# Patient Record
Sex: MALE | Race: BLACK OR AFRICAN AMERICAN | NOT HISPANIC OR LATINO | Employment: FULL TIME | URBAN - METROPOLITAN AREA
[De-identification: names, ages, dates, MRNs, and addresses within clinical notes are randomized per-mention and may not be internally consistent; named-entity substitution may affect disease eponyms.]

---

## 2017-03-01 ENCOUNTER — OFFICE VISIT (OUTPATIENT)
Dept: INTERNAL MEDICINE | Facility: CLINIC | Age: 47
End: 2017-03-01
Payer: COMMERCIAL

## 2017-03-01 ENCOUNTER — HOSPITAL ENCOUNTER (OUTPATIENT)
Dept: RADIOLOGY | Facility: HOSPITAL | Age: 47
Discharge: HOME | End: 2017-03-01
Attending: HOSPITALIST
Payer: COMMERCIAL

## 2017-03-01 VITALS
WEIGHT: 315 LBS | HEART RATE: 75 BPM | HEIGHT: 72 IN | DIASTOLIC BLOOD PRESSURE: 92 MMHG | SYSTOLIC BLOOD PRESSURE: 172 MMHG | BODY MASS INDEX: 42.66 KG/M2

## 2017-03-01 DIAGNOSIS — K21.9 GASTROESOPHAGEAL REFLUX DISEASE WITHOUT ESOPHAGITIS: ICD-10-CM

## 2017-03-01 DIAGNOSIS — G89.29 CHRONIC PAIN OF BOTH KNEES: ICD-10-CM

## 2017-03-01 DIAGNOSIS — M25.562 CHRONIC PAIN OF BOTH KNEES: Primary | ICD-10-CM

## 2017-03-01 DIAGNOSIS — D17.0 LIPOMA OF NECK: ICD-10-CM

## 2017-03-01 DIAGNOSIS — M21.42 PES PLANUS OF BOTH FEET: ICD-10-CM

## 2017-03-01 DIAGNOSIS — M21.41 PES PLANUS OF BOTH FEET: ICD-10-CM

## 2017-03-01 DIAGNOSIS — M25.561 CHRONIC PAIN OF BOTH KNEES: ICD-10-CM

## 2017-03-01 DIAGNOSIS — E66.01 OBESITY, MORBID, BMI 40.0-49.9: ICD-10-CM

## 2017-03-01 DIAGNOSIS — M25.561 CHRONIC PAIN OF BOTH KNEES: Primary | ICD-10-CM

## 2017-03-01 DIAGNOSIS — M25.562 CHRONIC PAIN OF BOTH KNEES: ICD-10-CM

## 2017-03-01 DIAGNOSIS — G89.29 CHRONIC PAIN OF BOTH KNEES: Primary | ICD-10-CM

## 2017-03-01 DIAGNOSIS — I10 ESSENTIAL HYPERTENSION: Chronic | ICD-10-CM

## 2017-03-01 PROCEDURE — 73562 X-RAY EXAM OF KNEE 3: CPT | Mod: 26,50,, | Performed by: RADIOLOGY

## 2017-03-01 PROCEDURE — 99214 OFFICE O/P EST MOD 30 MIN: CPT | Mod: S$GLB,,, | Performed by: HOSPITALIST

## 2017-03-01 PROCEDURE — 3080F DIAST BP >= 90 MM HG: CPT | Mod: S$GLB,,, | Performed by: HOSPITALIST

## 2017-03-01 PROCEDURE — 99999 PR PBB SHADOW E&M-EST. PATIENT-LVL IV: CPT | Mod: PBBFAC,,, | Performed by: HOSPITALIST

## 2017-03-01 PROCEDURE — 3077F SYST BP >= 140 MM HG: CPT | Mod: S$GLB,,, | Performed by: HOSPITALIST

## 2017-03-01 PROCEDURE — 1160F RVW MEDS BY RX/DR IN RCRD: CPT | Mod: S$GLB,,, | Performed by: HOSPITALIST

## 2017-03-01 PROCEDURE — 73562 X-RAY EXAM OF KNEE 3: CPT | Mod: 50,TC

## 2017-03-01 RX ORDER — OMEPRAZOLE 40 MG/1
40 CAPSULE, DELAYED RELEASE ORAL DAILY
Qty: 30 CAPSULE | Refills: 3 | Status: SHIPPED | OUTPATIENT
Start: 2017-03-01 | End: 2017-06-07

## 2017-03-01 RX ORDER — LISINOPRIL 40 MG/1
40 TABLET ORAL DAILY
Qty: 90 TABLET | Refills: 3 | Status: SHIPPED | OUTPATIENT
Start: 2017-03-01 | End: 2018-10-17 | Stop reason: SDUPTHER

## 2017-03-01 RX ORDER — MELOXICAM 7.5 MG/1
7.5 TABLET ORAL DAILY
Qty: 30 TABLET | Refills: 3 | Status: SHIPPED | OUTPATIENT
Start: 2017-03-01 | End: 2017-06-07

## 2017-03-01 RX ORDER — DICLOFENAC SODIUM AND MISOPROSTOL 50; 200 MG/1; UG/1
1 TABLET, DELAYED RELEASE ORAL 2 TIMES DAILY
Qty: 60 TABLET | Refills: 11 | Status: CANCELLED | OUTPATIENT
Start: 2017-03-01 | End: 2018-03-01

## 2017-03-01 NOTE — MR AVS SNAPSHOT
[facility]  [address]  [facility] [address]  Phone: [phone]  Fax: [phone]                  Fabiano Jules Jr.   3/1/2017 2:00 PM   Office Visit    Description:  Male : 1970   Provider:  Sherie Romero MD   Department:  [facility]           Reason for Visit     Knee Pain     Ankle Pain           Diagnoses this Visit        Comments    Chronic pain of both knees    -  Primary     Essential hypertension         Obesity, morbid, BMI 40.0-49.9         Lipoma of neck         Pes planus of both feet         Gastroesophageal reflux disease without esophagitis                To Do List           Goals (5 Years of Data)     None      Follow-Up and Disposition     Return in about 6 weeks (around 2017).       These Medications        Disp Refills Start End    lisinopril (PRINIVIL,ZESTRIL) 40 MG tablet 90 tablet 3 3/1/2017     Take 1 tablet (40 mg total) by mouth once daily. - Oral    Pharmacy: [facility]  [place] - [facility] [address] AT [address] of [facility] Ph #: [phone]       meloxicam (MOBIC) 7.5 MG tablet 30 tablet 3 3/1/2017     Take 1 tablet (7.5 mg total) by mouth once daily. - Oral    Pharmacy: [facility]  [place] - [address] AT [address] of [facility] Ph #: [phone]       omeprazole (PRILOSEC) 40 MG capsule 30 capsule 3 3/1/2017 3/1/2018    Take 1 capsule (40 mg total) by mouth once daily. - Oral    Pharmacy: [facility]  [place] - [address] AT [address] of [facility] Ph #: [phone]         [facility]     [facility] On Call Nurse Care Line -  Assistance  Registered nurses in the [facility] Center provide clinical advisement, health education, appointment booking, and other advisory services.  Call for this free service at [phone].             Medications           Message regarding Medications     Verify the  changes and/or additions to your medication regime listed below are the same as discussed with your clinician today.  If any of these changes or additions are incorrect, please notify your healthcare provider.        START taking these NEW medications        Refills    meloxicam (MOBIC) 7.5 MG tablet 3    Sig: Take 1 tablet (7.5 mg total) by mouth once daily.    Class: Normal    Route: Oral    omeprazole (PRILOSEC) 40 MG capsule 3    Sig: Take 1 capsule (40 mg total) by mouth once daily.    Class: Normal    Route: Oral           Verify that the below list of medications is an accurate representation of the medications you are currently taking.  If none reported, the list may be blank. If incorrect, please contact your healthcare provider. Carry this list with you in case of emergency.           Current Medications     lisinopril (PRINIVIL,ZESTRIL) 40 MG tablet Take 1 tablet (40 mg total) by mouth once daily.    meloxicam (MOBIC) 7.5 MG tablet Take 1 tablet (7.5 mg total) by mouth once daily.    omeprazole (PRILOSEC) 40 MG capsule Take 1 capsule (40 mg total) by mouth once daily.           Clinical Reference Information           Your Vitals Were     BP                   172/92           Blood Pressure          Most Recent Value    BP  (!)  172/92      Allergies as of 3/1/2017     No Known Drug Allergies      Immunizations Administered on Date of Encounter - 3/1/2017     None      Orders Placed During Today's Visit      Normal Orders This Visit    Ambulatory Referral to General Surgery     Ambulatory Referral to Podiatry     Future Labs/Procedures Expected by Expires    Basic metabolic panel  3/1/2017 4/30/2018    Hemoglobin A1c  3/1/2017 4/30/2018    Lipid panel  3/1/2017 4/30/2018    X-Ray Knee 3 View Bilateral  3/1/2017 3/1/2018      [facility] Sign-Up     Activating your [facility] account is as easy as 1-2-3!     1) Visit [email or web address], select Sign Up Now, enter this activation code and your date of birth, then  select Next.  [email or web address]  Expires: 4/15/2017  3:06 PM      2) Create a username and password to use when you visit [facility] in the future and select a security question in case you lose your password and select Next.    3) Enter your e-mail address and click Sign Up!    Additional Information  If you have questions, please e-mail [email or web address] or call [phone] to talk to our [email or web address] staff. Remember, [facility] is NOT to be used for urgent needs. For medical emergencies, dial 911.         Language Assistance Services     ATTENTION: Language assistance services are available, free of charge. Please call [phone].      ATENCIÓN: Si lynettela janet, tiene a nickerson disposición servicios gratuitos de asistencia lingüística. Llame al [phone].     SHAINA Ý: N?u b?n nói Ti?ng Vi?t, có các d?ch v? h? tr? ngôn ng? mi?n phí dành cho b?n. G?i s? [phone].         [facility] complies with applicable Federal civil rights laws and does not discriminate on the basis of race, color, national origin, age, disability, or sex.

## 2017-03-01 NOTE — PROGRESS NOTES
Subjective:       Patient ID: Fabiano Jules Jr. is a 46 y.o. male.    Chief Complaint: Knee Pain (Bilat.) and Ankle Pain (Bilat.)    HPI Comments: 45 y/o M with hx of HTN, RAFAEL on CPAP, and obesity presents for evaluation of 4 week hx of bilateral knee pain. Patient states that the pain started abruptly, it is a constant 10/10 pain.  Pain is located anteriorly (behind his knee cap) as well as in his posterior knee. Pain is not improved with anything. He tried ibuprofen 400mg BID X3 days with no improvement. It is worse at the end of the day and with walking. His gait has not been affected. No prior trauma or injury. He works in the film industry and was recently in [place] doing a lot of walking (several miles per day) , after which the pain may have begun. Patient notes that he is also flat-footed and has tried multiple insoles with no improvement in his chronic foot pain. He is concerned that this may be affecting his knees now.     Obesity screen: BMI 47.57  HTN screen: 170/92- did not take lisinopril 40mg this AM, states that BP is usually WNL at home  Lipid Screen : no prior hx   Diabetes screen:  no prior hx      Review of Systems   Constitutional: Negative for activity change, appetite change and fever.   HENT: Negative for congestion.    Respiratory: Negative for apnea, cough, shortness of breath and wheezing.    Cardiovascular: Negative for chest pain, palpitations and leg swelling.   Gastrointestinal: Negative for abdominal distention and abdominal pain.   Genitourinary: Negative for dysuria.   Musculoskeletal: Positive for arthralgias. Negative for back pain, gait problem, joint swelling and myalgias.        As above   Neurological: Negative for dizziness, weakness and light-headedness.   Psychiatric/Behavioral: Negative for confusion.       Objective:     BP (!) 172/92  Pulse 75  Ht 6' (1.829 m)  Wt (!) 159.1 kg (350 lb 12 oz)  BMI 47.57 kg/m2    Physical Exam   Constitutional: He is oriented to person,  place, and time. He appears well-developed. No distress.   BMI 47.5   Eyes: EOM are normal. Pupils are equal, round, and reactive to light.   Neck: Normal range of motion. Neck supple.   + acanthosis nigricans on posterior neck, + lipoma on posterior neck   Cardiovascular: Normal rate, regular rhythm and normal heart sounds.  Exam reveals no gallop and no friction rub.    No murmur heard.  Pulmonary/Chest: Effort normal and breath sounds normal. No respiratory distress. He has no wheezes. He has no rales.   Abdominal: Soft. Bowel sounds are normal. There is no tenderness.   Musculoskeletal: Normal range of motion. He exhibits no edema or deformity.   Mild tenderness along lateral aspects of the knee bilat, no erythema or inflammation noted, + FROM bilat, - anterior and posterior drawer tests, no significant pain with valgus or varus stress   Lymphadenopathy:     He has no cervical adenopathy.   Neurological: He is alert and oriented to person, place, and time. No cranial nerve deficit.   Skin: Skin is warm and dry. He is not diaphoretic.   Psychiatric: He has a normal mood and affect.       Assessment:       1. Chronic pain of both knees    2. Essential hypertension    3. Obesity, morbid, BMI 40.0-49.9    4. Lipoma of neck    5. Pes planus of both feet    6. Gastroesophageal reflux disease without esophagitis        Plan:       1. Chronic pain of both knees  - ddx: likely arthritic changes  - X-Ray Knee 3 View Bilateral; Future  - meloxicam (MOBIC) 7.5 MG tablet daily  - discussed weight loss    2. Essential hypertension  - cont lisinopril 40mg at this time  - Basic metabolic panel; Future    3. Obesity, morbid, BMI 40.0-49.9  -discussed weight loss  - Basic metabolic panel; Future  - Hemoglobin A1c; Future  - Lipid panel; Future    4. Lipoma of neck  - Ambulatory Referral to General Surgery    5. Pes planus of both feet  - Ambulatory Referral to Podiatry    6. Gastroesophageal reflux disease without esophagitis  -  omeprazole (PRILOSEC) 40 MG capsule daily while on meloxicam therapy    F/u in 6 weeks- f/u for HTN and knee pain. May consider referral to Ortho in the future for steroid injections    Discussed with Dr Ziggy Romero MD  Internal Medicine PGY-2  Pager [phone]

## 2017-03-03 ENCOUNTER — OFFICE VISIT (OUTPATIENT)
Dept: SURGERY | Facility: CLINIC | Age: 47
End: 2017-03-03
Payer: COMMERCIAL

## 2017-03-03 ENCOUNTER — LAB VISIT (OUTPATIENT)
Dept: LAB | Facility: HOSPITAL | Age: 47
End: 2017-03-03
Payer: COMMERCIAL

## 2017-03-03 VITALS
SYSTOLIC BLOOD PRESSURE: 162 MMHG | DIASTOLIC BLOOD PRESSURE: 89 MMHG | WEIGHT: 157 LBS | HEIGHT: 72 IN | TEMPERATURE: 99 F | HEART RATE: 58 BPM | BODY MASS INDEX: 21.26 KG/M2

## 2017-03-03 DIAGNOSIS — I10 ESSENTIAL HYPERTENSION: Chronic | ICD-10-CM

## 2017-03-03 DIAGNOSIS — R22.9 SUBCUTANEOUS MASS: Primary | ICD-10-CM

## 2017-03-03 DIAGNOSIS — E66.01 OBESITY, MORBID, BMI 40.0-49.9: ICD-10-CM

## 2017-03-03 LAB
ANION GAP SERPL CALC-SCNC: 9 MMOL/L
BUN SERPL-MCNC: 12 MG/DL
CALCIUM SERPL-MCNC: 9.6 MG/DL
CHLORIDE SERPL-SCNC: 102 MMOL/L
CHOLEST/HDLC SERPL: 4.3 {RATIO}
CO2 SERPL-SCNC: 28 MMOL/L
CREAT SERPL-MCNC: 0.8 MG/DL
EST. GFR  (AFRICAN AMERICAN): >60 ML/MIN/1.73 M^2
EST. GFR  (NON AFRICAN AMERICAN): >60 ML/MIN/1.73 M^2
ESTIMATED AVG GLUCOSE: 126 MG/DL
GLUCOSE SERPL-MCNC: 87 MG/DL
HBA1C MFR BLD HPLC: 6 %
HDL/CHOLESTEROL RATIO: 23.5 %
HDLC SERPL-MCNC: 183 MG/DL
HDLC SERPL-MCNC: 43 MG/DL
LDLC SERPL CALC-MCNC: 120.4 MG/DL
NONHDLC SERPL-MCNC: 140 MG/DL
POTASSIUM SERPL-SCNC: 4 MMOL/L
SODIUM SERPL-SCNC: 139 MMOL/L
TRIGL SERPL-MCNC: 98 MG/DL

## 2017-03-03 PROCEDURE — 99243 OFF/OP CNSLTJ NEW/EST LOW 30: CPT | Mod: S$GLB,,, | Performed by: PHYSICIAN ASSISTANT

## 2017-03-03 PROCEDURE — 99999 PR PBB SHADOW E&M-EST. PATIENT-LVL III: CPT | Mod: PBBFAC,,, | Performed by: PHYSICIAN ASSISTANT

## 2017-03-03 PROCEDURE — 80048 BASIC METABOLIC PNL TOTAL CA: CPT

## 2017-03-03 PROCEDURE — 83036 HEMOGLOBIN GLYCOSYLATED A1C: CPT

## 2017-03-03 PROCEDURE — 80061 LIPID PANEL: CPT

## 2017-03-03 PROCEDURE — 36415 COLL VENOUS BLD VENIPUNCTURE: CPT

## 2017-03-03 RX ORDER — SODIUM CHLORIDE 9 MG/ML
INJECTION, SOLUTION INTRAVENOUS CONTINUOUS
Status: CANCELLED | OUTPATIENT
Start: 2017-03-03

## 2017-03-03 NOTE — LETTER
March 3, 2017      Sherie Romero MD  [address] [facility] [address]           [facility]  [address]  [facility] [address]  Phone: [phone]          Patient: Fabiano Jules Jr.   MR Number: [id number]   YOB: 1970   Date of Visit: 3/3/2017       Dear Dr. Sherie Romero:    Thank you for referring Fabiano Jules to me for evaluation. Attached you will find relevant portions of my assessment and plan of care.    If you have questions, please do not hesitate to call me. I look forward to following Fabiano Jules along with you.    Sincerely,    Patricia Templeton PA-C    Enclosure  CC:  No Recipients    If you would like to receive this communication electronically, please contact [email or web address] or [phone] to request more information on Screen[employer]er Link access.    For providers and/or their staff who would like to refer a patient to [facility], please contact us through our one-stop-shop provider referral line, [facility] , at [phone].    If you feel you have received this communication in error or would no longer like to receive these types of communications, please e-mail [email or web address]

## 2017-03-03 NOTE — PROGRESS NOTES
History & Physical    SUBJECTIVE:     History of Present Illness:  Patient is a 46 y.o. male presents with subcutaneous mass on posterior neck. He has had a previous lipoma in the same area, which was removed about 3 or 4 years ago. This mass has been growing back slowly and is smaller and slightly more painful than the previous one. Patient denies fevers, chills, nausea, vomiting. Symptoms are aggravated by palpation, moving his head. He is not a diabetic, non-smoker, no anti-coagulant.    Chief Complaint   Patient presents with    Consult    Lipoma       Review of patient's allergies indicates:   Allergen Reactions    No known drug allergies        Current Outpatient Prescriptions   Medication Sig Dispense Refill    lisinopril (PRINIVIL,ZESTRIL) 40 MG tablet Take 1 tablet (40 mg total) by mouth once daily. 90 tablet 3    meloxicam (MOBIC) 7.5 MG tablet Take 1 tablet (7.5 mg total) by mouth once daily. 30 tablet 3    omeprazole (PRILOSEC) 40 MG capsule Take 1 capsule (40 mg total) by mouth once daily. 30 capsule 3     No current facility-administered medications for this visit.        Past Medical History:   Diagnosis Date    CPAP (continuous positive airway pressure) dependence     Hypertension     PONV (postoperative nausea and vomiting)     Sleep apnea      Past Surgical History:   Procedure Laterality Date    CIRCUMCISION      LIPOMA RESECTION      Back of neck     Family History   Problem Relation Age of Onset    Hypertension Mother     Diabetes Father     Cancer Father      Social History   Substance Use Topics    Smoking status: Former Smoker     Quit date: 1/1/2009    Smokeless tobacco: Never Used      Comment: quit smoking in 2010    Alcohol use No        Review of Systems:  Review of Systems   Constitutional: Negative for chills and fever.   HENT: Negative for congestion and sneezing.    Eyes: Negative for photophobia and visual disturbance.   Respiratory: Negative for cough and  shortness of breath.    Cardiovascular: Negative for chest pain and palpitations.   Gastrointestinal: Negative for abdominal pain, constipation, diarrhea, nausea and vomiting.   Endocrine: Negative for cold intolerance and heat intolerance.   Musculoskeletal: Negative for arthralgias and myalgias.   Skin: Negative for rash and wound.   Psychiatric/Behavioral: Negative for agitation.       OBJECTIVE:     Vital Signs (Most Recent)  Temp: 98.5 °F (36.9 °C) (03/03/17 1042)  Pulse: (!) 58 (03/03/17 1042)  BP: (!) 162/89 (03/03/17 1042)  6' (1.829 m)  71.2 kg (157 lb)     Physical Exam:  Physical Exam   Constitutional: He is oriented to person, place, and time. He appears well-developed and well-nourished. No distress.   HENT:   Head: Normocephalic and atraumatic.   Eyes: Pupils are equal, round, and reactive to light.   Neck: Normal range of motion.   Cardiovascular: Normal rate and regular rhythm.    Pulmonary/Chest: Effort normal. No respiratory distress.   Abdominal: Soft. He exhibits no distension. There is no tenderness.   Musculoskeletal: Normal range of motion. He exhibits no edema or tenderness.   Neurological: He is alert and oriented to person, place, and time.   Skin: Skin is warm and dry.   Posterior neck mass - soft subcutaneous mass, non tender, no erythema, well healed scar noted   Psychiatric: He has a normal mood and affect.     ASSESSMENT/PLAN:   45 yo male with mass on posterior neck at site of previous lipoma removal  -plan for mass removal in OR

## 2017-03-03 NOTE — PATIENT INSTRUCTIONS
Understanding a Lipoma    A lipoma is a benign lump under the skin thats made of fat. Its not cancer. It feels soft like rubber when you press it, and in most cases it doesnt hurt. Some people have more than one. A lipoma grows slowly over time and doesnt cause many problems. Lipomas occur most often in adults from ages 40 to 60, and more often in men.  How to say it  Ly-POH-renetta   What causes a lipoma?  The cause is not yet known. Experts are still learning more. It may be partly caused by a problem in a gene. They can run in families. Familial multiple lipomatosis is when 2 or more family members have many lipomas.  Symptoms of a lipoma  The main symptom of a lipoma is a soft lump under the skin that doesnt hurt unless it is pressing on a nerve. It may be small, around 1/4 inch across. Or it may be larger, up to 4 inches across or more.  There are different kinds of lipomas. The most common kind occurs under the skin of the shoulders, chest, back, belly, or under the arms. In some cases, a lipoma can occur on the legs. In rare cases, one may occur deeper in the body or in a muscle.  Treatment for a lipoma  In most cases, a lipoma doesnt need treatment. Your healthcare provider may look at it during regular checkups to see if it changes.  But if the lipoma is painful or you want it removed for cosmetic reasons, it can be removed with surgery. The surgery is called excision. The lipoma will most likely not grow back after surgery. During surgery, the area around the lipoma is numbed. If you have a deep lipoma, you may need medicine called regional anesthesia to numb a larger area. Or you may need medicine called general anesthesia to put you to sleep during the procedure. Then the doctor makes a cut over the area of the lipoma. He or she removes the lump of fat. The cut is then closed with stitches.  Possible complications of a lipoma  A large lipoma inside the body can press on organs, nerves, or other  tissues and cause problems. For example, it can cause problems with breathing or digestion.  Living with a lipoma  Your healthcare provider may look at the lipoma during regular checkups to see if it changes or is causing problems.  When to call your healthcare provider  Call your healthcare provider right away if you have any of these:  · Lipoma that grows quickly, causes pain, or feels hard  · Growth of new lipomas   Date Last Reviewed: 5/1/2016  © 6617-6856 [employer] [address] [place] [address]. All rights reserved. This information is not intended as a substitute for professional medical care. Always follow your healthcare professional's instructions.

## 2017-03-03 NOTE — MR AVS SNAPSHOT
[facility] General Surgery  [address]  [facility] [address]  Phone: [phone]                  Fabiano Jules Jr.   3/3/2017 11:00 AM   Office Visit    Description:  Male : 1970   Provider:  Patricia Templeton PA-C   Department:  [facility]           Reason for Visit     Consult     Lipoma                To Do List           Future Appointments        Provider Department Dept Phone    3/3/2017 11:00 AM Patricia Tempelton PA-C [facility] Surgery [phone]    3/14/2017 3:30 PM Maxx Randall DPM [facility] Podiatry [phone]      Goals (5 Years of Data)     None      [facility]     [facility] On Call Nurse Care Line -  Assistance  Registered nurses in the [facility] On Call Center provide clinical advisement, health education, appointment booking, and other advisory services.  Call for this free service at [phone].             Medications           Message regarding Medications     Verify the changes and/or additions to your medication regime listed below are the same as discussed with your clinician today.  If any of these changes or additions are incorrect, please notify your healthcare provider.             Verify that the below list of medications is an accurate representation of the medications you are currently taking.  If none reported, the list may be blank. If incorrect, please contact your healthcare provider. Carry this list with you in case of emergency.           Current Medications     lisinopril (PRINIVIL,ZESTRIL) 40 MG tablet Take 1 tablet (40 mg total) by mouth once daily.    meloxicam (MOBIC) 7.5 MG tablet Take 1 tablet (7.5 mg total) by mouth once daily.    omeprazole (PRILOSEC) 40 MG capsule Take 1 capsule (40 mg total) by mouth once daily.           Clinical Reference Information           Your Vitals Were     BP Pulse Temp Height Weight BMI    162/89 58 98.5 °F (36.9 °C) (Oral) 6' (1.829 m) 71.2 kg (157 lb) 21.29 kg/m2      Blood  Pressure          Most Recent Value    BP  (!)  162/89      Allergies as of 3/3/2017     No Known Drug Allergies      Immunizations Administered on Date of Encounter - 3/3/2017     None      [facility] Sign-Up     Activating your [facility] account is as easy as 1-2-3!     1) Visit [email or web address], select Sign Up Now, enter this activation code and your date of birth, then select Next.  [email or web address]  Expires: 4/15/2017  3:06 PM      2) Create a username and password to use when you visit [facility] in the future and select a security question in case you lose your password and select Next.    3) Enter your e-mail address and click Sign Up!    Additional Information  If you have questions, please e-mail [email or web address] or call [phone] to talk to our [facility] staff. Remember, [facility] is NOT to be used for urgent needs. For medical emergencies, dial 911.         Instructions      Understanding a Lipoma    A lipoma is a benign lump under the skin thats made of fat. Its not cancer. It feels soft like rubber when you press it, and in most cases it doesnt hurt. Some people have more than one. A lipoma grows slowly over time and doesnt cause many problems. Lipomas occur most often in adults from ages 40 to 60, and more often in men.  How to say it  Ly-POH-renetta   What causes a lipoma?  The cause is not yet known. Experts are still learning more. It may be partly caused by a problem in a gene. They can run in families. Familial multiple lipomatosis is when 2 or more family members have many lipomas.  Symptoms of a lipoma  The main symptom of a lipoma is a soft lump under the skin that doesnt hurt unless it is pressing on a nerve. It may be small, around 1/4 inch across. Or it may be larger, up to 4 inches across or more.  There are different kinds of lipomas. The most common kind occurs under the skin of the shoulders, chest, back, belly, or under the arms. In some cases, a lipoma can occur on the legs. In  rare cases, one may occur deeper in the body or in a muscle.  Treatment for a lipoma  In most cases, a lipoma doesnt need treatment. Your healthcare provider may look at it during regular checkups to see if it changes.  But if the lipoma is painful or you want it removed for cosmetic reasons, it can be removed with surgery. The surgery is called excision. The lipoma will most likely not grow back after surgery. During surgery, the area around the lipoma is numbed. If you have a deep lipoma, you may need medicine called regional anesthesia to numb a larger area. Or you may need medicine called general anesthesia to put you to sleep during the procedure. Then the doctor makes a cut over the area of the lipoma. He or she removes the lump of fat. The cut is then closed with stitches.  Possible complications of a lipoma  A large lipoma inside the body can press on organs, nerves, or other tissues and cause problems. For example, it can cause problems with breathing or digestion.  Living with a lipoma  Your healthcare provider may look at the lipoma during regular checkups to see if it changes or is causing problems.  When to call your healthcare provider  Call your healthcare provider right away if you have any of these:  · Lipoma that grows quickly, causes pain, or feels hard  · Growth of new lipomas   Date Last Reviewed: 5/1/2016  © 9187-2248 [employer]. [address] [place] [address]. All rights reserved. This information is not intended as a substitute for professional medical care. Always follow your healthcare professional's instructions.             Language Assistance Services     ATTENTION: Language assistance services are available, free of charge. Please call [phone].      ATENCIÓN: Si habla español, tiene a nickerson disposición servicios gratuitos de asistencia lingüística. Llame al [phone].     SHAINA Ý: N?u b?n nói Ti?ng Vi?t, có các d?ch v? h? tr? ngôn ng? mi?n phí dành cho  b?n. G?i s? [phone].         [facility] complies with applicable Federal civil rights laws and does not discriminate on the basis of race, color, national origin, age, disability, or sex.

## 2017-03-09 ENCOUNTER — TELEPHONE (OUTPATIENT)
Dept: CRITICAL CARE MEDICINE | Facility: HOSPITAL | Age: 47
End: 2017-03-09

## 2017-03-10 NOTE — TELEPHONE ENCOUNTER
Called patient regarding lab results and Xrays. No evidence of arthritis. Labs are unremarkable, except for A1C of 6.0 showing evidence of pre-diabetes. Discussed mediterranean diet for weight loss and prevention of diabetes development. Knee pain is doing better with daily meloxicam , Cr was WNL.     Sherie Romero MD  Internal Medicine PGY-2  Pager [phone]

## 2017-03-14 ENCOUNTER — OFFICE VISIT (OUTPATIENT)
Dept: PODIATRY | Facility: CLINIC | Age: 47
End: 2017-03-14
Payer: COMMERCIAL

## 2017-03-14 VITALS
HEIGHT: 72 IN | WEIGHT: 315 LBS | SYSTOLIC BLOOD PRESSURE: 142 MMHG | BODY MASS INDEX: 42.66 KG/M2 | HEART RATE: 63 BPM | DIASTOLIC BLOOD PRESSURE: 90 MMHG

## 2017-03-14 DIAGNOSIS — M76.821 POSTERIOR TIBIAL TENDON DYSFUNCTION (PTTD) OF BOTH LOWER EXTREMITIES: Primary | ICD-10-CM

## 2017-03-14 DIAGNOSIS — M76.829: ICD-10-CM

## 2017-03-14 DIAGNOSIS — M62.469 GASTROCNEMIUS EQUINUS, UNSPECIFIED LATERALITY: ICD-10-CM

## 2017-03-14 DIAGNOSIS — M21.41 PES PLANUS OF BOTH FEET: ICD-10-CM

## 2017-03-14 DIAGNOSIS — M76.822 POSTERIOR TIBIAL TENDON DYSFUNCTION (PTTD) OF BOTH LOWER EXTREMITIES: Primary | ICD-10-CM

## 2017-03-14 DIAGNOSIS — M21.42 PES PLANUS OF BOTH FEET: ICD-10-CM

## 2017-03-14 PROCEDURE — 3080F DIAST BP >= 90 MM HG: CPT | Mod: S$GLB,,, | Performed by: PODIATRIST

## 2017-03-14 PROCEDURE — 99203 OFFICE O/P NEW LOW 30 MIN: CPT | Mod: S$GLB,,, | Performed by: PODIATRIST

## 2017-03-14 PROCEDURE — 99999 PR PBB SHADOW E&M-EST. PATIENT-LVL III: CPT | Mod: PBBFAC,,, | Performed by: PODIATRIST

## 2017-03-14 PROCEDURE — 1160F RVW MEDS BY RX/DR IN RCRD: CPT | Mod: S$GLB,,, | Performed by: PODIATRIST

## 2017-03-14 PROCEDURE — 3077F SYST BP >= 140 MM HG: CPT | Mod: S$GLB,,, | Performed by: PODIATRIST

## 2017-03-14 RX ORDER — METHYLPREDNISOLONE 4 MG/1
TABLET ORAL
Qty: 1 PACKAGE | Refills: 1 | Status: SHIPPED | OUTPATIENT
Start: 2017-03-14 | End: 2017-06-07

## 2017-03-14 NOTE — MR AVS SNAPSHOT
[facility] - Podiatry  [address]  [facility] [address]  Phone: [phone]                  Fabiano Jules Jr.   3/14/2017 3:30 PM   Office Visit    Description:  Male : 1970   Provider:  Maxx Randall DPM   Department:  [facility]           Reason for Visit     Foot Problem     Foot Pain           Diagnoses this Visit        Comments    Posterior tibial tendon dysfunction (PTTD) of both lower extremities    -  Primary     Pes planus of both feet         Gastrocnemius equinus, unspecified laterality         Tibial tendonitis, posterior, unspecified laterality                To Do List           Future Appointments        Provider Department Dept Phone    2017 3:30 PM [phone]      Goals (5 Years of Data)     None       These Medications        Disp Refills Start End    methylPREDNISolone (MEDROL DOSEPACK) 4 mg tablet 1 Package 1 3/14/2017     Use as instructed on dose pack    Pharmacy: [facility]/pharmacy #[id number] - [place], [place] - [address] [facility] Ph #: [phone]         [facility] On Call     [facility] On Call Nurse Care Line -  Assistance  Registered nurses in the [facility] provide clinical advisement, health education, appointment booking, and other advisory services.  Call for this free service at [phone].             Medications           Message regarding Medications     Verify the changes and/or additions to your medication regime listed below are the same as discussed with your clinician today.  If any of these changes or additions are incorrect, please notify your healthcare provider.        START taking these NEW medications        Refills    methylPREDNISolone (MEDROL DOSEPACK) 4 mg tablet 1    Sig: Use as instructed on dose pack    Class: Normal           Verify that the below list of medications is an accurate representation of the medications you are currently taking.  If none reported, the list may be  blank. If incorrect, please contact your healthcare provider. Carry this list with you in case of emergency.           Current Medications     lisinopril (PRINIVIL,ZESTRIL) 40 MG tablet Take 1 tablet (40 mg total) by mouth once daily.    meloxicam (MOBIC) 7.5 MG tablet Take 1 tablet (7.5 mg total) by mouth once daily.    omeprazole (PRILOSEC) 40 MG capsule Take 1 capsule (40 mg total) by mouth once daily.    methylPREDNISolone (MEDROL DOSEPACK) 4 mg tablet Use as instructed on dose pack           Clinical Reference Information           Your Vitals Were     BP Pulse Height Weight BMI    142/90 63 6' (1.829 m) 158 kg (348 lb 4.8 oz) 47.24 kg/m2      Blood Pressure          Most Recent Value    BP  (!)  142/90      Allergies as of 3/14/2017     No Known Drug Allergies      Immunizations Administered on Date of Encounter - 3/14/2017     None      Orders Placed During Today's Visit      Normal Orders This Visit    ANKLE FOOT ORTHOSIS FOR HOME USE     Future Labs/Procedures Expected by Expires    X-Ray Foot Complete Bilateral  3/14/2017 3/14/2018      [facility] Sign-Up     Activating your [facility] account is as easy as 1-2-3!     1) Visit [email or web address], select Sign Up Now, enter this activation code and your date of birth, then select Next.  [email or web address]  Expires: 4/15/2017  4:06 PM      2) Create a username and password to use when you visit [facility] in the future and select a security question in case you lose your password and select Next.    3) Enter your e-mail address and click Sign Up!    Additional Information  If you have questions, please e-mail [email or web address] or call [phone] to talk to our [facility] staff. Remember, [facility] is NOT to be used for urgent needs. For medical emergencies, dial 911.         Language Assistance Services     ATTENTION: Language assistance services are available, free of charge. Please call [phone].      ATENCIÓN: Si lynettela español, tiene a nickerson disposición  servicios gratuitos de asistencia lingüística. Chapito curtis [phone].     SHAINA Ý: N?u b?n nói Ti?ng Vi?t, có các d?ch v? h? tr? ngôn ng? mi?n phí dành cho b?n. G?i s? [phone].         [facility] complies with applicable Federal civil rights laws and does not discriminate on the basis of race, color, national origin, age, disability, or sex.

## 2017-03-14 NOTE — LETTER
March 19, 2017      Sherie Romero MD  [address]  [facility] [address]           [facility]  [address]  [facility] [address]  Phone: [phone]          Patient: Fabiano Jules Jr.   MR Number: [id number]   YOB: 1970   Date of Visit: 3/14/2017       Dear Dr. Sherie Romero:    Thank you for referring Fabiano Jules to me for evaluation. Attached you will find relevant portions of my assessment and plan of care.    If you have questions, please do not hesitate to call me. I look forward to following Fabiano Jules along with you.    Sincerely,    Maxx Randall, STEPHANIE    Enclosure  CC:  No Recipients    If you would like to receive this communication electronically, please contact [email or web address] or [phone] to request more information on [facility] access.    For providers and/or their staff who would like to refer a patient to [facility], please contact us through our one-stop-shop provider referral line, [facility], at [phone].    If you feel you have received this communication in error or would no longer like to receive these types of communications, please e-mail [email or web address]

## 2017-03-19 NOTE — PROGRESS NOTES
Subjective:      Patient ID: Fabiano Jules Jr. is a 47 y.o. male.    Chief Complaint: Foot Problem (bilateral/ PCP Dr. Romero) and Foot Pain    Fabiano is a 47 y.o. male who presents to the podiatry clinic  with complaint of  bilateral foot pain. Onset of the symptoms was several years ago. Precipitating event: increased activity and flat feet. Current symptoms include: ability to bear weight, but with some pain, pain at the medial aspect of the ankle, stiffness, swelling and worsening symptoms after a period of activity. Aggravating factors: standing and walking. Symptoms have gradually worsened. Patient has had no prior foot problems. Evaluation to date: none. Treatment to date: NSAID'S, rest, OTC orthotics, supportive shoes or boots, elastic supporter which is not very effective and rest. Patients rates pain 6/10 on pain scale.    Past Medical History:   Diagnosis Date    CPAP (continuous positive airway pressure) dependence     Hypertension     PONV (postoperative nausea and vomiting)     Sleep apnea        Past Surgical History:   Procedure Laterality Date    CIRCUMCISION      LIPOMA RESECTION      Back of neck       Family History   Problem Relation Age of Onset    Hypertension Mother     Diabetes Father     Cancer Father        Social History     Social History    Marital status:      Spouse name: N/A    Number of children: 4    Years of education: N/A     Occupational History     [employer]     Social History Main Topics    Smoking status: Former Smoker     Quit date: 1/1/2009    Smokeless tobacco: Never Used      Comment: quit smoking in 2010    Alcohol use No    Drug use: No    Sexual activity: Not Asked     Other Topics Concern    None     Social History Narrative       Current Outpatient Prescriptions   Medication Sig Dispense Refill    lisinopril (PRINIVIL,ZESTRIL) 40 MG tablet Take 1 tablet (40 mg total) by mouth once daily. 90 tablet 3    meloxicam (MOBIC) 7.5 MG tablet Take 1  tablet (7.5 mg total) by mouth once daily. 30 tablet 3    omeprazole (PRILOSEC) 40 MG capsule Take 1 capsule (40 mg total) by mouth once daily. 30 capsule 3    methylPREDNISolone (MEDROL DOSEPACK) 4 mg tablet Use as instructed on dose pack 1 Package 1     No current facility-administered medications for this visit.        Review of patient's allergies indicates:   Allergen Reactions    No known drug allergies          Review of Systems   Constitution: Negative for chills, fever, weakness, malaise/fatigue and night sweats.   Cardiovascular: Negative for chest pain, leg swelling, orthopnea and palpitations.   Respiratory: Negative for cough, shortness of breath and wheezing.    Skin: Negative for color change, itching, poor wound healing and rash.   Musculoskeletal: Positive for joint pain and joint swelling. Negative for arthritis, gout, muscle weakness and myalgias.   Gastrointestinal: Negative for abdominal pain, constipation and nausea.   Neurological: Negative for disturbances in coordination, dizziness, focal weakness, numbness and tremors.           Objective:      Physical Exam   Constitutional: He is oriented to person, place, and time. Vital signs are normal. He appears well-developed. He is cooperative. No distress.   Cardiovascular: Intact distal pulses.    Pulses:       Dorsalis pedis pulses are 2+ on the right side, and 2+ on the left side.        Posterior tibial pulses are 2+ on the right side, and 2+ on the left side.   Musculoskeletal:        Right ankle: Normal.        Left ankle: Normal.        Right foot: There is tenderness and deformity. There is normal range of motion, no bony tenderness, normal capillary refill and no crepitus.        Left foot: There is tenderness and deformity. There is normal range of motion, no bony tenderness, normal capillary refill and no crepitus.   Tenderness but no edema along course of PT tendon at ankle and navicular, bilat.     Ankle dorsiflexion is less than 5  degrees past neurtral with knee extended. 10 degrees of DF achieved with knee flexion.  Pes planus with hindfoot valgus, FF varus and abduction.  No pain with ankle or STJ ROM.  Negative anterior drawer at the ankle bilat.  Able to perform double but not single heel rise without pain.       Neurological: He is alert and oriented to person, place, and time. He has normal strength. No sensory deficit. He exhibits normal muscle tone. Gait normal.   Reflex Scores:       Achilles reflexes are 2+ on the right side and 2+ on the left side.  Negative Tinels sign and Pipo's click, bilat.   Skin: Skin is intact. No ecchymosis and no lesion noted. No erythema. Nails show no clubbing.   No foot and ankle edema.  No open wounds.               Assessment:       Encounter Diagnoses   Name Primary?    Posterior tibial tendon dysfunction (PTTD) of both lower extremities Yes    Pes planus of both feet     Gastrocnemius equinus, unspecified laterality     Tibial tendonitis, posterior, unspecified laterality          Plan:       Fabiano was seen today for foot problem and foot pain.    Diagnoses and all orders for this visit:    Posterior tibial tendon dysfunction (PTTD) of both lower extremities  -     X-Ray Foot Complete Bilateral; Future  -     methylPREDNISolone (MEDROL DOSEPACK) 4 mg tablet; Use as instructed on dose pack  -     ANKLE FOOT ORTHOSIS FOR HOME USE    Pes planus of both feet  -     X-Ray Foot Complete Bilateral; Future  -     methylPREDNISolone (MEDROL DOSEPACK) 4 mg tablet; Use as instructed on dose pack  -     ANKLE FOOT ORTHOSIS FOR HOME USE    Gastrocnemius equinus, unspecified laterality  -     X-Ray Foot Complete Bilateral; Future  -     methylPREDNISolone (MEDROL DOSEPACK) 4 mg tablet; Use as instructed on dose pack  -     ANKLE FOOT ORTHOSIS FOR HOME USE    Tibial tendonitis, posterior, unspecified laterality  -     X-Ray Foot Complete Bilateral; Future  -     methylPREDNISolone (MEDROL DOSEPACK) 4 mg  tablet; Use as instructed on dose pack  -     ANKLE FOOT ORTHOSIS FOR HOME USE      I counseled the patient on his conditions, their implications and medical management.    RICE  Dispensed, fitted and gait trained with ankle brace bilat. Instructed to wear with supportive shoe at all times when placing weight on the foot.    Continue with supportives shoes.  Custom AFO to stabilize ankle/hindfoot.  Consider walking boot/cast as needed. Declines today.    Side effects of medication(s) were discussed in detail and patient voiced understanding. Patient will call back for any issues or complications.     .

## 2017-03-19 NOTE — PATIENT INSTRUCTIONS
Posterior Tibial Tendon Dysfunction  Posterior tibial tendon dysfunction is one of the most common problems of the foot and ankle. It occurs when the posterior tibial tendon becomes inflamed or torn. As a result, the tendon may not be able to provide stability and support for the arch of the foot, resulting in flatfoot.  Most patients can be treated without surgery, using orthotics and braces. If orthotics and braces do not provide relief, surgery can be an effective way to help with the pain. Surgery might be as simple as removing the inflamed tissue or repairing a simple tear. However, more often than not, surgery is very involved, and many patients will notice some limitation in activity after surgery.  Anatomy  The posterior tibial tendon is one of the most important tendons of the leg. A tendon attaches muscles to bones, and the posterior tibial tendon attaches the calf muscle to the bones on the inside of the foot. The main function of the tendon is to hold up the arch and support the foot when walking.    The posterior tibial tendon attaches the calf muscle to the bones on the inside of the foot.  Top of page  Cause  An acute injury, such as from a fall, can tear the posterior tibial tendon or cause it to become inflamed. The tendon can also tear due to overuse. For example, people who do high-impact sports, such as basketball, tennis, or soccer, may have tears of the tendon from repetitive use. Once the tendon becomes inflamed or torn, the arch will slowly fall (collapse) over time.  Posterior tibial tendon dysfunction is more common in women and in people older than 40 years of age. Additional risk factors include obesity, diabetes, and hypertension.  Top of page  Symptoms   Pain along the inside of the foot and ankle, where the tendon lies. This may or may not be associated with swelling in the area.   Pain that is worse with activity. High-intensity or high-impact activities, such as running, can be  "very difficult. Some patients can have trouble walking or standing for a long time.   Pain on the outside of the ankle. When the foot collapses, the heel bone may shift to a new position outwards. This can put pressure on the outside ankle bone. The same type of pain is found in arthritis in the back of the foot.    The most common location of pain is along the course of the posterior tibial tendon (yellow line), which travels along the back and inside of the foot and ankle.  Top of page  Doctor Examination  Medical History and Physical Examination    This patient has posterior tibial tendon dysfunction with a flatfoot deformity.(Left) The front of her foot points outward. (Right) The "too many toes" sign. Even the big toe can be seen from the back of this patient's foot.  Your doctor will take a complete medical history and ask about your symptoms. During the foot and ankle examination, your doctor will check whether these signs are present.   Swelling along the posterior tibial tendon. This swelling is from the lower leg to the inside of the foot and ankle.   A change in the shape of the foot. The heel may be tilted outward and the arch will have collapsed.   "Too many toes" sign. When looking at the heel from the back of the patient, usually only the fifth toe and half of the fourth toe are seen. In a flatfoot deformity, more of the little toe can be seen.    This patient is able to perform a single limb heel rise on the right leg.   "Single limb heel rise" test. Being able to stand on one leg and come up on "tiptoes" requires a healthy posterior tibial tendon. When a patient cannot stand on one leg and raise the heel, it suggests a problem with the posterior tibial tendon.   Limited flexibility. The doctor may try to move the foot from side to side. The treatment plan for posterior tibial tendon tears varies depending on the flexibility of the foot. If there is no motion or if it is limited, there will " need to be a different treatment than with a flexible foot.   The range of motion of the ankle is affected. Upward motion of the ankle (dorsiflexion) can be limited in flatfoot. The limited motion is tied to tightness of the calf muscles.  Imaging Tests  Other tests which may help your doctor confirm your diagnosis include:  X-rays. These imaging tests provide detailed pictures of dense structures, like bone. They are useful to detect arthritis. If surgery is needed, they help the doctor make measurements to determine what surgery would most helpful.    (Top) An x-ray of a normal foot. Note that the lines are parallel, indicating a normal arch.(Bottom) In this x-ray the lines diverge, which is consistent with flatfoot deformity.  Magnetic resonance imaging (MRI). These studies can create images of soft tissues like the tendons and muscles. An MRI may be ordered if the diagnosis is in doubt.  Computerized tomography scan (CT Scan). These scans are more detailed than x-rays. They create cross-section images of the foot and ankle. Because arthritis of the back of the foot has similar symptoms to posterior tibial tendon dysfunction, a CT scan may be ordered to look for arthritis.  Ultrasound. An ultrasound uses high-frequency sound waves that echo off the body. This creates a picture of the bone and tissue. Sometimes more information is needed to make a diagnosis. An ultrasound can be ordered to show the posterior tibial tendon.  Top of page  Nonsurgical Treatment  Symptoms will be relieved in most patients with appropriate nonsurgical treatment. Pain may last longer than 3 months even with early treatment. For patients who have had pain for many months, it is not uncommon for the pain to last another 6 months after treatment starts.  Rest  Decreasing or even stopping activities that worsen the pain is the first step. Switching to low-impact exercise is helpful. Biking, elliptical machines, or swimming do not put a  large impact load on the foot, and are generally tolerated by most patients.  Ice  Apply cold packs on the most painful area of the posterior tibial tendon for 20 minutes at a time, 3 or 4 times a day to keep down swelling. Do not apply ice directly to the skin. Placing ice over the tendon immediately after completing an exercise helps to decrease the inflammation around the tendon.  Nonsteroidal Anti-inflammatory Medication  Drugs, such as ibuprofen or naproxen, reduce pain and inflammation. Taking such medications about a half of an hour before an exercise activity helps to limit inflammation around the tendon. The thickening of the tendon that is present is degenerated tendon. It will not go away with medication. Talk with your primary care doctor if the medication is used for more than 1 month.  Immobilization  A short leg cast or walking boot may be used for 6 to 8 weeks. This allows the tendon to rest and the swelling to go down. However, a cast causes the other muscles of the leg to atrophy (decrease in strength) and thus is only used if no other conservative treatment works.  Orthotics  Most people can be helped with orthotics and braces. An orthotic is a shoe insert. It is the most common nonsurgical treatment for a flatfoot. An over-the-counter orthotic may be enough for patients with a mild change in the shape of the foot. A custom orthotic is required in patients who have moderate to severe changes in the shape of the foot. The custom orthotic is more costly, but it allows the doctor to better control the position the foot.  Braces  A lace-up ankle brace may help mild to moderate flatfoot. The brace would support the joints of the back of the foot and take tension off of the tendon. A custom-molded leather brace is needed in severe flatfoot that is stiff or arthritic. The brace can help some patients avoid surgery.  Physical Therapy  Physical therapy that strengthens the tendon can help patients with  mild to moderate disease of the posterior tibial tendon.  Steroid Injection  Cortisone is a very powerful anti-inflammatory medicine that your doctor may consider injecting around the tendon. A cortisone injection into the posterior tibial tendon is not normally done. It carries a risk of tendon rupture. Discuss this risk with your doctor before getting an injection.  Top of page  Surgical Treatment  Surgery should only be done if the pain does not get better after 6 months of appropriate treatment. The type of surgery depends on where tendonitis is located and how much the tendon is damaged. Surgical reconstruction can be extremely complex. The following is a list of the more commonly used operations. Additional procedures may also be required.  Gastrocnemius Recession or Lengthening of the Achilles Tendon  This is a surgical lengthening of the calf muscles. It is useful in patients who have limited ability to move the ankle up. This surgery can help prevent flatfoot from returning, but does create some weakness with pushing off and climbing stairs. Complication rates are low but can include nerve damage and weakness. This surgery is typically performed together with other techniques for treating flatfoot.  Tenosynovectomy (Cleaning the Tendon)  This surgery is used when there is very mild disease, the shape of the foot has not changed, and there is pain and swelling over the tendon. The surgeon will clean away and remove the inflamed tissue (synovium) surrounding the tendon. This can be performed alone or in addition to other procedures. The main risk of this surgery is that the tendon may continue to degenerate and the pain may return.  Tendon Transfer  Tendon transfer can be done in flexible flatfoot to recreate the function of the damaged posterior tibial tendon. In this procedure, the diseased posterior tibial tendon is removed and replaced with another tendon from the foot, or, if the disease is not too  "significant in the posterior tibial tendon, the transferred tendon is attached to the preserved (not removed) posterior tibial tendon.  One of two possible tendons are commonly used to replace the posterior tibial tendon. One tendon helps the big toe point down and the other one helps the little toes move down. After the transfer, the toes will still be able to move and most patients will not notice a change in how they walk.  Although the transferred tendon can substitute for the posterior tibial tendon, the foot still is not normal. Some people may not be able to run or return to competitive sports after surgery. Patients who need tendon transfer surgery are typically not able to participate in many sports activities before surgery because of pain and tendon disease.  Osteotomy (Cutting and Shifting Bones)  An osteotomy can change the shape of a flexible flatfoot to recreate a more "normal" arch shape. One or two bone cuts may be required, typically of the heel bone (calcaneus).  If flatfoot is severe, a bone graft may be needed. The bone graft will lengthen the outside of the foot. Other bones in the middle of the foot also may be involved. They may be cut or fused to help support the arch and prevent the flatfoot from returning. Screws or plates hold the bones in places while they heal.    X-ray of a foot as viewed from the side in a patient with a more severe deformity. This patient required fusion of the middle of the foot in addition to a tendon transfer and cut in the heel bone.  Fusion  Sometimes flatfoot is stiff or there is also arthritis in the back of the foot. In these cases, the foot will not be flexible enough to be treated successfully with bone cuts and tendon transfers. Fusion (arthrodesis) of a joint or joints in the back of the foot is used to realign the foot and make it more "normal" shaped and remove any arthritis. Fusion involves removing any remaining cartilage in the joint. Over time, this " "lets the body "glue" the joints together so that they become one large bone without a joint, which eliminates joint pain. Screws or plates hold the bones in places while they heal.    This x-ray shows a very stiff flatfoot deformity. A fusion of the three joints in the back of the foot is required and can successfully recreate the arch and allow restoration of function.  Side-to-side motion is lost after this operation. Patients who typically need this surgery do not have a lot of motion and will see an improvement in the way they walk. The pain they may experience on the outside of the ankle joint will be gone due to permanent realignment of the foot. The up and down motion of the ankle is not greatly affected. With any fusion, the body may fail to "glue" the bones together. This may require another operation.  Complications  The most common complication is that pain is not completely relieved. Nonunion (failure of the body to "glue" the bones together) can be a complication with both osteotomies and fusions. Wound infection is a possible complication, as well.  Surgical Outcome  Most patients have good results from surgery. The main factors that determine surgical outcome are the amount of motion possible before surgery and the severity of the flatfoot. The more severe the problem, the longer the recovery time and the less likely a patient will be able to return to sports. In many patients, it may be 12 months before there is any great improvement in pain.      "

## 2017-05-24 ENCOUNTER — PROCEDURE VISIT (OUTPATIENT)
Dept: SURGERY | Facility: CLINIC | Age: 47
End: 2017-05-24
Payer: COMMERCIAL

## 2017-05-24 VITALS
TEMPERATURE: 98 F | DIASTOLIC BLOOD PRESSURE: 102 MMHG | BODY MASS INDEX: 42.66 KG/M2 | WEIGHT: 315 LBS | HEART RATE: 51 BPM | HEIGHT: 72 IN | SYSTOLIC BLOOD PRESSURE: 162 MMHG

## 2017-05-24 DIAGNOSIS — D17.0 LIPOMA OF NECK: Primary | ICD-10-CM

## 2017-05-24 PROCEDURE — 21552 EXC NECK LES SC 3 CM/>: CPT | Mod: S$GLB,,, | Performed by: SURGERY

## 2017-05-24 PROCEDURE — 88304 TISSUE EXAM BY PATHOLOGIST: CPT | Performed by: PATHOLOGY

## 2017-05-24 PROCEDURE — 88304 TISSUE EXAM BY PATHOLOGIST: CPT | Mod: 26,,, | Performed by: PATHOLOGY

## 2017-05-24 NOTE — PROCEDURES
"Exc, Tumor Soft Tissue  Date/Time: 5/24/2017 12:04 PM  Performed by: DIXIE HARDING  Authorized by: DIXIE HARDING     Consent Done?:  Yes (Written)  Time out: Immediately prior to procedure a "time out" was called to verify the correct patient, procedure, equipment, support staff and site/side marked as required.    INDICATIONS:: tumor.  LOCATION:  Body area:  Neck / anterior thorax    PREP:Position:  Prone    ANESTHESIA:  Anesthesia:  Local infiltration  Local anesthetic:  Lidocaine 1% without epinephrine    PROCEDURE DETAILS:  Excision type:  Tumor  Excision depth:  Subcutaneous  Excision size (cm):  3  Scalpel size:  10  Incision type:  Single straight  Specimens?: Yes    Specimens submitted to pathology.  Hemostasis was obtained.  Estimated blood loss (cc):  20  Wound closure:  Simple  Wound repair size (cm):  3  Sutures:  3-0 Vicryl and 4-0 Monocryl  Sterile dressings:  Mastisol, Telfa gauze, Steri-strips and Tegaderm  Patient tolerated the procedure well with no immediate complications.  Post-operative instructions were provided for the patient.  Patient was discharged and will follow up for wound check and pathology results. and Patient was discharged and will follow up for wound check.      "

## 2017-06-07 ENCOUNTER — OFFICE VISIT (OUTPATIENT)
Dept: SURGERY | Facility: CLINIC | Age: 47
End: 2017-06-07
Payer: COMMERCIAL

## 2017-06-07 VITALS
HEART RATE: 65 BPM | TEMPERATURE: 98 F | BODY MASS INDEX: 42.66 KG/M2 | HEIGHT: 72 IN | WEIGHT: 315 LBS | DIASTOLIC BLOOD PRESSURE: 82 MMHG | SYSTOLIC BLOOD PRESSURE: 136 MMHG

## 2017-06-07 DIAGNOSIS — D17.0 LIPOMA OF NECK: Primary | ICD-10-CM

## 2017-06-07 PROCEDURE — 99999 PR PBB SHADOW E&M-EST. PATIENT-LVL III: CPT | Mod: PBBFAC,,, | Performed by: SURGERY

## 2017-06-07 PROCEDURE — 99024 POSTOP FOLLOW-UP VISIT: CPT | Mod: S$GLB,,, | Performed by: SURGERY

## 2017-06-07 NOTE — PROGRESS NOTES
S/p excision of posterior neck lipoma.    Doing well    Incision healed    Path benign    Plan D/C   F/u prn

## 2018-10-17 ENCOUNTER — OFFICE VISIT (OUTPATIENT)
Dept: URGENT CARE | Facility: CLINIC | Age: 48
End: 2018-10-17
Payer: COMMERCIAL

## 2018-10-17 VITALS
OXYGEN SATURATION: 97 % | RESPIRATION RATE: 16 BRPM | TEMPERATURE: 98 F | SYSTOLIC BLOOD PRESSURE: 167 MMHG | HEART RATE: 97 BPM | DIASTOLIC BLOOD PRESSURE: 98 MMHG | HEIGHT: 72 IN | BODY MASS INDEX: 40.63 KG/M2 | WEIGHT: 300 LBS

## 2018-10-17 DIAGNOSIS — I10 ESSENTIAL HYPERTENSION: Primary | ICD-10-CM

## 2018-10-17 PROCEDURE — 3008F BODY MASS INDEX DOCD: CPT | Mod: CPTII,S$GLB,, | Performed by: INTERNAL MEDICINE

## 2018-10-17 PROCEDURE — 3080F DIAST BP >= 90 MM HG: CPT | Mod: CPTII,S$GLB,, | Performed by: INTERNAL MEDICINE

## 2018-10-17 PROCEDURE — 99212 OFFICE O/P EST SF 10 MIN: CPT | Mod: S$GLB,,, | Performed by: INTERNAL MEDICINE

## 2018-10-17 PROCEDURE — 3077F SYST BP >= 140 MM HG: CPT | Mod: CPTII,S$GLB,, | Performed by: INTERNAL MEDICINE

## 2018-10-17 RX ORDER — LISINOPRIL 40 MG/1
40 TABLET ORAL DAILY
Qty: 90 TABLET | Refills: 3 | Status: SHIPPED | OUTPATIENT
Start: 2018-10-17 | End: 2020-01-09

## 2018-10-18 NOTE — PATIENT INSTRUCTIONS
"  Discharge Instructions for High Blood Pressure (Hypertension)  You have been diagnosed with high blood pressure (also called hypertension). This means the force of blood against your artery walls is too strong. It also means your heart is working hard to move blood. High blood pressure usually has no symptoms, but over time, it can damage your heart, blood vessels, eyes, kidneys, and other organs. With help from your doctor, you can manage your blood pressure and protect your health.  Taking medicine  · Learn to take your own blood pressure. Keep a record of your results. Ask your doctor which readings mean that you need medical attention.  · Take your blood pressure medicine exactly as directed. Dont skip doses. Missing doses can cause your blood pressure to get out of control.  · If you do miss a dose (or doses) check with your healthcare provider about what to do.  · Avoid medicine that contain heart stimulants, including over-the-counter drugs. Check for warnings about high blood pressure on the label. Ask the pharmacist before purchasing something you haven't used before  · Check with your doctor or pharmacist before taking a decongestant. Some decongestants can worsen high blood pressure.  Lifestyle changes  · Maintain a healthy weight. Get help to lose any extra pounds.  · Cut back on salt.  ¨ Limit canned, dried, packaged, and fast foods.  ¨ Dont add salt to your food at the table.  ¨ Season foods with herbs instead of salt when you cook.  ¨ Request no added salt when you go to a restaurant.  ¨ The American Heart Associations (AHA) "ideal" sodium intake recommendation is 1,500 milligrams per day.  However, since American's eat so much salt, the AHA says a positive change can occur by cutting back to even 2,400 milligrams of sodium a day.   · Follow the DASH (Dietary Approaches to Stop Hypertension) eating plan. This plan recommends vegetables, fruits, whole gains, and other heart healthy foods.  · Begin " an exercise program. Ask your doctor how to get started. The American Heart Association recommends aerobic exercise 3 to 4 times a week for an average of 40 minutes at a time, with your doctor's approval. Simple activities like walking or gardening can help.  · Break the smoking habit. Enroll in a stop-smoking program to improve your chances of success. Ask your healthcare provider about programs and medicines to help you stop smoking.  · Limit drinks that contain caffeine (coffee, black or green tea, cola) to 2 per day.  · Never take stimulants such as amphetamines or cocaine; these drugs can be deadly for someone with high blood pressure.  · Control your stress. Learn stress-management techniques.  · Limit alcohol to no more than 1 drink a day for women and 2 drinks a day for men.  Follow-up care  Make a follow-up appointment as directed by our staff.     When to seek medical care  Call your doctor immediately or seek emergency care if you have any of the following:  · Chest pain or shortness of breath (call 911)  · Moderate to severe headache  · Weakness in the muscles of your face, arms, or legs  · Trouble speaking  · Extreme drowsiness  · Confusion  · Fainting or dizziness  · Pulsating or rushing sound in your ears  · Unexplained nosebleed  · Weakness, tingling, or numbness of your face, arms, or legs  · Change in vision  · Blood pressure measured at home that is greater than 180/110   Date Last Reviewed: 4/27/2016  © 6520-6853 [employer]. [address] [place] [address], [place] [address]. All rights reserved. This information is not intended as a substitute for professional medical care. Always follow your healthcare professional's instructions.

## 2018-10-18 NOTE — PROGRESS NOTES
Subjective:       Patient ID: Fabiano Jules Jr. is a 48 y.o. male.    Vitals:  height is 6' (1.829 m) and weight is 136.1 kg (300 lb). His oral temperature is 98.3 °F (36.8 °C). His blood pressure is 167/98 (abnormal) and his pulse is 97. His respiration is 16 and oxygen saturation is 97%.     Chief Complaint: Hypertension and Medication Refill (Lisinipril 40mg)    Hypertension   This is a recurrent problem. The current episode started in the past 7 days. The problem has been gradually worsening since onset. Pertinent negatives include no blurred vision, chest pain, headaches or shortness of breath. There are no associated agents to hypertension. Risk factors for coronary artery disease include family history, obesity and male gender. Past treatments include beta blockers.   Medication Refill   This is a chronic problem. The current episode started more than 1 year ago (pt needs refill lisinipril 40 mg. x1 wk). The problem occurs constantly. Pertinent negatives include no abdominal pain, chest pain, chills, fever, headaches, nausea, rash, sore throat or vomiting. Nothing aggravates the symptoms. He has tried nothing for the symptoms.     Review of Systems   Constitution: Negative for chills and fever.   HENT: Negative for sore throat.    Eyes: Negative for blurred vision.   Cardiovascular: Negative for chest pain.   Respiratory: Negative for shortness of breath.    Skin: Negative for rash.   Musculoskeletal: Negative for back pain and joint pain.   Gastrointestinal: Negative for abdominal pain, diarrhea, nausea and vomiting.   Neurological: Negative for headaches.   Psychiatric/Behavioral: The patient is not nervous/anxious.    All other systems reviewed and are negative.      Objective:      Physical Exam   Constitutional: He is oriented to person, place, and time. He appears well-developed and well-nourished. He is cooperative.  Non-toxic appearance. He does not appear ill. No distress.   HENT:   Head: Normocephalic  and atraumatic.   Right Ear: Hearing, tympanic membrane, external ear and ear canal normal.   Left Ear: Hearing, tympanic membrane, external ear and ear canal normal.   Nose: Nose normal. No mucosal edema, rhinorrhea or nasal deformity. No epistaxis. Right sinus exhibits no maxillary sinus tenderness and no frontal sinus tenderness. Left sinus exhibits no maxillary sinus tenderness and no frontal sinus tenderness.   Mouth/Throat: Uvula is midline, oropharynx is clear and moist and mucous membranes are normal. No trismus in the jaw. Normal dentition. No uvula swelling. No posterior oropharyngeal erythema.   Eyes: Conjunctivae and lids are normal. Right eye exhibits no discharge. Left eye exhibits no discharge. No scleral icterus.   Sclera clear bilat   Neck: Trachea normal, normal range of motion, full passive range of motion without pain and phonation normal. Neck supple.   Cardiovascular: Normal rate, regular rhythm, normal heart sounds, intact distal pulses and normal pulses.   Pulmonary/Chest: Effort normal and breath sounds normal. No respiratory distress.   Abdominal: Soft. Normal appearance and bowel sounds are normal. He exhibits no distension, no pulsatile midline mass and no mass. There is no tenderness.   Musculoskeletal: Normal range of motion. He exhibits no edema or deformity.   Neurological: He is alert and oriented to person, place, and time. He exhibits normal muscle tone. Coordination normal.   Skin: Skin is warm, dry and intact. Capillary refill takes less than 2 seconds. He is not diaphoretic. No pallor.   Psychiatric: He has a normal mood and affect. His speech is normal and behavior is normal. Judgment and thought content normal. Cognition and memory are normal.   Nursing note and vitals reviewed.      Assessment:       1. Essential hypertension        Plan:         Essential hypertension  -     lisinopril (PRINIVIL,ZESTRIL) 40 MG tablet; Take 1 tablet (40 mg total) by mouth once daily.   Dispense: 90 tablet; Refill: 3

## 2018-10-19 ENCOUNTER — OFFICE VISIT (OUTPATIENT)
Dept: INTERNAL MEDICINE | Facility: CLINIC | Age: 48
End: 2018-10-19
Payer: COMMERCIAL

## 2018-10-19 VITALS
SYSTOLIC BLOOD PRESSURE: 138 MMHG | DIASTOLIC BLOOD PRESSURE: 86 MMHG | HEART RATE: 55 BPM | BODY MASS INDEX: 42.66 KG/M2 | WEIGHT: 315 LBS | HEIGHT: 72 IN | OXYGEN SATURATION: 99 %

## 2018-10-19 DIAGNOSIS — E66.01 CLASS 3 SEVERE OBESITY WITH BODY MASS INDEX (BMI) OF 40.0 TO 44.9 IN ADULT, UNSPECIFIED OBESITY TYPE, UNSPECIFIED WHETHER SERIOUS COMORBIDITY PRESENT: ICD-10-CM

## 2018-10-19 DIAGNOSIS — I10 ESSENTIAL HYPERTENSION: Chronic | ICD-10-CM

## 2018-10-19 DIAGNOSIS — G47.33 OSA (OBSTRUCTIVE SLEEP APNEA): ICD-10-CM

## 2018-10-19 DIAGNOSIS — Z12.11 COLON CANCER SCREENING: ICD-10-CM

## 2018-10-19 DIAGNOSIS — E66.01 OBESITY, MORBID, BMI 40.0-49.9: ICD-10-CM

## 2018-10-19 DIAGNOSIS — Z00.00 ANNUAL PHYSICAL EXAM: Primary | ICD-10-CM

## 2018-10-19 PROBLEM — E66.813 CLASS 3 SEVERE OBESITY WITH BODY MASS INDEX (BMI) OF 40.0 TO 44.9 IN ADULT: Status: ACTIVE | Noted: 2018-10-19

## 2018-10-19 PROCEDURE — 99999 PR PBB SHADOW E&M-EST. PATIENT-LVL IV: CPT | Mod: PBBFAC,,, | Performed by: NURSE PRACTITIONER

## 2018-10-19 PROCEDURE — 3075F SYST BP GE 130 - 139MM HG: CPT | Mod: CPTII,S$GLB,, | Performed by: NURSE PRACTITIONER

## 2018-10-19 PROCEDURE — 3079F DIAST BP 80-89 MM HG: CPT | Mod: CPTII,S$GLB,, | Performed by: NURSE PRACTITIONER

## 2018-10-19 PROCEDURE — 99396 PREV VISIT EST AGE 40-64: CPT | Mod: S$GLB,,, | Performed by: NURSE PRACTITIONER

## 2018-10-19 NOTE — PROGRESS NOTES
Internal Medicine Annual Exam       CHIEF COMPLAINT     The patient, Fabiano Jules Jr., who is a 48 y.o. male with htn, phimosis, and obesity presents for an annual exam.    HPI   Here for annual exam. Needs PCP.     HTN- compliant with lisinopril 40mg daily     RAFAEL- uses CPAP; last Study 8-10 years. Would like new machine.     HM-  Lipids- ordered today   c-scope- ordered today   Flu- refuses   Tdap-UTD           Past Medical History:  Past Medical History:   Diagnosis Date    CPAP (continuous positive airway pressure) dependence     Hypertension     PONV (postoperative nausea and vomiting)     Sleep apnea        Past Surgical History:   Procedure Laterality Date    CIRCUMCISION      CIRCUMCISION N/A 2/3/2015    Performed by [facility]ran Ruiz IV, [facility] at [facility] OR    LIPOMA RESECTION      Back of neck        Family History   Problem Relation Age of Onset    Hypertension Mother     Diabetes Father     Cancer Father         Social History     Socioeconomic History    Marital status:      Spouse name: Not on file    Number of children: 4    Years of education: Not on file    Highest education level: Not on file   Social Needs    Financial resource strain: Not on file    Food insecurity - worry: Not on file    Food insecurity - inability: Not on file    Transportation needs - medical: Not on file    Transportation needs - non-medical: Not on file   Occupational History     Employer: [employer]   Tobacco Use    Smoking status: Former Smoker     Last attempt to quit: 2009     Years since quittin.8    Smokeless tobacco: Never Used    Tobacco comment: quit smoking in    Substance and Sexual Activity    Alcohol use: No    Drug use: No    Sexual activity: Not on file   Other Topics Concern    Not on file   Social History Narrative    Not on file        Social History     Tobacco Use   Smoking Status Former Smoker    Last attempt to quit: 2009    Years since quittin.8    Smokeless Tobacco Never Used   Tobacco Comment    quit smoking in 2010        Allergies as of 10/19/2018 - Reviewed 10/19/2018   Allergen Reaction Noted    No known drug allergies  06/10/2013          Home Medications:  Prior to Admission medications    Medication Sig Start Date End Date Taking? Authorizing Provider   lisinopril (PRINIVIL,ZESTRIL) 40 MG tablet Take 1 tablet (40 mg total) by mouth once daily. 10/17/18   Destiny Adan NP       Review of Systems:  Review of Systems   Constitutional: Negative for chills, fatigue, fever and unexpected weight change.   HENT: Negative for congestion, ear pain, hearing loss, postnasal drip and sinus pressure.    Eyes: Negative for pain, redness and visual disturbance.   Respiratory: Negative for cough, shortness of breath and wheezing.    Cardiovascular: Negative for chest pain and palpitations.   Gastrointestinal: Negative for abdominal distention, abdominal pain, constipation, diarrhea, nausea and vomiting.   Endocrine: Negative for polydipsia, polyphagia and polyuria.   Genitourinary: Negative for dysuria, frequency, hematuria and urgency.   Musculoskeletal: Positive for arthralgias (right knee - recent MCL surgery, seeing PT). Negative for gait problem and myalgias.   Skin: Negative for pallor and rash.   Allergic/Immunologic: Negative for environmental allergies and immunocompromised state.   Neurological: Negative for dizziness, weakness, light-headedness and headaches.   Hematological: Negative for adenopathy. Does not bruise/bleed easily.   Psychiatric/Behavioral: Positive for sleep disturbance. Negative for behavioral problems, confusion and dysphoric mood. The patient is not nervous/anxious.        Health Maintainence:   Immunizations:  Health Maintenance       Date Due Completion Date    TETANUS VACCINE 03/15/1986 ---    Lipid Panel 03/03/2018 3/3/2017    Override on 3/1/2017: Done    Colonoscopy 03/15/2018 ---    Influenza Vaccine 08/01/2018 ---            PHYSICAL EXAM     /86 (BP Location: Left arm, Patient Position: Sitting, BP Method: Large (Manual))   Pulse (!) 55   Ht 6' (1.829 m)   Wt (!) 156.4 kg (344 lb 12.8 oz)   SpO2 99%   BMI 46.76 kg/m²  Body mass index is 46.76 kg/m².    Physical Exam   Constitutional: He is oriented to person, place, and time. He appears well-developed and well-nourished.   HENT:   Head: Normocephalic.   Right Ear: External ear normal.   Left Ear: External ear normal.   Nose: Nose normal.   Mouth/Throat: Oropharynx is clear and moist. No oropharyngeal exudate.   Eyes: Pupils are equal, round, and reactive to light.   Neck: No JVD present. No tracheal deviation present. No thyromegaly present.   Cardiovascular: Normal rate, regular rhythm and intact distal pulses. Exam reveals no gallop and no friction rub.   No murmur heard.  Pulmonary/Chest: Breath sounds normal. No respiratory distress. He has no wheezes. He has no rales. He exhibits no tenderness.   Abdominal: Soft. Bowel sounds are normal. He exhibits no distension. There is no tenderness.   Musculoskeletal: Normal range of motion. He exhibits no edema or tenderness.   Lymphadenopathy:     He has no cervical adenopathy.   Neurological: He is alert and oriented to person, place, and time.   Skin: Skin is warm and dry. No rash noted.   Psychiatric: He has a normal mood and affect. His behavior is normal.   Vitals reviewed.      LABS     Lab Results   Component Value Date    HGBA1C 6.0 03/03/2017     CMP  Sodium   Date Value Ref Range Status   03/03/2017 139 136 - 145 mmol/L Final     Potassium   Date Value Ref Range Status   03/03/2017 4.0 3.5 - 5.1 mmol/L Final     Chloride   Date Value Ref Range Status   03/03/2017 102 95 - 110 mmol/L Final     CO2   Date Value Ref Range Status   03/03/2017 28 23 - 29 mmol/L Final     Glucose   Date Value Ref Range Status   03/03/2017 87 70 - 110 mg/dL Final     BUN, Bld   Date Value Ref Range Status   03/03/2017 12 6 - 20 mg/dL Final      Creatinine   Date Value Ref Range Status   03/03/2017 0.8 0.5 - 1.4 mg/dL Final     Calcium   Date Value Ref Range Status   03/03/2017 9.6 8.7 - 10.5 mg/dL Final     Total Protein   Date Value Ref Range Status   12/18/2014 8.1 6.0 - 8.4 g/dL Final     Albumin   Date Value Ref Range Status   12/18/2014 4.2 3.5 - 5.2 g/dL Final     Total Bilirubin   Date Value Ref Range Status   12/18/2014 0.4 0.1 - 1.0 mg/dL Final     Comment:     For infants and newborns, interpretation of results should be based  on gestational age, weight and in agreement with clinical  observations.  Premature Infant recommended reference ranges:  Up to 24 hours.............<8.0 mg/dL  Up to 48 hours............<12.0 mg/dL  3-5 days..................<15.0 mg/dL  6-29 days.................<15.0 mg/dL       Alkaline Phosphatase   Date Value Ref Range Status   12/18/2014 53 (L) 55 - 135 U/L Final     AST   Date Value Ref Range Status   12/18/2014 18 10 - 40 U/L Final     ALT   Date Value Ref Range Status   12/18/2014 11 10 - 44 U/L Final     Anion Gap   Date Value Ref Range Status   03/03/2017 9 8 - 16 mmol/L Final     eGFR if    Date Value Ref Range Status   03/03/2017 >60.0 >60 mL/min/1.73 m^2 Final     eGFR if non    Date Value Ref Range Status   03/03/2017 >60.0 >60 mL/min/1.73 m^2 Final     Comment:     Calculation used to obtain the estimated glomerular filtration  rate (eGFR) is the CKD-EPI equation. Since race is unknown   in our information system, the eGFR values for   -American and Non--American patients are given   for each creatinine result.       Lab Results   Component Value Date    WBC 4.40 02/02/2015    HGB 12.2 (L) 02/02/2015    HCT 37.5 (L) 02/02/2015    MCV 82 02/02/2015     02/02/2015     Lab Results   Component Value Date    CHOL 183 03/03/2017    CHOL 166 12/18/2014    CHOL 168 08/20/2013     Lab Results   Component Value Date    HDL 43 03/03/2017    HDL 40 12/18/2014     HDL 32 (L) 08/20/2013     Lab Results   Component Value Date    LDLCALC 120.4 03/03/2017    LDLCALC 108.6 12/18/2014    LDLCALC 114.0 08/20/2013     Lab Results   Component Value Date    TRIG 98 03/03/2017    TRIG 87 12/18/2014    TRIG 111 08/20/2013     Lab Results   Component Value Date    CHOLHDL 23.5 03/03/2017    CHOLHDL 24.1 12/18/2014    CHOLHDL 19.0 (L) 08/20/2013     Lab Results   Component Value Date    TSH 3.365 08/20/2013       ASSESSMENT/PLAN     Fabiano Jules Jr. is a 48 y.o. male with  Past Medical History:   Diagnosis Date    CPAP (continuous positive airway pressure) dependence     Hypertension     PONV (postoperative nausea and vomiting)     Sleep apnea      Annual physical exam- all age and gender related screenings discussed   -     CBC auto differential; Future; Expected date: 10/19/2018  -     Comprehensive metabolic panel; Future; Expected date: 10/19/2018  -     TSH; Future; Expected date: 10/19/2018  -     T4, free; Future; Expected date: 10/19/2018  -     Cancel: Urinalysis  -     Vitamin D; Future; Expected date: 10/19/2018  -     Lipid panel; Future; Expected date: 10/19/2018  -     Urinalysis; Future; Expected date: 10/19/2018    Essential hypertension- at goal. Cont current meds. Low na diet     Obesity, morbid, BMI 40.0-49.9  Class 3 severe obesity with body mass index (BMI) of 40.0 to 44.9 in adult, unspecified obesity type, unspecified whether serious comorbidity present- discussed keto diet.     Colon cancer screening  -     Case request GI: COLONOSCOPY    RAFAEL (obstructive sleep apnea)- refer to sleep med   -     Ambulatory consult to Sleep Disorders      Follow up was needed and with PCP in 4-6 months to [facility] care     Bebe Whitmore MN, APRN, FNP-c   Department of Internal Medicine - [facility]  7:35 AM

## 2018-10-24 ENCOUNTER — LAB VISIT (OUTPATIENT)
Dept: LAB | Facility: HOSPITAL | Age: 48
End: 2018-10-24
Payer: COMMERCIAL

## 2018-10-24 ENCOUNTER — TELEPHONE (OUTPATIENT)
Dept: INTERNAL MEDICINE | Facility: CLINIC | Age: 48
End: 2018-10-24

## 2018-10-24 DIAGNOSIS — Z00.00 ANNUAL PHYSICAL EXAM: ICD-10-CM

## 2018-10-24 DIAGNOSIS — D64.9 ANEMIA, UNSPECIFIED TYPE: Primary | ICD-10-CM

## 2018-10-24 DIAGNOSIS — E55.9 VITAMIN D DEFICIENCY: ICD-10-CM

## 2018-10-24 LAB
25(OH)D3+25(OH)D2 SERPL-MCNC: 17 NG/ML
ALBUMIN SERPL BCP-MCNC: 4.1 G/DL
ALP SERPL-CCNC: 49 U/L
ALT SERPL W/O P-5'-P-CCNC: 13 U/L
ANION GAP SERPL CALC-SCNC: 11 MMOL/L
AST SERPL-CCNC: 20 U/L
BASOPHILS # BLD AUTO: 0.02 K/UL
BASOPHILS NFR BLD: 0.5 %
BILIRUB SERPL-MCNC: 0.6 MG/DL
BUN SERPL-MCNC: 10 MG/DL
CALCIUM SERPL-MCNC: 9.6 MG/DL
CHLORIDE SERPL-SCNC: 105 MMOL/L
CHOLEST SERPL-MCNC: 158 MG/DL
CHOLEST/HDLC SERPL: 4.4 {RATIO}
CO2 SERPL-SCNC: 25 MMOL/L
CREAT SERPL-MCNC: 0.7 MG/DL
DIFFERENTIAL METHOD: ABNORMAL
EOSINOPHIL # BLD AUTO: 0.1 K/UL
EOSINOPHIL NFR BLD: 2.3 %
ERYTHROCYTE [DISTWIDTH] IN BLOOD BY AUTOMATED COUNT: 14.9 %
EST. GFR  (AFRICAN AMERICAN): >60 ML/MIN/1.73 M^2
EST. GFR  (NON AFRICAN AMERICAN): >60 ML/MIN/1.73 M^2
GLUCOSE SERPL-MCNC: 96 MG/DL
HCT VFR BLD AUTO: 39.2 %
HDLC SERPL-MCNC: 36 MG/DL
HDLC SERPL: 22.8 %
HGB BLD-MCNC: 11.8 G/DL
LDLC SERPL CALC-MCNC: 109.4 MG/DL
LYMPHOCYTES # BLD AUTO: 2.2 K/UL
LYMPHOCYTES NFR BLD: 55.8 %
MCH RBC QN AUTO: 25.4 PG
MCHC RBC AUTO-ENTMCNC: 30.1 G/DL
MCV RBC AUTO: 84 FL
MONOCYTES # BLD AUTO: 0.4 K/UL
MONOCYTES NFR BLD: 9.7 %
NEUTROPHILS # BLD AUTO: 1.2 K/UL
NEUTROPHILS NFR BLD: 31.7 %
NONHDLC SERPL-MCNC: 122 MG/DL
PLATELET # BLD AUTO: 312 K/UL
PMV BLD AUTO: 10.8 FL
POTASSIUM SERPL-SCNC: 4.1 MMOL/L
PROT SERPL-MCNC: 7.5 G/DL
RBC # BLD AUTO: 4.65 M/UL
SODIUM SERPL-SCNC: 141 MMOL/L
T4 FREE SERPL-MCNC: 1.02 NG/DL
TRIGL SERPL-MCNC: 63 MG/DL
TSH SERPL DL<=0.005 MIU/L-ACNC: 2.19 UIU/ML
WBC # BLD AUTO: 3.91 K/UL

## 2018-10-24 PROCEDURE — 80053 COMPREHEN METABOLIC PANEL: CPT

## 2018-10-24 PROCEDURE — 84443 ASSAY THYROID STIM HORMONE: CPT

## 2018-10-24 PROCEDURE — 36415 COLL VENOUS BLD VENIPUNCTURE: CPT

## 2018-10-24 PROCEDURE — 85025 COMPLETE CBC W/AUTO DIFF WBC: CPT

## 2018-10-24 PROCEDURE — 80061 LIPID PANEL: CPT

## 2018-10-24 PROCEDURE — 82306 VITAMIN D 25 HYDROXY: CPT

## 2018-10-24 PROCEDURE — 84439 ASSAY OF FREE THYROXINE: CPT

## 2018-10-24 RX ORDER — ERGOCALCIFEROL 1.25 MG/1
50000 CAPSULE ORAL
Qty: 12 CAPSULE | Refills: 0 | Status: SHIPPED | OUTPATIENT
Start: 2018-10-24 | End: 2019-01-15 | Stop reason: SDUPTHER

## 2018-10-24 NOTE — TELEPHONE ENCOUNTER
Discussed lab results. Will increase HDL through diet and exercise. Start vit D supplement 50,000 units weekly.   Anemia worsening, will set up C-scope. Pt states understanding

## 2018-10-26 DIAGNOSIS — Z12.11 SPECIAL SCREENING FOR MALIGNANT NEOPLASMS, COLON: Primary | ICD-10-CM

## 2018-10-26 RX ORDER — POLYETHYLENE GLYCOL 3350, SODIUM SULFATE ANHYDROUS, SODIUM BICARBONATE, SODIUM CHLORIDE, POTASSIUM CHLORIDE 236; 22.74; 6.74; 5.86; 2.97 G/4L; G/4L; G/4L; G/4L; G/4L
4 POWDER, FOR SOLUTION ORAL ONCE
Qty: 4000 ML | Refills: 0 | Status: SHIPPED | OUTPATIENT
Start: 2018-10-26 | End: 2018-10-26

## 2018-11-14 ENCOUNTER — TELEPHONE (OUTPATIENT)
Dept: SLEEP MEDICINE | Facility: CLINIC | Age: 48
End: 2018-11-14

## 2018-11-15 ENCOUNTER — OFFICE VISIT (OUTPATIENT)
Dept: SLEEP MEDICINE | Facility: CLINIC | Age: 48
End: 2018-11-15
Payer: COMMERCIAL

## 2018-11-15 VITALS
HEIGHT: 72 IN | BODY MASS INDEX: 42.66 KG/M2 | WEIGHT: 315 LBS | DIASTOLIC BLOOD PRESSURE: 98 MMHG | SYSTOLIC BLOOD PRESSURE: 140 MMHG | HEART RATE: 78 BPM

## 2018-11-15 DIAGNOSIS — G47.33 OBSTRUCTIVE SLEEP APNEA: Primary | ICD-10-CM

## 2018-11-15 DIAGNOSIS — Z71.89 CPAP USE COUNSELING: ICD-10-CM

## 2018-11-15 PROCEDURE — 3008F BODY MASS INDEX DOCD: CPT | Mod: CPTII,S$GLB,, | Performed by: NURSE PRACTITIONER

## 2018-11-15 PROCEDURE — 99999 PR PBB SHADOW E&M-EST. PATIENT-LVL III: CPT | Mod: PBBFAC,,, | Performed by: NURSE PRACTITIONER

## 2018-11-15 PROCEDURE — 3080F DIAST BP >= 90 MM HG: CPT | Mod: CPTII,S$GLB,, | Performed by: NURSE PRACTITIONER

## 2018-11-15 PROCEDURE — 99203 OFFICE O/P NEW LOW 30 MIN: CPT | Mod: S$GLB,,, | Performed by: NURSE PRACTITIONER

## 2018-11-15 PROCEDURE — 3077F SYST BP >= 140 MM HG: CPT | Mod: CPTII,S$GLB,, | Performed by: NURSE PRACTITIONER

## 2018-11-15 NOTE — LETTER
November 15, 2018      Bebe Whitmore, NP  [address]  [facility] [address]           [facility]  [address]  [facility] [address]  Phone: [phone]          Patient: Fabiano Jules Jr.   MR Number: [id number]   YOB: 1970   Date of Visit: 11/15/2018       Dear Bebe Whitmore:    Thank you for referring Fabiano Jules to me for evaluation. Attached you will find relevant portions of my assessment and plan of care.    If you have questions, please do not hesitate to call me. I look forward to following Fabiano Jules along with you.    Sincerely,    Niecy Chang NP    Enclosure  CC:  No Recipients    If you would like to receive this communication electronically, please contact [email or web address] or [phone] to request more information on [employer] Link access.    For providers and/or their staff who would like to refer a patient to [facility], please contact us through our one-stop-shop provider referral line, [facility] , at [phone].    If you feel you have received this communication in error or would no longer like to receive these types of communications, please e-mail [email or web address]

## 2018-11-15 NOTE — PROGRESS NOTES
"Fabiano Jules  was seen as a new patient at the request of  Bebe Whitmore,* for the evaluation of obstructive sleep apnea.    CHIEF COMPLAINT:    Chief Complaint   Patient presents with    Sleep Apnea       11/15/2018 BIGG Chang NP: Initial HISTORY OF PRESENT ILLNESS: Fabiano Jules Jr. is a 48 y.o. male is here for sleep evaluation.       Per PCP:  "RAFAEL- uses CPAP; last Study 8-10 years. Would like new machine".    Patient diagnosed with RAFAEL via PSG in 2006 prompted by snoring, air gasping, and witnessed apneas.   Has had 4 different machines since and requires new one now   Reports nightly use with resolution in sleep complaints  Prefers to use nasal mask  Also needs local DME, previous one closed.   Supplies prn out of pocket from online vendor     Subjectively using CPAP nightly, upon interrogation of EveryScapeiPAP CPAP 14 cm, Days > 4 hour use is 0/90 days and 90-days usage average is 0.6 hours.   Per patient machine must not be recording properly, as he uses machine regularly.     Denies symptoms of restless legs or kicking during sleep.    Occupation: days only     Previous Sleep Studies:    01/09/2006 Split  lb. AHI 88 with oxygen rosendo 55%. Split to CPAP 14 cm     PAST MEDICAL HISTORY:    Active Ambulatory Problems     Diagnosis Date Noted    HTN (hypertension) 08/20/2013    Motion sickness 08/13/2014    Male circumcision 12/18/2014    Phimosis 02/03/2015    Obesity, morbid, BMI 40.0-49.9 03/01/2017    Class 3 severe obesity with body mass index (BMI) of 40.0 to 44.9 in adult 10/19/2018    Vitamin D deficiency 10/24/2018    Anemia 10/24/2018     Resolved Ambulatory Problems     Diagnosis Date Noted    Annual physical exam 12/18/2014     Past Medical History:   Diagnosis Date    CPAP (continuous positive airway pressure) dependence     Hypertension     PONV (postoperative nausea and vomiting)     Sleep apnea                 PAST SURGICAL HISTORY:    Past Surgical History: "   Procedure Laterality Date    CIRCUMCISION      CIRCUMCISION N/A 2/3/2015    Performed by [facility]ran Ruiz [facility] at [facility] OR    facet [facility]       [facility] [place]     LIPOMA RESECTION      Back of neck    MEDIAL COLLATERAL LIGAMENT AND LATERAL COLLATERAL LIGAMENT REPAIR, KNEE Right 2018    Dr. Christophe Gore in [place]          FAMILY HISTORY:                Family History   Problem Relation Age of Onset    Hypertension Mother     Diabetes Father     Cancer Father         mesotheliosma        SOCIAL HISTORY:          Tobacco:   Social History     Tobacco Use   Smoking Status Former Smoker    Last attempt to quit: 2009    Years since quittin.8   Smokeless Tobacco Never Used   Tobacco Comment    quit smoking in        Alcohol use:    Social History     Substance and Sexual Activity   Alcohol Use No                 ALLERGIES:    Review of patient's allergies indicates:   Allergen Reactions    No known drug allergies        CURRENT MEDICATIONS:    Current Outpatient Medications   Medication Sig Dispense Refill    ergocalciferol (ERGOCALCIFEROL) 50,000 unit Cap Take 1 capsule (50,000 Units total) by mouth every 7 days. 12 capsule 0    lisinopril (PRINIVIL,ZESTRIL) 40 MG tablet Take 1 tablet (40 mg total) by mouth once daily. 90 tablet 3     No current facility-administered medications for this visit.                   REVIEW OF SYSTEMS:     Sleep related symptoms as per HPI.  CONST:Denies weight gain    HEENT: Denies sinus congestion  PULM: Denies dyspnea  CARD:  Denies palpitations   GI:  Denies acid reflux  : Denies polyuria  NEURO: Denies headaches  PSYCH: Denies mood disturbance  HEME: Denies anemia    Otherwise, a balance of 10 systems reviewed is negative.          PHYSICAL EXAM:  BP (!) 140/98 (BP Location: Left arm, Patient Position: Sitting)   Pulse 78   Ht 6' (1.829 m)   Wt (!) 152.2 kg (335 lb 8.6 oz)   BMI 45.51 kg/m²        GENERAL:  Obese development, well groomed  NECK: Supple.  No thyromegaly. No palpable nodes.    SKIN: On face and neck: No abrasions, no rashes, no lesions.  No subcutaneous nodules are palpable.  RESPIRATORY: Normal chest expansion and non-labored breathing at rest.  CARDIOVASCULAR: Normal rate  EXTREMITIES: No edema. No clubbing. No cyanosis. Station normal. Gait normal.        NEURO/PSYCH: Oriented to time, place and person. Normal attention span and concentration. Affect is full. Mood is normal.                                              ASSESSMENT:    Obstructive sleep apnea, severe by AHI.  The patient symptomatically has snoring, air gasping, and witnessed apneas resolved with CPAP. The patient is subjectively adherent on CPAP and experiencing symptomatic benefit. Medical co-morbidities: HTN and obesity.  This warrants treatment for obstructive sleep apnea.  CPAP machine has reached reasonable useful lifetime and needs to be replaced    PLAN:    Treatment:  -new CPAP 14 cm @ [facility]. RTC 31 - 90 days after PAP   -If patient has difficulty with CPAP adherence or ongoing RAFAEL symptoms despite CPAP adherence, then consider an in-lab titration sleep study in order to determine optimal fixed CPAP setting.  -Reviewed insurance guidelines for adherence to therapy defined as use of PAP = 4 hours per night on 70% of nights during a consecutive 30-day period anytime during the first 3 months of initial usage.   -Pt understands that if [employer] requires repeat sleep testing will re-evaluate via HST then proceed with new ; if so, in interim pt will contact [facility] for supplies to be used with current machine    Education: During our discussion today, we talked about the etiology of obstructive sleep apnea as well as the potential ramifications of untreated sleep apnea, which could include daytime sleepiness, hypertension, heart disease and/or stroke. We discussed potential treatment options, which could include  weight loss, body positioning, continuous positive airway pressure (CPAP), OA, EPAP, or referral for surgical consideration.     Precautions: The patient was advised to abstain from driving should they feel sleepy  or drowsy.     Thank you for allowing me the opportunity to participate in the care of your patient.

## 2018-12-02 ENCOUNTER — PATIENT MESSAGE (OUTPATIENT)
Dept: SURGERY | Facility: CLINIC | Age: 48
End: 2018-12-02

## 2018-12-03 ENCOUNTER — ANESTHESIA (OUTPATIENT)
Dept: ENDOSCOPY | Facility: HOSPITAL | Age: 48
End: 2018-12-03
Payer: COMMERCIAL

## 2018-12-03 ENCOUNTER — ANESTHESIA EVENT (OUTPATIENT)
Dept: ENDOSCOPY | Facility: HOSPITAL | Age: 48
End: 2018-12-03
Payer: COMMERCIAL

## 2018-12-03 ENCOUNTER — HOSPITAL ENCOUNTER (OUTPATIENT)
Facility: HOSPITAL | Age: 48
Discharge: HOME | End: 2018-12-03
Attending: COLON & RECTAL SURGERY | Admitting: COLON & RECTAL SURGERY
Payer: COMMERCIAL

## 2018-12-03 VITALS
OXYGEN SATURATION: 100 % | HEIGHT: 72 IN | HEART RATE: 78 BPM | WEIGHT: 315 LBS | BODY MASS INDEX: 42.66 KG/M2 | RESPIRATION RATE: 18 BRPM | TEMPERATURE: 99 F | DIASTOLIC BLOOD PRESSURE: 76 MMHG | SYSTOLIC BLOOD PRESSURE: 153 MMHG

## 2018-12-03 DIAGNOSIS — Z12.11 SCREENING FOR COLON CANCER: ICD-10-CM

## 2018-12-03 PROCEDURE — 37000009 HC ANESTHESIA EA ADD 15 MINS: Performed by: COLON & RECTAL SURGERY

## 2018-12-03 PROCEDURE — E9220 PRA ENDO ANESTHESIA: HCPCS | Mod: 33,,, | Performed by: NURSE ANESTHETIST, CERTIFIED REGISTERED

## 2018-12-03 PROCEDURE — 63600175 PHARM REV CODE 636 W HCPCS: Performed by: NURSE ANESTHETIST, CERTIFIED REGISTERED

## 2018-12-03 PROCEDURE — 27201089 HC SNARE, DISP (ANY): Performed by: COLON & RECTAL SURGERY

## 2018-12-03 PROCEDURE — 45385 COLONOSCOPY W/LESION REMOVAL: CPT | Performed by: COLON & RECTAL SURGERY

## 2018-12-03 PROCEDURE — 45385 COLONOSCOPY W/LESION REMOVAL: CPT | Mod: 33,,, | Performed by: COLON & RECTAL SURGERY

## 2018-12-03 PROCEDURE — 88305 TISSUE EXAM BY PATHOLOGIST: CPT | Performed by: PATHOLOGY

## 2018-12-03 PROCEDURE — 25000003 PHARM REV CODE 250: Performed by: NURSE PRACTITIONER

## 2018-12-03 PROCEDURE — 88305 TISSUE EXAM BY PATHOLOGIST: CPT | Mod: 26,,, | Performed by: PATHOLOGY

## 2018-12-03 PROCEDURE — 37000008 HC ANESTHESIA 1ST 15 MINUTES: Performed by: COLON & RECTAL SURGERY

## 2018-12-03 RX ORDER — PROPOFOL 10 MG/ML
VIAL (ML) INTRAVENOUS CONTINUOUS PRN
Status: DISCONTINUED | OUTPATIENT
Start: 2018-12-03 | End: 2018-12-03

## 2018-12-03 RX ORDER — PROPOFOL 10 MG/ML
VIAL (ML) INTRAVENOUS
Status: DISCONTINUED | OUTPATIENT
Start: 2018-12-03 | End: 2018-12-03

## 2018-12-03 RX ORDER — SODIUM CHLORIDE 9 MG/ML
INJECTION, SOLUTION INTRAVENOUS CONTINUOUS
Status: DISCONTINUED | OUTPATIENT
Start: 2018-12-03 | End: 2018-12-03 | Stop reason: HOSPADM

## 2018-12-03 RX ORDER — LIDOCAINE HCL/PF 100 MG/5ML
SYRINGE (ML) INTRAVENOUS
Status: DISCONTINUED | OUTPATIENT
Start: 2018-12-03 | End: 2018-12-03

## 2018-12-03 RX ORDER — HYDROCODONE BITARTRATE AND ACETAMINOPHEN 7.5; 325 MG/1; MG/1
1 TABLET ORAL EVERY 6 HOURS PRN
Status: ON HOLD | COMMUNITY
End: 2022-04-07 | Stop reason: HOSPADM

## 2018-12-03 RX ORDER — SODIUM CHLORIDE 0.9 % (FLUSH) 0.9 %
3 SYRINGE (ML) INJECTION
Status: DISCONTINUED | OUTPATIENT
Start: 2018-12-03 | End: 2018-12-03 | Stop reason: HOSPADM

## 2018-12-03 RX ADMIN — PROPOFOL 30 MG: 10 INJECTION, EMULSION INTRAVENOUS at 11:12

## 2018-12-03 RX ADMIN — PROPOFOL 70 MG: 10 INJECTION, EMULSION INTRAVENOUS at 11:12

## 2018-12-03 RX ADMIN — SODIUM CHLORIDE: 0.9 INJECTION, SOLUTION INTRAVENOUS at 10:12

## 2018-12-03 RX ADMIN — PROPOFOL 150 MCG/KG/MIN: 10 INJECTION, EMULSION INTRAVENOUS at 11:12

## 2018-12-03 RX ADMIN — LIDOCAINE HYDROCHLORIDE 75 MG: 20 INJECTION, SOLUTION INTRAVENOUS at 11:12

## 2018-12-03 NOTE — [employer] PATIENT INSTRUCTIONS
Discharge Summary/Instructions after an Endoscopic Procedure  Patient Name: Fabiano Jules  Patient MRN: [id number]  Patient YOB: 1970  Monday, December 03, 2018  Kev Reyna MD  RESTRICTIONS:  During your procedure today, you received medications for sedation.  These   medications may affect your judgment, balance and coordination.  Therefore,   for 24 hours, you have the following restrictions:   - DO NOT drive a car, operate machinery, make legal/financial decisions,   sign important papers or drink alcohol.    ACTIVITY:  Today: no heavy lifting, straining or running due to procedural   sedation/anesthesia.  The following day: return to full activity including work.  DIET:  Eat and drink normally unless instructed otherwise.     TREATMENT FOR COMMON SIDE EFFECTS:  - Mild abdominal pain, nausea, belching, bloating or excessive gas:  rest,   eat lightly and use a heating pad.  - Sore Throat: treat with throat lozenges and/or gargle with warm salt   water.  - Because air was used during the procedure, expelling large amounts of air   from your rectum or belching is normal.  - If a bowel prep was taken, you may not have a bowel movement for 1-3 days.    This is normal.  SYMPTOMS TO WATCH FOR AND REPORT TO YOUR PHYSICIAN:  1. Abdominal pain or bloating, other than gas cramps.  2. Chest pain.  3. Back pain.  4. Signs of infection such as: chills or fever occurring within 24 hours   after the procedure.  5. Rectal bleeding, which would show as bright red, maroon, or black stools.   (A tablespoon of blood from the rectum is not serious, especially if   hemorrhoids are present.)  6. Vomiting.  7. Weakness or dizziness.  GO DIRECTLY TO THE NEAREST EMERGENCY ROOM IF YOU HAVE ANY OF THE FOLLOWING:      Difficulty breathing              Chills and/or fever over 101 F   Persistent vomiting and/or vomiting blood   Severe abdominal pain   Severe chest pain   Black, tarry stools   Bleeding- more than one tablespoon   Any  other symptom or condition that you feel may need urgent attention  Your doctor recommends these additional instructions:  If any biopsies were taken, your doctors clinic will contact you in 1 to 2   weeks with any results.  - Discharge patient to home (ambulatory).   - Patient has a contact number available for emergencies.  The signs and   symptoms of potential delayed complications were discussed with the   patient.  Return to normal activities tomorrow.  Written discharge   instructions were provided to the patient.   - Resume previous diet.   - Continue present medications.   - Await pathology results.   - Repeat colonoscopy in 5 years for surveillance based on pathology   results.  For questions, problems or results please call your physician - Kev Reyna MD at Work:  [phone].  [facility], EMERGENCY ROOM PHONE NUMBER: [phone]  IF A COMPLICATION OR EMERGENCY SITUATION ARISES AND YOU ARE UNABLE TO REACH   YOUR PHYSICIAN - GO DIRECTLY TO THE EMERGENCY ROOM.  Kev Reyna MD  12/3/2018 12:00:03 PM  This report has been verified and signed electronically.  [employer]

## 2018-12-03 NOTE — TRANSFER OF CARE
Anesthesia Transfer of Care Note    Patient: Fabiano Jules Jr.    Procedure(s) Performed: Procedure(s) (LRB):  COLONOSCOPY (N/A)    Patient location: GI    Anesthesia Type: general    Transport from OR: Transported from OR on room air with adequate spontaneous ventilation    Post pain: adequate analgesia    Post assessment: no apparent anesthetic complications and tolerated procedure well    Post vital signs: stable    Level of consciousness: awake    Nausea/Vomiting: no nausea/vomiting    Complications: none    Transfer of care protocol was followed      Last vitals:   Visit Vitals  BP (!) 170/97 (BP Location: Left arm, Patient Position: Sitting)   Pulse (!) 58   Temp 37.3 °C (99.2 °F) (Temporal)   Resp 16   Ht 6' (1.829 m)   Wt (!) 147.4 kg (325 lb)   SpO2 98%   BMI 44.08 kg/m²

## 2018-12-03 NOTE — ANESTHESIA PREPROCEDURE EVALUATION
12/03/2018  Pre-operative evaluation for Procedure(s) (LRB):  COLONOSCOPY (N/A)    Fabiano Jules Jr. is a 48 y.o. male HTN, RAFAEL on cpap.     Patient Active Problem List   Diagnosis    HTN (hypertension)    Motion sickness    Male circumcision    Phimosis    Obesity, morbid, BMI 40.0-49.9    Class 3 severe obesity with body mass index (BMI) of 40.0 to 44.9 in adult    Vitamin D deficiency    Anemia    Screening for colon cancer       Review of patient's allergies indicates:   Allergen Reactions    No known drug allergies        No current facility-administered medications on file prior to encounter.      Current Outpatient Medications on File Prior to Encounter   Medication Sig Dispense Refill    HYDROcodone-acetaminophen (NORCO) 7.5-325 mg per tablet Take 1 tablet by mouth every 6 (six) hours as needed for Pain.      lisinopril (PRINIVIL,ZESTRIL) 40 MG tablet Take 1 tablet (40 mg total) by mouth once daily. 90 tablet 3       Past Surgical History:   Procedure Laterality Date    CIRCUMCISION      CIRCUMCISION N/A 2/3/2015    Performed by Eran Ruiz IV, [facility] at [facility] OR    facet injection       [facility] [place]     LIPOMA RESECTION      Back of neck    MEDIAL COLLATERAL LIGAMENT AND LATERAL COLLATERAL LIGAMENT REPAIR, KNEE Right 09/01/2018    Dr. Christophe Gore in [place]        Social History     Socioeconomic History    Marital status:      Spouse name: Not on file    Number of children: 4    Years of education: Not on file    Highest education level: Not on file   Social Needs    Financial resource strain: Not on file    Food insecurity - worry: Not on file    Food insecurity - inability: Not on file    Transportation needs - medical: Not on file    Transportation needs - non-medical: Not on file   Occupational History     Employer: [employer]   Tobacco Use    Smoking status: Former Smoker     Last attempt to quit: 2009     Years since quittin.9    Smokeless tobacco: Never Used    Tobacco comment: quit smoking in    Substance and Sexual Activity    Alcohol use: No    Drug use: No    Sexual activity: Not on file   Other Topics Concern    Not on file   Social History Narrative    Not on file         CBC: No results for input(s): WBC, RBC, HGB, HCT, PLT, MCV, MCH, MCHC in the last 72 hours.    CMP: No results for input(s): NA, K, CL, CO2, BUN, CREATININE, GLU, MG, PHOS, CALCIUM, ALBUMIN, PROT, ALKPHOS, ALT, AST, BILITOT in the last 72 hours.    INR  No results for input(s): PT, INR, PROTIME, APTT in the last 72 hours.        Diagnostic Studies:      EKD Echo:  No results found for this or any previous visit.      Anesthesia Evaluation    I have reviewed the Patient Summary Reports.     I have reviewed the Medications.     Review of Systems  Anesthesia Hx:  History of prior surgery of interest to airway management or planning: Denies Family Hx of Anesthesia complications.   Denies Personal Hx of Anesthesia complications.       Physical Exam  General:  Morbid Obesity    Airway/Jaw/Neck:  Airway Findings: Mouth Opening: Normal Tongue: Normal  General Airway Assessment: Adult  Mallampati: III  TM Distance: Normal, at least 6 cm  Jaw/Neck Findings:  Neck ROM: Normal ROM      Dental:  Dental Findings: In tact   Chest/Lungs:  Chest/Lungs Findings: Clear to auscultation, Normal Respiratory Rate         Mental Status:  Mental Status Findings:  Cooperative, Alert and Oriented         Anesthesia Plan  Type of Anesthesia, risks & benefits discussed:  Anesthesia Type:  general  Patient's Preference:   Intra-op Monitoring Plan: standard ASA monitors  Intra-op Monitoring Plan Comments:   Post Op Pain Control Plan: multimodal analgesia  Post Op Pain Control Plan Comments:   Induction:   IV  Beta Blocker:  Patient is not currently on a  Beta-Blocker (No further documentation required).       Informed Consent: Patient understands risks and agrees with Anesthesia plan.  Questions answered. Anesthesia consent signed with patient.  ASA Score: 3     Day of Surgery Review of History & Physical:    H&P update referred to the surgeon.         Ready For Surgery From Anesthesia Perspective.

## 2018-12-03 NOTE — DISCHARGE INSTRUCTIONS
Colonoscopy     A camera attached to a flexible tube with a viewing lens is used to take video pictures.     Colonoscopy is a test to view the inside of your lower digestive tract (colon and rectum). Sometimes it can show the last part of the small intestine (ileum). During the test, small pieces of tissue may be removed for testing. This is called a biopsy. Small growths, such as polyps, may also be removed.   Why is colonoscopy done?  The test is done to help look for colon cancer. And it can help find the source of abdominal pain, bleeding, and changes in bowel habits. It may be needed once a year, depending on factors such as your:  · Age  · Health history  · Family health history  · Symptoms  · Results from any prior colonoscopy  Risks and possible complications  These include:  · Bleeding               · A puncture or tear in the colon   · Risks of anesthesia  · A cancer lesion not being seen  Getting ready   To prepare for the test:  · Talk with your healthcare provider about the risks of the test (see below). Also ask your healthcare provider about alternatives to the test.  · Tell your healthcare provider about any medicines you take. Also tell him or her about any health conditions you may have.  · Make sure your rectum and colon are empty for the test. Follow the diet and bowel prep instructions exactly. If you dont, the test may need to be rescheduled.  · Plan for a friend or family member to drive you home after the test.     Colonoscopy provides an inside view of the entire colon.     You may discuss the results with your doctor right away or at a future visit.  During the test   The test is usually done in the hospital on an outpatient basis. This means you go home the same day. The procedure takes about 30 minutes. During that time:  · You are given relaxing (sedating) medicine through an IV line. You may be drowsy, or fully asleep.  · The healthcare provider will first give you a physical exam to  check for anal and rectal problems.  · Then the anus is lubricated and the scope inserted.  · If you are awake, you may have a feeling similar to needing to have a bowel movement. You may also feel pressure as air is pumped into the colon. Its OK to pass gas during the procedure.  · Biopsy, polyp removal, or other treatments may be done during the test.  After the test   You may have gas right after the test. It can help to try to pass it to help prevent later bloating. Your healthcare provider may discuss the results with you right away. Or you may need to schedule a follow-up visit to talk about the results. After the test, you can go back to your normal eating and other activities. You may be tired from the sedation and need to rest for a few hours.  Date Last Reviewed: 11/1/2016 © 2000-2017 [employer]. [address] [place] [address], [place] [address]. All rights reserved. This information is not intended as a substitute for professional medical care. Always follow your healthcare professional's instructions.

## 2018-12-03 NOTE — H&P
Endoscopy H&P    Procedure : Colonoscopy      asymptomatic screening exam, 1st colonoscopy. Denies family history of CRS.      Past Medical History:   Diagnosis Date    CPAP (continuous positive airway pressure) dependence     Hypertension     PONV (postoperative nausea and vomiting)     Sleep apnea              Review of Systems -ROS:  GENERAL: No fever, chills, fatigability or weight loss.  CHEST: Denies DEWITT, cyanosis, wheezing, cough and sputum production.  CARDIOVASCULAR: Denies chest pain, PND, orthopnea or reduced exercise tolerance.   Musculoskeletal ROS: negative for - gait disturbance or joint pain  Neurological ROS: negative for - confusion or memory loss        Physical Exam:  General: well developed, well nourished, no distress  Head: normocephalic  Neck: supple, symmetrical, trachea midline  Lungs:  clear to auscultation bilaterally and normal respiratory effort  Heart: regular rate and rhythm, S1, S2 normal, no murmur, rub or gallop and regular rate and rhythm  Abdomen: soft, non-tender non-distented; bowel sounds normal; no masses,  no organomegaly  Extremities: no cyanosis or edema, or clubbing       Moderate Sedation (choice): Mallampati Score 1    ASA : III    IMP: asymptomatic screening exam    Plan: Colonoscopy with Moderate sedation.  I have explained the procedure including indications, alternatives, expected outcomes and potential complications. The patient appears to understand and gives informed consent. The patient is medically ready for surgery.

## 2018-12-03 NOTE — ANESTHESIA POSTPROCEDURE EVALUATION
Anesthesia Post Evaluation    Patient: Fabiano Jules Jr.    Procedure(s) Performed: Procedure(s) (LRB):  COLONOSCOPY (N/A)    Final Anesthesia Type: general  Patient location during evaluation: PACU  Patient participation: Yes- Able to Participate  Level of consciousness: awake and alert and oriented  Post-procedure vital signs: reviewed and stable  Pain management: adequate  Airway patency: patent  PONV status at discharge: No PONV  Anesthetic complications: no      Cardiovascular status: hemodynamically stable  Respiratory status: unassisted  Hydration status: euvolemic  Follow-up not needed.        Visit Vitals  BP (!) 153/76   Pulse 78   Temp 37.1 °C (98.7 °F) (Temporal)   Resp 18   Ht 6' (1.829 m)   Wt (!) 147.4 kg (325 lb)   SpO2 100%   BMI 44.08 kg/m²       Pain/Arian Score: Pain Assessment Performed: Yes (12/3/2018 10:49 AM)  Presence of Pain: denies (12/3/2018 10:49 AM)  Arian Score: 10 (12/3/2018 12:10 PM)

## 2018-12-07 ENCOUNTER — PATIENT MESSAGE (OUTPATIENT)
Dept: INTERNAL MEDICINE | Facility: CLINIC | Age: 48
End: 2018-12-07

## 2018-12-10 ENCOUNTER — TELEPHONE (OUTPATIENT)
Dept: ENDOSCOPY | Facility: HOSPITAL | Age: 48
End: 2018-12-10

## 2018-12-10 NOTE — PROGRESS NOTES
SPECIMEN  1) Transverse colon, 3 mm polyp, jumbo cold forceps polypectomy.  FINAL PATHOLOGIC DIAGNOSIS  FRAGMENT OF NONNEOPLASTIC COLONIC MUCOSA  Diagnosed by: Osman Jenkins M.D.  (Electronically Signed: 2018-12-07 08:08:00)    Biopsy showed non-neoplastic tissue which is NOT pathologic and does NOT require surveillance or further testing.      I would recommend repeat colonoscopy in 10 years    If you have any questions or if I can clarify any of the above please contact me:      Mobile [phone]   Pager [phone]  email [email or web address]  EPIC message  Nurse Bruna Infante [phone]   Fatoumata Cabral:  [phone]    Sincerely  HKev MD, FACS, FASCRS  Staff Surgeon  Dept of Colon and Rectal Surgery

## 2019-01-15 DIAGNOSIS — E55.9 VITAMIN D DEFICIENCY: ICD-10-CM

## 2019-01-16 RX ORDER — ERGOCALCIFEROL 1.25 MG/1
CAPSULE ORAL
Qty: 12 CAPSULE | Refills: 0 | Status: SHIPPED | OUTPATIENT
Start: 2019-01-16 | End: 2020-01-10 | Stop reason: SDUPTHER

## 2019-03-08 ENCOUNTER — TELEPHONE (OUTPATIENT)
Dept: SLEEP MEDICINE | Facility: CLINIC | Age: 49
End: 2019-03-08

## 2019-08-08 ENCOUNTER — OFFICE VISIT (OUTPATIENT)
Dept: URGENT CARE | Facility: CLINIC | Age: 49
End: 2019-08-08
Payer: COMMERCIAL

## 2019-08-08 VITALS
RESPIRATION RATE: 18 BRPM | DIASTOLIC BLOOD PRESSURE: 100 MMHG | TEMPERATURE: 98 F | SYSTOLIC BLOOD PRESSURE: 180 MMHG | OXYGEN SATURATION: 98 % | BODY MASS INDEX: 42.66 KG/M2 | WEIGHT: 315 LBS | HEIGHT: 72 IN | HEART RATE: 60 BPM

## 2019-08-08 DIAGNOSIS — I10 ESSENTIAL HYPERTENSION: ICD-10-CM

## 2019-08-08 DIAGNOSIS — I10 WHITE COAT SYNDROME WITH DIAGNOSIS OF HYPERTENSION: ICD-10-CM

## 2019-08-08 DIAGNOSIS — H10.9 CONJUNCTIVITIS OF RIGHT EYE, UNSPECIFIED CONJUNCTIVITIS TYPE: Primary | ICD-10-CM

## 2019-08-08 PROCEDURE — 3008F PR BODY MASS INDEX (BMI) DOCUMENTED: ICD-10-PCS | Mod: CPTII,S$GLB,, | Performed by: NURSE PRACTITIONER

## 2019-08-08 PROCEDURE — 3080F PR MOST RECENT DIASTOLIC BLOOD PRESSURE >= 90 MM HG: ICD-10-PCS | Mod: CPTII,S$GLB,, | Performed by: NURSE PRACTITIONER

## 2019-08-08 PROCEDURE — 3077F SYST BP >= 140 MM HG: CPT | Mod: CPTII,S$GLB,, | Performed by: NURSE PRACTITIONER

## 2019-08-08 PROCEDURE — 3080F DIAST BP >= 90 MM HG: CPT | Mod: CPTII,S$GLB,, | Performed by: NURSE PRACTITIONER

## 2019-08-08 PROCEDURE — 3077F PR MOST RECENT SYSTOLIC BLOOD PRESSURE >= 140 MM HG: ICD-10-PCS | Mod: CPTII,S$GLB,, | Performed by: NURSE PRACTITIONER

## 2019-08-08 PROCEDURE — 3008F BODY MASS INDEX DOCD: CPT | Mod: CPTII,S$GLB,, | Performed by: NURSE PRACTITIONER

## 2019-08-08 PROCEDURE — 99214 PR OFFICE/OUTPT VISIT, EST, LEVL IV, 30-39 MIN: ICD-10-PCS | Mod: S$GLB,,, | Performed by: NURSE PRACTITIONER

## 2019-08-08 PROCEDURE — 99214 OFFICE O/P EST MOD 30 MIN: CPT | Mod: S$GLB,,, | Performed by: NURSE PRACTITIONER

## 2019-08-08 RX ORDER — TRAMADOL HYDROCHLORIDE 200 MG/1
TABLET, EXTENDED RELEASE ORAL
COMMUNITY
Start: 2019-07-05 | End: 2021-05-27

## 2019-08-08 RX ORDER — MELOXICAM 15 MG/1
TABLET ORAL
COMMUNITY
Start: 2019-06-25 | End: 2021-01-04 | Stop reason: ALTCHOICE

## 2019-08-08 RX ORDER — ERYTHROMYCIN 5 MG/G
OINTMENT OPHTHALMIC 3 TIMES DAILY
Qty: 1 G | Refills: 0 | Status: SHIPPED | OUTPATIENT
Start: 2019-08-08 | End: 2019-08-15

## 2019-08-08 RX ORDER — LISINOPRIL 40 MG/1
40 TABLET ORAL
COMMUNITY
End: 2020-01-09

## 2019-08-08 NOTE — PATIENT INSTRUCTIONS
If your condition worsens or fails to improve we recommend that you receive another evaluation at the emergency room immediately or contact your primary medical clinic to discuss your concerns.   You must understand that you have received an Urgent Care treatment only and that you may be released before all of your medical problems are known or treated. You, the patient, will arrange for follow up care as instructed.   Please return here or go to the Emergency Department for any concerns or worsening of condition.  If you were prescribed antibiotics, please take them to completion.  If you were prescribed a narcotic medication, do not drive or operate heavy equipment or machinery while taking these medications.  Please follow up with your primary care doctor or specialist as needed.    If you  smoke, please stop smoking.    Conjunctivitis, Nonspecific    The membrane that covers the white part of your eye (the conjunctiva) is inflamed. Inflammation happens when your body responds to an injury, allergic reaction, infection, or illness. Symptoms of inflammation in the eye may include redness, irritation, itching, swelling, or burning. These symptoms should go away within the next 24 hours. Conjunctivitis may be related to a particle that was in your eye. If so, it may wash out with your tears or irrigation treatment. Being exposed to liquid chemicals or fumes may also cause this reaction.   Home care  · Apply a cold pack (ice in a plastic bag, wrapped in a towel) over the eye for 20 minutes at a time. This will reduce pain.  · Artificial tears may be prescribed to reduce irritation or redness.  These should be used 3 to 4 times a day.  · You may use acetaminophen or ibuprofen to control pain, unless another medicine was prescribed.(Note: If you have chronic liver or kidney disease, or if you have ever had a stomach ulcer or gastrointestinal bleeding, talk with your healthcare provider before using these  medicines.)  Follow-up care  Follow up with your healthcare provider, or as advised.  When to seek medical advice  Call your healthcare provider right away if any of these occur:  · Increased eyelid swelling  · Increased eye pain  · Increased redness or drainage from the eye  · Increased blurry vision or increased sensitivity to light  · Failure of normal vision to return within 24 to 48 hours  Date Last Reviewed: 6/14/2015  © 0620-2065 [employer]. [address] [place] [address], [place] [address]. All rights reserved. This information is not intended as a substitute for professional medical care. Always follow your healthcare professional's instructions.

## 2019-08-08 NOTE — PROGRESS NOTES
Subjective:       Patient ID: Fabiano Jules Jr. is a 49 y.o. male.    Vitals:  height is 6' (1.829 m) and weight is 147.4 kg (325 lb) (abnormal). His temperature is 98.1 °F (36.7 °C). His blood pressure is 180/100 (abnormal) and his pulse is 60. His respiration is 18 and oxygen saturation is 98%.     Chief Complaint: Eye Problem    Patient states he woke up this morning with eye redness and itching.  Unsure of exposure to pinkeye but has been at a few functions with a lot of people lately.    Eye Problem    The right eye is affected. This is a new problem. The current episode started today. The problem occurs constantly. The problem has been gradually worsening. There was no injury mechanism. The pain is at a severity of 5/10. The pain is mild. There is no known exposure to pink eye. He does not wear contacts. Associated symptoms include eye redness and itching. Pertinent negatives include no blurred vision, eye discharge, double vision, fever, nausea, photophobia or vomiting. He has tried eye drops for the symptoms. The treatment provided no relief.       Constitution: Negative for chills and fever.   HENT: Negative for congestion and sinus pain.    Eyes: Positive for eye itching and eye redness. Negative for eye trauma, foreign body in eye, eye discharge, eye pain, photophobia, vision loss, double vision, blurred vision and eyelid swelling.   Gastrointestinal: Negative for nausea and vomiting.   Skin: Negative for rash.   Allergic/Immunologic: Negative for seasonal allergies and itching.   Neurological: Negative for headaches.       Objective:      Physical Exam   Constitutional: He is oriented to person, place, and time. He appears well-developed and well-nourished.  Non-toxic appearance. He does not appear ill. No distress.   Hypertensive in clinic   HENT:   Head: Normocephalic and atraumatic.   Right Ear: External ear normal.   Left Ear: External ear normal.   Nose: Nose normal.   Mouth/Throat: Uvula is midline,  oropharynx is clear and moist and mucous membranes are normal.   Eyes: Pupils are equal, round, and reactive to light. EOM and lids are normal. Right conjunctiva is injected. Left conjunctiva is not injected.       Neck: Trachea normal, normal range of motion, full passive range of motion without pain and phonation normal. Neck supple. No spinous process tenderness and no muscular tenderness present. No neck rigidity. Normal range of motion present.   Cardiovascular: Normal rate.   Pulmonary/Chest: Effort normal.   Musculoskeletal: Normal range of motion.   Lymphadenopathy:        Head (right side): No preauricular, no posterior auricular and no occipital adenopathy present.        Head (left side): No occipital adenopathy present.     He has no cervical adenopathy.   Neurological: He is alert and oriented to person, place, and time.   Skin: Skin is warm, dry and intact.   Psychiatric: He has a normal mood and affect. His speech is normal and behavior is normal. Judgment and thought content normal. Cognition and memory are normal.   Nursing note and vitals reviewed.      Assessment:       1. Conjunctivitis of right eye, unspecified conjunctivitis type    2. Essential hypertension    3. White coat syndrome with diagnosis of hypertension        Plan:         Conjunctivitis of right eye, unspecified conjunctivitis type  -     erythromycin (ROMYCIN) ophthalmic ointment; Place into the right eye 3 (three) times daily. for 7 days  Dispense: 1 g; Refill: 0    Essential hypertension    White coat syndrome with diagnosis of hypertension      Patient Instructions     If your condition worsens or fails to improve we recommend that you receive another evaluation at the emergency room immediately or contact your primary medical clinic to discuss your concerns.   You must understand that you have received an Urgent Care treatment only and that you may be released before all of your medical problems are known or treated. You, the  patient, will arrange for follow up care as instructed.   Please return here or go to the Emergency Department for any concerns or worsening of condition.  If you were prescribed antibiotics, please take them to completion.  If you were prescribed a narcotic medication, do not drive or operate heavy equipment or machinery while taking these medications.  Please follow up with your primary care doctor or specialist as needed.    If you  smoke, please stop smoking.    Conjunctivitis, Nonspecific    The membrane that covers the white part of your eye (the conjunctiva) is inflamed. Inflammation happens when your body responds to an injury, allergic reaction, infection, or illness. Symptoms of inflammation in the eye may include redness, irritation, itching, swelling, or burning. These symptoms should go away within the next 24 hours. Conjunctivitis may be related to a particle that was in your eye. If so, it may wash out with your tears or irrigation treatment. Being exposed to liquid chemicals or fumes may also cause this reaction.   Home care  · Apply a cold pack (ice in a plastic bag, wrapped in a towel) over the eye for 20 minutes at a time. This will reduce pain.  · Artificial tears may be prescribed to reduce irritation or redness.  These should be used 3 to 4 times a day.  · You may use acetaminophen or ibuprofen to control pain, unless another medicine was prescribed.(Note: If you have chronic liver or kidney disease, or if you have ever had a stomach ulcer or gastrointestinal bleeding, talk with your healthcare provider before using these medicines.)  Follow-up care  Follow up with your healthcare provider, or as advised.  When to seek medical advice  Call your healthcare provider right away if any of these occur:  · Increased eyelid swelling  · Increased eye pain  · Increased redness or drainage from the eye  · Increased blurry vision or increased sensitivity to light  · Failure of normal vision to return  within 24 to 48 hours  Date Last Reviewed: 6/14/2015  © 0956-7598 [employer]. [address] [place] [address], [place] [address]. All rights reserved. This information is not intended as a substitute for professional medical care. Always follow your healthcare professional's instructions.

## 2019-10-31 DIAGNOSIS — I10 ESSENTIAL HYPERTENSION: ICD-10-CM

## 2019-11-02 RX ORDER — LISINOPRIL 40 MG/1
TABLET ORAL
Qty: 90 TABLET | Refills: 3 | OUTPATIENT
Start: 2019-11-02

## 2019-11-20 DIAGNOSIS — I10 ESSENTIAL HYPERTENSION: ICD-10-CM

## 2019-11-20 RX ORDER — LISINOPRIL 40 MG/1
TABLET ORAL
Qty: 90 TABLET | Refills: 3 | OUTPATIENT
Start: 2019-11-20

## 2019-11-29 ENCOUNTER — OFFICE VISIT (OUTPATIENT)
Dept: URGENT CARE | Facility: CLINIC | Age: 49
End: 2019-11-29
Payer: COMMERCIAL

## 2019-11-29 VITALS
TEMPERATURE: 99 F | SYSTOLIC BLOOD PRESSURE: 132 MMHG | BODY MASS INDEX: 40.63 KG/M2 | WEIGHT: 300 LBS | DIASTOLIC BLOOD PRESSURE: 84 MMHG | HEART RATE: 96 BPM | OXYGEN SATURATION: 99 % | RESPIRATION RATE: 18 BRPM | HEIGHT: 72 IN

## 2019-11-29 DIAGNOSIS — I10 ESSENTIAL HYPERTENSION: Primary | ICD-10-CM

## 2019-11-29 PROCEDURE — 99214 PR OFFICE/OUTPT VISIT, EST, LEVL IV, 30-39 MIN: ICD-10-PCS | Mod: S$GLB,,, | Performed by: NURSE PRACTITIONER

## 2019-11-29 PROCEDURE — 99214 OFFICE O/P EST MOD 30 MIN: CPT | Mod: S$GLB,,, | Performed by: NURSE PRACTITIONER

## 2019-11-29 RX ORDER — LISINOPRIL 40 MG/1
40 TABLET ORAL DAILY
Qty: 30 TABLET | Refills: 0 | Status: SHIPPED | OUTPATIENT
Start: 2019-11-29 | End: 2020-01-09

## 2019-11-29 NOTE — PROGRESS NOTES
Subjective:       Patient ID: Fabiano Jules Jr. is a 49 y.o. male.    Vitals:  height is 6' (1.829 m) and weight is 136.1 kg (300 lb). His temperature is 98.8 °F (37.1 °C). His blood pressure is 132/84 and his pulse is 96. His respiration is 18 and oxygen saturation is 99%.     Chief Complaint: Headache    Pt states out of bp medication lisinopril x3 days. Yesterday headache with 160/100. Today headache resolved and blood pressure better. Pt states here for bp med refill. Missed family provider appt last week for refill pt states due to work.     Headache    This is a recurrent problem. The current episode started yesterday. The problem occurs intermittently. The problem has been resolved. The pain is located in the bilateral region. The pain does not radiate. The pain quality is similar to prior headaches. The quality of the pain is described as pulsating and aching. The pain is at a severity of 0/10. The patient is experiencing no pain. Pertinent negatives include no abdominal pain, abnormal behavior, blurred vision, coughing, dizziness, eye pain, eye redness, fever, loss of balance, muscle aches, nausea, neck pain, numbness, phonophobia, photophobia, scalp tenderness, seizures, sinus pressure, tingling, tinnitus, visual change, vomiting or weakness. Associated symptoms comments: No chest pain, no dizziness, no one sided weakness.. Nothing aggravates the symptoms. He has tried nothing for the symptoms. The treatment provided no relief. His past medical history is significant for hypertension. There is no history of migraine headaches or recent head traumas.       Constitution: Negative for fever and generalized weakness.   HENT: Negative for tinnitus and sinus pressure.    Neck: Negative for neck pain, neck stiffness and painful lymph nodes.   Cardiovascular: Negative for chest pain and sob on exertion.   Eyes: Negative for eye pain, eye redness, photophobia and blurred vision.   Respiratory: Negative for chest  tightness, cough and wheezing.    Gastrointestinal: Negative for abdominal pain, nausea, vomiting and diarrhea.   Genitourinary: Negative for dysuria and frequency.   Musculoskeletal: Negative for joint pain, joint swelling and muscle ache.   Skin: Negative for pale, rash and erythema.   Neurological: Positive for headaches. Negative for dizziness, loss of balance, history of migraines, numbness and seizures.   Hematologic/Lymphatic: Negative for swollen lymph nodes and easy bruising/bleeding. Does not bruise/bleed easily.       Objective:      Physical Exam   Constitutional: He is oriented to person, place, and time. He appears well-developed and well-nourished. He is cooperative.  Non-toxic appearance. He does not have a sickly appearance. He does not appear ill. No distress.   HENT:   Head: Normocephalic and atraumatic.   Right Ear: Hearing, tympanic membrane, external ear and ear canal normal.   Left Ear: Hearing, tympanic membrane, external ear and ear canal normal.   Nose: Nose normal. No mucosal edema, rhinorrhea or nasal deformity. No epistaxis. Right sinus exhibits no maxillary sinus tenderness and no frontal sinus tenderness. Left sinus exhibits no maxillary sinus tenderness and no frontal sinus tenderness.   Mouth/Throat: Uvula is midline, oropharynx is clear and moist and mucous membranes are normal. Mucous membranes are not pale. No trismus in the jaw. Normal dentition. No uvula swelling. No oropharyngeal exudate, posterior oropharyngeal edema or posterior oropharyngeal erythema. Tonsils are 0 on the right. Tonsils are 0 on the left.   Eyes: Conjunctivae and lids are normal. Right eye exhibits no discharge. Left eye exhibits no discharge. No scleral icterus.   Neck: Trachea normal, normal range of motion, full passive range of motion without pain and phonation normal. Neck supple. No neck rigidity. No tracheal deviation, no edema and no erythema present.   Cardiovascular: Normal rate, regular rhythm,  normal heart sounds, intact distal pulses and normal pulses. PMI is not displaced.   No murmur heard.  Pulmonary/Chest: Effort normal and breath sounds normal. No respiratory distress. He has no decreased breath sounds. He has no wheezes. He has no rhonchi.   Abdominal: Soft. Normal appearance and bowel sounds are normal. He exhibits no distension, no pulsatile midline mass and no mass. There is no tenderness.   Musculoskeletal: Normal range of motion. He exhibits no edema or deformity.   Lymphadenopathy:     He has no cervical adenopathy.   Neurological: He is alert and oriented to person, place, and time. No cranial nerve deficit. He exhibits normal muscle tone. He displays no seizure activity. Coordination and gait normal. GCS eye subscore is 4. GCS verbal subscore is 5. GCS motor subscore is 6.   Skin: Skin is warm, dry, intact, not diaphoretic, not pale and no rash. Capillary refill takes less than 2 seconds. erythema  Psychiatric: He has a normal mood and affect. His speech is normal and behavior is normal. Judgment and thought content normal. Cognition and memory are normal.   Nursing note and vitals reviewed.        Assessment:       1. Essential hypertension        Plan:       Alert, nontoxic and in NAD. Afebrile. No evidence end organ damage.  Patient is asymptomatic today.  Will refill 1 month lisinopril, to follow up with family provider.    Essential hypertension  -     lisinopril (PRINIVIL,ZESTRIL) 40 MG tablet; Take 1 tablet (40 mg total) by mouth once daily.  Dispense: 30 tablet; Refill: 0          Patient Instructions     Established High Blood Pressure    High blood pressure (hypertension) is a chronic disease. Often, healthcare providers dont know what causes it. But it can be caused by certain health conditions and medicines.  If you have high blood pressure, you may not have any symptoms. If you do have symptoms, they may include headache, dizziness, changes in your vision, chest pain, and  shortness of breath. But even without symptoms, high blood pressure thats not treated raises your risk for heart attack and stroke. High blood pressure is a serious health risk and shouldnt be ignored.  A blood pressure reading is made up of two numbers: a higher number over a lower number. The top number is the systolic pressure. The bottom number is the diastolic pressure. A normal blood pressure is a systolic pressure of  less than 120 over a diastolic pressure of less than 80. You will see your blood pressure readings written together. For example, a person with a systolic pressure of 188 and a diastolic pressure of 78 will have 118/78 written in the medical record.  High blood pressure is when either the top number is 140 or higher, or the bottom number is 90 or higher. This must be the result when taking your blood pressure a number of times. The blood pressures between normal and high are called prehypertension.  Home care  If you have high blood pressure, you should do what is listed below to lower your blood pressure. If you are taking medicines for high blood pressure, these methods may reduce or end your need for medicines in the future.  Begin a weight-loss program if you are overweight.  Cut back on how much salt you get in your diet. Heres how to do this:  Dont eat foods that have a lot of salt. These include olives, pickles, smoked meats, and salted potato chips.  Dont add salt to your food at the table.  Use only small amounts of salt when cooking.  Start an exercise program. Talk with your healthcare provider about the type of exercise program that would be best for you. It doesn't have to be hard. Even brisk walking for 20 minutes 3 times a week is a good form of exercise.  Dont take medicines that stimulate the heart. This includes many over-the-counter cold and sinus decongestant pills and sprays, as well as diet pills. Check the warnings about hypertension on the label. Before buying any  over-the-counter medicines or supplements, always ask the pharmacist about the product's potential interaction with your high blood pressure and your high blood pressure medicines.  Stimulants such as amphetamine or cocaine could be deadly for someone with high blood pressure. Never take these.  Limit how much caffeine you get in your diet. Switch to caffeine-free products.  Stop smoking. If you are a long-time smoker, this can be hard. Talk to your healthcare provider about medicines and nicotine replacement options to help you. Also, enroll in a stop-smoking program to make it more likely that you will quit for good.  Learn how to handle stress. This is an important part of any program to lower blood pressure. Learn about relaxation methods like meditation, yoga, or biofeedback.  If your provider prescribed medicines, take them exactly as directed. Missing doses may cause your blood pressure get out of control.  If you miss a dose or doses, check with your healthcare provider or pharmacist about what to do.  Consider buying an automatic blood pressure machine. Ask your provider for a recommendation. You can get one of these at most pharmacies.     The American Heart Association recommends the following guidelines for home blood pressure monitoring:  Don't smoke or drink coffee for 30 minutes before taking your blood pressure.  Go to the bathroom before the test.  Relax for 5 minutes before taking the measurement.  Sit with your back supported (don't sit on a couch or soft chair); keep your feet on the floor uncrossed. Place your arm on a solid flat surface (like a table) with the upper part of the arm at heart level. Place the middle of the cuff directly above the eye of the elbow. Check the monitor's instruction manual for an illustration.  Take multiple readings. When you measure, take 2 to 3 readings one minute apart and record all of the results.  Take your blood pressure at the same time every day, or as your  healthcare provider recommends.  Record the date, time, and blood pressure reading.  Take the record with you to your next medical appointment. If your blood pressure monitor has a built-in memory, simply take the monitor with you to your next appointment.  Call your provider if you have several high readings. Don't be frightened by a single high blood pressure reading, but if you get several high readings, check in with your healthcare provider.  Note: When blood pressure reaches a systolic (top number) of 180 or higher OR diastolic (bottom number) of 110 or higher, seek emergency medical treatment.  Follow-up care  You will need to see your healthcare provider regularly. This is to check your blood pressure and to make changes to your medicines. Make a follow-up appointment as directed. Bring the record of your home blood pressure readings to the appointment.  When to seek medical advice  Call your healthcare provider right away if any of these occur:  Blood pressure reaches a systolic (upper number) of 180 or higher OR a diastolic (bottom number) of 110 or higher  Chest pain or shortness of breath  Severe headache  Throbbing or rushing sound in the ears  Nosebleed  Sudden severe pain in your belly (abdomen)  Extreme drowsiness, confusion, or fainting  Dizziness or spinning sensation (vertigo)  Weakness of an arm or leg or one side of the face  You have problems speaking or seeing   Date Last Reviewed: 12/1/2016  © 5915-0189 [employer]. [address] [place] [address]. All rights reserved. This information is not intended as a substitute for professional medical care. Always follow your healthcare professional's instructions.      ·   ·   · Follow up with your primary care in 2-5 days if symptoms have not improved, or you may return here.  · If you were referred to a specialist, please follow up with that specialty.  · If you were prescribed antibiotics, please take them to  completion.  · If you were prescribed a narcotic or any medication with sedative effects, do not drive or operate heavy equipment or machinery while taking these medications.  · You must understand that you have received treatment at an Urgent Care facility only, and that you may be released before all of your medical problems are known or treated. Urgent Care facilities are not equipped to handle life threatening emergencies. It is recommended that you go to an Emergency Department for further evaluation of worsening or concerning symptoms, or possibly life threatening conditions as discussed.                                        If you  smoke, please stop smoking

## 2019-11-29 NOTE — PATIENT INSTRUCTIONS
Established High Blood Pressure    High blood pressure (hypertension) is a chronic disease. Often, healthcare providers dont know what causes it. But it can be caused by certain health conditions and medicines.  If you have high blood pressure, you may not have any symptoms. If you do have symptoms, they may include headache, dizziness, changes in your vision, chest pain, and shortness of breath. But even without symptoms, high blood pressure thats not treated raises your risk for heart attack and stroke. High blood pressure is a serious health risk and shouldnt be ignored.  A blood pressure reading is made up of two numbers: a higher number over a lower number. The top number is the systolic pressure. The bottom number is the diastolic pressure. A normal blood pressure is a systolic pressure of  less than 120 over a diastolic pressure of less than 80. You will see your blood pressure readings written together. For example, a person with a systolic pressure of 188 and a diastolic pressure of 78 will have 118/78 written in the medical record.  High blood pressure is when either the top number is 140 or higher, or the bottom number is 90 or higher. This must be the result when taking your blood pressure a number of times. The blood pressures between normal and high are called prehypertension.  Home care  If you have high blood pressure, you should do what is listed below to lower your blood pressure. If you are taking medicines for high blood pressure, these methods may reduce or end your need for medicines in the future.  Begin a weight-loss program if you are overweight.  Cut back on how much salt you get in your diet. Heres how to do this:  Dont eat foods that have a lot of salt. These include olives, pickles, smoked meats, and salted potato chips.  Dont add salt to your food at the table.  Use only small amounts of salt when cooking.  Start an exercise program. Talk with your healthcare provider about the  type of exercise program that would be best for you. It doesn't have to be hard. Even brisk walking for 20 minutes 3 times a week is a good form of exercise.  Dont take medicines that stimulate the heart. This includes many over-the-counter cold and sinus decongestant pills and sprays, as well as diet pills. Check the warnings about hypertension on the label. Before buying any over-the-counter medicines or supplements, always ask the pharmacist about the product's potential interaction with your high blood pressure and your high blood pressure medicines.  Stimulants such as amphetamine or cocaine could be deadly for someone with high blood pressure. Never take these.  Limit how much caffeine you get in your diet. Switch to caffeine-free products.  Stop smoking. If you are a long-time smoker, this can be hard. Talk to your healthcare provider about medicines and nicotine replacement options to help you. Also, enroll in a stop-smoking program to make it more likely that you will quit for good.  Learn how to handle stress. This is an important part of any program to lower blood pressure. Learn about relaxation methods like meditation, yoga, or biofeedback.  If your provider prescribed medicines, take them exactly as directed. Missing doses may cause your blood pressure get out of control.  If you miss a dose or doses, check with your healthcare provider or pharmacist about what to do.  Consider buying an automatic blood pressure machine. Ask your provider for a recommendation. You can get one of these at most pharmacies.     The American Heart Association recommends the following guidelines for home blood pressure monitoring:  Don't smoke or drink coffee for 30 minutes before taking your blood pressure.  Go to the bathroom before the test.  Relax for 5 minutes before taking the measurement.  Sit with your back supported (don't sit on a couch or soft chair); keep your feet on the floor uncrossed. Place your arm on a  solid flat surface (like a table) with the upper part of the arm at heart level. Place the middle of the cuff directly above the eye of the elbow. Check the monitor's instruction manual for an illustration.  Take multiple readings. When you measure, take 2 to 3 readings one minute apart and record all of the results.  Take your blood pressure at the same time every day, or as your healthcare provider recommends.  Record the date, time, and blood pressure reading.  Take the record with you to your next medical appointment. If your blood pressure monitor has a built-in memory, simply take the monitor with you to your next appointment.  Call your provider if you have several high readings. Don't be frightened by a single high blood pressure reading, but if you get several high readings, check in with your healthcare provider.  Note: When blood pressure reaches a systolic (top number) of 180 or higher OR diastolic (bottom number) of 110 or higher, seek emergency medical treatment.  Follow-up care  You will need to see your healthcare provider regularly. This is to check your blood pressure and to make changes to your medicines. Make a follow-up appointment as directed. Bring the record of your home blood pressure readings to the appointment.  When to seek medical advice  Call your healthcare provider right away if any of these occur:  Blood pressure reaches a systolic (upper number) of 180 or higher OR a diastolic (bottom number) of 110 or higher  Chest pain or shortness of breath  Severe headache  Throbbing or rushing sound in the ears  Nosebleed  Sudden severe pain in your belly (abdomen)  Extreme drowsiness, confusion, or fainting  Dizziness or spinning sensation (vertigo)  Weakness of an arm or leg or one side of the face  You have problems speaking or seeing   Date Last Reviewed: 12/1/2016  © 7715-9445 [employer]. [address] [place] [address], [place] [address]. All rights reserved. This information is not  intended as a substitute for professional medical care. Always follow your healthcare professional's instructions.      ·   ·   · Follow up with your primary care in 2-5 days if symptoms have not improved, or you may return here.  · If you were referred to a specialist, please follow up with that specialty.  · If you were prescribed antibiotics, please take them to completion.  · If you were prescribed a narcotic or any medication with sedative effects, do not drive or operate heavy equipment or machinery while taking these medications.  · You must understand that you have received treatment at an Urgent Care facility only, and that you may be released before all of your medical problems are known or treated. Urgent Care facilities are not equipped to handle life threatening emergencies. It is recommended that you go to an Emergency Department for further evaluation of worsening or concerning symptoms, or possibly life threatening conditions as discussed.                                        If you  smoke, please stop smoking

## 2020-01-07 ENCOUNTER — PATIENT MESSAGE (OUTPATIENT)
Dept: SLEEP MEDICINE | Facility: CLINIC | Age: 50
End: 2020-01-07

## 2020-01-09 ENCOUNTER — LAB VISIT (OUTPATIENT)
Dept: LAB | Facility: HOSPITAL | Age: 50
End: 2020-01-09
Payer: COMMERCIAL

## 2020-01-09 ENCOUNTER — OFFICE VISIT (OUTPATIENT)
Dept: INTERNAL MEDICINE | Facility: CLINIC | Age: 50
End: 2020-01-09
Payer: COMMERCIAL

## 2020-01-09 VITALS
HEART RATE: 68 BPM | SYSTOLIC BLOOD PRESSURE: 138 MMHG | BODY MASS INDEX: 42.66 KG/M2 | OXYGEN SATURATION: 98 % | DIASTOLIC BLOOD PRESSURE: 90 MMHG | WEIGHT: 315 LBS | HEIGHT: 72 IN

## 2020-01-09 DIAGNOSIS — G89.29 CHRONIC PAIN OF RIGHT KNEE: ICD-10-CM

## 2020-01-09 DIAGNOSIS — M54.50 CHRONIC BILATERAL LOW BACK PAIN, UNSPECIFIED WHETHER SCIATICA PRESENT: ICD-10-CM

## 2020-01-09 DIAGNOSIS — E66.01 OBESITY, MORBID, BMI 40.0-49.9: ICD-10-CM

## 2020-01-09 DIAGNOSIS — I10 ESSENTIAL HYPERTENSION: Chronic | ICD-10-CM

## 2020-01-09 DIAGNOSIS — G89.29 CHRONIC BILATERAL LOW BACK PAIN, UNSPECIFIED WHETHER SCIATICA PRESENT: ICD-10-CM

## 2020-01-09 DIAGNOSIS — M25.561 CHRONIC PAIN OF RIGHT KNEE: ICD-10-CM

## 2020-01-09 DIAGNOSIS — E55.9 VITAMIN D DEFICIENCY: ICD-10-CM

## 2020-01-09 DIAGNOSIS — D64.9 ANEMIA, UNSPECIFIED TYPE: ICD-10-CM

## 2020-01-09 DIAGNOSIS — E66.01 CLASS 3 SEVERE OBESITY WITH BODY MASS INDEX (BMI) OF 40.0 TO 44.9 IN ADULT, UNSPECIFIED OBESITY TYPE, UNSPECIFIED WHETHER SERIOUS COMORBIDITY PRESENT: ICD-10-CM

## 2020-01-09 DIAGNOSIS — Z00.00 ANNUAL PHYSICAL EXAM: ICD-10-CM

## 2020-01-09 DIAGNOSIS — Z00.00 ANNUAL PHYSICAL EXAM: Primary | ICD-10-CM

## 2020-01-09 LAB
25(OH)D3+25(OH)D2 SERPL-MCNC: 19 NG/ML (ref 30–96)
ALBUMIN SERPL BCP-MCNC: 4.4 G/DL (ref 3.5–5.2)
ALP SERPL-CCNC: 57 U/L (ref 55–135)
ALT SERPL W/O P-5'-P-CCNC: 12 U/L (ref 10–44)
ANION GAP SERPL CALC-SCNC: 9 MMOL/L (ref 8–16)
AST SERPL-CCNC: 18 U/L (ref 10–40)
BASOPHILS # BLD AUTO: 0.02 K/UL (ref 0–0.2)
BASOPHILS NFR BLD: 0.4 % (ref 0–1.9)
BILIRUB SERPL-MCNC: 0.7 MG/DL (ref 0.1–1)
BILIRUB UR QL STRIP: NEGATIVE
BUN SERPL-MCNC: 8 MG/DL (ref 6–20)
CALCIUM SERPL-MCNC: 9.5 MG/DL (ref 8.7–10.5)
CHLORIDE SERPL-SCNC: 101 MMOL/L (ref 95–110)
CHOLEST SERPL-MCNC: 183 MG/DL (ref 120–199)
CHOLEST/HDLC SERPL: 4 {RATIO} (ref 2–5)
CLARITY UR REFRACT.AUTO: CLEAR
CO2 SERPL-SCNC: 28 MMOL/L (ref 23–29)
COLOR UR AUTO: YELLOW
CREAT SERPL-MCNC: 0.8 MG/DL (ref 0.5–1.4)
DIFFERENTIAL METHOD: ABNORMAL
EOSINOPHIL # BLD AUTO: 0.1 K/UL (ref 0–0.5)
EOSINOPHIL NFR BLD: 1.9 % (ref 0–8)
ERYTHROCYTE [DISTWIDTH] IN BLOOD BY AUTOMATED COUNT: 15 % (ref 11.5–14.5)
EST. GFR  (AFRICAN AMERICAN): >60 ML/MIN/1.73 M^2
EST. GFR  (NON AFRICAN AMERICAN): >60 ML/MIN/1.73 M^2
FERRITIN SERPL-MCNC: 71 NG/ML (ref 20–300)
GLUCOSE SERPL-MCNC: 93 MG/DL (ref 70–110)
GLUCOSE UR QL STRIP: NEGATIVE
HCT VFR BLD AUTO: 41.2 % (ref 40–54)
HDLC SERPL-MCNC: 46 MG/DL (ref 40–75)
HDLC SERPL: 25.1 % (ref 20–50)
HGB BLD-MCNC: 13.3 G/DL (ref 14–18)
HGB UR QL STRIP: NEGATIVE
IRON SERPL-MCNC: 108 UG/DL (ref 45–160)
KETONES UR QL STRIP: NEGATIVE
LDLC SERPL CALC-MCNC: 120.4 MG/DL (ref 63–159)
LEUKOCYTE ESTERASE UR QL STRIP: NEGATIVE
LYMPHOCYTES # BLD AUTO: 2.5 K/UL (ref 1–4.8)
LYMPHOCYTES NFR BLD: 47.4 % (ref 18–48)
MCH RBC QN AUTO: 26.1 PG (ref 27–31)
MCHC RBC AUTO-ENTMCNC: 32.3 G/DL (ref 32–36)
MCV RBC AUTO: 81 FL (ref 82–98)
MONOCYTES # BLD AUTO: 0.5 K/UL (ref 0.3–1)
MONOCYTES NFR BLD: 8.8 % (ref 4–15)
NEUTROPHILS # BLD AUTO: 2.2 K/UL (ref 1.8–7.7)
NEUTROPHILS NFR BLD: 41.5 % (ref 38–73)
NITRITE UR QL STRIP: NEGATIVE
NONHDLC SERPL-MCNC: 137 MG/DL
PH UR STRIP: 8 [PH] (ref 5–8)
PLATELET # BLD AUTO: 372 K/UL (ref 150–350)
PMV BLD AUTO: 10.6 FL (ref 9.2–12.9)
POTASSIUM SERPL-SCNC: 3.8 MMOL/L (ref 3.5–5.1)
PROT SERPL-MCNC: 8.1 G/DL (ref 6–8.4)
PROT UR QL STRIP: NEGATIVE
RBC # BLD AUTO: 5.1 M/UL (ref 4.6–6.2)
SATURATED IRON: 23 % (ref 20–50)
SODIUM SERPL-SCNC: 138 MMOL/L (ref 136–145)
SP GR UR STRIP: 1.01 (ref 1–1.03)
TOTAL IRON BINDING CAPACITY: 477 UG/DL (ref 250–450)
TRANSFERRIN SERPL-MCNC: 322 MG/DL (ref 200–375)
TRIGL SERPL-MCNC: 83 MG/DL (ref 30–150)
TSH SERPL DL<=0.005 MIU/L-ACNC: 2.15 UIU/ML (ref 0.4–4)
URN SPEC COLLECT METH UR: NORMAL
WBC # BLD AUTO: 5.21 K/UL (ref 3.9–12.7)

## 2020-01-09 PROCEDURE — 83540 ASSAY OF IRON: CPT

## 2020-01-09 PROCEDURE — 99999 PR PBB SHADOW E&M-EST. PATIENT-LVL IV: ICD-10-PCS | Mod: PBBFAC,,, | Performed by: NURSE PRACTITIONER

## 2020-01-09 PROCEDURE — 80061 LIPID PANEL: CPT

## 2020-01-09 PROCEDURE — 99396 PR PREVENTIVE VISIT,EST,40-64: ICD-10-PCS | Mod: S$GLB,,, | Performed by: NURSE PRACTITIONER

## 2020-01-09 PROCEDURE — 99999 PR PBB SHADOW E&M-EST. PATIENT-LVL IV: CPT | Mod: PBBFAC,,, | Performed by: NURSE PRACTITIONER

## 2020-01-09 PROCEDURE — 36415 COLL VENOUS BLD VENIPUNCTURE: CPT

## 2020-01-09 PROCEDURE — 84443 ASSAY THYROID STIM HORMONE: CPT

## 2020-01-09 PROCEDURE — 3080F PR MOST RECENT DIASTOLIC BLOOD PRESSURE >= 90 MM HG: ICD-10-PCS | Mod: CPTII,S$GLB,, | Performed by: NURSE PRACTITIONER

## 2020-01-09 PROCEDURE — 3075F SYST BP GE 130 - 139MM HG: CPT | Mod: CPTII,S$GLB,, | Performed by: NURSE PRACTITIONER

## 2020-01-09 PROCEDURE — 3075F PR MOST RECENT SYSTOLIC BLOOD PRESS GE 130-139MM HG: ICD-10-PCS | Mod: CPTII,S$GLB,, | Performed by: NURSE PRACTITIONER

## 2020-01-09 PROCEDURE — 82728 ASSAY OF FERRITIN: CPT

## 2020-01-09 PROCEDURE — 99396 PREV VISIT EST AGE 40-64: CPT | Mod: S$GLB,,, | Performed by: NURSE PRACTITIONER

## 2020-01-09 PROCEDURE — 82306 VITAMIN D 25 HYDROXY: CPT

## 2020-01-09 PROCEDURE — 81003 URINALYSIS AUTO W/O SCOPE: CPT

## 2020-01-09 PROCEDURE — 85025 COMPLETE CBC W/AUTO DIFF WBC: CPT

## 2020-01-09 PROCEDURE — 3080F DIAST BP >= 90 MM HG: CPT | Mod: CPTII,S$GLB,, | Performed by: NURSE PRACTITIONER

## 2020-01-09 PROCEDURE — 80053 COMPREHEN METABOLIC PANEL: CPT

## 2020-01-09 RX ORDER — LISINOPRIL AND HYDROCHLOROTHIAZIDE 12.5; 2 MG/1; MG/1
2 TABLET ORAL DAILY
Qty: 180 TABLET | Refills: 2 | Status: SHIPPED | OUTPATIENT
Start: 2020-01-09 | End: 2020-03-19 | Stop reason: SDUPTHER

## 2020-01-09 NOTE — PROGRESS NOTES
Internal Medicine Annual Exam       CHIEF COMPLAINT     The patient, Fabiano Jules Jr., who is a 49 y.o. male with htn, phimosis, and obesity presents for an annual exam.    HPI     HTN- compliant with lisinopril 40mg daily   BP running high at home   Occasional headaches. No vision changes, chest pain or SOB.      RAFAEL-  last Study 8-10 years. Using new CPAP machine. No issues. .     Chronic knee and back pain - knee surgery 7/18 Dr. ROSLYN Gore and 11,12/18 and 1/19 JESSICA and RFA lumbar spine with Dr LAUREL Gore   Still having knee and back pain     Anemia- mild on last labs - c-scope 3mm polyp, no bleeds. No family h/o thalassemia or sickle cell.      HM-   Lipids- ordered today   c-scope- 12/3/2018 3mm polyp - repeat in 5 years    Flu- refuses  Tdap-UTD        Past Medical History:  Past Medical History:   Diagnosis Date    CPAP (continuous positive airway pressure) dependence     Hypertension     PONV (postoperative nausea and vomiting)     Sleep apnea        Past Surgical History:   Procedure Laterality Date    CIRCUMCISION      COLONOSCOPY N/A 12/3/2018    Procedure: COLONOSCOPY;  Surgeon: CHRISSY Reyna MD;  Location: [facility] ([address] [place] [address]);  Service: Endoscopy;  Laterality: N/A;    facet injection       [facility] [place]     LIPOMA RESECTION      Back of neck    MEDIAL COLLATERAL LIGAMENT AND LATERAL COLLATERAL LIGAMENT REPAIR, KNEE Right 09/01/2018    Dr. Christophe Gore in [place]         Family History   Problem Relation Age of Onset    Hypertension Mother     Diabetes Father     Cancer Father         mesotheliosma         Social History     Socioeconomic History    Marital status:      Spouse name: Not on file    Number of children: 4    Years of education: Not on file    Highest education level: Not on file   Occupational History     Employer: [employer]   Social Needs    Financial resource strain: Not on file    Food insecurity:     Worry: Not on file      Inability: Not on file    Transportation needs:     Medical: Not on file     Non-medical: Not on file   Tobacco Use    Smoking status: Former Smoker     Last attempt to quit: 2009     Years since quittin.0    Smokeless tobacco: Never Used    Tobacco comment: quit smoking in    Substance and Sexual Activity    Alcohol use: No    Drug use: No    Sexual activity: Not on file   Lifestyle    Physical activity:     Days per week: Not on file     Minutes per session: Not on file    Stress: Not on file   Relationships    Social connections:     Talks on phone: Not on file     Gets together: Not on file     Attends Ca[employer] service: Not on file     Active member of club or organization: Not on file     Attends meetings of clubs or organizations: Not on file     Relationship status: Not on file   Other Topics Concern    Not on file   Social History Narrative    Not on file        Social History     Tobacco Use   Smoking Status Former Smoker    Last attempt to quit: 2009    Years since quittin.0   Smokeless Tobacco Never Used   Tobacco Comment    quit smoking in         Allergies as of 2020 - Reviewed 2020   Allergen Reaction Noted    No known drug allergies  06/10/2013          Home Medications:  Prior to Admission medications    Medication Sig Start Date End Date Taking? Authorizing Provider   ergocalciferol (ERGOCALCIFEROL) 50,000 unit Cap TAKE 1 CAPSULE BY MOUTH EVERY 7 DAYS 19  Yes Bhaskar Mi MD   lisinopril (PRINIVIL,ZESTRIL) 40 MG tablet Take 1 tablet (40 mg total) by mouth once daily. 10/17/18  Yes Destiny Adan NP   meloxicam (MOBIC) 15 MG tablet  19  Yes Historical Provider, MD   HYDROcodone-acetaminophen (NORCO) 7.5-325 mg per tablet Take 1 tablet by mouth every 6 (six) hours as needed for Pain.    Historical Provider, MD   lisinopril (PRINIVIL,ZESTRIL) 40 MG tablet Take 40 mg by mouth.    Historical Provider, MD   lisinopril (PRINIVIL,ZESTRIL)  40 MG tablet Take 1 tablet (40 mg total) by mouth once daily.  Patient not taking: Reported on 1/9/2020 11/29/19 11/28/20  Yeimi Zuniga NP   traMADol (ULTRAM-ER) 200 MG Tb24  7/5/19   Historical Provider, MD       Review of Systems:  Review of Systems   Constitutional: Negative for activity change, appetite change, chills, fatigue and fever.   HENT: Positive for postnasal drip.    Eyes: Negative for visual disturbance.   Respiratory: Negative for cough, shortness of breath and wheezing.    Cardiovascular: Negative for chest pain, palpitations and leg swelling.   Gastrointestinal: Negative for abdominal pain, constipation, diarrhea, nausea and vomiting.   Genitourinary: Negative.    Skin: Negative for rash.   Neurological: Positive for light-headedness (1 episode in the past 6 months with change positions ) and headaches. Negative for dizziness and weakness.       Health Maintainence:   Immunizations:  Health Maintenance       Date Due Completion Date    Shingles Vaccine (1 of 2) 03/15/2018 ---    Influenza Vaccine (1) 09/01/2019 ---    Lipid Panel 10/24/2019 10/24/2018    Override on 3/1/2017: Done    TETANUS VACCINE 10/01/2024 10/1/2014 (Done)    Override on 10/1/2014: Done    Colonoscopy 12/03/2028 12/3/2018           PHYSICAL EXAM     BP (!) 148/96 (BP Location: Right arm, Patient Position: Sitting, BP Method: Large (Manual))   Pulse 68   Ht 6' (1.829 m)   Wt (!) 163.7 kg (360 lb 14.3 oz)   SpO2 98%   BMI 48.95 kg/m²  Body mass index is 48.95 kg/m².    Physical Exam   Constitutional: He is oriented to person, place, and time. He appears well-developed and well-nourished.   HENT:   Head: Normocephalic.   Right Ear: External ear normal.   Left Ear: External ear normal.   Nose: Nose normal.   Mouth/Throat: Oropharynx is clear and moist. No oropharyngeal exudate.   Eyes: Pupils are equal, round, and reactive to light.   Neck: No JVD present. No tracheal deviation present. No thyromegaly present.    Cardiovascular: Normal rate, regular rhythm and intact distal pulses. Exam reveals no gallop and no friction rub.   No murmur heard.  Pulmonary/Chest: Breath sounds normal. No respiratory distress. He has no wheezes. He has no rales. He exhibits no tenderness.   Abdominal: Soft. Bowel sounds are normal. He exhibits no distension. There is no tenderness.   Musculoskeletal: Normal range of motion. He exhibits no edema or tenderness.   Lymphadenopathy:     He has no cervical adenopathy.   Neurological: He is alert and oriented to person, place, and time.   Skin: Skin is warm and dry. No rash noted.   Psychiatric: He has a normal mood and affect. His behavior is normal.   Vitals reviewed.      LABS     Lab Results   Component Value Date    HGBA1C 6.0 03/03/2017     CMP  Sodium   Date Value Ref Range Status   10/24/2018 141 136 - 145 mmol/L Final     Potassium   Date Value Ref Range Status   10/24/2018 4.1 3.5 - 5.1 mmol/L Final     Chloride   Date Value Ref Range Status   10/24/2018 105 95 - 110 mmol/L Final     CO2   Date Value Ref Range Status   10/24/2018 25 23 - 29 mmol/L Final     Glucose   Date Value Ref Range Status   10/24/2018 96 70 - 110 mg/dL Final     BUN, Bld   Date Value Ref Range Status   10/24/2018 10 6 - 20 mg/dL Final     Creatinine   Date Value Ref Range Status   10/24/2018 0.7 0.5 - 1.4 mg/dL Final     Calcium   Date Value Ref Range Status   10/24/2018 9.6 8.7 - 10.5 mg/dL Final     Total Protein   Date Value Ref Range Status   10/24/2018 7.5 6.0 - 8.4 g/dL Final     Albumin   Date Value Ref Range Status   10/24/2018 4.1 3.5 - 5.2 g/dL Final     Total Bilirubin   Date Value Ref Range Status   10/24/2018 0.6 0.1 - 1.0 mg/dL Final     Comment:     For infants and newborns, interpretation of results should be based  on gestational age, weight and in agreement with clinical  observations.  Premature Infant recommended reference ranges:  Up to 24 hours.............<8.0 mg/dL  Up to 48  hours............<12.0 mg/dL  3-5 days..................<15.0 mg/dL  6-29 days.................<15.0 mg/dL       Alkaline Phosphatase   Date Value Ref Range Status   10/24/2018 49 (L) 55 - 135 U/L Final     AST   Date Value Ref Range Status   10/24/2018 20 10 - 40 U/L Final     ALT   Date Value Ref Range Status   10/24/2018 13 10 - 44 U/L Final     Anion Gap   Date Value Ref Range Status   10/24/2018 11 8 - 16 mmol/L Final     eGFR if    Date Value Ref Range Status   10/24/2018 >60 >60 mL/min/1.73 m^2 Final     eGFR if non    Date Value Ref Range Status   10/24/2018 >60 >60 mL/min/1.73 m^2 Final     Comment:     Calculation used to obtain the estimated glomerular filtration  rate (eGFR) is the CKD-EPI equation.        Lab Results   Component Value Date    WBC 3.91 10/24/2018    HGB 11.8 (L) 10/24/2018    HCT 39.2 (L) 10/24/2018    MCV 84 10/24/2018     10/24/2018     Lab Results   Component Value Date    CHOL 158 10/24/2018    CHOL 183 03/03/2017    CHOL 166 12/18/2014     Lab Results   Component Value Date    HDL 36 (L) 10/24/2018    HDL 43 03/03/2017    HDL 40 12/18/2014     Lab Results   Component Value Date    LDLCALC 109.4 10/24/2018    LDLCALC 120.4 03/03/2017    LDLCALC 108.6 12/18/2014     Lab Results   Component Value Date    TRIG 63 10/24/2018    TRIG 98 03/03/2017    TRIG 87 12/18/2014     Lab Results   Component Value Date    CHOLHDL 22.8 10/24/2018    CHOLHDL 23.5 03/03/2017    CHOLHDL 24.1 12/18/2014     Lab Results   Component Value Date    TSH 2.189 10/24/2018       ASSESSMENT/PLAN     Fabiano Jules Jr. is a 49 y.o. male with  Past Medical History:   Diagnosis Date    CPAP (continuous positive airway pressure) dependence     Hypertension     PONV (postoperative nausea and vomiting)     Sleep apnea        Annual physical exam- all age and gender related screenings discussed   -     CBC auto differential; Future; Expected date: 01/09/2020  -     Comprehensive  metabolic panel; Future; Expected date: 01/09/2020  -     TSH; Future; Expected date: 01/09/2020  -     Urinalysis  -     Vitamin D; Future; Expected date: 01/09/2020  -     Lipid panel; Future; Expected date: 01/09/2020    Essential hypertension- above goal. Will add HCTZ. Cont BP monitoring. Low na diet. Discussed weight loss   -     CBC auto differential; Future; Expected date: 01/09/2020  -     lisinopril-hydrochlorothiazide (PRINZIDE,ZESTORETIC) 20-12.5 mg per tablet; Take 2 tablets by mouth once daily.  Dispense: 180 tablet; Refill: 2    Anemia, unspecified type- will repeat CBC and Iron studies   -     Iron and TIBC; Future; Expected date: 01/09/2020  -     Ferritin; Future; Expected date: 01/09/2020    Vitamin D deficiency- recheck vitamin D - completed high dose course   -     Vitamin D; Future; Expected date: 01/09/2020    Obesity, morbid, BMI 40.0-49.9  Class 3 severe obesity with body mass index (BMI) of 40.0 to 44.9 in adult, unspecified obesity type, unspecified whether serious comorbidity present- discussed diet for weight loss.     Chronic bilateral low back pain, unspecified whether sciatica present- stable. F/u with pain mgmt.     Chronic pain of right knee- stable. F/u with pain mgmt     Follow up with PCP    Bebe POWELL, APRN, FNP-c   Department of Internal Medicine - [facility]  9:35 AM

## 2020-01-10 ENCOUNTER — PATIENT MESSAGE (OUTPATIENT)
Dept: INTERNAL MEDICINE | Facility: CLINIC | Age: 50
End: 2020-01-10

## 2020-01-10 DIAGNOSIS — E55.9 VITAMIN D DEFICIENCY: Primary | ICD-10-CM

## 2020-01-10 DIAGNOSIS — E55.9 VITAMIN D DEFICIENCY: ICD-10-CM

## 2020-01-10 RX ORDER — ERGOCALCIFEROL 1.25 MG/1
50000 CAPSULE ORAL
Qty: 12 CAPSULE | Refills: 0 | Status: SHIPPED | OUTPATIENT
Start: 2020-01-10 | End: 2020-01-13

## 2020-01-13 RX ORDER — ERGOCALCIFEROL 1.25 MG/1
CAPSULE ORAL
Qty: 12 CAPSULE | Refills: 0 | Status: SHIPPED | OUTPATIENT
Start: 2020-01-13 | End: 2020-04-14 | Stop reason: SDUPTHER

## 2020-02-27 ENCOUNTER — OFFICE VISIT (OUTPATIENT)
Dept: URGENT CARE | Facility: CLINIC | Age: 50
End: 2020-02-27
Payer: COMMERCIAL

## 2020-02-27 VITALS
WEIGHT: 315 LBS | SYSTOLIC BLOOD PRESSURE: 136 MMHG | HEIGHT: 72 IN | TEMPERATURE: 98 F | HEART RATE: 84 BPM | RESPIRATION RATE: 18 BRPM | OXYGEN SATURATION: 98 % | BODY MASS INDEX: 42.66 KG/M2 | DIASTOLIC BLOOD PRESSURE: 86 MMHG

## 2020-02-27 DIAGNOSIS — R05.9 COUGH: ICD-10-CM

## 2020-02-27 DIAGNOSIS — H65.02 ACUTE SEROUS OTITIS MEDIA OF LEFT EAR, RECURRENCE NOT SPECIFIED: ICD-10-CM

## 2020-02-27 DIAGNOSIS — B96.89 ACUTE BACTERIAL SINUSITIS: Primary | ICD-10-CM

## 2020-02-27 DIAGNOSIS — J01.90 ACUTE BACTERIAL SINUSITIS: Primary | ICD-10-CM

## 2020-02-27 PROCEDURE — 99214 OFFICE O/P EST MOD 30 MIN: CPT | Mod: S$GLB,,, | Performed by: INTERNAL MEDICINE

## 2020-02-27 PROCEDURE — 99214 PR OFFICE/OUTPT VISIT, EST, LEVL IV, 30-39 MIN: ICD-10-PCS | Mod: S$GLB,,, | Performed by: INTERNAL MEDICINE

## 2020-02-27 RX ORDER — AMOXICILLIN AND CLAVULANATE POTASSIUM 875; 125 MG/1; MG/1
1 TABLET, FILM COATED ORAL 2 TIMES DAILY
Qty: 20 TABLET | Refills: 0 | Status: SHIPPED | OUTPATIENT
Start: 2020-02-27 | End: 2020-03-08

## 2020-02-27 RX ORDER — PREDNISONE 20 MG/1
20 TABLET ORAL DAILY
Qty: 5 TABLET | Refills: 0 | Status: SHIPPED | OUTPATIENT
Start: 2020-02-27 | End: 2020-03-03

## 2020-02-27 RX ORDER — BENZONATATE 100 MG/1
200 CAPSULE ORAL 3 TIMES DAILY PRN
Qty: 30 CAPSULE | Refills: 1 | Status: SHIPPED | OUTPATIENT
Start: 2020-02-27 | End: 2020-05-28 | Stop reason: CLARIF

## 2020-02-28 NOTE — PATIENT INSTRUCTIONS
Acute Bacterial Rhinosinusitis (ABRS)    Acute bacterial rhinosinusitis (ABRS) is an infection of your nasal cavity and sinuses. Its caused by bacteria. Acute means that youve had symptoms for less than 12 weeks.  Understanding your sinuses  The nasal cavity is the large air-filled space behind your nose. The sinuses are a group of spaces formed by the bones of your face. They connect with your nasal cavity. ABRS causes the tissue lining these spaces to become inflamed. Mucus may not drain normally. This leads to facial pain and other symptoms.  What causes ABRS?  ABRS most often follows an upper respiratory infection caused by a virus. Bacteria then infect the lining of your nasal cavity and sinuses. But you can also get ABRS if you have:  · Nasal allergies  · Long-term nasal swelling and congestion not caused by allergies  · Blockage in the nose  Symptoms of ABRS  The symptoms of ABRS may be different for each person, and can include:  · Nasal congestion  · Runny nose  · Fluid draining from the nose down the throat (postnasal drip)  · Headache  · Cough  · Pain in the sinuses  · Thick, colored fluid from the nose (mucus)  · Fever  Diagnosing ABRS  ABRS may be diagnosed if youve had an upper respiratory infection like a cold and cough for longer than 10 to 14 days. Your health care provider will ask about your symptoms and your medical history. The provider will check your vital signs, including your temperature. Youll have a physical exam. The health care provider will check your ears, nose, and throat. You likely wont need any tests. If ABRS comes back, you may have a culture or other tests.  Treatment for ABRS  Treatment may include:  · Antibiotic medicine. This is for symptoms that last for at least 10 to 14 days.  · Nasal corticosteroid medicine. Drops or spray used in the nose can lessen swelling and congestion.  · Over-the-counter pain medicine. This is to lessen sinus pain and pressure.  · Nasal  decongestant medicine. Spray or drops may help to lessen congestion. Do not use them for more than a few days.  · Salt wash (saline irrigation). This can help to loosen mucus.  Possible complications of ABRS  ABRS may come back or become long-term (chronic).  In rare cases, ABRS may cause complications such as:   · Inflamed tissue around the brain and spinal cord (meningitis)  · Inflamed tissue around the eyes (orbital cellulitis)  · Inflamed bones around the sinuses (osteitis)  These problems may need to be treated in a hospital with intravenous (IV) antibiotic medicine or surgery.  When to call the health care provider  Call your health care provider if you have any of the following:  · Symptoms that dont get better, or get worse  · Symptoms that dont get better after 3 to 5 days on antibiotics  · Trouble seeing  · Swelling around your eyes  · Confusion or trouble staying awake   Date Last Reviewed: 3/3/2015  © 8224-2376 [employer]. [address] [place] [address]. All rights reserved. This information is not intended as a substitute for professional medical care. Always follow your healthcare professional's instructions.    Please return here or go to the Emergency Department for any concerns or worsening of condition.  If you were prescribed antibiotics, please take them to completion.  If you were prescribed a narcotic medication, do not drive or operate heavy equipment or machinery while taking these medications.  Please follow up with your primary care doctor or specialist as needed.    If you  smoke, please stop smoking.    1) Motrin/advil/ibuprofen- Take Two to Three Tablets(200 mg) three Times a Day for 5 to 7 Days.  2) Mucinex D 1/2 to 1 Tablet twice a day for 5 to 7 Days.  3) Drink Hot Liquids(coffee,WATER,Tea,Hot Chocolate,or Soup) that you put in a Mug place in Microwave for 2.5 to 3 minutes CHANGE THE CUP THAT WAS USED IN THE MICROWAVE SO AS NOT TO BURN YOUR MOUTH,then sniff  the steam from the cup and sip the heated liquid TEN TO TWELVE TIMES A DAY for 5 to 7 Days.  4) These 3 things will help the antibiotics and other medications work faster and will speed your recovery!

## 2020-03-19 DIAGNOSIS — I10 ESSENTIAL HYPERTENSION: Chronic | ICD-10-CM

## 2020-03-19 RX ORDER — LISINOPRIL AND HYDROCHLOROTHIAZIDE 12.5; 2 MG/1; MG/1
2 TABLET ORAL DAILY
Qty: 180 TABLET | Refills: 2 | Status: SHIPPED | OUTPATIENT
Start: 2020-03-19 | End: 2020-03-20

## 2020-03-20 ENCOUNTER — PATIENT MESSAGE (OUTPATIENT)
Dept: INTERNAL MEDICINE | Facility: CLINIC | Age: 50
End: 2020-03-20

## 2020-03-20 DIAGNOSIS — I10 ESSENTIAL HYPERTENSION: Primary | ICD-10-CM

## 2020-03-20 RX ORDER — AMLODIPINE BESYLATE 5 MG/1
5 TABLET ORAL DAILY
Qty: 90 TABLET | Refills: 3 | Status: SHIPPED | OUTPATIENT
Start: 2020-03-20 | End: 2020-05-28 | Stop reason: CLARIF

## 2020-03-21 ENCOUNTER — PATIENT MESSAGE (OUTPATIENT)
Dept: INTERNAL MEDICINE | Facility: CLINIC | Age: 50
End: 2020-03-21

## 2020-04-14 ENCOUNTER — PATIENT MESSAGE (OUTPATIENT)
Dept: INTERNAL MEDICINE | Facility: CLINIC | Age: 50
End: 2020-04-14

## 2020-04-14 DIAGNOSIS — E55.9 VITAMIN D DEFICIENCY: ICD-10-CM

## 2020-04-14 RX ORDER — ERGOCALCIFEROL 1.25 MG/1
50000 CAPSULE ORAL
Qty: 12 CAPSULE | Refills: 0 | Status: SHIPPED | OUTPATIENT
Start: 2020-04-14 | End: 2020-05-28 | Stop reason: CLARIF

## 2020-06-23 PROBLEM — M54.50 LOW BACK PAIN: Status: ACTIVE | Noted: 2020-06-23

## 2020-09-02 ENCOUNTER — OFFICE VISIT (OUTPATIENT)
Dept: URGENT CARE | Facility: CLINIC | Age: 50
End: 2020-09-02
Payer: COMMERCIAL

## 2020-09-02 VITALS
SYSTOLIC BLOOD PRESSURE: 183 MMHG | WEIGHT: 300 LBS | TEMPERATURE: 98 F | OXYGEN SATURATION: 98 % | HEART RATE: 66 BPM | BODY MASS INDEX: 40.63 KG/M2 | RESPIRATION RATE: 16 BRPM | DIASTOLIC BLOOD PRESSURE: 109 MMHG | HEIGHT: 72 IN

## 2020-09-02 DIAGNOSIS — I10 ESSENTIAL HYPERTENSION: ICD-10-CM

## 2020-09-02 DIAGNOSIS — R51.9 ACUTE INTRACTABLE HEADACHE, UNSPECIFIED HEADACHE TYPE: Primary | ICD-10-CM

## 2020-09-02 DIAGNOSIS — E86.0 MILD DEHYDRATION: ICD-10-CM

## 2020-09-02 PROCEDURE — 99214 PR OFFICE/OUTPT VISIT, EST, LEVL IV, 30-39 MIN: ICD-10-PCS | Mod: S$GLB,,, | Performed by: NURSE PRACTITIONER

## 2020-09-02 PROCEDURE — 99214 OFFICE O/P EST MOD 30 MIN: CPT | Mod: S$GLB,,, | Performed by: NURSE PRACTITIONER

## 2020-09-02 RX ORDER — BUTALBITAL, ACETAMINOPHEN AND CAFFEINE 50; 325; 40 MG/1; MG/1; MG/1
1 TABLET ORAL EVERY 6 HOURS PRN
Qty: 20 TABLET | Refills: 0 | Status: SHIPPED | OUTPATIENT
Start: 2020-09-02 | End: 2020-10-02

## 2020-09-02 NOTE — PATIENT INSTRUCTIONS
Dehydration (Adult)  Dehydration occurs when your body loses too much fluid. This may be the result of prolonged vomiting or diarrhea, excessive sweating, or a high fever. It may also happen if you dont drink enough fluid when youre sick or out in the heat. Misuse of diuretics (water pills) can also be a cause.  Symptoms include thirst and decreased urine output. You may also feel dizzy, weak, fatigued, or very drowsy. The diet described below is usually enough to treat dehydration. In some cases, you may need medicine.  Home care  · Drink at least 12 8-ounce glasses of fluid every day to resolve the dehydration. Fluid may include water; orange juice; lemonade; apple, grape, or cranberry juice; clear fruit drinks; electrolyte replacement and sports drinks; and teas and coffee without caffeine. If you have been diagnosed with a kidney disease, ask your doctor how much and what types of fluids you should drink to prevent dehydration. If you have kidney disease, fluid can build up in the body. This can be dangerous to your health.  · If you have a fever, muscle aches, or a headache as a result of a cold or flu, you may take acetaminophen or ibuprofen, unless another medicine was prescribed. If you have chronic liver or kidney disease, or have ever had a stomach ulcer or gastrointestinal bleeding, talk with your health care provider before using these medicines. Don't take aspirin if you are younger than 18 and have a fever. Aspirin raises the chance for severe liver injury.  Follow-up care  Follow up with your health care provider, or as advised.  When to seek medical advice  Call your health care provider right away if any of these occur:  · Continued vomiting  · Frequent diarrhea (more than 5 times a day); blood (red or black color) or mucus in diarrhea  · Blood in vomit or stool  · Swollen abdomen or increasing abdominal pain  · Weakness, dizziness, or fainting  · Unusual drowsiness or confusion  · Reduced urine  output or extreme thirst  · Fever of 100.4°F (34°C) or higher  Date Last Reviewed: 5/31/2015  © 7524-8444 [employer]. [address] [place] [address], [place] [address]. All rights reserved. This information is not intended as a substitute for professional medical care. Always follow your healthcare professional's instructions.        Discharge Instructions: Taking Your Blood Pressure  Blood pressure is the force of blood as it moves from the heart through the blood vessels. You can take your own blood pressure reading using a digital monitor. Take readings as often as your healthcare provider instructs. Take your readings each time in the same way, using the same arm. Here are guidelines for taking your blood pressure.  The American Heart Association (AHA) recommends purchasing a blood pressure monitor that is validated and approved by the Association for the Advancement of Medical Instrumentation, the Citizen of [place] Hypertension Society, and the International Protocol for the Validation of Automated BP Measuring Devices. If the blood pressure monitor is for a senior adult, a pregnant woman, or a child, make certain it is validated for use with such a population. For the most reliable readings, the AHA recommends an automatic, cuff-style, upper arm (bicep) monitor. The readings from finger and wrist monitors are not as reliable as the upper arm monitor.        Step 1. Relax    · Wait at least a half hour after smoking, eating, or exercising. Do not drink coffee, tea, soda, or other caffeinated beverages before checking your blood pressure.   · Sit comfortably at a table. Place the monitor near you.  · Rest for a few minutes before you begin.        Step 2. Wrap the cuff    · Place your arm on the table, palm up. Put your arm in a position that is level with your heart. Wrap the cuff around your upper arm, about an inch above your elbow. Its best to wrap the cuff on bare skin, not over clothing.  · Make sure your  cuff fits. If it doesnt wrap around your upper arm, order a larger cuff. A cuff that is too large or too small can result in an inaccurate blood pressure reading.           Step 3. Inflate the cuff    · Pump the cuff until the scale reads 200. If you have a self-inflating cuff, push the button that starts the pump.  · The cuff will tighten, then loosen.  · The numbers will change. When they stop changing, your blood pressure reading will appear.  · If you get a reading that is too high or too low for you, relax for a few minutes. Then do the test again.    Step 4. Write down the results  · Write down your blood pressure numbers. Chin the date and time. Keep your results in one place, such as a notebook.  · Remove the cuff from your arm. Turn off the machine.  · Take the readings with you to your medical appointments.  · If you start a new blood pressure medicine, or change a blood pressure medicine dose, note the day you started the new drug or dosage on your blood pressure recording sheet. This will help your healthcare provider monitor the effect of medication changes.     Date Last Reviewed: 4/27/2016  © 2914-3344 [employer]. [address] [place] [address], [place] [address]. All rights reserved. This information is not intended as a substitute for professional medical care. Always follow your healthcare professional's instructions.

## 2020-09-02 NOTE — LETTER
September 2, 2020      [facility]  [address]  [place] [address]  Phone: [phone]  Fax: [phone]       Patient: Fabiano Jules   YOB: 1970  Date of Visit: 09/02/2020    To Whom It May Concern:    Riddhi Jules  was at [facility] on 09/02/2020. He may return to work/school on 9/7/2020 with no restrictions. If you have any questions or concerns, or if I can be of further assistance, please do not hesitate to contact me.    Sincerely,            Nandini Buckley, NP

## 2020-09-02 NOTE — PROGRESS NOTES
Subjective:       Patient ID: Fabiano Jules Jr. is a 50 y.o. male.    Vitals:  height is 6' (1.829 m) and weight is 136.1 kg (300 lb). His tympanic temperature is 97.5 °F (36.4 °C). His blood pressure is 183/109 (abnormal) and his pulse is 66. His respiration is 16 and oxygen saturation is 98%.     Chief Complaint: Migraine    Patient states he has had two episodes of dehydration at work this week.  Patient states medics at work helped cool him down and rehydrated him with pedialite, water and gatorade.  Patient states after each episode he has experienced headaches and weakness.  Patient has a history of migraine headaches and did not take his lisinopril this morning.      Migraine   This is a new problem. The current episode started in the past 7 days. The problem occurs intermittently. The problem has been gradually worsening. The pain is located in the bilateral region. The pain does not radiate. The pain quality is similar to prior headaches. The quality of the pain is described as aching. The pain is at a severity of 5/10. The pain is mild. Associated symptoms include dizziness, muscle aches, photophobia, scalp tenderness, tingling and weakness. Pertinent negatives include no abdominal pain, abnormal behavior, anorexia, back pain, blurred vision, coughing, drainage, ear pain, eye watering, facial sweating, fever, hearing loss, insomnia, loss of balance, nausea, neck pain, numbness, phonophobia, rhinorrhea, seizures, sinus pressure, sore throat, swollen glands, tinnitus, visual change, vomiting or weight loss. Exacerbated by: Dehydration. Treatments tried: Pedia lite, water and gatorade. The treatment provided mild relief.       Constitution: Positive for fatigue. Negative for chills, sweating and fever.        Denies any chance of covid as he gets tested 3 x weekly on set of film   HENT: Negative for ear pain, tinnitus, hearing loss, congestion, postnasal drip, sinus pain, sinus pressure and sore throat.    Neck:  Negative for neck pain and painful lymph nodes.   Cardiovascular: Negative for chest pain and leg swelling.   Eyes: Positive for photophobia. Negative for vision loss, double vision, blurred vision and eyelid swelling.   Respiratory: Negative for cough, sputum production and shortness of breath.    Gastrointestinal: Negative for abdominal pain, nausea, vomiting, constipation and diarrhea.   Musculoskeletal: Negative for pain, joint pain, joint swelling, back pain, muscle cramps and muscle ache.   Skin: Negative for color change, pale and rash.   Allergic/Immunologic: Negative for seasonal allergies.   Neurological: Positive for dizziness and headaches. Negative for history of vertigo, light-headedness, passing out, loss of balance, numbness and seizures.   Hematologic/Lymphatic: Negative for swollen lymph nodes, easy bruising/bleeding and history of blood clots. Does not bruise/bleed easily.   Psychiatric/Behavioral: Negative for nervous/anxious, sleep disturbance and depression. The patient is not nervous/anxious and does not have insomnia.        Objective:      Physical Exam   Constitutional: He is oriented to person, place, and time. He appears well-developed. He is cooperative.  Non-toxic appearance. He does not appear ill. No distress.   HENT:   Head: Normocephalic and atraumatic.   Ears:   Right Ear: Hearing, tympanic membrane and ear canal normal.   Left Ear: Hearing, tympanic membrane and ear canal normal.   Nose: Nose normal. No mucosal edema, rhinorrhea or nasal deformity. No epistaxis. Right sinus exhibits no maxillary sinus tenderness and no frontal sinus tenderness. Left sinus exhibits no maxillary sinus tenderness and no frontal sinus tenderness.   Mouth/Throat: Uvula is midline. Mucous membranes are dry. No trismus in the jaw. Normal dentition. No uvula swelling. No posterior oropharyngeal edema or posterior oropharyngeal erythema.      Comments: Tongue mod dryness noted.   Eyes: Pupils are equal,  round, and reactive to light. Conjunctivae and lids are normal. No scleral icterus.   Neck: Trachea normal, full passive range of motion without pain and phonation normal. Neck supple. No neck rigidity. No edema and no erythema present.   Cardiovascular: Normal rate, regular rhythm and normal pulses.   Pulmonary/Chest: Effort normal and breath sounds normal. No stridor. No respiratory distress. He has no decreased breath sounds. He has no wheezes. He has no rhonchi. He has no rales. He exhibits no tenderness.   Abdominal: Soft. Normal appearance and bowel sounds are normal.   Musculoskeletal: Normal range of motion.   Neurological: He is alert and oriented to person, place, and time. He displays no weakness and normal reflexes. No cranial nerve deficit or sensory deficit. He exhibits normal muscle tone. Coordination and gait normal.   Skin: Skin is warm, dry, intact, not diaphoretic and not pale. Psychiatric: His speech is normal and behavior is normal. Judgment and thought content normal.   Nursing note and vitals reviewed.        Assessment:       1. Acute intractable headache, unspecified headache type    2. Essential hypertension    3. Mild dehydration        Plan:       1. Acute intractable headache, unspecified headache type  Start with tylenol, take fioricet only if needed.   - butalbital-acetaminophen-caffeine -40 mg (FIORICET, ESGIC) -40 mg per tablet; Take 1 tablet by mouth every 6 (six) hours as needed for Pain or Headaches.  Dispense: 20 tablet; Refill: 0    2. Essential hypertension  Go home and take bp meds. Thinks he skipped yesterday and today. Advised how dangerous this is and how imp meds are. Must continue to monitor at home as I would think he may be non-controlled. V/u.     3. Mild dehydration  Seems to still be dehydrated. Orthostatics ok. Go home and push fluids as advised. Rest. Will give off of work the next couple days. If ANY worse, to ER.

## 2020-11-11 ENCOUNTER — PATIENT MESSAGE (OUTPATIENT)
Dept: INTERNAL MEDICINE | Facility: CLINIC | Age: 50
End: 2020-11-11

## 2020-11-12 ENCOUNTER — OFFICE VISIT (OUTPATIENT)
Dept: FAMILY MEDICINE | Facility: CLINIC | Age: 50
End: 2020-11-12
Payer: COMMERCIAL

## 2020-11-12 ENCOUNTER — LAB VISIT (OUTPATIENT)
Dept: LAB | Facility: HOSPITAL | Age: 50
End: 2020-11-12
Attending: STUDENT IN AN ORGANIZED HEALTH CARE EDUCATION/TRAINING PROGRAM
Payer: COMMERCIAL

## 2020-11-12 VITALS
DIASTOLIC BLOOD PRESSURE: 96 MMHG | SYSTOLIC BLOOD PRESSURE: 152 MMHG | WEIGHT: 315 LBS | HEART RATE: 79 BPM | HEIGHT: 72 IN | TEMPERATURE: 99 F | BODY MASS INDEX: 42.66 KG/M2 | OXYGEN SATURATION: 97 % | RESPIRATION RATE: 18 BRPM

## 2020-11-12 DIAGNOSIS — Z00.00 ROUTINE GENERAL MEDICAL EXAMINATION AT A HEALTH CARE FACILITY: Primary | ICD-10-CM

## 2020-11-12 DIAGNOSIS — E66.01 CLASS 3 SEVERE OBESITY WITH SERIOUS COMORBIDITY AND BODY MASS INDEX (BMI) OF 45.0 TO 49.9 IN ADULT, UNSPECIFIED OBESITY TYPE: ICD-10-CM

## 2020-11-12 DIAGNOSIS — E66.01 OBESITY, MORBID, BMI 40.0-49.9: ICD-10-CM

## 2020-11-12 DIAGNOSIS — I10 BENIGN ESSENTIAL HTN: ICD-10-CM

## 2020-11-12 LAB
ALBUMIN SERPL BCP-MCNC: 4.2 G/DL (ref 3.5–5.2)
ALP SERPL-CCNC: 50 U/L (ref 55–135)
ALT SERPL W/O P-5'-P-CCNC: 14 U/L (ref 10–44)
ANION GAP SERPL CALC-SCNC: 11 MMOL/L (ref 8–16)
AST SERPL-CCNC: 24 U/L (ref 10–40)
BASOPHILS # BLD AUTO: 0.02 K/UL (ref 0–0.2)
BASOPHILS NFR BLD: 0.5 % (ref 0–1.9)
BILIRUB SERPL-MCNC: 0.4 MG/DL (ref 0.1–1)
BUN SERPL-MCNC: 12 MG/DL (ref 6–20)
CALCIUM SERPL-MCNC: 9.1 MG/DL (ref 8.7–10.5)
CHLORIDE SERPL-SCNC: 102 MMOL/L (ref 95–110)
CHOLEST SERPL-MCNC: 146 MG/DL (ref 120–199)
CHOLEST/HDLC SERPL: 3.3 {RATIO} (ref 2–5)
CO2 SERPL-SCNC: 27 MMOL/L (ref 23–29)
CREAT SERPL-MCNC: 0.9 MG/DL (ref 0.5–1.4)
DIFFERENTIAL METHOD: ABNORMAL
EOSINOPHIL # BLD AUTO: 0.1 K/UL (ref 0–0.5)
EOSINOPHIL NFR BLD: 2.1 % (ref 0–8)
ERYTHROCYTE [DISTWIDTH] IN BLOOD BY AUTOMATED COUNT: 14.9 % (ref 11.5–14.5)
EST. GFR  (AFRICAN AMERICAN): >60 ML/MIN/1.73 M^2
EST. GFR  (NON AFRICAN AMERICAN): >60 ML/MIN/1.73 M^2
GLUCOSE SERPL-MCNC: 80 MG/DL (ref 70–110)
HCT VFR BLD AUTO: 39.7 % (ref 40–54)
HDLC SERPL-MCNC: 44 MG/DL (ref 40–75)
HDLC SERPL: 30.1 % (ref 20–50)
HGB BLD-MCNC: 11.9 G/DL (ref 14–18)
IMM GRANULOCYTES # BLD AUTO: 0 K/UL (ref 0–0.04)
IMM GRANULOCYTES NFR BLD AUTO: 0 % (ref 0–0.5)
LDLC SERPL CALC-MCNC: 91 MG/DL (ref 63–159)
LYMPHOCYTES # BLD AUTO: 2.5 K/UL (ref 1–4.8)
LYMPHOCYTES NFR BLD: 57.9 % (ref 18–48)
MCH RBC QN AUTO: 25.7 PG (ref 27–31)
MCHC RBC AUTO-ENTMCNC: 30 G/DL (ref 32–36)
MCV RBC AUTO: 86 FL (ref 82–98)
MONOCYTES # BLD AUTO: 0.3 K/UL (ref 0.3–1)
MONOCYTES NFR BLD: 7.6 % (ref 4–15)
NEUTROPHILS # BLD AUTO: 1.4 K/UL (ref 1.8–7.7)
NEUTROPHILS NFR BLD: 31.9 % (ref 38–73)
NONHDLC SERPL-MCNC: 102 MG/DL
NRBC BLD-RTO: 0 /100 WBC
PLATELET # BLD AUTO: 357 K/UL (ref 150–350)
PMV BLD AUTO: 10.3 FL (ref 9.2–12.9)
POTASSIUM SERPL-SCNC: 4.1 MMOL/L (ref 3.5–5.1)
PROT SERPL-MCNC: 7.7 G/DL (ref 6–8.4)
RBC # BLD AUTO: 4.63 M/UL (ref 4.6–6.2)
SODIUM SERPL-SCNC: 140 MMOL/L (ref 136–145)
TRIGL SERPL-MCNC: 55 MG/DL (ref 30–150)
TSH SERPL DL<=0.005 MIU/L-ACNC: 1.36 UIU/ML (ref 0.4–4)
WBC # BLD AUTO: 4.23 K/UL (ref 3.9–12.7)

## 2020-11-12 PROCEDURE — 84153 ASSAY OF PSA TOTAL: CPT

## 2020-11-12 PROCEDURE — 99214 PR OFFICE/OUTPT VISIT, EST, LEVL IV, 30-39 MIN: ICD-10-PCS | Mod: S$GLB,,, | Performed by: STUDENT IN AN ORGANIZED HEALTH CARE EDUCATION/TRAINING PROGRAM

## 2020-11-12 PROCEDURE — 1125F PR PAIN SEVERITY QUANTIFIED, PAIN PRESENT: ICD-10-PCS | Mod: S$GLB,,, | Performed by: STUDENT IN AN ORGANIZED HEALTH CARE EDUCATION/TRAINING PROGRAM

## 2020-11-12 PROCEDURE — 3008F PR BODY MASS INDEX (BMI) DOCUMENTED: ICD-10-PCS | Mod: CPTII,S$GLB,, | Performed by: STUDENT IN AN ORGANIZED HEALTH CARE EDUCATION/TRAINING PROGRAM

## 2020-11-12 PROCEDURE — 3078F PR MOST RECENT DIASTOLIC BLOOD PRESSURE < 80 MM HG: ICD-10-PCS | Mod: CPTII,S$GLB,, | Performed by: STUDENT IN AN ORGANIZED HEALTH CARE EDUCATION/TRAINING PROGRAM

## 2020-11-12 PROCEDURE — 99214 OFFICE O/P EST MOD 30 MIN: CPT | Mod: S$GLB,,, | Performed by: STUDENT IN AN ORGANIZED HEALTH CARE EDUCATION/TRAINING PROGRAM

## 2020-11-12 PROCEDURE — 80053 COMPREHEN METABOLIC PANEL: CPT

## 2020-11-12 PROCEDURE — 3008F BODY MASS INDEX DOCD: CPT | Mod: CPTII,S$GLB,, | Performed by: STUDENT IN AN ORGANIZED HEALTH CARE EDUCATION/TRAINING PROGRAM

## 2020-11-12 PROCEDURE — 99999 PR PBB SHADOW E&M-EST. PATIENT-LVL V: ICD-10-PCS | Mod: PBBFAC,,, | Performed by: STUDENT IN AN ORGANIZED HEALTH CARE EDUCATION/TRAINING PROGRAM

## 2020-11-12 PROCEDURE — 99999 PR PBB SHADOW E&M-EST. PATIENT-LVL V: CPT | Mod: PBBFAC,,, | Performed by: STUDENT IN AN ORGANIZED HEALTH CARE EDUCATION/TRAINING PROGRAM

## 2020-11-12 PROCEDURE — 84443 ASSAY THYROID STIM HORMONE: CPT

## 2020-11-12 PROCEDURE — 83036 HEMOGLOBIN GLYCOSYLATED A1C: CPT

## 2020-11-12 PROCEDURE — 85025 COMPLETE CBC W/AUTO DIFF WBC: CPT

## 2020-11-12 PROCEDURE — 36415 COLL VENOUS BLD VENIPUNCTURE: CPT | Mod: PO

## 2020-11-12 PROCEDURE — 80061 LIPID PANEL: CPT

## 2020-11-12 PROCEDURE — 3078F DIAST BP <80 MM HG: CPT | Mod: CPTII,S$GLB,, | Performed by: STUDENT IN AN ORGANIZED HEALTH CARE EDUCATION/TRAINING PROGRAM

## 2020-11-12 PROCEDURE — 1125F AMNT PAIN NOTED PAIN PRSNT: CPT | Mod: S$GLB,,, | Performed by: STUDENT IN AN ORGANIZED HEALTH CARE EDUCATION/TRAINING PROGRAM

## 2020-11-12 PROCEDURE — 3077F PR MOST RECENT SYSTOLIC BLOOD PRESSURE >= 140 MM HG: ICD-10-PCS | Mod: CPTII,S$GLB,, | Performed by: STUDENT IN AN ORGANIZED HEALTH CARE EDUCATION/TRAINING PROGRAM

## 2020-11-12 PROCEDURE — 3077F SYST BP >= 140 MM HG: CPT | Mod: CPTII,S$GLB,, | Performed by: STUDENT IN AN ORGANIZED HEALTH CARE EDUCATION/TRAINING PROGRAM

## 2020-11-12 RX ORDER — CHLORTHALIDONE 25 MG/1
25 TABLET ORAL DAILY
Qty: 30 TABLET | Refills: 11 | Status: SHIPPED | OUTPATIENT
Start: 2020-11-12 | End: 2021-02-12 | Stop reason: DRUGHIGH

## 2020-11-12 NOTE — PROGRESS NOTES
[facility] CLINIC NOTE    Patient Name: Fabiano Jules Jr.  YOB: 1970    PRESENTING HISTORY   Chief Complaint:   Chief Complaint   Patient presents with    [facility]      New patient to me  History of Present Illness:  Mr. Fabiano Jules Jr. is a 50 y.o. male      Moved from [place] 2 months ago.     Was seeing NP on [facility].     HTN- on lisinopril for 5 years.    RAFAEL- uses CPAP. Getting good sleep   No NSAIDs. Takes hydrocodone for chronic back pain.   EtOH- few times a year.    Fhx: Mother, brother, grandmother       Obesity- lightest 210.    350 right now, heaviest I've been   Motivation 6/10.    Did HIIT today.    2 16 oz bottle water, 1 cup coffee. 1 oz tuna.    Diets in past: [place] Diet. 17 pound weight loss in 7 days.    Lost a lot of weight living in [place]   Never on any medications or considered surgery for weight loss.         Review of Systems   Constitutional: Negative for chills, fever and weight loss.   HENT: Negative for hearing loss and tinnitus.    Eyes: Negative for blurred vision and double vision.   Respiratory: Positive for cough. Negative for shortness of breath.    Cardiovascular: Negative for chest pain and palpitations.   Gastrointestinal: Negative for abdominal pain, constipation and diarrhea.   Musculoskeletal: Positive for back pain. Negative for myalgias.   Skin: Negative for itching and rash.   Neurological: Negative for dizziness and headaches.   Psychiatric/Behavioral: Negative for depression. The patient does not have insomnia.          PAST HISTORY:     Past Medical History:   Diagnosis Date    Back injury     CPAP (continuous positive airway pressure) dependence     Hypertension     PONV (postoperative nausea and vomiting)     Sleep apnea        Past Surgical History:   Procedure Laterality Date    CIRCUMCISION      COLONOSCOPY N/A 12/3/2018    Procedure: COLONOSCOPY;  Surgeon: CHRISSY Reyna MD;  Location: [address]  [facility]);  Service: Endoscopy;  Laterality: N/A;    epidural steroid injection X 2      [facility]       [facility] [place]     LIPOMA RESECTION      Back of neck    MAGNETIC RESONANCE IMAGING N/A 2020    Procedure: MRI (MAGNETIC RESONANCE IMAGING) LUMBAR SPINE;  Surgeon: [facility] Diagnostic Provider;  Location: [facility];  Service: General;  Laterality: N/A;  COVID NEG    MEDIAL COLLATERAL LIGAMENT AND LATERAL COLLATERAL LIGAMENT REPAIR, KNEE Right 2018    Dr. Christophe Gore in [place]     MYELOGRAPHY N/A 2020    Procedure: MYELOGRAM;  Surgeon: [facility] Diagnostic Provider;  [facility];  Service: General;  Laterality: N/A;       Family History   Problem Relation Age of Onset    Hypertension Mother     Diabetes Father     Cancer Father         mesotheliosma        Social History     Socioeconomic History    Marital status:      Spouse name: Not on file    Number of children: 4    Years of education: Not on file    Highest education level: Not on file   Occupational History     Employer: [employer]   Social Needs    Financial resource strain: Not on file    Food insecurity     Worry: Not on file     Inability: Not on file    Transportation needs     Medical: Not on file     Non-medical: Not on file   Tobacco Use    Smoking status: Former Smoker     Quit date: 2009     Years since quittin.8    Smokeless tobacco: Never Used    Tobacco comment: quit smoking in    Substance and Sexual Activity    Alcohol use: No    Drug use: No    Sexual activity: Not Currently   Lifestyle    Physical activity     Days per week: Not on file     Minutes per session: Not on file    Stress: Not on file   Relationships    Social connections     Talks on phone: Not on file     Gets together: Not on file     Attends Muslim service: Not on file     Active member of club or organization: Not on file     Attends meetings of clubs or organizations: Not  on file     Relationship status: Not on file   Other Topics Concern    Not on file   Social History Narrative    Not on file       MEDICATIONS & ALLERGIES:     Current Outpatient Medications on File Prior to Visit   Medication Sig    HYDROcodone-acetaminophen (NORCO) 7.5-325 mg per tablet Take 1 tablet by mouth every 6 (six) hours as needed for Pain.    lisinopriL (PRINIVIL,ZESTRIL) 40 MG tablet Take 40 mg by mouth once daily.    meloxicam (MOBIC) 15 MG tablet     multivitamin capsule Take 1 capsule by mouth once daily.    traMADol (ULTRAM-ER) 200 MG Tb24      Current Facility-Administered Medications on File Prior to Visit   Medication    lactated ringers infusion       Review of patient's allergies indicates:   Allergen Reactions    No known drug allergies        OBJECTIVE:   Vital Signs:  Vitals:    11/12/20 1306   BP: (!) 158/88   Pulse: 79   Resp: 18   Temp: 98.7 °F (37.1 °C)   TempSrc: Temporal   SpO2: 97%   Weight: (!) 159 kg (350 lb 8.5 oz)   Height: 6' (1.829 m)       No results found for this or any previous visit (from the past 24 hour(s)).      Physical Exam   Constitutional: He is oriented to person, place, and time. No distress.   Class 3 obesity   HENT:   Head: Normocephalic and atraumatic.   Right Ear: External ear normal.   Left Ear: External ear normal.   Eyes: Pupils are equal, round, and reactive to light. Conjunctivae and EOM are normal. No scleral icterus.   Neck: Normal range of motion. Neck supple. No thyromegaly present.   Short, thick neck   Cardiovascular: Normal rate, regular rhythm and normal heart sounds.   No murmur heard.  Pulmonary/Chest: Effort normal and breath sounds normal. No respiratory distress. He has no wheezes. He has no rales.   Musculoskeletal: Normal range of motion.         General: No tenderness, deformity or edema.   Lymphadenopathy:     He has no cervical adenopathy.   Neurological: He is alert and oriented to person, place, and time. Gait normal. GCS score  is 15.   Skin: Skin is warm and dry. No rash noted. He is not diaphoretic. No erythema.   Psychiatric: Mood, memory, affect and judgment normal.   Nursing note and vitals reviewed.      ASSESSMENT & PLAN:     50 M here to establish care.     Major issues are uncontrolled HTN on Lisinopril and Class 3 obesity.     Routine general medical examination at a health care facility  As above    Benign essential HTN  -     chlorthalidone (HYGROTEN) 25 MG Tab; Take 1 tablet (25 mg total) by mouth once daily.  Dispense: 30 tablet; Refill: 11  -     PSA, Screening; Future; Expected date: 11/12/2020  -     Hemoglobin A1C; Future; Expected date: 11/12/2020  -     Comprehensive Metabolic Panel; Future; Expected date: 11/12/2020  -     CBC Auto Differential; Future; Expected date: 11/12/2020  -     TSH; Future; Expected date: 11/12/2020  -     Lipid Panel; Future; Expected date: 11/12/2020  -     Basic Metabolic Panel; Future; Expected date: 12/12/2020  Add chlorthalidone  BP check 2 weeks  Recheck electrolytes 4 weeks  Low salt diet    Class 3 severe obesity with serious comorbidity and body mass index (BMI) of 45.0 to 49.9 in adult, unspecified obesity type  -     PSA, Screening; Future; Expected date: 11/12/2020  -     Hemoglobin A1C; Future; Expected date: 11/12/2020  -     Comprehensive Metabolic Panel; Future; Expected date: 11/12/2020  -     CBC Auto Differential; Future; Expected date: 11/12/2020  -     TSH; Future; Expected date: 11/12/2020  -     liraglutide, weight loss, (SAXENDA) 3 mg/0.5 mL (18 mg/3 mL) PnIj; Inject 0.6 mg into the skin every 7 days.  Dispense: 15 mL; Refill: 12  -     Ambulatory referral/consult to Weight Management Program; Future; Expected date: 11/19/2020  -     Lipid Panel; Future; Expected date: 11/12/2020  -     Basic Metabolic Panel; Future; Expected date: 12/12/2020  Needs aggressive management and currently well motivated.   Will try to capitalize on this with diet, exercise, pharmacotherapy.    Referral to weight management.        Howie Mathias MD

## 2020-11-12 NOTE — PATIENT INSTRUCTIONS
My Fitness Pal or LoseIT! Are weight tracking apps. Consider Bringme    Record everything you eat.   Weight yourself daily.     Goal calories less than 2800.   2300/day will give you 1 pound per week of weight loss    Goal weight 220.   May 2020    Feb 12, goal weight 330.     4 Steps for Eating Healthier  Changing the way you eat can improve your health. It can lower your cholesterol and blood pressure, and help you stay at a healthy weight. Your diet doesnt have to be bland and boring to be healthy. Just watch your calories and follow these steps:    1. Eat fewer unhealthy fats  · Choose more fish and lean meats instead of fatty cuts of meat.  · Skip butter and lard, and use less margarine.  · Pass on foods that have palm, coconut, or hydrogenated oils.  · Eat fewer high-fat dairy foods like cheese, ice cream, and whole milk.  · Get a heart-healthy cookbook and try some low-fat recipes.  2. Go light on salt  · Keep the saltshaker off the table.  · Limit high-salt ingredients, such as soy sauce, bouillon, and garlic salt.  · Instead of adding salt when cooking, season your food with herbs and flavorings. Try lemon, garlic, and onion.  · Limit convenience foods, such as boxed or canned foods and restaurant food.  · Read food labels and choose lower-sodium options.  3. Limit sugar  · Pause before you add sugars to pancakes, cereal, coffee, or tea. This includes white and brown table sugar, syrup, honey, and molasses. Cut your usual amount by half.  · Use non-sugar sweeteners. Stevia, aspartame, and sucralose can satisfy a sweet tooth without adding calories.  · Swap out sugar-filled soda and other drinks. Buy sugar-free or low-calorie beverages. Remember water is always the best choice.  · Read labels and choose foods with less added sugar. Keep in mind that dairy foods and foods with fruit will have some natural sugar.  · Cut the sugar in recipes by 1/3 to 1/2. Boost the flavor with extracts like almond,  vanilla, or orange. Or add spices such as cinnamon or nutmeg.  4. Eat more fiber  · Eat fresh fruits and vegetables every day.  · Boost your diet with whole grains. Go for oats, whole-grain rice, and bran.  · Add beans and lentils to your meals.  · Drink more water to match your fiber increase. This is to help prevent constipation.  Date Last Reviewed: 5/11/2015  © [employer]. [address] [place] [address], [place] [address]. All rights reserved. This information is not intended as a substitute for professional medical care. Always follow your healthcare professional's instructions.      Get flu shot at Pharmacy on a Friday

## 2020-11-13 LAB
COMPLEXED PSA SERPL-MCNC: 2.1 NG/ML (ref 0–4)
ESTIMATED AVG GLUCOSE: 117 MG/DL (ref 68–131)
HBA1C MFR BLD HPLC: 5.7 % (ref 4–5.6)

## 2020-11-16 ENCOUNTER — TELEPHONE (OUTPATIENT)
Dept: PHARMACY | Facility: CLINIC | Age: 50
End: 2020-11-16

## 2020-11-16 DIAGNOSIS — E66.01 OBESITY, MORBID, BMI 40.0-49.9: Primary | ICD-10-CM

## 2020-11-16 DIAGNOSIS — R73.03 PRE-DIABETES: ICD-10-CM

## 2020-11-16 NOTE — TELEPHONE ENCOUNTER
Good morning Dr. Mathias,     I am reaching out from [facility] in regards to Saxenda RX received for Mr. Fabiano Jules. A prior authorization was submitted to his insurance, but they have denied the request, stating Saxenda is a plan exclusion. I researched his plan, and they will cover Ozempic for 24.99/month and Victoza for 39.99/month if you would like to send us new RX.    Mr. Jules has been notified of this information. Please let me know if you have any questions or concerns! Thank you for your time!    Arielle Andrews, PharmD  [facility]

## 2020-12-07 ENCOUNTER — LAB VISIT (OUTPATIENT)
Dept: LAB | Facility: HOSPITAL | Age: 50
End: 2020-12-07
Attending: STUDENT IN AN ORGANIZED HEALTH CARE EDUCATION/TRAINING PROGRAM
Payer: COMMERCIAL

## 2020-12-07 DIAGNOSIS — I10 BENIGN ESSENTIAL HTN: ICD-10-CM

## 2020-12-07 DIAGNOSIS — E66.01 CLASS 3 SEVERE OBESITY WITH SERIOUS COMORBIDITY AND BODY MASS INDEX (BMI) OF 45.0 TO 49.9 IN ADULT, UNSPECIFIED OBESITY TYPE: ICD-10-CM

## 2020-12-07 PROCEDURE — 80048 BASIC METABOLIC PNL TOTAL CA: CPT

## 2020-12-07 PROCEDURE — 36415 COLL VENOUS BLD VENIPUNCTURE: CPT | Mod: PO

## 2020-12-15 ENCOUNTER — HOSPITAL ENCOUNTER (EMERGENCY)
Facility: HOSPITAL | Age: 50
Discharge: HOME | End: 2020-12-15
Attending: EMERGENCY MEDICINE
Payer: COMMERCIAL

## 2020-12-15 VITALS
HEIGHT: 72 IN | OXYGEN SATURATION: 96 % | RESPIRATION RATE: 18 BRPM | TEMPERATURE: 99 F | BODY MASS INDEX: 42.66 KG/M2 | SYSTOLIC BLOOD PRESSURE: 192 MMHG | WEIGHT: 315 LBS | HEART RATE: 61 BPM | DIASTOLIC BLOOD PRESSURE: 91 MMHG

## 2020-12-15 DIAGNOSIS — M25.571 ANKLE PAIN, RIGHT: ICD-10-CM

## 2020-12-15 DIAGNOSIS — S86.001A INJURY OF RIGHT ACHILLES TENDON, INITIAL ENCOUNTER: Primary | ICD-10-CM

## 2020-12-15 DIAGNOSIS — M79.671 RIGHT FOOT PAIN: ICD-10-CM

## 2020-12-15 PROCEDURE — 25000003 PHARM REV CODE 250: Performed by: PHYSICIAN ASSISTANT

## 2020-12-15 PROCEDURE — 99283 EMERGENCY DEPT VISIT LOW MDM: CPT | Mod: 25

## 2020-12-15 PROCEDURE — 99284 EMERGENCY DEPT VISIT MOD MDM: CPT | Mod: ,,, | Performed by: PHYSICIAN ASSISTANT

## 2020-12-15 PROCEDURE — 99284 PR EMERGENCY DEPT VISIT,LEVEL IV: ICD-10-PCS | Mod: ,,, | Performed by: PHYSICIAN ASSISTANT

## 2020-12-15 RX ORDER — NAPROXEN 500 MG/1
500 TABLET ORAL 2 TIMES DAILY WITH MEALS
Qty: 10 TABLET | Refills: 0 | Status: SHIPPED | OUTPATIENT
Start: 2020-12-15 | End: 2021-01-04 | Stop reason: ALTCHOICE

## 2020-12-15 RX ORDER — IBUPROFEN 600 MG/1
600 TABLET ORAL
Status: COMPLETED | OUTPATIENT
Start: 2020-12-15 | End: 2020-12-15

## 2020-12-15 RX ADMIN — IBUPROFEN 600 MG: 600 TABLET, FILM COATED ORAL at 07:12

## 2020-12-15 NOTE — DISCHARGE INSTRUCTIONS
Your xrays do not show any fractures. Take naproxen as needed for pain. Do not take with other anti-inflammatories such as mobic, ibuprofen, aleve. Follow up with orthopedic surgery or return to the ER for any new or worsening symptoms.

## 2020-12-15 NOTE — ED NOTES
"The patient c/o right "achilles tendon" pain that occurred on dec 8th when coming backwards down a step. States right foot was numb due to a pinched nerve, and his right heel "jammed" and pain "shot up the back of the achilles" reports taking meloxican advil and cbd cream and pills with no relief. No loc. Did not hit his head.  "

## 2020-12-15 NOTE — Clinical Note
"Fabiano Merrill" Jorge A was seen and treated in our emergency department on 12/15/2020.  He may return to work on 12/16/2020.       If you have any questions or concerns, please don't hesitate to call.      DEWEY Cabrales/Fredo SHARP    "

## 2020-12-15 NOTE — ED PROVIDER NOTES
Encounter Date: 12/15/2020       History     Chief Complaint   Patient presents with    Foot Problem     r achilles pain     50-year-old male with past medical history of hypertension, lumbar radiculopathy presents to the emergency department with complaints of right foot and ankle pain that began about 6 days ago during a work related injury.  Patient reports that he was stepping off of a piece of equipment when he fell backwards onto the right foot. He did fall to the ground however denies head trauma or LOC.  He reports taking hydrocodone at home with some relief.  He has been able to ambulate however it is painful to do so.  Reports chronic back pain that is unchanged since the incident.  Denies chest pain, shortness of breath, nausea, vomiting, diarrhea.  Denies other worsening or alleviating factors.        Review of patient's allergies indicates:   Allergen Reactions    No known drug allergies      Past Medical History:   Diagnosis Date    Back injury     CPAP (continuous positive airway pressure) dependence     Hypertension     PONV (postoperative nausea and vomiting)     Sleep apnea      Past Surgical History:   Procedure Laterality Date    CIRCUMCISION      COLONOSCOPY N/A 12/3/2018    Procedure: COLONOSCOPY;  Surgeon: CHRISSY Reyna MD;  Location: [address]);  Service: Endoscopy;  Laterality: N/A;    epidural steroid injection X 2      facet injection       [facility] [place]     LIPOMA RESECTION      Back of neck    MAGNETIC RESONANCE IMAGING N/A 5/29/2020    Procedure: MRI (MAGNETIC RESONANCE IMAGING) LUMBAR SPINE;  Surgeon: [facility] Diagnostic Provider;  Location: [facility];  Service: General;  Laterality: N/A;  COVID NEG    MEDIAL COLLATERAL LIGAMENT AND LATERAL COLLATERAL LIGAMENT REPAIR, KNEE Right 09/01/2018    Dr. Christophe Gore in [place]     MYELOGRAPHY N/A 6/23/2020    Procedure: MYELOGRAM;  Surgeon: [facility] Diagnostic Provider;  Location: [facility]  OR;  Service: General;  Laterality: N/A;     Family History   Problem Relation Age of Onset    Hypertension Mother     Diabetes Father     Cancer Father         mesotheliosma      Social History     Tobacco Use    Smoking status: Former Smoker     Quit date: 2009     Years since quittin.9    Smokeless tobacco: Never Used    Tobacco comment: quit smoking in    Substance Use Topics    Alcohol use: No    Drug use: No     Review of Systems   Constitutional: Negative for fever.   HENT: Negative for sore throat.    Respiratory: Negative for shortness of breath.    Cardiovascular: Negative for chest pain.   Gastrointestinal: Negative for nausea.   Genitourinary: Negative for dysuria.   Musculoskeletal: Positive for arthralgias and gait problem. Negative for back pain.   Skin: Negative for rash.   Neurological: Negative for weakness.   Hematological: Does not bruise/bleed easily.       Physical Exam     Initial Vitals [12/15/20 0716]   BP Pulse Resp Temp SpO2   (!) 192/91 61 18 98.7 °F (37.1 °C) 96 %      MAP       --         Physical Exam    Nursing note and vitals reviewed.  Constitutional: He appears well-developed and well-nourished. He is not diaphoretic. No distress.   HENT:   Head: Normocephalic and atraumatic.   Mouth/Throat: Oropharynx is clear and moist.   Eyes: EOM are normal. Pupils are equal, round, and reactive to light.   Neck: Normal range of motion. Neck supple.   Cardiovascular: Normal rate, regular rhythm, normal heart sounds and intact distal pulses. Exam reveals no gallop and no friction rub.    No murmur heard.  Pulmonary/Chest: Breath sounds normal. He has no wheezes. He has no rhonchi. He has no rales.   Abdominal: Soft. Bowel sounds are normal. There is no abdominal tenderness.   Musculoskeletal: Normal range of motion.      Comments: Mild swelling to right ankle. TTP achilles, lateral and medial malleolus. Full ROM right ankle. Negative cazares sign.  Strength 5/5.  Decreased  sensation to medial aspect right foot compared to left.   Neurological: He is alert and oriented to person, place, and time. He has normal strength. GCS score is 15. GCS eye subscore is 4. GCS verbal subscore is 5. GCS motor subscore is 6.   Skin: Skin is warm and dry. Capillary refill takes less than 2 seconds.   Psychiatric: He has a normal mood and affect. His behavior is normal. Judgment and thought content normal.         ED Course   Procedures  Labs Reviewed - No data to display       Imaging Results          X-Ray Foot Complete Right (Final result)  Result time 12/15/20 08:15:51    Final result by Anish Gay DO (12/15/20 08:15:51)                 Impression:      Moderate ankle joint effusion.  No acute osseous abnormality of the right ankle or right foot.      Electronically signed by: Anish Gay  Date:    12/15/2020  Time:    08:15             Narrative:    EXAMINATION:  XR ANKLE COMPLETE 3 VIEW RIGHT; XR FOOT COMPLETE 3 VIEW RIGHT    CLINICAL HISTORY:  Pain in right ankle and joints of right foot; Pain in right foot    TECHNIQUE:  AP, oblique, lateral radiographs of the right ankle.    AP, oblique, lateral radiographs of the right foot.    COMPARISON:  None    FINDINGS:  Bone mineralization is normal.    Right ankle: No acute fracture or dislocation.  The ankle mortise is congruent.  The talar dome is intact.  There is an osteophyte along the medial malleolus.  There is an os trigonum.  There is a moderate effusion.    Right foot: No acute fracture or dislocation.  There is pes planus.  Alignment is otherwise normal.  Possible remote fracture of the 5th proximal phalanx.  Tiny Achilles calcaneal enthesophyte.                               X-Ray Ankle Complete Right (Final result)  Result time 12/15/20 08:15:51    Final result by Anish Gay DO (12/15/20 08:15:51)                 Impression:      Moderate ankle joint effusion.  No acute osseous abnormality of the right ankle or right  foot.      Electronically signed by: Anish Gay  Date:    12/15/2020  Time:    08:15             Narrative:    EXAMINATION:  XR ANKLE COMPLETE 3 VIEW RIGHT; XR FOOT COMPLETE 3 VIEW RIGHT    CLINICAL HISTORY:  Pain in right ankle and joints of right foot; Pain in right foot    TECHNIQUE:  AP, oblique, lateral radiographs of the right ankle.    AP, oblique, lateral radiographs of the right foot.    COMPARISON:  None    FINDINGS:  Bone mineralization is normal.    Right ankle: No acute fracture or dislocation.  The ankle mortise is congruent.  The talar dome is intact.  There is an osteophyte along the medial malleolus.  There is an os trigonum.  There is a moderate effusion.    Right foot: No acute fracture or dislocation.  There is pes planus.  Alignment is otherwise normal.  Possible remote fracture of the 5th proximal phalanx.  Tiny Achilles calcaneal enthesophyte.                                 Medical Decision Making:   History:   Old Medical Records: I decided to obtain old medical records.  Initial Assessment:   Emergent evaluation of a 50-year-old male who presents to the emergency department with complaints of right foot and ankle pain that began about 6 days ago after an injury sustained at work.  He reports stepping off of a piece of equipment when he fell onto the right heel.  No head trauma or loss of consciousness.  Patient is afebrile, hypertensive, nontoxic appearing.  Will order x-ray, pain control, ice and reassess.  Differential Diagnosis:   Differential diagnosis includes but is not limited to fracture, dislocation, contusion, Achilles tendon rupture.  Clinical Tests:   Radiological Study: Ordered and Reviewed  ED Management:  X-ray without evidence of fracture.  Concern for Achilles tendon injury, will provide walking boot.  Ambulatory referral for orthopedic surgery sent.  Advised patient to use walking boot until he is able to follow-up in clinic.  Will also discharge with prescription for  naproxen.  Strict return precautions given.  He voices understanding.  All questions answered.  The patient was instructed to follow up with Orthopedic surgery or to return to the emergency department for worsening symptoms. The treatment plan was discussed with the patient who demonstrated understanding and comfort with plan. The patient's history, physical exam, and plan of care was discussed with and agreed upon with my supervising physician.                                Clinical Impression:       ICD-10-CM ICD-9-CM   1. Injury of right Achilles tendon, initial encounter  S86.001A 959.7   2. Right foot pain  M79.671 729.5   3. Ankle pain, right  M25.571 719.47                          ED Disposition Condition    Discharge Stable        ED Prescriptions     Medication Sig Dispense Start Date End Date Auth. Provider    naproxen (NAPROSYN) 500 MG tablet Take 1 tablet (500 mg total) by mouth 2 (two) times daily with meals. 10 tablet 12/15/2020  Afshan Holcomb PA-C        Follow-up Information     Follow up With Specialties Details Why Contact Info Additional Information    [facility] Emergency Medicine Go to  If symptoms worsen [address] [facility] [id number] [phone]     [facility] - Orthopedics [facility] Orthopedics Schedule an appointment as soon as possible for a visit in 3 days  [address] [facility] [facility] [id number] [phone] [facility] - Main Building, 5th Floor Please park in [facility] and take Atrium elevator                                       Afshan Holcomb PA-C  12/15/20 0855

## 2020-12-22 ENCOUNTER — PATIENT OUTREACH (OUTPATIENT)
Dept: ADMINISTRATIVE | Facility: OTHER | Age: 50
End: 2020-12-22

## 2020-12-22 NOTE — PROGRESS NOTES
Chart was reviewed for overdue [facility] Encounters ([facility])  topics  Updates were requested from care everywhere  Health Maintenance has been updated  LINKS immunization registry triggered

## 2020-12-24 LAB
ANION GAP SERPL CALC-SCNC: 10 MMOL/L (ref 8–16)
BUN SERPL-MCNC: 13 MG/DL (ref 6–20)
CALCIUM SERPL-MCNC: 9.4 MG/DL (ref 8.7–10.5)
CHLORIDE SERPL-SCNC: 99 MMOL/L (ref 95–110)
CO2 SERPL-SCNC: 29 MMOL/L (ref 23–29)
CREAT SERPL-MCNC: 0.9 MG/DL (ref 0.5–1.4)
EST. GFR  (AFRICAN AMERICAN): >60 ML/MIN/1.73 M^2
EST. GFR  (NON AFRICAN AMERICAN): >60 ML/MIN/1.73 M^2
GLUCOSE SERPL-MCNC: 113 MG/DL (ref 70–110)
POTASSIUM SERPL-SCNC: 3.6 MMOL/L (ref 3.5–5.1)
SODIUM SERPL-SCNC: 138 MMOL/L (ref 136–145)

## 2021-01-04 ENCOUNTER — OFFICE VISIT (OUTPATIENT)
Dept: ORTHOPEDICS | Facility: CLINIC | Age: 51
End: 2021-01-04
Payer: OTHER MISCELLANEOUS

## 2021-01-04 VITALS — WEIGHT: 315 LBS | BODY MASS INDEX: 42.66 KG/M2 | HEIGHT: 72 IN | RESPIRATION RATE: 16 BRPM

## 2021-01-04 DIAGNOSIS — S86.011A STRAIN OF RIGHT ACHILLES TENDON, INITIAL ENCOUNTER: Primary | ICD-10-CM

## 2021-01-04 DIAGNOSIS — S86.001A INJURY OF RIGHT ACHILLES TENDON, INITIAL ENCOUNTER: ICD-10-CM

## 2021-01-04 PROCEDURE — 99999 PR PBB SHADOW E&M-EST. PATIENT-LVL III: CPT | Mod: PBBFAC,,, | Performed by: ORTHOPAEDIC SURGERY

## 2021-01-04 PROCEDURE — 99999 PR PBB SHADOW E&M-EST. PATIENT-LVL III: ICD-10-PCS | Mod: PBBFAC,,, | Performed by: ORTHOPAEDIC SURGERY

## 2021-01-04 PROCEDURE — 99203 OFFICE O/P NEW LOW 30 MIN: CPT | Mod: S$GLB,,, | Performed by: ORTHOPAEDIC SURGERY

## 2021-01-04 PROCEDURE — 99203 PR OFFICE/OUTPT VISIT, NEW, LEVL III, 30-44 MIN: ICD-10-PCS | Mod: S$GLB,,, | Performed by: ORTHOPAEDIC SURGERY

## 2021-01-04 RX ORDER — IBUPROFEN AND FAMOTIDINE 800; 26.6 MG/1; MG/1
800 TABLET, COATED ORAL 3 TIMES DAILY
Qty: 60 TABLET | Refills: 0 | Status: SHIPPED | OUTPATIENT
Start: 2021-01-04 | End: 2021-01-28

## 2021-01-07 ENCOUNTER — TELEPHONE (OUTPATIENT)
Dept: REHABILITATION | Facility: OTHER | Age: 51
End: 2021-01-07

## 2021-01-12 ENCOUNTER — CLINICAL SUPPORT (OUTPATIENT)
Dept: REHABILITATION | Facility: OTHER | Age: 51
End: 2021-01-12
Payer: OTHER MISCELLANEOUS

## 2021-01-12 DIAGNOSIS — R26.2 DIFFICULTY WALKING: ICD-10-CM

## 2021-01-12 DIAGNOSIS — G89.29 CHRONIC PAIN OF RIGHT ANKLE: ICD-10-CM

## 2021-01-12 DIAGNOSIS — S86.001S INJURY OF RIGHT ACHILLES TENDON, SEQUELA: ICD-10-CM

## 2021-01-12 DIAGNOSIS — M25.571 CHRONIC PAIN OF RIGHT ANKLE: ICD-10-CM

## 2021-01-12 DIAGNOSIS — S86.011A STRAIN OF RIGHT ACHILLES TENDON, INITIAL ENCOUNTER: ICD-10-CM

## 2021-01-12 PROBLEM — S86.001A INJURY OF RIGHT ACHILLES TENDON: Status: ACTIVE | Noted: 2021-01-12

## 2021-01-12 PROCEDURE — 97161 PT EVAL LOW COMPLEX 20 MIN: CPT | Mod: PN

## 2021-01-12 PROCEDURE — 97110 THERAPEUTIC EXERCISES: CPT | Mod: PN

## 2021-01-14 ENCOUNTER — CLINICAL SUPPORT (OUTPATIENT)
Dept: REHABILITATION | Facility: OTHER | Age: 51
End: 2021-01-14
Payer: OTHER MISCELLANEOUS

## 2021-01-14 DIAGNOSIS — R26.2 DIFFICULTY WALKING: ICD-10-CM

## 2021-01-14 DIAGNOSIS — G89.29 CHRONIC PAIN OF RIGHT ANKLE: ICD-10-CM

## 2021-01-14 DIAGNOSIS — E11.9 TYPE 2 DIABETES MELLITUS WITHOUT COMPLICATION, UNSPECIFIED WHETHER LONG TERM INSULIN USE: ICD-10-CM

## 2021-01-14 DIAGNOSIS — M25.571 CHRONIC PAIN OF RIGHT ANKLE: ICD-10-CM

## 2021-01-14 PROCEDURE — 97110 THERAPEUTIC EXERCISES: CPT | Mod: PN

## 2021-01-14 PROCEDURE — 97140 MANUAL THERAPY 1/> REGIONS: CPT | Mod: PN

## 2021-01-19 ENCOUNTER — PATIENT OUTREACH (OUTPATIENT)
Dept: ADMINISTRATIVE | Facility: OTHER | Age: 51
End: 2021-01-19

## 2021-01-19 ENCOUNTER — CLINICAL SUPPORT (OUTPATIENT)
Dept: REHABILITATION | Facility: OTHER | Age: 51
End: 2021-01-19
Payer: COMMERCIAL

## 2021-01-19 VITALS — DIASTOLIC BLOOD PRESSURE: 92 MMHG | SYSTOLIC BLOOD PRESSURE: 152 MMHG

## 2021-01-19 DIAGNOSIS — R26.2 DIFFICULTY WALKING: Primary | ICD-10-CM

## 2021-01-19 PROCEDURE — 97110 THERAPEUTIC EXERCISES: CPT | Mod: PN

## 2021-01-19 PROCEDURE — 97140 MANUAL THERAPY 1/> REGIONS: CPT | Mod: PN

## 2021-01-22 ENCOUNTER — PATIENT MESSAGE (OUTPATIENT)
Dept: FAMILY MEDICINE | Facility: CLINIC | Age: 51
End: 2021-01-22

## 2021-01-22 DIAGNOSIS — U07.1 COVID-19: Primary | ICD-10-CM

## 2021-01-22 RX ORDER — ERGOCALCIFEROL 1.25 MG/1
50000 CAPSULE ORAL
Qty: 12 CAPSULE | Refills: 0 | Status: SHIPPED | OUTPATIENT
Start: 2021-01-22 | End: 2021-06-24 | Stop reason: SDUPTHER

## 2021-01-23 ENCOUNTER — TELEPHONE (OUTPATIENT)
Dept: ADMINISTRATIVE | Facility: OTHER | Age: 51
End: 2021-01-23

## 2021-01-23 ENCOUNTER — PATIENT MESSAGE (OUTPATIENT)
Dept: FAMILY MEDICINE | Facility: CLINIC | Age: 51
End: 2021-01-23

## 2021-01-25 ENCOUNTER — PATIENT MESSAGE (OUTPATIENT)
Dept: ADMINISTRATIVE | Facility: OTHER | Age: 51
End: 2021-01-25

## 2021-01-25 ENCOUNTER — NURSE TRIAGE (OUTPATIENT)
Dept: ADMINISTRATIVE | Facility: CLINIC | Age: 51
End: 2021-01-25

## 2021-01-25 ENCOUNTER — TELEPHONE (OUTPATIENT)
Dept: ADMINISTRATIVE | Facility: CLINIC | Age: 51
End: 2021-01-25

## 2021-01-26 ENCOUNTER — PATIENT MESSAGE (OUTPATIENT)
Dept: ADMINISTRATIVE | Facility: OTHER | Age: 51
End: 2021-01-26

## 2021-01-27 ENCOUNTER — PATIENT MESSAGE (OUTPATIENT)
Dept: ADMINISTRATIVE | Facility: OTHER | Age: 51
End: 2021-01-27

## 2021-01-27 ENCOUNTER — PATIENT MESSAGE (OUTPATIENT)
Dept: FAMILY MEDICINE | Facility: CLINIC | Age: 51
End: 2021-01-27

## 2021-01-28 ENCOUNTER — PATIENT MESSAGE (OUTPATIENT)
Dept: ADMINISTRATIVE | Facility: OTHER | Age: 51
End: 2021-01-28

## 2021-01-28 ENCOUNTER — LAB VISIT (OUTPATIENT)
Dept: LAB | Facility: HOSPITAL | Age: 51
End: 2021-01-28
Attending: STUDENT IN AN ORGANIZED HEALTH CARE EDUCATION/TRAINING PROGRAM
Payer: COMMERCIAL

## 2021-01-28 ENCOUNTER — PATIENT MESSAGE (OUTPATIENT)
Dept: FAMILY MEDICINE | Facility: CLINIC | Age: 51
End: 2021-01-28

## 2021-01-28 ENCOUNTER — OFFICE VISIT (OUTPATIENT)
Dept: FAMILY MEDICINE | Facility: CLINIC | Age: 51
End: 2021-01-28
Payer: COMMERCIAL

## 2021-01-28 ENCOUNTER — NURSE TRIAGE (OUTPATIENT)
Dept: ADMINISTRATIVE | Facility: CLINIC | Age: 51
End: 2021-01-28

## 2021-01-28 VITALS — DIASTOLIC BLOOD PRESSURE: 84 MMHG | SYSTOLIC BLOOD PRESSURE: 142 MMHG | HEART RATE: 68 BPM | OXYGEN SATURATION: 98 %

## 2021-01-28 DIAGNOSIS — U07.1 COVID-19: ICD-10-CM

## 2021-01-28 DIAGNOSIS — U07.1 COVID-19: Primary | ICD-10-CM

## 2021-01-28 DIAGNOSIS — K52.9 COLITIS: ICD-10-CM

## 2021-01-28 DIAGNOSIS — K92.1 MELENA: ICD-10-CM

## 2021-01-28 LAB
ALBUMIN SERPL BCP-MCNC: 4.3 G/DL (ref 3.5–5.2)
ALP SERPL-CCNC: 72 U/L (ref 55–135)
ALT SERPL W/O P-5'-P-CCNC: 21 U/L (ref 10–44)
ANION GAP SERPL CALC-SCNC: 11 MMOL/L (ref 8–16)
AST SERPL-CCNC: 35 U/L (ref 10–40)
BASOPHILS # BLD AUTO: 0.03 K/UL (ref 0–0.2)
BASOPHILS NFR BLD: 0.6 % (ref 0–1.9)
BILIRUB SERPL-MCNC: 0.5 MG/DL (ref 0.1–1)
BUN SERPL-MCNC: 9 MG/DL (ref 6–20)
CALCIUM SERPL-MCNC: 9.3 MG/DL (ref 8.7–10.5)
CHLORIDE SERPL-SCNC: 96 MMOL/L (ref 95–110)
CO2 SERPL-SCNC: 28 MMOL/L (ref 23–29)
CREAT SERPL-MCNC: 0.9 MG/DL (ref 0.5–1.4)
DIFFERENTIAL METHOD: ABNORMAL
EOSINOPHIL # BLD AUTO: 0 K/UL (ref 0–0.5)
EOSINOPHIL NFR BLD: 0.6 % (ref 0–8)
ERYTHROCYTE [DISTWIDTH] IN BLOOD BY AUTOMATED COUNT: 14.3 % (ref 11.5–14.5)
EST. GFR  (AFRICAN AMERICAN): >60 ML/MIN/1.73 M^2
EST. GFR  (NON AFRICAN AMERICAN): >60 ML/MIN/1.73 M^2
GIANT PLATELETS BLD QL SMEAR: PRESENT
GLUCOSE SERPL-MCNC: 95 MG/DL (ref 70–110)
HCT VFR BLD AUTO: 42.9 % (ref 40–54)
HGB BLD-MCNC: 13.7 G/DL (ref 14–18)
IMM GRANULOCYTES # BLD AUTO: 0.01 K/UL (ref 0–0.04)
IMM GRANULOCYTES NFR BLD AUTO: 0.2 % (ref 0–0.5)
LYMPHOCYTES # BLD AUTO: 2.4 K/UL (ref 1–4.8)
LYMPHOCYTES NFR BLD: 48.8 % (ref 18–48)
MCH RBC QN AUTO: 25.8 PG (ref 27–31)
MCHC RBC AUTO-ENTMCNC: 31.9 G/DL (ref 32–36)
MCV RBC AUTO: 81 FL (ref 82–98)
MONOCYTES # BLD AUTO: 0.5 K/UL (ref 0.3–1)
MONOCYTES NFR BLD: 9.5 % (ref 4–15)
NEUTROPHILS # BLD AUTO: 2 K/UL (ref 1.8–7.7)
NEUTROPHILS NFR BLD: 40.3 % (ref 38–73)
NRBC BLD-RTO: 0 /100 WBC
PLATELET # BLD AUTO: 253 K/UL (ref 150–350)
PLATELET BLD QL SMEAR: ABNORMAL
PMV BLD AUTO: 11.1 FL (ref 9.2–12.9)
POTASSIUM SERPL-SCNC: 3.7 MMOL/L (ref 3.5–5.1)
PROT SERPL-MCNC: 8.5 G/DL (ref 6–8.4)
RBC # BLD AUTO: 5.3 M/UL (ref 4.6–6.2)
SODIUM SERPL-SCNC: 135 MMOL/L (ref 136–145)
WBC # BLD AUTO: 4.86 K/UL (ref 3.9–12.7)

## 2021-01-28 PROCEDURE — 85025 COMPLETE CBC W/AUTO DIFF WBC: CPT | Mod: PO

## 2021-01-28 PROCEDURE — 80053 COMPREHEN METABOLIC PANEL: CPT

## 2021-01-28 PROCEDURE — 99214 PR OFFICE/OUTPT VISIT, EST, LEVL IV, 30-39 MIN: ICD-10-PCS | Mod: S$GLB,,, | Performed by: STUDENT IN AN ORGANIZED HEALTH CARE EDUCATION/TRAINING PROGRAM

## 2021-01-28 PROCEDURE — 36415 COLL VENOUS BLD VENIPUNCTURE: CPT | Mod: PO

## 2021-01-28 PROCEDURE — 3077F PR MOST RECENT SYSTOLIC BLOOD PRESSURE >= 140 MM HG: ICD-10-PCS | Mod: CPTII,S$GLB,, | Performed by: STUDENT IN AN ORGANIZED HEALTH CARE EDUCATION/TRAINING PROGRAM

## 2021-01-28 PROCEDURE — 99214 OFFICE O/P EST MOD 30 MIN: CPT | Mod: S$GLB,,, | Performed by: STUDENT IN AN ORGANIZED HEALTH CARE EDUCATION/TRAINING PROGRAM

## 2021-01-28 PROCEDURE — 3079F DIAST BP 80-89 MM HG: CPT | Mod: CPTII,S$GLB,, | Performed by: STUDENT IN AN ORGANIZED HEALTH CARE EDUCATION/TRAINING PROGRAM

## 2021-01-28 PROCEDURE — 99999 PR PBB SHADOW E&M-EST. PATIENT-LVL III: CPT | Mod: PBBFAC,,, | Performed by: STUDENT IN AN ORGANIZED HEALTH CARE EDUCATION/TRAINING PROGRAM

## 2021-01-28 PROCEDURE — 3077F SYST BP >= 140 MM HG: CPT | Mod: CPTII,S$GLB,, | Performed by: STUDENT IN AN ORGANIZED HEALTH CARE EDUCATION/TRAINING PROGRAM

## 2021-01-28 PROCEDURE — 99999 PR PBB SHADOW E&M-EST. PATIENT-LVL III: ICD-10-PCS | Mod: PBBFAC,,, | Performed by: STUDENT IN AN ORGANIZED HEALTH CARE EDUCATION/TRAINING PROGRAM

## 2021-01-28 PROCEDURE — 3079F PR MOST RECENT DIASTOLIC BLOOD PRESSURE 80-89 MM HG: ICD-10-PCS | Mod: CPTII,S$GLB,, | Performed by: STUDENT IN AN ORGANIZED HEALTH CARE EDUCATION/TRAINING PROGRAM

## 2021-01-28 RX ORDER — CIPROFLOXACIN 500 MG/1
500 TABLET ORAL EVERY 12 HOURS
Qty: 14 TABLET | Refills: 0 | Status: SHIPPED | OUTPATIENT
Start: 2021-01-28 | End: 2021-02-12

## 2021-01-28 RX ORDER — METRONIDAZOLE 500 MG/1
500 TABLET ORAL EVERY 8 HOURS
Qty: 21 TABLET | Refills: 0 | Status: SHIPPED | OUTPATIENT
Start: 2021-01-28 | End: 2021-02-12

## 2021-01-28 RX ORDER — PANTOPRAZOLE SODIUM 40 MG/1
40 TABLET, DELAYED RELEASE ORAL DAILY
Qty: 30 TABLET | Refills: 11 | Status: SHIPPED | OUTPATIENT
Start: 2021-01-28 | End: 2021-05-27

## 2021-01-29 ENCOUNTER — NURSE TRIAGE (OUTPATIENT)
Dept: ADMINISTRATIVE | Facility: CLINIC | Age: 51
End: 2021-01-29

## 2021-01-29 ENCOUNTER — PATIENT MESSAGE (OUTPATIENT)
Dept: ADMINISTRATIVE | Facility: OTHER | Age: 51
End: 2021-01-29

## 2021-01-30 ENCOUNTER — PATIENT MESSAGE (OUTPATIENT)
Dept: ADMINISTRATIVE | Facility: OTHER | Age: 51
End: 2021-01-30

## 2021-01-31 ENCOUNTER — PATIENT MESSAGE (OUTPATIENT)
Dept: ADMINISTRATIVE | Facility: OTHER | Age: 51
End: 2021-01-31

## 2021-02-01 ENCOUNTER — PATIENT MESSAGE (OUTPATIENT)
Dept: ADMINISTRATIVE | Facility: OTHER | Age: 51
End: 2021-02-01

## 2021-02-01 ENCOUNTER — DOCUMENTATION ONLY (OUTPATIENT)
Dept: REHABILITATION | Facility: OTHER | Age: 51
End: 2021-02-01

## 2021-02-02 ENCOUNTER — PATIENT MESSAGE (OUTPATIENT)
Dept: ADMINISTRATIVE | Facility: OTHER | Age: 51
End: 2021-02-02

## 2021-02-02 ENCOUNTER — PATIENT MESSAGE (OUTPATIENT)
Dept: ADMINISTRATIVE | Facility: CLINIC | Age: 51
End: 2021-02-02

## 2021-02-02 ENCOUNTER — NURSE TRIAGE (OUTPATIENT)
Dept: ADMINISTRATIVE | Facility: CLINIC | Age: 51
End: 2021-02-02

## 2021-02-03 ENCOUNTER — PATIENT MESSAGE (OUTPATIENT)
Dept: ADMINISTRATIVE | Facility: OTHER | Age: 51
End: 2021-02-03

## 2021-02-04 ENCOUNTER — PATIENT MESSAGE (OUTPATIENT)
Dept: ADMINISTRATIVE | Facility: CLINIC | Age: 51
End: 2021-02-04

## 2021-02-04 ENCOUNTER — PATIENT MESSAGE (OUTPATIENT)
Dept: ADMINISTRATIVE | Facility: OTHER | Age: 51
End: 2021-02-04

## 2021-02-05 ENCOUNTER — PATIENT MESSAGE (OUTPATIENT)
Dept: ADMINISTRATIVE | Facility: CLINIC | Age: 51
End: 2021-02-05

## 2021-02-08 ENCOUNTER — PATIENT MESSAGE (OUTPATIENT)
Dept: FAMILY MEDICINE | Facility: CLINIC | Age: 51
End: 2021-02-08

## 2021-02-11 ENCOUNTER — CLINICAL SUPPORT (OUTPATIENT)
Dept: REHABILITATION | Facility: HOSPITAL | Age: 51
End: 2021-02-11
Payer: OTHER MISCELLANEOUS

## 2021-02-11 DIAGNOSIS — G89.29 CHRONIC PAIN OF RIGHT ANKLE: ICD-10-CM

## 2021-02-11 DIAGNOSIS — M25.571 CHRONIC PAIN OF RIGHT ANKLE: ICD-10-CM

## 2021-02-11 DIAGNOSIS — R26.2 DIFFICULTY WALKING: ICD-10-CM

## 2021-02-11 PROCEDURE — 97112 NEUROMUSCULAR REEDUCATION: CPT | Mod: PN | Performed by: PHYSICAL THERAPIST

## 2021-02-11 PROCEDURE — 97110 THERAPEUTIC EXERCISES: CPT | Mod: PN | Performed by: PHYSICAL THERAPIST

## 2021-02-12 ENCOUNTER — HOSPITAL ENCOUNTER (OUTPATIENT)
Dept: RADIOLOGY | Facility: CLINIC | Age: 51
Discharge: HOME | End: 2021-02-12
Attending: STUDENT IN AN ORGANIZED HEALTH CARE EDUCATION/TRAINING PROGRAM
Payer: COMMERCIAL

## 2021-02-12 ENCOUNTER — OFFICE VISIT (OUTPATIENT)
Dept: FAMILY MEDICINE | Facility: CLINIC | Age: 51
End: 2021-02-12
Payer: COMMERCIAL

## 2021-02-12 VITALS
HEART RATE: 70 BPM | DIASTOLIC BLOOD PRESSURE: 88 MMHG | OXYGEN SATURATION: 97 % | BODY MASS INDEX: 42.66 KG/M2 | HEIGHT: 72 IN | TEMPERATURE: 98 F | RESPIRATION RATE: 18 BRPM | WEIGHT: 315 LBS | SYSTOLIC BLOOD PRESSURE: 136 MMHG

## 2021-02-12 DIAGNOSIS — E55.9 VITAMIN D DEFICIENCY: ICD-10-CM

## 2021-02-12 DIAGNOSIS — I10 ESSENTIAL HYPERTENSION: ICD-10-CM

## 2021-02-12 DIAGNOSIS — Z00.00 ROUTINE GENERAL MEDICAL EXAMINATION AT A HEALTH CARE FACILITY: Primary | ICD-10-CM

## 2021-02-12 DIAGNOSIS — R43.0 ANOSMIA: ICD-10-CM

## 2021-02-12 DIAGNOSIS — E66.01 OBESITY, MORBID, BMI 40.0-49.9: ICD-10-CM

## 2021-02-12 DIAGNOSIS — R10.13 EPIGASTRIC PAIN: ICD-10-CM

## 2021-02-12 DIAGNOSIS — R43.2 AGEUSIA: ICD-10-CM

## 2021-02-12 DIAGNOSIS — U07.1 COVID-19: ICD-10-CM

## 2021-02-12 DIAGNOSIS — R73.03 PRE-DIABETES: ICD-10-CM

## 2021-02-12 PROCEDURE — 71046 X-RAY EXAM CHEST 2 VIEWS: CPT | Mod: 26,,, | Performed by: RADIOLOGY

## 2021-02-12 PROCEDURE — 71046 XR CHEST PA AND LATERAL: ICD-10-PCS | Mod: 26,,, | Performed by: RADIOLOGY

## 2021-02-12 PROCEDURE — 3008F BODY MASS INDEX DOCD: CPT | Mod: CPTII,S$GLB,, | Performed by: STUDENT IN AN ORGANIZED HEALTH CARE EDUCATION/TRAINING PROGRAM

## 2021-02-12 PROCEDURE — 99999 PR PBB SHADOW E&M-EST. PATIENT-LVL IV: ICD-10-PCS | Mod: PBBFAC,,, | Performed by: STUDENT IN AN ORGANIZED HEALTH CARE EDUCATION/TRAINING PROGRAM

## 2021-02-12 PROCEDURE — 71046 X-RAY EXAM CHEST 2 VIEWS: CPT | Mod: TC,FY,PO

## 2021-02-12 PROCEDURE — 99999 PR PBB SHADOW E&M-EST. PATIENT-LVL IV: CPT | Mod: PBBFAC,,, | Performed by: STUDENT IN AN ORGANIZED HEALTH CARE EDUCATION/TRAINING PROGRAM

## 2021-02-12 PROCEDURE — 3079F PR MOST RECENT DIASTOLIC BLOOD PRESSURE 80-89 MM HG: ICD-10-PCS | Mod: CPTII,S$GLB,, | Performed by: STUDENT IN AN ORGANIZED HEALTH CARE EDUCATION/TRAINING PROGRAM

## 2021-02-12 PROCEDURE — 3079F DIAST BP 80-89 MM HG: CPT | Mod: CPTII,S$GLB,, | Performed by: STUDENT IN AN ORGANIZED HEALTH CARE EDUCATION/TRAINING PROGRAM

## 2021-02-12 PROCEDURE — 99396 PR PREVENTIVE VISIT,EST,40-64: ICD-10-PCS | Mod: S$GLB,,, | Performed by: STUDENT IN AN ORGANIZED HEALTH CARE EDUCATION/TRAINING PROGRAM

## 2021-02-12 PROCEDURE — 3008F PR BODY MASS INDEX (BMI) DOCUMENTED: ICD-10-PCS | Mod: CPTII,S$GLB,, | Performed by: STUDENT IN AN ORGANIZED HEALTH CARE EDUCATION/TRAINING PROGRAM

## 2021-02-12 PROCEDURE — 3075F SYST BP GE 130 - 139MM HG: CPT | Mod: CPTII,S$GLB,, | Performed by: STUDENT IN AN ORGANIZED HEALTH CARE EDUCATION/TRAINING PROGRAM

## 2021-02-12 PROCEDURE — 1126F PR PAIN SEVERITY QUANTIFIED, NO PAIN PRESENT: ICD-10-PCS | Mod: S$GLB,,, | Performed by: STUDENT IN AN ORGANIZED HEALTH CARE EDUCATION/TRAINING PROGRAM

## 2021-02-12 PROCEDURE — 1126F AMNT PAIN NOTED NONE PRSNT: CPT | Mod: S$GLB,,, | Performed by: STUDENT IN AN ORGANIZED HEALTH CARE EDUCATION/TRAINING PROGRAM

## 2021-02-12 PROCEDURE — 99396 PREV VISIT EST AGE 40-64: CPT | Mod: S$GLB,,, | Performed by: STUDENT IN AN ORGANIZED HEALTH CARE EDUCATION/TRAINING PROGRAM

## 2021-02-12 PROCEDURE — 3075F PR MOST RECENT SYSTOLIC BLOOD PRESS GE 130-139MM HG: ICD-10-PCS | Mod: CPTII,S$GLB,, | Performed by: STUDENT IN AN ORGANIZED HEALTH CARE EDUCATION/TRAINING PROGRAM

## 2021-02-12 RX ORDER — MELOXICAM 15 MG/1
TABLET ORAL
COMMUNITY
End: 2021-04-29 | Stop reason: ALTCHOICE

## 2021-02-18 ENCOUNTER — DOCUMENTATION ONLY (OUTPATIENT)
Dept: REHABILITATION | Facility: HOSPITAL | Age: 51
End: 2021-02-18

## 2021-02-18 ENCOUNTER — CLINICAL SUPPORT (OUTPATIENT)
Dept: REHABILITATION | Facility: HOSPITAL | Age: 51
End: 2021-02-18
Payer: OTHER MISCELLANEOUS

## 2021-02-18 DIAGNOSIS — M25.571 CHRONIC PAIN OF RIGHT ANKLE: ICD-10-CM

## 2021-02-18 DIAGNOSIS — G89.29 CHRONIC PAIN OF RIGHT ANKLE: ICD-10-CM

## 2021-02-18 DIAGNOSIS — R26.2 DIFFICULTY WALKING: ICD-10-CM

## 2021-02-18 PROCEDURE — 97140 MANUAL THERAPY 1/> REGIONS: CPT | Mod: PN,CQ

## 2021-02-18 PROCEDURE — 97112 NEUROMUSCULAR REEDUCATION: CPT | Mod: PN,CQ

## 2021-02-18 PROCEDURE — 97110 THERAPEUTIC EXERCISES: CPT | Mod: PN,CQ

## 2021-02-19 ENCOUNTER — PATIENT MESSAGE (OUTPATIENT)
Dept: FAMILY MEDICINE | Facility: CLINIC | Age: 51
End: 2021-02-19

## 2021-02-19 DIAGNOSIS — R31.0 GROSS HEMATURIA: Primary | ICD-10-CM

## 2021-02-22 ENCOUNTER — LAB VISIT (OUTPATIENT)
Dept: LAB | Facility: HOSPITAL | Age: 51
End: 2021-02-22
Attending: STUDENT IN AN ORGANIZED HEALTH CARE EDUCATION/TRAINING PROGRAM
Payer: COMMERCIAL

## 2021-02-22 DIAGNOSIS — R31.0 GROSS HEMATURIA: ICD-10-CM

## 2021-02-22 PROCEDURE — 81001 URINALYSIS AUTO W/SCOPE: CPT

## 2021-02-23 LAB
BILIRUB UR QL STRIP: NEGATIVE
CLARITY UR REFRACT.AUTO: CLEAR
COLOR UR AUTO: YELLOW
GLUCOSE UR QL STRIP: NEGATIVE
HGB UR QL STRIP: NEGATIVE
KETONES UR QL STRIP: NEGATIVE
LEUKOCYTE ESTERASE UR QL STRIP: NEGATIVE
MICROSCOPIC COMMENT: NORMAL
NITRITE UR QL STRIP: NEGATIVE
PH UR STRIP: 6 [PH] (ref 5–8)
PROT UR QL STRIP: NEGATIVE
SP GR UR STRIP: 1.02 (ref 1–1.03)
URN SPEC COLLECT METH UR: NORMAL
WBC #/AREA URNS AUTO: 1 /HPF (ref 0–5)

## 2021-02-24 ENCOUNTER — CLINICAL SUPPORT (OUTPATIENT)
Dept: REHABILITATION | Facility: HOSPITAL | Age: 51
End: 2021-02-24
Payer: OTHER MISCELLANEOUS

## 2021-02-24 DIAGNOSIS — R26.2 DIFFICULTY WALKING: ICD-10-CM

## 2021-02-24 DIAGNOSIS — G89.29 CHRONIC PAIN OF RIGHT ANKLE: ICD-10-CM

## 2021-02-24 DIAGNOSIS — M25.571 CHRONIC PAIN OF RIGHT ANKLE: ICD-10-CM

## 2021-02-24 PROCEDURE — 97140 MANUAL THERAPY 1/> REGIONS: CPT | Mod: PN | Performed by: PHYSICAL THERAPIST

## 2021-02-24 PROCEDURE — 97110 THERAPEUTIC EXERCISES: CPT | Mod: PN | Performed by: PHYSICAL THERAPIST

## 2021-02-24 PROCEDURE — 97112 NEUROMUSCULAR REEDUCATION: CPT | Mod: PN | Performed by: PHYSICAL THERAPIST

## 2021-03-10 ENCOUNTER — TELEPHONE (OUTPATIENT)
Dept: REHABILITATION | Facility: HOSPITAL | Age: 51
End: 2021-03-10

## 2021-03-10 ENCOUNTER — CLINICAL SUPPORT (OUTPATIENT)
Dept: REHABILITATION | Facility: HOSPITAL | Age: 51
End: 2021-03-10
Payer: COMMERCIAL

## 2021-03-10 DIAGNOSIS — G89.29 CHRONIC PAIN OF RIGHT ANKLE: ICD-10-CM

## 2021-03-10 DIAGNOSIS — R26.2 DIFFICULTY WALKING: ICD-10-CM

## 2021-03-10 DIAGNOSIS — M25.571 CHRONIC PAIN OF RIGHT ANKLE: ICD-10-CM

## 2021-03-10 PROCEDURE — 97110 THERAPEUTIC EXERCISES: CPT | Mod: PN,CQ

## 2021-03-12 ENCOUNTER — CLINICAL SUPPORT (OUTPATIENT)
Dept: REHABILITATION | Facility: HOSPITAL | Age: 51
End: 2021-03-12
Payer: COMMERCIAL

## 2021-03-12 DIAGNOSIS — R26.2 DIFFICULTY WALKING: ICD-10-CM

## 2021-03-12 DIAGNOSIS — M25.571 CHRONIC PAIN OF RIGHT ANKLE: ICD-10-CM

## 2021-03-12 DIAGNOSIS — G89.29 CHRONIC PAIN OF RIGHT ANKLE: ICD-10-CM

## 2021-03-12 PROCEDURE — 97112 NEUROMUSCULAR REEDUCATION: CPT | Mod: PN,CQ

## 2021-03-12 PROCEDURE — 97140 MANUAL THERAPY 1/> REGIONS: CPT | Mod: PN,CQ

## 2021-03-12 PROCEDURE — 97110 THERAPEUTIC EXERCISES: CPT | Mod: PN,CQ

## 2021-03-16 ENCOUNTER — IMMUNIZATION (OUTPATIENT)
Dept: FAMILY MEDICINE | Facility: CLINIC | Age: 51
End: 2021-03-16
Payer: COMMERCIAL

## 2021-03-16 ENCOUNTER — CLINICAL SUPPORT (OUTPATIENT)
Dept: REHABILITATION | Facility: HOSPITAL | Age: 51
End: 2021-03-16
Payer: COMMERCIAL

## 2021-03-16 DIAGNOSIS — G89.29 CHRONIC PAIN OF RIGHT ANKLE: ICD-10-CM

## 2021-03-16 DIAGNOSIS — M25.571 CHRONIC PAIN OF RIGHT ANKLE: ICD-10-CM

## 2021-03-16 DIAGNOSIS — R26.2 DIFFICULTY WALKING: ICD-10-CM

## 2021-03-16 DIAGNOSIS — Z23 NEED FOR VACCINATION: Primary | ICD-10-CM

## 2021-03-16 PROCEDURE — 97140 MANUAL THERAPY 1/> REGIONS: CPT | Mod: PN | Performed by: PHYSICAL THERAPIST

## 2021-03-16 PROCEDURE — 97110 THERAPEUTIC EXERCISES: CPT | Mod: PN | Performed by: PHYSICAL THERAPIST

## 2021-03-16 PROCEDURE — 91300 COVID-19, MRNA, LNP-S, PF, 30 MCG/0.3 ML DOSE VACCINE: CPT | Mod: PBBFAC | Performed by: FAMILY MEDICINE

## 2021-03-16 PROCEDURE — 97112 NEUROMUSCULAR REEDUCATION: CPT | Mod: PN | Performed by: PHYSICAL THERAPIST

## 2021-03-18 ENCOUNTER — CLINICAL SUPPORT (OUTPATIENT)
Dept: REHABILITATION | Facility: HOSPITAL | Age: 51
End: 2021-03-18
Payer: COMMERCIAL

## 2021-03-18 DIAGNOSIS — M25.571 CHRONIC PAIN OF RIGHT ANKLE: ICD-10-CM

## 2021-03-18 DIAGNOSIS — R26.2 DIFFICULTY WALKING: ICD-10-CM

## 2021-03-18 DIAGNOSIS — G89.29 CHRONIC PAIN OF RIGHT ANKLE: ICD-10-CM

## 2021-03-18 PROCEDURE — 97110 THERAPEUTIC EXERCISES: CPT | Mod: PN,CQ

## 2021-03-18 PROCEDURE — 97112 NEUROMUSCULAR REEDUCATION: CPT | Mod: PN,CQ

## 2021-03-25 ENCOUNTER — CLINICAL SUPPORT (OUTPATIENT)
Dept: REHABILITATION | Facility: HOSPITAL | Age: 51
End: 2021-03-25
Payer: OTHER MISCELLANEOUS

## 2021-03-25 ENCOUNTER — DOCUMENTATION ONLY (OUTPATIENT)
Dept: REHABILITATION | Facility: HOSPITAL | Age: 51
End: 2021-03-25

## 2021-03-25 DIAGNOSIS — M25.571 CHRONIC PAIN OF RIGHT ANKLE: ICD-10-CM

## 2021-03-25 DIAGNOSIS — G89.29 CHRONIC PAIN OF RIGHT ANKLE: ICD-10-CM

## 2021-03-25 DIAGNOSIS — R26.2 DIFFICULTY WALKING: ICD-10-CM

## 2021-03-25 PROCEDURE — 97112 NEUROMUSCULAR REEDUCATION: CPT | Mod: PN,CQ

## 2021-03-25 PROCEDURE — 97110 THERAPEUTIC EXERCISES: CPT | Mod: PN,CQ

## 2021-03-25 PROCEDURE — 97140 MANUAL THERAPY 1/> REGIONS: CPT | Mod: PN,CQ

## 2021-04-01 ENCOUNTER — CLINICAL SUPPORT (OUTPATIENT)
Dept: REHABILITATION | Facility: HOSPITAL | Age: 51
End: 2021-04-01
Payer: OTHER MISCELLANEOUS

## 2021-04-01 DIAGNOSIS — R26.2 DIFFICULTY WALKING: ICD-10-CM

## 2021-04-01 DIAGNOSIS — M25.571 CHRONIC PAIN OF RIGHT ANKLE: ICD-10-CM

## 2021-04-01 DIAGNOSIS — G89.29 CHRONIC PAIN OF RIGHT ANKLE: ICD-10-CM

## 2021-04-01 PROCEDURE — 97110 THERAPEUTIC EXERCISES: CPT | Mod: PN | Performed by: PHYSICAL THERAPIST

## 2021-04-01 PROCEDURE — 97112 NEUROMUSCULAR REEDUCATION: CPT | Mod: PN | Performed by: PHYSICAL THERAPIST

## 2021-04-06 ENCOUNTER — IMMUNIZATION (OUTPATIENT)
Dept: FAMILY MEDICINE | Facility: CLINIC | Age: 51
End: 2021-04-06
Payer: COMMERCIAL

## 2021-04-06 DIAGNOSIS — Z23 NEED FOR VACCINATION: Primary | ICD-10-CM

## 2021-04-06 PROCEDURE — 91300 COVID-19, MRNA, LNP-S, PF, 30 MCG/0.3 ML DOSE VACCINE: CPT | Mod: PBBFAC | Performed by: FAMILY MEDICINE

## 2021-04-06 PROCEDURE — 0002A COVID-19, MRNA, LNP-S, PF, 30 MCG/0.3 ML DOSE VACCINE: CPT | Mod: PBBFAC | Performed by: FAMILY MEDICINE

## 2021-04-21 ENCOUNTER — PATIENT OUTREACH (OUTPATIENT)
Dept: ADMINISTRATIVE | Facility: OTHER | Age: 51
End: 2021-04-21

## 2021-04-29 ENCOUNTER — OFFICE VISIT (OUTPATIENT)
Dept: ORTHOPEDICS | Facility: CLINIC | Age: 51
End: 2021-04-29
Payer: OTHER MISCELLANEOUS

## 2021-04-29 VITALS — WEIGHT: 315 LBS | RESPIRATION RATE: 18 BRPM | HEIGHT: 72 IN | BODY MASS INDEX: 42.66 KG/M2

## 2021-04-29 DIAGNOSIS — S86.011A STRAIN OF RIGHT ACHILLES TENDON, INITIAL ENCOUNTER: Primary | ICD-10-CM

## 2021-04-29 DIAGNOSIS — S86.001D INJURY OF RIGHT ACHILLES TENDON, SUBSEQUENT ENCOUNTER: Primary | ICD-10-CM

## 2021-04-29 PROCEDURE — 99999 PR PBB SHADOW E&M-EST. PATIENT-LVL III: ICD-10-PCS | Mod: PBBFAC,,, | Performed by: ORTHOPAEDIC SURGERY

## 2021-04-29 PROCEDURE — 99999 PR PBB SHADOW E&M-EST. PATIENT-LVL III: CPT | Mod: PBBFAC,,, | Performed by: ORTHOPAEDIC SURGERY

## 2021-04-29 PROCEDURE — 99213 PR OFFICE/OUTPT VISIT, EST, LEVL III, 20-29 MIN: ICD-10-PCS | Mod: S$GLB,,, | Performed by: ORTHOPAEDIC SURGERY

## 2021-04-29 PROCEDURE — 99213 OFFICE O/P EST LOW 20 MIN: CPT | Mod: S$GLB,,, | Performed by: ORTHOPAEDIC SURGERY

## 2021-04-29 RX ORDER — CELECOXIB 200 MG/1
200 CAPSULE ORAL DAILY
Qty: 30 CAPSULE | Refills: 0 | Status: SHIPPED | OUTPATIENT
Start: 2021-04-29 | End: 2021-11-29 | Stop reason: SDUPTHER

## 2021-05-10 ENCOUNTER — TELEPHONE (OUTPATIENT)
Dept: ORTHOPEDICS | Facility: CLINIC | Age: 51
End: 2021-05-10

## 2021-05-24 ENCOUNTER — HOSPITAL ENCOUNTER (OUTPATIENT)
Dept: RADIOLOGY | Facility: HOSPITAL | Age: 51
Discharge: HOME | End: 2021-05-24
Attending: ORTHOPAEDIC SURGERY
Payer: OTHER MISCELLANEOUS

## 2021-05-24 ENCOUNTER — PATIENT MESSAGE (OUTPATIENT)
Dept: ORTHOPEDICS | Facility: CLINIC | Age: 51
End: 2021-05-24

## 2021-05-24 DIAGNOSIS — S86.011A STRAIN OF RIGHT ACHILLES TENDON, INITIAL ENCOUNTER: ICD-10-CM

## 2021-05-24 PROCEDURE — 73721 MRI ANKLE WITHOUT CONTRAST RIGHT: ICD-10-PCS | Mod: 26,RT,, | Performed by: RADIOLOGY

## 2021-05-24 PROCEDURE — 73721 MRI JNT OF LWR EXTRE W/O DYE: CPT | Mod: TC,RT

## 2021-05-24 PROCEDURE — 73721 MRI JNT OF LWR EXTRE W/O DYE: CPT | Mod: 26,RT,, | Performed by: RADIOLOGY

## 2021-05-26 DIAGNOSIS — S86.001D INJURY OF RIGHT ACHILLES TENDON, SUBSEQUENT ENCOUNTER: Primary | ICD-10-CM

## 2021-05-27 ENCOUNTER — OFFICE VISIT (OUTPATIENT)
Dept: OPTOMETRY | Facility: CLINIC | Age: 51
End: 2021-05-27
Payer: COMMERCIAL

## 2021-05-27 DIAGNOSIS — H52.7 REFRACTIVE ERROR: ICD-10-CM

## 2021-05-27 DIAGNOSIS — H04.123 BILATERAL DRY EYES: Primary | ICD-10-CM

## 2021-05-27 PROCEDURE — 1126F AMNT PAIN NOTED NONE PRSNT: CPT | Mod: S$GLB,,, | Performed by: OPTOMETRIST

## 2021-05-27 PROCEDURE — 92015 PR REFRACTION: ICD-10-PCS | Mod: S$GLB,,, | Performed by: OPTOMETRIST

## 2021-05-27 PROCEDURE — 92015 DETERMINE REFRACTIVE STATE: CPT | Mod: S$GLB,,, | Performed by: OPTOMETRIST

## 2021-05-27 PROCEDURE — 1126F PR PAIN SEVERITY QUANTIFIED, NO PAIN PRESENT: ICD-10-PCS | Mod: S$GLB,,, | Performed by: OPTOMETRIST

## 2021-05-27 PROCEDURE — 92004 COMPRE OPH EXAM NEW PT 1/>: CPT | Mod: S$GLB,,, | Performed by: OPTOMETRIST

## 2021-05-27 PROCEDURE — 99999 PR PBB SHADOW E&M-EST. PATIENT-LVL II: ICD-10-PCS | Mod: PBBFAC,,, | Performed by: OPTOMETRIST

## 2021-05-27 PROCEDURE — 92004 PR EYE EXAM, NEW PATIENT,COMPREHESV: ICD-10-PCS | Mod: S$GLB,,, | Performed by: OPTOMETRIST

## 2021-05-27 PROCEDURE — 99999 PR PBB SHADOW E&M-EST. PATIENT-LVL II: CPT | Mod: PBBFAC,,, | Performed by: OPTOMETRIST

## 2021-05-31 DIAGNOSIS — S86.001D INJURY OF RIGHT ACHILLES TENDON, SUBSEQUENT ENCOUNTER: Primary | ICD-10-CM

## 2021-06-04 ENCOUNTER — OFFICE VISIT (OUTPATIENT)
Dept: ORTHOPEDICS | Facility: CLINIC | Age: 51
End: 2021-06-04
Payer: OTHER MISCELLANEOUS

## 2021-06-04 ENCOUNTER — HOSPITAL ENCOUNTER (OUTPATIENT)
Dept: RADIOLOGY | Facility: HOSPITAL | Age: 51
Discharge: HOME | End: 2021-06-04
Attending: ORTHOPAEDIC SURGERY
Payer: OTHER MISCELLANEOUS

## 2021-06-04 VITALS — WEIGHT: 315 LBS | HEIGHT: 72 IN | BODY MASS INDEX: 42.66 KG/M2

## 2021-06-04 DIAGNOSIS — M25.571 CHRONIC PAIN OF RIGHT ANKLE: Primary | ICD-10-CM

## 2021-06-04 DIAGNOSIS — G89.29 CHRONIC PAIN OF RIGHT ANKLE: Primary | ICD-10-CM

## 2021-06-04 DIAGNOSIS — Q66.6 CONGENITAL PES PLANOVALGUS: ICD-10-CM

## 2021-06-04 DIAGNOSIS — S86.001D INJURY OF RIGHT ACHILLES TENDON, SUBSEQUENT ENCOUNTER: ICD-10-CM

## 2021-06-04 DIAGNOSIS — M19.071 DEGENERATIVE JOINT DISEASE OF RIGHT HINDFOOT: ICD-10-CM

## 2021-06-04 PROCEDURE — 99999 PR PBB SHADOW E&M-EST. PATIENT-LVL III: CPT | Mod: PBBFAC,,, | Performed by: ORTHOPAEDIC SURGERY

## 2021-06-04 PROCEDURE — 99244 OFF/OP CNSLTJ NEW/EST MOD 40: CPT | Mod: S$GLB,,, | Performed by: ORTHOPAEDIC SURGERY

## 2021-06-04 PROCEDURE — 73610 X-RAY EXAM OF ANKLE: CPT | Mod: 26,RT,, | Performed by: RADIOLOGY

## 2021-06-04 PROCEDURE — 99999 PR PBB SHADOW E&M-EST. PATIENT-LVL III: ICD-10-PCS | Mod: PBBFAC,,, | Performed by: ORTHOPAEDIC SURGERY

## 2021-06-04 PROCEDURE — 99244 PR OFFICE CONSULTATION,LEVEL IV: ICD-10-PCS | Mod: S$GLB,,, | Performed by: ORTHOPAEDIC SURGERY

## 2021-06-04 PROCEDURE — 73610 X-RAY EXAM OF ANKLE: CPT | Mod: TC,PN,RT

## 2021-06-04 PROCEDURE — 73610 XR ANKLE COMPLETE 3 VIEW RIGHT: ICD-10-PCS | Mod: 26,RT,, | Performed by: RADIOLOGY

## 2021-06-08 ENCOUNTER — PATIENT MESSAGE (OUTPATIENT)
Dept: ORTHOPEDICS | Facility: CLINIC | Age: 51
End: 2021-06-08

## 2021-06-08 PROBLEM — Q66.6 CONGENITAL PES PLANOVALGUS: Status: ACTIVE | Noted: 2021-06-08

## 2021-06-08 PROBLEM — M19.071 DEGENERATIVE JOINT DISEASE OF RIGHT HINDFOOT: Status: ACTIVE | Noted: 2021-06-08

## 2021-06-08 PROBLEM — G89.29 CHRONIC PAIN OF RIGHT ANKLE: Status: ACTIVE | Noted: 2021-01-12

## 2021-06-09 ENCOUNTER — TELEPHONE (OUTPATIENT)
Dept: ORTHOPEDICS | Facility: CLINIC | Age: 51
End: 2021-06-09

## 2021-06-09 ENCOUNTER — HOSPITAL ENCOUNTER (OUTPATIENT)
Dept: RADIOLOGY | Facility: HOSPITAL | Age: 51
Discharge: HOME | End: 2021-06-09
Attending: ORTHOPAEDIC SURGERY
Payer: COMMERCIAL

## 2021-06-09 DIAGNOSIS — M25.572 LEFT ANKLE PAIN, UNSPECIFIED CHRONICITY: ICD-10-CM

## 2021-06-09 DIAGNOSIS — M25.572 LEFT ANKLE PAIN, UNSPECIFIED CHRONICITY: Primary | ICD-10-CM

## 2021-06-09 PROCEDURE — 73610 XR ANKLE COMPLETE 3 VIEW LEFT: ICD-10-PCS | Mod: 26,LT,, | Performed by: RADIOLOGY

## 2021-06-09 PROCEDURE — 73610 X-RAY EXAM OF ANKLE: CPT | Mod: TC,FY,LT

## 2021-06-09 PROCEDURE — 73610 X-RAY EXAM OF ANKLE: CPT | Mod: 26,LT,, | Performed by: RADIOLOGY

## 2021-06-10 ENCOUNTER — OFFICE VISIT (OUTPATIENT)
Dept: PODIATRY | Facility: CLINIC | Age: 51
End: 2021-06-10
Payer: COMMERCIAL

## 2021-06-10 VITALS
HEIGHT: 72 IN | WEIGHT: 315 LBS | HEART RATE: 98 BPM | DIASTOLIC BLOOD PRESSURE: 90 MMHG | SYSTOLIC BLOOD PRESSURE: 157 MMHG | BODY MASS INDEX: 42.66 KG/M2

## 2021-06-10 DIAGNOSIS — B35.1 ONYCHOMYCOSIS DUE TO DERMATOPHYTE: Primary | ICD-10-CM

## 2021-06-10 DIAGNOSIS — B35.3 TINEA PEDIS OF BOTH FEET: ICD-10-CM

## 2021-06-10 PROCEDURE — 99204 PR OFFICE/OUTPT VISIT, NEW, LEVL IV, 45-59 MIN: ICD-10-PCS | Mod: S$GLB,,, | Performed by: PODIATRIST

## 2021-06-10 PROCEDURE — 1125F PR PAIN SEVERITY QUANTIFIED, PAIN PRESENT: ICD-10-PCS | Mod: S$GLB,,, | Performed by: PODIATRIST

## 2021-06-10 PROCEDURE — 3008F BODY MASS INDEX DOCD: CPT | Mod: CPTII,S$GLB,, | Performed by: PODIATRIST

## 2021-06-10 PROCEDURE — 99999 PR PBB SHADOW E&M-EST. PATIENT-LVL III: ICD-10-PCS | Mod: PBBFAC,,, | Performed by: PODIATRIST

## 2021-06-10 PROCEDURE — 1125F AMNT PAIN NOTED PAIN PRSNT: CPT | Mod: S$GLB,,, | Performed by: PODIATRIST

## 2021-06-10 PROCEDURE — 99204 OFFICE O/P NEW MOD 45 MIN: CPT | Mod: S$GLB,,, | Performed by: PODIATRIST

## 2021-06-10 PROCEDURE — 99999 PR PBB SHADOW E&M-EST. PATIENT-LVL III: CPT | Mod: PBBFAC,,, | Performed by: PODIATRIST

## 2021-06-10 PROCEDURE — 3008F PR BODY MASS INDEX (BMI) DOCUMENTED: ICD-10-PCS | Mod: CPTII,S$GLB,, | Performed by: PODIATRIST

## 2021-06-10 RX ORDER — CICLOPIROX 7.7 MG/G
GEL TOPICAL 2 TIMES DAILY
Qty: 100 G | Refills: 3 | Status: SHIPPED | OUTPATIENT
Start: 2021-06-10 | End: 2021-07-13

## 2021-06-10 RX ORDER — CICLOPIROX 80 MG/ML
SOLUTION TOPICAL NIGHTLY
Qty: 6.6 ML | Refills: 11 | Status: SHIPPED | OUTPATIENT
Start: 2021-06-10 | End: 2021-10-01

## 2021-06-11 ENCOUNTER — OFFICE VISIT (OUTPATIENT)
Dept: ORTHOPEDICS | Facility: CLINIC | Age: 51
End: 2021-06-11
Payer: COMMERCIAL

## 2021-06-11 ENCOUNTER — HOSPITAL ENCOUNTER (OUTPATIENT)
Dept: RADIOLOGY | Facility: HOSPITAL | Age: 51
Discharge: HOME | End: 2021-06-11
Attending: ORTHOPAEDIC SURGERY
Payer: COMMERCIAL

## 2021-06-11 VITALS
HEART RATE: 76 BPM | WEIGHT: 315 LBS | DIASTOLIC BLOOD PRESSURE: 87 MMHG | HEIGHT: 72 IN | SYSTOLIC BLOOD PRESSURE: 144 MMHG | BODY MASS INDEX: 42.66 KG/M2

## 2021-06-11 DIAGNOSIS — M19.071 ARTHRITIS OF RIGHT ANKLE: ICD-10-CM

## 2021-06-11 DIAGNOSIS — M19.072 DEGENERATIVE JOINT DISEASE OF LEFT HINDFOOT: ICD-10-CM

## 2021-06-11 DIAGNOSIS — Q66.6 CONGENITAL PES PLANOVALGUS: Primary | ICD-10-CM

## 2021-06-11 DIAGNOSIS — M19.072 ARTHRITIS OF ANKLE, LEFT: Primary | ICD-10-CM

## 2021-06-11 DIAGNOSIS — M19.071 DEGENERATIVE JOINT DISEASE OF RIGHT HINDFOOT: ICD-10-CM

## 2021-06-11 DIAGNOSIS — Q66.6 CONGENITAL PES PLANOVALGUS: ICD-10-CM

## 2021-06-11 PROCEDURE — 1125F AMNT PAIN NOTED PAIN PRSNT: CPT | Mod: S$GLB,,, | Performed by: ORTHOPAEDIC SURGERY

## 2021-06-11 PROCEDURE — 73650 XR CALCANEUS 2 VIEW LEFT: ICD-10-PCS | Mod: 26,59,LT, | Performed by: RADIOLOGY

## 2021-06-11 PROCEDURE — 99214 PR OFFICE/OUTPT VISIT, EST, LEVL IV, 30-39 MIN: ICD-10-PCS | Mod: 25,S$GLB,, | Performed by: ORTHOPAEDIC SURGERY

## 2021-06-11 PROCEDURE — 73630 XR FOOT COMPLETE 3 VIEW LEFT: ICD-10-PCS | Mod: 26,LT,, | Performed by: RADIOLOGY

## 2021-06-11 PROCEDURE — 99214 OFFICE O/P EST MOD 30 MIN: CPT | Mod: 25,S$GLB,, | Performed by: ORTHOPAEDIC SURGERY

## 2021-06-11 PROCEDURE — 20605 INTERMEDIATE JOINT ASPIRATION/INJECTION: L ANKLE: ICD-10-PCS | Mod: LT,S$GLB,, | Performed by: ORTHOPAEDIC SURGERY

## 2021-06-11 PROCEDURE — 3008F PR BODY MASS INDEX (BMI) DOCUMENTED: ICD-10-PCS | Mod: CPTII,S$GLB,, | Performed by: ORTHOPAEDIC SURGERY

## 2021-06-11 PROCEDURE — 73630 X-RAY EXAM OF FOOT: CPT | Mod: TC,PN,LT

## 2021-06-11 PROCEDURE — 1125F PR PAIN SEVERITY QUANTIFIED, PAIN PRESENT: ICD-10-PCS | Mod: S$GLB,,, | Performed by: ORTHOPAEDIC SURGERY

## 2021-06-11 PROCEDURE — 20605 DRAIN/INJ JOINT/BURSA W/O US: CPT | Mod: LT,S$GLB,, | Performed by: ORTHOPAEDIC SURGERY

## 2021-06-11 PROCEDURE — 99999 PR PBB SHADOW E&M-EST. PATIENT-LVL III: ICD-10-PCS | Mod: PBBFAC,,, | Performed by: ORTHOPAEDIC SURGERY

## 2021-06-11 PROCEDURE — 73650 X-RAY EXAM OF HEEL: CPT | Mod: TC,PN,59,LT

## 2021-06-11 PROCEDURE — 73650 X-RAY EXAM OF HEEL: CPT | Mod: 26,59,LT, | Performed by: RADIOLOGY

## 2021-06-11 PROCEDURE — 73630 X-RAY EXAM OF FOOT: CPT | Mod: 26,LT,, | Performed by: RADIOLOGY

## 2021-06-11 PROCEDURE — 3008F BODY MASS INDEX DOCD: CPT | Mod: CPTII,S$GLB,, | Performed by: ORTHOPAEDIC SURGERY

## 2021-06-11 PROCEDURE — 99999 PR PBB SHADOW E&M-EST. PATIENT-LVL III: CPT | Mod: PBBFAC,,, | Performed by: ORTHOPAEDIC SURGERY

## 2021-06-11 RX ADMIN — TRIAMCINOLONE ACETONIDE 20 MG: 40 INJECTION, SUSPENSION INTRA-ARTICULAR; INTRAMUSCULAR at 09:06

## 2021-06-18 PROBLEM — M19.072 DEGENERATIVE JOINT DISEASE OF LEFT HINDFOOT: Status: ACTIVE | Noted: 2021-06-18

## 2021-06-18 PROBLEM — M19.071 ARTHRITIS OF RIGHT ANKLE: Status: ACTIVE | Noted: 2021-06-18

## 2021-06-18 PROBLEM — M19.072 ARTHRITIS OF ANKLE, LEFT: Status: ACTIVE | Noted: 2021-06-18

## 2021-06-18 RX ORDER — TRIAMCINOLONE ACETONIDE 40 MG/ML
20 INJECTION, SUSPENSION INTRA-ARTICULAR; INTRAMUSCULAR
Status: DISCONTINUED | OUTPATIENT
Start: 2021-06-11 | End: 2021-06-18 | Stop reason: HOSPADM

## 2021-06-22 ENCOUNTER — TELEPHONE (OUTPATIENT)
Dept: FAMILY MEDICINE | Facility: CLINIC | Age: 51
End: 2021-06-22

## 2021-06-23 ENCOUNTER — LAB VISIT (OUTPATIENT)
Dept: LAB | Facility: HOSPITAL | Age: 51
End: 2021-06-23
Attending: STUDENT IN AN ORGANIZED HEALTH CARE EDUCATION/TRAINING PROGRAM
Payer: COMMERCIAL

## 2021-06-23 ENCOUNTER — OFFICE VISIT (OUTPATIENT)
Dept: FAMILY MEDICINE | Facility: CLINIC | Age: 51
End: 2021-06-23
Payer: COMMERCIAL

## 2021-06-23 DIAGNOSIS — R41.89 BRAIN FOG: Primary | ICD-10-CM

## 2021-06-23 DIAGNOSIS — E66.01 OBESITY, MORBID, BMI 40.0-49.9: ICD-10-CM

## 2021-06-23 DIAGNOSIS — D64.9 ANEMIA, UNSPECIFIED TYPE: ICD-10-CM

## 2021-06-23 DIAGNOSIS — R73.03 PRE-DIABETES: ICD-10-CM

## 2021-06-23 DIAGNOSIS — R41.89 BRAIN FOG: ICD-10-CM

## 2021-06-23 LAB
BASOPHILS # BLD AUTO: 0.03 K/UL (ref 0–0.2)
BASOPHILS NFR BLD: 0.5 % (ref 0–1.9)
DIFFERENTIAL METHOD: ABNORMAL
EOSINOPHIL # BLD AUTO: 0 K/UL (ref 0–0.5)
EOSINOPHIL NFR BLD: 0.5 % (ref 0–8)
ERYTHROCYTE [DISTWIDTH] IN BLOOD BY AUTOMATED COUNT: 15.4 % (ref 11.5–14.5)
HCT VFR BLD AUTO: 41.1 % (ref 40–54)
HGB BLD-MCNC: 12.7 G/DL (ref 14–18)
HGB S BLD QL SOLY: NEGATIVE
IMM GRANULOCYTES # BLD AUTO: 0.02 K/UL (ref 0–0.04)
IMM GRANULOCYTES NFR BLD AUTO: 0.3 % (ref 0–0.5)
LYMPHOCYTES # BLD AUTO: 2.8 K/UL (ref 1–4.8)
LYMPHOCYTES NFR BLD: 47.7 % (ref 18–48)
MCH RBC QN AUTO: 25.5 PG (ref 27–31)
MCHC RBC AUTO-ENTMCNC: 30.9 G/DL (ref 32–36)
MCV RBC AUTO: 83 FL (ref 82–98)
MONOCYTES # BLD AUTO: 0.4 K/UL (ref 0.3–1)
MONOCYTES NFR BLD: 7.4 % (ref 4–15)
NEUTROPHILS # BLD AUTO: 2.6 K/UL (ref 1.8–7.7)
NEUTROPHILS NFR BLD: 43.6 % (ref 38–73)
NRBC BLD-RTO: 0 /100 WBC
PLATELET # BLD AUTO: 411 K/UL (ref 150–450)
PMV BLD AUTO: 10.2 FL (ref 9.2–12.9)
RBC # BLD AUTO: 4.98 M/UL (ref 4.6–6.2)
WBC # BLD AUTO: 5.93 K/UL (ref 3.9–12.7)

## 2021-06-23 PROCEDURE — 99213 PR OFFICE/OUTPT VISIT, EST, LEVL III, 20-29 MIN: ICD-10-PCS | Mod: 95,,, | Performed by: STUDENT IN AN ORGANIZED HEALTH CARE EDUCATION/TRAINING PROGRAM

## 2021-06-23 PROCEDURE — 84443 ASSAY THYROID STIM HORMONE: CPT | Performed by: STUDENT IN AN ORGANIZED HEALTH CARE EDUCATION/TRAINING PROGRAM

## 2021-06-23 PROCEDURE — 83036 HEMOGLOBIN GLYCOSYLATED A1C: CPT | Performed by: STUDENT IN AN ORGANIZED HEALTH CARE EDUCATION/TRAINING PROGRAM

## 2021-06-23 PROCEDURE — 99213 OFFICE O/P EST LOW 20 MIN: CPT | Mod: 95,,, | Performed by: STUDENT IN AN ORGANIZED HEALTH CARE EDUCATION/TRAINING PROGRAM

## 2021-06-23 PROCEDURE — 85025 COMPLETE CBC W/AUTO DIFF WBC: CPT | Performed by: STUDENT IN AN ORGANIZED HEALTH CARE EDUCATION/TRAINING PROGRAM

## 2021-06-23 PROCEDURE — 85660 RBC SICKLE CELL TEST: CPT | Performed by: STUDENT IN AN ORGANIZED HEALTH CARE EDUCATION/TRAINING PROGRAM

## 2021-06-23 PROCEDURE — 82728 ASSAY OF FERRITIN: CPT | Performed by: STUDENT IN AN ORGANIZED HEALTH CARE EDUCATION/TRAINING PROGRAM

## 2021-06-23 PROCEDURE — 82607 VITAMIN B-12: CPT | Performed by: STUDENT IN AN ORGANIZED HEALTH CARE EDUCATION/TRAINING PROGRAM

## 2021-06-23 PROCEDURE — 36415 COLL VENOUS BLD VENIPUNCTURE: CPT | Mod: PO | Performed by: STUDENT IN AN ORGANIZED HEALTH CARE EDUCATION/TRAINING PROGRAM

## 2021-06-23 PROCEDURE — 83921 ORGANIC ACID SINGLE QUANT: CPT | Performed by: STUDENT IN AN ORGANIZED HEALTH CARE EDUCATION/TRAINING PROGRAM

## 2021-06-23 PROCEDURE — 83540 ASSAY OF IRON: CPT | Performed by: STUDENT IN AN ORGANIZED HEALTH CARE EDUCATION/TRAINING PROGRAM

## 2021-06-23 PROCEDURE — 82746 ASSAY OF FOLIC ACID SERUM: CPT | Performed by: STUDENT IN AN ORGANIZED HEALTH CARE EDUCATION/TRAINING PROGRAM

## 2021-06-23 PROCEDURE — 82306 VITAMIN D 25 HYDROXY: CPT | Performed by: STUDENT IN AN ORGANIZED HEALTH CARE EDUCATION/TRAINING PROGRAM

## 2021-06-24 ENCOUNTER — PATIENT MESSAGE (OUTPATIENT)
Dept: FAMILY MEDICINE | Facility: CLINIC | Age: 51
End: 2021-06-24

## 2021-06-24 DIAGNOSIS — D50.9 IRON DEFICIENCY ANEMIA, UNSPECIFIED IRON DEFICIENCY ANEMIA TYPE: ICD-10-CM

## 2021-06-24 DIAGNOSIS — E55.9 VITAMIN D DEFICIENCY: Primary | ICD-10-CM

## 2021-06-24 DIAGNOSIS — E53.8 B12 DEFICIENCY: ICD-10-CM

## 2021-06-24 LAB
25(OH)D3+25(OH)D2 SERPL-MCNC: 18 NG/ML (ref 30–96)
ESTIMATED AVG GLUCOSE: 114 MG/DL (ref 68–131)
FERRITIN SERPL-MCNC: 73 NG/ML (ref 20–300)
FOLATE SERPL-MCNC: 14.1 NG/ML (ref 4–24)
HBA1C MFR BLD: 5.6 % (ref 4–5.6)
IRON SERPL-MCNC: 43 UG/DL (ref 45–160)
SATURATED IRON: 9 % (ref 20–50)
TOTAL IRON BINDING CAPACITY: 480 UG/DL (ref 250–450)
TRANSFERRIN SERPL-MCNC: 324 MG/DL (ref 200–375)
TSH SERPL DL<=0.005 MIU/L-ACNC: 1.96 UIU/ML (ref 0.4–4)
VIT B12 SERPL-MCNC: 221 PG/ML (ref 210–950)

## 2021-06-24 RX ORDER — FERROUS SULFATE 325(65) MG
325 TABLET ORAL EVERY OTHER DAY
Qty: 90 TABLET | Refills: 0 | Status: SHIPPED | OUTPATIENT
Start: 2021-06-24 | End: 2022-08-07 | Stop reason: SDUPTHER

## 2021-06-24 RX ORDER — ERGOCALCIFEROL 1.25 MG/1
50000 CAPSULE ORAL
Qty: 24 CAPSULE | Refills: 0 | Status: SHIPPED | OUTPATIENT
Start: 2021-06-24 | End: 2022-01-03 | Stop reason: SDUPTHER

## 2021-06-24 RX ORDER — MULTIVIT WITH MINERALS/HERBS
1 TABLET ORAL DAILY
Qty: 90 TABLET | Refills: 1 | Status: SHIPPED | OUTPATIENT
Start: 2021-06-24 | End: 2022-08-07 | Stop reason: SDUPTHER

## 2021-06-28 ENCOUNTER — TELEPHONE (OUTPATIENT)
Dept: FAMILY MEDICINE | Facility: CLINIC | Age: 51
End: 2021-06-28

## 2021-06-29 LAB — METHYLMALONATE SERPL-SCNC: 0.16 UMOL/L

## 2021-07-08 ENCOUNTER — OFFICE VISIT (OUTPATIENT)
Dept: URGENT CARE | Facility: CLINIC | Age: 51
End: 2021-07-08
Payer: COMMERCIAL

## 2021-07-08 ENCOUNTER — TELEPHONE (OUTPATIENT)
Dept: FAMILY MEDICINE | Facility: CLINIC | Age: 51
End: 2021-07-08

## 2021-07-08 VITALS
TEMPERATURE: 98 F | OXYGEN SATURATION: 98 % | HEART RATE: 70 BPM | HEIGHT: 72 IN | DIASTOLIC BLOOD PRESSURE: 87 MMHG | RESPIRATION RATE: 16 BRPM | SYSTOLIC BLOOD PRESSURE: 128 MMHG | BODY MASS INDEX: 38.6 KG/M2 | WEIGHT: 285 LBS

## 2021-07-08 DIAGNOSIS — T14.90XA TRAUMA: ICD-10-CM

## 2021-07-08 DIAGNOSIS — S80.12XA CONTUSION OF KNEE AND LOWER LEG, LEFT, INITIAL ENCOUNTER: Primary | ICD-10-CM

## 2021-07-08 DIAGNOSIS — S80.02XA CONTUSION OF KNEE AND LOWER LEG, LEFT, INITIAL ENCOUNTER: Primary | ICD-10-CM

## 2021-07-08 DIAGNOSIS — R93.89 ABNORMAL X-RAY: ICD-10-CM

## 2021-07-08 PROCEDURE — 99212 OFFICE O/P EST SF 10 MIN: CPT | Mod: S$GLB,,, | Performed by: FAMILY MEDICINE

## 2021-07-08 PROCEDURE — 73590 XR TIBIA FIBULA 2 VIEW LEFT: ICD-10-PCS | Mod: LT,S$GLB,, | Performed by: RADIOLOGY

## 2021-07-08 PROCEDURE — 99212 PR OFFICE/OUTPT VISIT, EST, LEVL II, 10-19 MIN: ICD-10-PCS | Mod: S$GLB,,, | Performed by: FAMILY MEDICINE

## 2021-07-08 PROCEDURE — 73590 X-RAY EXAM OF LOWER LEG: CPT | Mod: LT,S$GLB,, | Performed by: RADIOLOGY

## 2021-07-13 ENCOUNTER — HOSPITAL ENCOUNTER (OUTPATIENT)
Facility: HOSPITAL | Age: 51
Discharge: HOME | End: 2021-07-15
Attending: EMERGENCY MEDICINE | Admitting: FAMILY MEDICINE
Payer: COMMERCIAL

## 2021-07-13 DIAGNOSIS — I21.4 NSTEMI (NON-ST ELEVATED MYOCARDIAL INFARCTION): ICD-10-CM

## 2021-07-13 DIAGNOSIS — M79.89 PAIN AND SWELLING OF LOWER LEG, LEFT: ICD-10-CM

## 2021-07-13 DIAGNOSIS — R22.42 LOCALIZED SWELLING OF LEFT LOWER LEG: ICD-10-CM

## 2021-07-13 DIAGNOSIS — M79.662 PAIN AND SWELLING OF LOWER LEG, LEFT: ICD-10-CM

## 2021-07-13 DIAGNOSIS — R55 SYNCOPE, UNSPECIFIED SYNCOPE TYPE: ICD-10-CM

## 2021-07-13 DIAGNOSIS — R79.89 ELEVATED TROPONIN: ICD-10-CM

## 2021-07-13 DIAGNOSIS — R53.1 WEAKNESS: ICD-10-CM

## 2021-07-13 DIAGNOSIS — R55 SYNCOPE: Primary | ICD-10-CM

## 2021-07-13 DIAGNOSIS — R07.9 CHEST PAIN: ICD-10-CM

## 2021-07-13 LAB
ALBUMIN SERPL BCP-MCNC: 4.4 G/DL (ref 3.5–5.2)
ALP SERPL-CCNC: 50 U/L (ref 55–135)
ALT SERPL W/O P-5'-P-CCNC: 16 U/L (ref 10–44)
ANION GAP SERPL CALC-SCNC: 12 MMOL/L (ref 8–16)
AST SERPL-CCNC: 25 U/L (ref 10–40)
BASOPHILS # BLD AUTO: 0.04 K/UL (ref 0–0.2)
BASOPHILS NFR BLD: 0.5 % (ref 0–1.9)
BILIRUB SERPL-MCNC: 0.9 MG/DL (ref 0.1–1)
BNP SERPL-MCNC: 27 PG/ML (ref 0–99)
BUN SERPL-MCNC: 9 MG/DL (ref 6–20)
CALCIUM SERPL-MCNC: 9.5 MG/DL (ref 8.7–10.5)
CHLORIDE SERPL-SCNC: 98 MMOL/L (ref 95–110)
CO2 SERPL-SCNC: 26 MMOL/L (ref 23–29)
CREAT SERPL-MCNC: 0.8 MG/DL (ref 0.5–1.4)
D DIMER PPP IA.FEU-MCNC: 0.39 UG/ML FEU
DIFFERENTIAL METHOD: ABNORMAL
EOSINOPHIL # BLD AUTO: 0.1 K/UL (ref 0–0.5)
EOSINOPHIL NFR BLD: 1.3 % (ref 0–8)
ERYTHROCYTE [DISTWIDTH] IN BLOOD BY AUTOMATED COUNT: 15.6 % (ref 11.5–14.5)
EST. GFR  (AFRICAN AMERICAN): >60 ML/MIN/1.73 M^2
EST. GFR  (NON AFRICAN AMERICAN): >60 ML/MIN/1.73 M^2
FERRITIN SERPL-MCNC: 57 NG/ML (ref 20–300)
GLUCOSE SERPL-MCNC: 102 MG/DL (ref 70–110)
HCT VFR BLD AUTO: 40.1 % (ref 40–54)
HGB BLD-MCNC: 12.7 G/DL (ref 14–18)
IMM GRANULOCYTES # BLD AUTO: 0.02 K/UL (ref 0–0.04)
IMM GRANULOCYTES NFR BLD AUTO: 0.2 % (ref 0–0.5)
IRON SERPL-MCNC: 42 UG/DL (ref 45–160)
LYMPHOCYTES # BLD AUTO: 5.2 K/UL (ref 1–4.8)
LYMPHOCYTES NFR BLD: 60.8 % (ref 18–48)
MAGNESIUM SERPL-MCNC: 2.3 MG/DL (ref 1.6–2.6)
MCH RBC QN AUTO: 26.6 PG (ref 27–31)
MCHC RBC AUTO-ENTMCNC: 31.7 G/DL (ref 32–36)
MCV RBC AUTO: 84 FL (ref 82–98)
MONOCYTES # BLD AUTO: 0.7 K/UL (ref 0.3–1)
MONOCYTES NFR BLD: 7.9 % (ref 4–15)
NEUTROPHILS # BLD AUTO: 2.5 K/UL (ref 1.8–7.7)
NEUTROPHILS NFR BLD: 29.3 % (ref 38–73)
NRBC BLD-RTO: 0 /100 WBC
PLATELET # BLD AUTO: 340 K/UL (ref 150–450)
PMV BLD AUTO: 9.6 FL (ref 9.2–12.9)
POTASSIUM SERPL-SCNC: 4 MMOL/L (ref 3.5–5.1)
PROT SERPL-MCNC: 8.1 G/DL (ref 6–8.4)
RBC # BLD AUTO: 4.77 M/UL (ref 4.6–6.2)
SARS-COV-2 RDRP RESP QL NAA+PROBE: NEGATIVE
SATURATED IRON: 10 % (ref 20–50)
SODIUM SERPL-SCNC: 136 MMOL/L (ref 136–145)
TOTAL IRON BINDING CAPACITY: 419 UG/DL (ref 250–450)
TRANSFERRIN SERPL-MCNC: 299 MG/DL (ref 200–375)
TROPONIN I SERPL DL<=0.01 NG/ML-MCNC: 0.09 NG/ML
WBC # BLD AUTO: 8.48 K/UL (ref 3.9–12.7)

## 2021-07-13 PROCEDURE — 80053 COMPREHEN METABOLIC PANEL: CPT | Performed by: EMERGENCY MEDICINE

## 2021-07-13 PROCEDURE — 83540 ASSAY OF IRON: CPT | Performed by: EMERGENCY MEDICINE

## 2021-07-13 PROCEDURE — 36415 COLL VENOUS BLD VENIPUNCTURE: CPT | Performed by: NURSE PRACTITIONER

## 2021-07-13 PROCEDURE — 85379 FIBRIN DEGRADATION QUANT: CPT | Performed by: NURSE PRACTITIONER

## 2021-07-13 PROCEDURE — 93010 ELECTROCARDIOGRAM REPORT: CPT | Mod: ,,, | Performed by: INTERNAL MEDICINE

## 2021-07-13 PROCEDURE — G0378 HOSPITAL OBSERVATION PER HR: HCPCS

## 2021-07-13 PROCEDURE — U0002 COVID-19 LAB TEST NON-CDC: HCPCS | Performed by: EMERGENCY MEDICINE

## 2021-07-13 PROCEDURE — 63600175 PHARM REV CODE 636 W HCPCS: Performed by: EMERGENCY MEDICINE

## 2021-07-13 PROCEDURE — 82728 ASSAY OF FERRITIN: CPT | Performed by: EMERGENCY MEDICINE

## 2021-07-13 PROCEDURE — 25000003 PHARM REV CODE 250: Performed by: NURSE PRACTITIONER

## 2021-07-13 PROCEDURE — 99285 EMERGENCY DEPT VISIT HI MDM: CPT

## 2021-07-13 PROCEDURE — 25000242 PHARM REV CODE 250 ALT 637 W/ HCPCS: Performed by: EMERGENCY MEDICINE

## 2021-07-13 PROCEDURE — 63600175 PHARM REV CODE 636 W HCPCS: Performed by: FAMILY MEDICINE

## 2021-07-13 PROCEDURE — 93010 EKG 12-LEAD: ICD-10-PCS | Mod: 76,,, | Performed by: INTERNAL MEDICINE

## 2021-07-13 PROCEDURE — 25000003 PHARM REV CODE 250: Performed by: EMERGENCY MEDICINE

## 2021-07-13 PROCEDURE — 93005 ELECTROCARDIOGRAM TRACING: CPT | Performed by: INTERNAL MEDICINE

## 2021-07-13 PROCEDURE — 83735 ASSAY OF MAGNESIUM: CPT | Performed by: EMERGENCY MEDICINE

## 2021-07-13 PROCEDURE — 63600175 PHARM REV CODE 636 W HCPCS: Performed by: NURSE PRACTITIONER

## 2021-07-13 PROCEDURE — 85025 COMPLETE CBC W/AUTO DIFF WBC: CPT | Performed by: EMERGENCY MEDICINE

## 2021-07-13 PROCEDURE — 93010 ELECTROCARDIOGRAM REPORT: CPT | Mod: 76,,, | Performed by: INTERNAL MEDICINE

## 2021-07-13 PROCEDURE — 84484 ASSAY OF TROPONIN QUANT: CPT | Performed by: EMERGENCY MEDICINE

## 2021-07-13 PROCEDURE — 96372 THER/PROPH/DIAG INJ SC/IM: CPT

## 2021-07-13 PROCEDURE — 83880 ASSAY OF NATRIURETIC PEPTIDE: CPT | Performed by: EMERGENCY MEDICINE

## 2021-07-13 RX ORDER — NITROGLYCERIN 0.4 MG/1
0.4 TABLET SUBLINGUAL
Status: COMPLETED | OUTPATIENT
Start: 2021-07-13 | End: 2021-07-13

## 2021-07-13 RX ORDER — POTASSIUM CHLORIDE 7.45 MG/ML
40 INJECTION INTRAVENOUS
Status: DISCONTINUED | OUTPATIENT
Start: 2021-07-13 | End: 2021-07-15 | Stop reason: HOSPADM

## 2021-07-13 RX ORDER — POTASSIUM CHLORIDE 20 MEQ/1
40 TABLET, EXTENDED RELEASE ORAL
Status: DISCONTINUED | OUTPATIENT
Start: 2021-07-13 | End: 2021-07-15 | Stop reason: HOSPADM

## 2021-07-13 RX ORDER — MAGNESIUM SULFATE HEPTAHYDRATE 40 MG/ML
4 INJECTION, SOLUTION INTRAVENOUS
Status: DISCONTINUED | OUTPATIENT
Start: 2021-07-13 | End: 2021-07-15 | Stop reason: HOSPADM

## 2021-07-13 RX ORDER — ONDANSETRON 2 MG/ML
4 INJECTION INTRAMUSCULAR; INTRAVENOUS EVERY 8 HOURS PRN
Status: DISCONTINUED | OUTPATIENT
Start: 2021-07-13 | End: 2021-07-15 | Stop reason: HOSPADM

## 2021-07-13 RX ORDER — ENOXAPARIN SODIUM 100 MG/ML
40 INJECTION SUBCUTANEOUS EVERY 12 HOURS
Status: DISCONTINUED | OUTPATIENT
Start: 2021-07-13 | End: 2021-07-15 | Stop reason: HOSPADM

## 2021-07-13 RX ORDER — MAGNESIUM SULFATE HEPTAHYDRATE 40 MG/ML
2 INJECTION, SOLUTION INTRAVENOUS
Status: DISCONTINUED | OUTPATIENT
Start: 2021-07-13 | End: 2021-07-15 | Stop reason: HOSPADM

## 2021-07-13 RX ORDER — AMOXICILLIN 250 MG
1 CAPSULE ORAL 2 TIMES DAILY
Status: DISCONTINUED | OUTPATIENT
Start: 2021-07-13 | End: 2021-07-15 | Stop reason: HOSPADM

## 2021-07-13 RX ORDER — ASPIRIN 325 MG
325 TABLET, DELAYED RELEASE (ENTERIC COATED) ORAL DAILY
Status: DISCONTINUED | OUTPATIENT
Start: 2021-07-14 | End: 2021-07-15 | Stop reason: HOSPADM

## 2021-07-13 RX ORDER — POTASSIUM CHLORIDE 20 MEQ/1
20 TABLET, EXTENDED RELEASE ORAL
Status: DISCONTINUED | OUTPATIENT
Start: 2021-07-13 | End: 2021-07-15 | Stop reason: HOSPADM

## 2021-07-13 RX ORDER — ASPIRIN 325 MG
325 TABLET ORAL
Status: COMPLETED | OUTPATIENT
Start: 2021-07-13 | End: 2021-07-13

## 2021-07-13 RX ORDER — ENOXAPARIN SODIUM 100 MG/ML
40 INJECTION SUBCUTANEOUS EVERY 12 HOURS
Status: DISCONTINUED | OUTPATIENT
Start: 2021-07-14 | End: 2021-07-13

## 2021-07-13 RX ORDER — SODIUM CHLORIDE 0.9 % (FLUSH) 0.9 %
10 SYRINGE (ML) INJECTION
Status: DISCONTINUED | OUTPATIENT
Start: 2021-07-13 | End: 2021-07-15 | Stop reason: HOSPADM

## 2021-07-13 RX ORDER — ENOXAPARIN SODIUM 100 MG/ML
40 INJECTION SUBCUTANEOUS EVERY 24 HOURS
Status: DISCONTINUED | OUTPATIENT
Start: 2021-07-13 | End: 2021-07-13

## 2021-07-13 RX ORDER — POLYETHYLENE GLYCOL 3350 17 G/17G
17 POWDER, FOR SOLUTION ORAL 2 TIMES DAILY PRN
Status: DISCONTINUED | OUTPATIENT
Start: 2021-07-13 | End: 2021-07-15 | Stop reason: HOSPADM

## 2021-07-13 RX ORDER — MAGNESIUM SULFATE 1 G/100ML
1 INJECTION INTRAVENOUS
Status: DISCONTINUED | OUTPATIENT
Start: 2021-07-13 | End: 2021-07-15 | Stop reason: HOSPADM

## 2021-07-13 RX ORDER — LANOLIN ALCOHOL/MO/W.PET/CERES
800 CREAM (GRAM) TOPICAL
Status: DISCONTINUED | OUTPATIENT
Start: 2021-07-13 | End: 2021-07-15 | Stop reason: HOSPADM

## 2021-07-13 RX ORDER — ACETAMINOPHEN 325 MG/1
650 TABLET ORAL EVERY 4 HOURS PRN
Status: DISCONTINUED | OUTPATIENT
Start: 2021-07-13 | End: 2021-07-15 | Stop reason: HOSPADM

## 2021-07-13 RX ORDER — HYDROCODONE BITARTRATE AND ACETAMINOPHEN 5; 325 MG/1; MG/1
1 TABLET ORAL EVERY 6 HOURS PRN
Status: DISCONTINUED | OUTPATIENT
Start: 2021-07-13 | End: 2021-07-15 | Stop reason: HOSPADM

## 2021-07-13 RX ORDER — POTASSIUM CHLORIDE 7.45 MG/ML
20 INJECTION INTRAVENOUS
Status: DISCONTINUED | OUTPATIENT
Start: 2021-07-13 | End: 2021-07-15 | Stop reason: HOSPADM

## 2021-07-13 RX ORDER — FERROUS SULFATE 325(65) MG
325 TABLET ORAL EVERY OTHER DAY
Status: DISCONTINUED | OUTPATIENT
Start: 2021-07-14 | End: 2021-07-15 | Stop reason: HOSPADM

## 2021-07-13 RX ORDER — MORPHINE SULFATE 4 MG/ML
4 INJECTION, SOLUTION INTRAMUSCULAR; INTRAVENOUS
Status: DISCONTINUED | OUTPATIENT
Start: 2021-07-13 | End: 2021-07-13

## 2021-07-13 RX ORDER — TALC
6 POWDER (GRAM) TOPICAL NIGHTLY PRN
Status: DISCONTINUED | OUTPATIENT
Start: 2021-07-13 | End: 2021-07-15 | Stop reason: HOSPADM

## 2021-07-13 RX ADMIN — SENNOSIDES AND DOCUSATE SODIUM 1 TABLET: 8.6; 5 TABLET ORAL at 11:07

## 2021-07-13 RX ADMIN — SODIUM CHLORIDE, SODIUM LACTATE, POTASSIUM CHLORIDE, AND CALCIUM CHLORIDE 500 ML: .6; .31; .03; .02 INJECTION, SOLUTION INTRAVENOUS at 11:07

## 2021-07-13 RX ADMIN — NITROGLYCERIN 0.4 MG: 0.4 TABLET, ORALLY DISINTEGRATING SUBLINGUAL at 08:07

## 2021-07-13 RX ADMIN — ASPIRIN 325 MG ORAL TABLET 325 MG: 325 PILL ORAL at 08:07

## 2021-07-13 RX ADMIN — HYDROCODONE BITARTRATE AND ACETAMINOPHEN 1 TABLET: 5; 325 TABLET ORAL at 11:07

## 2021-07-13 RX ADMIN — ENOXAPARIN SODIUM 40 MG: 40 INJECTION SUBCUTANEOUS at 11:07

## 2021-07-14 ENCOUNTER — HOSPITAL ENCOUNTER (OUTPATIENT)
Dept: RADIOLOGY | Facility: HOSPITAL | Age: 51
Discharge: HOME | End: 2021-07-14
Payer: COMMERCIAL

## 2021-07-14 ENCOUNTER — CLINICAL SUPPORT (OUTPATIENT)
Dept: CARDIOLOGY | Facility: HOSPITAL | Age: 51
End: 2021-07-14
Payer: COMMERCIAL

## 2021-07-14 PROBLEM — R79.89 ELEVATED TROPONIN: Status: ACTIVE | Noted: 2021-07-14

## 2021-07-14 PROBLEM — D50.9 MICROCYTIC ANEMIA: Status: ACTIVE | Noted: 2018-10-24

## 2021-07-14 PROBLEM — G47.33 OSA (OBSTRUCTIVE SLEEP APNEA): Status: ACTIVE | Noted: 2021-07-14

## 2021-07-14 PROBLEM — E66.01 MORBID OBESITY: Status: ACTIVE | Noted: 2021-07-14

## 2021-07-14 LAB
ANION GAP SERPL CALC-SCNC: 10 MMOL/L (ref 8–16)
AORTIC ROOT ANNULUS: 3.56 CM
AORTIC VALVE CUSP SEPERATION: 2 CM
AV INDEX (PROSTH): 0.7
AV MEAN GRADIENT: 6 MMHG
AV PEAK GRADIENT: 11 MMHG
AV VALVE AREA: 2.19 CM2
AV VELOCITY RATIO: 64.2
BASOPHILS # BLD AUTO: 0.03 K/UL (ref 0–0.2)
BASOPHILS NFR BLD: 0.6 % (ref 0–1.9)
BSA FOR ECHO PROCEDURE: 2.8 M2
BUN SERPL-MCNC: 9 MG/DL (ref 6–20)
CALCIUM SERPL-MCNC: 8.9 MG/DL (ref 8.7–10.5)
CHLORIDE SERPL-SCNC: 100 MMOL/L (ref 95–110)
CO2 SERPL-SCNC: 28 MMOL/L (ref 23–29)
CREAT SERPL-MCNC: 0.7 MG/DL (ref 0.5–1.4)
CV ECHO LV RWT: 0.57 CM
CV PHARM DOSE: 0.4 MG
CV STRESS BASE HR: 52 BPM
DIASTOLIC BLOOD PRESSURE: 94 MMHG
DIFFERENTIAL METHOD: ABNORMAL
DOP CALC AO PEAK VEL: 1.66 M/S
DOP CALC AO VTI: 35.33 CM
DOP CALC LVOT AREA: 3.1 CM2
DOP CALC LVOT DIAMETER: 1.99 CM
DOP CALC LVOT PEAK VEL: 106.58 M/S
DOP CALC LVOT STROKE VOLUME: 77.31 CM3
DOP CALCLVOT PEAK VEL VTI: 24.87 CM
E WAVE DECELERATION TIME: 240.81 MSEC
ECHO LV POSTERIOR WALL: 1.6 CM (ref 0.6–1.1)
EJECTION FRACTION: 60 %
EOSINOPHIL # BLD AUTO: 0.1 K/UL (ref 0–0.5)
EOSINOPHIL NFR BLD: 1 % (ref 0–8)
ERYTHROCYTE [DISTWIDTH] IN BLOOD BY AUTOMATED COUNT: 15.7 % (ref 11.5–14.5)
EST. GFR  (AFRICAN AMERICAN): >60 ML/MIN/1.73 M^2
EST. GFR  (NON AFRICAN AMERICAN): >60 ML/MIN/1.73 M^2
FRACTIONAL SHORTENING: 31 % (ref 28–44)
GLUCOSE SERPL-MCNC: 102 MG/DL (ref 70–110)
HCT VFR BLD AUTO: 36.6 % (ref 40–54)
HGB BLD-MCNC: 11.5 G/DL (ref 14–18)
IMM GRANULOCYTES # BLD AUTO: 0.01 K/UL (ref 0–0.04)
IMM GRANULOCYTES NFR BLD AUTO: 0.2 % (ref 0–0.5)
INTERVENTRICULAR SEPTUM: 1.6 CM (ref 0.6–1.1)
LEFT ATRIUM SIZE: 3.32 CM
LEFT INTERNAL DIMENSION IN SYSTOLE: 3.84 CM (ref 2.1–4)
LEFT VENTRICLE MASS INDEX: 159 G/M2
LEFT VENTRICULAR INTERNAL DIMENSION IN DIASTOLE: 5.6 CM (ref 3.5–6)
LEFT VENTRICULAR MASS: 421.47 G
LIPASE SERPL-CCNC: 29 U/L (ref 4–60)
LYMPHOCYTES # BLD AUTO: 2.4 K/UL (ref 1–4.8)
LYMPHOCYTES NFR BLD: 48 % (ref 18–48)
MAGNESIUM SERPL-MCNC: 2.1 MG/DL (ref 1.6–2.6)
MCH RBC QN AUTO: 26.3 PG (ref 27–31)
MCHC RBC AUTO-ENTMCNC: 31.4 G/DL (ref 32–36)
MCV RBC AUTO: 84 FL (ref 82–98)
MONOCYTES # BLD AUTO: 0.3 K/UL (ref 0.3–1)
MONOCYTES NFR BLD: 6.8 % (ref 4–15)
NEUTROPHILS # BLD AUTO: 2.2 K/UL (ref 1.8–7.7)
NEUTROPHILS NFR BLD: 43.4 % (ref 38–73)
NRBC BLD-RTO: 0 /100 WBC
OHS CV CPX 85 PERCENT MAX PREDICTED HEART RATE MALE: 142
OHS CV CPX MAX PREDICTED HEART RATE: 167
OHS CV CPX PATIENT IS FEMALE: 0
OHS CV CPX PATIENT IS MALE: 1
OHS CV CPX PEAK DIASTOLIC BLOOD PRESSURE: 92 MMHG
OHS CV CPX PEAK HEAR RATE: 94 BPM
OHS CV CPX PEAK RATE PRESSURE PRODUCT: NORMAL
OHS CV CPX PEAK SYSTOLIC BLOOD PRESSURE: 172 MMHG
OHS CV CPX PERCENT MAX PREDICTED HEART RATE ACHIEVED: 56
OHS CV CPX RATE PRESSURE PRODUCT PRESENTING: 7540
PISA TR MAX VEL: 2.77 M/S
PLATELET # BLD AUTO: 321 K/UL (ref 150–450)
PMV BLD AUTO: 9.8 FL (ref 9.2–12.9)
POTASSIUM SERPL-SCNC: 4.1 MMOL/L (ref 3.5–5.1)
PROCALCITONIN SERPL IA-MCNC: <0.05 NG/ML (ref 0–0.5)
PV PEAK VELOCITY: 99.73 CM/S
RA PRESSURE: 3 MMHG
RBC # BLD AUTO: 4.38 M/UL (ref 4.6–6.2)
SODIUM SERPL-SCNC: 138 MMOL/L (ref 136–145)
SYSTOLIC BLOOD PRESSURE: 145 MMHG
TDI LATERAL: 0.08 M/S
TDI SEPTAL: 0.1 M/S
TDI: 0.09 M/S
TR MAX PG: 31 MMHG
TROPONIN I SERPL DL<=0.01 NG/ML-MCNC: 0.07 NG/ML
TROPONIN I SERPL DL<=0.01 NG/ML-MCNC: 0.07 NG/ML
TSH SERPL DL<=0.005 MIU/L-ACNC: 3.53 UIU/ML (ref 0.34–5.6)
TV REST PULMONARY ARTERY PRESSURE: 34 MMHG
WBC # BLD AUTO: 4.98 K/UL (ref 3.9–12.7)

## 2021-07-14 PROCEDURE — 63600175 PHARM REV CODE 636 W HCPCS: Performed by: NURSE PRACTITIONER

## 2021-07-14 PROCEDURE — 83690 ASSAY OF LIPASE: CPT | Performed by: NURSE PRACTITIONER

## 2021-07-14 PROCEDURE — 93018 NUCLEAR STRESS TEST (CUPID ONLY): ICD-10-PCS | Mod: ,,, | Performed by: INTERNAL MEDICINE

## 2021-07-14 PROCEDURE — 99900035 HC TECH TIME PER 15 MIN (STAT)

## 2021-07-14 PROCEDURE — 99900031 HC PATIENT EDUCATION (STAT)

## 2021-07-14 PROCEDURE — 93018 CV STRESS TEST I&R ONLY: CPT | Mod: ,,, | Performed by: INTERNAL MEDICINE

## 2021-07-14 PROCEDURE — 93306 TTE W/DOPPLER COMPLETE: CPT | Mod: 26,,, | Performed by: INTERNAL MEDICINE

## 2021-07-14 PROCEDURE — 80048 BASIC METABOLIC PNL TOTAL CA: CPT | Performed by: NURSE PRACTITIONER

## 2021-07-14 PROCEDURE — 63600175 PHARM REV CODE 636 W HCPCS: Performed by: FAMILY MEDICINE

## 2021-07-14 PROCEDURE — A9502 TC99M TETROFOSMIN: HCPCS

## 2021-07-14 PROCEDURE — 25000003 PHARM REV CODE 250: Performed by: STUDENT IN AN ORGANIZED HEALTH CARE EDUCATION/TRAINING PROGRAM

## 2021-07-14 PROCEDURE — 93306 TTE W/DOPPLER COMPLETE: CPT

## 2021-07-14 PROCEDURE — 85025 COMPLETE CBC W/AUTO DIFF WBC: CPT | Performed by: NURSE PRACTITIONER

## 2021-07-14 PROCEDURE — G0378 HOSPITAL OBSERVATION PER HR: HCPCS

## 2021-07-14 PROCEDURE — 93017 CV STRESS TEST TRACING ONLY: CPT

## 2021-07-14 PROCEDURE — 84484 ASSAY OF TROPONIN QUANT: CPT | Performed by: NURSE PRACTITIONER

## 2021-07-14 PROCEDURE — 96372 THER/PROPH/DIAG INJ SC/IM: CPT | Mod: 59

## 2021-07-14 PROCEDURE — 84443 ASSAY THYROID STIM HORMONE: CPT | Performed by: NURSE PRACTITIONER

## 2021-07-14 PROCEDURE — 94761 N-INVAS EAR/PLS OXIMETRY MLT: CPT

## 2021-07-14 PROCEDURE — 99220 PR INITIAL OBSERVATION CARE,LEVL III: CPT | Mod: ,,, | Performed by: INTERNAL MEDICINE

## 2021-07-14 PROCEDURE — 84484 ASSAY OF TROPONIN QUANT: CPT | Mod: 91 | Performed by: NURSE PRACTITIONER

## 2021-07-14 PROCEDURE — 84145 PROCALCITONIN (PCT): CPT | Performed by: NURSE PRACTITIONER

## 2021-07-14 PROCEDURE — 93306 ECHO (CUPID ONLY): ICD-10-PCS | Mod: 26,,, | Performed by: INTERNAL MEDICINE

## 2021-07-14 PROCEDURE — 83735 ASSAY OF MAGNESIUM: CPT | Performed by: NURSE PRACTITIONER

## 2021-07-14 PROCEDURE — 93016 NUCLEAR STRESS TEST (CUPID ONLY): ICD-10-PCS | Mod: ,,, | Performed by: INTERNAL MEDICINE

## 2021-07-14 PROCEDURE — 36415 COLL VENOUS BLD VENIPUNCTURE: CPT | Performed by: NURSE PRACTITIONER

## 2021-07-14 PROCEDURE — 78452 HT MUSCLE IMAGE SPECT MULT: CPT | Mod: TC

## 2021-07-14 PROCEDURE — 93016 CV STRESS TEST SUPVJ ONLY: CPT | Mod: ,,, | Performed by: INTERNAL MEDICINE

## 2021-07-14 PROCEDURE — 25000003 PHARM REV CODE 250: Performed by: NURSE PRACTITIONER

## 2021-07-14 PROCEDURE — 99220 PR INITIAL OBSERVATION CARE,LEVL III: ICD-10-PCS | Mod: ,,, | Performed by: INTERNAL MEDICINE

## 2021-07-14 RX ORDER — AMLODIPINE BESYLATE 5 MG/1
10 TABLET ORAL DAILY
Status: DISCONTINUED | OUTPATIENT
Start: 2021-07-14 | End: 2021-07-15 | Stop reason: HOSPADM

## 2021-07-14 RX ORDER — LISINOPRIL 20 MG/1
20 TABLET ORAL DAILY
Status: DISCONTINUED | OUTPATIENT
Start: 2021-07-14 | End: 2021-07-14

## 2021-07-14 RX ORDER — REGADENOSON 0.08 MG/ML
0.4 INJECTION, SOLUTION INTRAVENOUS ONCE
Status: COMPLETED | OUTPATIENT
Start: 2021-07-14 | End: 2021-07-14

## 2021-07-14 RX ORDER — NITROGLYCERIN 0.4 MG/1
0.4 TABLET SUBLINGUAL EVERY 5 MIN PRN
Status: DISCONTINUED | OUTPATIENT
Start: 2021-07-14 | End: 2021-07-15 | Stop reason: HOSPADM

## 2021-07-14 RX ORDER — LISINOPRIL 20 MG/1
40 TABLET ORAL DAILY
Status: DISCONTINUED | OUTPATIENT
Start: 2021-07-14 | End: 2021-07-15 | Stop reason: HOSPADM

## 2021-07-14 RX ORDER — HYDROCHLOROTHIAZIDE 25 MG/1
25 TABLET ORAL DAILY
Status: DISCONTINUED | OUTPATIENT
Start: 2021-07-15 | End: 2021-07-15 | Stop reason: HOSPADM

## 2021-07-14 RX ORDER — ATORVASTATIN CALCIUM 40 MG/1
40 TABLET, FILM COATED ORAL NIGHTLY
Status: DISCONTINUED | OUTPATIENT
Start: 2021-07-14 | End: 2021-07-15 | Stop reason: HOSPADM

## 2021-07-14 RX ORDER — HYDRALAZINE HYDROCHLORIDE 20 MG/ML
10 INJECTION INTRAMUSCULAR; INTRAVENOUS EVERY 8 HOURS PRN
Status: DISCONTINUED | OUTPATIENT
Start: 2021-07-14 | End: 2021-07-15 | Stop reason: HOSPADM

## 2021-07-14 RX ORDER — HYDROCHLOROTHIAZIDE 12.5 MG/1
12.5 TABLET ORAL DAILY
Status: DISCONTINUED | OUTPATIENT
Start: 2021-07-15 | End: 2021-07-14

## 2021-07-14 RX ADMIN — ENOXAPARIN SODIUM 40 MG: 40 INJECTION SUBCUTANEOUS at 12:07

## 2021-07-14 RX ADMIN — REGADENOSON 0.4 MG: 0.08 INJECTION, SOLUTION INTRAVENOUS at 11:07

## 2021-07-14 RX ADMIN — ENOXAPARIN SODIUM 40 MG: 40 INJECTION SUBCUTANEOUS at 09:07

## 2021-07-14 RX ADMIN — ASPIRIN 325 MG: 325 TABLET, COATED ORAL at 09:07

## 2021-07-14 RX ADMIN — SENNOSIDES AND DOCUSATE SODIUM 1 TABLET: 8.6; 5 TABLET ORAL at 09:07

## 2021-07-14 RX ADMIN — ATORVASTATIN CALCIUM 40 MG: 40 TABLET, FILM COATED ORAL at 09:07

## 2021-07-14 RX ADMIN — FERROUS SULFATE TAB 325 MG (65 MG ELEMENTAL FE) 325 MG: 325 (65 FE) TAB at 09:07

## 2021-07-14 RX ADMIN — AMLODIPINE BESYLATE 10 MG: 5 TABLET ORAL at 05:07

## 2021-07-14 RX ADMIN — HYDROCODONE BITARTRATE AND ACETAMINOPHEN 1 TABLET: 5; 325 TABLET ORAL at 09:07

## 2021-07-15 ENCOUNTER — HOSPITAL ENCOUNTER (OUTPATIENT)
Dept: RADIOLOGY | Facility: HOSPITAL | Age: 51
Discharge: HOME | End: 2021-07-15
Attending: EMERGENCY MEDICINE | Admitting: FAMILY MEDICINE
Payer: COMMERCIAL

## 2021-07-15 VITALS
BODY MASS INDEX: 45.1 KG/M2 | SYSTOLIC BLOOD PRESSURE: 128 MMHG | HEART RATE: 64 BPM | OXYGEN SATURATION: 98 % | DIASTOLIC BLOOD PRESSURE: 76 MMHG | WEIGHT: 315 LBS | HEIGHT: 70 IN | TEMPERATURE: 98 F | RESPIRATION RATE: 18 BRPM

## 2021-07-15 LAB
ANION GAP SERPL CALC-SCNC: 9 MMOL/L (ref 8–16)
BASOPHILS # BLD AUTO: 0.02 K/UL (ref 0–0.2)
BASOPHILS NFR BLD: 0.4 % (ref 0–1.9)
BUN SERPL-MCNC: 10 MG/DL (ref 6–20)
CALCIUM SERPL-MCNC: 9.5 MG/DL (ref 8.7–10.5)
CHLORIDE SERPL-SCNC: 101 MMOL/L (ref 95–110)
CO2 SERPL-SCNC: 29 MMOL/L (ref 23–29)
CREAT SERPL-MCNC: 0.8 MG/DL (ref 0.5–1.4)
DIFFERENTIAL METHOD: ABNORMAL
EOSINOPHIL # BLD AUTO: 0.1 K/UL (ref 0–0.5)
EOSINOPHIL NFR BLD: 1.6 % (ref 0–8)
ERYTHROCYTE [DISTWIDTH] IN BLOOD BY AUTOMATED COUNT: 15.6 % (ref 11.5–14.5)
EST. GFR  (AFRICAN AMERICAN): >60 ML/MIN/1.73 M^2
EST. GFR  (NON AFRICAN AMERICAN): >60 ML/MIN/1.73 M^2
GLUCOSE SERPL-MCNC: 95 MG/DL (ref 70–110)
HCT VFR BLD AUTO: 40.5 % (ref 40–54)
HGB BLD-MCNC: 12.6 G/DL (ref 14–18)
IMM GRANULOCYTES # BLD AUTO: 0.01 K/UL (ref 0–0.04)
IMM GRANULOCYTES NFR BLD AUTO: 0.2 % (ref 0–0.5)
LYMPHOCYTES # BLD AUTO: 2.4 K/UL (ref 1–4.8)
LYMPHOCYTES NFR BLD: 48.5 % (ref 18–48)
MAGNESIUM SERPL-MCNC: 2.1 MG/DL (ref 1.6–2.6)
MCH RBC QN AUTO: 26 PG (ref 27–31)
MCHC RBC AUTO-ENTMCNC: 31.1 G/DL (ref 32–36)
MCV RBC AUTO: 84 FL (ref 82–98)
MONOCYTES # BLD AUTO: 0.4 K/UL (ref 0.3–1)
MONOCYTES NFR BLD: 8.9 % (ref 4–15)
NEUTROPHILS # BLD AUTO: 2 K/UL (ref 1.8–7.7)
NEUTROPHILS NFR BLD: 40.4 % (ref 38–73)
NRBC BLD-RTO: 0 /100 WBC
PLATELET # BLD AUTO: 348 K/UL (ref 150–450)
PMV BLD AUTO: 9.7 FL (ref 9.2–12.9)
POTASSIUM SERPL-SCNC: 4 MMOL/L (ref 3.5–5.1)
RBC # BLD AUTO: 4.85 M/UL (ref 4.6–6.2)
SODIUM SERPL-SCNC: 139 MMOL/L (ref 136–145)
WBC # BLD AUTO: 4.85 K/UL (ref 3.9–12.7)

## 2021-07-15 PROCEDURE — 25000003 PHARM REV CODE 250: Performed by: STUDENT IN AN ORGANIZED HEALTH CARE EDUCATION/TRAINING PROGRAM

## 2021-07-15 PROCEDURE — 99214 OFFICE O/P EST MOD 30 MIN: CPT | Mod: ,,, | Performed by: INTERNAL MEDICINE

## 2021-07-15 PROCEDURE — 25000003 PHARM REV CODE 250: Performed by: NURSE PRACTITIONER

## 2021-07-15 PROCEDURE — 99900035 HC TECH TIME PER 15 MIN (STAT)

## 2021-07-15 PROCEDURE — 63600175 PHARM REV CODE 636 W HCPCS: Performed by: FAMILY MEDICINE

## 2021-07-15 PROCEDURE — G0378 HOSPITAL OBSERVATION PER HR: HCPCS

## 2021-07-15 PROCEDURE — 99900031 HC PATIENT EDUCATION (STAT)

## 2021-07-15 PROCEDURE — 99214 PR OFFICE/OUTPT VISIT, EST, LEVL IV, 30-39 MIN: ICD-10-PCS | Mod: ,,, | Performed by: INTERNAL MEDICINE

## 2021-07-15 PROCEDURE — 94761 N-INVAS EAR/PLS OXIMETRY MLT: CPT

## 2021-07-15 PROCEDURE — 83735 ASSAY OF MAGNESIUM: CPT | Performed by: NURSE PRACTITIONER

## 2021-07-15 PROCEDURE — 36415 COLL VENOUS BLD VENIPUNCTURE: CPT | Performed by: NURSE PRACTITIONER

## 2021-07-15 PROCEDURE — 85025 COMPLETE CBC W/AUTO DIFF WBC: CPT | Performed by: NURSE PRACTITIONER

## 2021-07-15 PROCEDURE — 96372 THER/PROPH/DIAG INJ SC/IM: CPT | Mod: 59

## 2021-07-15 PROCEDURE — 80048 BASIC METABOLIC PNL TOTAL CA: CPT | Performed by: NURSE PRACTITIONER

## 2021-07-15 RX ORDER — ATORVASTATIN CALCIUM 40 MG/1
40 TABLET, FILM COATED ORAL NIGHTLY
Qty: 90 TABLET | Refills: 3 | Status: SHIPPED | OUTPATIENT
Start: 2021-07-15 | End: 2022-11-15

## 2021-07-15 RX ORDER — AMLODIPINE BESYLATE 5 MG/1
5 TABLET ORAL DAILY
Qty: 30 TABLET | Refills: 11 | Status: SHIPPED | OUTPATIENT
Start: 2021-07-16 | End: 2022-08-24 | Stop reason: SDUPTHER

## 2021-07-15 RX ORDER — LISINOPRIL AND HYDROCHLOROTHIAZIDE 12.5; 2 MG/1; MG/1
2 TABLET ORAL DAILY
Qty: 60 TABLET | Refills: 0 | Status: SHIPPED | OUTPATIENT
Start: 2021-07-15 | End: 2021-09-21

## 2021-07-15 RX ADMIN — ENOXAPARIN SODIUM 40 MG: 40 INJECTION SUBCUTANEOUS at 09:07

## 2021-07-15 RX ADMIN — HYDROCHLOROTHIAZIDE 25 MG: 25 TABLET ORAL at 09:07

## 2021-07-15 RX ADMIN — ASPIRIN 325 MG: 325 TABLET, COATED ORAL at 09:07

## 2021-07-15 RX ADMIN — AMLODIPINE BESYLATE 10 MG: 5 TABLET ORAL at 09:07

## 2021-07-15 RX ADMIN — LISINOPRIL 40 MG: 20 TABLET ORAL at 09:07

## 2021-07-15 RX ADMIN — SENNOSIDES AND DOCUSATE SODIUM 1 TABLET: 8.6; 5 TABLET ORAL at 09:07

## 2021-07-16 ENCOUNTER — HOSPITAL ENCOUNTER (EMERGENCY)
Facility: HOSPITAL | Age: 51
Discharge: HOME | End: 2021-07-16
Attending: EMERGENCY MEDICINE
Payer: COMMERCIAL

## 2021-07-16 VITALS
RESPIRATION RATE: 18 BRPM | BODY MASS INDEX: 49.5 KG/M2 | OXYGEN SATURATION: 99 % | DIASTOLIC BLOOD PRESSURE: 67 MMHG | SYSTOLIC BLOOD PRESSURE: 119 MMHG | HEART RATE: 63 BPM | TEMPERATURE: 98 F | WEIGHT: 315 LBS

## 2021-07-16 DIAGNOSIS — T78.40XA ALLERGIC REACTION: ICD-10-CM

## 2021-07-16 PROCEDURE — 99283 EMERGENCY DEPT VISIT LOW MDM: CPT

## 2021-07-16 PROCEDURE — 93010 ELECTROCARDIOGRAM REPORT: CPT | Mod: ,,, | Performed by: INTERNAL MEDICINE

## 2021-07-16 PROCEDURE — 93010 EKG 12-LEAD: ICD-10-PCS | Mod: ,,, | Performed by: INTERNAL MEDICINE

## 2021-07-16 PROCEDURE — 93005 ELECTROCARDIOGRAM TRACING: CPT | Performed by: INTERNAL MEDICINE

## 2021-07-16 PROCEDURE — 63600175 PHARM REV CODE 636 W HCPCS: Performed by: EMERGENCY MEDICINE

## 2021-07-16 RX ORDER — PREDNISONE 20 MG/1
60 TABLET ORAL
Status: COMPLETED | OUTPATIENT
Start: 2021-07-16 | End: 2021-07-16

## 2021-07-16 RX ORDER — PREDNISONE 20 MG/1
20 TABLET ORAL DAILY
Qty: 5 TABLET | Refills: 0 | Status: SHIPPED | OUTPATIENT
Start: 2021-07-16 | End: 2021-07-21

## 2021-07-16 RX ADMIN — PREDNISONE 60 MG: 20 TABLET ORAL at 11:07

## 2021-07-18 ENCOUNTER — PATIENT MESSAGE (OUTPATIENT)
Dept: FAMILY MEDICINE | Facility: CLINIC | Age: 51
End: 2021-07-18

## 2021-07-19 ENCOUNTER — OFFICE VISIT (OUTPATIENT)
Dept: FAMILY MEDICINE | Facility: CLINIC | Age: 51
End: 2021-07-19
Payer: COMMERCIAL

## 2021-07-19 VITALS
BODY MASS INDEX: 45.1 KG/M2 | WEIGHT: 315 LBS | OXYGEN SATURATION: 97 % | SYSTOLIC BLOOD PRESSURE: 138 MMHG | HEIGHT: 70 IN | DIASTOLIC BLOOD PRESSURE: 74 MMHG | HEART RATE: 88 BPM | TEMPERATURE: 99 F

## 2021-07-19 DIAGNOSIS — T78.40XD ALLERGIC REACTION, SUBSEQUENT ENCOUNTER: Primary | ICD-10-CM

## 2021-07-19 DIAGNOSIS — Z91.010 PEANUT ALLERGY: ICD-10-CM

## 2021-07-19 PROCEDURE — 99214 PR OFFICE/OUTPT VISIT, EST, LEVL IV, 30-39 MIN: ICD-10-PCS | Mod: S$GLB,,, | Performed by: NURSE PRACTITIONER

## 2021-07-19 PROCEDURE — 3008F PR BODY MASS INDEX (BMI) DOCUMENTED: ICD-10-PCS | Mod: CPTII,S$GLB,, | Performed by: NURSE PRACTITIONER

## 2021-07-19 PROCEDURE — 1126F AMNT PAIN NOTED NONE PRSNT: CPT | Mod: CPTII,S$GLB,, | Performed by: NURSE PRACTITIONER

## 2021-07-19 PROCEDURE — 99999 PR PBB SHADOW E&M-EST. PATIENT-LVL V: CPT | Mod: PBBFAC,,, | Performed by: NURSE PRACTITIONER

## 2021-07-19 PROCEDURE — 3008F BODY MASS INDEX DOCD: CPT | Mod: CPTII,S$GLB,, | Performed by: NURSE PRACTITIONER

## 2021-07-19 PROCEDURE — 99214 OFFICE O/P EST MOD 30 MIN: CPT | Mod: S$GLB,,, | Performed by: NURSE PRACTITIONER

## 2021-07-19 PROCEDURE — 1126F PR PAIN SEVERITY QUANTIFIED, NO PAIN PRESENT: ICD-10-PCS | Mod: CPTII,S$GLB,, | Performed by: NURSE PRACTITIONER

## 2021-07-19 PROCEDURE — 99999 PR PBB SHADOW E&M-EST. PATIENT-LVL V: ICD-10-PCS | Mod: PBBFAC,,, | Performed by: NURSE PRACTITIONER

## 2021-07-19 RX ORDER — CIMETIDINE 400 MG/1
400 TABLET, FILM COATED ORAL 2 TIMES DAILY PRN
Qty: 60 TABLET | Refills: 0 | Status: SHIPPED | OUTPATIENT
Start: 2021-07-19 | End: 2021-08-10

## 2021-07-19 RX ORDER — EPINEPHRINE 0.3 MG/.3ML
1 INJECTION SUBCUTANEOUS ONCE
Qty: 2 EACH | Refills: 1 | Status: SHIPPED | OUTPATIENT
Start: 2021-07-19 | End: 2021-12-19 | Stop reason: SDUPTHER

## 2021-08-02 ENCOUNTER — HOSPITAL ENCOUNTER (OUTPATIENT)
Dept: RADIOLOGY | Facility: HOSPITAL | Age: 51
Discharge: HOME | End: 2021-08-02
Attending: STUDENT IN AN ORGANIZED HEALTH CARE EDUCATION/TRAINING PROGRAM
Payer: OTHER MISCELLANEOUS

## 2021-08-02 DIAGNOSIS — R93.89 ABNORMAL X-RAY: ICD-10-CM

## 2021-08-02 PROCEDURE — 73718 MRI TIBIA FIBULA WITHOUT CONTRAST LEFT: ICD-10-PCS | Mod: 26,LT,, | Performed by: RADIOLOGY

## 2021-08-02 PROCEDURE — 73610 X-RAY EXAM OF ANKLE: CPT | Mod: 26,LT,, | Performed by: RADIOLOGY

## 2021-08-02 PROCEDURE — 73718 MRI LOWER EXTREMITY W/O DYE: CPT | Mod: 26,LT,, | Performed by: RADIOLOGY

## 2021-08-02 PROCEDURE — 73610 X-RAY EXAM OF ANKLE: CPT | Mod: TC,FY,LT

## 2021-08-02 PROCEDURE — 73718 MRI LOWER EXTREMITY W/O DYE: CPT | Mod: TC,LT

## 2021-08-02 PROCEDURE — 73610 XR ANKLE COMPLETE 3 VIEW LEFT: ICD-10-PCS | Mod: 26,LT,, | Performed by: RADIOLOGY

## 2021-08-03 ENCOUNTER — PATIENT MESSAGE (OUTPATIENT)
Dept: FAMILY MEDICINE | Facility: CLINIC | Age: 51
End: 2021-08-03

## 2021-08-04 ENCOUNTER — PATIENT MESSAGE (OUTPATIENT)
Dept: FAMILY MEDICINE | Facility: CLINIC | Age: 51
End: 2021-08-04

## 2021-08-04 DIAGNOSIS — D16.9 OSTEOCHONDROMA: Primary | ICD-10-CM

## 2021-08-17 ENCOUNTER — PATIENT MESSAGE (OUTPATIENT)
Dept: ORTHOPEDICS | Facility: CLINIC | Age: 51
End: 2021-08-17

## 2021-08-20 ENCOUNTER — PATIENT MESSAGE (OUTPATIENT)
Dept: ORTHOPEDICS | Facility: CLINIC | Age: 51
End: 2021-08-20

## 2021-08-23 ENCOUNTER — PATIENT MESSAGE (OUTPATIENT)
Dept: ORTHOPEDICS | Facility: CLINIC | Age: 51
End: 2021-08-23

## 2021-09-08 ENCOUNTER — TELEPHONE (OUTPATIENT)
Dept: ORTHOPEDICS | Facility: CLINIC | Age: 51
End: 2021-09-08

## 2021-09-10 DIAGNOSIS — M79.662 PAIN IN LEFT LOWER LEG: Primary | ICD-10-CM

## 2021-09-21 ENCOUNTER — OFFICE VISIT (OUTPATIENT)
Dept: ORTHOPEDICS | Facility: CLINIC | Age: 51
End: 2021-09-21
Payer: OTHER MISCELLANEOUS

## 2021-09-21 ENCOUNTER — HOSPITAL ENCOUNTER (OUTPATIENT)
Dept: RADIOLOGY | Facility: HOSPITAL | Age: 51
Discharge: HOME | End: 2021-09-21
Attending: ORTHOPAEDIC SURGERY
Payer: OTHER MISCELLANEOUS

## 2021-09-21 VITALS
SYSTOLIC BLOOD PRESSURE: 141 MMHG | HEART RATE: 75 BPM | WEIGHT: 315 LBS | HEIGHT: 70 IN | BODY MASS INDEX: 45.1 KG/M2 | DIASTOLIC BLOOD PRESSURE: 89 MMHG

## 2021-09-21 DIAGNOSIS — M79.662 PAIN IN LEFT LOWER LEG: ICD-10-CM

## 2021-09-21 DIAGNOSIS — S80.12XA CONTUSION OF LEFT LEG, INITIAL ENCOUNTER: Primary | ICD-10-CM

## 2021-09-21 PROCEDURE — 99999 PR PBB SHADOW E&M-EST. PATIENT-LVL IV: ICD-10-PCS | Mod: PBBFAC,,, | Performed by: ORTHOPAEDIC SURGERY

## 2021-09-21 PROCEDURE — 73610 X-RAY EXAM OF ANKLE: CPT | Mod: 26,LT,, | Performed by: RADIOLOGY

## 2021-09-21 PROCEDURE — 99214 PR OFFICE/OUTPT VISIT, EST, LEVL IV, 30-39 MIN: ICD-10-PCS | Mod: S$GLB,,, | Performed by: ORTHOPAEDIC SURGERY

## 2021-09-21 PROCEDURE — 73590 XR TIBIA FIBULA 2 VIEW LEFT: ICD-10-PCS | Mod: 26,LT,, | Performed by: RADIOLOGY

## 2021-09-21 PROCEDURE — 73610 X-RAY EXAM OF ANKLE: CPT | Mod: TC,PO,LT

## 2021-09-21 PROCEDURE — 99999 PR PBB SHADOW E&M-EST. PATIENT-LVL IV: CPT | Mod: PBBFAC,,, | Performed by: ORTHOPAEDIC SURGERY

## 2021-09-21 PROCEDURE — 99214 OFFICE O/P EST MOD 30 MIN: CPT | Mod: S$GLB,,, | Performed by: ORTHOPAEDIC SURGERY

## 2021-09-21 PROCEDURE — 73590 X-RAY EXAM OF LOWER LEG: CPT | Mod: TC,PO,LT

## 2021-09-21 PROCEDURE — 73590 X-RAY EXAM OF LOWER LEG: CPT | Mod: 26,LT,, | Performed by: RADIOLOGY

## 2021-09-21 PROCEDURE — 73610 XR ANKLE COMPLETE 3 VIEW LEFT: ICD-10-PCS | Mod: 26,LT,, | Performed by: RADIOLOGY

## 2021-11-01 ENCOUNTER — PATIENT MESSAGE (OUTPATIENT)
Dept: ORTHOPEDICS | Facility: CLINIC | Age: 51
End: 2021-11-01
Payer: COMMERCIAL

## 2021-11-24 DIAGNOSIS — E66.01 OBESITY, MORBID, BMI 40.0-49.9: ICD-10-CM

## 2021-11-24 RX ORDER — SEMAGLUTIDE 1.34 MG/ML
1 INJECTION, SOLUTION SUBCUTANEOUS
Qty: 4 PEN | Refills: 3 | Status: SHIPPED | OUTPATIENT
Start: 2021-11-24 | End: 2022-01-31

## 2021-11-29 ENCOUNTER — OFFICE VISIT (OUTPATIENT)
Dept: FAMILY MEDICINE | Facility: CLINIC | Age: 51
End: 2021-11-29
Payer: COMMERCIAL

## 2021-11-29 VITALS
HEART RATE: 68 BPM | BODY MASS INDEX: 42.66 KG/M2 | RESPIRATION RATE: 18 BRPM | DIASTOLIC BLOOD PRESSURE: 86 MMHG | OXYGEN SATURATION: 97 % | SYSTOLIC BLOOD PRESSURE: 136 MMHG | HEIGHT: 72 IN | WEIGHT: 315 LBS

## 2021-11-29 DIAGNOSIS — Z86.39 HISTORY OF VITAMIN D DEFICIENCY: ICD-10-CM

## 2021-11-29 DIAGNOSIS — Z13.6 ENCOUNTER FOR LIPID SCREENING FOR CARDIOVASCULAR DISEASE: ICD-10-CM

## 2021-11-29 DIAGNOSIS — M25.561 CHRONIC PAIN OF BOTH KNEES: ICD-10-CM

## 2021-11-29 DIAGNOSIS — M25.562 CHRONIC PAIN OF BOTH KNEES: ICD-10-CM

## 2021-11-29 DIAGNOSIS — U09.9 LONG COVID: ICD-10-CM

## 2021-11-29 DIAGNOSIS — Z13.220 ENCOUNTER FOR LIPID SCREENING FOR CARDIOVASCULAR DISEASE: ICD-10-CM

## 2021-11-29 DIAGNOSIS — G89.29 CHRONIC PAIN OF BOTH KNEES: ICD-10-CM

## 2021-11-29 DIAGNOSIS — E66.01 CLASS 3 SEVERE OBESITY WITH BODY MASS INDEX (BMI) OF 45.0 TO 49.9 IN ADULT, UNSPECIFIED OBESITY TYPE, UNSPECIFIED WHETHER SERIOUS COMORBIDITY PRESENT: ICD-10-CM

## 2021-11-29 DIAGNOSIS — Z00.00 ROUTINE GENERAL MEDICAL EXAMINATION AT A HEALTH CARE FACILITY: Primary | ICD-10-CM

## 2021-11-29 DIAGNOSIS — Z12.5 ENCOUNTER FOR PROSTATE CANCER SCREENING: ICD-10-CM

## 2021-11-29 PROCEDURE — 99999 PR PBB SHADOW E&M-EST. PATIENT-LVL V: CPT | Mod: PBBFAC,,, | Performed by: STUDENT IN AN ORGANIZED HEALTH CARE EDUCATION/TRAINING PROGRAM

## 2021-11-29 PROCEDURE — 99999 PR PBB SHADOW E&M-EST. PATIENT-LVL V: ICD-10-PCS | Mod: PBBFAC,,, | Performed by: STUDENT IN AN ORGANIZED HEALTH CARE EDUCATION/TRAINING PROGRAM

## 2021-11-29 PROCEDURE — 99214 OFFICE O/P EST MOD 30 MIN: CPT | Mod: S$GLB,,, | Performed by: STUDENT IN AN ORGANIZED HEALTH CARE EDUCATION/TRAINING PROGRAM

## 2021-11-29 PROCEDURE — 99214 PR OFFICE/OUTPT VISIT, EST, LEVL IV, 30-39 MIN: ICD-10-PCS | Mod: S$GLB,,, | Performed by: STUDENT IN AN ORGANIZED HEALTH CARE EDUCATION/TRAINING PROGRAM

## 2021-11-29 PROCEDURE — 4010F ACE/ARB THERAPY RXD/TAKEN: CPT | Mod: CPTII,S$GLB,, | Performed by: STUDENT IN AN ORGANIZED HEALTH CARE EDUCATION/TRAINING PROGRAM

## 2021-11-29 PROCEDURE — 4010F PR ACE/ARB THEARPY RXD/TAKEN: ICD-10-PCS | Mod: CPTII,S$GLB,, | Performed by: STUDENT IN AN ORGANIZED HEALTH CARE EDUCATION/TRAINING PROGRAM

## 2021-11-29 RX ORDER — CELECOXIB 200 MG/1
200 CAPSULE ORAL DAILY PRN
Qty: 30 CAPSULE | Refills: 2 | Status: ON HOLD | OUTPATIENT
Start: 2021-11-29 | End: 2022-04-07 | Stop reason: HOSPADM

## 2021-11-30 ENCOUNTER — TELEPHONE (OUTPATIENT)
Dept: BARIATRICS | Facility: CLINIC | Age: 51
End: 2021-11-30
Payer: COMMERCIAL

## 2021-12-03 ENCOUNTER — PATIENT MESSAGE (OUTPATIENT)
Dept: FAMILY MEDICINE | Facility: CLINIC | Age: 51
End: 2021-12-03
Payer: COMMERCIAL

## 2021-12-03 DIAGNOSIS — M25.562 CHRONIC PAIN OF BOTH KNEES: Primary | ICD-10-CM

## 2021-12-03 DIAGNOSIS — M25.561 CHRONIC PAIN OF BOTH KNEES: Primary | ICD-10-CM

## 2021-12-03 DIAGNOSIS — G89.29 CHRONIC PAIN OF BOTH KNEES: Primary | ICD-10-CM

## 2021-12-04 ENCOUNTER — HOSPITAL ENCOUNTER (OUTPATIENT)
Dept: RADIOLOGY | Facility: HOSPITAL | Age: 51
Discharge: HOME | End: 2021-12-04
Attending: STUDENT IN AN ORGANIZED HEALTH CARE EDUCATION/TRAINING PROGRAM
Payer: COMMERCIAL

## 2021-12-04 DIAGNOSIS — G89.29 CHRONIC PAIN OF BOTH KNEES: ICD-10-CM

## 2021-12-04 DIAGNOSIS — M25.562 CHRONIC PAIN OF BOTH KNEES: ICD-10-CM

## 2021-12-04 DIAGNOSIS — M25.561 CHRONIC PAIN OF BOTH KNEES: ICD-10-CM

## 2021-12-04 PROCEDURE — 73562 X-RAY EXAM OF KNEE 3: CPT | Mod: 26,,, | Performed by: RADIOLOGY

## 2021-12-04 PROCEDURE — 73562 XR KNEE 3 VIEW BILATERAL: ICD-10-PCS | Mod: 26,,, | Performed by: RADIOLOGY

## 2021-12-04 PROCEDURE — 73562 X-RAY EXAM OF KNEE 3: CPT | Mod: TC,50,FY

## 2021-12-06 ENCOUNTER — TELEPHONE (OUTPATIENT)
Dept: REHABILITATION | Facility: HOSPITAL | Age: 51
End: 2021-12-06
Payer: COMMERCIAL

## 2021-12-08 DIAGNOSIS — Z86.39 HISTORY OF VITAMIN D DEFICIENCY: Primary | ICD-10-CM

## 2021-12-13 ENCOUNTER — PATIENT MESSAGE (OUTPATIENT)
Dept: BARIATRICS | Facility: CLINIC | Age: 51
End: 2021-12-13
Payer: COMMERCIAL

## 2021-12-19 ENCOUNTER — HOSPITAL ENCOUNTER (EMERGENCY)
Facility: HOSPITAL | Age: 51
Discharge: HOME | End: 2021-12-19
Attending: EMERGENCY MEDICINE
Payer: COMMERCIAL

## 2021-12-19 VITALS
RESPIRATION RATE: 18 BRPM | DIASTOLIC BLOOD PRESSURE: 78 MMHG | HEART RATE: 79 BPM | SYSTOLIC BLOOD PRESSURE: 142 MMHG | OXYGEN SATURATION: 97 % | HEIGHT: 72 IN | BODY MASS INDEX: 42.66 KG/M2 | TEMPERATURE: 98 F | WEIGHT: 315 LBS

## 2021-12-19 DIAGNOSIS — T78.40XA ALLERGIC REACTION, INITIAL ENCOUNTER: Primary | ICD-10-CM

## 2021-12-19 PROCEDURE — 63600175 PHARM REV CODE 636 W HCPCS: Performed by: EMERGENCY MEDICINE

## 2021-12-19 PROCEDURE — 99284 EMERGENCY DEPT VISIT MOD MDM: CPT | Mod: 25

## 2021-12-19 PROCEDURE — 96374 THER/PROPH/DIAG INJ IV PUSH: CPT

## 2021-12-19 PROCEDURE — 96375 TX/PRO/DX INJ NEW DRUG ADDON: CPT

## 2021-12-19 PROCEDURE — 25000003 PHARM REV CODE 250: Performed by: EMERGENCY MEDICINE

## 2021-12-19 RX ORDER — EPINEPHRINE 0.3 MG/.3ML
1 INJECTION SUBCUTANEOUS ONCE
Qty: 2 EACH | Refills: 1 | Status: SHIPPED | OUTPATIENT
Start: 2021-12-19 | End: 2021-12-19

## 2021-12-19 RX ORDER — PREDNISONE 20 MG/1
40 TABLET ORAL DAILY
Qty: 10 TABLET | Refills: 0 | Status: SHIPPED | OUTPATIENT
Start: 2021-12-19 | End: 2021-12-25

## 2021-12-19 RX ORDER — METHYLPREDNISOLONE SOD SUCC 125 MG
125 VIAL (EA) INJECTION
Status: COMPLETED | OUTPATIENT
Start: 2021-12-19 | End: 2021-12-19

## 2021-12-19 RX ORDER — FAMOTIDINE 10 MG/ML
20 INJECTION INTRAVENOUS
Status: COMPLETED | OUTPATIENT
Start: 2021-12-19 | End: 2021-12-19

## 2021-12-19 RX ADMIN — FAMOTIDINE 20 MG: 10 INJECTION, SOLUTION INTRAVENOUS at 04:12

## 2021-12-19 RX ADMIN — METHYLPREDNISOLONE SODIUM SUCCINATE 125 MG: 125 INJECTION, POWDER, FOR SOLUTION INTRAMUSCULAR; INTRAVENOUS at 04:12

## 2021-12-23 ENCOUNTER — CLINICAL SUPPORT (OUTPATIENT)
Dept: REHABILITATION | Facility: HOSPITAL | Age: 51
End: 2021-12-23
Attending: STUDENT IN AN ORGANIZED HEALTH CARE EDUCATION/TRAINING PROGRAM
Payer: COMMERCIAL

## 2021-12-23 DIAGNOSIS — U09.9 LONG COVID: ICD-10-CM

## 2021-12-23 DIAGNOSIS — R41.841 COGNITIVE COMMUNICATION DEFICIT: ICD-10-CM

## 2021-12-23 PROCEDURE — 96125 COGNITIVE TEST BY HC PRO: CPT | Mod: PN

## 2021-12-24 ENCOUNTER — PATIENT MESSAGE (OUTPATIENT)
Dept: FAMILY MEDICINE | Facility: CLINIC | Age: 51
End: 2021-12-24
Payer: COMMERCIAL

## 2021-12-27 ENCOUNTER — CLINICAL SUPPORT (OUTPATIENT)
Dept: REHABILITATION | Facility: HOSPITAL | Age: 51
End: 2021-12-27
Payer: COMMERCIAL

## 2021-12-27 DIAGNOSIS — R41.841 COGNITIVE COMMUNICATION DEFICIT: ICD-10-CM

## 2021-12-27 PROCEDURE — 97130 THER IVNTJ EA ADDL 15 MIN: CPT | Mod: PN

## 2021-12-27 PROCEDURE — 97129 THER IVNTJ 1ST 15 MIN: CPT | Mod: PN

## 2021-12-30 ENCOUNTER — PATIENT MESSAGE (OUTPATIENT)
Dept: BARIATRICS | Facility: CLINIC | Age: 51
End: 2021-12-30
Payer: COMMERCIAL

## 2022-01-02 ENCOUNTER — PATIENT MESSAGE (OUTPATIENT)
Dept: FAMILY MEDICINE | Facility: CLINIC | Age: 52
End: 2022-01-02
Payer: COMMERCIAL

## 2022-01-03 ENCOUNTER — CLINICAL SUPPORT (OUTPATIENT)
Dept: REHABILITATION | Facility: HOSPITAL | Age: 52
End: 2022-01-03
Payer: COMMERCIAL

## 2022-01-03 ENCOUNTER — PATIENT MESSAGE (OUTPATIENT)
Dept: FAMILY MEDICINE | Facility: CLINIC | Age: 52
End: 2022-01-03
Payer: COMMERCIAL

## 2022-01-03 DIAGNOSIS — E55.9 VITAMIN D DEFICIENCY: ICD-10-CM

## 2022-01-03 DIAGNOSIS — R41.841 COGNITIVE COMMUNICATION DEFICIT: ICD-10-CM

## 2022-01-03 PROCEDURE — 97129 THER IVNTJ 1ST 15 MIN: CPT | Mod: PN

## 2022-01-03 PROCEDURE — 97130 THER IVNTJ EA ADDL 15 MIN: CPT | Mod: PN

## 2022-01-03 RX ORDER — ERGOCALCIFEROL 1.25 MG/1
50000 CAPSULE ORAL
Qty: 24 CAPSULE | Refills: 0 | Status: SHIPPED | OUTPATIENT
Start: 2022-01-03 | End: 2022-04-18 | Stop reason: SDUPTHER

## 2022-01-03 NOTE — PROGRESS NOTES
[facility]  Speech Therapy Treatment Note- Neurological Rehabilitation  Name: Fabiano Jules Jr.   MRN: [id number]   Therapy Diagnosis:   Encounter Diagnosis   Name Primary?    Cognitive communication deficit    Physician: Howie Mathias MD  Physician Orders: Ambulatory Referral to Speech Therapy  Medical Diagnosis: Long COVID    Visit #/ Visits Authorized: 1/ 10  Date of Evaluation:  12/23/2021   Insurance Authorization Period: 1/1/2022 - 3/29/2022  Plan of Care Expiration Date: 2/4/2022      Extended Plan of Care:  n/a  Progress Note: 1/23/2022   Visits Cancelled: 0  Visits No Show: 0    Time In: 07:34AM  Time Out: 08:15AM  Total Billable Time: 41 minutes     Precautions: Standard  Subjective:   Pt reports: January 1st was a tough day with his memory. In the morning, he would forget what he was doing multiple times.     He was not compliant to home exercise program.   Response to previous treatment: tolerated well   Pain Scale:  10/10 on a Visual Analog Scale currently.   Pain Location: right knee, right and left feet, lower right back   Objective:   TIMED  Procedure Min.   Cognitive Therapeutic Interventions, first 15 minutes CPT 82952  15   Cognitive Therapeutic Interventions, each additional 15 minutes CPT 11565  26         Total Timed Units: 3  Total Untimed Units: 0  Charges Billed/Number of units: 3/3    Short Term Goals: (6 weeks) Current Progress:   1. Pt will participate in assessment of high level executive function skills. (I.e. FAVRES Task 2)      Progressing/ Not Met 1/3/2022   Completed 12/27/21.   2. Pt will recall 4/4 memory strategies independently 3 times overall.    Progressing/ Not Met 1/3/2022   Pt recalled 0/4 memory strategies. See education below.        3. Pt will complete high level auditory memory tasks with 90% accuracy independently.      Progressing/ Not Met 1/3/2022   Not addressed in today's session.     4. Pt will complete alternating attention tasks in quiet  environment with 80% accuracy independently.    Progressing/ Not Met 1/3/2022   Pt completed alternating attention tasks within 3 minute intervals (cancellation of 2 symbols and deductive reasoning task) with less than 80% accuracy independently in a quiet environment.     Pt rated tasks as a 4 on the Scale of Cognitive Load.    5. Patient will independently generate strategies to improve ability to complete tasks with enhanced accuracy and time, based on review of objective previous performance on trials of functional tasks with 80% accuracy.     Progressing/ Not Met 1/3/2022   Pt completed moderate level deductive reasoning puzzle with 100% accuracy independently in 2 minutes and 26 seconds.      6.  Pt will complete divided attention tasks in quiet environment with 80% accuracy independently.     Progressing/ Not Met 1/3/2022   Not addressed in today's session.         Patient Education/Response:   Reviewed the following memory strategies again: Write, Repeat, Associate, and Picture it.   Reviewed attention strategies. Handout provided.   Reviewed results of FAVRES. Pt reports understanding.       Home program established: Patient instructed to continue prior program and review/incorporate attention strategies  Exercises were reviewed and Fabiano was able to demonstrate them prior to the end of the session.  Fabiano demonstrated good  understanding of the education provided.     See Electronic Medical Record under Patient Instructions for exercises provided throughout therapy.  Assessment:   Fabiano is progressing well towards his goals. Pt continues to benefit from review of compensatory memory strategies. Pt is improving with use of attention strategies and completion of alternating attention tasks. Current goals remain appropriate. Goals to be updated as necessary.     Pt prognosis is Good. Pt will continue to benefit from skilled outpatient speech and language therapy to address the deficits listed in the problem list  on initial evaluation, provide patient/family education and to maximize patient's level of independence in the home and community environment.   Medical necessity is demonstrated by the following IMPAIRMENTS:  Deficits in executive functioning and memory prevent the pt from relaying medically and safety relevant information in a timely manner in a state of emergency.     Barriers to Therapy: potentially patient's busy work schedule  Pt's spiritual, cultural and educational needs considered and patient agreeable to plan of care and goals.  Plan:   Continue Plan of Care with focus on attention, memory, and executive function skills    Charley Varner CCC-SLP   1/3/2022

## 2022-01-03 NOTE — TELEPHONE ENCOUNTER
I spoke with pt via phone, referral request sent to Dr. Mathias. Pt prefers [facility] as his pharmacy.

## 2022-01-05 ENCOUNTER — OFFICE VISIT (OUTPATIENT)
Dept: PODIATRY | Facility: CLINIC | Age: 52
End: 2022-01-05
Payer: COMMERCIAL

## 2022-01-05 VITALS — BODY MASS INDEX: 42.66 KG/M2 | HEIGHT: 72 IN | RESPIRATION RATE: 16 BRPM | WEIGHT: 315 LBS

## 2022-01-05 DIAGNOSIS — L60.0 INGROWING NAIL WITH INFECTION: ICD-10-CM

## 2022-01-05 DIAGNOSIS — B35.3 TINEA PEDIS OF BOTH FEET: ICD-10-CM

## 2022-01-05 DIAGNOSIS — E66.01 CLASS 3 SEVERE OBESITY WITH BODY MASS INDEX (BMI) OF 40.0 TO 44.9 IN ADULT, UNSPECIFIED OBESITY TYPE, UNSPECIFIED WHETHER SERIOUS COMORBIDITY PRESENT: ICD-10-CM

## 2022-01-05 DIAGNOSIS — L60.0 INGROWING NAIL: ICD-10-CM

## 2022-01-05 DIAGNOSIS — B35.1 ONYCHOMYCOSIS DUE TO DERMATOPHYTE: Primary | ICD-10-CM

## 2022-01-05 DIAGNOSIS — R26.2 DIFFICULTY WALKING: ICD-10-CM

## 2022-01-05 DIAGNOSIS — M79.675 PAIN OF TOE OF LEFT FOOT: ICD-10-CM

## 2022-01-05 PROCEDURE — 1160F PR REVIEW ALL MEDS BY PRESCRIBER/CLIN PHARMACIST DOCUMENTED: ICD-10-PCS | Mod: CPTII,S$GLB,, | Performed by: PODIATRIST

## 2022-01-05 PROCEDURE — 11730 NAIL REMOVAL: ICD-10-PCS | Mod: TA,S$GLB,, | Performed by: PODIATRIST

## 2022-01-05 PROCEDURE — 99214 PR OFFICE/OUTPT VISIT, EST, LEVL IV, 30-39 MIN: ICD-10-PCS | Mod: 25,S$GLB,, | Performed by: PODIATRIST

## 2022-01-05 PROCEDURE — 1159F PR MEDICATION LIST DOCUMENTED IN MEDICAL RECORD: ICD-10-PCS | Mod: CPTII,S$GLB,, | Performed by: PODIATRIST

## 2022-01-05 PROCEDURE — 11730 AVULSION NAIL PLATE SIMPLE 1: CPT | Mod: TA,S$GLB,, | Performed by: PODIATRIST

## 2022-01-05 PROCEDURE — 1160F RVW MEDS BY RX/DR IN RCRD: CPT | Mod: CPTII,S$GLB,, | Performed by: PODIATRIST

## 2022-01-05 PROCEDURE — 99214 OFFICE O/P EST MOD 30 MIN: CPT | Mod: 25,S$GLB,, | Performed by: PODIATRIST

## 2022-01-05 PROCEDURE — 1159F MED LIST DOCD IN RCRD: CPT | Mod: CPTII,S$GLB,, | Performed by: PODIATRIST

## 2022-01-05 PROCEDURE — 3008F PR BODY MASS INDEX (BMI) DOCUMENTED: ICD-10-PCS | Mod: CPTII,S$GLB,, | Performed by: PODIATRIST

## 2022-01-05 PROCEDURE — 3008F BODY MASS INDEX DOCD: CPT | Mod: CPTII,S$GLB,, | Performed by: PODIATRIST

## 2022-01-05 RX ORDER — DOXYCYCLINE 100 MG/1
100 CAPSULE ORAL 2 TIMES DAILY
Qty: 20 CAPSULE | Refills: 0 | Status: SHIPPED | OUTPATIENT
Start: 2022-01-05 | End: 2022-01-27

## 2022-01-05 RX ORDER — CIMETIDINE 400 MG/1
400 TABLET, FILM COATED ORAL 2 TIMES DAILY
COMMUNITY
Start: 2021-12-17 | End: 2022-01-27

## 2022-01-05 RX ORDER — CLOTRIMAZOLE AND BETAMETHASONE DIPROPIONATE 10; .64 MG/G; MG/G
CREAM TOPICAL 2 TIMES DAILY
Qty: 1 EACH | Refills: 11 | Status: SHIPPED | OUTPATIENT
Start: 2022-01-05 | End: 2022-01-27

## 2022-01-05 NOTE — PATIENT INSTRUCTIONS
Nail Fungal Infection  A nail fungal infection changes the way fingernails and toenails look. They may thicken, discolor, change shape, or split. This condition is hard to treat because nails grow slowly and have limited blood supply. The infection often comes back after treatment.  There are 2 types of medicines used to treat this condition:  · Topical anti-fungal medicines. These are applied to the surface of the skin and nail area. These medicines are not very effective because they cant get deep into the nail.  · Oral antifungal medicines. These medicines work better because they go into the nail from the inside out. But the infection may still come back. It may take 9 to 12 months for your nail to look normal again. This means you are cured. You can repeat treatment if needed. Most people take these medicines without any problems. It is rare to stop therapy because of side effects. But your healthcare provider may give you some monitoring tests. Talk about possible side effects with your provider before starting treatment.  If medicines fail, the nail can be removed surgically or chemically. These methods physically remove the fungus from the body. This helps medical treatment be more effective.  Home care  · Use medicines exactly as directed for as long as directed. Treating a fungal infection can take longer than other kinds of infections.  · Smoking is a risk factor for fungal infection. This is one more reason to quit.  · Wear absorbent socks, and shoes that let your feet breathe. Sweaty feet increase your risk of fungal infection. They also make an existing infection harder to treat.  · Use footwear when in damp public places like swimming pools, gyms, and shower rooms. This will help you avoid the fungus that grows there.  · Don't share nail clippers or scissors with others.  Follow-up care  Follow up with your healthcare provider, or as advised.  When to seek medical advice  Call your healthcare  provider right away if any of these occur:  · Skin by the nail becomes red, swollen, painful, or drains pus (a creamy yellow or white liquid)  · Side effects from oral anti-fungal medicines  Date Last Reviewed: 8/1/2016  © 9821-7068 [employer]. [address] [place] [address]. All rights reserved. This information is not intended as a substitute for professional medical care. Always follow your healthcare professional's instructions.

## 2022-01-05 NOTE — PROGRESS NOTES
"  [address] [facility] [address] [place] [address]  Phone: [phone]   [phone]    Patient's PCP:Howie Mathias MD  Referring Provider: Aaareferral Self    Subjective:      Chief Complaint:: Nail Problem (Left 1st toenail)    MATTHEW Jules Jr. is a 51 y.o. male who presents with a complaint of left 1st toenail problem lasting for a few years. Onset of symptoms patient was playing basketball about 10 years ago, a  who was about 6' 11" and jumped up and landed on his toe. A little afterwards, toenail fell off.  Since then, the nail has not grown back correctly and has been causing problems.   About 2 weeks ago, the toenail was infected.  Patient trimmed the nail back and was able to get the infection down.  Toenail is still painful.  It is growing back thick and discolored.  Treatment to date have included seeing two other podiatrists for this same issue.  First podiatrist trimmed the nail which lasted for a few years, was given oral medications which did not work.  Second podiatrist, Dr. De La O, prescribed Penlac Gel and Topical which also did not work.   He has tried to trim the nail to keep it at bay.    Vitals:    01/05/22 1435   Resp: 16   Weight: (!) 161.9 kg (357 lb)   Height: 6' (1.829 m)   PainSc:   5      Shoe Size:     Past Surgical History:   Procedure Laterality Date    CIRCUMCISION      COLONOSCOPY N/A 12/3/2018    Procedure: COLONOSCOPY;  Surgeon: CHRISSY Reyna MD;  Location: [address]);  Service: Endoscopy;  Laterality: N/A;    epidural steroid injection X 2      facet injection       [facility] [place]     LIPOMA RESECTION      Back of neck    MAGNETIC RESONANCE IMAGING N/A 5/29/2020    Procedure: MRI (MAGNETIC RESONANCE IMAGING) LUMBAR SPINE;  [facility];  Location: [facility];  Service: General;  Laterality: N/A;  COVID NEG    MAGNETIC RESONANCE IMAGING N/A 12/3/2021    Procedure: MRI (MAGNETIC " RESONANCE IMAGING), LUMBAR SPINE;  Surgeon: [facility] Diagnostic Provider;  Location: [facility];  Service: General;  Laterality: N/A;  In MRI @ 7:50 per Ariela    MEDIAL COLLATERAL LIGAMENT AND LATERAL COLLATERAL LIGAMENT REPAIR, KNEE Right 09/01/2018    Dr. Christophe Gore in [place]     MYELOGRAPHY N/A 6/23/2020    Procedure: MYELOGRAM;  Surgeon: [facility] Diagnostic Provider;  Location: [facility] OR;  Service: General;  Laterality: N/A;     Past Medical History:   Diagnosis Date    Arthritis     Back injury     CPAP (continuous positive airway pressure) dependence     Hypertension     PONV (postoperative nausea and vomiting)     Sleep apnea     c pap machine used     Family History   Problem Relation Age of Onset    Hypertension Mother     Diabetes Father     Cancer Father         mesotheliosma     Cataracts Neg Hx     Glaucoma Neg Hx     Macular degeneration Neg Hx     Retinal detachment Neg Hx         Social History:   Marital Status:   Alcohol History:  reports current alcohol use.  Tobacco History:  reports that he quit smoking about 13 years ago. He has a 5.00 pack-year smoking history. He has never used smokeless tobacco.  Drug History:  reports previous drug use. Drug: Hydrocodone.    Review of patient's allergies indicates:   Allergen Reactions    Peanut Anaphylaxis       Current Outpatient Medications   Medication Sig Dispense Refill    amLODIPine (NORVASC) 5 MG tablet Take 1 tablet (5 mg total) by mouth once daily. 30 tablet 11    atorvastatin (LIPITOR) 40 MG tablet Take 1 tablet (40 mg total) by mouth every evening. 90 tablet 3    b complex vitamins (B COMPLEX-VITAMIN B12) tablet Take 1 tablet by mouth once daily. 90 tablet 1    celecoxib (CELEBREX) 200 MG capsule Take 1 capsule (200 mg total) by mouth daily as needed for Pain. 30 capsule 2    cimetidine (TAGAMET) 400 MG tablet Take 400 mg by mouth 2 (two) times daily.      EPINEPHrine (EPIPEN) 0.3 mg/0.3 mL AtIn Inject into the muscle once for  one dose as needed. 2 each 1    ergocalciferol (ERGOCALCIFEROL) 50,000 unit Cap Take 1 capsule (50,000 Units total) by mouth twice a week. 24 capsule 0    ferrous sulfate (FEOSOL) 325 mg (65 mg iron) Tab tablet Take 1 tablet (325 mg total) by mouth every other day. 90 tablet 0    HYDROcodone-acetaminophen (NORCO) 7.5-325 mg per tablet Take 1 tablet by mouth every 6 (six) hours as needed for Pain.      lisinopriL-hydrochlorothiazide (PRINZIDE,ZESTORETIC) 20-12.5 mg per tablet TAKE 2 TABLETS BY MOUTH EVERY  tablet 1    semaglutide (OZEMPIC) 1 mg/dose (4 mg/3 mL) Inject 1 mg into the skin every 7 days. 4 pen 3    clotrimazole-betamethasone 1-0.05% (LOTRISONE) cream Apply topically 2 (two) times daily. 1 each 11    doxycycline (MONODOX) 100 MG capsule Take 1 capsule (100 mg total) by mouth 2 (two) times daily. 20 capsule 0     No current facility-administered medications for this visit.       Review of Systems   Constitutional: Negative for chills, fatigue, fever and unexpected weight change.   HENT: Negative for hearing loss and trouble swallowing.    Eyes: Negative for photophobia and visual disturbance.   Respiratory: Negative for cough, shortness of breath and wheezing.    Cardiovascular: Negative for chest pain, palpitations and leg swelling.   Gastrointestinal: Negative for abdominal pain and nausea.   Genitourinary: Negative for dysuria and frequency.   Musculoskeletal: Negative for arthralgias, back pain, gait problem, joint swelling and myalgias.   Skin: Negative for rash and wound.   Neurological: Negative for tremors, seizures, weakness, numbness and headaches.   Hematological: Does not bruise/bleed easily.         Objective:        Physical Exam:   Foot Exam  Physical Exam     Dermatological:  The toenail is incurvated, Medial, Lateral border of the Left hallux.    mild erythema noted to the affected area.     Absent paronychia.     Absent abscess.  Dry scale with superficial flakes without  ulceration, drainage, pus, tracking, fluctuance, malodor, or cardinal signs infection.    Toenails 1st, 2nd, 3rd, 4th, 5th  bilateral are hypertrophic, dystrophic, discolored tanish brown with tan, gray crumbly subungual debris.  Tender to distal nail plate pressure, without periungual skin abnormality of each.    Otherwise, Skin is normal age and health appropriate color, turgor, texture, and temperature bilateral lower extremities without ulceration, hyperpigmentation, discoloration, masses nodules or cords palpated.  No ecchymosis, erythema, edema, or cardinal signs of infection bilateral lower extremities.    Adequate joint range of motion without pain, limitation, nor crepitation Bilateral feet and ankle joints. Muscle strength is 5/5 in all groups bilaterally.     Protective sensation intact by 10 g monofilament all ten toes/both feet.  Pain sensation intact bilateral feet and legs.    DP and PT pulses 2/4 bilateral, capillary refill 3 seconds all toes/distal feet, all toes/both feet warm to touch.   Negative lower extremity edema bilateral.    Imaging:            Assessment:       1. Onychomycosis due to dermatophyte    2. Pain of toe of left foot    3. Tinea pedis of both feet    4. Ingrowing nail with infection    5. Ingrowing nail    6. Difficulty walking    7. Class 3 severe obesity with body mass index (BMI) of 40.0 to 44.9 in adult, unspecified obesity type, unspecified whether serious comorbidity present      Plan:   Onychomycosis due to dermatophyte  -     doxycycline (MONODOX) 100 MG capsule; Take 1 capsule (100 mg total) by mouth 2 (two) times daily.  Dispense: 20 capsule; Refill: 0  -     clotrimazole-betamethasone 1-0.05% (LOTRISONE) cream; Apply topically 2 (two) times daily.  Dispense: 1 each; Refill: 11    Pain of toe of left foot  -     doxycycline (MONODOX) 100 MG capsule; Take 1 capsule (100 mg total) by mouth 2 (two) times daily.  Dispense: 20 capsule; Refill: 0  -      clotrimazole-betamethasone 1-0.05% (LOTRISONE) cream; Apply topically 2 (two) times daily.  Dispense: 1 each; Refill: 11    Tinea pedis of both feet  -     doxycycline (MONODOX) 100 MG capsule; Take 1 capsule (100 mg total) by mouth 2 (two) times daily.  Dispense: 20 capsule; Refill: 0  -     clotrimazole-betamethasone 1-0.05% (LOTRISONE) cream; Apply topically 2 (two) times daily.  Dispense: 1 each; Refill: 11    Ingrowing nail with infection  -     Nail Removal    Ingrowing nail  -     Nail Removal    Difficulty walking    Class 3 severe obesity with body mass index (BMI) of 40.0 to 44.9 in adult, unspecified obesity type, unspecified whether serious comorbidity present      No follow-ups on file.    Nail Removal    Date/Time: 1/5/2022 2:20 PM  Performed by: Amarilis Jackson DPM  Authorized by: Amarilis Jackson DPM     Consent Done?:  Yes (Written)    Location:  Left foot  Location detail:  Left big toe  Anesthesia:  Local infiltration  Local anesthetic: lidocaine 1% without epinephrine  Preparation:  Skin prepped with alcohol    Amount removed:  Complete  Wedge excision of skin of nail fold: No    Nail bed sutured?: No    Nail matrix removed:  None  Removed nail replaced and anchored: No    Dressing applied:  4x4, antibiotic ointment and gauze roll  Patient tolerance:  Patient tolerated the procedure well with no immediate complications            Counseling:     I provided patient education verbally regarding:   Patient diagnosis, treatment options, as well as alternatives, risks, and benefits.     This note was created using Dragon voice recognition software that occasionally misinterpreted phrases or words.     Lotrisone topical cream prescribed for bilateral tinea pedis    Fungal infection of toenails explained. Treatment options including no treatment, periodic debridement, topical medications, oral medications, and removal of the nail were discussed, as well as success rates and risks of recurrence. We agreed  on temporary removal of the nail      Ingrown toenail treatment options of no treatment, avulsion of nail border under local with regrowth of nail, chemical matrixectomy for attempted permanent correction of the problem. Patient was educated about daily dressing changes, soaks, and medications following removal of the nail.        I counseled the patient on their conditions, their implications and medical management.      >50% of this > 45 minute visit was spent face to face educating/counseling the patient

## 2022-01-07 ENCOUNTER — CLINICAL SUPPORT (OUTPATIENT)
Dept: REHABILITATION | Facility: HOSPITAL | Age: 52
End: 2022-01-07
Payer: COMMERCIAL

## 2022-01-07 DIAGNOSIS — R41.841 COGNITIVE COMMUNICATION DEFICIT: ICD-10-CM

## 2022-01-07 PROCEDURE — 97129 THER IVNTJ 1ST 15 MIN: CPT | Mod: PN

## 2022-01-07 PROCEDURE — 97130 THER IVNTJ EA ADDL 15 MIN: CPT | Mod: PN

## 2022-01-07 NOTE — PROGRESS NOTES
[facility]  Speech Therapy Treatment Note- Neurological Rehabilitation  Name: Fabiano Jules Jr.   MRN: [id number]   Therapy Diagnosis:   Encounter Diagnosis   Name Primary?    Cognitive communication deficit    Physician: Howie Mathias MD  Physician Orders: Ambulatory Referral to Speech Therapy  Medical Diagnosis: Long COVID    Visit #/ Visits Authorized: 2/ 10  Date of Evaluation:  12/23/2021   Insurance Authorization Period: 1/1/2022 - 3/29/2022  Plan of Care Expiration Date: 2/4/2022      Extended Plan of Care:  n/a  Progress Note: 1/23/2022   Visits Cancelled: 0  Visits No Show: 0    Time In: 13:15PM  Time Out: 14:00PM  Total Billable Time: 45 minutes     Precautions: Standard  Subjective:   Pt reports: Got a toenail removed. Went to work today. 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 He thinks he did alright. It was a lot of concentration. He wrote a lot of stuff down.     He was not compliant to home exercise program.   Response to previous treatment: tolerated well   Pain Scale:  10/10 on a Visual Analog Scale currently.   Pain Location: right knee, right and left feet, lower right back   Objective:   TIMED  Procedure Min.   Cognitive Therapeutic Interventions, first 15 minutes CPT 44278  15   Cognitive Therapeutic Interventions, each additional 15 minutes CPT 24159  30         Total Timed Units: 3  Total Untimed Units: 0  Charges Billed/Number of units: 3/3    Short Term Goals: (6 weeks) Current Progress:   1. Pt will participate in assessment of high level executive function skills. (I.e. FAVRES Task 2)      Progressing/ Not Met 1/7/2022   Completed 12/27/21.   2. Pt will recall 4/4 memory strategies independently 3 times overall.    Progressing/ Not Met 1/7/2022   Not addressed in today's session.         3. Pt will complete high level auditory memory tasks with 90% accuracy independently.      Progressing/ Not Met 1/7/2022   Not addressed in today's  session.     4. Pt will complete alternating attention tasks in quiet environment with 80% accuracy independently.    Progressing/ Not Met 1/7/2022   Pt completed alternating attention tasks within 1 minute intervals (word search and deductive reasoning task) with 100% accuracy independently in a quiet environment.     Met x1   5. Patient will independently generate strategies to improve ability to complete tasks with enhanced accuracy and time, based on review of objective previous performance on trials of functional tasks with 80% accuracy.     Progressing/ Not Met 1/7/2022   See education below.  Completing Goal-Plan-Do-Review for auditing. Strategy generation given moderate cueing.      6.  Pt will complete divided attention tasks in quiet environment with 80% accuracy independently.     Progressing/ Not Met 1/7/2022   Not addressed in today's session.         Patient Education/Response:   Reviewed Goal-Plan-Do-Review for strategy generation for functional tasks/work.       Home program established: Patient instructed to continue prior program and review/incorporate attention strategies  Exercises were reviewed and Fabiano was able to demonstrate them prior to the end of the session.  Fabiano demonstrated good  understanding of the education provided.     See Electronic Medical Record under Patient Instructions for exercises provided throughout therapy.  Assessment:   Fabiano is progressing well towards his goals. Pt is improving with accurate completion of alternating tasks. He is improving with knowledge of goal-plan-do-review for strategy generation. Current goals remain appropriate. Goals to be updated as necessary.     Pt prognosis is Good. Pt will continue to benefit from skilled outpatient speech and language therapy to address the deficits listed in the problem list on initial evaluation, provide patient/family education and to maximize patient's level of independence in the home and community environment.    Medical necessity is demonstrated by the following IMPAIRMENTS:  Deficits in executive functioning and memory prevent the pt from relaying medically and safety relevant information in a timely manner in a state of emergency.     Barriers to Therapy: potentially patient's busy work schedule  Pt's spiritual, cultural and educational needs considered and patient agreeable to plan of care and goals.  Plan:   Continue Plan of Care with focus on attention, memory, and executive function skills    Charley Varner CCC-SLP   1/7/2022

## 2022-01-11 ENCOUNTER — OFFICE VISIT (OUTPATIENT)
Dept: ORTHOPEDICS | Facility: CLINIC | Age: 52
End: 2022-01-11
Payer: COMMERCIAL

## 2022-01-11 VITALS
BODY MASS INDEX: 42.66 KG/M2 | HEIGHT: 72 IN | HEART RATE: 81 BPM | WEIGHT: 315 LBS | SYSTOLIC BLOOD PRESSURE: 153 MMHG | DIASTOLIC BLOOD PRESSURE: 95 MMHG

## 2022-01-11 DIAGNOSIS — M19.079 ARTHRITIS OF ANKLE: Primary | ICD-10-CM

## 2022-01-11 PROCEDURE — 3080F PR MOST RECENT DIASTOLIC BLOOD PRESSURE >= 90 MM HG: ICD-10-PCS | Mod: CPTII,S$GLB,, | Performed by: ORTHOPAEDIC SURGERY

## 2022-01-11 PROCEDURE — 20605 INTERMEDIATE JOINT ASPIRATION/INJECTION: R ANKLE, L ANKLE: ICD-10-PCS | Mod: 50,S$GLB,, | Performed by: ORTHOPAEDIC SURGERY

## 2022-01-11 PROCEDURE — 3077F PR MOST RECENT SYSTOLIC BLOOD PRESSURE >= 140 MM HG: ICD-10-PCS | Mod: CPTII,S$GLB,, | Performed by: ORTHOPAEDIC SURGERY

## 2022-01-11 PROCEDURE — 99213 PR OFFICE/OUTPT VISIT, EST, LEVL III, 20-29 MIN: ICD-10-PCS | Mod: 25,S$GLB,, | Performed by: ORTHOPAEDIC SURGERY

## 2022-01-11 PROCEDURE — 3077F SYST BP >= 140 MM HG: CPT | Mod: CPTII,S$GLB,, | Performed by: ORTHOPAEDIC SURGERY

## 2022-01-11 PROCEDURE — 99999 PR PBB SHADOW E&M-EST. PATIENT-LVL III: ICD-10-PCS | Mod: PBBFAC,,, | Performed by: ORTHOPAEDIC SURGERY

## 2022-01-11 PROCEDURE — 3008F BODY MASS INDEX DOCD: CPT | Mod: CPTII,S$GLB,, | Performed by: ORTHOPAEDIC SURGERY

## 2022-01-11 PROCEDURE — 20605 DRAIN/INJ JOINT/BURSA W/O US: CPT | Mod: 50,S$GLB,, | Performed by: ORTHOPAEDIC SURGERY

## 2022-01-11 PROCEDURE — 1159F MED LIST DOCD IN RCRD: CPT | Mod: CPTII,S$GLB,, | Performed by: ORTHOPAEDIC SURGERY

## 2022-01-11 PROCEDURE — 99999 PR PBB SHADOW E&M-EST. PATIENT-LVL III: CPT | Mod: PBBFAC,,, | Performed by: ORTHOPAEDIC SURGERY

## 2022-01-11 PROCEDURE — 1160F PR REVIEW ALL MEDS BY PRESCRIBER/CLIN PHARMACIST DOCUMENTED: ICD-10-PCS | Mod: CPTII,S$GLB,, | Performed by: ORTHOPAEDIC SURGERY

## 2022-01-11 PROCEDURE — 3080F DIAST BP >= 90 MM HG: CPT | Mod: CPTII,S$GLB,, | Performed by: ORTHOPAEDIC SURGERY

## 2022-01-11 PROCEDURE — 1159F PR MEDICATION LIST DOCUMENTED IN MEDICAL RECORD: ICD-10-PCS | Mod: CPTII,S$GLB,, | Performed by: ORTHOPAEDIC SURGERY

## 2022-01-11 PROCEDURE — 99213 OFFICE O/P EST LOW 20 MIN: CPT | Mod: 25,S$GLB,, | Performed by: ORTHOPAEDIC SURGERY

## 2022-01-11 PROCEDURE — 1160F RVW MEDS BY RX/DR IN RCRD: CPT | Mod: CPTII,S$GLB,, | Performed by: ORTHOPAEDIC SURGERY

## 2022-01-11 PROCEDURE — 3008F PR BODY MASS INDEX (BMI) DOCUMENTED: ICD-10-PCS | Mod: CPTII,S$GLB,, | Performed by: ORTHOPAEDIC SURGERY

## 2022-01-11 RX ADMIN — TRIAMCINOLONE ACETONIDE 40 MG: 40 INJECTION, SUSPENSION INTRA-ARTICULAR; INTRAMUSCULAR at 09:01

## 2022-01-11 NOTE — PROGRESS NOTES
Subjective:      Patient ID: Fabiano Jules Jr. is a 51 y.o. male.    Chief Complaint: Pain of the Left Ankle and Pain of the Right Ankle    Fabiano Jules Jr. is a 51 y.o. male  was seen for f/u  today for bilateral ankle pain.  He rates his pain as 5/10 today.   The pain is worse with walking and standing.    He has had pain in both feet for many years which is worsening over time. The left ankle/foot is more painful than the right.       Social History     Occupational History     Employer: [employer]   Tobacco Use    Smoking status: Former Smoker     Packs/day: 0.50     Years: 10.00     Pack years: 5.00     Quit date: 2009     Years since quittin.0    Smokeless tobacco: Never Used    Tobacco comment: quit smoking in    Substance and Sexual Activity    Alcohol use: Yes     Comment: rare    Drug use: Not Currently     Types: Hydrocodone    Sexual activity: Not Currently            Objective:    Ortho Exam       GENERAL: Well developed, well nourished, no acute distress. Very,very pleasant. BMI 48.6.  SKIN: Skin is intact. No atrophy, abrasions or lesions are noted.   Neurological: Normal mental status. Appropriate and conversant. Alert and oriented x 3.  GAIT: Walks with an antalgic gait.    Bilateral lower extremities:  2+ dorsalis pedis pulse.  Capillary refill < 3 seconds.  Decresaed range of motion tibiotalar and subtalar joints. Severe pes planovalgus L>R.  5/5 strength EHL, FHL, tibialis anterior, gastrocsoleus, tibialis posterior and peroneals. Sensation to light touch intact sural, saphenous, superficial peroneal and deep peroneal nerves. severe tenderness to palpation at the the ankle joints  bilaterally. Rocker bottom deformity on the left.       Assessment:       1. Arthritis of ankle          Plan:       I injected the Bilateral ankle today. I can repeat injections every 3-4 months or prn as needed.

## 2022-01-11 NOTE — PROCEDURES
Intermediate Joint Aspiration/Injection: R ankle, L ankle    Date/Time: 1/11/2022 9:00 AM  Performed by: Matt Orozco MD  Authorized by: Matt Orozco MD     Consent Done?: Yes (Verbal)  Indications:  Pain, joint swelling and arthritis  Site marked: The procedure site was marked      Location:  Ankle  Site:  R ankle and L ankle  Prep: Patient was prepped and draped in usual sterile fashion    Needle size:  22 G  Approach:  Anteromedial  Medications:  40 mg triamcinolone acetonide 40 mg/mL; 40 mg triamcinolone acetonide 40 mg/mL  Patient tolerance:  Patient tolerated the procedure well with no immediate complications

## 2022-01-12 NOTE — PATIENT INSTRUCTIONS
Understanding Ingrown Toenails    An ingrown nail is the result of a nail growing into the skin that surrounds it. This often occurs at either edge of the big toe. Ingrown nails may be caused by improper trimming, inherited nail deformities, injuries, fungal infections, or pressure.    Symptoms  Ingrown nails may cause pain at the tip of the toe or all the way to the base of the toe. The pain is often worse while walking. An ingrown nail may also lead to infection, inflammation, or a more serious condition. If its infected, you might see pus or redness.    Evaluation  To determine the extent of your problem, your healthcare provider examines and possibly presses the painful area. If other problems are suspected, blood tests, cultures, and X-rays may be done as well.    Treatment  If the nail isnt infected, your healthcare provider may trim the corner of it to help relieve your symptoms. He or she may need to remove one side of your nail back to the cuticle. The base of the nail may then be treated with a chemical to keep the ingrown part from growing back. Severe infections or ingrown nails may require antibiotics and temporary or permanent removal of a portion of the nail. To prevent pain, a local anesthetic may be used in these procedures. This treatment is usually done at your healthcare provider's office.    Prevention  Many nail problems can be prevented by wearing the right shoes and trimming your nails properly. To help avoid infection, keep your feet clean and dry. If you have diabetes, talk with your healthcare provider before doing any foot self-care.  · The right shoes: Get your feet measured (your size may change as you age). Wear shoes that are supportive and roomy enough for your toes to wiggle. Look for shoes made of natural materials such as leather, which allow your feet to breathe.  · Proper trimming: To avoid problems, trim your toenails straight across without cutting down into the corners. If  you cant trim your own nails, ask your healthcare provider to do so for you.    Date Last Reviewed: 10/1/2016  © 9699-0246 [employer]. [address] [place] [address], [place] [address]. All rights reserved. This information is not intended as a substitute for professional medical care. Always follow your healthcare professional's instructions.        Nail Fungal Infection  A nail fungal infection changes the way fingernails and toenails look. They may thicken, discolor, change shape, or split. This condition is hard to treat because nails grow slowly and have limited blood supply. The infection often comes back after treatment.  There are 2 types of medicines used to treat this condition:  · Topical anti-fungal medicines. These are applied to the surface of the skin and nail area. These medicines are not very effective because they cant get deep into the nail.  · Oral antifungal medicines. These medicines work better because they go into the nail from the inside out. But the infection may still come back. It may take 9 to 12 months for your nail to look normal again. This means you are cured. You can repeat treatment if needed. Most people take these medicines without any problems. It is rare to stop therapy because of side effects. But your healthcare provider may give you some monitoring tests. Talk about possible side effects with your provider before starting treatment.  If medicines fail, the nail can be removed surgically or chemically. These methods physically remove the fungus from the body. This helps medical treatment be more effective.  Home care  · Use medicines exactly as directed for as long as directed. Treating a fungal infection can take longer than other kinds of infections.  · Smoking is a risk factor for fungal infection. This is one more reason to quit.  · Wear absorbent socks, and shoes that let your feet breathe. Sweaty feet increase your risk of fungal infection. They also make an  existing infection harder to treat.  · Use footwear when in damp public places like swimming pools, gyms, and shower rooms. This will help you avoid the fungus that grows there.  · Don't share nail clippers or scissors with others.  Follow-up care  Follow up with your healthcare provider, or as advised.  When to seek medical advice  Call your healthcare provider right away if any of these occur:  · Skin by the nail becomes red, swollen, painful, or drains pus (a creamy yellow or white liquid)  · Side effects from oral anti-fungal medicines  Date Last Reviewed: 8/1/2016  © 5357-6377 [employer]. [address] [place] [address], [place] [address]. All rights reserved. This information is not intended as a substitute for professional medical care. Always follow your healthcare professional's instructions.

## 2022-01-13 ENCOUNTER — OFFICE VISIT (OUTPATIENT)
Dept: PODIATRY | Facility: CLINIC | Age: 52
End: 2022-01-13
Payer: COMMERCIAL

## 2022-01-13 VITALS — RESPIRATION RATE: 16 BRPM | WEIGHT: 315 LBS | HEIGHT: 72 IN | BODY MASS INDEX: 42.66 KG/M2

## 2022-01-13 DIAGNOSIS — L60.0 INGROWN NAIL: Primary | ICD-10-CM

## 2022-01-13 DIAGNOSIS — B35.3 TINEA PEDIS OF BOTH FEET: ICD-10-CM

## 2022-01-13 DIAGNOSIS — M79.675 PAIN OF TOE OF LEFT FOOT: ICD-10-CM

## 2022-01-13 DIAGNOSIS — R26.2 DIFFICULTY IN WALKING, NOT ELSEWHERE CLASSIFIED: ICD-10-CM

## 2022-01-13 DIAGNOSIS — B35.1 ONYCHOMYCOSIS DUE TO DERMATOPHYTE: ICD-10-CM

## 2022-01-13 PROCEDURE — 1160F RVW MEDS BY RX/DR IN RCRD: CPT | Mod: CPTII,S$GLB,, | Performed by: PODIATRIST

## 2022-01-13 PROCEDURE — 3008F BODY MASS INDEX DOCD: CPT | Mod: CPTII,S$GLB,, | Performed by: PODIATRIST

## 2022-01-13 PROCEDURE — 3008F PR BODY MASS INDEX (BMI) DOCUMENTED: ICD-10-PCS | Mod: CPTII,S$GLB,, | Performed by: PODIATRIST

## 2022-01-13 PROCEDURE — 99213 OFFICE O/P EST LOW 20 MIN: CPT | Mod: S$GLB,,, | Performed by: PODIATRIST

## 2022-01-13 PROCEDURE — 1160F PR REVIEW ALL MEDS BY PRESCRIBER/CLIN PHARMACIST DOCUMENTED: ICD-10-PCS | Mod: CPTII,S$GLB,, | Performed by: PODIATRIST

## 2022-01-13 PROCEDURE — 1159F MED LIST DOCD IN RCRD: CPT | Mod: CPTII,S$GLB,, | Performed by: PODIATRIST

## 2022-01-13 PROCEDURE — 99213 PR OFFICE/OUTPT VISIT, EST, LEVL III, 20-29 MIN: ICD-10-PCS | Mod: S$GLB,,, | Performed by: PODIATRIST

## 2022-01-13 PROCEDURE — 1159F PR MEDICATION LIST DOCUMENTED IN MEDICAL RECORD: ICD-10-PCS | Mod: CPTII,S$GLB,, | Performed by: PODIATRIST

## 2022-01-13 RX ORDER — TRIAMCINOLONE ACETONIDE 40 MG/ML
40 INJECTION, SUSPENSION INTRA-ARTICULAR; INTRAMUSCULAR
Status: DISCONTINUED | OUTPATIENT
Start: 2022-01-11 | End: 2022-01-13 | Stop reason: HOSPADM

## 2022-01-13 NOTE — PROGRESS NOTES
[address] [facility] [address]  [place] [address]  Phone: [phone]   [phone]    Patient's PCP:Howie Mathias MD  Referring Provider: No ref. provider found    Subjective:      Chief Complaint:: Follow-up    HPI  Fabiano Jules Jr. is a 51 y.o. male who presents for follow up total avulsion left great toe.  States he was experiencing pain initially, but is doing much better now.  Vitals:    01/13/22 1151   Resp: 16   Weight: (!) 162.4 kg (358 lb)   Height: 6' (1.829 m)   PainSc: 10-Worst pain ever      Shoe Size:     Past Surgical History:   Procedure Laterality Date    CIRCUMCISION      COLONOSCOPY N/A 12/3/2018    Procedure: COLONOSCOPY;  Surgeon: CHRISSY Reyna MD;  Location: [address];  Service: Endoscopy;  Laterality: N/A;    epidural steroid injection X 2      facet injection       [facility] [place]     LIPOMA RESECTION      Back of neck    MAGNETIC RESONANCE IMAGING N/A 5/29/2020    Procedure: MRI (MAGNETIC RESONANCE IMAGING) LUMBAR SPINE;  Surgeon: [facility] Diagnostic Provider;  Location: [facility];  Service: General;  Laterality: N/A;  COVID NEG    MAGNETIC RESONANCE IMAGING N/A 12/3/2021    Procedure: MRI (MAGNETIC RESONANCE IMAGING), LUMBAR SPINE;  Surgeon: [facility] Diagnostic Provider;  Location: [facility];  Service: General;  Laterality: N/A;  In MRI @ 7:50 per Ariela    MEDIAL COLLATERAL LIGAMENT AND LATERAL COLLATERAL LIGAMENT REPAIR, KNEE Right 09/01/2018    Dr. Christophe Gore in [place]     MYELOGRAPHY N/A 6/23/2020    Procedure: MYELOGRAM;  Surgeon: [facility] Diagnostic Provider;  Location: [facility];  Service: General;  Laterality: N/A;     Past Medical History:   Diagnosis Date    Arthritis     Back injury     CPAP (continuous positive airway pressure) dependence     Hypertension     PONV (postoperative nausea and vomiting)     Sleep apnea     c pap machine used     Family History   Problem Relation Age of Onset    Hypertension Mother      Diabetes Father     Cancer Father         mesotheliosma     Cataracts Neg Hx     Glaucoma Neg Hx     Macular degeneration Neg Hx     Retinal detachment Neg Hx         Social History:   Marital Status:   Alcohol History:  reports current alcohol use.  Tobacco History:  reports that he quit smoking about 13 years ago. He has a 5.00 pack-year smoking history. He has never used smokeless tobacco.  Drug History:  reports previous drug use. Drug: Hydrocodone.    Review of patient's allergies indicates:   Allergen Reactions    Peanut Anaphylaxis       Current Outpatient Medications   Medication Sig Dispense Refill    amLODIPine (NORVASC) 5 MG tablet Take 1 tablet (5 mg total) by mouth once daily. 30 tablet 11    atorvastatin (LIPITOR) 40 MG tablet Take 1 tablet (40 mg total) by mouth every evening. 90 tablet 3    b complex vitamins (B COMPLEX-VITAMIN B12) tablet Take 1 tablet by mouth once daily. 90 tablet 1    celecoxib (CELEBREX) 200 MG capsule Take 1 capsule (200 mg total) by mouth daily as needed for Pain. 30 capsule 2    cimetidine (TAGAMET) 400 MG tablet Take 400 mg by mouth 2 (two) times daily.      clotrimazole-betamethasone 1-0.05% (LOTRISONE) cream Apply topically 2 (two) times daily. 1 each 11    doxycycline (MONODOX) 100 MG capsule Take 1 capsule (100 mg total) by mouth 2 (two) times daily. 20 capsule 0    EPINEPHrine (EPIPEN) 0.3 mg/0.3 mL AtIn Inject into the muscle once for one dose as needed. 2 each 1    ergocalciferol (ERGOCALCIFEROL) 50,000 unit Cap Take 1 capsule (50,000 Units total) by mouth twice a week. 24 capsule 0    ferrous sulfate (FEOSOL) 325 mg (65 mg iron) Tab tablet Take 1 tablet (325 mg total) by mouth every other day. 90 tablet 0    HYDROcodone-acetaminophen (NORCO) 7.5-325 mg per tablet Take 1 tablet by mouth every 6 (six) hours as needed for Pain.      lisinopriL-hydrochlorothiazide (PRINZIDE,ZESTORETIC) 20-12.5 mg per tablet TAKE 2 TABLETS BY MOUTH EVERY   tablet 1    semaglutide (OZEMPIC) 1 mg/dose (4 mg/3 mL) Inject 1 mg into the skin every 7 days. 4 pen 3     No current facility-administered medications for this visit.       Review of Systems   Constitutional: Negative for chills, fatigue, fever and unexpected weight change.   HENT: Negative for hearing loss and trouble swallowing.    Eyes: Negative for photophobia and visual disturbance.   Respiratory: Negative for cough, shortness of breath and wheezing.    Cardiovascular: Negative for chest pain, palpitations and leg swelling.   Gastrointestinal: Negative for abdominal pain and nausea.   Genitourinary: Negative for dysuria and frequency.   Musculoskeletal: Positive for arthralgias, back pain, joint swelling and myalgias. Negative for gait problem.   Skin: Negative for rash and wound.   Neurological: Negative for tremors, seizures, weakness, numbness and headaches.   Hematological: Does not bruise/bleed easily.         Objective:        Physical Exam:   Foot Exam  Physical Exam     Removal of nail, left great toenail avulsion,  healing normally with no signs of infection.   Absent paronychia.     Absent abscess.    Dry scale with superficial flakes without ulceration, drainage, pus, tracking, fluctuance, malodor, or cardinal signs infection.    Toenails 1st, 2nd, 3rd, 4th, 5th  bilateral are hypertrophic, dystrophic, discolored tanish brown with tan, gray crumbly subungual debris.  Tender to distal nail plate pressure, without periungual skin abnormality of each.     Otherwise, Skin is normal age and health appropriate color, turgor, texture, and temperature bilateral lower extremities without ulceration, hyperpigmentation, discoloration, masses nodules or cords palpated.  No ecchymosis, erythema, edema, or cardinal signs of infection bilateral lower extremities.     Adequate joint range of motion without pain, limitation, nor crepitation Bilateral feet and ankle joints. Muscle strength is 5/5 in all groups  bilaterally.     Protective sensation intact by 10 g monofilament all ten toes/both feet.  Pain sensation intact bilateral feet and legs.     DP and PT pulses 2/4 bilateral, capillary refill 3 seconds all toes/distal feet, all toes/both feet warm to touch.   Negative lower extremity edema bilateral.     Imaging:            Assessment:       1. Ingrown nail    2. Onychomycosis due to dermatophyte    3. Pain of toe of left foot    4. Tinea pedis of both feet    5. Difficulty in walking, not elsewhere classified      Plan:   Ingrown nail    Onychomycosis due to dermatophyte    Pain of toe of left foot    Tinea pedis of both feet    Difficulty in walking, not elsewhere classified      No follow-ups on file.    Procedures          Counseling:     I provided patient education verbally regarding:   Patient diagnosis, treatment options, as well as alternatives, risks, and benefits.     This note was created using Dragon voice recognition software that occasionally misinterpreted phrases or words.     Recommend continue Lotrisone cream for bilateral foot tinea pedis       Fungal infection of toenails explained. Treatment options including no treatment, periodic debridement, topical medications, oral medications, and removal of the nail were discussed, as well as success rates and risks of recurrence. We agreed on temporary removal of the nail    Patient was instructed to call the clinic or go to the emergency department if their symptoms do not improve, worsens, or if new symptoms develop.  Patient was advised that if any increased swelling, pain, or numbness arise to go immediately to the ED. Patient knows to call any time if an emergency arises. Shared decision making occurred and patient verbalized understanding in agreement with this plan.        Recommend continuing to soak in Epson salts and continue to dress for the next 1-2 weeks.  If pain  returns patient to return to clinic     I counseled the patient on  their conditions, their implications and medical management.      >50% of this > 45 minute visit was spent face to face educating/counseling the patient

## 2022-01-18 ENCOUNTER — CLINICAL SUPPORT (OUTPATIENT)
Dept: REHABILITATION | Facility: HOSPITAL | Age: 52
End: 2022-01-18
Payer: COMMERCIAL

## 2022-01-18 DIAGNOSIS — R41.841 COGNITIVE COMMUNICATION DEFICIT: ICD-10-CM

## 2022-01-18 PROCEDURE — 97130 THER IVNTJ EA ADDL 15 MIN: CPT | Mod: PN

## 2022-01-18 PROCEDURE — 97129 THER IVNTJ 1ST 15 MIN: CPT | Mod: PN

## 2022-01-18 NOTE — PROGRESS NOTES
[facility]  Speech Therapy Treatment Note- Neurological Rehabilitation  Name: Fabiano Jules Jr.   MRN: [id number]   Therapy Diagnosis:   Encounter Diagnosis   Name Primary?    Cognitive communication deficit    Physician: Howie Mathias MD  Physician Orders: Ambulatory Referral to Speech Therapy  Medical Diagnosis: Long COVID    Visit #/ Visits Authorized: 3/ 10  Date of Evaluation:  12/23/2021   Insurance Authorization Period: 1/1/2022 - 3/29/2022  Plan of Care Expiration Date: 2/4/2022      Extended Plan of Care:  n/a  Progress Note: 1/23/2022   Visits Cancelled: 0  Visits No Show: 0    Time In: 1050  Time Out: 1130  Total Billable Time: 40 minutes     Precautions: Standard  Subjective:   Pt reports: he is noting improvement with forgetting gaps in time. He states he recognized this 10 times last week, but was able to retreive the memory he was attempting 3-4 of those time.   He was compliant to home exercise program.   Response to previous treatment: tolerated well   Pain Scale:  7/10 on a Visual Analog Scale currently.   Pain Location: right knee, right and left feet, lower right back   Objective:   TIMED  Procedure Min.   Cognitive Therapeutic Interventions, first 15 minutes CPT 47297  15   Cognitive Therapeutic Interventions, each additional 15 minutes CPT 21091  25         Total Timed Units: 3  Total Untimed Units: 0  Charges Billed/Number of units: 3/3    Short Term Goals: (6 weeks) Current Progress:   1. Pt will participate in assessment of high level executive function skills. (I.e. FAVRES Task 2) Completed 12/27/21.   2. Pt will recall 4/4 memory strategies independently 3 times overall.    Progressing/ Not Met 1/18/2022   Not addressed in today's session.         3. Pt will complete high level auditory memory tasks with 90% accuracy independently.      Progressing/ Not Met 1/18/2022   Not addressed in today's session.     4. Pt will complete alternating attention tasks in quiet  environment with 80% accuracy independently.    Progressing/ Not Met 2022   Not addressed in today's session.      Met x1   5. Patient will independently generate strategies to improve ability to complete tasks with enhanced accuracy and time, based on review of objective previous performance on trials of functional tasks with 80% accuracy.     Progressing/ Not Met 2022   Completed with a deduction reasoning task completed with 80% accuracy independently      6.  Pt will complete divided attention tasks in quiet environment with 80% accuracy independently.     Progressing/ Not Met 2022   Not addressed in today's session.         Patient Education/Response:   Patient educated regarding the followin. Discussed use of memory strategies and how to optimize these. Discussed using strategies he was successful with prior to COVID such as color coding and use of color supports.   2. Role covid played in current complaints of cognitive dysfunction  3. Brain Fog and strategies for improving task tolerance and pacing    Patient verbalized understanding to all above education provided.    Home program established: Patient instructed to continue prior program and review/incorporate attention strategies  Exercises were reviewed and Fabiano was able to demonstrate them prior to the end of the session.  Fabiano demonstrated good  understanding of the education provided.     See Electronic Medical Record under Patient Instructions for exercises provided throughout therapy.  Assessment:   Fabiano is progressing well towards his goals. Patient with organized attempts to tasks. Patient is able to use strategies well, but has difficulty with strategy generation. Improvement was noted in understanding of current complaints. Current goals remain appropriate. Goals to be updated as necessary.     Pt prognosis is Good. Pt will continue to benefit from skilled outpatient speech and language therapy to address the deficits listed  in the problem list on initial evaluation, provide patient/family education and to maximize patient's level of independence in the home and community environment.   Medical necessity is demonstrated by the following IMPAIRMENTS:  Deficits in executive functioning and memory prevent the pt from relaying medically and safety relevant information in a timely manner in a state of emergency.     Barriers to Therapy: potentially patient's busy work schedule  Pt's spiritual, cultural and educational needs considered and patient agreeable to plan of care and goals.  Plan:   Continue Plan of Care with focus on attention, memory, and executive function skills    Deidra Russell CCC-SLP   1/18/2022

## 2022-01-21 ENCOUNTER — CLINICAL SUPPORT (OUTPATIENT)
Dept: REHABILITATION | Facility: HOSPITAL | Age: 52
End: 2022-01-21
Payer: COMMERCIAL

## 2022-01-21 DIAGNOSIS — R41.841 COGNITIVE COMMUNICATION DEFICIT: ICD-10-CM

## 2022-01-21 PROCEDURE — 97129 THER IVNTJ 1ST 15 MIN: CPT | Mod: PN

## 2022-01-21 PROCEDURE — 97130 THER IVNTJ EA ADDL 15 MIN: CPT | Mod: PN

## 2022-01-21 NOTE — PROGRESS NOTES
[facility]  Speech Therapy Treatment Note- Neurological Rehabilitation  Name: Fabiano Jules Jr.   MRN: [id number]   Therapy Diagnosis:   Encounter Diagnosis   Name Primary?    Cognitive communication deficit    Physician: Howie Mathias MD  Physician Orders: Ambulatory Referral to Speech Therapy  Medical Diagnosis: Long COVID    Visit #/ Visits Authorized: 4/ 10  Date of Evaluation:  12/23/2021   Insurance Authorization Period: 1/1/2022 - 3/29/2022  Plan of Care Expiration Date: 2/4/2022      Extended Plan of Care:  n/a  Progress Note: 1/23/2022   Visits Cancelled: 0  Visits No Show: 0    Time In: 10:17AM  Time Out: 11:02AM  Total Billable Time: 45 minutes     Precautions: Standard  Subjective:   Pt reports:   I had a few episodes that I worked it out and it came to me. That's a big improvement.     He was compliant to home exercise program.   Response to previous treatment: tolerated well   Pain Scale:  7/10 on a Visual Analog Scale currently.   Pain Location: right knee, right and left feet, lower right back   Objective:   TIMED  Procedure Min.   Cognitive Therapeutic Interventions, first 15 minutes CPT 25634  15   Cognitive Therapeutic Interventions, each additional 15 minutes CPT 96727  30         Total Timed Units: 3  Total Untimed Units: 0  Charges Billed/Number of units: 3/3    Short Term Goals: (6 weeks) Current Progress:   1. Pt will participate in assessment of high level executive function skills. (I.e. FAVRES Task 2) Completed 12/27/21.   2. Pt will recall 4/4 memory strategies independently 3 times overall.    Progressing/ Not Met 1/21/2022   Pt stated the following:   Association   Setting reminders   Pictures   Extended time   Write it down     Met x1    3. Pt will complete high level auditory memory tasks with 90% accuracy independently.      Progressing/ Not Met 1/21/2022   Not addressed in today's session.     4. Pt will complete alternating attention tasks in quiet environment with  80% accuracy independently.    Progressing/ Not Met 2022   Pt completed high level alternating attention task with 100% accuracy independently given moderate cueing.     Met x1   5. Patient will independently generate strategies to improve ability to complete tasks with enhanced accuracy and time, based on review of objective previous performance on trials of functional tasks with 80% accuracy.     Progressing/ Not Met 2022   Pt completed moderate level deductive reasoning task with 100% accuracy given minimum cueing for strategy generation.          6.  Pt will complete divided attention tasks in quiet environment with 80% accuracy independently.     Progressing/ Not Met 2022   Not addressed in today's session.         Patient Education/Response:   Patient educated regarding the followin. Benefits of using color coding strategy in other areas of life 2/2 hx of success   2. Metacognition and planning strategies    Home program established: Patient instructed to continue prior program and review/incorporate attention strategies  Exercises were reviewed and Fabiano was able to demonstrate them prior to the end of the session.  Fabiano demonstrated good  understanding of the education provided.     See Electronic Medical Record under Patient Instructions for exercises provided throughout therapy.  Assessment:   Fabiano is progressing well towards his goals. Patient demonstrates improvements with knowledge of compensatory memory strategies. Pt continues to benefit from assistance with generation of strategies to improve ability to complete tasks. Pt reports improvements of memory and use of strategies at home. Current goals remain appropriate. Goals to be updated as necessary.     Pt prognosis is Good. Pt will continue to benefit from skilled outpatient speech and language therapy to address the deficits listed in the problem list on initial evaluation, provide patient/family education and to maximize  patient's level of independence in the home and community environment.   Medical necessity is demonstrated by the following IMPAIRMENTS:  Deficits in executive functioning and memory prevent the pt from relaying medically and safety relevant information in a timely manner in a state of emergency.     Barriers to Therapy: potentially patient's busy work schedule  Pt's spiritual, cultural and educational needs considered and patient agreeable to plan of care and goals.  Plan:   Continue Plan of Care with focus on attention, memory, and executive function skills    Charley Varner CCC-SLP   1/21/2022

## 2022-01-24 ENCOUNTER — PATIENT MESSAGE (OUTPATIENT)
Dept: FAMILY MEDICINE | Facility: CLINIC | Age: 52
End: 2022-01-24
Payer: COMMERCIAL

## 2022-01-25 ENCOUNTER — CLINICAL SUPPORT (OUTPATIENT)
Dept: REHABILITATION | Facility: HOSPITAL | Age: 52
End: 2022-01-25
Payer: COMMERCIAL

## 2022-01-25 ENCOUNTER — TELEPHONE (OUTPATIENT)
Dept: FAMILY MEDICINE | Facility: CLINIC | Age: 52
End: 2022-01-25
Payer: COMMERCIAL

## 2022-01-25 DIAGNOSIS — R41.841 COGNITIVE COMMUNICATION DEFICIT: ICD-10-CM

## 2022-01-25 PROCEDURE — 97129 THER IVNTJ 1ST 15 MIN: CPT | Mod: PN

## 2022-01-25 PROCEDURE — 97130 THER IVNTJ EA ADDL 15 MIN: CPT | Mod: PN

## 2022-01-25 NOTE — TELEPHONE ENCOUNTER
Left voicemail for patient to call the office back.        ----- Message from Tim Gonzales sent at 1/25/2022  3:27 PM CST -----  Contact: Self  Type:  Patient Returning Call    Who Called:  Patient  Who Left Message for Patient:  Nidhi  Does the patient know what this is regarding?:  Medication  Best Call Back Number:  [phone]   Additional Information:

## 2022-01-25 NOTE — PROGRESS NOTES
"[facility]  Speech Therapy Treatment Note- Neurological Rehabilitation  Name: Fabiano Jules Jr.   MRN: [id number]   Therapy Diagnosis:   Encounter Diagnosis   Name Primary?    Cognitive communication deficit    Physician: Howie Mathias MD  Physician Orders: Ambulatory Referral to Speech Therapy  Medical Diagnosis: Long COVID    Visit #/ Visits Authorized: 5/10  Date of Evaluation:  12/23/2021   Insurance Authorization Period: 1/1/2022 - 3/29/2022  Plan of Care Expiration Date: 2/4/2022      Extended Plan of Care:  n/a  Progress Note: 1/23/2022   Visits Cancelled: 0  Visits No Show: 0    Time In: 2:38AM  Time Out: 3:20PM  Total Billable Time: 42 minutes     Precautions: Standard  Subjective:   Pt reports:  " I had a couple of episodes meaning I am in the middle of doing something and then I forget."     He was compliant to home exercise program.   Response to previous treatment: tolerated well   Pain Scale:  7/10 on a Visual Analog Scale currently.   Pain Location: right knee, right and left feet, lower right back   Objective:   TIMED  Procedure Min.   Cognitive Therapeutic Interventions, first 15 minutes CPT 30008  15   Cognitive Therapeutic Interventions, each additional 15 minutes CPT 91399  27         Total Timed Units: 3  Total Untimed Units: 0  Charges Billed/Number of units: 3/3    Short Term Goals: (6 weeks) Current Progress:   1. Pt will participate in assessment of high level executive function skills. (I.e. FAVRES Task 2) Completed 12/27/21.   2. Pt will recall 4/4 memory strategies independently 3 times overall.    Progressing/ Not Met 1/25/2022   Not addressed in today's session.       Met x1    3. Pt will complete high level auditory memory tasks with 90% accuracy independently.      Progressing/ Not Met 1/25/2022   Not addressed in today's session.     4. Pt will complete alternating attention tasks in quiet environment with 80% accuracy independently.    Progressing/ Not Met 1/25/2022 "   Pt completed high level alternating attention tasks (word search and word-finding grid) with 100% accuracy independently.    Met x2   5. Patient will independently generate strategies to improve ability to complete tasks with enhanced accuracy and time, based on review of objective previous performance on trials of functional tasks with 80% accuracy.     Progressing/ Not Met 1/25/2022   Patient generated strategy x1 to complete word-finding grid.          6.  Pt will complete divided attention tasks in quiet environment with 80% accuracy independently.     Progressing/ Not Met 1/25/2022   Not addressed in today's session.       Mindfulness Mediation for 5 minutes.     Patient Education/Response:   Discussed the impact of mindfulness on cognition including improved working memory and decreased stress. Pt reports understanding.     Home program established: Patient instructed to continue prior program and complete alternating attention tasks at home.   Exercises were reviewed and Fabiano was able to demonstrate them prior to the end of the session.  Fabiano demonstrated good  understanding of the education provided.     See Electronic Medical Record under Patient Instructions for exercises provided throughout therapy.  Assessment:   Fabiano is progressing well towards his goals. Patient demonstrates improvements with knowledge of mindfulness technique. Pt exhibits improved ability to complete mindfulness breathing following a guide and completing alternating attention tasks with accuracy. Pt continues to benefit from discussion of memory strategies and strategy generation. Current goals remain appropriate. Goals to be updated as necessary.     Pt prognosis is Good. Pt will continue to benefit from skilled outpatient speech and language therapy to address the deficits listed in the problem list on initial evaluation, provide patient/family education and to maximize patient's level of independence in the home and community  environment.   Medical necessity is demonstrated by the following IMPAIRMENTS:  Deficits in executive functioning and memory prevent the pt from relaying medically and safety relevant information in a timely manner in a state of emergency.     Barriers to Therapy: potentially patient's busy work schedule  Pt's spiritual, cultural and educational needs considered and patient agreeable to plan of care and goals.  Plan:   Continue Plan of Care with focus on attention, memory, and executive function skills    Charley Varner CCC-SLP   1/25/2022

## 2022-01-27 ENCOUNTER — LAB VISIT (OUTPATIENT)
Dept: LAB | Facility: HOSPITAL | Age: 52
End: 2022-01-27
Attending: ALLERGY & IMMUNOLOGY
Payer: COMMERCIAL

## 2022-01-27 ENCOUNTER — OFFICE VISIT (OUTPATIENT)
Dept: ALLERGY | Facility: CLINIC | Age: 52
End: 2022-01-27
Payer: COMMERCIAL

## 2022-01-27 VITALS — BODY MASS INDEX: 42.66 KG/M2 | WEIGHT: 315 LBS | HEIGHT: 72 IN

## 2022-01-27 DIAGNOSIS — Z91.018 HX OF FOOD ALLERGY: ICD-10-CM

## 2022-01-27 DIAGNOSIS — R06.02 SHORTNESS OF BREATH: Primary | ICD-10-CM

## 2022-01-27 DIAGNOSIS — R06.02 SHORTNESS OF BREATH: ICD-10-CM

## 2022-01-27 PROCEDURE — 36415 COLL VENOUS BLD VENIPUNCTURE: CPT | Performed by: ALLERGY & IMMUNOLOGY

## 2022-01-27 PROCEDURE — 3008F BODY MASS INDEX DOCD: CPT | Mod: CPTII,S$GLB,, | Performed by: ALLERGY & IMMUNOLOGY

## 2022-01-27 PROCEDURE — 86003 ALLG SPEC IGE CRUDE XTRC EA: CPT | Performed by: ALLERGY & IMMUNOLOGY

## 2022-01-27 PROCEDURE — 1159F PR MEDICATION LIST DOCUMENTED IN MEDICAL RECORD: ICD-10-PCS | Mod: CPTII,S$GLB,, | Performed by: ALLERGY & IMMUNOLOGY

## 2022-01-27 PROCEDURE — 3008F PR BODY MASS INDEX (BMI) DOCUMENTED: ICD-10-PCS | Mod: CPTII,S$GLB,, | Performed by: ALLERGY & IMMUNOLOGY

## 2022-01-27 PROCEDURE — 99999 PR PBB SHADOW E&M-EST. PATIENT-LVL III: CPT | Mod: PBBFAC,,, | Performed by: ALLERGY & IMMUNOLOGY

## 2022-01-27 PROCEDURE — 99204 OFFICE O/P NEW MOD 45 MIN: CPT | Mod: S$GLB,,, | Performed by: ALLERGY & IMMUNOLOGY

## 2022-01-27 PROCEDURE — 1159F MED LIST DOCD IN RCRD: CPT | Mod: CPTII,S$GLB,, | Performed by: ALLERGY & IMMUNOLOGY

## 2022-01-27 PROCEDURE — 99999 PR PBB SHADOW E&M-EST. PATIENT-LVL III: ICD-10-PCS | Mod: PBBFAC,,, | Performed by: ALLERGY & IMMUNOLOGY

## 2022-01-27 PROCEDURE — 86003 ALLG SPEC IGE CRUDE XTRC EA: CPT | Mod: 59 | Performed by: ALLERGY & IMMUNOLOGY

## 2022-01-27 PROCEDURE — 99204 PR OFFICE/OUTPT VISIT, NEW, LEVL IV, 45-59 MIN: ICD-10-PCS | Mod: S$GLB,,, | Performed by: ALLERGY & IMMUNOLOGY

## 2022-01-27 PROCEDURE — 86008 ALLG SPEC IGE RECOMB EA: CPT | Mod: 59 | Performed by: ALLERGY & IMMUNOLOGY

## 2022-01-27 NOTE — PROGRESS NOTES
"Subjective:       Patient ID: Fabiano Jules Jr. is a 51 y.o. male.    Chief Complaint:  Allergic Reaction (Peanut)      HPI    Pt presents for evaluation of suspected peanut allergy.  On 7/13/21 while leaving the house, he ate a handful of peanut (an off-brand, he reaclls). He started driving. W/in 10 min he felt like he was "blacking out", then started having trouble breathing, so started driving to [facility]. In ED triage was  brought straight back to ER. Reportedly BP was initially high. He was admitted to hosp x 2 night. Initially though to have had a cardiac event, but eval was reportedly normal. Denies assoc chest pain, rash, pruritus, or GI sx's.    He experience the same sx's again on 7/16. Again within 10 minutes of eating peanuts. He called 9/11. EMS gave epipen. He believes this gave him some relief of SOB.    On 12/19 was eating turkey leg fried in PN oil. Ate 1 bite and w/in 2 min started w shortness of breath, feeling faint. He self administered epipen. Relief noted. Has tolerated turkey multiple times since.    He has been afraid to eat tree nuts since this episode.    No asthma or AR        Past Medical History:   Diagnosis Date    Arthritis     Back injury     CPAP (continuous positive airway pressure) dependence     Hypertension     PONV (postoperative nausea and vomiting)     Sleep apnea     c pap machine used   long covid      Family History   Problem Relation Age of Onset    Hypertension Mother     Diabetes Father     Cancer Father         mesotheliosma     Cataracts Neg Hx     Glaucoma Neg Hx     Macular degeneration Neg Hx     Retinal detachment Neg Hx          Review of Systems   Constitutional: Positive for fatigue. Negative for activity change and fever.   HENT: Negative for congestion, postnasal drip, rhinorrhea, sinus pressure and sneezing.    Eyes: Negative for discharge, redness and itching.   Respiratory: Negative for cough, shortness of breath and wheezing.  "   Cardiovascular: Negative for chest pain.   Gastrointestinal: Negative for constipation, diarrhea, nausea and vomiting.   Genitourinary: Negative for difficulty urinating.   Musculoskeletal: Negative for joint swelling and myalgias.   Skin: Negative for rash.   Neurological: Negative for headaches.   Hematological: Does not bruise/bleed easily.   Psychiatric/Behavioral: Positive for decreased concentration. Negative for behavioral problems and sleep disturbance.        Objective:   Physical Exam  Constitutional:       General: He is not in acute distress.     Appearance: He is well-developed and well-nourished. He is not diaphoretic.   HENT:      Head: Normocephalic and atraumatic.      Right Ear: Tympanic membrane and external ear normal.      Left Ear: Tympanic membrane and external ear normal.      Nose: Nose normal.      Mouth/Throat:      Mouth: Oropharynx is clear and moist.      Pharynx: No oropharyngeal exudate.   Eyes:      General:         Right eye: No discharge.         Left eye: No discharge.      Conjunctiva/sclera: Conjunctivae normal.   Neck:      Thyroid: No thyromegaly.   Cardiovascular:      Rate and Rhythm: Normal rate and regular rhythm.   Pulmonary:      Effort: Pulmonary effort is normal. No respiratory distress.      Breath sounds: Normal breath sounds. No wheezing.   Abdominal:      General: Bowel sounds are normal. There is no distension.      Palpations: Abdomen is soft.      Tenderness: There is no abdominal tenderness.   Musculoskeletal:         General: No edema. Normal range of motion.      Cervical back: Normal range of motion and neck supple.   Lymphadenopathy:      Cervical: No cervical adenopathy.   Skin:     General: Skin is warm.      Findings: No erythema or rash.   Neurological:      Mental Status: He is alert and oriented to person, place, and time.      Motor: No abnormal muscle tone.   Psychiatric:         Mood and Affect: Mood and affect normal.         Behavior: Behavior  normal.         Thought Content: Thought content normal.         Judgment: Judgment normal.           Assessment:       1. Shortness of breath    2. Hx of food allergy         Plan:       Fabiano was seen today for allergic reaction.    Diagnoses and all orders for this visit:    Shortness of breath  -     Allergen, Peanut Components IGE; Future  -     Allergen - Pistachio; Future  -     Cashew IgE; Future  -     [place]reeze IgE; Future  -     Almonds IgE; Future    Hx of food allergy  -     Allergen, Peanut Components IGE; Future  -     Allergen - Pistachio; Future  -     Cashew IgE; Future  -     [place]reeze IgE; Future  -     Almonds IgE; Future    has epipen  Gave, reviewed food allergy action plan    If immunoCAPs neg, consider SPT. Consider observed oral challenge if SPT also neg

## 2022-01-31 ENCOUNTER — OFFICE VISIT (OUTPATIENT)
Dept: BARIATRICS | Facility: CLINIC | Age: 52
End: 2022-01-31
Payer: COMMERCIAL

## 2022-01-31 ENCOUNTER — CLINICAL SUPPORT (OUTPATIENT)
Dept: REHABILITATION | Facility: HOSPITAL | Age: 52
End: 2022-01-31
Payer: COMMERCIAL

## 2022-01-31 ENCOUNTER — TELEPHONE (OUTPATIENT)
Dept: FAMILY MEDICINE | Facility: CLINIC | Age: 52
End: 2022-01-31
Payer: COMMERCIAL

## 2022-01-31 VITALS
DIASTOLIC BLOOD PRESSURE: 83 MMHG | HEIGHT: 69 IN | TEMPERATURE: 98 F | HEART RATE: 78 BPM | WEIGHT: 315 LBS | BODY MASS INDEX: 46.65 KG/M2 | SYSTOLIC BLOOD PRESSURE: 163 MMHG | RESPIRATION RATE: 16 BRPM

## 2022-01-31 DIAGNOSIS — E55.9 VITAMIN D DEFICIENCY: ICD-10-CM

## 2022-01-31 DIAGNOSIS — R41.841 COGNITIVE COMMUNICATION DEFICIT: ICD-10-CM

## 2022-01-31 DIAGNOSIS — M19.071 DEGENERATIVE JOINT DISEASE OF RIGHT HINDFOOT: ICD-10-CM

## 2022-01-31 DIAGNOSIS — E66.01 MORBID OBESITY: Primary | ICD-10-CM

## 2022-01-31 DIAGNOSIS — Z71.3 PRE-BARIATRIC SURGERY NUTRITION EVALUATION: ICD-10-CM

## 2022-01-31 DIAGNOSIS — Z71.89 ENCOUNTER FOR PRE-BARIATRIC SURGERY COUNSELING AND EDUCATION: ICD-10-CM

## 2022-01-31 DIAGNOSIS — R55 SYNCOPE, UNSPECIFIED SYNCOPE TYPE: ICD-10-CM

## 2022-01-31 DIAGNOSIS — G62.9 NEUROPATHY: ICD-10-CM

## 2022-01-31 DIAGNOSIS — D50.9 MICROCYTIC ANEMIA: ICD-10-CM

## 2022-01-31 DIAGNOSIS — Q66.6 CONGENITAL PES PLANOVALGUS: ICD-10-CM

## 2022-01-31 DIAGNOSIS — G47.33 OSA (OBSTRUCTIVE SLEEP APNEA): ICD-10-CM

## 2022-01-31 DIAGNOSIS — I10 PRIMARY HYPERTENSION: ICD-10-CM

## 2022-01-31 LAB
ALLERGY INTERPRETATION: NORMAL
ALMOND IGE QN: <0.1 KU/L
CASHEW NUT IGE QN: <0.1 KU/L
DEPRECATED ALMOND IGE RAST QL: NORMAL
DEPRECATED CASHEW NUT IGE RAST QL: NORMAL
DEPRECATED PEANUT (RARA H) 2 IGE RAST QL: NORMAL
DEPRECATED PEANUT (RARA H) 2 IGE RAST QL: NORMAL
DEPRECATED PEANUT (RARA H) 3 IGE RAST QL: NORMAL
DEPRECATED PEANUT (RARA H) 6 IGE RAST QL: NORMAL
DEPRECATED PEANUT (RARA H) 8 IGE RAST QL: NORMAL
DEPRECATED PISTACHIO IGE RAST QL: NORMAL
DEPRECATED WALNUT IGE RAST QL: NORMAL
PEANUT (RARA H) 1 IGE QN: <0.1 KU/L
PEANUT (RARA H) 2 IGE QN: <0.1 KU/L
PEANUT (RARA H) 3 IGE QN: <0.1 KU/L
PEANUT (RARA H) 6 IGE QN: <0.1 KU/L
PEANUT (RARA H) 8 IGE QN: <0.1 KU/L
PEANUT (RARA H) 9 IGE QN: <0.1 KU/L
PEANUT (RARA H) 9 IGE QN: NORMAL
PISTACHIO IGE QN: <0.1 KU/L
WALNUT IGE QN: <0.1 KU/L

## 2022-01-31 PROCEDURE — 3079F PR MOST RECENT DIASTOLIC BLOOD PRESSURE 80-89 MM HG: ICD-10-PCS | Mod: CPTII,S$GLB,, | Performed by: NURSE PRACTITIONER

## 2022-01-31 PROCEDURE — 97129 THER IVNTJ 1ST 15 MIN: CPT | Mod: PN

## 2022-01-31 PROCEDURE — 1125F PR PAIN SEVERITY QUANTIFIED, PAIN PRESENT: ICD-10-PCS | Mod: CPTII,S$GLB,, | Performed by: NURSE PRACTITIONER

## 2022-01-31 PROCEDURE — 1159F MED LIST DOCD IN RCRD: CPT | Mod: CPTII,S$GLB,, | Performed by: NURSE PRACTITIONER

## 2022-01-31 PROCEDURE — 99999 PR PBB SHADOW E&M-EST. PATIENT-LVL V: CPT | Mod: PBBFAC,,, | Performed by: NURSE PRACTITIONER

## 2022-01-31 PROCEDURE — 3008F BODY MASS INDEX DOCD: CPT | Mod: CPTII,S$GLB,, | Performed by: NURSE PRACTITIONER

## 2022-01-31 PROCEDURE — 97130 THER IVNTJ EA ADDL 15 MIN: CPT | Mod: PN

## 2022-01-31 PROCEDURE — 3079F DIAST BP 80-89 MM HG: CPT | Mod: CPTII,S$GLB,, | Performed by: NURSE PRACTITIONER

## 2022-01-31 PROCEDURE — 3008F PR BODY MASS INDEX (BMI) DOCUMENTED: ICD-10-PCS | Mod: CPTII,S$GLB,, | Performed by: NURSE PRACTITIONER

## 2022-01-31 PROCEDURE — 3077F PR MOST RECENT SYSTOLIC BLOOD PRESSURE >= 140 MM HG: ICD-10-PCS | Mod: CPTII,S$GLB,, | Performed by: NURSE PRACTITIONER

## 2022-01-31 PROCEDURE — 99205 PR OFFICE/OUTPT VISIT, NEW, LEVL V, 60-74 MIN: ICD-10-PCS | Mod: S$GLB,,, | Performed by: NURSE PRACTITIONER

## 2022-01-31 PROCEDURE — 1160F RVW MEDS BY RX/DR IN RCRD: CPT | Mod: CPTII,S$GLB,, | Performed by: NURSE PRACTITIONER

## 2022-01-31 PROCEDURE — 3077F SYST BP >= 140 MM HG: CPT | Mod: CPTII,S$GLB,, | Performed by: NURSE PRACTITIONER

## 2022-01-31 PROCEDURE — 99205 OFFICE O/P NEW HI 60 MIN: CPT | Mod: S$GLB,,, | Performed by: NURSE PRACTITIONER

## 2022-01-31 PROCEDURE — 1125F AMNT PAIN NOTED PAIN PRSNT: CPT | Mod: CPTII,S$GLB,, | Performed by: NURSE PRACTITIONER

## 2022-01-31 PROCEDURE — 1160F PR REVIEW ALL MEDS BY PRESCRIBER/CLIN PHARMACIST DOCUMENTED: ICD-10-PCS | Mod: CPTII,S$GLB,, | Performed by: NURSE PRACTITIONER

## 2022-01-31 PROCEDURE — 1159F PR MEDICATION LIST DOCUMENTED IN MEDICAL RECORD: ICD-10-PCS | Mod: CPTII,S$GLB,, | Performed by: NURSE PRACTITIONER

## 2022-01-31 PROCEDURE — 99999 PR PBB SHADOW E&M-EST. PATIENT-LVL V: ICD-10-PCS | Mod: PBBFAC,,, | Performed by: NURSE PRACTITIONER

## 2022-01-31 NOTE — PROGRESS NOTES
[facility]  Speech Therapy Treatment Note- Neurological Rehabilitation   PROGRESS NOTE  Name: Fabiano Jules Jr.   MRN: [id number]   Therapy Diagnosis:   Encounter Diagnosis   Name Primary?    Cognitive communication deficit    Physician: Howie Mathisa MD  Physician Orders: Ambulatory Referral to Speech Therapy  Medical Diagnosis: Long COVID    Visit #/ Visits Authorized: 6/10  Date of Evaluation:  12/23/2021   Insurance Authorization Period: 1/1/2022 - 3/29/2022  Plan of Care Expiration Date: 2/4/2022      Extended Plan of Care:  n/a  Progress Note: 1/23/2022 - today's note  Visits Cancelled: 0  Visits No Show: 0    Time In: 3:35AM  Time Out: 4:20PM  Total Billable Time: 45 minutes     Precautions: Standard  Subjective:   Pt reports:  Had an appointment today. It was long. Started training a jw at work today and they worked on making the schedule. Pt felt good about work today.     He was compliant to home exercise program. Pt reports use of mindfulness.   Response to previous treatment: tolerated well   Pain Scale:  7/10 on a Visual Analog Scale currently.   Pain Location: right knee, right and left feet, lower right back   Objective:   TIMED  Procedure Min.   Cognitive Therapeutic Interventions, first 15 minutes CPT 20423  15   Cognitive Therapeutic Interventions, each additional 15 minutes CPT 23060  30         Total Timed Units: 3  Total Untimed Units: 0  Charges Billed/Number of units: 3/3    Short Term Goals: (6 weeks) Current Progress:   1. Pt will participate in assessment of high level executive function skills. (I.e. FAVRES Task 2) Completed 12/27/21.   2. Pt will recall 4/4 memory strategies independently 3 times overall.    Progressing/ Not Met 1/31/2022   Reviewed additional attention and memory strategies. See education below.        Met x1    3. Pt will complete high level auditory memory tasks with 90% accuracy independently.      Progressing/ Not Met 1/31/2022   Not addressed in  today's session.     4. Pt will complete alternating attention tasks in quiet environment with 80% accuracy independently.    Met 1/31/2022   Pt completed high level alternating attention tasks (level 10/10 on Constant Therapy Dilshad) with 82% accuracy independently in a quiet environment.    Completed 5/7 with Scale of Cognitive Load    Met x3   5. Patient will independently generate strategies to improve ability to complete tasks with enhanced accuracy and time, based on review of objective previous performance on trials of functional tasks with 80% accuracy.     Progressing/ Not Met 1/31/2022     Not addressed in today's session.             6.  Pt will complete divided attention tasks in quiet environment with 80% accuracy independently.     Progressing/ Not Met 1/31/2022   Pt completed low level language and focused attention tasks while completing high level auditory comprehension task. Pt answered comprehension questions following with 0% accuracy.       Patient Education/Response:   Pt reports increased anger. SLP advised patient to discuss this again with his MD.   Reviewed additional attention and memory strategies beyond WRAP. Handout provided.     Home program established: Patient instructed to continue prior program and complete alternating attention tasks at home.   Exercises were reviewed and Fabiano was able to demonstrate them prior to the end of the session.  Fabiano demonstrated good  understanding of the education provided.     See Electronic Medical Record under Patient Instructions for exercises provided throughout therapy.  Assessment:    Fabiano is progressing well towards his goals. Pt continues to benefit from discussion of attention and memory strategies. Pt with significant difficulty with divided attention tasks today. He is improving with alternating attention tasks. Current goals remain appropriate. Goals to be updated as necessary.     Pt prognosis is Good. Pt will continue to benefit from  skilled outpatient speech and language therapy to address the deficits listed in the problem list on initial evaluation, provide patient/family education and to maximize patient's level of independence in the home and community environment.   Medical necessity is demonstrated by the following IMPAIRMENTS:  Deficits in executive functioning and memory prevent the pt from relaying medically and safety relevant information in a timely manner in a state of emergency.     Barriers to Therapy: potentially patient's busy work schedule  Pt's spiritual, cultural and educational needs considered and patient agreeable to plan of care and goals.  Plan:   Continue Plan of Care with focus on attention, memory, and executive function skills    Charley Varner CCC-SLP   1/31/2022

## 2022-01-31 NOTE — PROGRESS NOTES
Initial Consult    Chief Complaint   Patient presents with    Obesity    Consult    Nutrition Counseling       History of Present Illness:  Patient is a 51 y.o. male who is referred for evaluation of surgical treatment of morbid obesity. His Body mass index is 51.76 kg/m². He has known comorbidities of hypertension, obstructive sleep apnea and arthritis. He has not attended the bariatric seminar and is most interested in hearing options.      Past attempts at weight loss include:   Unsupervised: atkins, gym memberships, herbal life, high protein, low carb, slim fast, pritkins   Supervised:  , diet pills by MD  Diet pills: laxatives, diuretics, Ozempic   Exercise attempts: walking, running, treadmilll, weight training, stationary cycle, group classes    Weight history:   At current weight:  7  Obese for 30 years.  More than 35 pounds overweight for 30 years .  More than 100 pounds overweight for 15 years.  Started dieting at 18 years old.  Maximum weight reached: 360 lbs  Most weight lost was 70 lbs  through eating less for 1 year.  He describes His eating habits as snacker/grazer, binger eater, sweet eater.    RAFAEL screening: wears CPAP    Reflux screening: none    Review of patient's allergies indicates:   Allergen Reactions    Peanut Anaphylaxis       Current Outpatient Medications   Medication Sig Dispense Refill    amLODIPine (NORVASC) 5 MG tablet Take 1 tablet (5 mg total) by mouth once daily. 30 tablet 11    atorvastatin (LIPITOR) 40 MG tablet Take 1 tablet (40 mg total) by mouth every evening. 90 tablet 3    b complex vitamins (B COMPLEX-VITAMIN B12) tablet Take 1 tablet by mouth once daily. 90 tablet 1    celecoxib (CELEBREX) 200 MG capsule Take 1 capsule (200 mg total) by mouth daily as needed for Pain. 30 capsule 2    ergocalciferol (ERGOCALCIFEROL) 50,000 unit Cap Take 1 capsule (50,000 Units total) by mouth twice a week. 24 capsule 0    ferrous sulfate (FEOSOL) 325 mg (65 mg  iron) Tab tablet Take 1 tablet (325 mg total) by mouth every other day. 90 tablet 0    HYDROcodone-acetaminophen (NORCO) 7.5-325 mg per tablet Take 1 tablet by mouth every 6 (six) hours as needed for Pain.      lisinopriL-hydrochlorothiazide (PRINZIDE,ZESTORETIC) 20-12.5 mg per tablet TAKE 2 TABLETS BY MOUTH EVERY  tablet 1    EPINEPHrine (EPIPEN) 0.3 mg/0.3 mL AtIn Inject into the muscle once for one dose as needed. (Patient not taking: No sig reported) 2 each 1     No current facility-administered medications for this visit.       Past Medical History:   Diagnosis Date    Arthritis     Back injury     CPAP (continuous positive airway pressure) dependence     Hypertension     PONV (postoperative nausea and vomiting)     Sleep apnea     c pap machine used     Past Surgical History:   Procedure Laterality Date    CIRCUMCISION      COLONOSCOPY N/A 12/3/2018    Procedure: COLONOSCOPY;  Surgeon: CHRISSY Reyna MD;  Location: [address]);  Service: Endoscopy;  Laterality: N/A;    epidural steroid injection X 2      facet injection       [facility] [place]     LIPOMA RESECTION      Back of neck    MAGNETIC RESONANCE IMAGING N/A 5/29/2020    Procedure: MRI (MAGNETIC RESONANCE IMAGING) LUMBAR SPINE;  Surgeon: [facility] Diagnostic Provider;  Location: [facility];  Service: General;  Laterality: N/A;  COVID NEG    MAGNETIC RESONANCE IMAGING N/A 12/3/2021    Procedure: MRI (MAGNETIC RESONANCE IMAGING), LUMBAR SPINE;  Surgeon: [facility] Diagnostic Provider;  Location: [facility];  Service: General;  Laterality: N/A;  In MRI @ 7:50 per Ariela    MEDIAL COLLATERAL LIGAMENT AND LATERAL COLLATERAL LIGAMENT REPAIR, KNEE Right 09/01/2018    Dr. Christophe Gore in [place]     MYELOGRAPHY N/A 6/23/2020    Procedure: MYELOGRAM;  Surgeon: [facility] Diagnostic Provider;  Location: [facility] OR;  Service: General;  Laterality: N/A;     Family History   Problem Relation Age of Onset    Hypertension  Mother     Diabetes Father     Cancer Father         mesotheliosma     Cataracts Neg Hx     Glaucoma Neg Hx     Macular degeneration Neg Hx     Retinal detachment Neg Hx      Social History     Tobacco Use    Smoking status: Former Smoker     Packs/day: 0.50     Years: 10.00     Pack years: 5.00     Quit date: 2009     Years since quittin.0    Smokeless tobacco: Never Used    Tobacco comment: quit smoking in    Substance Use Topics    Alcohol use: Yes     Comment: rare    Drug use: Not Currently     Types: Hydrocodone          Chart review:  Primary Care Physician: Mey      Lab review:  Most Recent Data:  CBC:   Lab Results   Component Value Date    WBC 4.85 07/15/2021    HGB 12.6 (L) 07/15/2021    HCT 40.5 07/15/2021     07/15/2021    MCV 84 07/15/2021    RDW 15.6 (H) 07/15/2021       BMP:   Lab Results   Component Value Date     10/01/2021    K 3.6 10/01/2021     10/01/2021    CO2 29 10/01/2021    BUN 12 10/01/2021    CREATININE 0.79 (L) 10/01/2021    GLU 95 10/01/2021    CALCIUM 9.4 10/01/2021    MG 2.1 07/15/2021     LFTs:   Lab Results   Component Value Date    PROT 8.1 2021    ALBUMIN 4.4 2021    BILITOT 0.9 2021    AST 25 2021    ALKPHOS 50 (L) 2021    ALT 16 2021     Coags: No results found for: INR, PROTIME, PTT  FLP:   Lab Results   Component Value Date    CHOL 177 2021    HDL 37 (L) 2021    LDLCALC 117.0 2021    TRIG 115 2021    CHOLHDL 20.9 2021     DM:   Lab Results   Component Value Date    HGBA1C 5.6 2021    HGBA1C 5.7 (H) 2020    HGBA1C 6.0 2017    LDLCALC 117.0 2021    CREATININE 0.79 (L) 10/01/2021     Thyroid:   Lab Results   Component Value Date    TSH 3.530 2021    FREET4 1.02 10/24/2018     Anemia:   Lab Results   Component Value Date    IRON 42 (L) 2021    TIBC 419 2021    FERRITIN 57 2021    ZHMLPCPO07 221 2021    FOLATE 14.1  06/23/2021     Cardiac:   Lab Results   Component Value Date    TROPONINI 0.073 (H) 07/14/2021    TROPONINI 0.073 (H) 07/14/2021    BNP 27 07/13/2021     Urinalysis:   Lab Results   Component Value Date    COLORU Yellow 02/22/2021    SPECGRAV 1.020 02/22/2021    NITRITE Negative 02/22/2021    PROTEINUR neg 06/24/2015    KETONESU Negative 02/22/2021    UROBILINOGEN neg 06/24/2015    BILIRUBINUR neg 06/24/2015    WBCUA 1 02/22/2021         Radiology review:  7/2021- EKG, Stress test and ECHO      Review of Systems:  Review of Systems   Constitutional: Positive for activity change and fatigue. Negative for appetite change, chills, diaphoresis and fever.   HENT: Negative for congestion, dental problem, drooling, ear discharge, ear pain, facial swelling, hearing loss, mouth sores, nosebleeds, postnasal drip, rhinorrhea, sinus pressure, sinus pain, sneezing, sore throat, tinnitus, trouble swallowing and voice change.    Eyes: Negative for photophobia, pain, discharge, itching and visual disturbance.   Respiratory: Positive for shortness of breath. Negative for apnea, cough, chest tightness and wheezing.    Cardiovascular: Positive for leg swelling. Negative for chest pain and palpitations.   Gastrointestinal: Negative for abdominal distention, abdominal pain, anal bleeding, blood in stool, constipation, diarrhea, nausea, rectal pain and vomiting.   Endocrine: Negative for cold intolerance, heat intolerance, polydipsia, polyphagia and polyuria.   Genitourinary: Negative for difficulty urinating, dysuria, enuresis, flank pain, frequency, genital sores and hematuria.   Musculoskeletal: Positive for arthralgias, back pain, gait problem and joint swelling. Negative for myalgias, neck pain and neck stiffness.   Skin: Negative for color change, pallor, rash and wound.   Allergic/Immunologic: Negative.  Negative for environmental allergies, food allergies and immunocompromised state.   Neurological: Negative for dizziness,  "tremors, seizures, syncope, facial asymmetry, speech difficulty, weakness, light-headedness, numbness and headaches.   Hematological: Negative for adenopathy. Does not bruise/bleed easily.   Psychiatric/Behavioral: Negative for agitation, behavioral problems, confusion, decreased concentration, dysphoric mood, hallucinations, self-injury, sleep disturbance and suicidal ideas. The patient is not nervous/anxious and is not hyperactive.    Medication compliant.      Current Diet Log:  Breakfast: 2 eggs, biscuits/toast with grits, 4-6 pieces of askew  Snack: granola and fuit. 3 cups of coffee with sugar/creamer  Lunch: "healthiest per day' per patient since provided at work- chicken, meat, salad- dont typically eat breads or desserts while at work  Snack: Beauty, chips, cookies, granola bar, and Energy Drink  Dinner: baked chicken, rice and breans    NO MVI    Exercise: walks mostly at work  3x/wk= 3500 steps  4x/wk= 0533-6406 K steps per day        Physical:     Vital Signs (Most Recent)  Temp: 98.3 °F (36.8 °C) (01/31/22 1419)  Pulse: 78 (01/31/22 1419)  Resp: 16 (01/31/22 1419)  BP: (!) 163/83 (01/31/22 1419)  5' 9" (1.753 m)  (!) 159 kg (350 lb 8.5 oz)     Body comp:  Fat Percent:  44.4 %  Fat Mass:  154 lb  FFM:  193 lb  TBW: 152.2 lb  TBW %:  43.9 %  BMR: 2770 kcal          Physical Exam:  Physical Exam  Constitutional:       General: He is not in acute distress.     Appearance: Normal appearance. He is obese. He is not ill-appearing or diaphoretic.   HENT:      Head: Normocephalic and atraumatic.      Right Ear: External ear normal.      Left Ear: External ear normal.      Nose: Nose normal. No congestion or rhinorrhea.      Mouth/Throat:      Mouth: Mucous membranes are moist.      Pharynx: Oropharynx is clear.   Eyes:      General: No scleral icterus.        Right eye: No discharge.         Left eye: No discharge.      Conjunctiva/sclera: Conjunctivae normal.   Cardiovascular:      Rate and Rhythm: Normal " rate and regular rhythm.      Pulses: Normal pulses.      Heart sounds: Normal heart sounds. No murmur heard.      Pulmonary:      Effort: Pulmonary effort is normal. No respiratory distress.      Breath sounds: No stridor. No wheezing.   Chest:      Chest wall: No tenderness.   Abdominal:      General: Bowel sounds are normal. There is no distension.      Palpations: Abdomen is soft. There is no mass.      Tenderness: There is no abdominal tenderness.      Hernia: No hernia is present.   Musculoskeletal:         General: No swelling, tenderness, deformity or signs of injury. Normal range of motion.      Cervical back: Normal range of motion and neck supple. No rigidity or tenderness.      Right lower leg: Edema present.      Left lower leg: Edema present.   Skin:     General: Skin is warm and dry.      Capillary Refill: Capillary refill takes less than 2 seconds.      Coloration: Skin is not jaundiced or pale.      Findings: No bruising, erythema, lesion or rash.   Neurological:      General: No focal deficit present.      Mental Status: He is alert and oriented to person, place, and time. Mental status is at baseline.      Motor: No weakness.      Coordination: Coordination abnormal.      Gait: Gait abnormal.   Psychiatric:         Mood and Affect: Mood normal.         Thought Content: Thought content normal.         Judgment: Judgment normal.         ASSESSMENT/PLAN:        1. Morbid obesity  CBC Auto Differential    H. pylori Antibody, IgG    Comprehensive Metabolic Panel    Hemoglobin A1C    Iron and TIBC    Magnesium    Vitamin B12    Vitamin B1    TSH    T4, Free    T3    Phosphorus    Ambulatory referral/consult to Nutrition Services    Ambulatory referral/consult to Psychiatry    Ambulatory referral/consult to Cardiology    FL Upper GI   2. Pre-bariatric surgery nutrition evaluation     3. Encounter for pre-bariatric surgery counseling and education     4. Primary hypertension  Ambulatory referral/consult  to Nutrition Services    Ambulatory referral/consult to Cardiology   5. Vitamin D deficiency     6. RAFAEL (obstructive sleep apnea)     7. Microcytic anemia  Ambulatory referral/consult to Nutrition Services   8. Syncope, unspecified syncope type  Ambulatory referral/consult to Cardiology   9. Neuropathy     10. Degenerative joint disease of right hindfoot     11. Congenital pes planovalgus         Plan:  Fabiano Jules Jr. has morbid obesity as their Body mass index is 51.76 kg/m². He would benefit from weight loss surgery and has chosen gastric sleeve surgery as the preferred procedure. He understands that this is a tool and lifestyle change will be necessary to keep weight off. I went over possible complications of all surgeries with the patient and he is agreeable to surgery.    He will need: 6 MSD    Labs  EKG  UGI   dietary consult  psych consult   Seminar      I will obtain the following clearances prior to surgery: Cardiology   Previous ECHO, Nuclear Stress Test (7/14/2021)    Patient has multiple medical problems that have been worsening over time.  We are planning to treat these medical problems which include obesity, and HTN, with diet, weight loss, exercise, possibly surgery.     Obesity, hypertension, RAFAEL, syncope, and PMH of elevated troponins are risk factors for elective major surgery and I will plan for risk stratification by sending He to cardiologist.        Diet plan: high protein low carb- mainly meats and vegetables  Exercise plan: Cardiovascular exercise, get HR over 100 for 20 minutes 3 times per week.  Start multivitamin, continue Rx of Vitamin D         I spent a total of 65 minutes on the day of the visit.This includes face to face time and non-face to face time preparing to see the patient (eg, review of tests), obtaining and/or reviewing separately obtained history, documenting clinical information in the electronic or other health record, independently interpreting results and communicating  results to the patient/family/caregiver, or care coordinator.

## 2022-02-01 ENCOUNTER — PATIENT MESSAGE (OUTPATIENT)
Dept: FAMILY MEDICINE | Facility: CLINIC | Age: 52
End: 2022-02-01
Payer: COMMERCIAL

## 2022-02-02 ENCOUNTER — PATIENT MESSAGE (OUTPATIENT)
Dept: FAMILY MEDICINE | Facility: CLINIC | Age: 52
End: 2022-02-02
Payer: COMMERCIAL

## 2022-02-07 ENCOUNTER — CLINICAL SUPPORT (OUTPATIENT)
Dept: REHABILITATION | Facility: HOSPITAL | Age: 52
End: 2022-02-07
Payer: COMMERCIAL

## 2022-02-07 DIAGNOSIS — R41.841 COGNITIVE COMMUNICATION DEFICIT: ICD-10-CM

## 2022-02-07 PROCEDURE — 97129 THER IVNTJ 1ST 15 MIN: CPT | Mod: PN

## 2022-02-07 PROCEDURE — 97130 THER IVNTJ EA ADDL 15 MIN: CPT | Mod: PN

## 2022-02-07 NOTE — PROGRESS NOTES
[facility]  Speech Therapy Treatment Note- Post-COVID Recovery   Name: Fabiano Jules Jr.   MRN: [id number]   Therapy Diagnosis:   Encounter Diagnosis   Name Primary?    Cognitive communication deficit    Physician: Howie Mathias MD  Physician Orders: Ambulatory Referral to Speech Therapy  Medical Diagnosis: Long COVID    Visit #/ Visits Authorized: 7/10  Date of Evaluation:  12/23/2021   Insurance Authorization Period: 1/1/2022 - 3/29/2022  Plan of Care Expiration Date: 2/4/2022      Extended Plan of Care:  n/a  Progress Note: 1/23/2022   Visits Cancelled: 0  Visits No Show: 0    Time In: 7:35AM  Time Out: 7: 58AM  Total Billable Time: 23 minutes     Precautions: Standard  Subjective:   Pt reports: I left my work papers two different times at work. Requested to leave early today 2/2 to not be late.     He was compliant to home exercise program.   Response to previous treatment: tolerated well   Pain Scale:  7/10 on a Visual Analog Scale currently.   Pain Location: right knee, right and left feet, lower right back   Objective:   TIMED  Procedure Min.   Cognitive Therapeutic Interventions, first 15 minutes CPT 35142  15   Cognitive Therapeutic Interventions, each additional 15 minutes CPT 83931  8         Total Timed Units: 2  Total Untimed Units: 0  Charges Billed/Number of units: 2/2    Short Term Goals: (6 weeks) Current Progress:   1. Pt will participate in assessment of high level executive function skills. (I.e. FAVRES Task 2) Completed 12/27/21.   2. Pt will recall 4/4 memory strategies independently 3 times overall.    Progressing/ Not Met 2/7/2022   2/4 independently recalled        Met x1    3. Pt will complete high level auditory memory tasks with 90% accuracy independently.      Progressing/ Not Met 2/7/2022   Remember N-back - 69% accuracy      4. Pt will complete alternating attention tasks in quiet environment with 80% accuracy independently.    Met 2/7/2022   Pt completed high level  alternating attention tasks (level 10/10 on Constant Therapy Dilshad) with 82% accuracy independently in a quiet environment.    Completed 5/7 with Scale of Cognitive Load    Met x3   5. Patient will independently generate strategies to improve ability to complete tasks with enhanced accuracy and time, based on review of objective previous performance on trials of functional tasks with 80% accuracy.     Progressing/ Not Met 2/7/2022    Discussed memory strategies to prevent losing track of cash and preventing leaving items at work given moderate assistance.              6.  Pt will complete divided attention tasks in quiet environment with 80% accuracy independently.     Progressing/ Not Met 2/7/2022   Not addressed in today's session.         Patient Education/Response:   Reviewed memory strategies beyond WRAP. Handout provided.     Home program established: Patient instructed to continue prior program and complete alternating attention tasks at home.   Exercises were reviewed and Fabiano was able to demonstrate them prior to the end of the session.  Fabiano demonstrated good  understanding of the education provided.     See Electronic Medical Record under Patient Instructions for exercises provided throughout therapy.  Assessment:    Fabiano is progressing well towards his goals. Pt continues to report memory challenges at home which affect his work and family life. Pt continues to demonstrate auditory memory challenges during the session. Pt will benefit from continued opportunities to work on auditory memory and use of memory strategies. Current goals remain appropriate. Goals to be updated as necessary.     Pt prognosis is Good. Pt will continue to benefit from skilled outpatient speech and language therapy to address the deficits listed in the problem list on initial evaluation, provide patient/family education and to maximize patient's level of independence in the home and community environment.   Medical necessity is  demonstrated by the following IMPAIRMENTS:  Deficits in executive functioning and memory prevent the pt from relaying medically and safety relevant information in a timely manner in a state of emergency.     Barriers to Therapy: potentially patient's busy work schedule  Pt's spiritual, cultural and educational needs considered and patient agreeable to plan of care and goals.  Plan:   Continue Plan of Care with focus on attention, memory, and executive function skills    Charley Varner CCC-SLP   2/7/2022

## 2022-02-07 NOTE — PLAN OF CARE
[facility]  Speech Therapy Updated Plan of Care-Post-COVID Recovery         Date: 2/7/2022   Name: Fabiano Jules Jr.  Clinic Number: [id number]    Therapy Diagnosis:   Encounter Diagnosis   Name Primary?    Cognitive communication deficit      Physician: Howie Mathias MD    Physician Orders: Ambulatory Referral to Speech Therapy  Medical Diagnosis: Long COVID    Visit #/ Visits Authorized:  7 / 10  Evaluation Date: 12/23/2021   Insurance Authorization Period: 1/1/2022 - 3/29/2022  Plan of Care Expiration: 2/4/2022   New POC Certification Period:  2/7/2022- 3/4/2022    Total Visits Received: 9    Precautions:Standard     Subjective     Update: Pt continues to report memory challenges at home which affect his work and family life. Pt continues to demonstrate auditory memory challenges during the session. Pt will benefit from continued opportunities to work on his auditory memory and use of memory strategies.    Objective     Update: see follow up note dated 2/7/2022    Assessment     Update: Fabiano Jules Jr. presents to [facility] status post medical diagnosis of Long COVID. Demonstrates impairments including limitations as described in the problem list. Positive prognostic factors include patient motivation. Negative prognostic factors include time since onset. He presents with cognitive communication deficits characterized by deficits in memory.  No barriers to therapy identified.. Patient will benefit from skilled, outpatient rehabilitation speech therapy.    Rehab Potential: fair   Pt's spiritual, cultural, and educational needs considered and patient agreeable to plan of care and goals.    Education: Plan of Care and role of SLP in care     Previous Short Term Goals Status:   Short Term Goals: (6 weeks) Current Progress:   1. Pt will participate in assessment of high level executive function skills. (I.e. FAVRES Task 2) Completed 12/27/21.   2. Pt will recall 4/4 memory strategies  independently 3 times overall.     Progressing/ Not Met 2/7/2022   Met 1/3, Progressing/ Not Met 2/7/2022      3. Pt will complete high level auditory memory tasks with 90% accuracy independently.        Progressing/ Not Met 2/7/2022        4. Pt will complete alternating attention tasks in quiet environment with 80% accuracy independently.      Met 2/7/2022       Met x3   5. Patient will independently generate strategies to improve ability to complete tasks with enhanced accuracy and time, based on review of objective previous performance on trials of functional tasks with 80% accuracy.      Progressing/ Not Met 2/7/2022                   6.  Pt will complete divided attention tasks in quiet environment with 80% accuracy independently.    Progressing/ Not Met 2/7/2022         New Short Term Goals:   Short Term Goals: (2 weeks) Current Progress:   1. Pt will recall 4/4 memory strategies independently 3 times overall.     Progressing/ Not Met 2/7/2022   Met 1/3   2. Pt will complete high level auditory memory tasks with 90% accuracy independently.       Progressing/ Not Met 2/7/2022     Ongoing      3. Patient will independently generate strategies to improve ability to complete tasks with enhanced accuracy and time, based on review of objective previous performance on trials of functional tasks with 80% accuracy.       Progressing/ Not Met 2/7/2022  Ongoing               4.  Pt will complete divided attention tasks in quiet environment with 80% accuracy independently.   Progressing/ Not Met 2/7/2022  Ongoing        Long Term Goal Status: 4 weeks  1. Pt will maintain functional cognitive-linguistic based skills and utilize compensatory strategies to communicate wants and needs effectively to different conversational partners, maintain safety, and participate socially in functional living environment.  Progressing/ Not Met 2/7/2022      Goals Previously Met:  See above.      Reasons for Recertification of Therapy:  Patient's has demonstrated progress thus far and continues to demonstrate deficits     Plan     Updated Certification Period: 2/7/2022 to 3/4/2022       Recommended Treatment Plan: Patient will participate in the [facility] rehabilitation program for speech therapy 1 times per week to address his Cognition deficits, to educate patient and their family, and to participate in a home exercise program.     Other recommendations: n/a     Therapist's Name:  Charley Varner CCC-SLP   2/7/2022      I CERTIFY THE NEED FOR THESE SERVICES FURNISHED UNDER THIS PLAN OF TREATMENT AND WHILE UNDER MY CARE      Physician Name: _______________________________    Physician Signature: ____________________________

## 2022-02-18 ENCOUNTER — CLINICAL SUPPORT (OUTPATIENT)
Dept: REHABILITATION | Facility: HOSPITAL | Age: 52
End: 2022-02-18
Payer: COMMERCIAL

## 2022-02-18 DIAGNOSIS — R41.841 COGNITIVE COMMUNICATION DEFICIT: ICD-10-CM

## 2022-02-18 PROCEDURE — 97129 THER IVNTJ 1ST 15 MIN: CPT | Mod: PN

## 2022-02-18 PROCEDURE — 97130 THER IVNTJ EA ADDL 15 MIN: CPT | Mod: PN

## 2022-02-18 NOTE — PROGRESS NOTES
"[facility]  Speech Therapy Treatment Note- Post-COVID Recovery   Name: Fabiano Jules Jr.   MRN: [id number]   Therapy Diagnosis:   Encounter Diagnosis   Name Primary?    Cognitive communication deficit    Physician: Howie Mathias MD  Physician Orders: Ambulatory Referral to Speech Therapy  Medical Diagnosis: Long COVID    Visit #/ Visits Authorized: 8/10  Date of Evaluation:  12/23/2021   Insurance Authorization Period: 1/1/2022 - 3/29/2022  Plan of Care Expiration Date: 3/4/2022  Extended Plan of Care:  n/a  Progress Note: 2/23/2022   Visits Cancelled: 0  Visits No Show: 0    Time In: 8:03AM  Time Out: 8:48AM  Total Billable Time: 45 minutes     Precautions: Standard  Subjective:   Pt reports: Death in the family. Busy at work. "I probably went three days in a row with no problems (regarding memory)." He had a chance to do some of the mindfulness with my son. He also played [employer].     He was compliant to home exercise program.   Response to previous treatment: tolerated well   Pain Scale:  7/10 on a Visual Analog Scale currently.   Pain Location: right knee, right and left feet, lower right back   Objective:   TIMED  Procedure Min.   Cognitive Therapeutic Interventions, first 15 minutes CPT 56859  15   Cognitive Therapeutic Interventions, each additional 15 minutes CPT 02588   30         Total Timed Units: 2  Total Untimed Units: 0  Charges Billed/Number of units: 2/2      Short Term Goals: (2 weeks) Current Progress:   1. Pt will recall 4/4 memory strategies independently 3 times overall.     Progressing/ Not Met 2/7/2022   Met 1/3   2. Pt will complete high level auditory memory tasks with 90% accuracy independently.       Progressing/ Not Met 2/7/2022      Say the words in alphabetical order (4 words Constant Therapy Level 2/5):  40% accuracy       Match the sounds you hear (Constant Therapy Level 4/5):  85% accuracy       3. Patient will independently generate strategies to improve ability to " complete tasks with enhanced accuracy and time, based on review of objective previous performance on trials of functional tasks with 80% accuracy.       Progressing/ Not Met 2/7/2022  Reviewed strategies for memory: take notes and recap/summary               4.  Pt will complete divided attention tasks in quiet environment with 80% accuracy independently.   Progressing/ Not Met 2/7/2022  See education below.        Patient Education/Response:   Reviewed rationale for mindfulness. Pt reports understanding.   Discussed immediate recall vs working memory. Pt reports understanding.  Reviewed examples of divided attention (I.e. taking notes while listening and talking on the phone while cooking). Pt reports understanding.  Reviewed attention strategy of scheduling breaks during work day.  Pt reports understanding.    Home program established: Patient instructed to continue prior program and complete divided attention tasks at home.   Exercises were reviewed and Fabiano was able to demonstrate them prior to the end of the session.  Fabiano demonstrated good  understanding of the education provided.     See Electronic Medical Record under Patient Instructions for exercises provided throughout therapy.  Assessment:    Fabiano is progressing slowly towards his goals.  Patient continues to benefit from memory strategy generation assistance. Memory deficits persists during high level tasks. Patient is improving with incorporating mindfulness at home, per pt report. Current goals remain appropriate. Goals to be updated as necessary.     Pt prognosis is Good. Pt will continue to benefit from skilled outpatient speech and language therapy to address the deficits listed in the problem list on initial evaluation, provide patient/family education and to maximize patient's level of independence in the home and community environment.   Medical necessity is demonstrated by the following IMPAIRMENTS:  Deficits in executive functioning and memory  prevent the pt from relaying medically and safety relevant information in a timely manner in a state of emergency.     Barriers to Therapy: potentially patient's busy work schedule  Pt's spiritual, cultural and educational needs considered and patient agreeable to plan of care and goals.  Plan:   Continue Plan of Care 1x/week with focus on attention, memory, and executive function skills    Charley Varner CCC-SLP   2/18/2022

## 2022-02-21 ENCOUNTER — PATIENT MESSAGE (OUTPATIENT)
Dept: ALLERGY | Facility: CLINIC | Age: 52
End: 2022-02-21
Payer: COMMERCIAL

## 2022-02-21 ENCOUNTER — TELEPHONE (OUTPATIENT)
Dept: ALLERGY | Facility: CLINIC | Age: 52
End: 2022-02-21
Payer: COMMERCIAL

## 2022-02-21 NOTE — TELEPHONE ENCOUNTER
----- Message from Giuseppe Britton MD sent at 1/31/2022  1:29 PM CST -----  Please let Mr. Jules know that his allergy test to peanut and tree nuts are negative.   OK to reintroduce tree nuts at home.   For peanut allergy, given his history, we can schedule a skin test, off antihistamines 1 week, to peanut before considering an observed peanut challenge in challenge clinic. Chasity murray appt.  KJ Tim

## 2022-02-28 ENCOUNTER — CLINICAL SUPPORT (OUTPATIENT)
Dept: REHABILITATION | Facility: HOSPITAL | Age: 52
End: 2022-02-28
Payer: COMMERCIAL

## 2022-02-28 DIAGNOSIS — R41.841 COGNITIVE COMMUNICATION DEFICIT: Primary | ICD-10-CM

## 2022-02-28 PROCEDURE — 97129 THER IVNTJ 1ST 15 MIN: CPT | Mod: PN

## 2022-02-28 PROCEDURE — 97130 THER IVNTJ EA ADDL 15 MIN: CPT | Mod: PN

## 2022-02-28 NOTE — PROGRESS NOTES
"[facility]  Speech Therapy Treatment Note- Post-COVID Recovery     Name: Fabiano Jules Jr.   MRN: [id number]   Therapy Diagnosis:   Encounter Diagnosis   Name Primary?    Cognitive communication deficit Yes   Physician: Howie Mathias MD  Physician Orders: Ambulatory Referral to Speech Therapy  Medical Diagnosis: Long COVID    Visit #/ Visits Authorized: 9/10  Date of Evaluation:  12/23/2021   Insurance Authorization Period: 1/1/2022 - 3/29/2022  Plan of Care Expiration Date: 3/4/2022  Extended Plan of Care:  n/a  Progress Note: 2/23/2022 - today's note  Visits Cancelled: 0  Visits No Show: 0    Time In: 10:30AM  Time Out: 11:15AM  Total Billable Time: 45 minutes     Precautions: Standard  Subjective:   Pt reports: "I have not been sleeping. I have been doing Constant Therapy everyday. I forgot stuff everyday this week.  I worked 72 hours in 5 days."       He was compliant to home exercise program.   Response to previous treatment: tolerated well   Pain Scale:  7/10 on a Visual Analog Scale currently.   Pain Location: right knee, right and left feet, lower right back   Objective:   TIMED  Procedure Min.   Cognitive Therapeutic Interventions, first 15 minutes CPT 46217  15   Cognitive Therapeutic Interventions, each additional 15 minutes CPT 50744   30         Total Timed Units: 3  Total Untimed Units: 0  Charges Billed/Number of units: 3/3      Short Term Goals: (2 weeks) Current Progress:   1. Pt will recall 4/4 memory strategies independently 3 times overall.     Progressing/ Not Met 2/7/2022   Reviewed. See education below.         Met 1/3   2. Pt will complete high level auditory memory tasks with 90% accuracy independently.       Progressing/ Not Met 2/7/2022      Not addressed in today's session.        3. Patient will independently generate strategies to improve ability to complete tasks with enhanced accuracy and time, based on review of objective previous performance on trials of functional " tasks with 80% accuracy.       Progressing/ Not Met 2/7/2022  Reviewed organization strategies for work-related tasks. Given minimal assistance, pt and clinician created a daily to do list including important items at the top of each list.     Given minimal assistance, pt determined activities that drain energy and assigned amount of spoons to each. Pt determined activities that restore energy and assigned amount of spoons to each.             4.  Pt will complete divided attention tasks in quiet environment with 80% accuracy independently.   Progressing/ Not Met 2/7/2022  See education below.        Patient Education/Response:   Reviewed spoon theory extensively. Pt reports understanding. See above.     Home program established: Patient instructed to continue prior program   Exercises were reviewed and Fabiano was able to demonstrate them prior to the end of the session.  Fabiano demonstrated good  understanding of the education provided.     See Electronic Medical Record under Patient Instructions for exercises provided throughout therapy.  Assessment:    Fabiano is progressing slowly towards his goals.  Patient benefited from review of Spoon Theory on this date. Patient is able to determine activities that drain and restore energy. He is improving with ability to assign appropriate spoons to each activity and adjust his lifestyle accordingly. Pt reports significant memory challenges last week. He continues to benefit from executive function strategy generation for work-related tasks. SLP suspects that memory deficits were worsened with lack of sleep this week.  Current goals remain appropriate. Goals to be updated as necessary.     Pt prognosis is Good. Pt will continue to benefit from skilled outpatient speech and language therapy to address the deficits listed in the problem list on initial evaluation, provide patient/family education and to maximize patient's level of independence in the home and community environment.    Medical necessity is demonstrated by the following IMPAIRMENTS:  Deficits in executive functioning and memory prevent the pt from relaying medically and safety relevant information in a timely manner in a state of emergency.     Barriers to Therapy: potentially patient's busy work schedule  Pt's spiritual, cultural and educational needs considered and patient agreeable to plan of care and goals.  Plan:   Continue Plan of Care 1x/week with focus on attention, memory, and executive function skills    Charley Varner CCC-SLP   2/28/2022

## 2022-03-11 ENCOUNTER — DOCUMENTATION ONLY (OUTPATIENT)
Dept: REHABILITATION | Facility: HOSPITAL | Age: 52
End: 2022-03-11

## 2022-03-11 ENCOUNTER — CLINICAL SUPPORT (OUTPATIENT)
Dept: REHABILITATION | Facility: HOSPITAL | Age: 52
End: 2022-03-11
Payer: COMMERCIAL

## 2022-03-11 DIAGNOSIS — R41.841 COGNITIVE COMMUNICATION DEFICIT: Primary | ICD-10-CM

## 2022-03-11 PROCEDURE — 97129 THER IVNTJ 1ST 15 MIN: CPT | Mod: PN

## 2022-03-11 PROCEDURE — 97130 THER IVNTJ EA ADDL 15 MIN: CPT | Mod: PN

## 2022-03-11 NOTE — PROGRESS NOTES
"[facility]  Speech Therapy Treatment Note- Post-COVID Recovery     Name: Fabiano Jules Jr.   MRN: [id number]   Therapy Diagnosis:   Encounter Diagnosis   Name Primary?    Cognitive communication deficit Yes   Physician: Howie Mathias MD  Physician Orders: Ambulatory Referral to Speech Therapy  Medical Diagnosis: Long COVID    Visit #/ Visits Authorized: 10/10  Date of Evaluation:  12/23/2021   Insurance Authorization Period: 1/1/2022 - 3/29/2022  Plan of Care Expiration Date: 4/1/2022  Extended Plan of Care:  Yes, 3/4/2022   Progress Note: 3/23/2022   Visits Cancelled: 0  Visits No Show: 0    Time In: 8:00AM  Time Out: 8:45AM  Total Billable Time: 45 minutes     Precautions: Standard  Subjective:   Pt reports: "I am having surgery April 12th. I am not going back to do the movie. It is too hard. I might be off the rest of the year." He also states, "Twice I was driving and I was like where am I going. It took me like 30 seconds. I forgot two things yesterday. I forgot to call the doctor's office."     He was compliant to home exercise program.   Response to previous treatment: tolerated well   Pain Scale:  7/10 on a Visual Analog Scale currently.   Pain Location: right knee, right and left feet, lower right back   Objective:   TIMED  Procedure Min.   Cognitive Therapeutic Interventions, first 15 minutes CPT 62394  15   Cognitive Therapeutic Interventions, each additional 15 minutes CPT 85425   30         Total Timed Units: 3  Total Untimed Units: 0  Charges Billed/Number of units: 3/3    Short Term Goals: (2 weeks) Current Progress:   1. Pt will recall 4/4 memory strategies independently 3 times overall.     Progressing/ Not Met 2/7/2022   Reviewed. See education below.     Independently  recalled 3/4: Write it, picture it, and association        Met 1/3   2. Pt will complete high level auditory memory tasks with 90% accuracy independently.       Progressing/ Not Met 2/7/2022      Mental " manipulation  Ranking in consistent way - 3-4 words: 100% accuracy independently    Ranking in various ways- 3-4 words: 30% accuracy independently, provided repetitions as needed to attempt to increase accuracy   3. Patient will independently generate strategies to improve ability to complete tasks with enhanced accuracy and time, based on review of objective previous performance on trials of functional tasks with 80% accuracy.       Progressing/ Not Met 2/7/2022  Name- Picture - Association : named 5/5 immediate recall given minimum cueing for strategy use                4.  Pt will complete divided attention tasks in quiet environment with 80% accuracy independently.   Progressing/ Not Met 2/7/2022  Pt completed number cancellation of three numbers while counting with 88% accuracy independently in 1 minute.     Met x1        Patient Education/Response:   Reviewed compensatory memory strategies. Pt reports understanding.     Home program established: yes-pt provided number cancellation and mental manipulation handouts. Pt will complete at home with family member.    Exercises were reviewed and Fabiano was able to demonstrate them prior to the end of the session.  Fabaino demonstrated good  understanding of the education provided.     See Electronic Medical Record under Patient Instructions for exercises provided throughout therapy.  Assessment:    Fabiano is progressing slowly towards his goals.  Patient with improvements in divided attention tasks in a quiet environment today. He is recalling 3/4 compensatory memory strategies and continues to benefit from addressing working memory deficits and strategy generation for memory and attention.  Current goals remain appropriate. Goals to be updated as necessary.     Pt prognosis is Good. Pt will continue to benefit from skilled outpatient speech and language therapy to address the deficits listed in the problem list on initial evaluation, provide patient/family education and to  maximize patient's level of independence in the home and community environment.   Medical necessity is demonstrated by the following IMPAIRMENTS:  Deficits in executive functioning and memory prevent the pt from relaying medically and safety relevant information in a timely manner in a state of emergency.     Barriers to Therapy: potentially patient's busy work schedule  Pt's spiritual, cultural and educational needs considered and patient agreeable to plan of care and goals.  Plan:   Continue Plan of Care 1x/week with focus on attention, memory, and executive function skills    Charley Varner CCC-SLP   3/11/2022

## 2022-03-11 NOTE — PLAN OF CARE
[facility]  Speech Therapy Updated Plan of Care-Neurological Rehabilitation         Date: 3/11/2022   Name: Fabiano Jules Jr.  Clinic Number: [id number]    Therapy Diagnosis:   Encounter Diagnosis   Name Primary?    Cognitive communication deficit Yes     Physician: Howie Mathias MD    Physician Orders: Ambulatory Referral to Speech Therapy  Medical Diagnosis: Long COVID    Visit #/ Visits Authorized:  10 / 10   Evaluation Date: 12/23/2021   Insurance Authorization Period: 1/1/2022 - 3/29/2022  Plan of Care Expiration:    4/1/2022  New POC Certification Period:  3/4/2022 - 4/1/2022    Total Visits Received: 12    Precautions:Standard     Subjective     Update: Fabiano is progressing slowly towards his goals. Over the past sessions, patient has determined activities that drain and restore energy and has demonstrated improvements with knowledge of the Spoon Theory. He continues to report high level memory deficits but has also made progress in recalling some of the compensatory memory strategies. He continues to benefit from support for recall of greater than three. He also continues to demonstrate deficits in divided attention.     Objective     Update: see follow up note dated 3/11/2022    Assessment     Update: Fabiano Jules Jr. presents to [facility] status post medical diagnosis of Long COVID. Demonstrates impairments including limitations as described in the problem list. Positive prognostic factors include patient motivation. Negative prognostic factors include time since onset. He presents with cognitive communication deficits characterized by deficits in memory and attention skills. No barriers to therapy identified.. Patient will benefit from skilled, outpatient rehabilitation speech therapy.    Rehab Potential: fair   Pt's spiritual, cultural, and educational needs considered and patient agreeable to plan of care and goals.    Education: Plan of Care and role of SLP in care      Previous Short Term Goals Status: 2 weeks  New Short Term Goals:   Short Term Goals: (2 weeks) Current Progress:   1. Pt will recall 4/4 memory strategies independently 3 times overall.     Progressing/ Not Met 2/28/2022   Met 1/3   2. Pt will complete high level auditory memory tasks with 90% accuracy independently.       Progressing/ Not Met 2/28/2022      Ongoing      3. Patient will independently generate strategies to improve ability to complete tasks with enhanced accuracy and time, based on review of objective previous performance on trials of functional tasks with 80% accuracy.       Progressing/ Not Met 2/28/2022  Ongoing               4.  Pt will complete divided attention tasks in quiet environment with 80% accuracy independently.   Progressing/ Not Met 2/28/2022  Ongoing        New Short Term Goals: 2 weeks  Short Term Goals:  Current Progress:   1. Pt will recall 4/4 memory strategies independently 3 times overall.     Progressing/ Not Met 2/28/2022   Met 1/3   2. Pt will complete high level auditory memory tasks with 90% accuracy independently.       Progressing/ Not Met 2/28/2022      Ongoing      3. Patient will independently generate strategies to improve ability to complete tasks with enhanced accuracy and time, based on review of objective previous performance on trials of functional tasks with 80% accuracy.       Progressing/ Not Met 2/28/2022  Ongoing               4.  Pt will complete divided attention tasks in quiet environment with 80% accuracy independently.   Progressing/ Not Met 2/28/2022  Ongoing         Long Term Goal Status:  4 weeks  1. Pt will maintain functional cognitive-linguistic based skills and utilize compensatory strategies to communicate wants and needs effectively to different conversational partners, maintain safety, and participate socially in functional living environment.  Progressing/ Not Met 3/4/2022      Goals Previously Met:  See Plan of care update 2/7/2022      Reasons for Recertification of Therapy: Patient's has demonstrated progress thus far and continues to demonstrate deficits     Plan     Updated Certification Period: 3/4/2022 to 4/1/2022    Recommended Treatment Plan: Patient will participate in the [facility] program for speech therapy 1 times per week to address his Communication deficits, to educate patient and their family, and to participate in a home exercise program.     Other recommendations: n/a     Therapist's Name:  Charley Varner CCC-SLP   3/11/2022      I CERTIFY THE NEED FOR THESE SERVICES FURNISHED UNDER THIS PLAN OF TREATMENT AND WHILE UNDER MY CARE      Physician Name: _______________________________    Physician Signature: ____________________________

## 2022-03-23 ENCOUNTER — PATIENT MESSAGE (OUTPATIENT)
Dept: FAMILY MEDICINE | Facility: CLINIC | Age: 52
End: 2022-03-23
Payer: COMMERCIAL

## 2022-03-24 NOTE — TELEPHONE ENCOUNTER
I spoke with pt via phone, clearance scheduled for 03/29/2022 with Mrs. Hernandez. Requested clearance form from [facility].

## 2022-03-28 ENCOUNTER — CLINICAL SUPPORT (OUTPATIENT)
Dept: REHABILITATION | Facility: HOSPITAL | Age: 52
End: 2022-03-28
Payer: COMMERCIAL

## 2022-03-28 DIAGNOSIS — R41.841 COGNITIVE COMMUNICATION DEFICIT: Primary | ICD-10-CM

## 2022-03-28 PROCEDURE — 97130 THER IVNTJ EA ADDL 15 MIN: CPT | Mod: PN

## 2022-03-28 PROCEDURE — 97129 THER IVNTJ 1ST 15 MIN: CPT | Mod: PN

## 2022-03-28 NOTE — PROGRESS NOTES
"[facility]  Speech Therapy Treatment Note- Post-COVID Recovery   PROGRESS NOTE      Name: Fabiano Jules Jr.   MRN: [id number]   Therapy Diagnosis:   Encounter Diagnosis   Name Primary?    Cognitive communication deficit Yes   Physician: Howie Mathias MD  Physician Orders: Ambulatory Referral to Speech Therapy  Medical Diagnosis: Long COVID    Visit #/ Visits Authorized: 11/10  Date of Evaluation:  12/23/2021   Insurance Authorization Period: 1/1/2022 - 4/16/2022  Plan of Care Expiration Date: 4/1/2022  Extended Plan of Care:  Yes, 3/4/2022   Progress Note: 3/23/2022 - today's note  Visits Cancelled: 0  Visits No Show: 0    Time In: 8:00AM  Time Out: 8:45AM  Total Billable Time: 45 minutes     Precautions: Standard  Subjective:   Pt reports: Surgery on April 5th. "I had to quit that job."     He was compliant to home exercise program.   Response to previous treatment: tolerated well   Pain Scale:  7/10 on a Visual Analog Scale currently.   Pain Location: right knee, right and left feet, lower right back   Objective:   TIMED  Procedure Min.   Cognitive Therapeutic Interventions, first 15 minutes CPT 92483  15   Cognitive Therapeutic Interventions, each additional 15 minutes CPT 20809  30         Total Timed Units: 3  Total Untimed Units: 0  Charges Billed/Number of units: 3/3    Short Term Goals: (2 weeks) Current Progress:   1. Pt will recall 4/4 memory strategies independently 3 times overall.     Progressing/ Not Met 2/7/2022   Recalled 4/4 independently         Met 2/3   2. Pt will complete high level auditory memory tasks with 90% accuracy independently.       Progressing/ Not Met 2/7/2022      Constant Therapy Level 2/5 Matching the Sounds: 86% accuracy independently    Understanding Voicemails Constant Therapy Level  1/1: 80% accuracy independently    Understanding Stories You Hear  Constant Therapy Level  4/6:  90% accuracy     Remember Spoken Word Order (Constant Therapy Level 2/3): 50% " accuracy     Met x1   3. Patient will independently generate strategies to improve ability to complete tasks with enhanced accuracy and time, based on review of objective previous performance on trials of functional tasks with 80% accuracy.       Progressing/ Not Met 2/7/2022  Clinician provided moderate cueing for strategies for completion of high level memory tasks.         4.  Pt will complete divided attention tasks in quiet environment with 80% accuracy independently.   Progressing/ Not Met 2/7/2022  Not addressed in today's session.      Met x1        Patient Education/Response:   Discussed recommendation to continue to complete auditory memory tasks following discharge. Pt reports understanding.     Home program established: Patient instructed to continue prior program   Exercises were reviewed and Fabiano was able to demonstrate them prior to the end of the session.  Fabiano demonstrated good  understanding of the education provided.     See Electronic Medical Record under Patient Instructions for exercises provided throughout therapy.  Assessment:    Fabiano is progressing slowly towards his goals. Patient recalled 4/4 memory strategies today independently. He demonstrated improved performance with high level auditory memory strategies. Patient continues to report brain fog and memory deficits; however, reports use of compensatory strategies when possible. Current goals remain appropriate. Goals to be updated as necessary.     Pt prognosis is Good. Pt will continue to benefit from skilled outpatient speech and language therapy to address the deficits listed in the problem list on initial evaluation, provide patient/family education and to maximize patient's level of independence in the home and community environment.   Medical necessity is demonstrated by the following IMPAIRMENTS:  Deficits in executive functioning and memory prevent the pt from relaying medically and safety relevant information in a timely manner  in a state of emergency.     Barriers to Therapy: potentially patient's busy work schedule  Pt's spiritual, cultural and educational needs considered and patient agreeable to plan of care and goals.  Plan:   Continue Plan of Care 1x/week with focus on attention, memory, and executive function skills    Charley Varner CCC-SLP   3/28/2022

## 2022-03-29 ENCOUNTER — OFFICE VISIT (OUTPATIENT)
Dept: FAMILY MEDICINE | Facility: CLINIC | Age: 52
End: 2022-03-29
Payer: COMMERCIAL

## 2022-03-29 ENCOUNTER — TELEPHONE (OUTPATIENT)
Dept: FAMILY MEDICINE | Facility: CLINIC | Age: 52
End: 2022-03-29

## 2022-03-29 ENCOUNTER — HOSPITAL ENCOUNTER (OUTPATIENT)
Dept: RADIOLOGY | Facility: CLINIC | Age: 52
Discharge: HOME | End: 2022-03-29
Attending: NURSE PRACTITIONER
Payer: COMMERCIAL

## 2022-03-29 VITALS
WEIGHT: 315 LBS | SYSTOLIC BLOOD PRESSURE: 138 MMHG | DIASTOLIC BLOOD PRESSURE: 88 MMHG | HEART RATE: 67 BPM | OXYGEN SATURATION: 97 % | HEIGHT: 69 IN | BODY MASS INDEX: 46.65 KG/M2

## 2022-03-29 DIAGNOSIS — G47.33 OSA (OBSTRUCTIVE SLEEP APNEA): ICD-10-CM

## 2022-03-29 DIAGNOSIS — M54.41 CHRONIC RIGHT-SIDED LOW BACK PAIN WITH RIGHT-SIDED SCIATICA: ICD-10-CM

## 2022-03-29 DIAGNOSIS — I10 PRIMARY HYPERTENSION: Chronic | ICD-10-CM

## 2022-03-29 DIAGNOSIS — Z01.818 PRE-OP EXAM: ICD-10-CM

## 2022-03-29 DIAGNOSIS — R41.841 COGNITIVE COMMUNICATION DEFICIT: ICD-10-CM

## 2022-03-29 DIAGNOSIS — Z01.818 PRE-OP EXAM: Primary | ICD-10-CM

## 2022-03-29 DIAGNOSIS — G89.29 CHRONIC RIGHT-SIDED LOW BACK PAIN WITH RIGHT-SIDED SCIATICA: ICD-10-CM

## 2022-03-29 DIAGNOSIS — E66.01 CLASS 3 SEVERE OBESITY WITH BODY MASS INDEX (BMI) OF 40.0 TO 44.9 IN ADULT, UNSPECIFIED OBESITY TYPE, UNSPECIFIED WHETHER SERIOUS COMORBIDITY PRESENT: ICD-10-CM

## 2022-03-29 PROCEDURE — 3008F PR BODY MASS INDEX (BMI) DOCUMENTED: ICD-10-PCS | Mod: CPTII,S$GLB,, | Performed by: NURSE PRACTITIONER

## 2022-03-29 PROCEDURE — 93010 ELECTROCARDIOGRAM REPORT: CPT | Mod: S$GLB,,, | Performed by: INTERNAL MEDICINE

## 2022-03-29 PROCEDURE — 1159F MED LIST DOCD IN RCRD: CPT | Mod: CPTII,S$GLB,, | Performed by: NURSE PRACTITIONER

## 2022-03-29 PROCEDURE — 93010 EKG 12-LEAD: ICD-10-PCS | Mod: S$GLB,,, | Performed by: INTERNAL MEDICINE

## 2022-03-29 PROCEDURE — 99214 PR OFFICE/OUTPT VISIT, EST, LEVL IV, 30-39 MIN: ICD-10-PCS | Mod: S$GLB,,, | Performed by: NURSE PRACTITIONER

## 2022-03-29 PROCEDURE — 71046 XR CHEST PA AND LATERAL: ICD-10-PCS | Mod: 26,,, | Performed by: RADIOLOGY

## 2022-03-29 PROCEDURE — 3008F BODY MASS INDEX DOCD: CPT | Mod: CPTII,S$GLB,, | Performed by: NURSE PRACTITIONER

## 2022-03-29 PROCEDURE — 4010F ACE/ARB THERAPY RXD/TAKEN: CPT | Mod: CPTII,S$GLB,, | Performed by: NURSE PRACTITIONER

## 2022-03-29 PROCEDURE — 99214 OFFICE O/P EST MOD 30 MIN: CPT | Mod: S$GLB,,, | Performed by: NURSE PRACTITIONER

## 2022-03-29 PROCEDURE — 93005 EKG 12-LEAD: ICD-10-PCS | Mod: S$GLB,,, | Performed by: NURSE PRACTITIONER

## 2022-03-29 PROCEDURE — 3075F PR MOST RECENT SYSTOLIC BLOOD PRESS GE 130-139MM HG: ICD-10-PCS | Mod: CPTII,S$GLB,, | Performed by: NURSE PRACTITIONER

## 2022-03-29 PROCEDURE — 3075F SYST BP GE 130 - 139MM HG: CPT | Mod: CPTII,S$GLB,, | Performed by: NURSE PRACTITIONER

## 2022-03-29 PROCEDURE — 3079F DIAST BP 80-89 MM HG: CPT | Mod: CPTII,S$GLB,, | Performed by: NURSE PRACTITIONER

## 2022-03-29 PROCEDURE — 99999 PR PBB SHADOW E&M-EST. PATIENT-LVL III: CPT | Mod: PBBFAC,,, | Performed by: NURSE PRACTITIONER

## 2022-03-29 PROCEDURE — 93005 ELECTROCARDIOGRAM TRACING: CPT | Mod: S$GLB,,, | Performed by: NURSE PRACTITIONER

## 2022-03-29 PROCEDURE — 3079F PR MOST RECENT DIASTOLIC BLOOD PRESSURE 80-89 MM HG: ICD-10-PCS | Mod: CPTII,S$GLB,, | Performed by: NURSE PRACTITIONER

## 2022-03-29 PROCEDURE — 99999 PR PBB SHADOW E&M-EST. PATIENT-LVL III: ICD-10-PCS | Mod: PBBFAC,,, | Performed by: NURSE PRACTITIONER

## 2022-03-29 PROCEDURE — 71046 X-RAY EXAM CHEST 2 VIEWS: CPT | Mod: TC,FY,PO

## 2022-03-29 PROCEDURE — 4010F PR ACE/ARB THEARPY RXD/TAKEN: ICD-10-PCS | Mod: CPTII,S$GLB,, | Performed by: NURSE PRACTITIONER

## 2022-03-29 PROCEDURE — 71046 X-RAY EXAM CHEST 2 VIEWS: CPT | Mod: 26,,, | Performed by: RADIOLOGY

## 2022-03-29 PROCEDURE — 1159F PR MEDICATION LIST DOCUMENTED IN MEDICAL RECORD: ICD-10-PCS | Mod: CPTII,S$GLB,, | Performed by: NURSE PRACTITIONER

## 2022-03-29 NOTE — PROGRESS NOTES
Subjective:       Patient ID: Fabiano Jules Jr. is a 52 y.o. male.    Chief Complaint: Pre-op Exam    52-year-old male presents to the clinic today for preop exam.  He is scheduled to have a fusion of L4-L5 on 04/05/2022 by Dr. Adler.  He states the battery fell on his back on 04/09/2018 while at [employer].  He is still working as a film director.  He denies any chest pain, heart palpitations, shortness of breath, or swelling to lower extremities.  He denies any headaches, dizziness, or blurred vision.  He denies any difficulty with urination or dysuria.  He denies any abdominal pain, nausea, vomiting, or diarrhea.  He denies any upper respiratory tract infection symptoms.  Denies any specific complaints today.  He has been taking anti-inflammatories which I have instructed him to stop today prior to surgery.  He has tried physical therapy and multiple back injections prior to considering surgery.  He has been taking anti-inflammatories which he will stop at this time. He has not been on prolonged courses of steroids. He denies any history of problems or history of  family problems with anesthesia. He reports no history of blood clots or excessive bleeding. He reports fair exercise tolerance.       Past Medical History:   Diagnosis Date    Arthritis     Back injury     CPAP (continuous positive airway pressure) dependence     Hypertension     PONV (postoperative nausea and vomiting)     Sleep apnea     c pap machine used     Past Surgical History:   Procedure Laterality Date    CIRCUMCISION      COLONOSCOPY N/A 12/3/2018    Procedure: COLONOSCOPY;  Surgeon: CHRISSY Reyna MD;  Location: [address]);  Service: Endoscopy;  Laterality: N/A;    epidural steroid injection X 2      [facility]       [facility] [place]     LIPOMA RESECTION      Back of neck    MAGNETIC RESONANCE IMAGING N/A 5/29/2020    Procedure: MRI (MAGNETIC RESONANCE IMAGING) LUMBAR SPINE;   Surgeon: [facility] Diagnostic Provider;  Location: [facility];  Service: General;  Laterality: N/A;  COVID NEG    MAGNETIC RESONANCE IMAGING N/A 12/3/2021    Procedure: MRI (MAGNETIC RESONANCE IMAGING), LUMBAR SPINE;  Surgeon: [facility] Diagnostic Provider;  Location: [facility];  Service: General;  Laterality: N/A;  In MRI @ 7:50 per Ariela    MEDIAL COLLATERAL LIGAMENT AND LATERAL COLLATERAL LIGAMENT REPAIR, KNEE Right 09/01/2018    Dr. Christophe Gore in [place]     MYELOGRAPHY N/A 6/23/2020    Procedure: MYELOGRAM;  Surgeon: [facility] Diagnostic Provider;  Location: [facility];  Service: General;  Laterality: N/A;      reports that he quit smoking about 13 years ago. He has a 5.00 pack-year smoking history. He has never used smokeless tobacco. He reports current alcohol use. He reports previous drug use. Drug: Hydrocodone.  Review of Systems   Constitutional: Negative for chills and fatigue.   HENT: Negative for congestion, ear pain, sinus pressure and sore throat.    Respiratory: Negative for cough, shortness of breath and wheezing.    Cardiovascular: Negative for chest pain, palpitations and leg swelling.   Gastrointestinal: Negative for abdominal pain, constipation, nausea and vomiting.   Genitourinary: Negative for difficulty urinating.   Musculoskeletal: Positive for back pain. Negative for gait problem.   Neurological: Negative for dizziness, weakness, light-headedness and headaches.        Numbness and tingling in right leg        Objective:      Physical Exam  Constitutional:       Appearance: He is well-developed.   HENT:      Head: Normocephalic and atraumatic.      Right Ear: External ear normal.      Left Ear: External ear normal.      Nose: Nose normal.      Mouth/Throat:      Pharynx: No oropharyngeal exudate.   Eyes:      General: No scleral icterus.        Right eye: No discharge.         Left eye: No discharge.      Conjunctiva/sclera: Conjunctivae normal.      Pupils: Pupils are equal, round, and reactive to light.    Neck:      Thyroid: No thyromegaly.      Vascular: No JVD.      Trachea: No tracheal deviation.   Cardiovascular:      Rate and Rhythm: Normal rate and regular rhythm.      Heart sounds: Normal heart sounds. No murmur heard.    No friction rub. No gallop.   Pulmonary:      Effort: Pulmonary effort is normal. No respiratory distress.      Breath sounds: Normal breath sounds. No stridor. No wheezing or rales.   Chest:      Chest wall: No tenderness.   Abdominal:      General: Bowel sounds are normal. There is no distension.      Palpations: Abdomen is soft. There is no mass.      Tenderness: There is no abdominal tenderness. There is no guarding or rebound.   Musculoskeletal:         General: Tenderness present. Normal range of motion.      Cervical back: Normal range of motion and neck supple.      Comments: His spine is nontender to palpation.  He has tenderness to the right paraspinal muscles of L4-L5.  He has a positive right leg raise.  His sensation is intact.  His reflexes are normal.   Lymphadenopathy:      Cervical: No cervical adenopathy.   Skin:     General: Skin is warm and dry.      Coloration: Skin is not pale.      Findings: No erythema or rash.   Neurological:      Mental Status: He is alert and oriented to person, place, and time.   Psychiatric:         Mood and Affect: Mood normal.         Behavior: Behavior normal.         Thought Content: Thought content normal.         Judgment: Judgment normal.         Assessment:       1. Pre-op exam    2. Chronic right-sided low back pain with right-sided sciatica    3. Primary hypertension    4. Class 3 severe obesity with body mass index (BMI) of 40.0 to 44.9 in adult, unspecified obesity type, unspecified whether serious comorbidity present    5. RAFAEL (obstructive sleep apnea)    6. Cognitive communication deficit        Plan:         Pre-o the p exam  -     CBC Auto Differential; Future; Expected date: 03/29/2022  -     X-Ray Chest PA And Lateral; Future;  Expected date: 03/29/2022  -     IN OFFICE EKG 12-LEAD (to Muse)  -     Comprehensive Metabolic Panel; Future; Expected date: 03/29/2022    Chronic right-sided low back pain with right-sided sciatica  - surgery scheduled on 04/05/2022 on L4-L5    Primary hypertension  - The current medical regimen is effective;  continue present plan and medications.    Class 3 severe obesity with body mass index (BMI) of 40.0 to 44.9 in adult, unspecified obesity type, unspecified whether serious comorbidity present  - The patient is asked to make an attempt to improve diet and exercise patterns to aid in medical management of this problem.    RAFAEL (obstructive sleep apnea)  - continues CPAP machine as directed    Cognitive communication deficit  - developed after having COVID    EKG normal sinus rhythm no ST wave changes  CMP WNL   CBC stable  Chest x-ray  no acute cardiopulmonary abnormality appreciated radiographically.  No interval detrimental change from compared to the prior exam.    This is a low cardiac risk patient that is medically maximized for a low risk cardiac surgery. She is cleared for surgery.           No follow-ups on file.

## 2022-03-29 NOTE — TELEPHONE ENCOUNTER
[facility] requesting clearance letter for patient. Please advise. Thank you.

## 2022-03-29 NOTE — TELEPHONE ENCOUNTER
----- Message from Kaitlin Garcia sent at 3/29/2022  3:38 PM CDT -----  Regarding: Surg Babak Mckeon w/[facility] [phone]  F# [phone].. Linda is calling in ref to patients surg clearance letter. Patient having back surgery T-Lift under general anes. Scheduled for 4/5. Please fax clearance to the above number.     Thank You

## 2022-03-29 NOTE — PATIENT INSTRUCTIONS
If you are due for any health screening(s) below please notify me so we can arrange them to be ordered and scheduled to maintain your health.     Health Maintenance   Topic Date Due    Hepatitis C Screening  Never done    Hemoglobin A1c  12/23/2021    Lipid Panel  12/04/2022    TETANUS VACCINE  10/01/2024                Check CBC and CMP   Check Chest x-ray    Check EKG]   Surgery on 4/6 L4- L5 fusion    Stop anti-inflammatories today

## 2022-03-30 ENCOUNTER — TELEPHONE (OUTPATIENT)
Dept: FAMILY MEDICINE | Facility: CLINIC | Age: 52
End: 2022-03-30
Payer: COMMERCIAL

## 2022-03-30 PROBLEM — M48.062 SPINAL STENOSIS OF LUMBAR REGION WITH NEUROGENIC CLAUDICATION: Status: ACTIVE | Noted: 2022-03-30

## 2022-03-30 NOTE — TELEPHONE ENCOUNTER
I left a message on the patient's voicemail that he was cleared for surgery.  I explained to him that I was faxing over his surgery clearance to Linda at [facility].  If he had any questions, please feel free to call me at the office.

## 2022-03-31 ENCOUNTER — PATIENT MESSAGE (OUTPATIENT)
Dept: FAMILY MEDICINE | Facility: CLINIC | Age: 52
End: 2022-03-31
Payer: COMMERCIAL

## 2022-04-01 ENCOUNTER — PATIENT MESSAGE (OUTPATIENT)
Dept: FAMILY MEDICINE | Facility: CLINIC | Age: 52
End: 2022-04-01
Payer: COMMERCIAL

## 2022-04-05 ENCOUNTER — PATIENT MESSAGE (OUTPATIENT)
Dept: FAMILY MEDICINE | Facility: CLINIC | Age: 52
End: 2022-04-05
Payer: COMMERCIAL

## 2022-04-05 PROBLEM — Z79.899 POLYPHARMACY: Status: ACTIVE | Noted: 2022-04-05

## 2022-04-06 ENCOUNTER — PATIENT MESSAGE (OUTPATIENT)
Dept: FAMILY MEDICINE | Facility: CLINIC | Age: 52
End: 2022-04-06
Payer: COMMERCIAL

## 2022-04-06 NOTE — TELEPHONE ENCOUNTER
Please see attached portal message from patient. Patient has question related lab performed by Orthopedic physician. Please advise. Thank you.

## 2022-04-11 ENCOUNTER — PATIENT MESSAGE (OUTPATIENT)
Dept: FAMILY MEDICINE | Facility: CLINIC | Age: 52
End: 2022-04-11
Payer: COMMERCIAL

## 2022-04-13 ENCOUNTER — PATIENT MESSAGE (OUTPATIENT)
Dept: FAMILY MEDICINE | Facility: CLINIC | Age: 52
End: 2022-04-13
Payer: COMMERCIAL

## 2022-04-13 NOTE — TELEPHONE ENCOUNTER
I spoke with pt via phone, he states that he was seeing speech therapy before his surgery.   He would like to know if there is anything else he can do to get him to feeling more like him self after covid. Please advise, thank you !

## 2022-04-15 ENCOUNTER — PATIENT MESSAGE (OUTPATIENT)
Dept: FAMILY MEDICINE | Facility: CLINIC | Age: 52
End: 2022-04-15
Payer: COMMERCIAL

## 2022-04-18 ENCOUNTER — DOCUMENTATION ONLY (OUTPATIENT)
Dept: REHABILITATION | Facility: HOSPITAL | Age: 52
End: 2022-04-18
Payer: COMMERCIAL

## 2022-04-18 DIAGNOSIS — E55.9 VITAMIN D DEFICIENCY: ICD-10-CM

## 2022-04-18 RX ORDER — ERGOCALCIFEROL 1.25 MG/1
50000 CAPSULE ORAL
Qty: 24 CAPSULE | Refills: 0 | Status: SHIPPED | OUTPATIENT
Start: 2022-04-18 | End: 2023-03-31 | Stop reason: SDUPTHER

## 2022-04-18 NOTE — PROGRESS NOTES
Outpatient Therapy Discharge Summary   Discharge Date: 4/18/2022   Name: Fabiano Jules Jr.  Clinic Number: [id number]  Therapy Diagnosis: No diagnosis found.  Physician: No ref. provider found  Physician Orders: Ambulatory Referral to Speech Therapy  Medical Diagnosis: Long COVID  Evaluation Date: 12/23/2021     Date of Last visit: 3/28/2022  Total Visits Received: 11  Cancelled Visits: 0  No Show Visits: 0    Assessment    Assessment of Current Status: Fabiano was progressing slowly towards his goals. Patient improved with recall of memory strategies. Patient continued to report brain fog and memory deficits; however, reports use of compensatory strategies when possible.     Goals:   Short Term Goals:  Current Progress:   1. Pt will recall 4/4 memory strategies independently 3 times overall.      Met 1/3, Progressing/ Not Met 3/28/2022    2. Pt will complete high level auditory memory tasks with 90% accuracy independently.           Progressing/ Not Met 3/28/2022       3. Patient will independently generate strategies to improve ability to complete tasks with enhanced accuracy and time, based on review of objective previous performance on trials of functional tasks with 80% accuracy.    Progressing/ Not Met 3/28/2022                4.  Pt will complete divided attention tasks in quiet environment with 80% accuracy independently.    Progressing/ Not Met 3/28/2022       Long Term Goals:  1. Pt will maintain functional cognitive-linguistic based skills and utilize compensatory strategies to communicate wants and needs effectively to different conversational partners, maintain safety, and participate socially in functional living environment.  Progressing/ Not Met 3/28/2022     Discharge reason: Patient has reached the maximum rehab potential for the present time    Plan   This patient is discharged from Speech Therapy    Charley Varner CCC-SLP   4/18/2022

## 2022-04-19 ENCOUNTER — EXTERNAL HOME HEALTH (OUTPATIENT)
Dept: HOME HEALTH SERVICES | Facility: HOSPITAL | Age: 52
End: 2022-04-19
Payer: COMMERCIAL

## 2022-05-27 ENCOUNTER — PATIENT MESSAGE (OUTPATIENT)
Dept: FAMILY MEDICINE | Facility: CLINIC | Age: 52
End: 2022-05-27
Payer: COMMERCIAL

## 2022-05-27 DIAGNOSIS — G47.33 OSA (OBSTRUCTIVE SLEEP APNEA): Primary | ICD-10-CM

## 2022-05-30 ENCOUNTER — PATIENT MESSAGE (OUTPATIENT)
Dept: FAMILY MEDICINE | Facility: CLINIC | Age: 52
End: 2022-05-30
Payer: COMMERCIAL

## 2022-05-30 NOTE — TELEPHONE ENCOUNTER
CPAP titration study    Message text     Portal message forwarded to patient for notification cpap titration study has been placed.

## 2022-06-01 ENCOUNTER — OFFICE VISIT (OUTPATIENT)
Dept: BARIATRICS | Facility: CLINIC | Age: 52
End: 2022-06-01
Payer: COMMERCIAL

## 2022-06-01 VITALS
RESPIRATION RATE: 16 BRPM | DIASTOLIC BLOOD PRESSURE: 96 MMHG | WEIGHT: 315 LBS | HEART RATE: 96 BPM | HEIGHT: 69 IN | SYSTOLIC BLOOD PRESSURE: 140 MMHG | TEMPERATURE: 98 F | BODY MASS INDEX: 46.65 KG/M2

## 2022-06-01 DIAGNOSIS — G47.33 OSA (OBSTRUCTIVE SLEEP APNEA): ICD-10-CM

## 2022-06-01 DIAGNOSIS — E55.9 VITAMIN D DEFICIENCY: ICD-10-CM

## 2022-06-01 DIAGNOSIS — E66.01 MORBID OBESITY: Primary | ICD-10-CM

## 2022-06-01 PROCEDURE — 1159F MED LIST DOCD IN RCRD: CPT | Mod: CPTII,S$GLB,, | Performed by: SURGERY

## 2022-06-01 PROCEDURE — 4010F PR ACE/ARB THEARPY RXD/TAKEN: ICD-10-PCS | Mod: CPTII,S$GLB,, | Performed by: SURGERY

## 2022-06-01 PROCEDURE — 99213 OFFICE O/P EST LOW 20 MIN: CPT | Mod: S$GLB,,, | Performed by: SURGERY

## 2022-06-01 PROCEDURE — 4010F ACE/ARB THERAPY RXD/TAKEN: CPT | Mod: CPTII,S$GLB,, | Performed by: SURGERY

## 2022-06-01 PROCEDURE — 99999 PR PBB SHADOW E&M-EST. PATIENT-LVL III: ICD-10-PCS | Mod: PBBFAC,,, | Performed by: SURGERY

## 2022-06-01 PROCEDURE — 1159F PR MEDICATION LIST DOCUMENTED IN MEDICAL RECORD: ICD-10-PCS | Mod: CPTII,S$GLB,, | Performed by: SURGERY

## 2022-06-01 PROCEDURE — 3008F PR BODY MASS INDEX (BMI) DOCUMENTED: ICD-10-PCS | Mod: CPTII,S$GLB,, | Performed by: SURGERY

## 2022-06-01 PROCEDURE — 3077F SYST BP >= 140 MM HG: CPT | Mod: CPTII,S$GLB,, | Performed by: SURGERY

## 2022-06-01 PROCEDURE — 3077F PR MOST RECENT SYSTOLIC BLOOD PRESSURE >= 140 MM HG: ICD-10-PCS | Mod: CPTII,S$GLB,, | Performed by: SURGERY

## 2022-06-01 PROCEDURE — 3008F BODY MASS INDEX DOCD: CPT | Mod: CPTII,S$GLB,, | Performed by: SURGERY

## 2022-06-01 PROCEDURE — 99213 PR OFFICE/OUTPT VISIT, EST, LEVL III, 20-29 MIN: ICD-10-PCS | Mod: S$GLB,,, | Performed by: SURGERY

## 2022-06-01 PROCEDURE — 3080F PR MOST RECENT DIASTOLIC BLOOD PRESSURE >= 90 MM HG: ICD-10-PCS | Mod: CPTII,S$GLB,, | Performed by: SURGERY

## 2022-06-01 PROCEDURE — 99999 PR PBB SHADOW E&M-EST. PATIENT-LVL III: CPT | Mod: PBBFAC,,, | Performed by: SURGERY

## 2022-06-01 PROCEDURE — 3080F DIAST BP >= 90 MM HG: CPT | Mod: CPTII,S$GLB,, | Performed by: SURGERY

## 2022-06-01 RX ORDER — OXYCODONE AND ACETAMINOPHEN 7.5; 325 MG/1; MG/1
1 TABLET ORAL 3 TIMES DAILY PRN
Status: ON HOLD | COMMUNITY
Start: 2022-05-18 | End: 2022-12-20 | Stop reason: HOSPADM

## 2022-06-01 RX ORDER — CYCLOBENZAPRINE HCL 10 MG
10 TABLET ORAL 3 TIMES DAILY
COMMUNITY
Start: 2022-04-29 | End: 2022-11-15

## 2022-06-01 NOTE — PROGRESS NOTES
Medically Supervised Weight Loss Documentation    Date of Visit: 06/02/2022    Patient: Fabiano Jules Jr.    Current Weight: 355  Current BMI: Body mass index is 52.48 kg/m².  Weight Change: + 5    Last Weight: 350    Beginning Weight: 350      Vitals:   Vitals:    06/01/22 1050   BP: (!) 140/96   Pulse: 96   Resp: 16   Temp: 98 °F (36.7 °C)       Comorbidities:   Past Medical History:   Diagnosis Date    Arthritis     Back injury     COVID     CPAP (continuous positive airway pressure) dependence     Hypertension     PONV (postoperative nausea and vomiting)     Sleep apnea     c pap machine used       Medications:  Current Outpatient Medications on File Prior to Visit   Medication Sig Dispense Refill    amLODIPine (NORVASC) 5 MG tablet Take 1 tablet (5 mg total) by mouth once daily. 30 tablet 11    atorvastatin (LIPITOR) 40 MG tablet Take 1 tablet (40 mg total) by mouth every evening. 90 tablet 3    b complex vitamins (B COMPLEX-VITAMIN B12) tablet Take 1 tablet by mouth once daily. 90 tablet 1    cimetidine (TAGAMET) 400 MG tablet TAKE 1 TABLET (400 MG TOTAL) BY MOUTH 2 (TWO) TIMES DAILY AS NEEDED (ALLERGIC REACTION). 180 tablet 1    cyclobenzaprine (FLEXERIL) 10 MG tablet Take 10 mg by mouth 3 (three) times daily.      EPINEPHrine (EPIPEN) 0.3 mg/0.3 mL AtIn Inject into the muscle once for one dose as needed. 2 each 1    ergocalciferol (ERGOCALCIFEROL) 50,000 unit Cap Take 1 capsule (50,000 Units total) by mouth twice a week. 24 capsule 0    ferrous sulfate (FEOSOL) 325 mg (65 mg iron) Tab tablet Take 1 tablet (325 mg total) by mouth every other day. 90 tablet 0    LIDOcaine-methyl sal-menthol 4-4-5 % PtMd Apply 1 patch topically once daily at 6am. 10 patch 0    lisinopriL-hydrochlorothiazide (PRINZIDE,ZESTORETIC) 20-12.5 mg per tablet TAKE 2 TABLETS BY MOUTH EVERY  tablet 1    oxyCODONE-acetaminophen (PERCOCET) 7.5-325 mg per tablet Take 1 tablet by mouth 3 (three) times daily as needed.       gabapentin (NEURONTIN) 300 MG capsule Take 1 capsule (300 mg total) by mouth 3 (three) times daily. 90 capsule 0     Current Facility-Administered Medications on File Prior to Visit   Medication Dose Route Frequency Provider Last Rate Last Admin    acetaminophen tablet 650 mg  650 mg Oral Q4H PRN Katie Shane NP        ceFAZolin in dextrose (iso-os) 2 gram/100 mL PgBk 2,000 mg  2 g Intravenous Q8H Katie Shane NP   Stopped at 04/09/22 1036    HYDROmorphone injection 1 mg  1 mg Intravenous Q6H PRN Katie Shane NP   1 mg at 04/09/22 0539    ondansetron injection 4 mg  4 mg Intravenous Q6H PRN Katie Shane NP        oxyCODONE-acetaminophen  mg per tablet 1 tablet  1 tablet Oral Q4H PRN Katie Shane NP   1 tablet at 04/09/22 0906    oxyCODONE-acetaminophen 5-325 mg per tablet 1 tablet  1 tablet Oral Q4H PRN Katie Shane NP   1 tablet at 04/08/22 0947    polyethylene glycol packet 17 g  17 g Oral Daily Katie Shane NP   17 g at 04/08/22 0822         Body comp:  uto      Diet Education Discussed:    Breakfast:  Oatmeal and fruits  Lunch:  Regular plate of food with veggies and meat  Dinner:  Protein shake    Still snacks on some sweets     Exercise/Activity Discussed:    Doing physical therapy for his recent back surgery    Behavior or Diet Goals for this patient:    Stop the sweets, get on track with low carb dieting.  Get back to exercising when feasible.  Has back brace and other equipment from recent surgery which accounts for some of the weight gain    He will need: 6 MSD     Labs  EKG  UGI   dietary consult  psych consult   Seminar        I will obtain the following clearances prior to surgery: Cardiology- done              Previous ECHO, Nuclear Stress Test (7/14/2021)    : total time spent for visit: 20 minutes

## 2022-06-20 ENCOUNTER — PROCEDURE VISIT (OUTPATIENT)
Dept: SLEEP MEDICINE | Facility: HOSPITAL | Age: 52
End: 2022-06-20
Attending: STUDENT IN AN ORGANIZED HEALTH CARE EDUCATION/TRAINING PROGRAM
Payer: COMMERCIAL

## 2022-06-20 DIAGNOSIS — G47.33 OSA (OBSTRUCTIVE SLEEP APNEA): ICD-10-CM

## 2022-06-20 PROCEDURE — 95811 POLYSOM 6/>YRS CPAP 4/> PARM: CPT

## 2022-06-21 NOTE — PATIENT INSTRUCTIONS
CPAP was performed and pt was informed to call his referring physician in 3 to 5 days to make a f/u appt.

## 2022-06-24 ENCOUNTER — TELEPHONE (OUTPATIENT)
Dept: BARIATRICS | Facility: CLINIC | Age: 52
End: 2022-06-24
Payer: COMMERCIAL

## 2022-06-30 ENCOUNTER — PATIENT MESSAGE (OUTPATIENT)
Dept: REHABILITATION | Facility: HOSPITAL | Age: 52
End: 2022-06-30
Payer: COMMERCIAL

## 2022-07-01 DIAGNOSIS — M43.26 ANKYLOSIS OF LUMBAR SPINE: Primary | ICD-10-CM

## 2022-07-05 ENCOUNTER — CLINICAL SUPPORT (OUTPATIENT)
Dept: BARIATRICS | Facility: CLINIC | Age: 52
End: 2022-07-05
Payer: COMMERCIAL

## 2022-07-05 VITALS — BODY MASS INDEX: 46.65 KG/M2 | HEIGHT: 69 IN | WEIGHT: 315 LBS

## 2022-07-05 DIAGNOSIS — E66.01 CLASS 3 SEVERE OBESITY DUE TO EXCESS CALORIES WITH SERIOUS COMORBIDITY AND BODY MASS INDEX (BMI) OF 50.0 TO 59.9 IN ADULT: ICD-10-CM

## 2022-07-05 DIAGNOSIS — I10 PRIMARY HYPERTENSION: Chronic | ICD-10-CM

## 2022-07-05 DIAGNOSIS — Z71.3 DIETARY COUNSELING AND SURVEILLANCE: ICD-10-CM

## 2022-07-05 PROCEDURE — 97802 MEDICAL NUTRITION INDIV IN: CPT | Mod: S$GLB,,, | Performed by: DIETITIAN, REGISTERED

## 2022-07-05 PROCEDURE — 99999 PR PBB SHADOW E&M-EST. PATIENT-LVL III: CPT | Mod: PBBFAC,,, | Performed by: DIETITIAN, REGISTERED

## 2022-07-05 PROCEDURE — 97802 PR MED NUTR THER, 1ST, INDIV, EA 15 MIN: ICD-10-PCS | Mod: S$GLB,,, | Performed by: DIETITIAN, REGISTERED

## 2022-07-05 PROCEDURE — 99999 PR PBB SHADOW E&M-EST. PATIENT-LVL III: ICD-10-PCS | Mod: PBBFAC,,, | Performed by: DIETITIAN, REGISTERED

## 2022-07-05 NOTE — PROGRESS NOTES
NUTRITIONAL CONSULT    Referring Physician: Dr. Pinedo  Reason for MNT Referral: Initial assessment for  Bariatric surgery work-up    PAST MEDICAL HISTORY:   52 y.o. male presents with a BMI of Body mass index is 53.16 kg/m²..  Past attempts at weight loss include:   Unsupervised: atkins, gym memberships, herbal life, high protein, low carb, slim fast, pritkins   Supervised:  , diet pills by MD  Diet pills: laxatives, diuretics, Ozempic   Exercise attempts: walking, running, treadmilll, weight training, stationary cycle, group classes     Weight history:   At current weight:  7  Obese for 30 years.  More than 35 pounds overweight for 30 years .  More than 100 pounds overweight for 15 years.  Started dieting at 18 years old.  Maximum weight reached: 360 lbs  Most weight lost was 70 lbs  through eating less for 1 year.  He describes His eating habits as snacker/grazer, binger eater, sweet eater.    Past Medical History:   Diagnosis Date    Arthritis     Back injury     COVID     CPAP (continuous positive airway pressure) dependence     Hypertension     PONV (postoperative nausea and vomiting)     Sleep apnea     c pap machine used       CLINICAL DATA:  52 y.o.-year-old Black or  male.  Height: 5'9  Weight: 360 lbs  IBW: 160 lbs  BMI: 53.16  The patient's goal weight (50 % EBW): 260 lbs    Goal for Bariatric Surgery: to improve health    NUTRITION & HEALTH HISTORY:  Greatest challenge: sweets and portion control. He also has lack of appetite then overeats when he becomes really hungry.    Current diet recall: Breakfast: grits, eggs, bread product (bagel, biscuit, or toast), hashbrowns, askew, and sometimes a waffle  No lunch  Dinner: protein + vegetable + side dish + fruit or skips  Snack: fruit, sweets  Patient reports he can not eat.    Current Diet:  Meal pattern: irregular.  Protein supplements: 0.  Tried Muscle Milk in the past.  Snackin-excessive / day  Vegetables:  Likes a variety. Eats daily.  Fruits: Likes a variety. Eats daily.  Beverages: water, 1 cup coffee, multiple types of teas some caffeinated and some not.  Dining out: Weekly. Mostly restaurants.  Cooking at home: Daily. Mostly baked and grilled fish, starchy CHO, vegetables and poultry.    Exercise:  Past exercise: None    Current exercise: None  Restrictions to exercise: back pain and recent back surgery.    Vitamins / Minerals / Herbs:   65 mg elemental iron prescribed by physician, B12 every other day, 50,000 IU vitamin D/week.    Food Allergies:   Peaches, peanuts    Social:  Out of work currently, but normally works days.  Lives with wife and kids.  Grocery shopping and food prep shared with wife..  Patient believes the household will be supportive after surgery.  Alcohol: Socially.  Smoking: None.    ASSESSMENT:  · Patient reports attempts at weight loss, only to regain lost weight.  · Patient demonstrated knowledge of healthy eating behaviors and exercise patterns; admits to not eating healthy and not exercising at this point.  · Patient states willingness to change lifestyle and make behavior modifications.  · Expect fair  compliance after surgery at this time.    Insurance requires medically supervised diet prior to consideration for bariatric surgery.    BARIATRIC DIET DISCUSSION:  Discussed diet after surgery and related to patients food record.  Reviewed diet progression before and after surgery.  Stressed importance of exercise and its role in achieving weight loss goals.  Answered all questions.    RECOMMENDATIONS:  Patient is a potential candidate for bariatric surgery if he is able to consistently maintain lifestyle changes.    Needs additional visit(s) with RD.    PLAN:  Resume work-up for surgery.  Continue to review Bariatric Nutrition Guidebook at home and call with any questions.  Work on Bariatric Nutrition Checklist.  Work on gradually cutting back on starchy CHO in the diet.   Eat three meals  daily each with a source of protein.  Goal of 70 g protein daily from plant and animal based complete proteins.  64 ounces of caffeine free fluids.  Take MVI daily. No iron needed since he is prescribed iron.    SESSION TIME:  60 minutes

## 2022-07-07 ENCOUNTER — CLINICAL SUPPORT (OUTPATIENT)
Dept: REHABILITATION | Facility: HOSPITAL | Age: 52
End: 2022-07-07
Payer: COMMERCIAL

## 2022-07-07 DIAGNOSIS — Z55.9 SPECIAL EDUCATIONAL NEEDS: ICD-10-CM

## 2022-07-07 DIAGNOSIS — M62.89 ABNORMAL INCREASED MUSCLE TONE: ICD-10-CM

## 2022-07-07 DIAGNOSIS — M54.50 LUMBAR PAIN: Primary | ICD-10-CM

## 2022-07-07 DIAGNOSIS — Z74.09 DECREASED FUNCTIONAL MOBILITY AND ENDURANCE: ICD-10-CM

## 2022-07-07 DIAGNOSIS — R53.1 WEAKNESS: ICD-10-CM

## 2022-07-07 PROCEDURE — 97162 PT EVAL MOD COMPLEX 30 MIN: CPT | Mod: PN

## 2022-07-07 PROCEDURE — 97110 THERAPEUTIC EXERCISES: CPT | Mod: PN

## 2022-07-07 SDOH — SOCIAL DETERMINANTS OF HEALTH (SDOH): PROBLEMS RELATED TO EDUCATION AND LITERACY, UNSPECIFIED: Z55.9

## 2022-07-08 PROBLEM — Z74.09 DECREASED FUNCTIONAL MOBILITY AND ENDURANCE: Status: ACTIVE | Noted: 2022-07-08

## 2022-07-08 PROBLEM — R53.1 WEAKNESS: Status: ACTIVE | Noted: 2022-07-08

## 2022-07-08 PROBLEM — Z55.9 SPECIAL EDUCATIONAL NEEDS: Status: ACTIVE | Noted: 2022-07-08

## 2022-07-08 PROBLEM — M54.50 LUMBAR PAIN: Status: ACTIVE | Noted: 2022-07-08

## 2022-07-08 PROBLEM — M62.89 ABNORMAL INCREASED MUSCLE TONE: Status: ACTIVE | Noted: 2022-07-08

## 2022-07-08 NOTE — PLAN OF CARE
[facility]   Physical Therapy Initial Evaluation     Date: 7/7/2022   Name: Fabiano Jules Jr.  Clinic Number: [id number]    Therapy Diagnosis:   Encounter Diagnoses   Name Primary?    Lumbar pain Yes    Weakness     Abnormal increased muscle tone     Decreased functional mobility and endurance     Special educational needs      Physician: Duran Adler Jr., MD    Physician Orders: PT Eval and Treat   Medical Diagnosis from Referral: M43.26 (ICD-10-CM) - Ankylosis of lumbar spine   Evaluation Date: 7/7/2022  Authorization Period Expiration: 07/21/2022  Plan of Care Expiration: 09/09/2022 at 2x/wk x 8 weeks  Progress Note Due: 08/08/2022  Visit # / Visits authorized: 1/ 1  FOTO: 1/ 3     Time In: 1:00 pm  Time Out: 2:05 pm  Total Appointment Time (timed & untimed codes): 65 minutes    Precautions: Standard and and surgical.  SUBJECTIVE   Date of onset: The patient's surgery was 04/05/2022. He had a lumbar fusion.    History of current condition - Fabiano reports: that he had years of back pain. He reports the pain led to the need of this recent surgery. He reports having had an L4-L5 spinal fusion. He did home health therapy at first, and is now ready for out patient therapy. He enters today wearing a back brace as he reports he feels safer with it on right now as he remains weak. PT acknowledges. He reports also having had Covid within the past year and he believes is still having effects from it including short term memory issues. PT acknowledges.    Falls: No falls reported since this recent surgery.    Imaging, XR LUMBAR SPINE: FINDINGS: Status post L4-5 fusion with pedicle screws.  Hardware appears intact.  No acute fracture.  Mild vascular calcifications.Impression: S/p L4-5 fusion. CT LUMBAR SPINE COMPARISON: CT lumbar spine 06/23/2020FINDINGS:  There are bilateral transpedicular screws at L4 and L5 with interconnecting bars as well as an interbody spacer and right L4 laminectomy  "change.  Hardware appears intact.  Foci of air amongst the tissues at the laminectomy site are not unexpected given the history of recent surgery.  There is a slight grade 1 anterolisthesis of L4 over L5, new from the previous exam.  Remaining lumbar levels are unremarkable.  No acute osseous finding.Impression:Interval placement of posterior fusion hardware across L4-L5 along with an interbody spacer and right L4 laminectomy change.Slight grade 1 anterolisthesis of L4 over L5, new from the previous exam.No acute osseous finding.     Prior Therapy: The patient had home health PT after this recent surgery.  Social History: Lives with family with no stairs. He reports one step into his house.  Occupation:   Prior Level of Function: He reports being limited from the pain in his lumbar spine.  Current Level of Function: He reports being limited from post surgical pain and protocol but that he feels better then he did before surgery.    Pain:  Current 4/10, worst 10/10, best 2/10   Location: bilateral lumbar muscles  Description: Aching, Throbbing, Superficial, Sharp and Shooting  Aggravating Factors: muscle spasms, prolonged standing or walking, over bending and when he sneezes.  Easing Factors: sitting in a recliner, no over movement or over bending, and a hot and cold pack, menthol rub.He reports most relief from the cold pack at this time.    Patients goals: "I want to return to work."     Medical History:   Past Medical History:   Diagnosis Date    Arthritis     Back injury     COVID     CPAP (continuous positive airway pressure) dependence     Hypertension     PONV (postoperative nausea and vomiting)     Sleep apnea     c pap machine used     Surgical History:   Fabiano Jules Jr.  has a past surgical history that includes Lipoma resection; Circumcision; Medial collateral ligament and lateral collateral ligament repair, knee (Right, 09/01/2018); facet injection ; Colonoscopy (N/A, 12/3/2018); " Magnetic resonance imaging (N/A, 5/29/2020); epidural steroid injection X 2; Myelography (N/A, 6/23/2020); Magnetic resonance imaging (N/A, 12/3/2021); Minimally invasive transforaminal lumbar interbody fusion (TLIF) (N/A, 4/5/2022); and Bone graft (N/A, 4/5/2022).    Medications:   Fabiano has a current medication list which includes the following prescription(s): amlodipine, atorvastatin, b complex vitamins, cimetidine, cyclobenzaprine, epinephrine, ergocalciferol, ferrous sulfate, gabapentin, lidocaine-methyl sal-menthol, lisinopril-hydrochlorothiazide, and oxycodone-acetaminophen, and the following Facility-Administered Medications: acetaminophen, cefazolin in dextrose (iso-os), hydromorphone, ondansetron, oxycodone-acetaminophen, oxycodone-acetaminophen, and polyethylene glycol.    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 Bone landmarks are difficult to find as the patient is                                      obese. However, there does not appear to be any structural abnormalities.    Flexibility      Muscle Left Right Comment         Hamstrings Moderate Increase Moderate Increase    Quadriceps Minimal Increase Minimal Increase    Illipsoas Moderate Increase Moderate Increase    Pirifromis Moderate Increase Moderate Increase              ROM     L-Spine AROM AROM Comment    Left Right          Flexion 30*     Extension 5*     Sidebending 10* 10*              MMT      L-spine   Comment    Left Right          Flexion 3+/5 -    Extension 4-/5 -    Side Bending 4-/5 4-/5          Abdominals 3+/5 -    Lower Abdominals 3+/5 -    Hip ABduction 4-/5 4-/5 Decreased bilateral hip strength is   Hip ADduction 4-/5 4-/5 Influenced by back pain when tested   Hip Internal  Rotation 4-/5 4-/5    Hip External Rotation 4-/5 4-/5         SPECIAL TESTS         Test   Comment    Left Right    SLR Negative. Negative.    Slump Test Negative. Negative.          Repetitive Flexion (standing) Positive. Positive.    Repetitive Extension (standing) Negative. Negative.              FUNCTIONAL MOBILITY          Balance: No gross abnormalities.                                Gait: Patient ambulates into the clinic with a slow gait. He has a good stride length. He reports his gait is most likely habit as                           he walked slow before surgery because he was in so much pain, but he has less pain now and it is a habit he wants to break.                    Transfer: Patient performed sit to supine and supine to sit using log roll technique independently.        Limitation/Restriction for FOTO Lumbar Survey    Therapist reviewed FOTO scores for Fabiano Jules JrTish on 7/7/2022.   FOTO documents entered into [employer] - see Media section.    Limitation Score: 81% Limitation at the evaluation.         TREATMENT     Total Treatment time (time-based codes) separate from Evaluation: 25 minutes      Fabiano received the treatments listed below:    -standing doorway bilateral pectoralis stretch with 30 second hold x 3  -standing wand shoulder extension with 3 second hold x 15  -seated bilateral hamstring stretch with 30 second hold x 3 each leg  -seated pillow squeeze with 2 second hold x 15  -supine bilateral lower trunk rotation with 5 second hold x 5 each direction  -supine single knee to chest with 30 second hold x 3 each leg using a sheet  -supine posterior pelvic tilt with 3 second hold x 10  -supine gluteal squeeze with 2 second hold x 10    PATIENT EDUCATION AND HOME EXERCISES     Education provided:   -The patient received his initial written home exercise program. He returned demo to PT and was without questions.    Written Home Exercises Provided: yes. Exercises were reviewed and Fabiano was able to  demonstrate them prior to the end of the session.  Fabiano demonstrated good  understanding of the education provided. See EMR under Patient Instructions for exercises provided during therapy sessions.    ASSESSMENT     Fabiano is a 52 y.o. male referred to outpatient Physical Therapy with a medical diagnosis of M43.26 (ICD-10-CM) - Ankylosis of lumbar spine. He is status post lumbar fusion. Patient presents with lumbar pain and weakness and increased muscle tone and decreased active motion. He also had decreased functional ability at this time and needs patient education and a progressed written home exercise program.    Patient prognosis is Good.   Patientt will benefit from skilled outpatient Physical Therapy to address the deficits stated above and in the chart below, provide patient /family education, and to maximize patientt's level of independence.     Plan of care discussed with patient: Yes  Patient's spiritual, cultural and educational needs considered and patient is agreeable to the plan of care and goals as stated below:     Anticipated Barriers for therapy: co-morbidities.    Medical Necessity is demonstrated by the following  History  Co-morbidities and personal factors that may impact the plan of care Co-morbidities:   difficulty sleeping, HTN, level of undertstanding of current condition and arthritis, back injury, COVID, CPAP, post operative nausea and vomiting, and sleep apnea.    Personal Factors:   coping style  lifestyle  character     moderate   Examination  Body Structures and Functions, activity limitations and participation restrictions that may impact the plan of care Body Regions:   back  lower extremities  trunk    Body Systems:    ROM  strength  gross coordinated movement  gait  motor control  motor learning  increased muscle tone and pain control.    Participation Restrictions:   No restrictions noted.    Activity limitations:   Learning and applying knowledge  no deficits    General Tasks  and Commands  undertaking multiple tasks    Communication  no deficits    Mobility  lifting and carrying objects  walking  driving (bike, car, motorcycle)    Self care  dressing  looking after one's health    Domestic Life  shopping  cooking  doing house work (cleaning house, washing dishes, laundry)  assisting others    Interactions/Relationships  no deficits    Life Areas  employment    Community and Social Life  community life  recreation and leisure         moderate   Clinical Presentation evolving clinical presentation with changing clinical characteristics moderate   Decision Making/ Complexity Score: moderate       Goals:    STG  Weeks/Visits Date Established  Date Met   1.Decrease max lumbar pain to 6/10 to improve the patient's quality of life. 4 weeks. 7/7/2022      2. Decrease bilateral hamstring, iliopsoas, and piriformis muscle tones to minimal to moderate increase to improve ease of bed mobility. 4 weeks. 7/7/2022      3.Increase general core strength 1/2 grade to improve standing activity endurance. 4 weeks.           7/7/2022      4.Decrease FOTO limitation to <=70% to improve job task ability. 4 weeks. 7/7/2022      5.Fair initial written home exercise program knowledge and be without questions to have carryover after discharge from PT. 4 weeks. 7/7/2022        LTG Weeks/Visits Date Established  Date Met   1.Decrease max lumbar pain to 2/10 to improve the patient's quality of life. 8 weeks. 7/7/2022      2. Decrease bilateral hamstring, iliopsoas, and piriformis muscle tones to minimal increase to improve ease of bed mobility. 8 weeks 7/7/2022        3.Increase general core strength one grade from evaluation date to improve standing activity endurance. 8 weeks. 7/7/2022      4.Decrease FOTO limitation to <=60% to improve job task ability. 8 weeks. 7/7/2022      5.Good progressed written home exercise program knowledge and be without questions to have carryover after discharge from PT. 8 weeks.  7/7/2022          PLAN   Plan of care Certification: 7/7/2022 to 09/09/2022.    Outpatient Physical Therapy 2 times weekly for 8 weeks to include the following interventions: Electrical Stimulation unattended, Manual Therapy, Moist Heat/ Ice, Neuromuscular Re-ed, Patient Education, Self Care, Therapeutic Activities, Therapeutic Exercise, Ultrasound and care by a PTA..     Espinoza Jeffrey, ADILSON      I CERTIFY THE NEED FOR THESE SERVICES FURNISHED UNDER THIS PLAN OF TREATMENT AND WHILE UNDER MY CARE   Physician's comments:     Physician's Signature: ___________________________________________________

## 2022-07-12 ENCOUNTER — CLINICAL SUPPORT (OUTPATIENT)
Dept: REHABILITATION | Facility: HOSPITAL | Age: 52
End: 2022-07-12
Payer: COMMERCIAL

## 2022-07-12 ENCOUNTER — DOCUMENTATION ONLY (OUTPATIENT)
Dept: REHABILITATION | Facility: HOSPITAL | Age: 52
End: 2022-07-12
Payer: COMMERCIAL

## 2022-07-12 DIAGNOSIS — Z74.09 DECREASED FUNCTIONAL MOBILITY AND ENDURANCE: ICD-10-CM

## 2022-07-12 DIAGNOSIS — R53.1 WEAKNESS: ICD-10-CM

## 2022-07-12 DIAGNOSIS — Z55.9 SPECIAL EDUCATIONAL NEEDS: ICD-10-CM

## 2022-07-12 DIAGNOSIS — M62.89 ABNORMAL INCREASED MUSCLE TONE: ICD-10-CM

## 2022-07-12 DIAGNOSIS — M54.50 LUMBAR PAIN: Primary | ICD-10-CM

## 2022-07-12 PROCEDURE — 97112 NEUROMUSCULAR REEDUCATION: CPT | Mod: PN,CQ

## 2022-07-12 PROCEDURE — 97014 ELECTRIC STIMULATION THERAPY: CPT | Mod: PN,CQ

## 2022-07-12 PROCEDURE — 97110 THERAPEUTIC EXERCISES: CPT | Mod: PN,CQ

## 2022-07-12 SDOH — SOCIAL DETERMINANTS OF HEALTH (SDOH): PROBLEMS RELATED TO EDUCATION AND LITERACY, UNSPECIFIED: Z55.9

## 2022-07-12 NOTE — PROGRESS NOTES
[facility]  Outpatient Physical Therapy Daily Treatment Note      Name: Fabiano Jules Jr.  Clinic Number: [id number]  Visit Date: 7/12/2022    Therapy Diagnosis:   Encounter Diagnoses   Name Primary?    Lumbar pain Yes    Weakness     Abnormal increased muscle tone     Decreased functional mobility and endurance     Special educational needs      Physician: Duran Adler Jr., MD     Physician Orders: PT Eval and Treat   Medical Diagnosis from Referral: M43.26 (ICD-10-CM) - Ankylosis of lumbar spine   Evaluation Date: 7/7/2022  Authorization Period Expiration: 07/21/2022  Plan of Care Expiration: 09/09/2022 at 2x/wk x 8 weeks  Progress Note Due: 08/08/2022  Visit # / Visits authorized: 1/ 10   Total visit #: 2  PTA visit #: 1/5  FOTO: 1/ 3      Time In: 9:00 am  Time Out: 10:00 am  Total Appointment Time (timed & untimed codes): 55 minutes     Precautions: Standard and and surgical    Subjective     The patient presents to therapy reporting increased pain today. He stated that he used the pool and felt great but was sore for the last 2 days. Current pain level 8/10.     Response to previous treatment: first treatment following eval   Functional change: first treatment following eval     Pain: 8/10  Location: bilateral back      Objective     Fabiano received therapeutic exercises to develop strength, endurance, ROM, flexibility, posture and core stabilization for 20 minutes including:    Prone hip extension with knee flexion ( attempted)   Bridges ( attempted)   Seated glute sets 5 sec hold 2 x 10 with cues to self assess his body shifting upwards in order to ensure proper glute activation   lumbar trunk rotation 2 x 10 within tolerance  Pelvic tilt 10 x     Fbaiano participated in neuromuscular re-education activities to improve: Coordination, Proprioception and Posture for 25 minutes. The following activities were included:    Prone 3 minutes  Prone on elbows  3 minutes   Sidelying open books 5 sec hold 10 x   Side glide to left 2 sec hold 10 x     Fabiano participated in dynamic functional therapeutic activities to improve functional performance for 0  minutes, including:    Fabiano received the following supervised modalities after being cleared for contradictions: IFC Electrical Stimulation:  Fabiano received IFC Electrical Stimulation for pain control applied to the lumbar region with cold pack in seated position for 15 minutes. Fabiano tolderated treatment well without any adverse effects.      Fabiano received cold pack for 15 minutes to lumbar region with E-stim.      Patient Education and HEP     He was compliant with home exercise program.    Education provided:   - home exercise program additional exercises added today    Written Home Exercises Provided: yes.  Exercises were reviewed and Fabiano was able to demonstrate them prior to the end of the session.  Fabiano demonstrated fair  understanding of the education provided.     See EMR under Patient Instructions for exercises provided prior visit.    Assessment     The patient participated with PT today. He reported severe soreness after attempting home exercise program and exercises in the pool. Pt was advised to continue performing hip abduction in pool but to modify the motion to be smaller and more manageable. He was also instructed that he can continue to walk in the pool in this gravity minimized environment. Hip extension and bridges were attempted in clinic today but significant facial grimacing noted and when asked pt reported severe pain. The importance of keeping all exercises within manageable levels of pain was emphasized with pt and he was educated on the fundamentals of strength building and neuromuscular recruitment being an important factor in his rehabilitation. He verbalized understanding. With this in mind, pt was instructed to perform glute sets in a seated position ensuring proper glute engagement with self  assessment of bodily motion. It was also noted that pt presented with right lateral shift of pelvis which is likely contributing to his continued pain and dysfunction. Side glides to the left for correction were performed in clinic within pt's tolerance. Both of these exercises along with prone on elbows to encourage lumbar extension were issued for home exercise program. E-stim and cold pack were applied to pt's lumbar region after performance of exercises for pain control. Pt reported reduction of pain following therapy.     Pt will continue to benefit from skilled outpatient physical therapy to address the deficits listed in the problem list box on initial evaluation, provide pt/family education and to maximize pt's level of independence in the home and community environment.     Fabiano Is progressing well towards his goals.   Pt prognosis is Good.     Pt's spiritual, cultural and educational needs considered and pt agreeable to plan of care and goals.    Anticipated barriers to physical therapy: co-morbidities    Goals:Goals:     STG  Weeks/Visits Date Established  Date Met   1.Decrease max lumbar pain to 6/10 to improve the patient's quality of life. 4 weeks. 7/7/2022    In progress  7/12/2022      2. Decrease bilateral hamstring, iliopsoas, and piriformis muscle tones to minimal to moderate increase to improve ease of bed mobility. 4 weeks. 7/7/2022    In progress  7/12/2022      3.Increase general core strength 1/2 grade to improve standing activity endurance. 4 weeks.                7/7/2022    In progress  7/12/2022      4.Decrease FOTO limitation to <=70% to improve job task ability. 4 weeks. 7/7/2022     In progress  7/12/2022     5.Fair initial written home exercise program knowledge and be without questions to have carryover after discharge from PT. 4 weeks. 7/7/2022    In progress  7/12/2022         LTG Weeks/Visits Date Established  Date Met   1.Decrease max lumbar pain to 2/10 to improve the patient's  quality of life. 8 weeks. 7/7/2022    In progress  7/12/2022      2. Decrease bilateral hamstring, iliopsoas, and piriformis muscle tones to minimal increase to improve ease of bed mobility. 8 weeks 7/7/2022        In progress  7/12/2022     3.Increase general core strength one grade from evaluation date to improve standing activity endurance. 8 weeks. 7/7/2022    In progress  7/12/2022      4.Decrease FOTO limitation to <=60% to improve job task ability. 8 weeks. 7/7/2022     In progress  7/12/2022     5.Good progressed written home exercise program knowledge and be without questions to have carryover after discharge from PT. 8 weeks. 7/7/2022 In progress  7/12/2022            Plan     Continue with the plan of care established per initial evaluation    Donya Coello, PTA

## 2022-07-15 ENCOUNTER — CLINICAL SUPPORT (OUTPATIENT)
Dept: REHABILITATION | Facility: HOSPITAL | Age: 52
End: 2022-07-15
Payer: COMMERCIAL

## 2022-07-15 DIAGNOSIS — Z55.9 SPECIAL EDUCATIONAL NEEDS: ICD-10-CM

## 2022-07-15 DIAGNOSIS — R53.1 WEAKNESS: ICD-10-CM

## 2022-07-15 DIAGNOSIS — Z74.09 DECREASED FUNCTIONAL MOBILITY AND ENDURANCE: ICD-10-CM

## 2022-07-15 DIAGNOSIS — M62.89 ABNORMAL INCREASED MUSCLE TONE: ICD-10-CM

## 2022-07-15 DIAGNOSIS — M54.50 LUMBAR PAIN: Primary | ICD-10-CM

## 2022-07-15 PROCEDURE — 97112 NEUROMUSCULAR REEDUCATION: CPT | Mod: PN

## 2022-07-15 PROCEDURE — 97014 ELECTRIC STIMULATION THERAPY: CPT | Mod: PN

## 2022-07-15 PROCEDURE — 97110 THERAPEUTIC EXERCISES: CPT | Mod: PN

## 2022-07-15 SDOH — SOCIAL DETERMINANTS OF HEALTH (SDOH): PROBLEMS RELATED TO EDUCATION AND LITERACY, UNSPECIFIED: Z55.9

## 2022-07-15 NOTE — PROGRESS NOTES
"  [facility]  Outpatient Physical Therapy Daily Treatment Note      Name: Fabiano Jules Jr.  Clinic Number: [id number]  Visit Date: 7/15/2022    Therapy Diagnosis:   Encounter Diagnoses   Name Primary?    Lumbar pain Yes    Weakness     Abnormal increased muscle tone     Decreased functional mobility and endurance     Special educational needs      Physician: Duran Adler Jr., MD     Physician Orders: PT Eval and Treat   Medical Diagnosis from Referral: M43.26 (ICD-10-CM) - Ankylosis of lumbar spine   Evaluation Date: 7/7/2022  Authorization Period Expiration: 10/08/2022  Plan of Care Expiration: 09/09/2022 at 2x/wk x 8 weeks  Progress Note Due: 08/08/2022  Visit # / Visits authorized: 2/ 10   Total visit #: 3  PTA visit #: 0/5  FOTO: 1/ 3      Time In: 10:00 am  Time Out: 11:07 am  Total Appointment Time (timed & untimed codes): 55 minutes     Precautions: Standard and and surgical    Subjective     The patient reports: "I went to the pool again yesterday, and I did much better. I didn't feel a thing. I even felt better afterwards. I feel a little better today then last time. I'd say I am down to a 5/10. I listened to what Donya told me and made my pool activity less intensive."    Response to previous treatment: The patient enters reporting a decrease in lumbar pain since the last PT session.   Functional change: The patient reports better ease of activity in the aquatic realm.     Pain: 5/10 upon entering the clinic today.  Location: bilateral back    Objective     Fabiano received therapeutic exercises to develop strength, endurance, ROM, flexibility, posture and core stabilization for 30 minutes including:  -+Nu-step on Level 1 x 5 minutes.  -+Precor knee flexion with 35 pounds x 20  -+Precor knee extension with 20 pounds x 20  -+Scapular retraction with 25 pounds x 20  -+Precor DIP bar with 30 pounds x 20  -+Precor chest press with 10 pounds x " 20    Below not performed this day:  Prone hip extension with knee flexion ( attempted)   Bridges ( attempted)   Seated glute sets 5 sec hold 2 x 10 with cues to self assess his body shifting upwards in order to ensure proper glute activation   lumbar trunk rotation 2 x 10 within tolerance  Pelvic tilt 10 x     Fabiano participated in neuromuscular re-education activities to improve: Coordination, Proprioception and Posture for 10 minutes. The following activities were included:  -+heel raises x 20  -+standing bilateral abduction x 15 each leg  -+standing bilateral extension x 15 each leg  -+standing and bilateral marching x 15 each leg    Below not performed today:  Prone 3 minutes  Prone on elbows 3 minutes   Sidelying open books 5 sec hold 10 x   Side glide to left 2 sec hold 10 x     Fabiano received the following supervised modalities after being cleared for contradictions: IFC Electrical Stimulation:  Fabiano received IFC Electrical Stimulation for pain control applied to the lumbar region with cold pack in seated position for 15 minutes. Fabiano tolderated treatment well without any adverse effects. He received it at 100% at 1-11 Hz.    Fabiano received cold pack for 15 minutes to lumbar region with E-stim.    Patient Education and HEP     He was compliant with home exercise program.    Education provided:   -home exercise program additional exercises added today    Written Home Exercises Provided: Patient instructed to cont prior HEP.  Exercises were reviewed and Fabiano was able to demonstrate them prior to the end of the session.  Fabiano demonstrated fair  understanding of the education provided.     See EMR under Patient Instructions for exercises provided prior visit.  Assessment     Today, the patient entered reporting less lumbar pain then at the last PT session. He reported better compliance with his instructions for the  aquatic realm. Per subjective, his exercises were progressed this day to assist with strengthening  of lumbar assessory muscles. PT is hopeful that will make specific lumbar muscle and core strengthening more tolerable. The patient tolerated all well today. He reported no increase in pain. He requires rest and verbal cues at times, but he works hard. He is compliant with PT. He did not wear his brace this day, but good posture was maintained at all times. E-stim and cold pack were applied to patient lumbar region after performance of exercises for pain control. Pt reported reduction of pain following therapy again this day..     Pt will continue to benefit from skilled outpatient physical therapy to address the deficits listed in the problem list box on initial evaluation, provide pt/family education and to maximize pt's level of independence in the home and community environment.     Fabiano Is progressing well towards his goals.   Pt prognosis is Good.     Pt's spiritual, cultural and educational needs considered and pt agreeable to plan of care and goals.    Anticipated barriers to physical therapy: co-morbidities    Goals:Goals:     STG  Weeks/Visits Date Established  Date Met   1.Decrease max lumbar pain to 6/10 to improve the patient's quality of life. 4 weeks. 7/7/2022    In progress  7/15/2022      2. Decrease bilateral hamstring, iliopsoas, and piriformis muscle tones to minimal to moderate increase to improve ease of bed mobility. 4 weeks. 7/7/2022    In progress  7/15/2022      3.Increase general core strength 1/2 grade to improve standing activity endurance. 4 weeks.                7/7/2022    In progress  7/15/2022      4.Decrease FOTO limitation to <=70% to improve job task ability. 4 weeks. 7/7/2022     In progress  7/15/2022     5.Fair initial written home exercise program knowledge and be without questions to have carryover after discharge from PT. 4 weeks. 7/7/2022    In progress  7/15/2022         LTG Weeks/Visits Date Established  Date Met   1.Decrease max lumbar pain to 2/10 to improve the  patient's quality of life. 8 weeks. 7/7/2022    In progress  7/15/2022      2. Decrease bilateral hamstring, iliopsoas, and piriformis muscle tones to minimal increase to improve ease of bed mobility. 8 weeks 7/7/2022        In progress  7/15/2022     3.Increase general core strength one grade from evaluation date to improve standing activity endurance. 8 weeks. 7/7/2022    In progress  7/15/2022      4.Decrease FOTO limitation to <=60% to improve job task ability. 8 weeks. 7/7/2022     In progress  7/15/2022     5.Good progressed written home exercise program knowledge and be without questions to have carryover after discharge from PT. 8 weeks. 7/7/2022 In progress  7/15/2022         Plan     Plan of care Certification: 7/7/2022 to 09/09/2022. Outpatient Physical Therapy 2 times weekly for 8 weeks. Plan to maximize lumbar strength and flexibility to help provide long term pain relief.        Espinoza Jeffrey, PT

## 2022-07-18 ENCOUNTER — CLINICAL SUPPORT (OUTPATIENT)
Dept: REHABILITATION | Facility: HOSPITAL | Age: 52
End: 2022-07-18
Payer: COMMERCIAL

## 2022-07-18 DIAGNOSIS — M54.50 LUMBAR PAIN: Primary | ICD-10-CM

## 2022-07-18 DIAGNOSIS — M62.89 ABNORMAL INCREASED MUSCLE TONE: ICD-10-CM

## 2022-07-18 DIAGNOSIS — R53.1 WEAKNESS: ICD-10-CM

## 2022-07-18 DIAGNOSIS — Z55.9 SPECIAL EDUCATIONAL NEEDS: ICD-10-CM

## 2022-07-18 DIAGNOSIS — Z74.09 DECREASED FUNCTIONAL MOBILITY AND ENDURANCE: ICD-10-CM

## 2022-07-18 PROCEDURE — 97014 ELECTRIC STIMULATION THERAPY: CPT | Mod: PN,CQ

## 2022-07-18 PROCEDURE — 97110 THERAPEUTIC EXERCISES: CPT | Mod: PN,CQ

## 2022-07-18 PROCEDURE — 97112 NEUROMUSCULAR REEDUCATION: CPT | Mod: PN,CQ

## 2022-07-18 SDOH — SOCIAL DETERMINANTS OF HEALTH (SDOH): PROBLEMS RELATED TO EDUCATION AND LITERACY, UNSPECIFIED: Z55.9
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
## 2022-07-20 ENCOUNTER — CLINICAL SUPPORT (OUTPATIENT)
Dept: REHABILITATION | Facility: HOSPITAL | Age: 52
End: 2022-07-20
Payer: COMMERCIAL

## 2022-07-20 DIAGNOSIS — Z55.9 SPECIAL EDUCATIONAL NEEDS: ICD-10-CM

## 2022-07-20 DIAGNOSIS — M62.89 ABNORMAL INCREASED MUSCLE TONE: ICD-10-CM

## 2022-07-20 DIAGNOSIS — Z74.09 DECREASED FUNCTIONAL MOBILITY AND ENDURANCE: ICD-10-CM

## 2022-07-20 DIAGNOSIS — R53.1 WEAKNESS: ICD-10-CM

## 2022-07-20 DIAGNOSIS — M54.50 LUMBAR PAIN: Primary | ICD-10-CM

## 2022-07-20 PROCEDURE — 97014 ELECTRIC STIMULATION THERAPY: CPT | Mod: PN,CQ

## 2022-07-20 PROCEDURE — 97112 NEUROMUSCULAR REEDUCATION: CPT | Mod: PN,CQ

## 2022-07-20 PROCEDURE — 97110 THERAPEUTIC EXERCISES: CPT | Mod: PN,CQ

## 2022-07-20 SDOH — SOCIAL DETERMINANTS OF HEALTH (SDOH): PROBLEMS RELATED TO EDUCATION AND LITERACY, UNSPECIFIED: Z55.9

## 2022-07-20 NOTE — PROGRESS NOTES
[facility]  Outpatient Physical Therapy Daily Treatment Note      Name: Fabiano Jules Jr.  Clinic Number: [id number]  Visit Date: 7/20/2022    Therapy Diagnosis:   Encounter Diagnoses   Name Primary?    Lumbar pain Yes    Weakness     Abnormal increased muscle tone     Decreased functional mobility and endurance     Special educational needs      Physician: Duran Adler Jr., MD     Physician Orders: PT Eval and Treat   Medical Diagnosis from Referral: M43.26 (ICD-10-CM) - Ankylosis of lumbar spine   Evaluation Date: 7/7/2022  Authorization Period Expiration: 10/08/2022  Plan of Care Expiration: 09/09/2022 at 2x/wk x 8 weeks  Progress Note Due: 08/08/2022  Visit # / Visits authorized: 4/ 10   Total visit #: 5  PTA visit #: 2/5  FOTO: 1/ 3      Time In: 11:40 am  Time Out: 12:40 am  Total Appointment Time (timed & untimed codes): 60  minutes     Precautions: Standard and and surgical    Subjective     The patient reports: pain at level 5/10 worse on right than left side. He reports continued compliance with home exercise program.     Response to previous treatment: moderate soreness  Functional change: The patient reports decreased use of back brace    Pain: 5/10 upon entering the clinic today.  Location: bilateral back    Objective     Fabiano received therapeutic exercises to develop strength, endurance, ROM, flexibility, posture and core stabilization for 15 minutes including:      Clams with GTB 2 x 10   SIdelying hip abduction 10 x   Donkey kicks 1 red band on shuttle with cues to keep core engaged and facilitate glutes for movement 2 x 10 each side  quadratus lumborum stretch over bolster 3 x 30   Modified Bangl[place] Dead lifts 10 x minimal motion with cues to hinge at hip, keep lumbar spine neutral and engage glutes for initiation of lift.     Not performed today   -+Nu-step on Level 1 x 5 minutes.  -+Precor knee flexion with 35 pounds x 20  -+Precor  knee extension with 20 pounds x 20  -+Scapular retraction with 25 pounds x 20  -+Precor DIP bar with 30 pounds x 20  -+Precor chest press with 10 pounds x 20  Prone hip extension with knee flexion ( attempted)   Bridges ( attempted)   Prone glute sets 3 sec hold 3 x 10     Fabiano participated in neuromuscular re-education activities to improve: Coordination, Proprioception and Posture for 30 minutes. The following activities were included:    Standing lumbar extension with hips braced on high desk 20 x   Prone press ups 20 x with hip shifted to right   Sidelying open books 5 sec hold 15 x with GTB   Side glide to left 2 sec hold 10 x   Paloff press BTB 20  x each direction   Standing diagonal chops for abdominal/ oblique recruitment 20 x each side with Black theraband   Resisted retro walking holding black sports cord 2 minutes slow and controled with cues to place heel behind hip in order to achieve best recruitment of glutes     Muscle energy techniques taught for self correction of anterior innominate of right rotation. Performed with right foot on floor activating extensors and left leg on elevated plinth activating flexors. Improvement noted objectively as well as subjectively following muscle energy techniques. Not performed today     Below not performed today:  -+heel raises x 20  -+standing bilateral abduction x 15 each leg  -+standing bilateral extension x 15 each leg  -+standing and bilateral marching x 15 each leg  Prone 3 minutes  Prone on elbows 3 minutes   Sidelying open books 5 sec hold 10 x   Side glide to left 2 sec hold 10 x     Fabiano received the following supervised modalities after being cleared for contradictions: IFC Electrical Stimulation:  Fabiano received IFC Electrical Stimulation for pain control applied to the lumbar region with cold pack in seated position for 15 minutes. Fabiano tolderated treatment well without any adverse effects. He received it at 100% at 1-11 Hz.    Fabiano received cold pack  for 15 minutes to lumbar region with E-stim.    Patient Education and HEP     He was compliant with home exercise program.    Education provided:   -home exercise program additional exercises added today    Written Home Exercises Provided: Patient instructed to cont prior HEP.  Exercises were reviewed and Fabiano was able to demonstrate them prior to the end of the session.  Fabiano demonstrated fair  understanding of the education provided.     See EMR under Patient Instructions for exercises provided prior visit.  Assessment     Today, the patient was able to demonstrate improved glute activation in prone position but remains unable to lift the weight of pelvis in supine for bridges or the weight of the leg for prone hip extensions. Today's treatment focused on improved recruitment of glutes and hip stabilizing musculature. All exercises were modified as needed in order for pt to achieve best facilitation of targeted muscles. He was able to perform all exercises without increase of back pain. He reported fatigue in glutes and lower extremities which is to be expected. E-stim and cold pack were again applied to patient lumbar region after performance of exercises for pain control. Pt reported good response to therapy today reporting an initial reduction of pain even before application of modalities.       Pt will continue to benefit from skilled outpatient physical therapy to address the deficits listed in the problem list box on initial evaluation, provide pt/family education and to maximize pt's level of independence in the home and community environment.     Fabiano Is progressing well towards his goals.   Pt prognosis is Good.     Pt's spiritual, cultural and educational needs considered and pt agreeable to plan of care and goals.    Anticipated barriers to physical therapy: co-morbidities    Goals:Goals:     STG  Weeks/Visits Date Established  Date Met   1.Decrease max lumbar pain to 6/10 to improve the patient's  quality of life. 4 weeks. 7/7/2022    In progress  7/20/2022      2. Decrease bilateral hamstring, iliopsoas, and piriformis muscle tones to minimal to moderate increase to improve ease of bed mobility. 4 weeks. 7/7/2022    In progress  7/20/2022      3.Increase general core strength 1/2 grade to improve standing activity endurance. 4 weeks.                7/7/2022    In progress  7/20/2022      4.Decrease FOTO limitation to <=70% to improve job task ability. 4 weeks. 7/7/2022     In progress  7/20/2022     5.Fair initial written home exercise program knowledge and be without questions to have carryover after discharge from PT. 4 weeks. 7/7/2022    In progress  7/20/2022         LTG Weeks/Visits Date Established  Date Met   1.Decrease max lumbar pain to 2/10 to improve the patient's quality of life. 8 weeks. 7/7/2022    In progress  7/20/2022      2. Decrease bilateral hamstring, iliopsoas, and piriformis muscle tones to minimal increase to improve ease of bed mobility. 8 weeks 7/7/2022        In progress  7/20/2022     3.Increase general core strength one grade from evaluation date to improve standing activity endurance. 8 weeks. 7/7/2022    In progress  7/20/2022      4.Decrease FOTO limitation to <=60% to improve job task ability. 8 weeks. 7/7/2022     In progress  7/20/2022     5.Good progressed written home exercise program knowledge and be without questions to have carryover after discharge from PT. 8 weeks. 7/7/2022 In progress  7/20/2022         Plan     Plan of care Certification: 7/7/2022 to 09/09/2022. Outpatient Physical Therapy 2 times weekly for 8 weeks. Plan to maximize lumbar strength and flexibility to help provide long term pain relief.        Donya Coello, PTA

## 2022-07-21 ENCOUNTER — PATIENT MESSAGE (OUTPATIENT)
Dept: FAMILY MEDICINE | Facility: CLINIC | Age: 52
End: 2022-07-21
Payer: COMMERCIAL

## 2022-07-22 ENCOUNTER — PATIENT MESSAGE (OUTPATIENT)
Dept: FAMILY MEDICINE | Facility: CLINIC | Age: 52
End: 2022-07-22
Payer: COMMERCIAL

## 2022-07-25 ENCOUNTER — DOCUMENTATION ONLY (OUTPATIENT)
Dept: BARIATRICS | Facility: CLINIC | Age: 52
End: 2022-07-25
Payer: COMMERCIAL

## 2022-07-26 ENCOUNTER — CLINICAL SUPPORT (OUTPATIENT)
Dept: REHABILITATION | Facility: HOSPITAL | Age: 52
End: 2022-07-26
Payer: COMMERCIAL

## 2022-07-26 DIAGNOSIS — M62.89 ABNORMAL INCREASED MUSCLE TONE: ICD-10-CM

## 2022-07-26 DIAGNOSIS — Z55.9 SPECIAL EDUCATIONAL NEEDS: ICD-10-CM

## 2022-07-26 DIAGNOSIS — R53.1 WEAKNESS: ICD-10-CM

## 2022-07-26 DIAGNOSIS — M54.50 LUMBAR PAIN: Primary | ICD-10-CM

## 2022-07-26 DIAGNOSIS — Z74.09 DECREASED FUNCTIONAL MOBILITY AND ENDURANCE: ICD-10-CM

## 2022-07-26 PROCEDURE — 97014 ELECTRIC STIMULATION THERAPY: CPT | Mod: PN,CQ

## 2022-07-26 PROCEDURE — 97112 NEUROMUSCULAR REEDUCATION: CPT | Mod: PN,CQ

## 2022-07-26 PROCEDURE — 97110 THERAPEUTIC EXERCISES: CPT | Mod: PN,CQ

## 2022-07-26 SDOH — SOCIAL DETERMINANTS OF HEALTH (SDOH): PROBLEMS RELATED TO EDUCATION AND LITERACY, UNSPECIFIED: Z55.9

## 2022-07-26 NOTE — PROGRESS NOTES
"  [facility]  Outpatient Physical Therapy Daily Treatment Note      Name: Fabiano Jules Jr.  Clinic Number: [id number]  Visit Date: 7/26/2022    Therapy Diagnosis:   Encounter Diagnoses   Name Primary?    Lumbar pain Yes    Weakness     Abnormal increased muscle tone     Decreased functional mobility and endurance     Special educational needs      Physician: Duran Adler Jr., MD     Physician Orders: PT Eval and Treat   Medical Diagnosis from Referral: M43.26 (ICD-10-CM) - Ankylosis of lumbar spine   Evaluation Date: 7/7/2022  Authorization Period Expiration: 10/08/2022  Plan of Care Expiration: 09/09/2022 at 2x/wk x 8 weeks  Progress Note Due: 08/08/2022  Visit # / Visits authorized: 5/ 10   Total visit #: 6  PTA visit #: 3/5  FOTO: 1/ 3      Time In: 2:15 pm  Time Out: 13:15 pm  Total Appointment Time (timed & untimed codes): 60  minutes     Precautions: Standard and and surgical    Subjective     The patient reports: pain at level 7/10 today after having a "terrible night sleeping. I was tossing and turning and that just made it worse". He did report using a friends TENS unit with good pain relief.     Response to previous treatment: moderate soreness  Functional change: The patient reports decreased use of back brace    Pain: 7/10 upon entering the clinic today.  Location: bilateral back    Objective     Fabiano received therapeutic exercises to develop strength, endurance, ROM, flexibility, posture and core stabilization for 15 minutes including:      Clams with GTB 2 x 10   SIdelying hip abduction 10 x 2   Prone hip extension with knee flexed 10 x each side  Supine bridges with bolster under ankles 2 x 10    Donkey kicks 1 red band on shuttle with cues to keep core engaged and facilitate glutes for movement 2 x 10 each side  quadratus lumborum stretch over bolster 3 x 30 Not performed today   Modified Hebrew Dead lifts 10 x minimal motion with cues " to hinge at hip, keep lumbar spine neutral and engage glutes for initiation of lift. Not performed today     Not performed today   -+Nu-step on Level 1 x 5 minutes.  -+Precor knee flexion with 35 pounds x 20  -+Precor knee extension with 20 pounds x 20  -+Scapular retraction with 25 pounds x 20  -+Precor DIP bar with 30 pounds x 20  -+Precor chest press with 10 pounds x 20  Prone hip extension with knee flexion ( attempted)   Bridges ( attempted)   Prone glute sets 3 sec hold 3 x 10     Fabiano participated in neuromuscular re-education activities to improve: Coordination, Proprioception and Posture for 30 minutes. The following activities were included:    Standing lumbar extension with hips braced on high desk 20 x   Prone press ups 20 x with hip shifted to right Not performed today   Sidelying open books 5 sec hold 15 x with GTB   Side glide to left 2 sec hold 10 x   Paloff press BTB 20  x each direction   Standing diagonal chops for abdominal/ oblique recruitment 20 x each side with Black theraband   Resisted retro walking holding black sports cord 2 minutes slow and controled with cues to place heel behind hip in order to achieve best recruitment of glutes     Muscle energy techniques taught for self correction of anterior innominate of right rotation. Performed with right foot on floor activating extensors and left leg on elevated plinth activating flexors. Improvement noted objectively as well as subjectively following muscle energy techniques. Not performed today     Below not performed today:  -+heel raises x 20  -+standing bilateral abduction x 15 each leg  -+standing bilateral extension x 15 each leg  -+standing and bilateral marching x 15 each leg  Prone 3 minutes  Prone on elbows 3 minutes   Sidelying open books 5 sec hold 10 x   Side glide to left 2 sec hold 10 x     Fabiano received the following supervised modalities after being cleared for contradictions: IFC Electrical Stimulation:  Fabiano received  Electrical Stimulation with cold thermal probe for pain control applied to the lumbar region following therapeutic exercises today. 10 minutes total with 6 minutes on right and 4 minutes on left side. Fabiano tolderated treatment well without any adverse effects.       Patient Education and HEP     He was compliant with home exercise program.    Education provided:   -home exercise program additional exercises added today    Written Home Exercises Provided: Patient instructed to cont prior HEP.  Exercises were reviewed and Fabiano was able to demonstrate them prior to the end of the session.  Fabiano demonstrated fair  understanding of the education provided.     See EMR under Patient Instructions for exercises provided prior visit.  Assessment     Today, treatment was modified as needed in deference to pt's pain level. Despite his pain level he was able to perform exercises with improved form and significant improvement of glute facilitation. In that vein additional hip strengthening exercises were added to treatment without incident. E-stim with thermal probe was applied to pt's lumbar region today for more intensive pain control. He responded well with report of decreased pain however pt remained guarded when leaving the clinic. He was encouraged to continue using the TENS unit he has at home for additional pain control.   Pt will continue to benefit from skilled outpatient physical therapy to address the deficits listed in the problem list box on initial evaluation, provide pt/family education and to maximize pt's level of independence in the home and community environment.     Fabiano Is progressing well towards his goals.   Pt prognosis is Good.     Pt's spiritual, cultural and educational needs considered and pt agreeable to plan of care and goals.    Anticipated barriers to physical therapy: co-morbidities    Goals:Goals:     STG  Weeks/Visits Date Established  Date Met   1.Decrease max lumbar pain to 6/10 to improve  the patient's quality of life. 4 weeks. 7/7/2022    In progress  7/26/2022      2. Decrease bilateral hamstring, iliopsoas, and piriformis muscle tones to minimal to moderate increase to improve ease of bed mobility. 4 weeks. 7/7/2022    In progress  7/26/2022      3.Increase general core strength 1/2 grade to improve standing activity endurance. 4 weeks.                7/7/2022    In progress  7/26/2022      4.Decrease FOTO limitation to <=70% to improve job task ability. 4 weeks. 7/7/2022     In progress  7/26/2022     5.Fair initial written home exercise program knowledge and be without questions to have carryover after discharge from PT. 4 weeks. 7/7/2022    In progress  7/26/2022         LTG Weeks/Visits Date Established  Date Met   1.Decrease max lumbar pain to 2/10 to improve the patient's quality of life. 8 weeks. 7/7/2022    In progress  7/26/2022      2. Decrease bilateral hamstring, iliopsoas, and piriformis muscle tones to minimal increase to improve ease of bed mobility. 8 weeks 7/7/2022        In progress  7/26/2022     3.Increase general core strength one grade from evaluation date to improve standing activity endurance. 8 weeks. 7/7/2022    In progress  7/26/2022      4.Decrease FOTO limitation to <=60% to improve job task ability. 8 weeks. 7/7/2022     In progress  7/26/2022     5.Good progressed written home exercise program knowledge and be without questions to have carryover after discharge from PT. 8 weeks. 7/7/2022 In progress  7/26/2022         Plan     Plan of care Certification: 7/7/2022 to 09/09/2022. Outpatient Physical Therapy 2 times weekly for 8 weeks. Plan to maximize lumbar strength and flexibility to help provide long term pain relief.        Donya Coello, PTA

## 2022-08-01 ENCOUNTER — CLINICAL SUPPORT (OUTPATIENT)
Dept: REHABILITATION | Facility: HOSPITAL | Age: 52
End: 2022-08-01
Payer: COMMERCIAL

## 2022-08-01 DIAGNOSIS — Z74.09 DECREASED FUNCTIONAL MOBILITY AND ENDURANCE: ICD-10-CM

## 2022-08-01 DIAGNOSIS — M54.50 LUMBAR PAIN: ICD-10-CM

## 2022-08-01 DIAGNOSIS — Z55.9 SPECIAL EDUCATIONAL NEEDS: ICD-10-CM

## 2022-08-01 DIAGNOSIS — M62.89 ABNORMAL INCREASED MUSCLE TONE: ICD-10-CM

## 2022-08-01 DIAGNOSIS — R53.1 WEAKNESS: Primary | ICD-10-CM

## 2022-08-01 PROCEDURE — 97112 NEUROMUSCULAR REEDUCATION: CPT | Mod: PN

## 2022-08-01 PROCEDURE — 97110 THERAPEUTIC EXERCISES: CPT | Mod: PN

## 2022-08-01 SDOH — SOCIAL DETERMINANTS OF HEALTH (SDOH): PROBLEMS RELATED TO EDUCATION AND LITERACY, UNSPECIFIED: Z55.9

## 2022-08-01 NOTE — PROGRESS NOTES
"  [facility]  Outpatient Physical Therapy Daily Treatment Note      Name: Fabiano Jules Jr.  Clinic Number: [id number]  Visit Date: 8/1/2022    Therapy Diagnosis:   Encounter Diagnoses   Name Primary?    Lumbar pain     Weakness Yes    Abnormal increased muscle tone     Decreased functional mobility and endurance     Special educational needs      Physician: Duran Adler Jr., MD     Physician Orders: PT Eval and Treat   Medical Diagnosis from Referral: M43.26 (ICD-10-CM) - Ankylosis of lumbar spine   Evaluation Date: 7/7/2022  Authorization Period Expiration: 10/08/2022  Plan of Care Expiration: 09/09/2022 at 2x/wk x 8 weeks  Progress Note Due: 08/08/2022  Visit # / Visits authorized: 6/ 10 (08/10/2022 JEANCARLOS put on excel sheet for more visits)  Total visit #: 7  PTA visit #: 0/5  FOTO: 1/ 3      Time In: 10:05 am  Time Out: 11:14 am  Total Appointment Time (timed & untimed codes): 50 minutes + 6 minutes on HP pre routine     Precautions: Standard and and surgical    Subjective     The patient reports: "I just need a good night's sleep. I feel those back muscles when I am in bed at night. I just can't sleep. I went out of town and slept good. The bed was higher. We came back and raised my bed. I slept better, but the past couple of nights I haven't. I'd say I am that same as last time. It is like a 7/10. It's not where the hardware is, it's more like in the upper hips." PT acknowledges.     Response to previous treatment: The patient enters today reporting the same level of pain.  Functional change: The patient reports that he was able to raise his bed height which he reports did help him get in and out of bed better.    Pain: 7/10 upon entering the clinic today again today.  Location: bilateral back    Objective     +++Jeancarlos..did he do better after adjusting his night stand location and reducing rotation?+++    Fabiano received therapeutic exercises to " develop strength, endurance, ROM, flexibility, posture and core stabilization for 25 minutes including:  -+checked for leg length  -muscle energy technique by PT to attempt to rotate right pelvis anteriorly  -+bilateral straight leg raises x 20 each  -supine adduction squeeze with 2 second hold 20  -supine marching x 10 each leg with two second hold  -bridges x 4 to tolerance  -heel raises x 20 with red ball adduction.     Not performed today   -Clams with GTB 2 x 10   -SIdelying hip abduction 10 x 2   -Prone hip extension with knee flexed 10 x each side  -Supine bridges with bolster under ankles 2 x 10    -donkey kicks 1 red band on shuttle with cues to keep core engaged and facilitate glutes for movement 2 x 10 each side  -quadratus lumborum stretch over bolster 3 x 30 Not performed today   -Modified Norwegian Dead lifts 10 x minimal motion with cues to hinge at hip, keep lumbar spine neutral and engage glutes for initiation of lift  -Nu-step on Level 1 x 5 minutes.  -Precor knee flexion with 35 pounds x 20  -Precor knee extension with 20 pounds x 20  -Scapular retraction with 25 pounds x 20  -Precor DIP bar with 30 pounds x 20  -Precor chest press with 10 pounds x 20  -Prone hip extension with knee flexion ( attempted)     Fabiano participated in neuromuscular re-education activities to improve: Coordination, Proprioception and Posture for 25 minutes. The following activities were included:  -+isometric curl up with red ball and 3-5 second hold: forward, to the left, and to the right x 10 each direction  -+gluteal squeezes with 2 second hold with bolster under knees x 4 minutes  -+bolster bridges x 10 to tolerance.  -Paloff press Blue theraband 20  x each direction   -+serratus roll ups with black roller x 15  -+sit to stand from 4th black box not using arms x 10    Below not performed today:  Standing lumbar extension with hips braced on high desk 20 x   Prone press ups 20 x with hip shifted to right Not performed  today   Side glide to left 2 sec hold 10 x   Standing diagonal chops for abdominal/ oblique recruitment 20 x each side with Black theraband   Resisted retro walking holding black sports cord 2 minutes slow and controled with cues to place heel behind hip in order to achieve best recruitment of glutes     Muscle energy techniques taught for self correction of anterior innominate of right rotation. Performed with right foot on floor activating extensors and left leg on elevated plinth activating flexors. Improvement noted objectively as well as subjectively following muscle energy techniques. This technique not performed today     Below not performed today:  -heel raises x 20  -standing bilateral abduction x 15 each leg  -standing bilateral extension x 15 each leg  -standing and bilateral marching x 15 each leg  -Prone 3 minutes  -Prone on elbows 3 minutes   -Sidelying open books 5 sec hold 10 x     Fabiano received the following supervised modalities after being cleared for contradictions: IFC Electrical Stimulation:  Fabiano received Electrical Stimulation with cold thermal probe for pain control applied to the lumbar region following therapeutic exercises today. 0 minutes total with 6 minutes on right and 0 minutes on left side. Fabiano tolderated treatment well without any adverse effects. NP    Hot pack to lumbar area while in chair PRE workout x 6 minutes  Patient Education and HEP     He was compliant with home exercise program.    Education provided:   -home exercise program additional exercises added today  -+08/01/2022 Patient was educated on moving his night stand to prevent too much rotation. He verbally acknowledged.    Written Home Exercises Provided: Patient instructed to cont prior HEP.  Exercises were reviewed and Fabiano was able to demonstrate them prior to the end of the session.  Fabiano demonstrated fair  understanding of the education provided.     See EMR under Patient Instructions for exercises provided  prior visit.  Assessment     Today, the patient entered with continued pain. After talking with the patient, it may be how he rotates in bed at night to reach for his night table. He is going to consider switching sides of the bed and also limit any reaching for the night stand he can for a day or two to see if it helps decrease his night time pain. The patient entered with 7/10 pain, and he left today with 1-2/10 pain. Exercises and neuro were adjusted today to work on anterior core muscles and to limit rotation as it may be rotation at night time adding to his pain and unrestfulness. He did well. He reported fatigue, but his pain decreased. He had good gluteal contractions today. He requires increased length of time to perform his routine. He requires verbal cues at times, but uses good form. PT used muscle energy technique to rotate right pelvis forward. PT will continue to follow to be more specific with possible leg length discrepancy. Today, it appeared the right leg was slightly longer after the pelvis was adjusted. However, this too could have attributed to his decrease in pain during the session. PT will continue to assess. No modality was needed after the session today. He was encouraged to continue with his aquatics but after having a few days off, PT suggested he just walk and not doing and kicking or leg lifts. He verbally acknowledged. He continues to work hard and is motivated to get well.     Pt will continue to benefit from skilled outpatient physical therapy to address the deficits listed in the problem list box on initial evaluation, provide pt/family education and to maximize pt's level of independence in the home and community environment.     Fabiano Is progressing well towards his goals.   Pt prognosis is Good.     Pt's spiritual, cultural and educational needs considered and pt agreeable to plan of care and goals.    Anticipated barriers to physical therapy:  co-morbidities    Goals:Goals:     STG  Weeks/Visits Date Established  Date Met   1.Decrease max lumbar pain to 6/10 to improve the patient's quality of life. 4 weeks. 7/7/2022    In progress  8/1/2022      2. Decrease bilateral hamstring, iliopsoas, and piriformis muscle tones to minimal to moderate increase to improve ease of bed mobility. 4 weeks. 7/7/2022    In progress  8/1/2022      3.Increase general core strength 1/2 grade to improve standing activity endurance. 4 weeks.                7/7/2022    In progress  8/1/2022      4.Decrease FOTO limitation to <=70% to improve job task ability. 4 weeks. 7/7/2022     In progress  8/1/2022     5.Fair initial written home exercise program knowledge and be without questions to have carryover after discharge from PT. 4 weeks. 7/7/2022    In progress  8/1/2022         LTG Weeks/Visits Date Established  Date Met   1.Decrease max lumbar pain to 2/10 to improve the patient's quality of life. 8 weeks. 7/7/2022    In progress  8/1/2022      2. Decrease bilateral hamstring, iliopsoas, and piriformis muscle tones to minimal increase to improve ease of bed mobility. 8 weeks 7/7/2022        In progress  8/1/2022     3.Increase general core strength one grade from evaluation date to improve standing activity endurance. 8 weeks. 7/7/2022    In progress  8/1/2022      4.Decrease FOTO limitation to <=60% to improve job task ability. 8 weeks. 7/7/2022     In progress  8/1/2022     5.Good progressed written home exercise program knowledge and be without questions to have carryover after discharge from PT. 8 weeks. 7/7/2022 In progress  8/1/2022         Plan     Plan of care Certification: 7/7/2022 to 09/09/2022. Outpatient Physical Therapy 2 times weekly for 8 weeks. Plan to maximize lumbar strength and flexibility to help provide long term pain relief. Plan to continue to work on pain decrease and education.        Espinoza Jeffrey, PT

## 2022-08-03 ENCOUNTER — PATIENT MESSAGE (OUTPATIENT)
Dept: FAMILY MEDICINE | Facility: CLINIC | Age: 52
End: 2022-08-03
Payer: COMMERCIAL

## 2022-08-03 ENCOUNTER — PATIENT MESSAGE (OUTPATIENT)
Dept: FAMILY MEDICINE | Facility: CLINIC | Age: 52
End: 2022-08-03

## 2022-08-03 ENCOUNTER — OFFICE VISIT (OUTPATIENT)
Dept: FAMILY MEDICINE | Facility: CLINIC | Age: 52
End: 2022-08-03
Payer: COMMERCIAL

## 2022-08-03 ENCOUNTER — LAB VISIT (OUTPATIENT)
Dept: LAB | Facility: HOSPITAL | Age: 52
End: 2022-08-03
Attending: NURSE PRACTITIONER
Payer: COMMERCIAL

## 2022-08-03 VITALS
BODY MASS INDEX: 46.65 KG/M2 | OXYGEN SATURATION: 97 % | SYSTOLIC BLOOD PRESSURE: 160 MMHG | WEIGHT: 315 LBS | HEART RATE: 90 BPM | DIASTOLIC BLOOD PRESSURE: 98 MMHG | HEIGHT: 69 IN | RESPIRATION RATE: 18 BRPM

## 2022-08-03 DIAGNOSIS — D50.9 IRON DEFICIENCY ANEMIA, UNSPECIFIED IRON DEFICIENCY ANEMIA TYPE: Primary | ICD-10-CM

## 2022-08-03 DIAGNOSIS — Z00.00 ROUTINE GENERAL MEDICAL EXAMINATION AT A HEALTH CARE FACILITY: Primary | ICD-10-CM

## 2022-08-03 DIAGNOSIS — R73.01 IMPAIRED FASTING GLUCOSE: ICD-10-CM

## 2022-08-03 DIAGNOSIS — E66.01 MORBID OBESITY: ICD-10-CM

## 2022-08-03 DIAGNOSIS — D64.9 ANEMIA, UNSPECIFIED TYPE: ICD-10-CM

## 2022-08-03 LAB
ALBUMIN SERPL BCP-MCNC: 4.3 G/DL (ref 3.5–5.2)
ALP SERPL-CCNC: 57 U/L (ref 55–135)
ALT SERPL W/O P-5'-P-CCNC: 19 U/L (ref 10–44)
ANION GAP SERPL CALC-SCNC: 10 MMOL/L (ref 8–16)
AST SERPL-CCNC: 29 U/L (ref 10–40)
BASOPHILS # BLD AUTO: 0.03 K/UL (ref 0–0.2)
BASOPHILS NFR BLD: 0.6 % (ref 0–1.9)
BILIRUB SERPL-MCNC: 0.6 MG/DL (ref 0.1–1)
BUN SERPL-MCNC: 9 MG/DL (ref 6–20)
CALCIUM SERPL-MCNC: 9.7 MG/DL (ref 8.7–10.5)
CHLORIDE SERPL-SCNC: 100 MMOL/L (ref 95–110)
CO2 SERPL-SCNC: 29 MMOL/L (ref 23–29)
CREAT SERPL-MCNC: 0.9 MG/DL (ref 0.5–1.4)
DIFFERENTIAL METHOD: ABNORMAL
EOSINOPHIL # BLD AUTO: 0 K/UL (ref 0–0.5)
EOSINOPHIL NFR BLD: 0.6 % (ref 0–8)
ERYTHROCYTE [DISTWIDTH] IN BLOOD BY AUTOMATED COUNT: 16.1 % (ref 11.5–14.5)
EST. GFR  (NO RACE VARIABLE): >60 ML/MIN/1.73 M^2
ESTIMATED AVG GLUCOSE: 126 MG/DL (ref 68–131)
FERRITIN SERPL-MCNC: 77 NG/ML (ref 20–300)
FOLATE SERPL-MCNC: 16.1 NG/ML (ref 4–24)
GLUCOSE SERPL-MCNC: 92 MG/DL (ref 70–110)
HBA1C MFR BLD: 6 % (ref 4–5.6)
HCT VFR BLD AUTO: 37.6 % (ref 40–54)
HGB BLD-MCNC: 12.2 G/DL (ref 14–18)
IMM GRANULOCYTES # BLD AUTO: 0.01 K/UL (ref 0–0.04)
IMM GRANULOCYTES NFR BLD AUTO: 0.2 % (ref 0–0.5)
IRON SERPL-MCNC: 28 UG/DL (ref 45–160)
LYMPHOCYTES # BLD AUTO: 2 K/UL (ref 1–4.8)
LYMPHOCYTES NFR BLD: 37.5 % (ref 18–48)
MAGNESIUM SERPL-MCNC: 2.4 MG/DL (ref 1.6–2.6)
MCH RBC QN AUTO: 25.8 PG (ref 27–31)
MCHC RBC AUTO-ENTMCNC: 32.4 G/DL (ref 32–36)
MCV RBC AUTO: 80 FL (ref 82–98)
MONOCYTES # BLD AUTO: 0.9 K/UL (ref 0.3–1)
MONOCYTES NFR BLD: 17.5 % (ref 4–15)
NEUTROPHILS # BLD AUTO: 2.3 K/UL (ref 1.8–7.7)
NEUTROPHILS NFR BLD: 43.6 % (ref 38–73)
NRBC BLD-RTO: 0 /100 WBC
PATH REV BLD -IMP: NORMAL
PLATELET # BLD AUTO: 325 K/UL (ref 150–450)
PMV BLD AUTO: 10.2 FL (ref 9.2–12.9)
POTASSIUM SERPL-SCNC: 3.8 MMOL/L (ref 3.5–5.1)
PROT SERPL-MCNC: 7.9 G/DL (ref 6–8.4)
RBC # BLD AUTO: 4.73 M/UL (ref 4.6–6.2)
SATURATED IRON: 6 % (ref 20–50)
SODIUM SERPL-SCNC: 139 MMOL/L (ref 136–145)
T3 SERPL-MCNC: 97 NG/DL (ref 60–180)
T4 FREE SERPL-MCNC: 1 NG/DL (ref 0.71–1.51)
TOTAL IRON BINDING CAPACITY: 448 UG/DL (ref 250–450)
TRANSFERRIN SERPL-MCNC: 303 MG/DL (ref 200–375)
TSH SERPL DL<=0.005 MIU/L-ACNC: 2 UIU/ML (ref 0.4–4)
VIT B12 SERPL-MCNC: 246 PG/ML (ref 210–950)
WBC # BLD AUTO: 5.36 K/UL (ref 3.9–12.7)

## 2022-08-03 PROCEDURE — 83036 HEMOGLOBIN GLYCOSYLATED A1C: CPT | Performed by: NURSE PRACTITIONER

## 2022-08-03 PROCEDURE — 1159F PR MEDICATION LIST DOCUMENTED IN MEDICAL RECORD: ICD-10-PCS | Mod: CPTII,S$GLB,, | Performed by: STUDENT IN AN ORGANIZED HEALTH CARE EDUCATION/TRAINING PROGRAM

## 2022-08-03 PROCEDURE — 85025 COMPLETE CBC W/AUTO DIFF WBC: CPT | Performed by: STUDENT IN AN ORGANIZED HEALTH CARE EDUCATION/TRAINING PROGRAM

## 2022-08-03 PROCEDURE — 82728 ASSAY OF FERRITIN: CPT | Performed by: STUDENT IN AN ORGANIZED HEALTH CARE EDUCATION/TRAINING PROGRAM

## 2022-08-03 PROCEDURE — 1160F PR REVIEW ALL MEDS BY PRESCRIBER/CLIN PHARMACIST DOCUMENTED: ICD-10-PCS | Mod: CPTII,S$GLB,, | Performed by: STUDENT IN AN ORGANIZED HEALTH CARE EDUCATION/TRAINING PROGRAM

## 2022-08-03 PROCEDURE — 4010F PR ACE/ARB THEARPY RXD/TAKEN: ICD-10-PCS | Mod: CPTII,S$GLB,, | Performed by: STUDENT IN AN ORGANIZED HEALTH CARE EDUCATION/TRAINING PROGRAM

## 2022-08-03 PROCEDURE — 85060 BLOOD SMEAR INTERPRETATION: CPT | Mod: ,,, | Performed by: PATHOLOGY

## 2022-08-03 PROCEDURE — 1160F RVW MEDS BY RX/DR IN RCRD: CPT | Mod: CPTII,S$GLB,, | Performed by: STUDENT IN AN ORGANIZED HEALTH CARE EDUCATION/TRAINING PROGRAM

## 2022-08-03 PROCEDURE — 3080F PR MOST RECENT DIASTOLIC BLOOD PRESSURE >= 90 MM HG: ICD-10-PCS | Mod: CPTII,S$GLB,, | Performed by: STUDENT IN AN ORGANIZED HEALTH CARE EDUCATION/TRAINING PROGRAM

## 2022-08-03 PROCEDURE — 84466 ASSAY OF TRANSFERRIN: CPT | Performed by: STUDENT IN AN ORGANIZED HEALTH CARE EDUCATION/TRAINING PROGRAM

## 2022-08-03 PROCEDURE — 99213 OFFICE O/P EST LOW 20 MIN: CPT | Mod: S$GLB,,, | Performed by: STUDENT IN AN ORGANIZED HEALTH CARE EDUCATION/TRAINING PROGRAM

## 2022-08-03 PROCEDURE — 3044F PR MOST RECENT HEMOGLOBIN A1C LEVEL <7.0%: ICD-10-PCS | Mod: CPTII,S$GLB,, | Performed by: STUDENT IN AN ORGANIZED HEALTH CARE EDUCATION/TRAINING PROGRAM

## 2022-08-03 PROCEDURE — 3008F BODY MASS INDEX DOCD: CPT | Mod: CPTII,S$GLB,, | Performed by: STUDENT IN AN ORGANIZED HEALTH CARE EDUCATION/TRAINING PROGRAM

## 2022-08-03 PROCEDURE — 3077F PR MOST RECENT SYSTOLIC BLOOD PRESSURE >= 140 MM HG: ICD-10-PCS | Mod: CPTII,S$GLB,, | Performed by: STUDENT IN AN ORGANIZED HEALTH CARE EDUCATION/TRAINING PROGRAM

## 2022-08-03 PROCEDURE — 36415 COLL VENOUS BLD VENIPUNCTURE: CPT | Mod: PO | Performed by: STUDENT IN AN ORGANIZED HEALTH CARE EDUCATION/TRAINING PROGRAM

## 2022-08-03 PROCEDURE — 84443 ASSAY THYROID STIM HORMONE: CPT | Performed by: NURSE PRACTITIONER

## 2022-08-03 PROCEDURE — 83735 ASSAY OF MAGNESIUM: CPT | Performed by: NURSE PRACTITIONER

## 2022-08-03 PROCEDURE — 84425 ASSAY OF VITAMIN B-1: CPT | Performed by: NURSE PRACTITIONER

## 2022-08-03 PROCEDURE — 82746 ASSAY OF FOLIC ACID SERUM: CPT | Performed by: STUDENT IN AN ORGANIZED HEALTH CARE EDUCATION/TRAINING PROGRAM

## 2022-08-03 PROCEDURE — 99999 PR PBB SHADOW E&M-EST. PATIENT-LVL IV: CPT | Mod: PBBFAC,,, | Performed by: STUDENT IN AN ORGANIZED HEALTH CARE EDUCATION/TRAINING PROGRAM

## 2022-08-03 PROCEDURE — 99999 PR PBB SHADOW E&M-EST. PATIENT-LVL IV: ICD-10-PCS | Mod: PBBFAC,,, | Performed by: STUDENT IN AN ORGANIZED HEALTH CARE EDUCATION/TRAINING PROGRAM

## 2022-08-03 PROCEDURE — 3008F PR BODY MASS INDEX (BMI) DOCUMENTED: ICD-10-PCS | Mod: CPTII,S$GLB,, | Performed by: STUDENT IN AN ORGANIZED HEALTH CARE EDUCATION/TRAINING PROGRAM

## 2022-08-03 PROCEDURE — 3080F DIAST BP >= 90 MM HG: CPT | Mod: CPTII,S$GLB,, | Performed by: STUDENT IN AN ORGANIZED HEALTH CARE EDUCATION/TRAINING PROGRAM

## 2022-08-03 PROCEDURE — 84439 ASSAY OF FREE THYROXINE: CPT | Performed by: NURSE PRACTITIONER

## 2022-08-03 PROCEDURE — 84480 ASSAY TRIIODOTHYRONINE (T3): CPT | Performed by: NURSE PRACTITIONER

## 2022-08-03 PROCEDURE — 1159F MED LIST DOCD IN RCRD: CPT | Mod: CPTII,S$GLB,, | Performed by: STUDENT IN AN ORGANIZED HEALTH CARE EDUCATION/TRAINING PROGRAM

## 2022-08-03 PROCEDURE — 3077F SYST BP >= 140 MM HG: CPT | Mod: CPTII,S$GLB,, | Performed by: STUDENT IN AN ORGANIZED HEALTH CARE EDUCATION/TRAINING PROGRAM

## 2022-08-03 PROCEDURE — 3044F HG A1C LEVEL LT 7.0%: CPT | Mod: CPTII,S$GLB,, | Performed by: STUDENT IN AN ORGANIZED HEALTH CARE EDUCATION/TRAINING PROGRAM

## 2022-08-03 PROCEDURE — 85060 PATHOLOGIST REVIEW: ICD-10-PCS | Mod: ,,, | Performed by: PATHOLOGY

## 2022-08-03 PROCEDURE — 4010F ACE/ARB THERAPY RXD/TAKEN: CPT | Mod: CPTII,S$GLB,, | Performed by: STUDENT IN AN ORGANIZED HEALTH CARE EDUCATION/TRAINING PROGRAM

## 2022-08-03 PROCEDURE — 82607 VITAMIN B-12: CPT | Performed by: STUDENT IN AN ORGANIZED HEALTH CARE EDUCATION/TRAINING PROGRAM

## 2022-08-03 PROCEDURE — 99213 PR OFFICE/OUTPT VISIT, EST, LEVL III, 20-29 MIN: ICD-10-PCS | Mod: S$GLB,,, | Performed by: STUDENT IN AN ORGANIZED HEALTH CARE EDUCATION/TRAINING PROGRAM

## 2022-08-03 PROCEDURE — 80053 COMPREHEN METABOLIC PANEL: CPT | Performed by: STUDENT IN AN ORGANIZED HEALTH CARE EDUCATION/TRAINING PROGRAM

## 2022-08-03 RX ORDER — SEMAGLUTIDE 1.34 MG/ML
1 INJECTION, SOLUTION SUBCUTANEOUS
Qty: 1 PEN | Refills: 11 | Status: ON HOLD | OUTPATIENT
Start: 2022-08-03 | End: 2022-12-20 | Stop reason: HOSPADM

## 2022-08-04 LAB — PATH REV BLD -IMP: NORMAL

## 2022-08-04 NOTE — TELEPHONE ENCOUNTER
Lab appointment scheduled for the date of 8-5-22; GI appointment scheduled for 8-8-22. Patient agreed to appointment dates, times, and locations. Patient also advised appointment details are available in [facility] to reference date, time, location, and name of provider if needed.

## 2022-08-04 NOTE — PROGRESS NOTES
[facility] CLINIC NOTE    Patient Name: Fabiano Jules Jr.  YOB: 1970    PRESENTING HISTORY   Chief Complaint:   Chief Complaint   Patient presents with    Follow-up        History of Present Illness:  Mr. Fabiano Jules Jr. is a 52 y.o. male     I received the following message and asked him to come in to review:  Hello, Ive been educating myself and trying to take control of my health. I have questions about several things.   Q. Why is My sodium  low ?     Since My liver is fatty.   Q.  can you get other test or an ultrasound on my liver?     Q. What kind of anemia do I have ?      Q. Can I get a bone density test ?      He is s/p spinal fusion 4/5/22.          He has chronic anemia going back years.   Will investigate.     Due to transaminitis diagnosed (?, maybe self diagnosed) with FLD.   Discussed that since this was post operative during a period of stress would recommend retesting.     Don't think he needs bone density evaluation.     Seeing Dr. Pinedo, Bariatrics. Losing weight.   Wants to get back on ozempic, did well with it.                                            Review of Systems   All other systems reviewed and are negative.        PAST HISTORY:     Past Medical History:   Diagnosis Date    Arthritis     Back injury     COVID     CPAP (continuous positive airway pressure) dependence     Hypertension     PONV (postoperative nausea and vomiting)     Sleep apnea     c pap machine used       Past Surgical History:   Procedure Laterality Date    BONE GRAFT N/A 4/5/2022    Procedure: BONE GRAFT;  Surgeon: Duran Adler Jr., MD;  Location: [facility] OR;  Service: Orthopedics;  Laterality: N/A;    CIRCUMCISION      COLONOSCOPY N/A 12/3/2018    Procedure: COLONOSCOPY;  Surgeon: CHRISSY Reyna MD;  Location: [address]);  Service: Endoscopy;  Laterality: N/A;    epidural steroid injection X 2      facet [facility]       [facility] [place]     LIPOMA RESECTION      Back of neck    MAGNETIC RESONANCE IMAGING N/A 2020    Procedure: MRI (MAGNETIC RESONANCE IMAGING) LUMBAR SPINE;  Surgeon: [facility] Diagnostic Provider;  Location: [facility];  Service: General;  Laterality: N/A;  COVID NEG    MAGNETIC RESONANCE IMAGING N/A 12/3/2021    Procedure: MRI (MAGNETIC RESONANCE IMAGING), LUMBAR SPINE;  Surgeon: [facility] Diagnostic Provider;  Location: [facility];  Service: General;  Laterality: N/A;  In MRI @ 7:50 per Ariela    MEDIAL COLLATERAL LIGAMENT AND LATERAL COLLATERAL LIGAMENT REPAIR, KNEE Right 2018    Dr. Christophe Gore in [place]     MINIMALLY INVASIVE TRANSFORAMINAL LUMBAR INTERBODY FUSION (TLIF) N/A 2022    Procedure: FUSION, SPINE, LUMBAR, TLIF, MINIMALLY INVASIVE, WITH INSTRUMENTATION,  L4/5 RIGHT;  Surgeon: Duran Adler Jr., MD;  Location: [facility];  Service: Orthopedics;  Laterality: N/A;  10:30am per Tamela    MYELOGRAPHY N/A 2020    Procedure: MYELOGRAM;  Surgeon: [facility] Diagnostic Provider;  Location: [facility];  Service: General;  Laterality: N/A;       Family History   Problem Relation Age of Onset    Hypertension Mother     Diabetes Father     Cancer Father         mesotheliosma     Cataracts Neg Hx     Glaucoma Neg Hx     Macular degeneration Neg Hx     Retinal detachment Neg Hx        Social History     Socioeconomic History    Marital status:     Number of children: 4   Occupational History     Employer: [employer]   Tobacco Use    Smoking status: Former Smoker     Packs/day: 0.50     Years: 10.00     Pack years: 5.00     Quit date: 2009     Years since quittin.5    Smokeless tobacco: Never Used    Tobacco comment: quit smoking in    Substance and Sexual Activity    Alcohol use: Yes     Comment: rare    Drug use: Not Currently     Types: Hydrocodone    Sexual activity: Not Currently       MEDICATIONS & ALLERGIES:     Current Outpatient Medications on File Prior to  "Visit   Medication Sig    amLODIPine (NORVASC) 5 MG tablet Take 1 tablet (5 mg total) by mouth once daily.    b complex vitamins (B COMPLEX-VITAMIN B12) tablet Take 1 tablet by mouth once daily.    cimetidine (TAGAMET) 400 MG tablet TAKE 1 TABLET (400 MG TOTAL) BY MOUTH 2 (TWO) TIMES DAILY AS NEEDED (ALLERGIC REACTION).    cyclobenzaprine (FLEXERIL) 10 MG tablet Take 10 mg by mouth 3 (three) times daily.    EPINEPHrine (EPIPEN) 0.3 mg/0.3 mL AtIn Inject into the muscle once for one dose as needed.    ergocalciferol (ERGOCALCIFEROL) 50,000 unit Cap Take 1 capsule (50,000 Units total) by mouth twice a week.    ferrous sulfate (FEOSOL) 325 mg (65 mg iron) Tab tablet Take 1 tablet (325 mg total) by mouth every other day.    LIDOcaine-methyl sal-menthol 4-4-5 % PtMd Apply 1 patch topically once daily at 6am.    lisinopriL-hydrochlorothiazide (PRINZIDE,ZESTORETIC) 20-12.5 mg per tablet TAKE 2 TABLETS BY MOUTH EVERY DAY    oxyCODONE-acetaminophen (PERCOCET) 7.5-325 mg per tablet Take 1 tablet by mouth 3 (three) times daily as needed.    atorvastatin (LIPITOR) 40 MG tablet Take 1 tablet (40 mg total) by mouth every evening.    gabapentin (NEURONTIN) 300 MG capsule Take 1 capsule (300 mg total) by mouth 3 (three) times daily.     Current Facility-Administered Medications on File Prior to Visit   Medication    acetaminophen tablet 650 mg    ceFAZolin in dextrose (iso-os) 2 gram/100 mL PgBk 2,000 mg    HYDROmorphone injection 1 mg    ondansetron injection 4 mg    oxyCODONE-acetaminophen  mg per tablet 1 tablet    oxyCODONE-acetaminophen 5-325 mg per tablet 1 tablet    polyethylene glycol packet 17 g       Review of patient's allergies indicates:   Allergen Reactions    Peaches [peach (prunus persica)] Anaphylaxis    Peanut Anaphylaxis       OBJECTIVE:   Vital Signs:  Vitals:    08/03/22 1000   BP: (!) 160/98   Pulse: 90   Resp: 18   SpO2: 97%   Weight: (!) 152.9 kg (337 lb 1.3 oz)   Height: 5' 9" " (1.753 m)       Recent Results (from the past 24 hour(s))   Hemoglobin A1C    Collection Time: 08/03/22 10:40 AM   Result Value Ref Range    Hemoglobin A1C 6.0 (H) 4.0 - 5.6 %    Estimated Avg Glucose 126 68 - 131 mg/dL   Magnesium    Collection Time: 08/03/22 10:40 AM   Result Value Ref Range    Magnesium 2.4 1.6 - 2.6 mg/dL   TSH    Collection Time: 08/03/22 10:40 AM   Result Value Ref Range    TSH 2.002 0.400 - 4.000 uIU/mL   T4, Free    Collection Time: 08/03/22 10:40 AM   Result Value Ref Range    Free T4 1.00 0.71 - 1.51 ng/dL   T3    Collection Time: 08/03/22 10:40 AM   Result Value Ref Range    T3, Total 97 60 - 180 ng/dL   CBC Auto Differential    Collection Time: 08/03/22 10:40 AM   Result Value Ref Range    WBC 5.36 3.90 - 12.70 K/uL    RBC 4.73 4.60 - 6.20 M/uL    Hemoglobin 12.2 (L) 14.0 - 18.0 g/dL    Hematocrit 37.6 (L) 40.0 - 54.0 %    MCV 80 (L) 82 - 98 fL    MCH 25.8 (L) 27.0 - 31.0 pg    MCHC 32.4 32.0 - 36.0 g/dL    RDW 16.1 (H) 11.5 - 14.5 %    Platelets 325 150 - 450 K/uL    MPV 10.2 9.2 - 12.9 fL    Immature Granulocytes 0.2 0.0 - 0.5 %    Gran # (ANC) 2.3 1.8 - 7.7 K/uL    Immature Grans (Abs) 0.01 0.00 - 0.04 K/uL    Lymph # 2.0 1.0 - 4.8 K/uL    Mono # 0.9 0.3 - 1.0 K/uL    Eos # 0.0 0.0 - 0.5 K/uL    Baso # 0.03 0.00 - 0.20 K/uL    nRBC 0 0 /100 WBC    Gran % 43.6 38.0 - 73.0 %    Lymph % 37.5 18.0 - 48.0 %    Mono % 17.5 (H) 4.0 - 15.0 %    Eosinophil % 0.6 0.0 - 8.0 %    Basophil % 0.6 0.0 - 1.9 %    Differential Method Automated    Ferritin    Collection Time: 08/03/22 10:40 AM   Result Value Ref Range    Ferritin 77 20.0 - 300.0 ng/mL   Iron and TIBC    Collection Time: 08/03/22 10:40 AM   Result Value Ref Range    Iron 28 (L) 45 - 160 ug/dL    Transferrin 303 200 - 375 mg/dL    TIBC 448 250 - 450 ug/dL    Saturated Iron 6 (L) 20 - 50 %   Pathologist Interpretation Differential    Collection Time: 08/03/22 10:40 AM   Result Value Ref Range    Pathologist Review Review required     Comprehensive Metabolic Panel    Collection Time: 08/03/22 10:40 AM   Result Value Ref Range    Sodium 139 136 - 145 mmol/L    Potassium 3.8 3.5 - 5.1 mmol/L    Chloride 100 95 - 110 mmol/L    CO2 29 23 - 29 mmol/L    Glucose 92 70 - 110 mg/dL    BUN 9 6 - 20 mg/dL    Creatinine 0.9 0.5 - 1.4 mg/dL    Calcium 9.7 8.7 - 10.5 mg/dL    Total Protein 7.9 6.0 - 8.4 g/dL    Albumin 4.3 3.5 - 5.2 g/dL    Total Bilirubin 0.6 0.1 - 1.0 mg/dL    Alkaline Phosphatase 57 55 - 135 U/L    AST 29 10 - 40 U/L    ALT 19 10 - 44 U/L    Anion Gap 10 8 - 16 mmol/L    eGFR >60.0 >60 mL/min/1.73 m^2   VITAMIN B12    Collection Time: 08/03/22 10:40 AM   Result Value Ref Range    Vitamin B-12 246 210 - 950 pg/mL   Folate    Collection Time: 08/03/22 10:40 AM   Result Value Ref Range    Folate 16.1 4.0 - 24.0 ng/mL         Physical Exam  Vitals and nursing note reviewed.   Constitutional:       General: He is in acute distress (due to back pain).      Appearance: He is diaphoretic.   HENT:      Head: Normocephalic and atraumatic.      Right Ear: External ear normal.      Left Ear: External ear normal.   Eyes:      General: No scleral icterus.     Conjunctiva/sclera: Conjunctivae normal.      Pupils: Pupils are equal, round, and reactive to light.   Neck:      Thyroid: No thyromegaly.   Cardiovascular:      Rate and Rhythm: Normal rate and regular rhythm.      Heart sounds: Normal heart sounds. No murmur heard.  Pulmonary:      Effort: Pulmonary effort is normal. No respiratory distress.      Breath sounds: Normal breath sounds. No wheezing or rales.   Musculoskeletal:         General: No tenderness or deformity. Normal range of motion.      Cervical back: Normal range of motion and neck supple.   Lymphadenopathy:      Cervical: No cervical adenopathy.   Skin:     General: Skin is warm.      Findings: No erythema or rash.   Neurological:      Mental Status: He is alert and oriented to person, place, and time.      Gait: Gait is intact.    Psychiatric:         Mood and Affect: Mood and affect normal.         Cognition and Memory: Memory normal.         Judgment: Judgment normal.         ASSESSMENT & PLAN:     Routine general medical examination at a health care facility    Anemia, unspecified type  -     CBC Auto Differential; Future; Expected date: 08/03/2022  -     Ferritin; Future; Expected date: 08/03/2022  -     Iron and TIBC; Future; Expected date: 08/03/2022  -     Pathologist Interpretation Differential; Future; Expected date: 08/03/2022  -     Comprehensive Metabolic Panel; Future; Expected date: 08/03/2022  -     VITAMIN B12; Future; Expected date: 08/03/2022  -     Folate; Future; Expected date: 08/03/2022    Impaired fasting glucose  -     semaglutide (OZEMPIC) 1 mg/dose (4 mg/3 mL); Inject 1 mg into the skin every 7 days.  Dispense: 1 pen; Refill: 11      BP is elevated today due to pain. Recheck at home when feeling better.     He is going to contact his spine surgeon re pain control/investigation.       Howie Mathias MD   Internal Medicine    This note was created using Dragon voice recognition software that occasionally misinterprets phrases or words.

## 2022-08-05 ENCOUNTER — LAB VISIT (OUTPATIENT)
Dept: LAB | Facility: HOSPITAL | Age: 52
End: 2022-08-05
Attending: STUDENT IN AN ORGANIZED HEALTH CARE EDUCATION/TRAINING PROGRAM
Payer: COMMERCIAL

## 2022-08-05 DIAGNOSIS — D50.9 IRON DEFICIENCY ANEMIA, UNSPECIFIED IRON DEFICIENCY ANEMIA TYPE: ICD-10-CM

## 2022-08-05 PROCEDURE — 88184 FLOWCYTOMETRY/ TC 1 MARKER: CPT | Performed by: PATHOLOGY

## 2022-08-05 PROCEDURE — 88189 FLOWCYTOMETRY/READ 16 & >: CPT | Mod: ,,, | Performed by: PATHOLOGY

## 2022-08-05 PROCEDURE — 88185 FLOWCYTOMETRY/TC ADD-ON: CPT | Performed by: PATHOLOGY

## 2022-08-05 PROCEDURE — 88189 PR  FLOWCYTOMETRY/READ, 16 & > MARKERS: ICD-10-PCS | Mod: ,,, | Performed by: PATHOLOGY

## 2022-08-07 ENCOUNTER — PATIENT MESSAGE (OUTPATIENT)
Dept: BARIATRICS | Facility: CLINIC | Age: 52
End: 2022-08-07
Payer: COMMERCIAL

## 2022-08-07 ENCOUNTER — PATIENT MESSAGE (OUTPATIENT)
Dept: REHABILITATION | Facility: HOSPITAL | Age: 52
End: 2022-08-07
Payer: COMMERCIAL

## 2022-08-07 DIAGNOSIS — D50.9 IRON DEFICIENCY ANEMIA, UNSPECIFIED IRON DEFICIENCY ANEMIA TYPE: ICD-10-CM

## 2022-08-07 DIAGNOSIS — E53.8 B12 DEFICIENCY: ICD-10-CM

## 2022-08-08 ENCOUNTER — TELEPHONE (OUTPATIENT)
Dept: BARIATRICS | Facility: CLINIC | Age: 52
End: 2022-08-08
Payer: COMMERCIAL

## 2022-08-08 ENCOUNTER — DOCUMENTATION ONLY (OUTPATIENT)
Dept: BARIATRICS | Facility: CLINIC | Age: 52
End: 2022-08-08
Payer: COMMERCIAL

## 2022-08-08 ENCOUNTER — OFFICE VISIT (OUTPATIENT)
Dept: BARIATRICS | Facility: CLINIC | Age: 52
End: 2022-08-08
Payer: COMMERCIAL

## 2022-08-08 ENCOUNTER — OFFICE VISIT (OUTPATIENT)
Dept: GASTROENTEROLOGY | Facility: CLINIC | Age: 52
End: 2022-08-08
Payer: COMMERCIAL

## 2022-08-08 VITALS
SYSTOLIC BLOOD PRESSURE: 140 MMHG | HEIGHT: 69 IN | DIASTOLIC BLOOD PRESSURE: 90 MMHG | BODY MASS INDEX: 46.65 KG/M2 | WEIGHT: 315 LBS | HEART RATE: 56 BPM

## 2022-08-08 VITALS
TEMPERATURE: 99 F | DIASTOLIC BLOOD PRESSURE: 92 MMHG | HEIGHT: 69 IN | RESPIRATION RATE: 16 BRPM | SYSTOLIC BLOOD PRESSURE: 171 MMHG | HEART RATE: 60 BPM | BODY MASS INDEX: 46.65 KG/M2 | WEIGHT: 315 LBS

## 2022-08-08 DIAGNOSIS — R41.89 BRAIN FOG: ICD-10-CM

## 2022-08-08 DIAGNOSIS — K21.9 GASTROESOPHAGEAL REFLUX DISEASE, UNSPECIFIED WHETHER ESOPHAGITIS PRESENT: ICD-10-CM

## 2022-08-08 DIAGNOSIS — G47.33 OSA (OBSTRUCTIVE SLEEP APNEA): ICD-10-CM

## 2022-08-08 DIAGNOSIS — Z98.890 HISTORY OF SPINAL SURGERY: ICD-10-CM

## 2022-08-08 DIAGNOSIS — R53.83 FATIGUE, UNSPECIFIED TYPE: ICD-10-CM

## 2022-08-08 DIAGNOSIS — Z71.3 PRE-BARIATRIC SURGERY NUTRITION EVALUATION: ICD-10-CM

## 2022-08-08 DIAGNOSIS — K59.03 DRUG-INDUCED CONSTIPATION: ICD-10-CM

## 2022-08-08 DIAGNOSIS — E66.01 MORBID OBESITY: Primary | ICD-10-CM

## 2022-08-08 DIAGNOSIS — M54.50 ACUTE MIDLINE LOW BACK PAIN WITHOUT SCIATICA: ICD-10-CM

## 2022-08-08 DIAGNOSIS — Z86.010 HISTORY OF COLON POLYPS: ICD-10-CM

## 2022-08-08 DIAGNOSIS — Z71.89 ENCOUNTER FOR PRE-BARIATRIC SURGERY COUNSELING AND EDUCATION: ICD-10-CM

## 2022-08-08 DIAGNOSIS — D50.9 IRON DEFICIENCY ANEMIA, UNSPECIFIED IRON DEFICIENCY ANEMIA TYPE: Primary | ICD-10-CM

## 2022-08-08 LAB
FLOW CYTOMETRY ANTIBODIES ANALYZED - BLOOD: NORMAL
FLOW CYTOMETRY COMMENT - BLOOD: NORMAL
FLOW CYTOMETRY INTERPRETATION - BLOOD: NORMAL

## 2022-08-08 PROCEDURE — 99999 PR PBB SHADOW E&M-EST. PATIENT-LVL V: CPT | Mod: PBBFAC,,, | Performed by: NURSE PRACTITIONER

## 2022-08-08 PROCEDURE — 99203 PR OFFICE/OUTPT VISIT, NEW, LEVL III, 30-44 MIN: ICD-10-PCS | Mod: S$GLB,,,

## 2022-08-08 PROCEDURE — 99999 PR PBB SHADOW E&M-EST. PATIENT-LVL V: CPT | Mod: PBBFAC,,,

## 2022-08-08 PROCEDURE — 1159F PR MEDICATION LIST DOCUMENTED IN MEDICAL RECORD: ICD-10-PCS | Mod: CPTII,S$GLB,, | Performed by: NURSE PRACTITIONER

## 2022-08-08 PROCEDURE — 1160F PR REVIEW ALL MEDS BY PRESCRIBER/CLIN PHARMACIST DOCUMENTED: ICD-10-PCS | Mod: CPTII,S$GLB,, | Performed by: NURSE PRACTITIONER

## 2022-08-08 PROCEDURE — 3080F PR MOST RECENT DIASTOLIC BLOOD PRESSURE >= 90 MM HG: ICD-10-PCS | Mod: CPTII,S$GLB,, | Performed by: NURSE PRACTITIONER

## 2022-08-08 PROCEDURE — 3008F BODY MASS INDEX DOCD: CPT | Mod: CPTII,S$GLB,,

## 2022-08-08 PROCEDURE — 99215 PR OFFICE/OUTPT VISIT, EST, LEVL V, 40-54 MIN: ICD-10-PCS | Mod: S$GLB,,, | Performed by: NURSE PRACTITIONER

## 2022-08-08 PROCEDURE — 99215 OFFICE O/P EST HI 40 MIN: CPT | Mod: S$GLB,,, | Performed by: NURSE PRACTITIONER

## 2022-08-08 PROCEDURE — 4010F PR ACE/ARB THEARPY RXD/TAKEN: ICD-10-PCS | Mod: CPTII,S$GLB,,

## 2022-08-08 PROCEDURE — 3044F HG A1C LEVEL LT 7.0%: CPT | Mod: CPTII,S$GLB,, | Performed by: NURSE PRACTITIONER

## 2022-08-08 PROCEDURE — 1125F AMNT PAIN NOTED PAIN PRSNT: CPT | Mod: CPTII,S$GLB,, | Performed by: NURSE PRACTITIONER

## 2022-08-08 PROCEDURE — 99999 PR PBB SHADOW E&M-EST. PATIENT-LVL V: ICD-10-PCS | Mod: PBBFAC,,, | Performed by: NURSE PRACTITIONER

## 2022-08-08 PROCEDURE — 3008F PR BODY MASS INDEX (BMI) DOCUMENTED: ICD-10-PCS | Mod: CPTII,S$GLB,,

## 2022-08-08 PROCEDURE — 1159F PR MEDICATION LIST DOCUMENTED IN MEDICAL RECORD: ICD-10-PCS | Mod: CPTII,S$GLB,,

## 2022-08-08 PROCEDURE — 4010F ACE/ARB THERAPY RXD/TAKEN: CPT | Mod: CPTII,S$GLB,,

## 2022-08-08 PROCEDURE — 3080F PR MOST RECENT DIASTOLIC BLOOD PRESSURE >= 90 MM HG: ICD-10-PCS | Mod: CPTII,S$GLB,,

## 2022-08-08 PROCEDURE — 1160F PR REVIEW ALL MEDS BY PRESCRIBER/CLIN PHARMACIST DOCUMENTED: ICD-10-PCS | Mod: CPTII,S$GLB,,

## 2022-08-08 PROCEDURE — 99203 OFFICE O/P NEW LOW 30 MIN: CPT | Mod: S$GLB,,,

## 2022-08-08 PROCEDURE — 4010F PR ACE/ARB THEARPY RXD/TAKEN: ICD-10-PCS | Mod: CPTII,S$GLB,, | Performed by: NURSE PRACTITIONER

## 2022-08-08 PROCEDURE — 1159F MED LIST DOCD IN RCRD: CPT | Mod: CPTII,S$GLB,, | Performed by: NURSE PRACTITIONER

## 2022-08-08 PROCEDURE — 1160F RVW MEDS BY RX/DR IN RCRD: CPT | Mod: CPTII,S$GLB,,

## 2022-08-08 PROCEDURE — 3044F PR MOST RECENT HEMOGLOBIN A1C LEVEL <7.0%: ICD-10-PCS | Mod: CPTII,S$GLB,, | Performed by: NURSE PRACTITIONER

## 2022-08-08 PROCEDURE — 1159F MED LIST DOCD IN RCRD: CPT | Mod: CPTII,S$GLB,,

## 2022-08-08 PROCEDURE — 1160F RVW MEDS BY RX/DR IN RCRD: CPT | Mod: CPTII,S$GLB,, | Performed by: NURSE PRACTITIONER

## 2022-08-08 PROCEDURE — 3080F DIAST BP >= 90 MM HG: CPT | Mod: CPTII,S$GLB,, | Performed by: NURSE PRACTITIONER

## 2022-08-08 PROCEDURE — 3077F SYST BP >= 140 MM HG: CPT | Mod: CPTII,S$GLB,, | Performed by: NURSE PRACTITIONER

## 2022-08-08 PROCEDURE — 3008F PR BODY MASS INDEX (BMI) DOCUMENTED: ICD-10-PCS | Mod: CPTII,S$GLB,, | Performed by: NURSE PRACTITIONER

## 2022-08-08 PROCEDURE — 3077F SYST BP >= 140 MM HG: CPT | Mod: CPTII,S$GLB,,

## 2022-08-08 PROCEDURE — 99999 PR PBB SHADOW E&M-EST. PATIENT-LVL V: ICD-10-PCS | Mod: PBBFAC,,,

## 2022-08-08 PROCEDURE — 3077F PR MOST RECENT SYSTOLIC BLOOD PRESSURE >= 140 MM HG: ICD-10-PCS | Mod: CPTII,S$GLB,, | Performed by: NURSE PRACTITIONER

## 2022-08-08 PROCEDURE — 1125F PR PAIN SEVERITY QUANTIFIED, PAIN PRESENT: ICD-10-PCS | Mod: CPTII,S$GLB,, | Performed by: NURSE PRACTITIONER

## 2022-08-08 PROCEDURE — 3044F HG A1C LEVEL LT 7.0%: CPT | Mod: CPTII,S$GLB,,

## 2022-08-08 PROCEDURE — 3044F PR MOST RECENT HEMOGLOBIN A1C LEVEL <7.0%: ICD-10-PCS | Mod: CPTII,S$GLB,,

## 2022-08-08 PROCEDURE — 3008F BODY MASS INDEX DOCD: CPT | Mod: CPTII,S$GLB,, | Performed by: NURSE PRACTITIONER

## 2022-08-08 PROCEDURE — 3080F DIAST BP >= 90 MM HG: CPT | Mod: CPTII,S$GLB,,

## 2022-08-08 PROCEDURE — 3077F PR MOST RECENT SYSTOLIC BLOOD PRESSURE >= 140 MM HG: ICD-10-PCS | Mod: CPTII,S$GLB,,

## 2022-08-08 PROCEDURE — 4010F ACE/ARB THERAPY RXD/TAKEN: CPT | Mod: CPTII,S$GLB,, | Performed by: NURSE PRACTITIONER

## 2022-08-08 RX ORDER — MULTIVIT WITH MINERALS/HERBS
1 TABLET ORAL DAILY
Qty: 90 TABLET | Refills: 1 | Status: SHIPPED | OUTPATIENT
Start: 2022-08-08 | End: 2023-03-31 | Stop reason: SDUPTHER

## 2022-08-08 RX ORDER — FERROUS SULFATE 325(65) MG
325 TABLET ORAL EVERY OTHER DAY
Qty: 90 TABLET | Refills: 0 | Status: ON HOLD | OUTPATIENT
Start: 2022-08-08 | End: 2022-12-20 | Stop reason: HOSPADM

## 2022-08-08 NOTE — PROGRESS NOTES
Subjective:       Patient ID: Fabiano Jules Jr. is a 52 y.o. male Body mass index is 52.22 kg/m².    Chief Complaint: Anemia    This patient is new to me.  Referring Provider: Dr. Howie Mathias for iron deficiency anemia.     Anemia  Presents for initial visit. Symptoms include malaise/fatigue. There has been no abdominal pain, anorexia, bruising/bleeding easily, confusion, fever, leg swelling, light-headedness, pallor, palpitations, paresthesias, pica or weight loss. (Reports opioid induced constipation; currently having daily BM rated 4 on bristol scale; taking colace twice weekly with significant improvement; reports brain fog r/t past COVID-19 infection 01/19/21 - followed by PCP for managment) Signs of blood loss that are not present include hematemesis, hematochezia and melena. Past treatments include oral iron supplements and oral vitamin B12 (started iron and B12 ~1 year ago). Past medical history includes recent surgery (04/05/22 - hx of spinal fusion; currently wearing back brace). There is no history of alcohol abuse, cancer, chronic liver disease, chronic renal disease, clotting disorder, dementia, heart failure, hemoglobinopathy, HIV/AIDS, hypothyroidism, inflammatory bowel disease, malabsorption, malnutrition, neuropathy, recent illness, recent trauma or rheumatic disease. Procedure history includes colonoscopy (12/03/18). There is no past history of bone marrow exam, EGD or FOBT. Family history includes iron deficiency (mother).     Review of Systems   Constitutional: Positive for malaise/fatigue. Negative for activity change, appetite change, chills, diaphoresis, fatigue, fever, unexpected weight change and weight loss.   HENT: Negative for sore throat and trouble swallowing.    Respiratory: Negative for cough, choking and shortness of breath.    Cardiovascular: Negative for chest pain and palpitations.   Gastrointestinal: Positive for constipation. Negative for abdominal distention, abdominal pain,  anal bleeding, anorexia, blood in stool, diarrhea, hematemesis, hematochezia, melena, nausea, rectal pain and vomiting.   Genitourinary: Negative for hematuria.   Musculoskeletal: Positive for arthralgias (hx of arthritis in right knee).   Skin: Negative for pallor and rash.   Neurological: Negative for light-headedness and paresthesias.   Hematological: Does not bruise/bleed easily.   Psychiatric/Behavioral: Negative for confusion.       No LMP for male patient.  Past Medical History:   Diagnosis Date    Arthritis     Back injury     Colon polyp     COVID     CPAP (continuous positive airway pressure) dependence     Hypertension     PONV (postoperative nausea and vomiting)     Sleep apnea     c pap machine used     Past Surgical History:   Procedure Laterality Date    BONE GRAFT N/A 4/5/2022    Procedure: BONE GRAFT;  Surgeon: Duran Adler Jr., MD;  Location: [facility];  Service: Orthopedics;  Laterality: N/A;    CIRCUMCISION      COLONOSCOPY N/A 12/3/2018    Procedure: COLONOSCOPY;  Surgeon: CHRISSY Reyna MD;  Location: [address]);  Service: Endoscopy;  Laterality: N/A;    epidural steroid injection X 2      facet injection       [facility] [place]     LIPOMA RESECTION      Back of neck    MAGNETIC RESONANCE IMAGING N/A 5/29/2020    Procedure: MRI (MAGNETIC RESONANCE IMAGING) LUMBAR SPINE;  Surgeon: [facility] Diagnostic Provider;  Location: [facility];  Service: General;  Laterality: N/A;  COVID NEG    MAGNETIC RESONANCE IMAGING N/A 12/3/2021    Procedure: MRI (MAGNETIC RESONANCE IMAGING), LUMBAR SPINE;  Surgeon: [facility] Diagnostic Provider;  Location: [facility];  Service: General;  Laterality: N/A;  In MRI @ 7:50 per Ariela    MEDIAL COLLATERAL LIGAMENT AND LATERAL COLLATERAL LIGAMENT REPAIR, KNEE Right 09/01/2018    Dr. Christophe Gore in [place]     MINIMALLY INVASIVE TRANSFORAMINAL LUMBAR INTERBODY FUSION (TLIF) N/A 4/5/2022    Procedure: FUSION, SPINE,  LUMBAR, TLIF, MINIMALLY INVASIVE, WITH INSTRUMENTATION,  L4/5 RIGHT;  Surgeon: Duran Adler Jr., MD;  Location: [facility] OR;  Service: Orthopedics;  Laterality: N/A;  10:30am per Tamela    MYELOGRAPHY N/A 2020    Procedure: MYELOGRAM;  Surgeon: Andi Diagnostic Provider;  Location: [facility] OR;  Service: General;  Laterality: N/A;     Family History   Problem Relation Age of Onset    Hypertension Mother     Diabetes Father     Cancer Father         mesotheliosma     Cataracts Neg Hx     Glaucoma Neg Hx     Macular degeneration Neg Hx     Retinal detachment Neg Hx     Colon cancer Neg Hx     Colon polyps Neg Hx     Crohn's disease Neg Hx     Ulcerative colitis Neg Hx      Social History     Tobacco Use    Smoking status: Former Smoker     Packs/day: 0.50     Years: 10.00     Pack years: 5.00     Quit date: 2009     Years since quittin.6    Smokeless tobacco: Never Used    Tobacco comment: quit smoking in    Substance Use Topics    Alcohol use: Yes     Comment: rare    Drug use: Not Currently     Types: Hydrocodone     Wt Readings from Last 10 Encounters:   22 (!) 159.8 kg (352 lb 3 oz)   22 (!) 160.4 kg (353 lb 9.9 oz)   22 (!) 152.9 kg (337 lb 1.3 oz)   22 (!) 163.3 kg (360 lb 0.2 oz)   22 (!) 161.2 kg (355 lb 6.1 oz)   22 (!) 156.5 kg (345 lb)   22 (!) 160.8 kg (354 lb 6.4 oz)   22 (!) 160.9 kg (354 lb 11.5 oz)   22 (!) 159 kg (350 lb 8.5 oz)   22 (!) 160.5 kg (353 lb 13.4 oz)     Lab Results   Component Value Date    WBC 5.36 2022    HGB 12.2 (L) 2022    HCT 37.6 (L) 2022    MCV 80 (L) 2022     2022     CMP  Sodium   Date Value Ref Range Status   2022 139 136 - 145 mmol/L Final     Potassium   Date Value Ref Range Status   2022 3.8 3.5 - 5.1 mmol/L Final     Chloride   Date Value Ref Range Status   2022 100 95 - 110 mmol/L Final     CO2   Date Value Ref Range Status    08/03/2022 29 23 - 29 mmol/L Final     Glucose   Date Value Ref Range Status   08/03/2022 92 70 - 110 mg/dL Final     BUN   Date Value Ref Range Status   08/03/2022 9 6 - 20 mg/dL Final     Creatinine   Date Value Ref Range Status   08/03/2022 0.9 0.5 - 1.4 mg/dL Final     Calcium   Date Value Ref Range Status   08/03/2022 9.7 8.7 - 10.5 mg/dL Final     Total Protein   Date Value Ref Range Status   08/03/2022 7.9 6.0 - 8.4 g/dL Final     Albumin   Date Value Ref Range Status   08/03/2022 4.3 3.5 - 5.2 g/dL Final     Total Bilirubin   Date Value Ref Range Status   08/03/2022 0.6 0.1 - 1.0 mg/dL Final     Comment:     For infants and newborns, interpretation of results should be based  on gestational age, weight and in agreement with clinical  observations.    Premature Infant recommended reference ranges:  Up to 24 hours.............<8.0 mg/dL  Up to 48 hours............<12.0 mg/dL  3-5 days..................<15.0 mg/dL  6-29 days.................<15.0 mg/dL       Alkaline Phosphatase   Date Value Ref Range Status   08/03/2022 57 55 - 135 U/L Final     AST   Date Value Ref Range Status   08/03/2022 29 10 - 40 U/L Final     ALT   Date Value Ref Range Status   08/03/2022 19 10 - 44 U/L Final     Anion Gap   Date Value Ref Range Status   08/03/2022 10 8 - 16 mmol/L Final     eGFR if    Date Value Ref Range Status   04/09/2022 >60 >60 mL/min/1.73 m^2 Final     eGFR if non    Date Value Ref Range Status   04/09/2022 >60 >60 mL/min/1.73 m^2 Final     Comment:     Calculation used to obtain the estimated glomerular filtration  rate (eGFR) is the CKD-EPI equation.          Lab Results   Component Value Date    LIPASE 29 07/14/2021     Lab Results   Component Value Date    TSH 2.002 08/03/2022     Lab Results   Component Value Date    IRON 28 (L) 08/03/2022    TIBC 448 08/03/2022    FERRITIN 77 08/03/2022     Reviewed prior medical records including radiology report of chest x-ray 03/29/22 &  endoscopy history (see surgical history).    Objective:      Physical Exam  Vitals and nursing note reviewed.   Constitutional:       General: He is not in acute distress.     Appearance: Normal appearance. He is obese. He is not ill-appearing.   HENT:      Mouth/Throat:      Comments: Unable to assess due to COVID concerns.  Eyes:      Extraocular Movements: Extraocular movements intact.      Pupils: Pupils are equal, round, and reactive to light.   Cardiovascular:      Rate and Rhythm: Normal rate and regular rhythm.      Heart sounds: Normal heart sounds.   Pulmonary:      Effort: Pulmonary effort is normal. No respiratory distress.      Breath sounds: Normal breath sounds.   Abdominal:      General: Abdomen is protuberant. Bowel sounds are normal. There is no distension or abdominal bruit. There are no signs of injury.      Palpations: Abdomen is soft. There is no shifting dullness, fluid wave, hepatomegaly, splenomegaly or mass.      Tenderness: There is no abdominal tenderness. There is no guarding or rebound. Negative signs include Francis's sign, Rovsing's sign and McBurney's sign.      Hernia: No hernia is present.   Skin:     General: Skin is warm and dry.      Coloration: Skin is not jaundiced or pale.   Neurological:      Mental Status: He is alert and oriented to person, place, and time.   Psychiatric:         Attention and Perception: Attention normal.         Mood and Affect: Mood normal.         Speech: Speech normal.         Behavior: Behavior normal.         Assessment:       1. Iron deficiency anemia, unspecified iron deficiency anemia type    2. Fatigue, unspecified type    3. Drug-induced constipation    4. History of colon polyps    5. History of spinal surgery    6. Brain fog    7. BMI 50.0-59.9, adult        Plan:       Iron deficiency anemia, unspecified iron deficiency anemia type & Fatigue, unspecified type  - schedule EGD, discussed procedure with patient, including risks and benefits,  patient verbalized understanding  - schedule Colonoscopy, discussed procedure with the patient, including risks and benefits, patient verbalized understanding  - discussed with patient the different ways that anemia occurs: blood loss (such as from the gi tract), the body is not making enough, or the body is breaking down the rbcs too quickly; recommend EGD and colonoscopy to further evaluate gi tract for possible blood loss and pending results of endoscopies, possible UGI with Small Bowel Follow Through/video capsule study  -follow-up with PCP and/or hematology for continued evaluation and management  -     Case Request Endoscopy: EGD (ESOPHAGOGASTRODUODENOSCOPY), COLONOSCOPY    Drug-induced constipation  - schedule Colonoscopy, discussed procedure with the patient, including risks and benefits, patient verbalized understanding  -CONTINUE COLACE PRN FOR CONSTIPATION  -Recommend daily exercise as tolerated, adequate water intake (six 8-oz glasses of water daily), and high fiber diet. OTC fiber supplements are recommended if diet does not reach daily fiber goal (20-30 grams daily), such as Metamucil, Citrucel, or FiberCon (take as directed, separate from other oral medications by >2 hours).  -Recommend taking an OTC stool softener such as Colace as directed to avoid hard stools and straining with bowel movements PRN  -Recommend trying OTC MiraLax once daily (17g PO) as directed  - If no improvement with above recommendations, try intermittently dosed Dulcolax OTC as directed (every 3-4  days) PRN to facilitate bowel movements  -If still no improvement with these measures, call/follow-up    History of colon polyps  - schedule Colonoscopy, discussed procedure with the patient, including risks and benefits, patient verbalized understanding    History of spinal surgery  -Follow-up with surgeon for continued evaluation and management     Brain fog   -Follow-up with PCP for continued evaluation and management    BMI  50.0-59.9, adult  -Recommend diet and exercise as tolerated    Follow up in about 4 weeks (around 9/5/2022), or if symptoms worsen or fail to improve.      If no improvement in symptoms or symptoms worsen, call/follow-up at clinic or go to ER.        30 minutes of total time spent on the encounter, which includes face to face time and non-face to face time preparing to see the patient (eg, review of tests), Obtaining and/or reviewing separately obtained history, Documenting clinical information in the electronic or other health record, Independently interpreting results (not separately reported) and communicating results to the patient/family/caregiver, or Care coordination (not separately reported).

## 2022-08-08 NOTE — H&P (VIEW-ONLY)
Subjective:       Patient ID: Fabiano Jules Jr. is a 52 y.o. male Body mass index is 52.22 kg/m².    Chief Complaint: Anemia    This patient is new to me.  Referring Provider: Dr. Howie Mathias for iron deficiency anemia.     Anemia  Presents for initial visit. Symptoms include malaise/fatigue. There has been no abdominal pain, anorexia, bruising/bleeding easily, confusion, fever, leg swelling, light-headedness, pallor, palpitations, paresthesias, pica or weight loss. (Reports opioid induced constipation; currently having daily BM rated 4 on bristol scale; taking colace twice weekly with significant improvement; reports brain fog r/t past COVID-19 infection 01/19/21 - followed by PCP for managment) Signs of blood loss that are not present include hematemesis, hematochezia and melena. Past treatments include oral iron supplements and oral vitamin B12 (started iron and B12 ~1 year ago). Past medical history includes recent surgery (04/05/22 - hx of spinal fusion; currently wearing back brace). There is no history of alcohol abuse, cancer, chronic liver disease, chronic renal disease, clotting disorder, dementia, heart failure, hemoglobinopathy, HIV/AIDS, hypothyroidism, inflammatory bowel disease, malabsorption, malnutrition, neuropathy, recent illness, recent trauma or rheumatic disease. Procedure history includes colonoscopy (12/03/18). There is no past history of bone marrow exam, EGD or FOBT. Family history includes iron deficiency (mother).     Review of Systems   Constitutional: Positive for malaise/fatigue. Negative for activity change, appetite change, chills, diaphoresis, fatigue, fever, unexpected weight change and weight loss.   HENT: Negative for sore throat and trouble swallowing.    Respiratory: Negative for cough, choking and shortness of breath.    Cardiovascular: Negative for chest pain and palpitations.   Gastrointestinal: Positive for constipation. Negative for abdominal distention, abdominal pain,  anal bleeding, anorexia, blood in stool, diarrhea, hematemesis, hematochezia, melena, nausea, rectal pain and vomiting.   Genitourinary: Negative for hematuria.   Musculoskeletal: Positive for arthralgias (hx of arthritis in right knee).   Skin: Negative for pallor and rash.   Neurological: Negative for light-headedness and paresthesias.   Hematological: Does not bruise/bleed easily.   Psychiatric/Behavioral: Negative for confusion.       No LMP for male patient.  Past Medical History:   Diagnosis Date    Arthritis     Back injury     Colon polyp     COVID     CPAP (continuous positive airway pressure) dependence     Hypertension     PONV (postoperative nausea and vomiting)     Sleep apnea     c pap machine used     Past Surgical History:   Procedure Laterality Date    BONE GRAFT N/A 4/5/2022    Procedure: BONE GRAFT;  Surgeon: Duran Adler Jr., MD;  Location: [facility];  Service: Orthopedics;  Laterality: N/A;    CIRCUMCISION      COLONOSCOPY N/A 12/3/2018    Procedure: COLONOSCOPY;  Surgeon: CHRISSY Reyna MD;  Location: [address]);  Service: Endoscopy;  Laterality: N/A;    epidural steroid injection X 2      facet injection       [facility] [place]     LIPOMA RESECTION      Back of neck    MAGNETIC RESONANCE IMAGING N/A 5/29/2020    Procedure: MRI (MAGNETIC RESONANCE IMAGING) LUMBAR SPINE;  Surgeon: [facility] Diagnostic Provider;  Location: [facility];  Service: General;  Laterality: N/A;  COVID NEG    MAGNETIC RESONANCE IMAGING N/A 12/3/2021    Procedure: MRI (MAGNETIC RESONANCE IMAGING), LUMBAR SPINE;  Surgeon: [facility] Diagnostic Provider;  Location: [facility];  Service: General;  Laterality: N/A;  In MRI @ 7:50 per Ariela    MEDIAL COLLATERAL LIGAMENT AND LATERAL COLLATERAL LIGAMENT REPAIR, KNEE Right 09/01/2018    Dr. Christophe Gore in [place]     MINIMALLY INVASIVE TRANSFORAMINAL LUMBAR INTERBODY FUSION (TLIF) N/A 4/5/2022    Procedure: FUSION, SPINE,  LUMBAR, TLIF, MINIMALLY INVASIVE, WITH INSTRUMENTATION,  L4/5 RIGHT;  Surgeon: Duran Adler Jr., MD;  Location: [facility];  Service: Orthopedics;  Laterality: N/A;  10:30am per Tamela    MYELOGRAPHY N/A 2020    Procedure: MYELOGRAM;  Surgeon: And[facility] Diagnostic Provider;  Location: [facility] OR;  Service: General;  Laterality: N/A;     Family History   Problem Relation Age of Onset    Hypertension Mother     Diabetes Father     Cancer Father         mesotheliosma     Cataracts Neg Hx     Glaucoma Neg Hx     Macular degeneration Neg Hx     Retinal detachment Neg Hx     Colon cancer Neg Hx     Colon polyps Neg Hx     Crohn's disease Neg Hx     Ulcerative colitis Neg Hx      Social History     Tobacco Use    Smoking status: Former Smoker     Packs/day: 0.50     Years: 10.00     Pack years: 5.00     Quit date: 2009     Years since quittin.6    Smokeless tobacco: Never Used    Tobacco comment: quit smoking in    Substance Use Topics    Alcohol use: Yes     Comment: rare    Drug use: Not Currently     Types: Hydrocodone     Wt Readings from Last 10 Encounters:   22 (!) 159.8 kg (352 lb 3 oz)   22 (!) 160.4 kg (353 lb 9.9 oz)   22 (!) 152.9 kg (337 lb 1.3 oz)   22 (!) 163.3 kg (360 lb 0.2 oz)   22 (!) 161.2 kg (355 lb 6.1 oz)   22 (!) 156.5 kg (345 lb)   22 (!) 160.8 kg (354 lb 6.4 oz)   22 (!) 160.9 kg (354 lb 11.5 oz)   22 (!) 159 kg (350 lb 8.5 oz)   22 (!) 160.5 kg (353 lb 13.4 oz)     Lab Results   Component Value Date    WBC 5.36 2022    HGB 12.2 (L) 2022    HCT 37.6 (L) 2022    MCV 80 (L) 2022     2022     CMP  Sodium   Date Value Ref Range Status   2022 139 136 - 145 mmol/L Final     Potassium   Date Value Ref Range Status   2022 3.8 3.5 - 5.1 mmol/L Final     Chloride   Date Value Ref Range Status   2022 100 95 - 110 mmol/L Final     CO2   Date Value Ref Range Status    08/03/2022 29 23 - 29 mmol/L Final     Glucose   Date Value Ref Range Status   08/03/2022 92 70 - 110 mg/dL Final     BUN   Date Value Ref Range Status   08/03/2022 9 6 - 20 mg/dL Final     Creatinine   Date Value Ref Range Status   08/03/2022 0.9 0.5 - 1.4 mg/dL Final     Calcium   Date Value Ref Range Status   08/03/2022 9.7 8.7 - 10.5 mg/dL Final     Total Protein   Date Value Ref Range Status   08/03/2022 7.9 6.0 - 8.4 g/dL Final     Albumin   Date Value Ref Range Status   08/03/2022 4.3 3.5 - 5.2 g/dL Final     Total Bilirubin   Date Value Ref Range Status   08/03/2022 0.6 0.1 - 1.0 mg/dL Final     Comment:     For infants and newborns, interpretation of results should be based  on gestational age, weight and in agreement with clinical  observations.    Premature Infant recommended reference ranges:  Up to 24 hours.............<8.0 mg/dL  Up to 48 hours............<12.0 mg/dL  3-5 days..................<15.0 mg/dL  6-29 days.................<15.0 mg/dL       Alkaline Phosphatase   Date Value Ref Range Status   08/03/2022 57 55 - 135 U/L Final     AST   Date Value Ref Range Status   08/03/2022 29 10 - 40 U/L Final     ALT   Date Value Ref Range Status   08/03/2022 19 10 - 44 U/L Final     Anion Gap   Date Value Ref Range Status   08/03/2022 10 8 - 16 mmol/L Final     eGFR if    Date Value Ref Range Status   04/09/2022 >60 >60 mL/min/1.73 m^2 Final     eGFR if non    Date Value Ref Range Status   04/09/2022 >60 >60 mL/min/1.73 m^2 Final     Comment:     Calculation used to obtain the estimated glomerular filtration  rate (eGFR) is the CKD-EPI equation.          Lab Results   Component Value Date    LIPASE 29 07/14/2021     Lab Results   Component Value Date    TSH 2.002 08/03/2022     Lab Results   Component Value Date    IRON 28 (L) 08/03/2022    TIBC 448 08/03/2022    FERRITIN 77 08/03/2022     Reviewed prior medical records including radiology report of chest x-ray 03/29/22 &  endoscopy history (see surgical history).    Objective:      Physical Exam  Vitals and nursing note reviewed.   Constitutional:       General: He is not in acute distress.     Appearance: Normal appearance. He is obese. He is not ill-appearing.   HENT:      Mouth/Throat:      Comments: Unable to assess due to COVID concerns.  Eyes:      Extraocular Movements: Extraocular movements intact.      Pupils: Pupils are equal, round, and reactive to light.   Cardiovascular:      Rate and Rhythm: Normal rate and regular rhythm.      Heart sounds: Normal heart sounds.   Pulmonary:      Effort: Pulmonary effort is normal. No respiratory distress.      Breath sounds: Normal breath sounds.   Abdominal:      General: Abdomen is protuberant. Bowel sounds are normal. There is no distension or abdominal bruit. There are no signs of injury.      Palpations: Abdomen is soft. There is no shifting dullness, fluid wave, hepatomegaly, splenomegaly or mass.      Tenderness: There is no abdominal tenderness. There is no guarding or rebound. Negative signs include Francis's sign, Rovsing's sign and McBurney's sign.      Hernia: No hernia is present.   Skin:     General: Skin is warm and dry.      Coloration: Skin is not jaundiced or pale.   Neurological:      Mental Status: He is alert and oriented to person, place, and time.   Psychiatric:         Attention and Perception: Attention normal.         Mood and Affect: Mood normal.         Speech: Speech normal.         Behavior: Behavior normal.         Assessment:       1. Iron deficiency anemia, unspecified iron deficiency anemia type    2. Fatigue, unspecified type    3. Drug-induced constipation    4. History of colon polyps    5. History of spinal surgery    6. Brain fog    7. BMI 50.0-59.9, adult        Plan:       Iron deficiency anemia, unspecified iron deficiency anemia type & Fatigue, unspecified type  - schedule EGD, discussed procedure with patient, including risks and benefits,  patient verbalized understanding  - schedule Colonoscopy, discussed procedure with the patient, including risks and benefits, patient verbalized understanding  - discussed with patient the different ways that anemia occurs: blood loss (such as from the gi tract), the body is not making enough, or the body is breaking down the rbcs too quickly; recommend EGD and colonoscopy to further evaluate gi tract for possible blood loss and pending results of endoscopies, possible UGI with Small Bowel Follow Through/video capsule study  -follow-up with PCP and/or hematology for continued evaluation and management  -     Case Request Endoscopy: EGD (ESOPHAGOGASTRODUODENOSCOPY), COLONOSCOPY    Drug-induced constipation  - schedule Colonoscopy, discussed procedure with the patient, including risks and benefits, patient verbalized understanding  -CONTINUE COLACE PRN FOR CONSTIPATION  -Recommend daily exercise as tolerated, adequate water intake (six 8-oz glasses of water daily), and high fiber diet. OTC fiber supplements are recommended if diet does not reach daily fiber goal (20-30 grams daily), such as Metamucil, Citrucel, or FiberCon (take as directed, separate from other oral medications by >2 hours).  -Recommend taking an OTC stool softener such as Colace as directed to avoid hard stools and straining with bowel movements PRN  -Recommend trying OTC MiraLax once daily (17g PO) as directed  - If no improvement with above recommendations, try intermittently dosed Dulcolax OTC as directed (every 3-4  days) PRN to facilitate bowel movements  -If still no improvement with these measures, call/follow-up    History of colon polyps  - schedule Colonoscopy, discussed procedure with the patient, including risks and benefits, patient verbalized understanding    History of spinal surgery  -Follow-up with surgeon for continued evaluation and management     Brain fog   -Follow-up with PCP for continued evaluation and management    BMI  50.0-59.9, adult  -Recommend diet and exercise as tolerated    Follow up in about 4 weeks (around 9/5/2022), or if symptoms worsen or fail to improve.      If no improvement in symptoms or symptoms worsen, call/follow-up at clinic or go to ER.        30 minutes of total time spent on the encounter, which includes face to face time and non-face to face time preparing to see the patient (eg, review of tests), Obtaining and/or reviewing separately obtained history, Documenting clinical information in the electronic or other health record, Independently interpreting results (not separately reported) and communicating results to the patient/family/caregiver, or Care coordination (not separately reported).

## 2022-08-08 NOTE — PROGRESS NOTES
Medically Supervised Weight Loss Documentation    Chief Complaint   Patient presents with    Back Pain    Follow-up    Nutrition Counseling         Comorbidities:   Past Medical History:   Diagnosis Date    Arthritis     Back injury     Colon polyp     COVID     CPAP (continuous positive airway pressure) dependence     Hypertension     PONV (postoperative nausea and vomiting)     Sleep apnea     c pap machine used     Past Surgical History:   Procedure Laterality Date    BONE GRAFT N/A 4/5/2022    Procedure: BONE GRAFT;  Surgeon: Duran Adler Jr., MD;  Location: [facility];  Service: Orthopedics;  Laterality: N/A;    CIRCUMCISION      COLONOSCOPY N/A 12/3/2018    Procedure: COLONOSCOPY;  Surgeon: CHRISSY Reyna MD;  Location: [facility] END[facility] (4TH FLR);  Service: Endoscopy;  Laterality: N/A;    epidural steroid injection X 2      facet injection       [facility] [place]     LIPOMA RESECTION      Back of neck    MAGNETIC RESONANCE IMAGING N/A 5/29/2020    Procedure: MRI (MAGNETIC RESONANCE IMAGING) LUMBAR SPINE;  Surgeon: [facility] Diagnostic Provider;  Location: [facility];  Service: General;  Laterality: N/A;  COVID NEG    MAGNETIC RESONANCE IMAGING N/A 12/3/2021    Procedure: MRI (MAGNETIC RESONANCE IMAGING), LUMBAR SPINE;  Surgeon: [facility] Diagnostic Provider;  Location: [facility];  Service: General;  Laterality: N/A;  In MRI @ 7:50 per Ariela    MEDIAL COLLATERAL LIGAMENT AND LATERAL COLLATERAL LIGAMENT REPAIR, KNEE Right 09/01/2018    Dr. Christophe Gore in [place]     MINIMALLY INVASIVE TRANSFORAMINAL LUMBAR INTERBODY FUSION (TLIF) N/A 4/5/2022    Procedure: FUSION, SPINE, LUMBAR, TLIF, MINIMALLY INVASIVE, WITH INSTRUMENTATION,  L4/5 RIGHT;  Surgeon: Duran Adler Jr., MD;  Location: [facility];  Service: Orthopedics;  Laterality: N/A;  10:30am per Tamela    MYELOGRAPHY N/A 6/23/2020    Procedure: MYELOGRAM;  Surgeon: [facility] Diagnostic Provider;  Location: [facility];   Service: General;  Laterality: N/A;     Family History   Problem Relation Age of Onset    Hypertension Mother     Diabetes Father     Cancer Father         mesotheliosma     Cataracts Neg Hx     Glaucoma Neg Hx     Macular degeneration Neg Hx     Retinal detachment Neg Hx     Colon cancer Neg Hx     Colon polyps Neg Hx     Crohn's disease Neg Hx     Ulcerative colitis Neg Hx      Social History     Tobacco Use    Smoking status: Former Smoker     Packs/day: 0.50     Years: 10.00     Pack years: 5.00     Quit date: 2009     Years since quittin.6    Smokeless tobacco: Never Used    Tobacco comment: quit smoking in    Substance Use Topics    Alcohol use: Yes     Comment: rare    Drug use: Not Currently     Types: Hydrocodone        Review of patient's allergies indicates:   Allergen Reactions    Peaches [peach (prunus persica)] Anaphylaxis    Peanut Anaphylaxis       Medications:  Current Outpatient Medications on File Prior to Visit   Medication Sig Dispense Refill    amLODIPine (NORVASC) 5 MG tablet Take 1 tablet (5 mg total) by mouth once daily. 30 tablet 11    atorvastatin (LIPITOR) 40 MG tablet Take 1 tablet (40 mg total) by mouth every evening. 90 tablet 3    b complex vitamins (B COMPLEX-VITAMIN B12) tablet Take 1 tablet by mouth once daily. 90 tablet 1    cyclobenzaprine (FLEXERIL) 10 MG tablet Take 10 mg by mouth 3 (three) times daily.      EPINEPHrine (EPIPEN) 0.3 mg/0.3 mL AtIn Inject into the muscle once for one dose as needed. 2 each 1    ergocalciferol (ERGOCALCIFEROL) 50,000 unit Cap Take 1 capsule (50,000 Units total) by mouth twice a week. 24 capsule 0    ferrous sulfate (FEOSOL) 325 mg (65 mg iron) Tab tablet Take 1 tablet (325 mg total) by mouth every other day. 90 tablet 0    gabapentin (NEURONTIN) 300 MG capsule Take 1 capsule (300 mg total) by mouth 3 (three) times daily. 90 capsule 0    lisinopriL-hydrochlorothiazide (PRINZIDE,ZESTORETIC) 20-12.5 mg per  tablet TAKE 2 TABLETS BY MOUTH EVERY  tablet 1    oxyCODONE-acetaminophen (PERCOCET) 7.5-325 mg per tablet Take 1 tablet by mouth 3 (three) times daily as needed.      semaglutide (OZEMPIC) 1 mg/dose (4 mg/3 mL) Inject 1 mg into the skin every 7 days. 1 pen 11    cimetidine (TAGAMET) 400 MG tablet TAKE 1 TABLET (400 MG TOTAL) BY MOUTH 2 (TWO) TIMES DAILY AS NEEDED (ALLERGIC REACTION). (Patient not taking: No sig reported) 180 tablet 1    LIDOcaine-methyl sal-menthol 4-4-5 % PtMd Apply 1 patch topically once daily at 6am. (Patient not taking: No sig reported) 10 patch 0     Current Facility-Administered Medications on File Prior to Visit   Medication Dose Route Frequency Provider Last Rate Last Admin    acetaminophen tablet 650 mg  650 mg Oral Q4H PRN Katie Shane NP        ceFAZolin in dextrose (iso-os) 2 gram/100 mL PgBk 2,000 mg  2 g Intravenous Q8H Katie Shane NP   Stopped at 04/09/22 1036    HYDROmorphone injection 1 mg  1 mg Intravenous Q6H PRN Katie Shane NP   1 mg at 04/09/22 0539    ondansetron injection 4 mg  4 mg Intravenous Q6H PRN Katie Shane NP        oxyCODONE-acetaminophen  mg per tablet 1 tablet  1 tablet Oral Q4H PRN Katie Shane NP   1 tablet at 04/09/22 0906    oxyCODONE-acetaminophen 5-325 mg per tablet 1 tablet  1 tablet Oral Q4H PRN Katie Shane NP   1 tablet at 04/08/22 0947    polyethylene glycol packet 17 g  17 g Oral Daily Katie Shane NP   17 g at 04/08/22 0822         Body mass index is 52.01 kg/m².     Beginning Weight: 350 lbs    Last Weight: 360 lbs    Current Weight: 352 lbs   Weight Change: +2 lbs      Body comp:  Unable to tolerate d/t 10/10 back pain    Wt Readings from Last 5 Encounters:   08/08/22 (!) 159.8 kg (352 lb 3 oz)   08/08/22 (!) 160.4 kg (353 lb 9.9 oz)   08/03/22 (!) 152.9 kg (337 lb 1.3 oz)   07/05/22 (!) 163.3 kg (360 lb 0.2 oz)   06/01/22 (!) 161.2 kg (355 lb 6.1 oz)         Chart review:  Primary Care  Physician: Mey      Lab review:  Most Recent Data:  CBC:   Lab Results   Component Value Date    WBC 5.36 08/03/2022    HGB 12.2 (L) 08/03/2022    HCT 37.6 (L) 08/03/2022     08/03/2022    MCV 80 (L) 08/03/2022    RDW 16.1 (H) 08/03/2022     BMP:   Lab Results   Component Value Date     08/03/2022    K 3.8 08/03/2022     08/03/2022    CO2 29 08/03/2022    BUN 9 08/03/2022    CREATININE 0.9 08/03/2022    GLU 92 08/03/2022    CALCIUM 9.7 08/03/2022    MG 2.4 08/03/2022     LFTs:   Lab Results   Component Value Date    PROT 7.9 08/03/2022    ALBUMIN 4.3 08/03/2022    BILITOT 0.6 08/03/2022    AST 29 08/03/2022    ALKPHOS 57 08/03/2022    ALT 19 08/03/2022     Coags: No results found for: INR, PROTIME, PTT  FLP:   Lab Results   Component Value Date    CHOL 177 12/04/2021    HDL 37 (L) 12/04/2021    LDLCALC 117.0 12/04/2021    TRIG 115 12/04/2021    CHOLHDL 20.9 12/04/2021     DM:   Lab Results   Component Value Date    HGBA1C 6.0 (H) 08/03/2022    HGBA1C 5.6 06/23/2021    HGBA1C 5.7 (H) 11/12/2020    LDLCALC 117.0 12/04/2021    CREATININE 0.9 08/03/2022     Thyroid:   Lab Results   Component Value Date    TSH 2.002 08/03/2022    FREET4 1.00 08/03/2022    Y1TXLWH 97 08/03/2022     Anemia:   Lab Results   Component Value Date    IRON 28 (L) 08/03/2022    TIBC 448 08/03/2022    FERRITIN 77 08/03/2022    NYWYEVMY66 246 08/03/2022    FOLATE 16.1 08/03/2022     Cardiac:   Lab Results   Component Value Date    TROPONINI 0.073 (H) 07/14/2021    TROPONINI 0.073 (H) 07/14/2021    BNP 27 07/13/2021     Urinalysis:   Lab Results   Component Value Date    COLORU Yellow 04/01/2022    SPECGRAV 1.020 04/01/2022    NITRITE Negative 04/01/2022    KETONESU Negative 04/01/2022    UROBILINOGEN Negative 04/01/2022    WBCUA 1 02/22/2021         Radiology review: Images independently reviewed and interpreted.        Review of Systems:  Review of Systems   Constitutional: Positive for activity change. Negative for  appetite change, chills, diaphoresis, fatigue and fever.   HENT: Negative for congestion, dental problem, drooling, ear discharge, ear pain, facial swelling, hearing loss, mouth sores, nosebleeds, postnasal drip, rhinorrhea, sinus pressure, sinus pain, sneezing, sore throat, tinnitus, trouble swallowing and voice change.    Eyes: Negative for photophobia, pain, discharge, itching and visual disturbance.   Respiratory: Negative for apnea, cough, chest tightness, shortness of breath and wheezing.    Cardiovascular: Negative for chest pain, palpitations and leg swelling.   Gastrointestinal: Negative for abdominal distention, abdominal pain, anal bleeding, blood in stool, constipation, diarrhea, nausea, rectal pain and vomiting.   Endocrine: Negative for cold intolerance, heat intolerance, polydipsia, polyphagia and polyuria.   Genitourinary: Negative for difficulty urinating, dysuria, enuresis, flank pain, frequency, genital sores and hematuria.   Musculoskeletal: Positive for arthralgias, back pain, gait problem and joint swelling. Negative for myalgias, neck pain and neck stiffness.        Reports increased pain over 2 weeks, does not remember specifically the cause. Pain radiating down right leg. Denies numbness, tingling, loss of sensation, loss of bowel/bladder or erection.   Skin: Negative for color change, pallor, rash and wound.   Allergic/Immunologic: Negative.  Negative for environmental allergies, food allergies and immunocompromised state.   Neurological: Negative for dizziness, tremors, seizures, syncope, facial asymmetry, speech difficulty, weakness, light-headedness, numbness and headaches.   Hematological: Negative for adenopathy. Does not bruise/bleed easily.   Psychiatric/Behavioral: Negative for agitation, behavioral problems, confusion, decreased concentration, dysphoric mood, hallucinations, self-injury, sleep disturbance and suicidal ideas. The patient is not nervous/anxious and is not  "hyperactive.        Physical:     Vital Signs (Most Recent)  Temp: 99 °F (37.2 °C) (08/08/22 1010)  Pulse: 60 (08/08/22 1010)  Resp: 16 (08/08/22 1010)  BP: (!) 171/92 (08/08/22 1010)  5' 9" (1.753 m)  (!) 159.8 kg (352 lb 3 oz)     Physical Exam:  Physical Exam  Constitutional:       General: He is not in acute distress.     Appearance: Normal appearance. He is obese. He is not ill-appearing or diaphoretic.   HENT:      Head: Normocephalic and atraumatic.      Right Ear: External ear normal.      Left Ear: External ear normal.      Nose: Nose normal. No congestion or rhinorrhea.      Mouth/Throat:      Mouth: Mucous membranes are moist.      Pharynx: Oropharynx is clear.   Eyes:      General: No scleral icterus.        Right eye: No discharge.         Left eye: No discharge.      Conjunctiva/sclera: Conjunctivae normal.   Cardiovascular:      Rate and Rhythm: Normal rate and regular rhythm.      Pulses: Normal pulses.      Heart sounds: Normal heart sounds. No murmur heard.  Pulmonary:      Effort: Pulmonary effort is normal. No respiratory distress.      Breath sounds: No stridor. No wheezing.   Chest:      Chest wall: No tenderness.   Abdominal:      General: Bowel sounds are normal. There is no distension.      Palpations: Abdomen is soft. There is no mass.      Tenderness: There is no abdominal tenderness.      Hernia: No hernia is present.   Musculoskeletal:         General: Tenderness present. No swelling, deformity or signs of injury.      Cervical back: Normal range of motion and neck supple. No rigidity or tenderness.      Right lower leg: Edema present.      Left lower leg: Edema present.   Skin:     General: Skin is warm and dry.      Capillary Refill: Capillary refill takes less than 2 seconds.      Coloration: Skin is not jaundiced or pale.      Findings: No bruising, erythema, lesion or rash.   Neurological:      General: No focal deficit present.      Mental Status: He is alert and oriented to " person, place, and time. Mental status is at baseline.      Motor: No weakness.      Coordination: Coordination abnormal.      Gait: Gait abnormal.   Psychiatric:         Mood and Affect: Mood normal.         Thought Content: Thought content normal.         Judgment: Judgment normal.         ASSESSMENT/PLAN:        1. Morbid obesity  FL Upper GI   2. Pre-bariatric surgery nutrition evaluation     3. Encounter for pre-bariatric surgery counseling and education     4. RAFAEL (obstructive sleep apnea)     5. Acute midline low back pain without sciatica     6. Gastroesophageal reflux disease, unspecified whether esophagitis present  FL Upper GI       Continue with Dr. Pinedo's established plan on initial evaluation        MSD 3/6     Patient will need:     Labs- done  EKG- done  UGI- need to reschedule  EGD- 9/1/22  dietary consult- done  psych consult- [place]  Seminar- done  Colonoscopy- 9/1/22     Will obtain the following clearances prior to surgery:  Cardiology-       Diet Education Discussed:  Recommend high protein, low carb meals- mainly meats and vegetables.    Breakfast:  Egg 1-2 with ham wrap,  Lunch:  No rice, vegetables, meat, beans- salad, coleslaw  Dinner:  Vegetables, meat (baked chicken 2 legs)  Water: 8- 10 oz cups  No bread   Using smaller plate  Stop eating after dinner  Need to stop skipping meals      MVI: Vitamin D, C, B12, D and iron      Exercise/Activity Discussed:  Cardiovascular exercise, get HR over 100 for 20 minutes 3 times per week-  high activity job- hurt back about 2 weeks ago  Had back surgery on 4/5/22      Plan for this patient:  Diet adherence  Tanita scale results reviewed with patient  Exercise/Activity adherence  Fall reviewed with patient  Continue multivitamins- discussion of post-operative life long MVI need, including Calcium, and a B-Complex  Back pain- reports 10/10- patient follow up with Ortho today        I spent a total of 52 minutes on the day of the visit.This includes  face to face time and non-face to face time preparing to see the patient (eg, review of tests), obtaining and/or reviewing separately obtained history, documenting clinical information in the electronic or other health record, independently interpreting results and communicating results to the patient/family/caregiver, or care coordinator.

## 2022-08-10 ENCOUNTER — CLINICAL SUPPORT (OUTPATIENT)
Dept: REHABILITATION | Facility: HOSPITAL | Age: 52
End: 2022-08-10
Payer: COMMERCIAL

## 2022-08-10 DIAGNOSIS — M54.50 LUMBAR PAIN: Primary | ICD-10-CM

## 2022-08-10 DIAGNOSIS — Z55.9 SPECIAL EDUCATIONAL NEEDS: ICD-10-CM

## 2022-08-10 DIAGNOSIS — R53.1 WEAKNESS: ICD-10-CM

## 2022-08-10 DIAGNOSIS — Z74.09 DECREASED FUNCTIONAL MOBILITY AND ENDURANCE: ICD-10-CM

## 2022-08-10 DIAGNOSIS — M62.89 ABNORMAL INCREASED MUSCLE TONE: ICD-10-CM

## 2022-08-10 LAB — VIT B1 BLD-MCNC: 46 UG/L (ref 38–122)

## 2022-08-10 PROCEDURE — 97112 NEUROMUSCULAR REEDUCATION: CPT | Mod: PN,CQ

## 2022-08-10 SDOH — SOCIAL DETERMINANTS OF HEALTH (SDOH): PROBLEMS RELATED TO EDUCATION AND LITERACY, UNSPECIFIED: Z55.9

## 2022-08-10 NOTE — PROGRESS NOTES
"  [facility]  Outpatient Physical Therapy Daily Treatment Note      Name: Fabiano Jules Jr.  Clinic Number: [id number]  Visit Date: 8/10/2022    Therapy Diagnosis:   Encounter Diagnoses   Name Primary?    Lumbar pain Yes    Weakness     Abnormal increased muscle tone     Decreased functional mobility and endurance     Special educational needs      Physician: Duran Adler Jr., MD     Physician Orders: PT Eval and Treat   Medical Diagnosis from Referral: M43.26 (ICD-10-CM) - Ankylosis of lumbar spine   Evaluation Date: 7/7/2022  Authorization Period Expiration: 10/08/2022  Plan of Care Expiration: 09/09/2022 at 2x/wk x 8 weeks  Progress Note Due: 08/08/2022  Visit # / Visits authorized: 7/ 10   Total visit #: 8  PTA visit #: 1/5  FOTO: 1/ 3      Time In: 10:15 am  Time Out: 11:15 am  Total Appointment Time (timed & untimed codes): 50 minutes + 10 minutes on CP pre routine     Precautions: Standard and and surgical    Subjective     The patient reports: High pain levels again today. Consistent pain reported in right lumbar region. He also indicated "stiffness" just below fusion site at L5-S1.  "Can I try and get some relief before we even start today?"     Response to previous treatment: The patient enters today reporting high level of pain.  Functional change: The patient reports that he was able to raise his bed height which he reports did help him get in and out of bed better.    Pain: 7/10 upon entering the clinic today again today.  Location: bilateral back    Objective       Fabiano received therapeutic exercises to develop strength, endurance, ROM, flexibility, posture and core stabilization for 0 minutes including:      -+bilateral straight leg raises x 20 each  -supine adduction squeeze with 2 second hold 20  -supine marching x 10 each leg with two second hold  -bridges x 4 to tolerance  -heel raises x 20 with red ball adduction.     Not performed " today   -Clams with GTB 2 x 10   -SIdelying hip abduction 10 x 2   -Prone hip extension with knee flexed 10 x each side  -Supine bridges with bolster under ankles 2 x 10    -donkey kicks 1 red band on shuttle with cues to keep core engaged and facilitate glutes for movement 2 x 10 each side  -quadratus lumborum stretch over bolster 3 x 30 Not performed today   -Modified Mong[place]ian Dead lifts 10 x minimal motion with cues to hinge at hip, keep lumbar spine neutral and engage glutes for initiation of lift  -Nu-step on Level 1 x 5 minutes.  -Precor knee flexion with 35 pounds x 20  -Precor knee extension with 20 pounds x 20  -Scapular retraction with 25 pounds x 20  -Precor DIP bar with 30 pounds x 20  -Precor chest press with 10 pounds x 20  -Prone hip extension with knee flexion ( attempted)     Fabiano participated in neuromuscular re-education activities to improve: Coordination, Proprioception and Posture for 45 minutes. The following activities were included:    Standing extensions on supported plinth in order to achieve mobility at L5-S1 to relieve stiffness in this region 2 x 20   Lateral glides to correct standing dysfunctional pelvic shift when standing/ walking. Shifts performed towards left as pt presents with right lateral shift  Assisted lateral shifts with towel and overpressure. After shift had been achieved additional movement into extension was performed within pt's comfort level.   Hip mobs performed with pt on right lower extremity distraction with external rotation in order to improve available external rotation 10 degrees prior to mobs and 20 measured following mobs   Isometrics performed for right hip external rotators with pt taught to press arch of right foot into calf of left leg 5 sec hold 2 x 10       Below not performed today:  -+isometric curl up with red ball and 3-5 second hold: forward, to the left, and to the right x 10 each direction  -+gluteal squeezes with 2 second hold with bolster  under knees x 4 minutes  -+bolster bridges x 10 to tolerance.  -Paloff press Blue theraband 20  x each direction   -+serratus roll ups with black roller x 15  -+sit to stand from 4th black box not using arms x 10      Standing diagonal chops for abdominal/ oblique recruitment 20 x each side with Black theraband   Resisted retro walking holding black sports cord 2 minutes slow and controled with cues to place heel behind hip in order to achieve best recruitment of glutes       Below not performed today:  -heel raises x 20  -standing bilateral abduction x 15 each leg  -standing bilateral extension x 15 each leg  -standing and bilateral marching x 15 each leg  -Prone 3 minutes  -Prone on elbows 3 minutes   -Sidelying open books 5 sec hold 10 x     Fabiano received the following supervised modalities after being cleared for contradictions: IFC Electrical Stimulation:  Fabiano received Electrical Stimulation with cold thermal probe for pain control applied to the lumbar region following therapeutic exercises today. 0 minutes total with 6 minutes on right and 0 minutes on left side. Fabiano tolderated treatment well without any adverse effects. NP    Hot pack to lumbar area while in chair PRE workout x 6 minutes  Patient Education and HEP     He was compliant with home exercise program.    Education provided:   -home exercise program additional exercises added today  -+08/01/2022 Patient was educated on moving his night stand to prevent too much rotation. He verbally acknowledged.    Written Home Exercises Provided: Patient instructed to cont prior HEP.  Exercises were reviewed and Fabiano was able to demonstrate them prior to the end of the session.  Fabiano demonstrated fair  understanding of the education provided.     See EMR under Patient Instructions for exercises provided prior visit.  Assessment     Today, treatment focused on correction of dysfunctional body mechanics. Pt reports noting his gait changing to favor one side after  he was initially injured. He presents with decreased weight shift during ambulation which is clearly compensatory in order to minimize pain in right lumbar region. Noted left lumbar deviation and right thoracic deviation demonstrating a C spine formation. He has also indicated excessive feelings of stiffness below the incision site so repeated lumbar extensions were performed with plinth raised to ensure fulcrum to be at L5- S1. He reported an initial burning after performance of first set of 20 but after walking ~ 20 ft reported reduction of stiffness in this area. 20 more repetitions were performed with moderate improvement of gait mechanics noted. However pt continued to ambulate in guarded position that prevented appropriate pelvic shift. Pelvis remained laterally shifted to right so side glides followed by therapist generated lateral shift correction were performed within pt's tolerance. Immediate improvement of both symptoms and gait mechanics noted following these mobility exercises. Visible improvement of standing posture as well as significant improvement of appropriate weightshifting and pelvis shifting as weight was transferred from leg to leg was noted. He also reported drop of pain level from 7/10 to 3/10. Additional traction mobs performed on right hip to improve mobility in the hip/ pelvis were performed with pt instructed to perform isometrics for external rotation as homework in order to maintain improved motion. Pt was instructed to perform extensions, side glides and external rotation isometrics for home exercise program.     PTA was assisted today in pt's assessment by Esperanza Laguna DPT who performed overpressure for lateral pelvic shifts.      Pt will continue to benefit from skilled outpatient physical therapy to address the deficits listed in the problem list box on initial evaluation, provide pt/family education and to maximize pt's level of independence in the home and community  environment.     Fabiano Is progressing well towards his goals.   Pt prognosis is Good.     Pt's spiritual, cultural and educational needs considered and pt agreeable to plan of care and goals.    Anticipated barriers to physical therapy: co-morbidities    Goals:Goals:     STG  Weeks/Visits Date Established  Date Met   1.Decrease max lumbar pain to 6/10 to improve the patient's quality of life. 4 weeks. 7/7/2022    In progress  8/10/2022      2. Decrease bilateral hamstring, iliopsoas, and piriformis muscle tones to minimal to moderate increase to improve ease of bed mobility. 4 weeks. 7/7/2022    In progress  8/10/2022      3.Increase general core strength 1/2 grade to improve standing activity endurance. 4 weeks.                7/7/2022    In progress  8/10/2022      4.Decrease FOTO limitation to <=70% to improve job task ability. 4 weeks. 7/7/2022     In progress  8/10/2022     5.Fair initial written home exercise program knowledge and be without questions to have carryover after discharge from PT. 4 weeks. 7/7/2022    In progress  8/10/2022         LTG Weeks/Visits Date Established  Date Met   1.Decrease max lumbar pain to 2/10 to improve the patient's quality of life. 8 weeks. 7/7/2022    In progress  8/10/2022      2. Decrease bilateral hamstring, iliopsoas, and piriformis muscle tones to minimal increase to improve ease of bed mobility. 8 weeks 7/7/2022        In progress  8/10/2022     3.Increase general core strength one grade from evaluation date to improve standing activity endurance. 8 weeks. 7/7/2022    In progress  8/10/2022      4.Decrease FOTO limitation to <=60% to improve job task ability. 8 weeks. 7/7/2022     In progress  8/10/2022     5.Good progressed written home exercise program knowledge and be without questions to have carryover after discharge from PT. 8 weeks. 7/7/2022 In progress  8/10/2022         Plan     Plan of care Certification: 7/7/2022 to 09/09/2022. Outpatient Physical Therapy 2  times weekly for 8 weeks. Plan to maximize lumbar strength and flexibility to help provide long term pain relief. Plan to continue to work on pain decrease and education.        Donya Coello, PTA

## 2022-08-15 ENCOUNTER — CLINICAL SUPPORT (OUTPATIENT)
Dept: REHABILITATION | Facility: HOSPITAL | Age: 52
End: 2022-08-15
Payer: COMMERCIAL

## 2022-08-15 DIAGNOSIS — Z55.9 SPECIAL EDUCATIONAL NEEDS: ICD-10-CM

## 2022-08-15 DIAGNOSIS — R53.1 WEAKNESS: Primary | ICD-10-CM

## 2022-08-15 DIAGNOSIS — Z74.09 DECREASED FUNCTIONAL MOBILITY AND ENDURANCE: ICD-10-CM

## 2022-08-15 DIAGNOSIS — M54.50 LUMBAR PAIN: ICD-10-CM

## 2022-08-15 DIAGNOSIS — M62.89 ABNORMAL INCREASED MUSCLE TONE: ICD-10-CM

## 2022-08-15 PROCEDURE — 97110 THERAPEUTIC EXERCISES: CPT | Mod: PN

## 2022-08-15 PROCEDURE — 97112 NEUROMUSCULAR REEDUCATION: CPT | Mod: PN

## 2022-08-15 SDOH — SOCIAL DETERMINANTS OF HEALTH (SDOH): PROBLEMS RELATED TO EDUCATION AND LITERACY, UNSPECIFIED: Z55.9

## 2022-08-15 NOTE — PROGRESS NOTES
"  [facility]  Outpatient Physical Therapy Daily Treatment Note/Re-assessment      Name: Fabiano Jules Jr.  Clinic Number: [id number]  Visit Date: 8/15/2022    Therapy Diagnosis:   Encounter Diagnoses   Name Primary?    Lumbar pain     Weakness Yes    Abnormal increased muscle tone     Decreased functional mobility and endurance     Special educational needs      Physician: Duran Adler Jr., MD     Physician Orders: PT Eval and Treat   Medical Diagnosis from Referral: M43.26 (ICD-10-CM) - Ankylosis of lumbar spine   Evaluation Date: 7/7/2022  Authorization Period Expiration: 10/08/2022  Plan of Care Expiration: 09/09/2022 at 2x/wk x 8 weeks  Progress Note Due: 08/18/2022  Visit # / Visits authorized: 8/ 10 EXCEL sheet Thursday  Total visit #: 9    PTA visit #: 0/5  FOTO: 2/ 3 (2nd FOTO done 08/15/2022)     Time In: 9:17 am  Time Out: 10:07 am  Total Appointment Time (timed & untimed codes): 40 minutes + 10 minutes on HP pre routine     Precautions: Standard and and surgical  Subjective     The patient reports: "I am much better. I am walking much better since Donya taught me how not to compensate when I walk. I'd say that I am like a 2/10 today." PT acknowledges.    Response to previous treatment: The patient enters with minimal back complaints this day due to compliance with his home program.   Functional change: No changes to report since the last PT session.     Pain: 2/10 upon entering the clinic today again today. Max of 6/10 since last week.  Location: bilateral back    Objective       Fabiano received therapeutic exercises to develop strength, endurance, ROM, flexibility, posture and core stabilization for 25 minutes including:  -Precor knee flexion with 35 pounds x 20  -Precor knee extension with 20 pounds x 20  -+Precor outer thigh with 30 pounds x 20  -+bilateral upper extremity land mines with 12 pound bar x 20 each arm.  -bilateral straight leg " raises x 15 each with 2 pound weights  -supine adduction squeeze with 2 second hold 20  -supine marching x 10 each leg with two second hold  -bridges x 4 to tolerance  -heel raises x 20 with red ball adduction.     Not performed today   -Clams with GTB 2 x 10   -SIdelying hip abduction 10 x 2   -Prone hip extension with knee flexed 10 x each side  -Supine bridges with bolster under ankles 2 x 10    -donkey kicks 1 red band on shuttle with cues to keep core engaged and facilitate glutes for movement 2 x 10 each side  -Modified Malay Dead lifts 10 x minimal motion with cues to hinge at hip, keep lumbar spine neutral and engage glutes for initiation of lift  -Nu-step on Level 1 x 5 minutes.  -Scapular retraction with 25 pounds x 20  -Precor DIP bar with 30 pounds x 20  -Precor chest press with 10 pounds x 20  -Prone hip extension with knee flexion ( attempted)     Fabiano participated in neuromuscular re-education activities to improve: Coordination, Proprioception and Posture for 15 minutes. The following activities were included:  -+forward and reverse rocking on blue foam x 3 minutes  -isometric curl up with red ball and 3-5 second hold: forward, to the left, and to the right x 10 each direction  -gluteal squeezes with 2 second hold with bolster under knees x 4 minutes  -bolster bridges x 10 to tolerance.  -Standing extensions on supported plinth in order to achieve mobility at L5-S1 to relieve stiffness in this region 2 x 20    Below not performed today:  -Lateral glides to correct standing dysfunctional pelvic shift when standing/ walking. Shifts performed towards left as pt presents with right   lateral shift  -Assisted lateral shifts with towel and overpressure. After shift had been achieved additional movement into extension was performed -within pt's comfort level.   -Hip mobs performed with pt on right lower extremity distraction with external rotation in order to improve available external rotation 10  degrees prior to mobs and 20 measured following mobs   -Isometrics performed for right hip external rotators with pt taught to press arch of right foot into calf of left leg 5 sec hold 2 x 10   -Paloff press Blue theraband 20  x each direction   -serratus roll ups with black roller x 15  -sit to stand from 4th black box not using arms x 10  -Standing diagonal chops for abdominal/ oblique recruitment 20 x each side with Black theraband   -Resisted retro walking holding black sports cord 2 minutes slow and controled with cues to place heel behind hip in order to achieve best   recruitment of glutes   -standing bilateral abduction x 15 each leg  -standing bilateral extension x 15 each leg  -standing and bilateral marching x 15 each leg  -Sidelying open books 5 sec hold 10 x     Fabiano received the following supervised modalities after being cleared for contradictions: IFC Electrical Stimulation:  Fabiano received Electrical Stimulation with cold thermal probe for pain control applied to the lumbar region following therapeutic exercises today. 0 minutes total with 6 minutes on right and 0 minutes on left side. Fabiano tolderated treatment well without any adverse effects. NP    Hot pack to lumbar area while in chair PRE workout x 10 minutes      Flexibility        Muscle Left Right Comment             Hamstrings Minimal to Moderate Increase >Moderate Increase Minimal to Moderate Increase >Moderate Increase     Quadriceps Minimal Increase Minimal Increase     Illipsoas Minimal to Moderate Increase >Moderate Increase Minimal to Moderate Increase >Moderate Increase     Pirifromis Minimal to Moderate Increase >Moderate Increase Minimal to Moderate Increase >Moderate Increase                      MMT        L-spine     Comment     Left Right               Flexion 4-/5 >3+/5 -     Extension 4/5 >4-/5 -     Side Bending 4/5 >4-/5 4/5 >4-/5               Abdominals 4-/5 >3+/5 -     Lower Abdominals 4-/5 >3+/5 -                   Limitation/Restriction for FOTO Lumbar Survey     Therapist reviewed FOTO scores for Fabiano Jules Jr. on 08/15/2022.   FOTO documents entered into [employer] - see Media section.     Limitation Score: 66% limitation 08/15/2022 >81% Limitation at the evaluation.         Patient Education and HEP     He was compliant with home exercise program.    Education provided:   -home exercise program additional exercises added today  -08/01/2022 Patient was educated on moving his night stand to prevent too much rotation. He verbally acknowledged.    Written Home Exercises Provided: Patient instructed to cont prior HEP.  Exercises were reviewed and Fabiano was able to demonstrate them prior to the end of the session.  Fabiano demonstrated fair  understanding of the education provided.     See EMR under Patient Instructions for exercises provided prior visit.  Re-Assessment     Today, the patient was re-assessed. As of today, he has met 5 of his 5 short term goals. He is reporting overall pain decreases. His core and lower extremity strength is improving. He is more flexible now which is allowing him to ambulate with a more normal and less compensatory gait pattern. His functional ability is slowly improving based on all of his other improvements. He is compliant with PT. He works hard. He is motivated and focused to reach his goals. He needs continued skilled PT to maximize his core's ability and also to continue to assess his pelvis as to make sure it causes no future anomalies with his gait and posture. Patient was instructed again today to perform extensions, side glides and external rotation isometrics for his home exercise program as they are benefiting him. He tolerated therapy well today.     Pt will continue to benefit from skilled outpatient physical therapy to address the deficits listed in the problem list box on initial evaluation, provide pt/family education and to maximize pt's level of independence in the home and community  environment.     Fabiano Is progressing well towards his goals.   Pt prognosis is Good.     Pt's spiritual, cultural and educational needs considered and pt agreeable to plan of care and goals.    Anticipated barriers to physical therapy: co-morbidities    Goals:Goals:     STG  Weeks/Visits Date Established  Date Met   1.Decrease max lumbar pain to 6/10 to improve the patient's quality of life. 4 weeks. 7/7/2022    Goal Met 8/15/2022      2. Decrease bilateral hamstring, iliopsoas, and piriformis muscle tones to minimal to moderate increase to improve ease of bed mobility. 4 weeks. 7/7/2022    Goal Met 8/15/2022      3.Increase general core strength 1/2 grade to improve standing activity endurance. 4 weeks.                7/7/2022    Goal Met 8/15/2022      4.Decrease FOTO limitation to <=70% to improve job task ability. 4 weeks. 7/7/2022    Goal Met 8/15/2022     5.Fair initial written home exercise program knowledge and be without questions to have carryover after discharge from PT. 4 weeks. 7/7/2022    Goal Met 8/15/2022         LTG Weeks/Visits Date Established  Date Met   1.Decrease max lumbar pain to 2/10 to improve the patient's quality of life. 8 weeks. 7/7/2022    In progress  8/15/2022      2. Decrease bilateral hamstring, iliopsoas, and piriformis muscle tones to minimal increase to improve ease of bed mobility. 8 weeks 7/7/2022        In progress  8/15/2022     3.Increase general core strength one grade from evaluation date to improve standing activity endurance. 8 weeks. 7/7/2022    In progress  8/15/2022      4.Decrease FOTO limitation to <=60% to improve job task ability. 8 weeks. 7/7/2022     In progress  8/15/2022     5.Good progressed written home exercise program knowledge and be without questions to have carryover after discharge from PT. 8 weeks. 7/7/2022 In progress  8/15/2022         Plan     Plan of care Certification: 7/7/2022 to 09/09/2022. Outpatient Physical Therapy 2 times weekly for 8  weeks. Plan to maximize lumbar strength and flexibility to help provide long term pain relief. Plan to continue to work on pain decrease and education along with pelvic stabilization.        Espinoza Jeffrey, PT

## 2022-08-18 ENCOUNTER — PATIENT MESSAGE (OUTPATIENT)
Dept: REHABILITATION | Facility: HOSPITAL | Age: 52
End: 2022-08-18
Payer: COMMERCIAL

## 2022-08-22 ENCOUNTER — DOCUMENTATION ONLY (OUTPATIENT)
Dept: REHABILITATION | Facility: HOSPITAL | Age: 52
End: 2022-08-22
Payer: COMMERCIAL

## 2022-08-22 ENCOUNTER — CLINICAL SUPPORT (OUTPATIENT)
Dept: REHABILITATION | Facility: HOSPITAL | Age: 52
End: 2022-08-22
Payer: COMMERCIAL

## 2022-08-22 DIAGNOSIS — M62.89 ABNORMAL INCREASED MUSCLE TONE: ICD-10-CM

## 2022-08-22 DIAGNOSIS — R53.1 WEAKNESS: ICD-10-CM

## 2022-08-22 DIAGNOSIS — Z74.09 DECREASED FUNCTIONAL MOBILITY AND ENDURANCE: ICD-10-CM

## 2022-08-22 DIAGNOSIS — M54.50 LUMBAR PAIN: Primary | ICD-10-CM

## 2022-08-22 DIAGNOSIS — Z55.9 SPECIAL EDUCATIONAL NEEDS: ICD-10-CM

## 2022-08-22 PROCEDURE — 97014 ELECTRIC STIMULATION THERAPY: CPT | Mod: PN,CQ

## 2022-08-22 PROCEDURE — 97110 THERAPEUTIC EXERCISES: CPT | Mod: PN,CQ

## 2022-08-22 PROCEDURE — 97112 NEUROMUSCULAR REEDUCATION: CPT | Mod: PN,CQ

## 2022-08-22 SDOH — SOCIAL DETERMINANTS OF HEALTH (SDOH): PROBLEMS RELATED TO EDUCATION AND LITERACY, UNSPECIFIED: Z55.9

## 2022-08-22 NOTE — PROGRESS NOTES
[facility]  Outpatient Physical Therapy Daily Treatment Note      Name: Fabiano Jules Jr.  Clinic Number: [id number]  Visit Date: 8/22/2022    Therapy Diagnosis:   Encounter Diagnoses   Name Primary?    Lumbar pain Yes    Weakness     Abnormal increased muscle tone     Decreased functional mobility and endurance     Special educational needs      Physician: Duran Adler Jr., MD     Physician Orders: PT Eval and Treat   Medical Diagnosis from Referral: M43.26 (ICD-10-CM) - Ankylosis of lumbar spine   Evaluation Date: 7/7/2022  Authorization Period Expiration: 10/08/2022  Plan of Care Expiration: 09/09/2022 at 2x/wk x 8 weeks  Progress Note Due: 08/18/2022  Visit # / Visits authorized: 9/ 10 EXCEL sheet Thursday  Total visit #: 10    PTA visit #: 1/5  FOTO: 2/ 3 (2nd FOTO done 08/15/2022)     Time In: 8:00 am  Time Out: 9::00 am  Total Appointment Time (timed & untimed codes): 60 minutes      Precautions: Standard and and surgical  Subjective     The patient reports: Compliance with home exercise program. General improvement of symptoms.     Response to previous treatment: The patient enters with minimal back complaints this day due to compliance with his home program.   Functional change: No changes to report since the last PT session.     Pain: 2/10 upon entering the clinic today again today. Max of 6/10 since last week.  Location: bilateral back    Objective       Fabiano received therapeutic exercises to develop strength, endurance, ROM, flexibility, posture and core stabilization for 25 minutes including:  -Precor knee flexion with 35 pounds x 20  -Precor knee extension with 20 pounds x 20  -+Precor outer thigh with 30 pounds x 20  -+ standing hip isometrics with volley ball 5 sec 2 x 5  -Clams with GTB 2 x 10   -SIdelying hip abduction 10 x 2    -+bilateral upper extremity land mines with 12 pound bar x 20 each arm.  -bilateral straight leg raises x 15  each with 2 pound weights  -supine adduction squeeze with 2 second hold 20  -supine marching x 10 each leg with two second hold  -heel raises x 20 with red ball adduction.     Not performed today   -Clams with GTB 2 x 10   -SIdelying hip abduction 10 x 2   -Prone hip extension with knee flexed 10 x each side  -Supine bridges with bolster under ankles 2 x 10    -donkey kicks 1 red band on shuttle with cues to keep core engaged and facilitate glutes for movement 2 x 10 each side  -Modified [place] Dead lifts 10 x minimal motion with cues to hinge at hip, keep lumbar spine neutral and engage glutes for initiation of lift  -Nu-step on Level 1 x 5 minutes.  -Scapular retraction with 25 pounds x 20  -Precor DIP bar with 30 pounds x 20  -Precor chest press with 10 pounds x 20  -Prone hip extension with knee flexion ( attempted)     Fabiano participated in neuromuscular re-education activities to improve: Coordination, Proprioception and Posture for 15 minutes. The following activities were included:  -+forward and reverse rocking on blue foam x 3 minutes  -isometric curl up with red ball and 3-5 second hold: forward, to the left, and to the right x 10 each direction  - bridges with add squeezes 2  x 10   Paloff press Blue theraband 20  x each direction   -Standing extensions on supported plinth in order to achieve mobility at L5-S1 to relieve stiffness in this region 2 x 20    Below not performed today:  -gluteal squeezes with 2 second hold with bolster under knees x 4 minutes   -Lateral glides to correct standing dysfunctional pelvic shift when standing/ walking. Shifts performed towards left as pt presents with right   lateral shift  -Assisted lateral shifts with towel and overpressure. After shift had been achieved additional movement into extension was performed -within pt's comfort level.   -Hip mobs performed with pt on right lower extremity distraction with external rotation in order to improve available external  rotation 10 degrees prior to mobs and 20 measured following mobs   -Isometrics performed for right hip external rotators with pt taught to press arch of right foot into calf of left leg 5 sec hold 2 x 10   -Paloff press Blue theraband 20  x each direction   -serratus roll ups with black roller x 15  -sit to stand from 4th black box not using arms x 10  -Standing diagonal chops for abdominal/ oblique recruitment 20 x each side with Black theraband   -Resisted retro walking holding black sports cord 2 minutes slow and controled with cues to place heel behind hip in order to achieve best   recruitment of glutes   -standing bilateral abduction x 15 each leg  -standing bilateral extension x 15 each leg  -standing and bilateral marching x 15 each leg  -Sidelying open books 5 sec hold 10 x     Fabiano received the following supervised modalities after being cleared for contradictions: IFC Electrical Stimulation:  Fabiano received Electrical Stimulation with cold thermal probe for pain control applied to the lumbar region following therapeutic exercises today. 0 minutes total with 6 minutes on right and 0 minutes on left side. Fabiano tolderated treatment well without any adverse effects. NP    Hot pack to lumbar area while in chair PRE workout x 10 minutes     Patient Education and HEP     He was compliant with home exercise program.    Education provided:   -home exercise program additional exercises added today  -08/01/2022 Patient was educated on moving his night stand to prevent too much rotation. He verbally acknowledged.    Written Home Exercises Provided: Patient instructed to cont prior HEP.  Exercises were reviewed and Fabiano was able to demonstrate them prior to the end of the session.  Fabiano demonstrated fair  understanding of the education provided.     See EMR under Patient Instructions for exercises provided prior visit.  Assessment      Today, the patient was able to complete all recommended exercises without an  increase of painful symptoms. Continued focus on glute strengthening and hip stabilizers was performed today. Pt is responding well to therapy and will benefit from continued skilled PT to address remaining deficits so he can return to prior level of function.      Pt will continue to benefit from skilled outpatient physical therapy to address the deficits listed in the problem list box on initial evaluation, provide pt/family education and to maximize pt's level of independence in the home and community environment.     Fabiano Is progressing well towards his goals.   Pt prognosis is Good.     Pt's spiritual, cultural and educational needs considered and pt agreeable to plan of care and goals.    Anticipated barriers to physical therapy: co-morbidities    Goals:Goals:     STG  Weeks/Visits Date Established  Date Met   1.Decrease max lumbar pain to 6/10 to improve the patient's quality of life. 4 weeks. 7/7/2022    Goal Met 8/22/2022      2. Decrease bilateral hamstring, iliopsoas, and piriformis muscle tones to minimal to moderate increase to improve ease of bed mobility. 4 weeks. 7/7/2022    Goal Met 8/22/2022      3.Increase general core strength 1/2 grade to improve standing activity endurance. 4 weeks.                7/7/2022    Goal Met 8/22/2022      4.Decrease FOTO limitation to <=70% to improve job task ability. 4 weeks. 7/7/2022    Goal Met 8/22/2022     5.Fair initial written home exercise program knowledge and be without questions to have carryover after discharge from PT. 4 weeks. 7/7/2022    Goal Met 8/22/2022         LTG Weeks/Visits Date Established  Date Met   1.Decrease max lumbar pain to 2/10 to improve the patient's quality of life. 8 weeks. 7/7/2022    In progress  8/22/2022      2. Decrease bilateral hamstring, iliopsoas, and piriformis muscle tones to minimal increase to improve ease of bed mobility. 8 weeks 7/7/2022        In progress  8/22/2022     3.Increase general core strength one grade  from evaluation date to improve standing activity endurance. 8 weeks. 7/7/2022    In progress  8/22/2022      4.Decrease FOTO limitation to <=60% to improve job task ability. 8 weeks. 7/7/2022     In progress  8/22/2022     5.Good progressed written home exercise program knowledge and be without questions to have carryover after discharge from PT. 8 weeks. 7/7/2022 In progress  8/22/2022         Plan     Plan of care Certification: 7/7/2022 to 09/09/2022. Outpatient Physical Therapy 2 times weekly for 8 weeks. Plan to maximize lumbar strength and flexibility to help provide long term pain relief. Plan to continue to work on pain decrease and education along with pelvic stabilization.        Donya Coello, PTA

## 2022-08-24 ENCOUNTER — PATIENT MESSAGE (OUTPATIENT)
Dept: FAMILY MEDICINE | Facility: CLINIC | Age: 52
End: 2022-08-24
Payer: COMMERCIAL

## 2022-08-24 NOTE — TELEPHONE ENCOUNTER
No new care gaps identified.  [facility] Embedded Care Gaps. Reference number: [id number]. 8/24/2022   10:48:43 AM JULIANAT

## 2022-08-25 ENCOUNTER — CLINICAL SUPPORT (OUTPATIENT)
Dept: REHABILITATION | Facility: HOSPITAL | Age: 52
End: 2022-08-25
Payer: COMMERCIAL

## 2022-08-25 DIAGNOSIS — Z74.09 DECREASED FUNCTIONAL MOBILITY AND ENDURANCE: Primary | ICD-10-CM

## 2022-08-25 DIAGNOSIS — M54.50 LUMBAR PAIN: ICD-10-CM

## 2022-08-25 DIAGNOSIS — Z55.9 SPECIAL EDUCATIONAL NEEDS: ICD-10-CM

## 2022-08-25 DIAGNOSIS — R53.1 WEAKNESS: ICD-10-CM

## 2022-08-25 DIAGNOSIS — M62.89 ABNORMAL INCREASED MUSCLE TONE: ICD-10-CM

## 2022-08-25 PROCEDURE — 97110 THERAPEUTIC EXERCISES: CPT | Mod: PN

## 2022-08-25 PROCEDURE — 97112 NEUROMUSCULAR REEDUCATION: CPT | Mod: PN

## 2022-08-25 SDOH — SOCIAL DETERMINANTS OF HEALTH (SDOH): PROBLEMS RELATED TO EDUCATION AND LITERACY, UNSPECIFIED: Z55.9

## 2022-08-25 NOTE — PROGRESS NOTES
"  [facility]  Outpatient Physical Therapy Daily Treatment Note      Name: Fabiano Jules Jr.  Clinic Number: [id number]  Visit Date: 8/25/2022    Therapy Diagnosis:   Encounter Diagnoses   Name Primary?    Lumbar pain     Weakness     Abnormal increased muscle tone     Decreased functional mobility and endurance Yes    Special educational needs      Physician: Duran Adler Jr., MD     Physician Orders: PT Eval and Treat   Medical Diagnosis from Referral: M43.26 (ICD-10-CM) - Ankylosis of lumbar spine   Evaluation Date: 7/7/2022  Authorization Period Expiration: 10/08/2022  Plan of Care Expiration: 09/09/2022 at 2x/wk x 8 weeks (Update plan of care 09/06/2022)  Progress Note Due: 09/15/2022  Visit # / Visits authorized: 10/ 10 (11/16 on plan of care) EXCEL sheet Thursday++++++++++++++++  Total visit #: 11    PTA visit #: 0/5  FOTO: 2/ 3 (2nd FOTO done 08/15/2022)     Time In: 10:04 am  Time Out: 11:00 am  Total Appointment Time (timed & untimed codes): 40 minutes + 10 minutes of hot pack     Precautions: Standard and and surgical  Subjective     The patient reports:  "I feel like my back is having spasms again and tightening up. It is affecting how I am able to walk again. I'd say I am back up to a 5-6/10 today. I wasn't even able to go fishing." PT acknowledges. Patient was noted with a lateral lean during his gait."    Response to previous treatment: The patient enters with an exacerbation of his complaints this day.  Functional change: No changes to report since the last PT session.      Pain: 5-6/10 upon entering the clinic today again today.   Location: bilateral back    Objective       Fabiano received therapeutic exercises to develop strength, endurance, ROM, flexibility, posture and core stabilization for 15 minutes including:  -Nu-step on Level 1 x 6 minutes.  -red band snow angels to 90* x 20  -red band shoulder horizontal extension x 20  -bilateral " upper extremity land mines(simultaneously) going into total body extension with 8 pound bar x 15.  -heel raises x 20 with red ball adduction.     Not performed today  -Precor knee flexion with 35 pounds x 20  -Precor knee extension with 20 pounds x 20  -Precor outer thigh with 30 pounds x 20  -standing hip isometrics with volley ball 5 sec 2 x 5  -Clams with GTB 2 x 10   -SIdelying hip abduction 10 x 2    -bilateral straight leg raises x 15 each with 2 pound weights  -supine adduction squeeze with 2 second hold 20  -Clams with GTB 2 x 10   -Prone hip extension with knee flexed 10 x each side  -Supine bridges with bolster under ankles 2 x 10    -Modified [place] Dead lifts 10 x minimal motion with cues to hinge at hip, keep lumbar spine neutral and engage glutes for initiation of lift  -Scapular retraction with 25 pounds x 20  -Precor DIP bar with 30 pounds x 20  -Precor chest press with 10 pounds x 20  -Prone hip extension with knee flexion ( attempted)     Fabiano participated in neuromuscular re-education activities to improve: Coordination, Proprioception and Posture for 25 minutes. The following activities were included:  -Lateral glides to correct standing dysfunctional pelvic shift when standing/ walking. Shifts performed towards left as pt presents with right   lateral shift  -+overhead extension into the wall with green ball for two seconds x 15  -+kitchen sink walk outs with 15 second hold x 4  -+seated thoracic extension while on cold pack x 10  -Standing extensions on supported plinth in order to achieve mobility at L5-S1 to relieve stiffness in this region 2 x 20  -forward and reverse rocking on blue foam x 3 minutes  -Paloff press Blue theraband 20  x each direction     Below not performed today:  -isometric curl up with red ball and 3-5 second hold: forward, to the left, and to the right x 10 each direction  -bridges with add squeezes 2  x 10  -gluteal squeezes with 2 second hold with bolster under knees  x 4 minutes   -Isometrics performed for right hip external rotators with pt taught to press arch of right foot into calf of left leg 5 sec hold 2 x 10   -serratus roll ups with black roller x 15  -sit to stand from 4th black box not using arms x 10  -Standing diagonal chops for abdominal/ oblique recruitment 20 x each side with Black theraband   -Resisted retro walking holding black sports cord 2 minutes slow and controled with cues to place heel behind hip in order to achieve best   recruitment of glutes   -standing bilateral abduction x 15 each leg  -standing bilateral extension x 15 each leg  -standing and bilateral marching x 15 each leg  -Sidelying open books 5 sec hold 10 x     Fabiano received the following supervised modalities after being cleared for contradictions: IFC Electrical Stimulation:  Fabiano received Electrical Stimulation with cold thermal probe for pain control applied to the lumbar region following therapeutic exercises today. 0 minutes total with 6 minutes on right and 0 minutes on left side. Fabiano tolderated treatment well without any adverse effects. NP    Hot pack to lumbar area while in chair PRE workout x 10 minutes     Patient Education and HEP     He was compliant with home exercise program.    Education provided:   -home exercise program additional exercises added today  -08/01/2022 Patient was educated on moving his night stand to prevent too much rotation. He verbally acknowledged.    Written Home Exercises Provided: Patient instructed to cont prior HEP.  Exercises were reviewed and Fabiano was able to demonstrate them prior to the end of the session.  Fabiano demonstrated fair  understanding of the education provided.     See EMR under Patient Instructions for exercises provided prior visit.  Assessment      Today, the patient entered with a dysfunctional gait. Lateral glides were applied, and the patient had significant pain relief and almost an immediate improved gait. Attention was paid this  day on extension as PT did not want to counter effect the positiveness from the glides. He tolerated all exercises well today. He rested when needed. He reported feeling better when he left. PT is hopeful that his pain will again significantly decrease by the next PT session, and he can resume all exercises as he tolerates. He works hard, and he is compliant with PT. Continued focus on glute strengthening and hip stabilizers is needed. Pt is responding well to therapy and will benefit from continued skilled PT to address remaining deficits so he can return to desired level of function.      Pt will continue to benefit from skilled outpatient physical therapy to address the deficits listed in the problem list box on initial evaluation, provide pt/family education and to maximize pt's level of independence in the home and community environment.     Fabiano Is progressing well towards his goals.   Pt prognosis is Good.     Pt's spiritual, cultural and educational needs considered and pt agreeable to plan of care and goals.    Anticipated barriers to physical therapy: co-morbidities    Goals:Goals:     STG  Weeks/Visits Date Established  Date Met   1.Decrease max lumbar pain to 6/10 to improve the patient's quality of life. 4 weeks. 7/7/2022    Goal Met 8/25/2022      2. Decrease bilateral hamstring, iliopsoas, and piriformis muscle tones to minimal to moderate increase to improve ease of bed mobility. 4 weeks. 7/7/2022    Goal Met 8/25/2022      3.Increase general core strength 1/2 grade to improve standing activity endurance. 4 weeks.                7/7/2022    Goal Met 8/25/2022      4.Decrease FOTO limitation to <=70% to improve job task ability. 4 weeks. 7/7/2022    Goal Met 8/25/2022     5.Fair initial written home exercise program knowledge and be without questions to have carryover after discharge from PT. 4 weeks. 7/7/2022    Goal Met 8/25/2022         LTG Weeks/Visits Date Established  Date Met   1.Decrease max  lumbar pain to 2/10 to improve the patient's quality of life. 8 weeks. 7/7/2022    In progress  8/25/2022      2. Decrease bilateral hamstring, iliopsoas, and piriformis muscle tones to minimal increase to improve ease of bed mobility. 8 weeks 7/7/2022        In progress  8/25/2022     3.Increase general core strength one grade from evaluation date to improve standing activity endurance. 8 weeks. 7/7/2022    In progress  8/25/2022      4.Decrease FOTO limitation to <=60% to improve job task ability. 8 weeks. 7/7/2022     In progress  8/25/2022     5.Good progressed written home exercise program knowledge and be without questions to have carryover after discharge from PT. 8 weeks. 7/7/2022 In progress  8/25/2022         Plan     Plan of care Certification: 7/7/2022 to 09/09/2022. Outpatient Physical Therapy 2 times weekly for 8 weeks. Plan to maximize lumbar strength and flexibility to help provide long term pain relief. Plan to continue to work on pain decrease and education along with pelvic stabilization.        Espinoza Jeffrey, PT

## 2022-08-26 RX ORDER — AMLODIPINE BESYLATE 5 MG/1
5 TABLET ORAL DAILY
Qty: 30 TABLET | Refills: 11 | Status: SHIPPED | OUTPATIENT
Start: 2022-08-26 | End: 2022-09-07 | Stop reason: SDUPTHER

## 2022-08-29 ENCOUNTER — PATIENT MESSAGE (OUTPATIENT)
Dept: ENDOSCOPY | Facility: HOSPITAL | Age: 52
End: 2022-08-29
Payer: COMMERCIAL

## 2022-08-30 ENCOUNTER — PATIENT MESSAGE (OUTPATIENT)
Dept: ENDOSCOPY | Facility: HOSPITAL | Age: 52
End: 2022-08-30
Payer: COMMERCIAL

## 2022-08-30 ENCOUNTER — CLINICAL SUPPORT (OUTPATIENT)
Dept: REHABILITATION | Facility: HOSPITAL | Age: 52
End: 2022-08-30
Payer: COMMERCIAL

## 2022-08-30 DIAGNOSIS — M54.50 LUMBAR PAIN: Primary | ICD-10-CM

## 2022-08-30 DIAGNOSIS — R53.1 WEAKNESS: ICD-10-CM

## 2022-08-30 DIAGNOSIS — Z55.9 SPECIAL EDUCATIONAL NEEDS: ICD-10-CM

## 2022-08-30 DIAGNOSIS — Z74.09 DECREASED FUNCTIONAL MOBILITY AND ENDURANCE: ICD-10-CM

## 2022-08-30 DIAGNOSIS — M62.89 ABNORMAL INCREASED MUSCLE TONE: ICD-10-CM

## 2022-08-30 PROCEDURE — 97112 NEUROMUSCULAR REEDUCATION: CPT | Mod: PN,CQ

## 2022-08-30 PROCEDURE — 97140 MANUAL THERAPY 1/> REGIONS: CPT | Mod: PN,CQ

## 2022-08-30 PROCEDURE — 97014 ELECTRIC STIMULATION THERAPY: CPT | Mod: PN,CQ

## 2022-08-30 PROCEDURE — 97110 THERAPEUTIC EXERCISES: CPT | Mod: PN,CQ

## 2022-08-30 SDOH — SOCIAL DETERMINANTS OF HEALTH (SDOH): PROBLEMS RELATED TO EDUCATION AND LITERACY, UNSPECIFIED: Z55.9

## 2022-08-30 NOTE — PROGRESS NOTES
"  [facility]  Outpatient Physical Therapy Daily Treatment Note      Name: Fabiano Jules Jr.  Clinic Number: [id number]  Visit Date: 8/30/2022    Therapy Diagnosis:   Encounter Diagnoses   Name Primary?    Lumbar pain Yes    Weakness     Abnormal increased muscle tone     Decreased functional mobility and endurance     Special educational needs      Physician: Duran Adler Jr., MD     Physician Orders: PT Eval and Treat   Medical Diagnosis from Referral: M43.26 (ICD-10-CM) - Ankylosis of lumbar spine   Evaluation Date: 7/7/2022  Authorization Period Expiration: 10/08/2022  Plan of Care Expiration: 09/09/2022 at 2x/wk x 8 weeks (Update plan of care 09/06/2022)  Progress Note Due: 09/15/2022  Visit # / Visits authorized: 11/ 10 (11/16 on plan of care) EXCEL sheet Thursday++++++++++++++++  Total visit #: 12    PTA visit #: 1/5  FOTO: 2/ 3 (2nd FOTO done 08/15/2022)     Time In: 11:15 am  Time Out: 12:15 pm  Total Appointment Time (timed & untimed codes): 40 minutes + 10 minutes of hot pack     Precautions: Standard and and surgical  Subjective     The patient reports:  "I'm hurting today." Pt reported pain in entire lumbar region.     Response to previous treatment: The patient enters with an exacerbation of his complaints this day.  Functional change: No changes to report since the last PT session.      Pain: 5-6/10 upon entering the clinic today again today.   Location: bilateral back    Objective       Fabiano received therapeutic exercises to develop strength, endurance, ROM, flexibility, posture and core stabilization for 10 minutes including:  -Nu-step on Level 1 x 6 minutes.  -red band snow angels to 90* x 20  -red band shoulder horizontal extension x 20  -bilateral upper extremity land mines(simultaneously) going into total body extension with 8 pound bar x 15. Not performed today   -heel raises x 20 with red ball adduction.     Not performed today  -Precor " knee flexion with 35 pounds x 20  -Precor knee extension with 20 pounds x 20  -Precor outer thigh with 30 pounds x 20  -standing hip isometrics with volley ball 5 sec 2 x 5  -Clams with GTB 2 x 10   -SIdelying hip abduction 10 x 2    -bilateral straight leg raises x 15 each with 2 pound weights  -supine adduction squeeze with 2 second hold 20  -Clams with GTB 2 x 10   -Prone hip extension with knee flexed 10 x each side  -Supine bridges with bolster under ankles 2 x 10    -Modified Ukrainian Dead lifts 10 x minimal motion with cues to hinge at hip, keep lumbar spine neutral and engage glutes for initiation of lift  -Scapular retraction with 25 pounds x 20  -Precor DIP bar with 30 pounds x 20  -Precor chest press with 10 pounds x 20  -Prone hip extension with knee flexion ( attempted)     Fabiano participated in neuromuscular re-education activities to improve: Coordination, Proprioception and Posture for 25 minutes. The following activities were included:  -Lateral glides to correct standing dysfunctional pelvic shift when standing/ walking. Shifts performed towards left as pt presents with right   lateral shift  -+overhead extension into the wall with green ball for two seconds x 15  -+kitchen sink walk outs with 15 second hold x 4  -+seated thoracic extension while on cold pack x 10  -Standing extensions on supported plinth in order to achieve mobility at L5-S1 to relieve stiffness in this region 2 x 20  -side glides for lateral shift of pelvis from right towards midline 15 x   -forward and reverse rocking on blue foam x 3 minutes  -Paloff press Blue theraband 20  x each direction     Below not performed today:  -isometric curl up with red ball and 3-5 second hold: forward, to the left, and to the right x 10 each direction  -bridges with add squeezes 2  x 10  -gluteal squeezes with 2 second hold with bolster under knees x 4 minutes   -Isometrics performed for right hip external rotators with pt taught to press arch of  right foot into calf of left leg 5 sec hold 2 x 10   -serratus roll ups with black roller x 15  -sit to stand from 4th black box not using arms x 10  -Standing diagonal chops for abdominal/ oblique recruitment 20 x each side with Black theraband   -Resisted retro walking holding black sports cord 2 minutes slow and controled with cues to place heel behind hip in order to achieve best   recruitment of glutes   -standing bilateral abduction x 15 each leg  -standing bilateral extension x 15 each leg  -standing and bilateral marching x 15 each leg  -Sidelying open books 5 sec hold 10 x     Fabiano received the following manual therapy techniques: Myofacial release and Soft tissue Mobilization were applied to the: lumbar region  for 10  minutes, including:     Pt placed sidelying over bolster with right side up. Myofacial release and soft tissue mobilization performed to lumbar musculature to address severe muscle guarding on right side.     Fabiano received the following supervised modalities after being cleared for contradictions: IFC Electrical Stimulation:  Fabiano received Electrical Stimulation with cold pack for pain control applied to the lumbar region following therapeutic exercises today. 10 minutes total with cold pack . Fabiano tolderated treatment well without any adverse effects.     Hot pack to lumbar area while in chair PRE workout x 10 minutes     Patient Education and HEP     He was compliant with home exercise program.    Education provided:   -home exercise program additional exercises added today  -08/01/2022 Patient was educated on moving his night stand to prevent too much rotation. He verbally acknowledged.    Written Home Exercises Provided: Patient instructed to cont prior HEP.  Exercises were reviewed and Fabiano was able to demonstrate them prior to the end of the session.  Fabiano demonstrated fair  understanding of the education provided.     See EMR under Patient Instructions for exercises provided prior  visit.  Assessment      Today, the patient entered with a dysfunctional gait. Lateral glides were performed but pt remained guarded and did not achieve the same pain reliving effects. Manual therapy performed as denoted above to decrease muscle guarding and encourage enough relaxation for improved pelvic lumbar alignment. He did respond well to the manual therapy and was able to demonstrate improved gait mechanics following. It should be noted that pt presents with moderate to severe genu valgus on left lower extremity with fallen arches more severe on left than right. These mechanical dysfunctions of his musculoskeletal system may be contributing to the dysfunctional weightshifting that pt presents with on the days he comes to therapy with reports of increased pain. Pt may benefit from a heel lift on left lower extremity in order to minimize the effect of the leg length discrepancy that presents when pt is weightbearing/ standing. Pt stated that he has one and will bring it on his next therapy visit in order for potential to be assessed.  Continued focus on glute strengthening and hip stabilizers continues to be  needed for pt to achieve prior level of function.      Pt will continue to benefit from skilled outpatient physical therapy to address the deficits listed in the problem list box on initial evaluation, provide pt/family education and to maximize pt's level of independence in the home and community environment.     Fabiano Is progressing well towards his goals.   Pt prognosis is Good.     Pt's spiritual, cultural and educational needs considered and pt agreeable to plan of care and goals.    Anticipated barriers to physical therapy: co-morbidities    Goals:Goals:     STG  Weeks/Visits Date Established  Date Met   1.Decrease max lumbar pain to 6/10 to improve the patient's quality of life. 4 weeks. 7/7/2022    Goal Met 8/30/2022      2. Decrease bilateral hamstring, iliopsoas, and piriformis muscle tones to  minimal to moderate increase to improve ease of bed mobility. 4 weeks. 7/7/2022    Goal Met 8/30/2022      3.Increase general core strength 1/2 grade to improve standing activity endurance. 4 weeks.                7/7/2022    Goal Met 8/30/2022      4.Decrease FOTO limitation to <=70% to improve job task ability. 4 weeks. 7/7/2022    Goal Met 8/30/2022     5.Fair initial written home exercise program knowledge and be without questions to have carryover after discharge from PT. 4 weeks. 7/7/2022    Goal Met 8/30/2022         LTG Weeks/Visits Date Established  Date Met   1.Decrease max lumbar pain to 2/10 to improve the patient's quality of life. 8 weeks. 7/7/2022    In progress  8/30/2022      2. Decrease bilateral hamstring, iliopsoas, and piriformis muscle tones to minimal increase to improve ease of bed mobility. 8 weeks 7/7/2022        In progress  8/30/2022     3.Increase general core strength one grade from evaluation date to improve standing activity endurance. 8 weeks. 7/7/2022    In progress  8/30/2022      4.Decrease FOTO limitation to <=60% to improve job task ability. 8 weeks. 7/7/2022     In progress  8/30/2022     5.Good progressed written home exercise program knowledge and be without questions to have carryover after discharge from PT. 8 weeks. 7/7/2022 In progress  8/30/2022         Plan     Plan of care Certification: 7/7/2022 to 09/09/2022. Outpatient Physical Therapy 2 times weekly for 8 weeks. Plan to maximize lumbar strength and flexibility to help provide long term pain relief. Plan to continue to work on pain decrease and education along with pelvic stabilization.        Donya Coello, PTA

## 2022-09-01 ENCOUNTER — ANESTHESIA EVENT (OUTPATIENT)
Dept: ENDOSCOPY | Facility: HOSPITAL | Age: 52
End: 2022-09-01
Payer: COMMERCIAL

## 2022-09-01 ENCOUNTER — ANESTHESIA (OUTPATIENT)
Dept: ENDOSCOPY | Facility: HOSPITAL | Age: 52
End: 2022-09-01
Payer: COMMERCIAL

## 2022-09-01 ENCOUNTER — HOSPITAL ENCOUNTER (OUTPATIENT)
Facility: HOSPITAL | Age: 52
Discharge: HOME | End: 2022-09-01
Attending: INTERNAL MEDICINE | Admitting: INTERNAL MEDICINE
Payer: COMMERCIAL

## 2022-09-01 DIAGNOSIS — D50.9 IRON DEFICIENCY ANEMIA: ICD-10-CM

## 2022-09-01 PROCEDURE — 45380 COLONOSCOPY AND BIOPSY: CPT | Mod: 59 | Performed by: INTERNAL MEDICINE

## 2022-09-01 PROCEDURE — 88305 TISSUE EXAM BY PATHOLOGIST: CPT | Performed by: STUDENT IN AN ORGANIZED HEALTH CARE EDUCATION/TRAINING PROGRAM

## 2022-09-01 PROCEDURE — 45385 PR COLONOSCOPY,REMV LESN,SNARE: ICD-10-PCS | Mod: ,,, | Performed by: INTERNAL MEDICINE

## 2022-09-01 PROCEDURE — 43239 EGD BIOPSY SINGLE/MULTIPLE: CPT | Performed by: INTERNAL MEDICINE

## 2022-09-01 PROCEDURE — 43239 PR EGD, FLEX, W/BIOPSY, SGL/MULTI: ICD-10-PCS | Mod: 51,,, | Performed by: INTERNAL MEDICINE

## 2022-09-01 PROCEDURE — 88305 TISSUE EXAM BY PATHOLOGIST: ICD-10-PCS | Mod: 26,,, | Performed by: STUDENT IN AN ORGANIZED HEALTH CARE EDUCATION/TRAINING PROGRAM

## 2022-09-01 PROCEDURE — D9220A PRA ANESTHESIA: ICD-10-PCS | Mod: ANES,,, | Performed by: ANESTHESIOLOGY

## 2022-09-01 PROCEDURE — 27201012 HC FORCEPS, HOT/COLD, DISP: Performed by: INTERNAL MEDICINE

## 2022-09-01 PROCEDURE — 88305 TISSUE EXAM BY PATHOLOGIST: CPT | Mod: 26,,, | Performed by: STUDENT IN AN ORGANIZED HEALTH CARE EDUCATION/TRAINING PROGRAM

## 2022-09-01 PROCEDURE — 45385 COLONOSCOPY W/LESION REMOVAL: CPT | Mod: ,,, | Performed by: INTERNAL MEDICINE

## 2022-09-01 PROCEDURE — 63600175 PHARM REV CODE 636 W HCPCS: Performed by: NURSE ANESTHETIST, CERTIFIED REGISTERED

## 2022-09-01 PROCEDURE — 88342 IMHCHEM/IMCYTCHM 1ST ANTB: CPT | Performed by: STUDENT IN AN ORGANIZED HEALTH CARE EDUCATION/TRAINING PROGRAM

## 2022-09-01 PROCEDURE — D9220A PRA ANESTHESIA: Mod: ANES,,, | Performed by: ANESTHESIOLOGY

## 2022-09-01 PROCEDURE — 27201089 HC SNARE, DISP (ANY): Performed by: INTERNAL MEDICINE

## 2022-09-01 PROCEDURE — 88342 IMHCHEM/IMCYTCHM 1ST ANTB: CPT | Mod: 26,,, | Performed by: STUDENT IN AN ORGANIZED HEALTH CARE EDUCATION/TRAINING PROGRAM

## 2022-09-01 PROCEDURE — 45380 COLONOSCOPY AND BIOPSY: CPT | Mod: 59,,, | Performed by: INTERNAL MEDICINE

## 2022-09-01 PROCEDURE — 37000008 HC ANESTHESIA 1ST 15 MINUTES: Performed by: INTERNAL MEDICINE

## 2022-09-01 PROCEDURE — D9220A PRA ANESTHESIA: ICD-10-PCS | Mod: CRNA,,, | Performed by: NURSE ANESTHETIST, CERTIFIED REGISTERED

## 2022-09-01 PROCEDURE — D9220A PRA ANESTHESIA: Mod: CRNA,,, | Performed by: NURSE ANESTHETIST, CERTIFIED REGISTERED

## 2022-09-01 PROCEDURE — 88342 CHG IMMUNOCYTOCHEMISTRY: ICD-10-PCS | Mod: 26,,, | Performed by: STUDENT IN AN ORGANIZED HEALTH CARE EDUCATION/TRAINING PROGRAM

## 2022-09-01 PROCEDURE — 37000009 HC ANESTHESIA EA ADD 15 MINS: Performed by: INTERNAL MEDICINE

## 2022-09-01 PROCEDURE — 45380 PR COLONOSCOPY,BIOPSY: ICD-10-PCS | Mod: 59,,, | Performed by: INTERNAL MEDICINE

## 2022-09-01 PROCEDURE — 25000003 PHARM REV CODE 250: Performed by: INTERNAL MEDICINE

## 2022-09-01 PROCEDURE — 45385 COLONOSCOPY W/LESION REMOVAL: CPT | Performed by: INTERNAL MEDICINE

## 2022-09-01 PROCEDURE — 25000003 PHARM REV CODE 250: Performed by: NURSE ANESTHETIST, CERTIFIED REGISTERED

## 2022-09-01 PROCEDURE — 43239 EGD BIOPSY SINGLE/MULTIPLE: CPT | Mod: 51,,, | Performed by: INTERNAL MEDICINE

## 2022-09-01 RX ORDER — LIDOCAINE HCL/PF 100 MG/5ML
SYRINGE (ML) INTRAVENOUS
Status: DISCONTINUED | OUTPATIENT
Start: 2022-09-01 | End: 2022-09-01

## 2022-09-01 RX ORDER — NAPROXEN SODIUM 220 MG
220 TABLET ORAL DAILY PRN
COMMUNITY
End: 2022-11-15

## 2022-09-01 RX ORDER — PROPOFOL 10 MG/ML
VIAL (ML) INTRAVENOUS
Status: DISCONTINUED | OUTPATIENT
Start: 2022-09-01 | End: 2022-09-01

## 2022-09-01 RX ORDER — SODIUM CHLORIDE 9 MG/ML
INJECTION, SOLUTION INTRAVENOUS CONTINUOUS
Status: DISCONTINUED | OUTPATIENT
Start: 2022-09-01 | End: 2022-09-01 | Stop reason: HOSPADM

## 2022-09-01 RX ADMIN — SODIUM CHLORIDE: 0.9 INJECTION, SOLUTION INTRAVENOUS at 11:09

## 2022-09-01 RX ADMIN — PROPOFOL 50 MG: 10 INJECTION, EMULSION INTRAVENOUS at 12:09

## 2022-09-01 RX ADMIN — PROPOFOL 50 MG: 10 INJECTION, EMULSION INTRAVENOUS at 11:09

## 2022-09-01 RX ADMIN — PROPOFOL 100 MG: 10 INJECTION, EMULSION INTRAVENOUS at 11:09

## 2022-09-01 RX ADMIN — LIDOCAINE HYDROCHLORIDE 100 MG: 20 INJECTION INTRAVENOUS at 11:09

## 2022-09-01 NOTE — [employer]OVATION PATIENT INSTRUCTIONS
Discharge Summary/Instructions after an Endoscopic Procedure  Patient Name: Fabiano Jules  Patient MRN: [id number]  Patient YOB: 1970  Thursday, September 1, 2022  Alondra Arias MD  Dear patient,  As a result of recent federal legislation (The Federal Cures Act), you may   receive lab or pathology results from your procedure in your [facility]   account before your physician is able to contact you. Your physician or   their representative will relay the results to you with their   recommendations at their soonest availability.  Thank you,  RESTRICTIONS:  During your procedure today, you received medications for sedation.  These   medications may affect your judgment, balance and coordination.  Therefore,   for 24 hours, you have the following restrictions:   - DO NOT drive a car, operate machinery, make legal/financial decisions,   sign important papers or drink alcohol.    ACTIVITY:  Today: no heavy lifting, straining or running due to procedural   sedation/anesthesia.  The following day: return to full activity including work.  DIET:  Eat and drink normally unless instructed otherwise.     TREATMENT FOR COMMON SIDE EFFECTS:  - Mild abdominal pain, nausea, belching, bloating or excessive gas:  rest,   eat lightly and use a heating pad.  - Sore Throat: treat with throat lozenges and/or gargle with warm salt   water.  - Because air was used during the procedure, expelling large amounts of air   from your rectum or belching is normal.  - If a bowel prep was taken, you may not have a bowel movement for 1-3 days.    This is normal.  SYMPTOMS TO WATCH FOR AND REPORT TO YOUR PHYSICIAN:  1. Abdominal pain or bloating, other than gas cramps.  2. Chest pain.  3. Back pain.  4. Signs of infection such as: chills or fever occurring within 24 hours   after the procedure.  5. Rectal bleeding, which would show as bright red, maroon, or black stools.   (A tablespoon of blood from the rectum is not serious,  especially if   hemorrhoids are present.)  6. Vomiting.  7. Weakness or dizziness.  GO DIRECTLY TO THE NEAREST EMERGENCY ROOM IF YOU HAVE ANY OF THE FOLLOWING:      Difficulty breathing              Chills and/or fever over 101 F   Persistent vomiting and/or vomiting blood   Severe abdominal pain   Severe chest pain   Black, tarry stools   Bleeding- more than one tablespoon   Any other symptom or condition that you feel may need urgent attention  Your doctor recommends these additional instructions:  If any biopsies were taken, your doctors clinic will contact you in 1 to 2   weeks with any results.  - Await pathology results.   - Discharge patient to home (with escort).   - Patient has a contact number available for emergencies.  The signs and   symptoms of potential delayed complications were discussed with the   patient.  Return to normal activities tomorrow.  Written discharge   instructions were provided to the patient.   - Resume previous diet.   - Continue present medications.  For questions, problems or results please call your physician - Alondra Arias MD at Work:  [phone].  [facility] PHONE NUMBER: [phone]  IF A COMPLICATION OR EMERGENCY SITUATION ARISES AND YOU ARE UNABLE TO REACH   YOUR PHYSICIAN - GO DIRECTLY TO THE EMERGENCY ROOM.  Alondra Arias MD  9/1/2022 12:24:29 PM  This report has been verified and signed electronically.  Dear patient,  As a result of recent federal legislation (The Federal Cures Act), you may   receive lab or pathology results from your procedure in your [facility]   account before your physician is able to contact you. Your physician or   their representative will relay the results to you with their   recommendations at their soonest availability.  Thank you,  PROVATION

## 2022-09-01 NOTE — PROVATION PATIENT INSTRUCTIONS
Discharge Summary/Instructions after an Endoscopic Procedure  Patient Name: Fabiano Jules  Patient MRN: [id number]  Patient YOB: 1970  Thursday, September 1, 2022  Alondra Arias MD  Dear patient,  As a result of recent federal legislation (The Federal Cures Act), you may   receive lab or pathology results from your procedure in your [facility]   account before your physician is able to contact you. Your physician or   their representative will relay the results to you with their   recommendations at their soonest availability.  Thank you,  RESTRICTIONS:  During your procedure today, you received medications for sedation.  These   medications may affect your judgment, balance and coordination.  Therefore,   for 24 hours, you have the following restrictions:   - DO NOT drive a car, operate machinery, make legal/financial decisions,   sign important papers or drink alcohol.    ACTIVITY:  Today: no heavy lifting, straining or running due to procedural   sedation/anesthesia.  The following day: return to full activity including work.  DIET:  Eat and drink normally unless instructed otherwise.     TREATMENT FOR COMMON SIDE EFFECTS:  - Mild abdominal pain, nausea, belching, bloating or excessive gas:  rest,   eat lightly and use a heating pad.  - Sore Throat: treat with throat lozenges and/or gargle with warm salt   water.  - Because air was used during the procedure, expelling large amounts of air   from your rectum or belching is normal.  - If a bowel prep was taken, you may not have a bowel movement for 1-3 days.    This is normal.  SYMPTOMS TO WATCH FOR AND REPORT TO YOUR PHYSICIAN:  1. Abdominal pain or bloating, other than gas cramps.  2. Chest pain.  3. Back pain.  4. Signs of infection such as: chills or fever occurring within 24 hours   after the procedure.  5. Rectal bleeding, which would show as bright red, maroon, or black stools.   (A tablespoon of blood from the rectum is not serious,  especially if   hemorrhoids are present.)  6. Vomiting.  7. Weakness or dizziness.  GO DIRECTLY TO THE NEAREST EMERGENCY ROOM IF YOU HAVE ANY OF THE FOLLOWING:      Difficulty breathing              Chills and/or fever over 101 F   Persistent vomiting and/or vomiting blood   Severe abdominal pain   Severe chest pain   Black, tarry stools   Bleeding- more than one tablespoon   Any other symptom or condition that you feel may need urgent attention  Your doctor recommends these additional instructions:  If any biopsies were taken, your doctors clinic will contact you in 1 to 2   weeks with any results.  - Discharge patient to home (with escort).   - Patient has a contact number available for emergencies.  The signs and   symptoms of potential delayed complications were discussed with the   patient.  Return to normal activities tomorrow.  Written discharge   instructions were provided to the patient.   - Resume previous diet.   - Continue present medications.   - Await pathology results.   - Repeat colonoscopy date to be determined after pending pathology results   are reviewed for surveillance based on pathology results.   -Continue iron supplementation  -Schedule VCE   - Return to my office PRN.  For questions, problems or results please call your physician - Alondra Arias MD at Work:  [phone].  [facility], EMERGENCY ROOM PHONE NUMBER: [phone]  IF A COMPLICATION OR EMERGENCY SITUATION ARISES AND YOU ARE UNABLE TO REACH   YOUR PHYSICIAN - GO DIRECTLY TO THE EMERGENCY ROOM.  Alondra Arias MD  9/1/2022 12:23:08 PM  This report has been verified and signed electronically.  Dear patient,  As a result of recent federal legislation (The Federal Cures Act), you may   receive lab or pathology results from your procedure in your [facility]   account before your physician is able to contact you. Your physician or   their representative will relay the results to you with their    recommendations at their soonest availability.  Thank you,  PROVATION

## 2022-09-01 NOTE — PLAN OF CARE
"IV removed,patient states "no pain" and ready to be discharged. Discharged home with belongings and family.   "

## 2022-09-01 NOTE — ANESTHESIA POSTPROCEDURE EVALUATION
Anesthesia Post Evaluation    Patient: Fabiano Jules Jr.    Procedure(s) Performed: Procedure(s) (LRB):  EGD (ESOPHAGOGASTRODUODENOSCOPY) (N/A)  COLONOSCOPY (N/A)    Final Anesthesia Type: general      Patient location during evaluation: PACU  Patient participation: Yes- Able to Participate  Level of consciousness: awake and alert, oriented and awake  Post-procedure vital signs: reviewed and stable  Pain management: adequate  Airway patency: patent    PONV status at discharge: No PONV  Anesthetic complications: no      Cardiovascular status: blood pressure returned to baseline and hemodynamically stable  Respiratory status: unassisted, spontaneous ventilation and room air  Hydration status: euvolemic  Follow-up not needed.          Vitals Value Taken Time   /93 09/01/22 1250   Temp 36.7 °C (98 °F) 09/01/22 1255   Pulse 58 09/01/22 1256   Resp 14 09/01/22 1225   SpO2 96 % 09/01/22 1256   Vitals shown include unvalidated device data.      Event Time   Out of Recovery 13:16:41         Pain/Arian Score: Arian Score: 10 (9/1/2022 12:25 PM)

## 2022-09-01 NOTE — ANESTHESIA PREPROCEDURE EVALUATION
09/01/2022  Fabiano Jules Jr. is a 52 y.o., male.      Pre-op Assessment    I have reviewed the Patient Summary Reports.     I have reviewed the Nursing Notes. I have reviewed the NPO Status.   I have reviewed the Medications.     Review of Systems  Anesthesia Hx:  Hx of Anesthetic complications  Denies Family Hx of Anesthesia complications.   Denies Personal Hx of Anesthesia complications.   Social:  Former Smoker, No Alcohol Use    Cardiovascular:   Exercise tolerance: good Hypertension hyperlipidemia ECG has been reviewed. 3/2022 NRS with sinus arrhythmia  Hypertension, Essential Hypertension , stage 1 hypertension, systolic 140 - 159 or diastolic 90 - 99, Fairly Controlled on RX , Recent typical clinic B/P of 146/80    Pulmonary:   Sleep Apnea, CPAP    Education provided regarding risk of obstructive sleep apnea     Musculoskeletal:   Arthritis  L4-5 Spine Disorders: lumbar Disc disease    Endocrine:  Metabolic Disorders, Morbid Obesity / BMI > 40      Physical Exam  General: Well nourished, Cooperative, Alert and Oriented    Airway:  Mallampati: III   Mouth Opening: Normal  TM Distance: Normal  Tongue: Normal  Neck ROM: Normal ROM    Dental:  Intact    Chest/Lungs:  Clear to auscultation, Normal Respiratory Rate    Heart:  Rate: Normal  Rhythm: Regular Rhythm  Sounds: Normal        Anesthesia Plan  Type of Anesthesia, risks & benefits discussed:    Anesthesia Type: Gen Natural Airway  Intra-op Monitoring Plan: Standard ASA Monitors  Induction:  IV  Informed Consent: Informed consent signed with the Patient and all parties understand the risks and agree with anesthesia plan.  All questions answered.   ASA Score: 3  Day of Surgery Review of History & Physical: H&P Update referred to the surgeon/provider.    Ready For Surgery From Anesthesia Perspective.     .      3/29 Medical Clearance in chart - low cardiac  risk - D. Deblanc FNP-C    Lab Results   Component Value Date    WBC 5.36 08/03/2022    HGB 12.2 (L) 08/03/2022    HCT 37.6 (L) 08/03/2022    MCV 80 (L) 08/03/2022     08/03/2022       BMP  Lab Results   Component Value Date     08/03/2022    K 3.8 08/03/2022     08/03/2022    CO2 29 08/03/2022    BUN 9 08/03/2022    CREATININE 0.9 08/03/2022    CALCIUM 9.7 08/03/2022    ANIONGAP 10 08/03/2022    ESTGFRAFRICA >60 04/09/2022    EGFRNONAA >60 04/09/2022     Glucose 79    Outside ekg/cxr copy in chart.

## 2022-09-01 NOTE — TRANSFER OF CARE
Anesthesia Transfer of Care Note    Patient: Fabiano Jules Jr.    Procedure(s) Performed: Procedure(s) (LRB):  EGD (ESOPHAGOGASTRODUODENOSCOPY) (N/A)  COLONOSCOPY (N/A)    Patient location: PACU    Anesthesia Type: general    Transport from OR: Transported from OR on 2-3 L/min O2 by NC with adequate spontaneous ventilation    Post pain: adequate analgesia    Post assessment: no apparent anesthetic complications and tolerated procedure well    Post vital signs: stable    Level of consciousness: awake, alert and oriented    Nausea/Vomiting: no nausea/vomiting    Complications: none    Transfer of care protocol was followed      Last vitals:   Visit Vitals  BP (!) 167/93 (BP Location: Left arm, Patient Position: Lying)   Pulse (!) 55   Temp 36.8 °C (98.2 °F) (Skin)   Resp 18   Ht 6' (1.829 m)   Wt (!) 158.8 kg (350 lb)   SpO2 98%   BMI 47.47 kg/m²

## 2022-09-02 ENCOUNTER — CLINICAL SUPPORT (OUTPATIENT)
Dept: REHABILITATION | Facility: HOSPITAL | Age: 52
End: 2022-09-02
Payer: COMMERCIAL

## 2022-09-02 VITALS
OXYGEN SATURATION: 96 % | SYSTOLIC BLOOD PRESSURE: 162 MMHG | BODY MASS INDEX: 42.66 KG/M2 | HEART RATE: 54 BPM | TEMPERATURE: 98 F | HEIGHT: 72 IN | WEIGHT: 315 LBS | DIASTOLIC BLOOD PRESSURE: 93 MMHG | RESPIRATION RATE: 14 BRPM

## 2022-09-02 DIAGNOSIS — Z55.9 SPECIAL EDUCATIONAL NEEDS: ICD-10-CM

## 2022-09-02 DIAGNOSIS — M54.50 LUMBAR PAIN: ICD-10-CM

## 2022-09-02 DIAGNOSIS — Z74.09 DECREASED FUNCTIONAL MOBILITY AND ENDURANCE: ICD-10-CM

## 2022-09-02 DIAGNOSIS — M62.89 ABNORMAL INCREASED MUSCLE TONE: ICD-10-CM

## 2022-09-02 DIAGNOSIS — R53.1 WEAKNESS: Primary | ICD-10-CM

## 2022-09-02 PROCEDURE — 97110 THERAPEUTIC EXERCISES: CPT | Mod: PN

## 2022-09-02 PROCEDURE — 97112 NEUROMUSCULAR REEDUCATION: CPT | Mod: PN

## 2022-09-02 SDOH — SOCIAL DETERMINANTS OF HEALTH (SDOH): PROBLEMS RELATED TO EDUCATION AND LITERACY, UNSPECIFIED: Z55.9

## 2022-09-02 NOTE — PROGRESS NOTES
"  [facility]  Outpatient Physical Therapy Daily Treatment Note      Name: Fabiano Jules Jr.  Clinic Number: [id number]  Visit Date: 9/2/2022    Therapy Diagnosis:   Encounter Diagnoses   Name Primary?    Lumbar pain     Weakness Yes    Abnormal increased muscle tone     Decreased functional mobility and endurance     Special educational needs      Physician: Duran Adler Jr., MD     Physician Orders: PT Eval and Treat   Medical Diagnosis from Referral: M43.26 (ICD-10-CM) - Ankylosis of lumbar spine   Evaluation Date: 7/7/2022  Authorization Period Expiration: 10/08/2022  Plan of Care Expiration: 09/09/2022 at 2x/wk x 8 weeks (Update plan of care 09/06/2022)  Progress Note Due: 09/06/2022  Visit # / Visits authorized: 12/ 10 (13/16 on plan of care) ask about EXCEL sheet again on Tuesday++++++++++++++  Total visit #: 13    PTA visit #: 0/5  FOTO: 2/ 3 (2nd FOTO done 08/15/2022)     Time In: 12:20 pm  Time Out: 1:15 pm  Total Appointment Time (timed & untimed codes): 40 minutes + 10 minutes of hot pack     Precautions: Standard and and surgical  Subjective     The patient reports:  I'd say that I am a 4/10 today. The pain is down low like right above the butt crack. I have been doing my exercises y'all have shown me. I feel like I am walking upright, but I feel stiff." PT acknowledges.    Response to previous treatment: The patient enters with no new complaints.   Functional change: No changes to report since the last PT session.      Pain: 4/10 upon entering the clinic today again today.   Location: bilateral back    Objective       Fabiano received therapeutic exercises to develop strength, endurance, ROM, flexibility, posture and core stabilization for 15 minutes including:  -red band snow angels to 90* x 20  -red band shoulder horizontal extension x 20  -+bilateral lower extremity forward step ups on 4 inch black box x 20 each  -heel raises x 20 with red ball " adduction.   -Precor knee flexion with 35 pounds x 20  -Precor knee extension with 20 pounds x 20    Not performed today  -Nu-step on Level 1 x 6 minutes.  -bilateral upper extremity land mines(simultaneously) going into total body extension with 8 pound bar x 15.    -Precor outer thigh with 30 pounds x 20  -standing hip isometrics with volley ball 5 sec 2 x 5  -Clams with GTB 2 x 10   -SIdelying hip abduction 10 x 2    -bilateral straight leg raises x 15 each with 2 pound weights  -Clams with GTB 2 x 10   -Prone hip extension with knee flexed 10 x each side  -Supine bridges with bolster under ankles 2 x 10    -Modified [place] Dead lifts 10 x minimal motion with cues to hinge at hip, keep lumbar spine neutral & engage glutes for initiation of lift  -Scapular retraction with 25 pounds x 20  -Prone hip extension with knee flexion ( attempted)     Fabiano participated in neuromuscular re-education activities to improve: Coordination, Proprioception and Posture for 25 minutes. The following activities were included:  -seated thoracic extension in chair with half bolster and 3 second hold x 15  -overhead extension into the wall with green ball for two seconds x 15  -kitchen sink walk outs with 20 second hold x 4  -sit to stand from 4th black box not using arms x 10  -+seated half butt raises x 20 bilaterally  -serratus roll ups with black roller x 15    Below not performed today:  -Standing extensions on supported plinth in order to achieve mobility at L5-S1 to relieve stiffness in this region 2 x 20  -Paloff press Blue theraband 20  x each direction   -Lateral glides to correct standing dysfunctional pelvic shift when standing/ walking. Shifts performed towards left as pt presents with right  lateral shift NP  -isometric curl up with red ball and 3-5 second hold: forward, to the left, and to the right x 10 each direction  -bridges with add squeezes 2  x 10  -gluteal squeezes with 2 second hold with bolster under knees x  4 minutes   -Isometrics performed for right hip external rotators with pt taught to press arch of right foot into calf of left leg 5 sec hold 2 x 10   -side glides for lateral shift of pelvis from right towards midline 15 x   -Standing diagonal chops for abdominal/ oblique recruitment 20 x each side with Black theraband   -Resisted retro walking holding black sports cord 2 minutes slow and controled with cues to place heel behind hip in order to achieve best recruitment of glutes   -standing bilateral abduction x 15 each leg  -standing bilateral extension x 15 each leg  -standing and bilateral marching x 15 each leg    Fabiano received the following supervised modalities after being cleared for contradictions: IFC Electrical Stimulation:  Fabiano received Electrical Stimulation with cold pack for pain control applied to the lumbar region following therapeutic exercises today. 0 minutes total with cold pack . Fabiano tolderated treatment well without any adverse effects.     Hot pack to lumbar area while in chair PRE workout x 10 minutes     Patient Education and HEP     He was compliant with home exercise program.    Education provided:   -home exercise program additional exercises added today  -08/01/2022 Patient was educated on moving his night stand to prevent too much rotation. He verbally acknowledged.    Written Home Exercises Provided: Patient instructed to cont prior HEP.  Exercises were reviewed and Fabiano was able to demonstrate them prior to the end of the session.  Fabiano demonstrated fair  understanding of the education provided.     See EMR under Patient Instructions for exercises provided prior visit.  Assessment      Today, the patient entered with no new complaints. He walked in with a good and upright posture. Attention was paid again today on improvement in strength with posterior chain exercises. He gives good efforts. He needs continued skilled PT so that he can safely be progressed through his surgical  protocol. Patient may now also benefit from a heel lift on his left lower extremity in order to minimize the effect of the leg length discrepancy. Going forward there will be continued focus on glute strengthening and his hip stabilizers to achieve his prior level of function.      Pt will continue to benefit from skilled outpatient physical therapy to address the deficits listed in the problem list box on initial evaluation, provide pt/family education and to maximize pt's level of independence in the home and community environment.     Fabiano Is progressing well towards his goals.   Pt prognosis is Good.     Pt's spiritual, cultural and educational needs considered and pt agreeable to plan of care and goals.    Anticipated barriers to physical therapy: co-morbidities    Goals:Goals:     STG  Weeks/Visits Date Established  Date Met   1.Decrease max lumbar pain to 6/10 to improve the patient's quality of life. 4 weeks. 7/7/2022    Goal Met 9/2/2022      2. Decrease bilateral hamstring, iliopsoas, and piriformis muscle tones to minimal to moderate increase to improve ease of bed mobility. 4 weeks. 7/7/2022    Goal Met 9/2/2022      3.Increase general core strength 1/2 grade to improve standing activity endurance. 4 weeks.                7/7/2022    Goal Met 9/2/2022      4.Decrease FOTO limitation to <=70% to improve job task ability. 4 weeks. 7/7/2022    Goal Met 9/2/2022     5.Fair initial written home exercise program knowledge and be without questions to have carryover after discharge from PT. 4 weeks. 7/7/2022    Goal Met 9/2/2022         LTG Weeks/Visits Date Established  Date Met   1.Decrease max lumbar pain to 2/10 to improve the patient's quality of life. 8 weeks. 7/7/2022    In progress  9/2/2022      2. Decrease bilateral hamstring, iliopsoas, and piriformis muscle tones to minimal increase to improve ease of bed mobility. 8 weeks 7/7/2022        In progress  9/2/2022     3.Increase general core strength one  grade from evaluation date to improve standing activity endurance. 8 weeks. 7/7/2022    In progress  9/2/2022      4.Decrease FOTO limitation to <=60% to improve job task ability. 8 weeks. 7/7/2022     In progress  9/2/2022     5.Good progressed written home exercise program knowledge and be without questions to have carryover after discharge from PT. 8 weeks. 7/7/2022 In progress  9/2/2022         Plan     Plan of care Certification: 7/7/2022 to 09/09/2022. Outpatient Physical Therapy 2 times weekly for 8 weeks. Plan to maximize lumbar strength and flexibility to help provide long term pain relief. Plan to continue to work on pain decrease and education along with pelvic stabilization.        Espinoza Jeffrey, PT

## 2022-09-06 ENCOUNTER — PATIENT MESSAGE (OUTPATIENT)
Dept: FAMILY MEDICINE | Facility: CLINIC | Age: 52
End: 2022-09-06
Payer: COMMERCIAL

## 2022-09-06 ENCOUNTER — CLINICAL SUPPORT (OUTPATIENT)
Dept: REHABILITATION | Facility: HOSPITAL | Age: 52
End: 2022-09-06
Payer: COMMERCIAL

## 2022-09-06 DIAGNOSIS — Z55.9 SPECIAL EDUCATIONAL NEEDS: ICD-10-CM

## 2022-09-06 DIAGNOSIS — R53.1 WEAKNESS: Primary | ICD-10-CM

## 2022-09-06 DIAGNOSIS — Z74.09 DECREASED FUNCTIONAL MOBILITY AND ENDURANCE: ICD-10-CM

## 2022-09-06 DIAGNOSIS — M54.50 LUMBAR PAIN: ICD-10-CM

## 2022-09-06 DIAGNOSIS — M62.89 ABNORMAL INCREASED MUSCLE TONE: ICD-10-CM

## 2022-09-06 PROCEDURE — 97112 NEUROMUSCULAR REEDUCATION: CPT | Mod: PN

## 2022-09-06 PROCEDURE — 97110 THERAPEUTIC EXERCISES: CPT | Mod: PN

## 2022-09-06 RX ORDER — LISINOPRIL AND HYDROCHLOROTHIAZIDE 12.5; 2 MG/1; MG/1
2 TABLET ORAL DAILY
Qty: 180 TABLET | Refills: 1 | Status: ON HOLD | OUTPATIENT
Start: 2022-09-06 | End: 2022-12-20 | Stop reason: HOSPADM

## 2022-09-06 SDOH — SOCIAL DETERMINANTS OF HEALTH (SDOH): PROBLEMS RELATED TO EDUCATION AND LITERACY, UNSPECIFIED: Z55.9

## 2022-09-06 NOTE — PROGRESS NOTES
"  [facility]  Outpatient Physical Therapy Daily Treatment Note/Updated PT plan of care      Name: Fabiano Jules Jr.  Clinic Number: [id number]  Visit Date: 9/6/2022    Therapy Diagnosis:   Encounter Diagnoses   Name Primary?    Lumbar pain     Weakness Yes    Abnormal increased muscle tone     Decreased functional mobility and endurance     Special educational needs      Physician: Duran Adler Jr., MD     Physician Orders: PT Eval and Treat   Medical Diagnosis from Referral: M43.26 (ICD-10-CM) - Ankylosis of lumbar spine   Evaluation Date: 7/7/2022  Authorization Period Expiration: 10/08/2022  Plan of Care Expiration: 09/09/2022 at 2x/wk x 8 weeks (Updated plan of care: 09/12/2022 to 10/28/2022 at 2x/wk x 6 weeks)  Progress Note Due: 10/06/2022  Visit # / Visits authorized: 13/ 16 (14/16 on plan of care) Espinoza asked to put back on the Excel sheet 09/08/2022+++++++  Total visit #: 14    PTA visit #: 0/5  FOTO: 2/ 3 (2nd FOTO done 08/15/2022)     Time In: 4:16 pm  Time Out:  5:10 pm  Total Appointment Time (timed & untimed codes): 40 minutes + 10 minutes of hot pack     Precautions: Standard and and surgical  Subjective     The patient reports:  "I took a day off like you told me too, and I didn't think about working my back. I tried to fish, but I learned that I am not quite ready for that. I can feel myself getting stronger though. I can dee how much better I am doing by how much it hurts when I sneeze. The pain is down to a quarter of what it was when I sneezed after surgery . I'd say that I am a 3/10." PT acknowledges.    Response to previous treatment: The patient enters reporting feeling stronger overall and comes in at only a 3/10 this day.   Functional change: The patient was able to attempt to fish, and he reports less pain when he sneezes.      Pain: 3/10 upon entering the clinic today again today.   Location: bilateral back    Objective     Fabiano " received therapeutic exercises to develop strength, endurance, ROM, flexibility, posture and core stabilization for 25 minutes including:  -heel raises x 20 with red ball adduction.   -Precor knee flexion with 35 pounds x 25  -Precor knee extension with 20 pounds x 25  -+Precor back extension with no pain with 45 pounds x 20  -red band snow angels to 90* x 20  -red band shoulder horizontal extension x 20  -bilateral lower extremity forward step ups on 4 inch black box x 20 each  -Precor outer thigh with 30 pounds x 20    Not performed today  -Nu-step on Level 1 x 6 minutes.  -standing hip isometrics with volley ball 5 sec 2 x 5  -Clams with GTB 2 x 10   -SIdelying hip abduction 10 x 2    -bilateral straight leg raises x 15 each with 2 pound weights  -Prone hip extension with knee flexed 10 x each side  -Supine bridges with bolster under ankles 2 x 10    -Modified [place] Dead lifts 10 x minimal motion with cues to hinge at hip, keep lumbar spine neutral & engage glutes for initiation of lift  -Scapular retraction with 25 pounds x 20    Fabiano participated in neuromuscular re-education activities to improve: Coordination, Proprioception and Posture for 15 minutes. The following activities were included:  -seated thoracic extension in chair with half bolster and 3 second hold x 15  -overhead extension into the wall with green ball for two seconds x 15  -kitchen sink walk outs with 20 second hold x 4  -sit to stand from 4th black box not using arms x 15    Below not performed today:  -seated half butt raises x 20 bilaterally  -serratus roll ups with black roller x 15  -Standing extensions on supported plinth in order to achieve mobility at L5-S1 to relieve stiffness in this region 2 x 20  -Paloff press Blue theraband 20  x each direction   -Lateral glides to correct standing dysfunctional pelvic shift when standing/ walking. Shifts performed towards left as pt presents with right  lateral shift   -isometric curl up with  red ball and 3-5 second hold: forward, to the left, and to the right x 10 each direction  -Isometrics performed for right hip external rotators with pt taught to press arch of right foot into calf of left leg 5 sec hold 2 x 10   -side glides for lateral shift of pelvis from right towards midline 15 x   -standing bilateral abduction x 15 each leg  -standing bilateral extension x 15 each leg  -standing and bilateral marching x 15 each leg    Fabiano received the following supervised modalities after being cleared for contradictions: IFC Electrical Stimulation:  Fabiano received Electrical Stimulation with cold pack for pain control applied to the lumbar region following therapeutic exercises today. 0 minutes total with cold pack . Fabiano tolderated treatment well without any adverse effects.     Hot pack to lumbar area while in chair PRE workout x 10 minutes     Flexibility        Muscle Left Right Comment             Hamstrings Minimal to Moderate Increase >Moderate Increase Minimal to Moderate Increase >Moderate Increase     Quadriceps Minimal Increase Minimal Increase     Illipsoas Minimal to Moderate Increase >Moderate Increase Minimal to Moderate Increase >Moderate Increase     Pirifromis Minimal to Moderate Increase >Moderate Increase Minimal to Moderate Increase >Moderate Increase                      MMT        L-spine     Comment     Left Right               Flexion 4-/5 >3+/5 -     Extension 4/5 >4-/5 -     Side Bending 4/5 >4-/5 4/5 >4-/5               Abdominals 4-/5 >3+/5 -     Lower Abdominals 4-/5 >3+/5 -                      Limitation/Restriction for FOTO Lumbar Survey     Therapist reviewed FOTO scores for Fabiano Jules Jr. on 08/15/2022.   FOTO documents entered into [employer] - see Media section.     Limitation Score: 66% limitation 08/15/2022 >81% Limitation at the evaluation.        Patient Education and HEP     He was compliant with home exercise program.    Education provided:   -home exercise program  additional exercises added today  -08/01/2022 Patient was educated on moving his night stand to prevent too much rotation. He verbally acknowledged.    Written Home Exercises Provided: Patient instructed to cont prior HEP.  Exercises were reviewed and Fabiano was able to demonstrate them prior to the end of the session.  Fabiano demonstrated fair  understanding of the education provided.     See EMR under Patient Instructions for exercises provided prior visit.  Assessment/Updated plan of care     Today, the patient has his plan of care updated. He has met all of his short term goals. He continues to progress towards his long term goals. However, PT believes he will need an extended plan of care to be able to safely reach these goals. He is reporting and showing improved strength. His overall pain levels are decreasing. He is slowly improving his functional ability. As he is progressing, he needs continued skilled PT to safely help him reach his goals according to protocol and to limit any chance of re-injury. PT is hoping is insurance will authorize more PT sessions. He is compliant with PT. He is working hard. Patient by be able to apply a heel lift soon as his pain is decreasing and posture is improving. PT will continue to assess this need. Going forward there will be continued focus on glute strengthening and strengthening of his hip stabilizers and improved muscle endurance to achieve his prior level of function.      Pt will continue to benefit from skilled outpatient physical therapy to address the deficits listed in the problem list box on initial evaluation, provide pt/family education and to maximize pt's level of independence in the home and community environment.     Fabiano Is progressing well towards his goals.   Pt prognosis is Good.     Pt's spiritual, cultural and educational needs considered and pt agreeable to plan of care and goals.    Anticipated barriers to physical therapy:  co-morbidities    Goals:Goals:     STG  Weeks/Visits Date Established  Date Met   1.Decrease max lumbar pain to 6/10 to improve the patient's quality of life. 4 weeks. 7/7/2022    Goal Met 9/6/2022      2. Decrease bilateral hamstring, iliopsoas, and piriformis muscle tones to minimal to moderate increase to improve ease of bed mobility. 4 weeks. 7/7/2022    Goal Met 9/6/2022      3.Increase general core strength 1/2 grade to improve standing activity endurance. 4 weeks.                7/7/2022    Goal Met 9/6/2022      4.Decrease FOTO limitation to <=70% to improve job task ability. 4 weeks. 7/7/2022    Goal Met 9/6/2022     5.Fair initial written home exercise program knowledge and be without questions to have carryover after discharge from PT. 4 weeks. 7/7/2022    Goal Met 9/6/2022         LTG Weeks/Visits Date Established  Date Met   1.Decrease max lumbar pain to 2/10 to improve the patient's quality of life. 8 weeks. 7/7/2022    In progress  9/6/2022  3/10 today      2. Decrease bilateral hamstring, iliopsoas, and piriformis muscle tones to minimal increase to improve ease of bed mobility. 8 weeks 7/7/2022        In progress  9/6/2022     3.Increase general core strength one grade from evaluation date to improve standing activity endurance. 8 weeks. 7/7/2022    In progress  9/6/2022      4.Decrease FOTO limitation to <=60% to improve job task ability. 8 weeks. 7/7/2022     In progress  9/6/2022  66% when last taken     5.Good progressed written home exercise program knowledge and be without questions to have carryover after discharge from PT. 8 weeks. 7/7/2022 In progress  9/6/2022         Plan     Plan of care Certification: 7/7/2022 to 09/09/2022. Updated plan of care: 09/12/2022 to 10/28/2022 at 2x/wk x 6 weeks to include the following interventions: Electrical Stimulation unattended, Manual Therapy, Moist Heat/ Ice, Neuromuscular Re-ed, Patient Education, Self Care, Therapeutic Activities, Therapeutic  Exercise, Ultrasound and care by a PTA.. Plan to continue to work on pain decrease and education along with pelvic stabilization to help provide long term pain relief.        Espinoza Jeffrey, PT

## 2022-09-06 NOTE — TELEPHONE ENCOUNTER
No new care gaps identified.  [facility] Embedded Care Gaps. Reference number: [id number]. 9/06/2022   10:11:20 AM CDT

## 2022-09-06 NOTE — TELEPHONE ENCOUNTER
I spoke with pts pharmacy, tech states that he is unable to use [facility] due to insurance. Will have to use mail order or call insurance to see what local pharmacy he can use.     I spoke with pt. He states that he would like to use [facility]  . Refill request sent to Dr. Mathias.

## 2022-09-07 ENCOUNTER — OFFICE VISIT (OUTPATIENT)
Dept: BARIATRICS | Facility: CLINIC | Age: 52
End: 2022-09-07
Payer: COMMERCIAL

## 2022-09-07 ENCOUNTER — HOSPITAL ENCOUNTER (OUTPATIENT)
Dept: RADIOLOGY | Facility: HOSPITAL | Age: 52
Discharge: HOME | End: 2022-09-07
Attending: NURSE PRACTITIONER
Payer: COMMERCIAL

## 2022-09-07 VITALS
TEMPERATURE: 99 F | RESPIRATION RATE: 16 BRPM | DIASTOLIC BLOOD PRESSURE: 99 MMHG | HEIGHT: 69 IN | SYSTOLIC BLOOD PRESSURE: 199 MMHG | BODY MASS INDEX: 46.65 KG/M2 | HEART RATE: 82 BPM | WEIGHT: 315 LBS

## 2022-09-07 DIAGNOSIS — E66.01 MORBID OBESITY: Primary | ICD-10-CM

## 2022-09-07 DIAGNOSIS — I10 PRIMARY HYPERTENSION: ICD-10-CM

## 2022-09-07 DIAGNOSIS — G47.33 OSA (OBSTRUCTIVE SLEEP APNEA): ICD-10-CM

## 2022-09-07 DIAGNOSIS — E66.01 MORBID OBESITY: ICD-10-CM

## 2022-09-07 DIAGNOSIS — E66.01 CLASS 3 SEVERE OBESITY DUE TO EXCESS CALORIES WITH SERIOUS COMORBIDITY AND BODY MASS INDEX (BMI) OF 50.0 TO 59.9 IN ADULT: ICD-10-CM

## 2022-09-07 DIAGNOSIS — K21.9 GASTROESOPHAGEAL REFLUX DISEASE, UNSPECIFIED WHETHER ESOPHAGITIS PRESENT: ICD-10-CM

## 2022-09-07 PROCEDURE — A9698 NON-RAD CONTRAST MATERIALNOC: HCPCS | Performed by: NURSE PRACTITIONER

## 2022-09-07 PROCEDURE — 3008F BODY MASS INDEX DOCD: CPT | Mod: CPTII,S$GLB,, | Performed by: SURGERY

## 2022-09-07 PROCEDURE — 3077F SYST BP >= 140 MM HG: CPT | Mod: CPTII,S$GLB,, | Performed by: SURGERY

## 2022-09-07 PROCEDURE — 3044F HG A1C LEVEL LT 7.0%: CPT | Mod: CPTII,S$GLB,, | Performed by: SURGERY

## 2022-09-07 PROCEDURE — 25500020 PHARM REV CODE 255: Performed by: NURSE PRACTITIONER

## 2022-09-07 PROCEDURE — 3080F DIAST BP >= 90 MM HG: CPT | Mod: CPTII,S$GLB,, | Performed by: SURGERY

## 2022-09-07 PROCEDURE — 74240 X-RAY XM UPR GI TRC 1CNTRST: CPT | Mod: 26,,, | Performed by: RADIOLOGY

## 2022-09-07 PROCEDURE — 4010F ACE/ARB THERAPY RXD/TAKEN: CPT | Mod: CPTII,S$GLB,, | Performed by: SURGERY

## 2022-09-07 PROCEDURE — 3077F PR MOST RECENT SYSTOLIC BLOOD PRESSURE >= 140 MM HG: ICD-10-PCS | Mod: CPTII,S$GLB,, | Performed by: SURGERY

## 2022-09-07 PROCEDURE — 3044F PR MOST RECENT HEMOGLOBIN A1C LEVEL <7.0%: ICD-10-PCS | Mod: CPTII,S$GLB,, | Performed by: SURGERY

## 2022-09-07 PROCEDURE — 99213 PR OFFICE/OUTPT VISIT, EST, LEVL III, 20-29 MIN: ICD-10-PCS | Mod: S$GLB,,, | Performed by: SURGERY

## 2022-09-07 PROCEDURE — 99213 OFFICE O/P EST LOW 20 MIN: CPT | Mod: S$GLB,,, | Performed by: SURGERY

## 2022-09-07 PROCEDURE — 99999 PR PBB SHADOW E&M-EST. PATIENT-LVL III: ICD-10-PCS | Mod: PBBFAC,,, | Performed by: SURGERY

## 2022-09-07 PROCEDURE — 3008F PR BODY MASS INDEX (BMI) DOCUMENTED: ICD-10-PCS | Mod: CPTII,S$GLB,, | Performed by: SURGERY

## 2022-09-07 PROCEDURE — 74240 X-RAY XM UPR GI TRC 1CNTRST: CPT | Mod: TC,FY

## 2022-09-07 PROCEDURE — 3080F PR MOST RECENT DIASTOLIC BLOOD PRESSURE >= 90 MM HG: ICD-10-PCS | Mod: CPTII,S$GLB,, | Performed by: SURGERY

## 2022-09-07 PROCEDURE — 99999 PR PBB SHADOW E&M-EST. PATIENT-LVL III: CPT | Mod: PBBFAC,,, | Performed by: SURGERY

## 2022-09-07 PROCEDURE — 4010F PR ACE/ARB THEARPY RXD/TAKEN: ICD-10-PCS | Mod: CPTII,S$GLB,, | Performed by: SURGERY

## 2022-09-07 PROCEDURE — 74240 FL UPPER GI: ICD-10-PCS | Mod: 26,,, | Performed by: RADIOLOGY

## 2022-09-07 RX ORDER — LISINOPRIL AND HYDROCHLOROTHIAZIDE 12.5; 2 MG/1; MG/1
2 TABLET ORAL DAILY
Qty: 180 TABLET | Refills: 1 | Status: SHIPPED | OUTPATIENT
Start: 2022-09-07 | End: 2022-11-05 | Stop reason: SDUPTHER

## 2022-09-07 RX ADMIN — BARIUM SULFATE 200 ML: 0.6 SUSPENSION ORAL at 02:09

## 2022-09-07 NOTE — TELEPHONE ENCOUNTER
Prescription for Lisinopril HCTZ sent to incorrect pharmacy. Please send to [facility]; order pended to preferred pharmacy.

## 2022-09-07 NOTE — TELEPHONE ENCOUNTER
No new care gaps identified.  [facility] Embedded Care Gaps. Reference number: [id number]. 9/07/2022   7:38:32 AM JULIANAT

## 2022-09-07 NOTE — PROGRESS NOTES
Medically Supervised Weight Loss Documentation    Date of Visit: 09/07/2022    Patient: Fabiano Jules Jr.    Current Weight: 351  Current BMI: Body mass index is 51.83 kg/m².  Weight Change: +1    Last Weight: 355    Beginning Weight: 350      Vitals:   Vitals:    09/07/22 0833   BP: (!) 199/99   Pulse: 82   Resp: 16   Temp: 98.5 °F (36.9 °C)       Comorbidities:   Past Medical History:   Diagnosis Date    Arthritis     Back injury     Colon polyp     COVID     CPAP (continuous positive airway pressure) dependence     Hypertension     PONV (postoperative nausea and vomiting)     Sleep apnea     c pap machine used       Medications:  Current Outpatient Medications on File Prior to Visit   Medication Sig Dispense Refill    amLODIPine (NORVASC) 5 MG tablet Take 1 tablet (5 mg total) by mouth once daily. 30 tablet 11    atorvastatin (LIPITOR) 40 MG tablet Take 1 tablet (40 mg total) by mouth every evening. 90 tablet 3    b complex vitamins (B COMPLEX-VITAMIN B12) tablet Take 1 tablet by mouth once daily. 90 tablet 1    EPINEPHrine (EPIPEN) 0.3 mg/0.3 mL AtIn Inject into the muscle once for one dose as needed. 2 each 1    ergocalciferol (ERGOCALCIFEROL) 50,000 unit Cap Take 1 capsule (50,000 Units total) by mouth twice a week. 24 capsule 0    ferrous sulfate (FEROSUL) 325 mg (65 mg iron) Tab tablet Take 1 tablet (325 mg total) by mouth every other day. (Patient taking differently: Take 325 mg by mouth once daily.) 90 tablet 0    lisinopriL-hydrochlorothiazide (PRINZIDE,ZESTORETIC) 20-12.5 mg per tablet Take 2 tablets by mouth once daily. 180 tablet 1    oxyCODONE-acetaminophen (PERCOCET) 7.5-325 mg per tablet Take 1 tablet by mouth 3 (three) times daily as needed.      semaglutide (OZEMPIC) 1 mg/dose (4 mg/3 mL) Inject 1 mg into the skin every 7 days. 1 pen 11    cimetidine (TAGAMET) 400 MG tablet TAKE 1 TABLET (400 MG TOTAL) BY MOUTH 2 (TWO) TIMES DAILY AS NEEDED (ALLERGIC REACTION). (Patient not taking: No sig reported)  180 tablet 1    cyclobenzaprine (FLEXERIL) 10 MG tablet Take 10 mg by mouth 3 (three) times daily.      naproxen sodium (ANAPROX) 220 MG tablet Take 220 mg by mouth daily as needed.      [DISCONTINUED] gabapentin (NEURONTIN) 300 MG capsule Take 1 capsule (300 mg total) by mouth 3 (three) times daily. (Patient not taking: Reported on 9/7/2022) 90 capsule 0    [DISCONTINUED] LIDOcaine-methyl sal-menthol 4-4-5 % PtMd Apply 1 patch topically once daily at 6am. (Patient not taking: No sig reported) 10 patch 0    [DISCONTINUED] lisinopriL-hydrochlorothiazide (PRINZIDE,ZESTORETIC) 20-12.5 mg per tablet Take 2 tablets by mouth once daily. 180 tablet 1     Current Facility-Administered Medications on File Prior to Visit   Medication Dose Route Frequency Provider Last Rate Last Admin    acetaminophen tablet 650 mg  650 mg Oral Q4H PRN Katie Shane NP        ceFAZolin in dextrose (iso-os) 2 gram/100 mL PgBk 2,000 mg  2 g Intravenous Q8H Katie Shane NP   Stopped at 04/09/22 1036    HYDROmorphone injection 1 mg  1 mg Intravenous Q6H PRN Katie Shane NP   1 mg at 04/09/22 0539    ondansetron injection 4 mg  4 mg Intravenous Q6H PRN Katie Shane NP        oxyCODONE-acetaminophen  mg per tablet 1 tablet  1 tablet Oral Q4H PRN Katie hSane NP   1 tablet at 04/09/22 0906    oxyCODONE-acetaminophen 5-325 mg per tablet 1 tablet  1 tablet Oral Q4H PRN Katie Shane NP   1 tablet at 04/08/22 0947    polyethylene glycol packet 17 g  17 g Oral Daily Katie Shane NP   17 g at 04/08/22 0822         Body comp:  Fat Percent:  46.6 %  Fat Mass:  163.6 lb  FFM:  187.4 lb  TBW: 151.8 lb  TBW %:  43.2 %  BMR: 2703 kcal      Diet Education Discussed:    Still having some sweets    Breakfast:  skips  Lunch:  meats and vegetable  Dinner:  meats and vegetable     Exercise/Activity Discussed:    Had back surgery and has been doing pt twice a week    Behavior or Diet Goals for this patient:    Avoid sweets  3 meals a  day  Continue pt     Labs  EKG  UGI - pending  dietary consult- done  psych consult - done [facility]  Seminar        I will obtain the following clearances prior to surgery: Cardiology- done              Previous ECHO, Nuclear Stress Test (7/14/2021)    : total time spent for visit: 20 minutes

## 2022-09-08 ENCOUNTER — CLINICAL SUPPORT (OUTPATIENT)
Dept: REHABILITATION | Facility: HOSPITAL | Age: 52
End: 2022-09-08
Payer: COMMERCIAL

## 2022-09-08 DIAGNOSIS — Z74.09 DECREASED FUNCTIONAL MOBILITY AND ENDURANCE: ICD-10-CM

## 2022-09-08 DIAGNOSIS — M54.50 LUMBAR PAIN: Primary | ICD-10-CM

## 2022-09-08 DIAGNOSIS — Z55.9 SPECIAL EDUCATIONAL NEEDS: ICD-10-CM

## 2022-09-08 DIAGNOSIS — R53.1 WEAKNESS: ICD-10-CM

## 2022-09-08 DIAGNOSIS — M62.89 ABNORMAL INCREASED MUSCLE TONE: ICD-10-CM

## 2022-09-08 PROCEDURE — 97110 THERAPEUTIC EXERCISES: CPT | Mod: PN,CQ

## 2022-09-08 PROCEDURE — 97112 NEUROMUSCULAR REEDUCATION: CPT | Mod: PN,CQ

## 2022-09-08 SDOH — SOCIAL DETERMINANTS OF HEALTH (SDOH): PROBLEMS RELATED TO EDUCATION AND LITERACY, UNSPECIFIED: Z55.9

## 2022-09-08 NOTE — PROGRESS NOTES
[facility]  Outpatient Physical Therapy Daily Treatment Note/Updated PT plan of care      Name: Fabiano Jules Jr.  Clinic Number: [id number]  Visit Date: 9/8/2022    Therapy Diagnosis:   Encounter Diagnoses   Name Primary?    Lumbar pain Yes    Weakness     Abnormal increased muscle tone     Decreased functional mobility and endurance     Special educational needs      Physician: Duran Adler Jr., MD     Physician Orders: PT Eval and Treat   Medical Diagnosis from Referral: M43.26 (ICD-10-CM) - Ankylosis of lumbar spine   Evaluation Date: 7/7/2022  Authorization Period Expiration: 10/08/2022  Plan of Care Expiration: 09/09/2022 at 2x/wk x 8 weeks (Updated plan of care: 09/12/2022 to 10/28/2022 at 2x/wk x 6 weeks)  Progress Note Due: 10/06/2022  Visit # / Visits authorized: 14/ 16 (15/16 on plan of care) Espinoza asked to put back on the Excel sheet 09/08/2022+++++++  Total visit #: 15    PTA visit #: 1/5  FOTO: 2/ 3 (2nd FOTO done 08/15/2022)     Time In: 10:30 am  Time Out:  11:20 pm  Total Appointment Time (timed & untimed codes): 50 minutes      Precautions: Standard and and surgical  Subjective     The patient reports:  Minimal pain in back today 3/10. He reports that he has been wearing a heel lift.     Response to previous treatment: The patient enters reporting feeling stronger overall and comes in at only a 3/10 this day.   Functional change: unremarkable today    Pain: 3/10 upon entering the clinic today again today.   Location: bilateral back    Objective     Fabiano received therapeutic exercises to develop strength, endurance, ROM, flexibility, posture and core stabilization for 25 minutes including:  -heel raises x 20 with red ball adduction.   -Precor knee flexion with 35 pounds x 25  -Precor knee extension with 20 pounds x 25  -+Precor back extension with no pain with 45 pounds x 20  -red band snow angels to 90* x 20  -red band shoulder horizontal  extension x 20  -bilateral lower extremity forward step ups on 4 inch black box x 20 each Not performed today   -Precor outer thigh with 30 pounds x 20  Precor leg press 30 # 2 x 10  Standing donkey kicks at shuttle red band 2 x 10 each side     Not performed today  -Nu-step on Level 1 x 6 minutes.  -standing hip isometrics with volley ball 5 sec 2 x 5  -Clams with GTB 2 x 10   -SIdelying hip abduction 10 x 2    -bilateral straight leg raises x 15 each with 2 pound weights  -Prone hip extension with knee flexed 10 x each side  -Supine bridges with bolster under ankles 2 x 10    -Modified [place] Dead lifts 10 x minimal motion with cues to hinge at hip, keep lumbar spine neutral & engage glutes for initiation of lift  -Scapular retraction with 25 pounds x 20    Fabiano participated in neuromuscular re-education activities to improve: Coordination, Proprioception and Posture for 25 minutes. The following activities were included:  -seated thoracic extension in chair with half bolster and 3 second hold x 15  -overhead extension into the wall with green ball for two seconds x 15  -kitchen sink walk outs with 20 second hold x 4  -sit to stand from 4th black box not using arms x 15  Open books with GTB 15 x each  Standing on foam shifting weights at cabinet 4 minutes   Standing ab crunches with Black band 2 x 10   Hip hinges with dowel at back for self cuing 10 x     Below not performed today:  -seated half butt raises x 20 bilaterally  -serratus roll ups with black roller x 15  -Standing extensions on supported plinth in order to achieve mobility at L5-S1 to relieve stiffness in this region 2 x 20  -Paloff press Blue theraband 20  x each direction   -Lateral glides to correct standing dysfunctional pelvic shift when standing/ walking. Shifts performed towards left as pt presents with right  lateral shift   -isometric curl up with red ball and 3-5 second hold: forward, to the left, and to the right x 10 each  direction  -Isometrics performed for right hip external rotators with pt taught to press arch of right foot into calf of left leg 5 sec hold 2 x 10   -side glides for lateral shift of pelvis from right towards midline 15 x   -standing bilateral abduction x 15 each leg  -standing bilateral extension x 15 each leg  -standing and bilateral marching x 15 each leg    Fabiano received the following supervised modalities after being cleared for contradictions: IFC Electrical Stimulation:  Fabiano received Electrical Stimulation with cold pack for pain control applied to the lumbar region following therapeutic exercises today. 0 minutes total with cold pack . Fabiano tolderated treatment well without any adverse effects.       Patient Education and HEP     He was compliant with home exercise program.    Education provided:   -home exercise program additional exercises added today  -08/01/2022 Patient was educated on moving his night stand to prevent too much rotation. He verbally acknowledged.    Written Home Exercises Provided: Patient instructed to cont prior HEP.  Exercises were reviewed and Fabiano was able to demonstrate them prior to the end of the session.  Fabiano demonstrated fair  understanding of the education provided.     See EMR under Patient Instructions for exercises provided prior visit.  Assessment      Today, the patient presented with improved gait mechanics and more even weightbearing through bilateral lower extremities when standing. It is clear that the lift has had a positive effect on pt's body mechanics when bearing weight. He was able to perform several new exercises without exacerbation of pain. Hip hinges were initiated today in hopes of being able to advance to [place] dead lifts soon. He will benefit from continued and advanced training on functional lifting with safe body mechanics. He will also benefit from continued focus on hip and pelvic strengthening and stabilization. He will benefit from skilled  therapy to address these deficits and functional needs. Pt is making good progress with therapy with decreasing pain levels and improving functional abilities.       Pt will continue to benefit from skilled outpatient physical therapy to address the deficits listed in the problem list box on initial evaluation, provide pt/family education and to maximize pt's level of independence in the home and community environment.     Fabiano Is progressing well towards his goals.   Pt prognosis is Good.     Pt's spiritual, cultural and educational needs considered and pt agreeable to plan of care and goals.    Anticipated barriers to physical therapy: co-morbidities    Goals:Goals:     STG  Weeks/Visits Date Established  Date Met   1.Decrease max lumbar pain to 6/10 to improve the patient's quality of life. 4 weeks. 7/7/2022    Goal Met 9/8/2022      2. Decrease bilateral hamstring, iliopsoas, and piriformis muscle tones to minimal to moderate increase to improve ease of bed mobility. 4 weeks. 7/7/2022    Goal Met 9/8/2022      3.Increase general core strength 1/2 grade to improve standing activity endurance. 4 weeks.                7/7/2022    Goal Met 9/8/2022      4.Decrease FOTO limitation to <=70% to improve job task ability. 4 weeks. 7/7/2022    Goal Met 9/8/2022     5.Fair initial written home exercise program knowledge and be without questions to have carryover after discharge from PT. 4 weeks. 7/7/2022    Goal Met 9/8/2022         LTG Weeks/Visits Date Established  Date Met   1.Decrease max lumbar pain to 2/10 to improve the patient's quality of life. 8 weeks. 7/7/2022    In progress  9/8/2022  3/10 today      2. Decrease bilateral hamstring, iliopsoas, and piriformis muscle tones to minimal increase to improve ease of bed mobility. 8 weeks 7/7/2022        In progress  9/8/2022     3.Increase general core strength one grade from evaluation date to improve standing activity endurance. 8 weeks. 7/7/2022    In  progress  9/8/2022      4.Decrease FOTO limitation to <=60% to improve job task ability. 8 weeks. 7/7/2022     In progress  9/8/2022  66% when last taken     5.Good progressed written home exercise program knowledge and be without questions to have carryover after discharge from PT. 8 weeks. 7/7/2022 In progress  9/8/2022         Plan     Plan of care Certification: 7/7/2022 to 09/09/2022. Updated plan of care: 09/12/2022 to 10/28/2022 at 2x/wk x 6 weeks to include the following interventions: Electrical Stimulation unattended, Manual Therapy, Moist Heat/ Ice, Neuromuscular Re-ed, Patient Education, Self Care, Therapeutic Activities, Therapeutic Exercise, Ultrasound and care by a PTA.. Plan to continue to work on pain decrease and education along with pelvic stabilization to help provide long term pain relief.        Donya Coello, PTA

## 2022-09-08 NOTE — PLAN OF CARE
"  [facility]  Outpatient Physical Therapy Daily Treatment Note/Updated PT plan of care      Name: Fabiano Jules Jr.  Clinic Number: [id number]  Visit Date: 9/6/2022    Therapy Diagnosis:   Encounter Diagnoses   Name Primary?    Lumbar pain     Weakness Yes    Abnormal increased muscle tone     Decreased functional mobility and endurance     Special educational needs      Physician: Duran Adler Jr., MD     Physician Orders: PT Eval and Treat   Medical Diagnosis from Referral: M43.26 (ICD-10-CM) - Ankylosis of lumbar spine   Evaluation Date: 7/7/2022  Authorization Period Expiration: 10/08/2022  Plan of Care Expiration: 09/09/2022 at 2x/wk x 8 weeks (Updated plan of care: 09/12/2022 to 10/28/2022 at 2x/wk x 6 weeks)  Progress Note Due: 10/06/2022  Visit # / Visits authorized: 13/ 16 (14/16 on plan of care) Espinoza asked to put back on the Excel sheet 09/08/2022+++++++  Total visit #: 14    PTA visit #: 0/5  FOTO: 2/ 3 (2nd FOTO done 08/15/2022)     Time In: 4:16 pm  Time Out:  5:10 pm  Total Appointment Time (timed & untimed codes): 40 minutes + 10 minutes of hot pack     Precautions: Standard and and surgical  Subjective     The patient reports:  "I took a day off like you told me too, and I didn't think about working my back. I tried to fish, but I learned that I am not quite ready for that. I can feel myself getting stronger though. I can dee how much better I am doing by how much it hurts when I sneeze. The pain is down to a quarter of what it was when I sneezed after surgery . I'd say that I am a 3/10." PT acknowledges.    Response to previous treatment: The patient enters reporting feeling stronger overall and comes in at only a 3/10 this day.   Functional change: The patient was able to attempt to fish, and he reports less pain when he sneezes.      Pain: 3/10 upon entering the clinic today again today.   Location: bilateral back    Objective     Fabiano " received therapeutic exercises to develop strength, endurance, ROM, flexibility, posture and core stabilization for 25 minutes including:  -heel raises x 20 with red ball adduction.   -Precor knee flexion with 35 pounds x 25  -Precor knee extension with 20 pounds x 25  -+Precor back extension with no pain with 45 pounds x 20  -red band snow angels to 90* x 20  -red band shoulder horizontal extension x 20  -bilateral lower extremity forward step ups on 4 inch black box x 20 each  -Precor outer thigh with 30 pounds x 20    Not performed today  -Nu-step on Level 1 x 6 minutes.  -standing hip isometrics with volley ball 5 sec 2 x 5  -Clams with GTB 2 x 10   -SIdelying hip abduction 10 x 2    -bilateral straight leg raises x 15 each with 2 pound weights  -Prone hip extension with knee flexed 10 x each side  -Supine bridges with bolster under ankles 2 x 10    -Modified [place] Dead lifts 10 x minimal motion with cues to hinge at hip, keep lumbar spine neutral & engage glutes for initiation of lift  -Scapular retraction with 25 pounds x 20    Fabiano participated in neuromuscular re-education activities to improve: Coordination, Proprioception and Posture for 15 minutes. The following activities were included:  -seated thoracic extension in chair with half bolster and 3 second hold x 15  -overhead extension into the wall with green ball for two seconds x 15  -kitchen sink walk outs with 20 second hold x 4  -sit to stand from 4th black box not using arms x 15    Below not performed today:  -seated half butt raises x 20 bilaterally  -serratus roll ups with black roller x 15  -Standing extensions on supported plinth in order to achieve mobility at L5-S1 to relieve stiffness in this region 2 x 20  -Paloff press Blue theraband 20  x each direction   -Lateral glides to correct standing dysfunctional pelvic shift when standing/ walking. Shifts performed towards left as pt presents with right  lateral shift   -isometric curl up with  red ball and 3-5 second hold: forward, to the left, and to the right x 10 each direction  -Isometrics performed for right hip external rotators with pt taught to press arch of right foot into calf of left leg 5 sec hold 2 x 10   -side glides for lateral shift of pelvis from right towards midline 15 x   -standing bilateral abduction x 15 each leg  -standing bilateral extension x 15 each leg  -standing and bilateral marching x 15 each leg    Fabiano received the following supervised modalities after being cleared for contradictions: IFC Electrical Stimulation:  Fabiano received Electrical Stimulation with cold pack for pain control applied to the lumbar region following therapeutic exercises today. 0 minutes total with cold pack . Fabiano tolderated treatment well without any adverse effects.     Hot pack to lumbar area while in chair PRE workout x 10 minutes     Flexibility        Muscle Left Right Comment             Hamstrings Minimal to Moderate Increase >Moderate Increase Minimal to Moderate Increase >Moderate Increase     Quadriceps Minimal Increase Minimal Increase     Illipsoas Minimal to Moderate Increase >Moderate Increase Minimal to Moderate Increase >Moderate Increase     Pirifromis Minimal to Moderate Increase >Moderate Increase Minimal to Moderate Increase >Moderate Increase                      MMT        L-spine     Comment     Left Right               Flexion 4-/5 >3+/5 -     Extension 4/5 >4-/5 -     Side Bending 4/5 >4-/5 4/5 >4-/5               Abdominals 4-/5 >3+/5 -     Lower Abdominals 4-/5 >3+/5 -                      Limitation/Restriction for FOTO Lumbar Survey     Therapist reviewed FOTO scores for Fabiano Jules Jr. on 08/15/2022.   FOTO documents entered into [employer] - see Media section.     Limitation Score: 66% limitation 08/15/2022 >81% Limitation at the evaluation.        Patient Education and HEP     He was compliant with home exercise program.    Education provided:   -home exercise program  additional exercises added today  -08/01/2022 Patient was educated on moving his night stand to prevent too much rotation. He verbally acknowledged.    Written Home Exercises Provided: Patient instructed to cont prior HEP.  Exercises were reviewed and Fabiano was able to demonstrate them prior to the end of the session.  Fabiano demonstrated fair  understanding of the education provided.     See EMR under Patient Instructions for exercises provided prior visit.  Assessment/Updated plan of care     Today, the patient has his plan of care updated. He has met all of his short term goals. He continues to progress towards his long term goals. However, PT believes he will need an extended plan of care to be able to safely reach these goals. He is reporting and showing improved strength. His overall pain levels are decreasing. He is slowly improving his functional ability. As he is progressing, he needs continued skilled PT to safely help him reach his goals according to protocol and to limit any chance of re-injury. PT is hoping is insurance will authorize more PT sessions. He is compliant with PT. He is working hard. Patient by be able to apply a heel lift soon as his pain is decreasing and posture is improving. PT will continue to assess this need. Going forward there will be continued focus on glute strengthening and strengthening of his hip stabilizers and improved muscle endurance to achieve his prior level of function.      Pt will continue to benefit from skilled outpatient physical therapy to address the deficits listed in the problem list box on initial evaluation, provide pt/family education and to maximize pt's level of independence in the home and community environment.     Fabiano Is progressing well towards his goals.   Pt prognosis is Good.     Pt's spiritual, cultural and educational needs considered and pt agreeable to plan of care and goals.    Anticipated barriers to physical therapy:  co-morbidities    Goals:Goals:     STG  Weeks/Visits Date Established  Date Met   1.Decrease max lumbar pain to 6/10 to improve the patient's quality of life. 4 weeks. 7/7/2022    Goal Met 9/6/2022      2. Decrease bilateral hamstring, iliopsoas, and piriformis muscle tones to minimal to moderate increase to improve ease of bed mobility. 4 weeks. 7/7/2022    Goal Met 9/6/2022      3.Increase general core strength 1/2 grade to improve standing activity endurance. 4 weeks.                7/7/2022    Goal Met 9/6/2022      4.Decrease FOTO limitation to <=70% to improve job task ability. 4 weeks. 7/7/2022    Goal Met 9/6/2022     5.Fair initial written home exercise program knowledge and be without questions to have carryover after discharge from PT. 4 weeks. 7/7/2022    Goal Met 9/6/2022         LTG Weeks/Visits Date Established  Date Met   1.Decrease max lumbar pain to 2/10 to improve the patient's quality of life. 8 weeks. 7/7/2022    In progress  9/6/2022  3/10 today      2. Decrease bilateral hamstring, iliopsoas, and piriformis muscle tones to minimal increase to improve ease of bed mobility. 8 weeks 7/7/2022        In progress  9/6/2022     3.Increase general core strength one grade from evaluation date to improve standing activity endurance. 8 weeks. 7/7/2022    In progress  9/6/2022      4.Decrease FOTO limitation to <=60% to improve job task ability. 8 weeks. 7/7/2022     In progress  9/6/2022  66% when last taken     5.Good progressed written home exercise program knowledge and be without questions to have carryover after discharge from PT. 8 weeks. 7/7/2022 In progress  9/6/2022         Plan     Plan of care Certification: 7/7/2022 to 09/09/2022. Updated plan of care: 09/12/2022 to 10/28/2022 at 2x/wk x 6 weeks to include the following interventions: Electrical Stimulation unattended, Manual Therapy, Moist Heat/ Ice, Neuromuscular Re-ed, Patient Education, Self Care, Therapeutic Activities, Therapeutic  Exercise, Ultrasound and care by a PTA.. Plan to continue to work on pain decrease and education along with pelvic stabilization to help provide long term pain relief.        Espinoza Jeffrey, PT

## 2022-09-12 LAB
FINAL PATHOLOGIC DIAGNOSIS: NORMAL
GROSS: NORMAL
Lab: NORMAL
SUPPLEMENTAL DIAGNOSIS: NORMAL

## 2022-09-13 ENCOUNTER — CLINICAL SUPPORT (OUTPATIENT)
Dept: REHABILITATION | Facility: HOSPITAL | Age: 52
End: 2022-09-13
Payer: COMMERCIAL

## 2022-09-13 DIAGNOSIS — M62.89 ABNORMAL INCREASED MUSCLE TONE: ICD-10-CM

## 2022-09-13 DIAGNOSIS — R53.1 WEAKNESS: ICD-10-CM

## 2022-09-13 DIAGNOSIS — M54.50 LUMBAR PAIN: Primary | ICD-10-CM

## 2022-09-13 DIAGNOSIS — Z74.09 DECREASED FUNCTIONAL MOBILITY AND ENDURANCE: ICD-10-CM

## 2022-09-13 DIAGNOSIS — Z55.9 SPECIAL EDUCATIONAL NEEDS: ICD-10-CM

## 2022-09-13 PROCEDURE — 97110 THERAPEUTIC EXERCISES: CPT | Mod: PN,CQ

## 2022-09-13 PROCEDURE — 97112 NEUROMUSCULAR REEDUCATION: CPT | Mod: PN,CQ

## 2022-09-13 PROCEDURE — 97014 ELECTRIC STIMULATION THERAPY: CPT | Mod: PN,CQ

## 2022-09-13 SDOH — SOCIAL DETERMINANTS OF HEALTH (SDOH): PROBLEMS RELATED TO EDUCATION AND LITERACY, UNSPECIFIED: Z55.9

## 2022-09-13 NOTE — PROGRESS NOTES
Formerly [facility], [facility]  Outpatient Physical Therapy Daily Treatment Note/Updated PT plan of care      Name: Fabiano Jules Jr.  Clinic Number: [id number]  Visit Date: 9/13/2022    Therapy Diagnosis:   Encounter Diagnoses   Name Primary?    Lumbar pain Yes    Weakness     Abnormal increased muscle tone     Decreased functional mobility and endurance     Special educational needs      Physician: Duran Adler Jr., MD     Physician Orders: PT Eval and Treat   Medical Diagnosis from Referral: M43.26 (ICD-10-CM) - Ankylosis of lumbar spine   Evaluation Date: 7/7/2022  Authorization Period Expiration: 10/08/2022  Plan of Care Expiration: 09/09/2022 at 2x/wk x 8 weeks (Updated plan of care: 09/12/2022 to 10/28/2022 at 2x/wk x 6 weeks)  Progress Note Due: 10/06/2022  Visit # / Visits authorized: 15/ 16 (16/16 on plan of care) Espinoza asked to put back on the Excel sheet 09/08/2022+++++++  Total visit #: 16    PTA visit #: 2/5  FOTO: 2/ 3 (2nd FOTO done 08/15/2022)     Time In: 11:15 am  Time Out:  12:15 pm  Total Appointment Time (timed & untimed codes): 60 minutes      Precautions: Standard and and surgical  Subjective     The patient reports:  more cervical pain this morning than back pain.     Response to previous treatment: good  Functional change: unremarkable today    Pain: 3/10 upon entering the clinic today again today.   Location: bilateral back    Objective     Fabiano received therapeutic exercises to develop strength, endurance, ROM, flexibility, posture and core stabilization for 25 minutes including:  -heel raises x 20 with red ball adduction.   -Precor knee flexion with 35 pounds x 25  -Precor knee extension with 20 pounds x 25  -+Precor back extension with no pain with 45 pounds x 20  -red band snow angels to 90* x 20  -red band shoulder horizontal extension x 20  -bilateral lower extremity forward step ups on 4 inch black box x 20 each Not performed today   -Precor  outer thigh with 30 pounds x 20  Precor leg press 30 # 2 x 10  Standing donkey kicks at shuttle red band 2 x 10 each side   Prone hip extension with knee flexion cues to 10 x each side    Not performed today  -Nu-step on Level 1 x 6 minutes.  -standing hip isometrics with volley ball 5 sec 2 x 5  -Clams with GTB 2 x 10   -SIdelying hip abduction 10 x 2    -bilateral straight leg raises x 15 each with 2 pound weights  -Prone hip extension with knee flexed 10 x each side  -Supine bridges with bolster under ankles 2 x 10    -Modified Persian Dead lifts 10 x minimal motion with cues to hinge at hip, keep lumbar spine neutral & engage glutes for initiation of lift  -Scapular retraction with 25 pounds x 20    Fabiano participated in neuromuscular re-education activities to improve: Coordination, Proprioception and Posture for 25 minutes. The following activities were included:  -seated thoracic extension in chair with half bolster and 3 second hold x 15  -overhead extension into the wall with green ball for two seconds x 15  -kitchen sink walk outs with 20 second hold x 4  -sit to stand from 4th black box not using arms x 15  Open books with GTB 15 x each  Standing on foam shifting weights at cabinet 4 minutes Not performed today   Standing ab crunches with Black band 2 x 10 Not performed today   Hip hinges with dowel at back for self cuing 10 x   Abdominal isometrics with ball 5 sec hold 15 x for 3 positions    Below not performed today:  -seated half butt raises x 20 bilaterally  -serratus roll ups with black roller x 15  -Standing extensions on supported plinth in order to achieve mobility at L5-S1 to relieve stiffness in this region 2 x 20  -Paloff press Blue theraband 20  x each direction   -Lateral glides to correct standing dysfunctional pelvic shift when standing/ walking. Shifts performed towards left as pt presents with right  lateral shift   -isometric curl up with red ball and 3-5 second hold: forward, to the  left, and to the right x 10 each direction  -Isometrics performed for right hip external rotators with pt taught to press arch of right foot into calf of left leg 5 sec hold 2 x 10   -side glides for lateral shift of pelvis from right towards midline 15 x   -standing bilateral abduction x 15 each leg  -standing bilateral extension x 15 each leg  -standing and bilateral marching x 15 each leg    Fabiano received the following supervised modalities after being cleared for contradictions: IFC Electrical Stimulation:  Fabiano received Electrical Stimulation with cold pack for pain control applied to the lumbar region following therapeutic exercises today. 10 minutes total with cold pack . Fabiano tolderated treatment well without any adverse effects.       Patient Education and HEP     He was compliant with home exercise program.    Education provided:   -home exercise program additional exercises added today  -08/01/2022 Patient was educated on moving his night stand to prevent too much rotation. He verbally acknowledged.    Written Home Exercises Provided: Patient instructed to cont prior HEP.  Exercises were reviewed and Fabiano was able to demonstrate them prior to the end of the session.  Fabiano demonstrated fair  understanding of the education provided.     See EMR under Patient Instructions for exercises provided prior visit.  Assessment      Today, the patient presented with slight bias towards right side when ambulating into the clinic. He was able to self correct with lateral glides. Pt was encouraged to maintain his home exercise program for this correction even if he is only performing them every other day. He was able to achieve elevation of femur when performing prone hip extensions for the first time since starting therapy indication significant increase of hip strength and functional mobility  He will benefit from skilled therapy to continue overcoming his deficits and functional needs. Pt is making good progress with  therapy with decreasing pain levels and improving functional abilities.       Pt will continue to benefit from skilled outpatient physical therapy to address the deficits listed in the problem list box on initial evaluation, provide pt/family education and to maximize pt's level of independence in the home and community environment.     Fabiano Is progressing well towards his goals.   Pt prognosis is Good.     Pt's spiritual, cultural and educational needs considered and pt agreeable to plan of care and goals.    Anticipated barriers to physical therapy: co-morbidities    Goals:Goals:     STG  Weeks/Visits Date Established  Date Met   1.Decrease max lumbar pain to 6/10 to improve the patient's quality of life. 4 weeks. 7/7/2022    Goal Met 9/13/2022      2. Decrease bilateral hamstring, iliopsoas, and piriformis muscle tones to minimal to moderate increase to improve ease of bed mobility. 4 weeks. 7/7/2022    Goal Met 9/13/2022      3.Increase general core strength 1/2 grade to improve standing activity endurance. 4 weeks.                7/7/2022    Goal Met 9/13/2022      4.Decrease FOTO limitation to <=70% to improve job task ability. 4 weeks. 7/7/2022    Goal Met 9/13/2022     5.Fair initial written home exercise program knowledge and be without questions to have carryover after discharge from PT. 4 weeks. 7/7/2022    Goal Met 9/13/2022         LTG Weeks/Visits Date Established  Date Met   1.Decrease max lumbar pain to 2/10 to improve the patient's quality of life. 8 weeks. 7/7/2022    In progress  9/13/2022  3/10 today      2. Decrease bilateral hamstring, iliopsoas, and piriformis muscle tones to minimal increase to improve ease of bed mobility. 8 weeks 7/7/2022        In progress  9/13/2022     3.Increase general core strength one grade from evaluation date to improve standing activity endurance. 8 weeks. 7/7/2022    In progress  9/13/2022      4.Decrease FOTO limitation to <=60% to improve job task ability. 8  weeks. 7/7/2022     In progress  9/13/2022  66% when last taken     5.Good progressed written home exercise program knowledge and be without questions to have carryover after discharge from PT. 8 weeks. 7/7/2022 In progress  9/13/2022         Plan     Plan of care Certification: 7/7/2022 to 09/09/2022. Updated plan of care: 09/12/2022 to 10/28/2022 at 2x/wk x 6 weeks to include the following interventions: Electrical Stimulation unattended, Manual Therapy, Moist Heat/ Ice, Neuromuscular Re-ed, Patient Education, Self Care, Therapeutic Activities, Therapeutic Exercise, Ultrasound and care by a PTA.. Plan to continue to work on pain decrease and education along with pelvic stabilization to help provide long term pain relief.        Donya Coello, PTA

## 2022-09-15 ENCOUNTER — CLINICAL SUPPORT (OUTPATIENT)
Dept: REHABILITATION | Facility: HOSPITAL | Age: 52
End: 2022-09-15
Payer: COMMERCIAL

## 2022-09-15 DIAGNOSIS — R53.1 WEAKNESS: ICD-10-CM

## 2022-09-15 DIAGNOSIS — M62.89 ABNORMAL INCREASED MUSCLE TONE: ICD-10-CM

## 2022-09-15 DIAGNOSIS — M54.50 LUMBAR PAIN: ICD-10-CM

## 2022-09-15 DIAGNOSIS — Z74.09 DECREASED FUNCTIONAL MOBILITY AND ENDURANCE: Primary | ICD-10-CM

## 2022-09-15 DIAGNOSIS — Z55.9 SPECIAL EDUCATIONAL NEEDS: ICD-10-CM

## 2022-09-15 PROCEDURE — 97112 NEUROMUSCULAR REEDUCATION: CPT | Mod: PN

## 2022-09-15 PROCEDURE — 97110 THERAPEUTIC EXERCISES: CPT | Mod: PN

## 2022-09-15 SDOH — SOCIAL DETERMINANTS OF HEALTH (SDOH): PROBLEMS RELATED TO EDUCATION AND LITERACY, UNSPECIFIED: Z55.9

## 2022-09-15 NOTE — PROGRESS NOTES
"  [facility]  Outpatient Physical Therapy Daily Treatment Note      Name: Fabiano Jules Jr.  Clinic Number: [id number]  Visit Date: 9/15/2022    Therapy Diagnosis:   Encounter Diagnoses   Name Primary?    Lumbar pain     Weakness     Abnormal increased muscle tone     Decreased functional mobility and endurance Yes    Special educational needs        Physician: Duran Adler Jr., MD     Physician Orders: PT Eval and Treat   Medical Diagnosis from Referral: M43.26 (ICD-10-CM) - Ankylosis of lumbar spine   Evaluation Date: 7/7/2022  Authorization Period Expiration: 10/08/2022  Plan of Care Expiration: 09/12/2022 to 10/28/2022 (at 2x/wk x 6 weeks)  Progress Note Due: 10/06/2022  Visit # / Visits authorized: 16/ (16)28????? (2/12 on plan of care)   Total visit #: 17 ++++++++++schedule 4 more++++++++++++    PTA visit #: 0/5  FOTO: 2/ 3 (2nd FOTO done 08/15/2022)     Time In: 10:50 am  Time Out:  12:05 pm  Total Appointment Time (timed & untimed codes): 50 minutes + 10 minutes of heat pre exercises.      Precautions: Standard and and surgical  Subjective     The patient reports: "Overall, the back seems to be doing better. It's still that right side that bothers me the most. My neck is still bothering me more then the low back. I am pretty sure it's from sleeping on the wrong pillow. The back is like a 3/10 today."    Response to previous treatment: The patient enters reporting no increase in back pain.  Functional change: No changes to report since the last PT session.     Pain: 3/10 upon entering the clinic today again today.   Location: bilateral back    Objective     Fabiano received therapeutic exercises to develop strength, endurance, ROM, flexibility, posture and core stabilization for 25 minutes including:  -heel raises x 20 with red ball adduction.   -Precor knee flexion with 35 pounds x 25  -Precor knee extension with 20 pounds x 25  -Precor back extension " with no pain with 45 pounds x 20  -Precor leg press with 30 pounds and seat on 13 x 25 repetitions.   -Precor outer thigh with 35 pounds x 25  -Precor scapular retraction with 25 pounds x 20  -red band snow angels to 90* x 20  -red band shoulder horizontal extension x 20    Not performed today  -bilateral lower extremity forward step ups on 4 inch black box x 20 each   -Standing donkey kicks at shuttle red band 2 x 10 each side   -Prone hip extension with knee flexion cues to 10 x each side  -standing hip isometrics with volley ball 5 sec 2 x 5  -Clams with GTB 2 x 10   -SIdelying hip abduction 10 x 2    -Modified Japanese Dead lifts 10 x minimal motion with cues to hinge at hip, keep lumbar spine neutral & engage glutes for initiation of lift    Fabiano participated in neuromuscular re-education activities to improve: Coordination, Proprioception and Posture for 25 minutes. The following activities were included:  -lateral glides against wall x 10  -serratus roll ups with black roller x 15  -sit to stand from 4th black box not using arms x 15 and on blue foam with yellow weighted ball  -seated thoracic extension in chair with half bolster and 3 second hold x 15  -overhead extension into the wall with green ball for two seconds x 15  -kitchen sink walk outs with 20 second hold x 4  -Paloff press Blue theraband 20  x each direction   -Open books with GTB 15 x each    Below not performed today:  -Standing ab crunches with Black band 2 x 10 Not performed today   -Hip hinges with dowel at back for self cuing 10 x   -Abdominal isometrics with ball 5 sec hold 15 x for 3 positions  -Standing extensions on supported plinth in order to achieve mobility at L5-S1 to relieve stiffness in this region 2 x 20  -Isometrics performed for right hip external rotators with pt taught to press arch of right foot into calf of left leg 5 sec hold 2 x 10   -standing bilateral abduction x 15 each leg  -standing bilateral extension x 15 each  leg  -standing and bilateral marching x 15 each leg    Fabiano received the following supervised modalities after being cleared for contradictions: IFC Electrical Stimulation:  Fabiano received Electrical Stimulation with cold pack for pain control applied to the lumbar region following therapeutic exercises today. 0 minutes total with cold pack . Fabiano tolderated treatment well without any adverse effects. NP    -hot pack to bilateral low back x 10 minutes pre work out.  Patient Education and HEP     He was compliant with home exercise program.    Education provided:   -home exercise program additional exercises added today  -08/01/2022 Patient was educated on moving his night stand to prevent too much rotation. He verbally acknowledged.    Written Home Exercises Provided: Patient instructed to cont prior HEP.  Exercises were reviewed and Fabiano was able to demonstrate them prior to the end of the session.  Fabiano demonstrated fair  understanding of the education provided.     See EMR under Patient Instructions for exercises provided prior visit.  Assessment      Today, the patient continued with his low back post surgical program. His routine was consistent. He was not limited by his neck complaints this day. He had good freedom of movement with his exercised, and he maintained a good and upright posture without needing verbal cues. He should not be ready to slowly progress his resistances. He needs continued strengthening to help with his core support and muscle endurance advancement. He continues to work hard. He will benefit from continued skilled therapy to continue overcoming his deficits and functional needs. Patient continues to make good progress with therapy and with decreasing pain levels and improving functional abilities. He should be able to progress his home program soon.      Pt will continue to benefit from skilled outpatient physical therapy to address the deficits listed in the problem list box on initial  evaluation, provide pt/family education and to maximize pt's level of independence in the home and community environment.     Fabiano Is progressing well towards his goals.   Pt prognosis is Good.     Pt's spiritual, cultural and educational needs considered and pt agreeable to plan of care and goals.    Anticipated barriers to physical therapy: co-morbidities    Goals:Goals:     STG  Weeks/Visits Date Established  Date Met   1.Decrease max lumbar pain to 6/10 to improve the patient's quality of life. 4 weeks. 7/7/2022    Goal Met 9/15/2022      2. Decrease bilateral hamstring, iliopsoas, and piriformis muscle tones to minimal to moderate increase to improve ease of bed mobility. 4 weeks. 7/7/2022    Goal Met 9/15/2022      3.Increase general core strength 1/2 grade to improve standing activity endurance. 4 weeks.                7/7/2022    Goal Met 9/15/2022      4.Decrease FOTO limitation to <=70% to improve job task ability. 4 weeks. 7/7/2022    Goal Met 9/15/2022     5.Fair initial written home exercise program knowledge and be without questions to have carryover after discharge from PT. 4 weeks. 7/7/2022    Goal Met 9/15/2022         LTG Weeks/Visits Date Established  Date Met   1.Decrease max lumbar pain to 2/10 to improve the patient's quality of life. 8 weeks. 7/7/2022    In progress  9/15/2022  3/10 today      2. Decrease bilateral hamstring, iliopsoas, and piriformis muscle tones to minimal increase to improve ease of bed mobility. 8 weeks 7/7/2022        In progress  9/15/2022     3.Increase general core strength one grade from evaluation date to improve standing activity endurance. 8 weeks. 7/7/2022    In progress  9/15/2022      4.Decrease FOTO limitation to <=60% to improve job task ability. 8 weeks. 7/7/2022     In progress  9/15/2022  66% when last taken     5.Good progressed written home exercise program knowledge and be without questions to have carryover after discharge from PT. 8 weeks. 7/7/2022 In  progress  9/15/2022         Plan     Updated plan of care: 09/12/2022 to 10/28/2022 at 2x/wk x 6 weeks. Plan to continue to work on pain decrease and education along with pelvic stabilization to help provide long term pain relief and correct postural endurance.        Espinoza Jeffrey, PT

## 2022-09-19 ENCOUNTER — CLINICAL SUPPORT (OUTPATIENT)
Dept: REHABILITATION | Facility: HOSPITAL | Age: 52
End: 2022-09-19
Payer: COMMERCIAL

## 2022-09-19 DIAGNOSIS — M54.50 LUMBAR PAIN: ICD-10-CM

## 2022-09-19 DIAGNOSIS — Z55.9 SPECIAL EDUCATIONAL NEEDS: ICD-10-CM

## 2022-09-19 DIAGNOSIS — Z74.09 DECREASED FUNCTIONAL MOBILITY AND ENDURANCE: ICD-10-CM

## 2022-09-19 DIAGNOSIS — R53.1 WEAKNESS: Primary | ICD-10-CM

## 2022-09-19 DIAGNOSIS — M62.89 ABNORMAL INCREASED MUSCLE TONE: ICD-10-CM

## 2022-09-19 PROCEDURE — 97112 NEUROMUSCULAR REEDUCATION: CPT | Mod: PN

## 2022-09-19 PROCEDURE — 97110 THERAPEUTIC EXERCISES: CPT | Mod: PN

## 2022-09-19 SDOH — SOCIAL DETERMINANTS OF HEALTH (SDOH): PROBLEMS RELATED TO EDUCATION AND LITERACY, UNSPECIFIED: Z55.9
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
## 2022-09-21 ENCOUNTER — DOCUMENTATION ONLY (OUTPATIENT)
Dept: REHABILITATION | Facility: HOSPITAL | Age: 52
End: 2022-09-21
Payer: COMMERCIAL

## 2022-09-21 ENCOUNTER — CLINICAL SUPPORT (OUTPATIENT)
Dept: REHABILITATION | Facility: HOSPITAL | Age: 52
End: 2022-09-21
Payer: COMMERCIAL

## 2022-09-21 DIAGNOSIS — R53.1 WEAKNESS: ICD-10-CM

## 2022-09-21 DIAGNOSIS — M54.50 LUMBAR PAIN: Primary | ICD-10-CM

## 2022-09-21 DIAGNOSIS — Z74.09 DECREASED FUNCTIONAL MOBILITY AND ENDURANCE: ICD-10-CM

## 2022-09-21 DIAGNOSIS — M62.89 ABNORMAL INCREASED MUSCLE TONE: ICD-10-CM

## 2022-09-21 DIAGNOSIS — Z55.9 SPECIAL EDUCATIONAL NEEDS: ICD-10-CM

## 2022-09-21 PROCEDURE — 97110 THERAPEUTIC EXERCISES: CPT | Mod: PN,CQ

## 2022-09-21 PROCEDURE — 97112 NEUROMUSCULAR REEDUCATION: CPT | Mod: PN,CQ

## 2022-09-21 SDOH — SOCIAL DETERMINANTS OF HEALTH (SDOH): PROBLEMS RELATED TO EDUCATION AND LITERACY, UNSPECIFIED: Z55.9

## 2022-09-21 NOTE — PROGRESS NOTES
"  [facility]  Outpatient Physical Therapy Daily Treatment Note      Name: Fabiano Jules Jr.  Clinic Number: [id number]  Visit Date: 9/21/2022    Therapy Diagnosis:   Encounter Diagnoses   Name Primary?    Lumbar pain Yes    Weakness     Abnormal increased muscle tone     Decreased functional mobility and endurance     Special educational needs        Physician: Duran Adler Jr., MD     Physician Orders: PT Eval and Treat   Medical Diagnosis from Referral: M43.26 (ICD-10-CM) - Ankylosis of lumbar spine   Evaluation Date: 7/7/2022  Authorization Period Expiration: 10/08/2022  Plan of Care Expiration: 09/12/2022 to 10/28/2022 (at 2x/wk x 6 weeks)  Progress Note Due: 10/06/2022  Visit # / Visits authorized: 18/ (18)28????? (4/12 on plan of care)   Total visit #: 19     PTA visit #: 1/5  FOTO: 2/ 3 (2nd FOTO done 08/15/2022)     Time In: 13:45 pm  Time Out:  14:30 am  Total Appointment Time (timed & untimed codes): 45 minutes      Precautions: Standard and and surgical  Subjective     The patient reports: "Moderate soreness that he associates with tossing and turning last night while trying to sleep.     Response to previous treatment: No soreness reported after last treatment   Functional change: unremarkable this day    Pain: 3/10 upon entering the clinic today again today.   Location: bilateral back    Objective     Fabiano received therapeutic exercises to develop strength, endurance, ROM, flexibility, posture and core stabilization for 30 minutes including:    -+Precor chest press with 15 pounds for abdominal contraction x 20   -Precor back extension with no pain with 45 pounds x 25  -Precor knee flexion with 40 pounds x 25  -Precor knee extension with 25 pounds x 20  -Precor scapular retraction with 30 pounds x 25  -Precor leg press with 40 pounds and seat on 13 x 25 repetitions.   -Precor outer thigh with 45 pounds x 20  -heel raises x 20 with red ball " adduction.   -red band snow angels to 90* x 20  -red band shoulder horizontal extension x 20    Not performed today  -bilateral lower extremity forward step ups on 4 inch black box x 20 each   -Standing donkey kicks at shuttle red band 2 x 10 each side   -Prone hip extension with knee flexion cues to 10 x each side  -standing hip isometrics with volley ball 5 sec 2 x 5  -Clams with GTB 2 x 10   -SIdelying hip abduction 10 x 2    -Modified [place] Dead lifts 10 x minimal motion with cues to hinge at hip, keep lumbar spine neutral & engage glutes for initiation of lift    Fabiano participated in neuromuscular re-education activities to improve: Coordination, Proprioception and Posture for 10 minutes. The following activities were included:      -lateral glides against wall x 5 Not performed today   -serratus roll ups with black roller x 15  -sit to stand from 4th black box not using arms x 15 and on blue foam with yellow weighted ball  Standing bilateral shoulder extension while standing on foam for core stabilization exercise 2 x 10   Standing Paloff press standing on foam for stabilizers and multifidi activation 15 x each direction BTB    -seated thoracic extension in chair with half bolster and 3 second hold x 15    Below not performed today:  -overhead extension into the wall with green ball for two seconds x 15  -kitchen sink walk outs with 20 second hold x 4  -Paloff press Blue theraband 20  x each direction   -Open books with GTB 15 x each  -Standing ab crunches with Black band 2 x 10 Not performed today   -Hip hinges with dowel at back for self cuing 10 x   -Abdominal isometrics with ball 5 sec hold 15 x for 3 positions  -Standing extensions on supported plinth in order to achieve mobility at L5-S1 to relieve stiffness in this region 2 x 20  -Isometrics performed for right hip external rotators with pt taught to press arch of right foot into calf of left leg 5 sec hold 2 x 10   -standing bilateral abduction x 15  each leg  -standing bilateral extension x 15 each leg  -standing and bilateral marching x 15 each leg    Fabiano participated in dynamic functional therapeutic activities to improve functional performance for 5  minutes, including: techniques for supporting himself while sleeping. Different configurations of pillows for support of spine assessed for pt's needs.     Fabiano received the following supervised modalities after being cleared for contradictions: IFC Electrical Stimulation:  Fabiano received Electrical Stimulation with cold pack for pain control applied to the lumbar region following therapeutic exercises today. 0 minutes total with cold pack . Fabiano tolderated treatment well without any adverse effects. NP    -hot pack to bilateral low back x 0 minutes pre work out.  Patient Education and HEP     He was compliant with home exercise program.    Education provided:   -home exercise program additional exercises added today  -08/01/2022 Patient was educated on moving his night stand to prevent too much rotation. He verbally acknowledged.    Written Home Exercises Provided: Patient instructed to cont prior HEP.  Exercises were reviewed and Fabiano was able to demonstrate them prior to the end of the session.  Fabiano demonstrated fair  understanding of the education provided.     See EMR under Patient Instructions for exercises provided prior visit.  Assessment      Today, the patient was able to advance strengthening exercises of his core and stabilizing musculature.  He was able to perform all recommended therapeutic exercises without significant increase of painful symptoms. Pt was educated on supporting his spine during sleep using different pillow configurations for his body type. He responded well verbalizing feeling the support. It is hopeful that this will help his overall pain levels as pain after a rough night is a recurring issue for Mr. Jules. He puts forth excellent effort with all therapeutic activities. He can  benefit from continued gluteal and lumbar strengthening to help with core stability endurance to improve pt's tolerance for activities of daily living.       Pt will continue to benefit from skilled outpatient physical therapy to address the deficits listed in the problem list box on initial evaluation, provide pt/family education and to maximize pt's level of independence in the home and community environment.     Fabiano Is progressing well towards his goals.   Pt prognosis is Good.     Pt's spiritual, cultural and educational needs considered and pt agreeable to plan of care and goals.    Anticipated barriers to physical therapy: co-morbidities    Goals:Goals:     STG  Weeks/Visits Date Established  Date Met   1.Decrease max lumbar pain to 6/10 to improve the patient's quality of life. 4 weeks. 7/7/2022    Goal Met 9/21/2022      2. Decrease bilateral hamstring, iliopsoas, and piriformis muscle tones to minimal to moderate increase to improve ease of bed mobility. 4 weeks. 7/7/2022    Goal Met 9/21/2022      3.Increase general core strength 1/2 grade to improve standing activity endurance. 4 weeks.                7/7/2022    Goal Met 9/21/2022      4.Decrease FOTO limitation to <=70% to improve job task ability. 4 weeks. 7/7/2022    Goal Met 9/21/2022     5.Fair initial written home exercise program knowledge and be without questions to have carryover after discharge from PT. 4 weeks. 7/7/2022    Goal Met 9/21/2022         LTG Weeks/Visits Date Established  Date Met   1.Decrease max lumbar pain to 2/10 to improve the patient's quality of life. 8 weeks. 7/7/2022    In progress  9/21/2022  3/10 today      2. Decrease bilateral hamstring, iliopsoas, and piriformis muscle tones to minimal increase to improve ease of bed mobility. 8 weeks 7/7/2022        In progress  9/21/2022     3.Increase general core strength one grade from evaluation date to improve standing activity endurance. 8 weeks. 7/7/2022    In  progress  9/21/2022      4.Decrease FOTO limitation to <=60% to improve job task ability. 8 weeks. 7/7/2022     In progress  9/21/2022  66% when last taken     5.Good progressed written home exercise program knowledge and be without questions to have carryover after discharge from PT. 8 weeks. 7/7/2022 In progress  9/21/2022         Plan     Updated plan of care: 09/12/2022 to 10/28/2022 at 2x/wk x 6 weeks. Plan to continue to work on pain decrease and education along with pelvic stabilization to help provide long term pain relief and correct postural endurance.        Donya Coello, PTA

## 2022-09-25 NOTE — PROGRESS NOTES
"  [facility]  Outpatient Physical Therapy Daily Treatment Note      Name: Fabiano Jules Jr.  Clinic Number: [id number]  Visit Date: 9/26/2022    Therapy Diagnosis:   Encounter Diagnoses   Name Primary?    Lumbar pain     Weakness Yes    Abnormal increased muscle tone     Decreased functional mobility and endurance     Special educational needs        Physician: Duran Adler Jr., MD     Physician Orders: PT Eval and Treat   Medical Diagnosis from Referral: M43.26 (ICD-10-CM) - Ankylosis of lumbar spine   Evaluation Date: 7/7/2022  Authorization Period Expiration: 11/17//2022  Plan of Care Expiration: 09/12/2022 to 10/28/2022 (at 2x/wk x 6 weeks)  Progress Note Due: 10/06/2022  Visit # / Visits authorized: 19/ (19)21 (5/12 on plan of care)    Total visit #: 20    PTA visit #: 0/5  FOTO: 2/ 3 (2nd FOTO done 08/15/2022)(3rd FOTO at discharge)     Time In: 1:58 pm  Time Out:  2:50 am  Total Appointment Time (timed & untimed codes): 40 minutes + 10 minutes hot pack     Precautions: Standard and and surgical  Subjective     The patient reports: "I seem to be doing OK. I have some stiffness still, especially early in the morning when I roll to get out of bed. Is that arthritis? PT acknowledges and explains that it is most likely his body still adjusting to the hardware. The patient verbally acknowledges.     Response to previous treatment: No soreness reported after last treatment   Functional change: The patient reports more stiffness with activity verses pain with activity.     Pain: 2-3/10 stiffness upon entering the clinic today again today.   Location: bilateral back    Objective     Fabiano received therapeutic exercises to develop strength, endurance, ROM, flexibility, posture and core stabilization for 30 minutes including:  -Precor chest press with 15 pounds for abdominal contraction x 20   -Precor back extension with no pain with 45 pounds x 25  -+DIPS with 40 " pounds x 20  -Precor knee flexion with 40 pounds x 25  -Precor knee extension with 25 pounds x 20  -Precor scapular retraction with 30 pounds x 25  -Precor leg press with 40 pounds and seat on 13 x 25 repetitions.   -Precor outer thigh with 45 pounds x 20    Not performed today  -heel raises x 20 with red ball adduction.   -red band snow angels to 90* x 20  -red band shoulder horizontal extension x 20  -bilateral lower extremity forward step ups on 4 inch black box x 20 each   -Standing donkey kicks at shuttle red band 2 x 10 each side   -Prone hip extension with knee flexion cues to 10 x each side  -Clams with GTB 2 x 10   -SIdelying hip abduction 10 x 2      Fabiano participated in neuromuscular re-education activities to improve: Coordination, Proprioception and Posture for 10 minutes. The following activities were included:    -serratus roll ups with black roller x 20  -sit to stand from 4th black box not using arms x 15 and on blue foam with yellow weighted ball  -Standing bilateral shoulder extension while standing on foam for core stabilization exercise 2 x 10   -Standing Paloff press standing on foam for stabilizers and multifidi activation 15 x each direction Blue theraband      Below not performed today:  -seated thoracic extension in chair with half bolster and 3 second hold x 15  -lateral glides against wall x 5   -kitchen sink walk outs with 20 second hold x 4  -Paloff press Blue theraband 20  x each direction   -Open books with GTB 15 x each  -Abdominal isometrics with ball 5 sec hold 15 x for 3 positions    -hot pack to bilateral low back x 10 minutes post work out.  Patient Education and HEP     He was compliant with home exercise program.    Education provided:   -home exercise program additional exercises added today  -08/01/2022 Patient was educated on moving his night stand to prevent too much rotation. He verbally acknowledged.    Written Home Exercises Provided: Patient instructed to cont prior  HEP.  Exercises were reviewed and Fabiano was able to demonstrate them prior to the end of the session.  Fabiano demonstrated fair  understanding of the education provided.     See EMR under Patient Instructions for exercises provided prior visit.  Assessment      Today, the patient entered with minimal pain. Patient has two visits left that are authorized. PT will progress his home program and continue to educate the patient on what to do and not to do and how to advance his program at home safely. He would be a good candidate for Healthy Back Program in the future. No increased pain was reported this day. Patient continues to work hard and use good form with his exercises. He can benefit from continued gluteal and lumbar strengthening to help with core stability endurance to improve patient's tolerance for activities of daily living and to safely progress his home program and patient education.       Pt will continue to benefit from skilled outpatient physical therapy to address the deficits listed in the problem list box on initial evaluation, provide pt/family education and to maximize pt's level of independence in the home and community environment.     Fabiano Is progressing well towards his goals.   Pt prognosis is Good.     Pt's spiritual, cultural and educational needs considered and pt agreeable to plan of care and goals.    Anticipated barriers to physical therapy: co-morbidities    Goals:Goals:     STG  Weeks/Visits Date Established  Date Met   1.Decrease max lumbar pain to 6/10 to improve the patient's quality of life. 4 weeks. 7/7/2022    Goal Met 9/26/2022      2. Decrease bilateral hamstring, iliopsoas, and piriformis muscle tones to minimal to moderate increase to improve ease of bed mobility. 4 weeks. 7/7/2022    Goal Met 9/26/2022      3.Increase general core strength 1/2 grade to improve standing activity endurance. 4 weeks.                7/7/2022    Goal Met 9/26/2022      4.Decrease FOTO limitation to  <=70% to improve job task ability. 4 weeks. 7/7/2022    Goal Met 9/26/2022     5.Fair initial written home exercise program knowledge and be without questions to have carryover after discharge from PT. 4 weeks. 7/7/2022    Goal Met 9/26/2022         LTG Weeks/Visits Date Established  Date Met   1.Decrease max lumbar pain to 2/10 to improve the patient's quality of life. 8 weeks. 7/7/2022    In progress  9/26/2022  3/10 today      2. Decrease bilateral hamstring, iliopsoas, and piriformis muscle tones to minimal increase to improve ease of bed mobility. 8 weeks 7/7/2022        In progress  9/26/2022     3.Increase general core strength one grade from evaluation date to improve standing activity endurance. 8 weeks. 7/7/2022    In progress  9/26/2022      4.Decrease FOTO limitation to <=60% to improve job task ability. 8 weeks. 7/7/2022     In progress  9/26/2022  66% when last taken     5.Good progressed written home exercise program knowledge and be without questions to have carryover after discharge from PT. 8 weeks. 7/7/2022 In progress  9/26/2022         Plan     Updated plan of care: 09/12/2022 to 10/28/2022 at 2x/wk x 6 weeks. Plan to re-assess the patient the next PT session and then the patient will be discharged to his home program after being seen by the PTA to assure independence with his home program. The patient agrees.         Espinoza Jeffrey, PT

## 2022-09-26 ENCOUNTER — CLINICAL SUPPORT (OUTPATIENT)
Dept: REHABILITATION | Facility: HOSPITAL | Age: 52
End: 2022-09-26
Payer: COMMERCIAL

## 2022-09-26 DIAGNOSIS — Z74.09 DECREASED FUNCTIONAL MOBILITY AND ENDURANCE: ICD-10-CM

## 2022-09-26 DIAGNOSIS — M54.50 LUMBAR PAIN: ICD-10-CM

## 2022-09-26 DIAGNOSIS — R53.1 WEAKNESS: Primary | ICD-10-CM

## 2022-09-26 DIAGNOSIS — M62.89 ABNORMAL INCREASED MUSCLE TONE: ICD-10-CM

## 2022-09-26 DIAGNOSIS — Z55.9 SPECIAL EDUCATIONAL NEEDS: ICD-10-CM

## 2022-09-26 PROCEDURE — 97110 THERAPEUTIC EXERCISES: CPT | Mod: PN

## 2022-09-26 PROCEDURE — 97112 NEUROMUSCULAR REEDUCATION: CPT | Mod: PN

## 2022-09-26 SDOH — SOCIAL DETERMINANTS OF HEALTH (SDOH): PROBLEMS RELATED TO EDUCATION AND LITERACY, UNSPECIFIED: Z55.9

## 2022-09-28 NOTE — PROGRESS NOTES
"  [facility]  Outpatient Physical Therapy Daily Treatment Note/Re-Assessment    Clinic Number: [id number]  Visit Date: 9/29/2022    Therapy Diagnosis:   Encounter Diagnoses   Name Primary?    Lumbar pain     Weakness     Abnormal increased muscle tone     Decreased functional mobility and endurance Yes    Special educational needs        Physician: Duran Adler Jr., MD     Physician Orders: PT Eval and Treat   Medical Diagnosis from Referral: M43.26 (ICD-10-CM) - Ankylosis of lumbar spine   Evaluation Date: 7/7/2022  Authorization Period Expiration: 11/17//2022  Plan of Care Expiration: 09/12/2022 to 10/28/2022   Will be discharged to home program unless insurance authorizes more: 10/05/2022+++++++++++++++++++  Visit # / Visits authorized: 20/ 21 (6/12 on plan of care)    Total visit #: 21    PTA visit #: 0/5  FOTO: 3/3 (3rd FOTO done 09/29/2022)     Time In: 9:55 am  Time Out: 10:50 am  Total Appointment Time (timed & untimed codes): 40 minutes + 10 minutes hot pack (post exercises)     Precautions: Standard and and surgical  Subjective     The patient reports: "I had some muscle spasms last night. I am only a 3/10 today. I didn't even have to take a pain pill today. When I just sit still there is no pain. When I first started, there was even pain when I sat down." PT acknowledges.     Response to previous treatment: No soreness reported after last treatment   Functional change: The patient reports he does not need a pain pill at times now. Also, he no longer has pain when just sitting at rest.      Pain: 3/10 stiffness upon entering the clinic today again today.   Location: bilateral back    Objective      Flexibility        Muscle Left Right Comment             Hamstrings Minimal to Moderate Increase >Moderate Increase Minimal to Moderate Increase >Moderate Increase     Quadriceps Minimal Increase Minimal Increase     Illipsoas Minimal to Moderate " Increase >Moderate Increase Minimal to Moderate Increase >Moderate Increase     Pirifromis Minimal to Moderate Increase >Moderate Increase Minimal to Moderate Increase >Moderate Increase                    MMT        L-spine     Comment     Left Right               Flexion 4/5 >4-/5 >3+/5 -     Extension 4/5 >4-/5 -     Side Bending 4/5 >4-/5 4/5 >4-/5               Abdominals 4/5 >4-/5 >3+/5 -     Lower Abdominals 4/5 >4-/5 >3+/5 -                      Limitation/Restriction for FOTO Lumbar Survey     Therapist reviewed FOTO scores for Fabiano Jules Jr. on 09/29/2022   FOTO documents entered into [employer] - see Media section.     Limitation Score:  49% Limitation 09/29/2022 > 66% limitation 08/15/2022 >81% Limitation at the evaluation.        +++add to home exercise program 10/05/2022+++    Fabiano received therapeutic exercises to develop strength, endurance, ROM, flexibility, posture and core stabilization for 30 minutes including:  -Precor chest press with 15 pounds for abdominal contraction x 20   -Precor back extension with no pain with 45 pounds x 25  -DIPS with 40 pounds x 20  -Precor scapular retraction with 30 pounds x 25  -Precor knee flexion with 40 pounds x 25  -Precor knee extension with 25 pounds x 20  -Precor leg press with 40 pounds and seat on 13 x 25 repetitions.   -Precor outer thigh with 45 pounds x 25  -standing bilateral snow angels to 90* with green band x 20  +++add to home exercise program 10/05/2022+++    Not performed today  -bilateral lower extremity forward step ups on 4 inch black box x 20 each   -Standing donkey kicks at shuttle red band 2 x 10 each side   -Clams with GTB 2 x 10   -SIdelying hip abduction 10 x 2      Fabiano participated in neuromuscular re-education activities to improve: Coordination, Proprioception and Posture for 10 minutes. The following activities were included:    -serratus roll ups with black roller x 20  -sit to stand from 3rd black box not using arms x 15 and on blue  foam with yellow weighted ball  -kitchen sink walk outs with 20 second hold x 4  -Standing bilateral shoulder extension while standing on foam for core stabilization exercise 2 x 10 with green band  -Standing Paloff press standing on foam for stabilizers and multifidi activation 15 x each direction Blue theraband  (give bands for home use on last visit????)    -hot pack to bilateral low back x 10 minutes post work out.  Patient Education and HEP     He was compliant with home exercise program.    Education provided:   -home exercise program additional exercises added today  -08/01/2022 Patient was educated on moving his night stand to prevent too much rotation. He verbally acknowledged.    Written Home Exercises Provided: Patient instructed to cont prior HEP.  Exercises were reviewed and Fabiano was able to demonstrate them prior to the end of the session.  Fabiano demonstrated fair  understanding of the education provided.     See EMR under Patient Instructions for exercises provided prior visit.  Re-Assessment      Today, the patient was re-assessed by PT as he will be discharged to his home program after he sees the PTA on 10/05/2022 unless his insurance authorizes more PT session. His insurance did not authorize more visits at this time so that he could complete this plan of care. As of today, he reports that his current function is tolerable. His FOTO limitations improved. His overall pain has decreased. He shows improved core strength. He would be a good candidate for Healthy Back Program in the future. No increased pain was reported this day. He can benefit from at least one more session to learn a progressed home version of gluteal and lumbar strengthening along with patient education. He tolerated today's session well.        Pt will continue to benefit from skilled outpatient physical therapy to address the deficits listed in the problem list box on initial evaluation, provide pt/family education and to maximize  pt's level of independence in the home and community environment.     Fabiano Is progressing well towards his goals.   Pt prognosis is Good.     Pt's spiritual, cultural and educational needs considered and pt agreeable to plan of care and goals.    Anticipated barriers to physical therapy: co-morbidities    Goals:Goals:     STG  Weeks/Visits Date Established  Date Met   1.Decrease max lumbar pain to 6/10 to improve the patient's quality of life. 4 weeks. 7/7/2022    Goal Met 9/29/2022      2. Decrease bilateral hamstring, iliopsoas, and piriformis muscle tones to minimal to moderate increase to improve ease of bed mobility. 4 weeks. 7/7/2022    Goal Met 9/29/2022      3.Increase general core strength 1/2 grade to improve standing activity endurance. 4 weeks.                7/7/2022    Goal Met 9/29/2022      4.Decrease FOTO limitation to <=70% to improve job task ability. 4 weeks. 7/7/2022    Goal Met 9/29/2022     5.Fair initial written home exercise program knowledge and be without questions to have carryover after discharge from PT. 4 weeks. 7/7/2022    Goal Met 9/29/2022         LTG Weeks/Visits Date Established  Date Met   1.Decrease max lumbar pain to 2/10 to improve the patient's quality of life. 8 weeks. 7/7/2022    In progress  9/29/2022  3/10 today      2. Decrease bilateral hamstring, iliopsoas, and piriformis muscle tones to minimal increase to improve ease of bed mobility. 8 weeks 7/7/2022        In progress  9/29/2022     3.Increase general core strength one grade from evaluation date to improve standing activity endurance. 8 weeks. 7/7/2022    In progress  9/29/2022      4.Decrease FOTO limitation to <=60% to improve job task ability. 8 weeks. 7/7/2022    Goal Met 9/29/2022  49% limitation     5.Good progressed written home exercise program knowledge and be without questions to have carryover after discharge from PT. 8 weeks. 7/7/2022 In progress  9/29/2022         Plan     Updated plan of care:  09/12/2022 to 10/28/2022 at 2x/wk x 6 weeks. Plan to discharge the patient to his progressed home program after his 10/05/2022 PT session unless his insurance has authorized more visits by then. If they do, PT can update the plan of care at that time. The patient agrees.          Espinoza Jeffrey, PT

## 2022-09-29 ENCOUNTER — CLINICAL SUPPORT (OUTPATIENT)
Dept: REHABILITATION | Facility: HOSPITAL | Age: 52
End: 2022-09-29
Payer: COMMERCIAL

## 2022-09-29 DIAGNOSIS — M54.50 LUMBAR PAIN: ICD-10-CM

## 2022-09-29 DIAGNOSIS — R53.1 WEAKNESS: ICD-10-CM

## 2022-09-29 DIAGNOSIS — M54.50 LOW BACK PAIN: Primary | ICD-10-CM

## 2022-09-29 DIAGNOSIS — Z74.09 DECREASED FUNCTIONAL MOBILITY AND ENDURANCE: Primary | ICD-10-CM

## 2022-09-29 DIAGNOSIS — Z55.9 SPECIAL EDUCATIONAL NEEDS: ICD-10-CM

## 2022-09-29 DIAGNOSIS — M62.89 ABNORMAL INCREASED MUSCLE TONE: ICD-10-CM

## 2022-09-29 PROCEDURE — 97110 THERAPEUTIC EXERCISES: CPT | Mod: PN

## 2022-09-29 PROCEDURE — 97112 NEUROMUSCULAR REEDUCATION: CPT | Mod: PN

## 2022-09-29 SDOH — SOCIAL DETERMINANTS OF HEALTH (SDOH): PROBLEMS RELATED TO EDUCATION AND LITERACY, UNSPECIFIED: Z55.9

## 2022-10-03 ENCOUNTER — HOSPITAL ENCOUNTER (OUTPATIENT)
Facility: HOSPITAL | Age: 52
Discharge: HOME | End: 2022-10-03
Attending: INTERNAL MEDICINE | Admitting: INTERNAL MEDICINE
Payer: COMMERCIAL

## 2022-10-03 VITALS
RESPIRATION RATE: 16 BRPM | OXYGEN SATURATION: 98 % | HEART RATE: 60 BPM | TEMPERATURE: 98 F | DIASTOLIC BLOOD PRESSURE: 75 MMHG | SYSTOLIC BLOOD PRESSURE: 137 MMHG

## 2022-10-03 DIAGNOSIS — D50.9 IDA (IRON DEFICIENCY ANEMIA): ICD-10-CM

## 2022-10-03 PROCEDURE — 91110 GI TRC IMG INTRAL ESOPH-ILE: CPT | Mod: TC | Performed by: INTERNAL MEDICINE

## 2022-10-03 PROCEDURE — 25000003 PHARM REV CODE 250

## 2022-10-03 RX ORDER — DEXTROMETHORPHAN/PSEUDOEPHED 2.5-7.5/.8
DROPS ORAL
Status: COMPLETED
Start: 2022-10-03 | End: 2022-10-03

## 2022-10-03 RX ORDER — DEXTROMETHORPHAN/PSEUDOEPHED 2.5-7.5/.8
40 DROPS ORAL ONCE
Status: COMPLETED | OUTPATIENT
Start: 2022-10-03 | End: 2022-10-03

## 2022-10-03 RX ADMIN — SIMETHICONE 40 MG: 20 SUSPENSION/ DROPS ORAL at 07:10

## 2022-10-03 RX ADMIN — Medication 40 MG: at 07:10

## 2022-10-04 NOTE — H&P
[facility] Note    CC: MYRNA    HPI 52 y.o. male presents for evaluation of MYRNA    Past Medical History:   Diagnosis Date    Arthritis     Back injury     Colon polyp     COVID     CPAP (continuous positive airway pressure) dependence     Hypertension     PONV (postoperative nausea and vomiting)     Sleep apnea     c pap machine used         Physical Examination  /75 (BP Location: Left arm, Patient Position: Lying)   Pulse 60   Temp 98.1 °F (36.7 °C) (Skin)   Resp 16   SpO2 98%   Assessment:   52 y.o. male presents for evaluation of MYRNA    Plan:  -Proceed to VCE    Alondra Arias MD  [facility]  [address]  [place] [address]  Office: [phone]  Fax: [phone]

## 2022-10-05 ENCOUNTER — CLINICAL SUPPORT (OUTPATIENT)
Dept: REHABILITATION | Facility: HOSPITAL | Age: 52
End: 2022-10-05
Attending: ORTHOPAEDIC SURGERY
Payer: COMMERCIAL

## 2022-10-05 DIAGNOSIS — M54.50 LUMBAR PAIN: Primary | ICD-10-CM

## 2022-10-05 DIAGNOSIS — Z55.9 SPECIAL EDUCATIONAL NEEDS: ICD-10-CM

## 2022-10-05 DIAGNOSIS — Z74.09 DECREASED FUNCTIONAL MOBILITY AND ENDURANCE: ICD-10-CM

## 2022-10-05 DIAGNOSIS — M62.89 ABNORMAL INCREASED MUSCLE TONE: ICD-10-CM

## 2022-10-05 DIAGNOSIS — R53.1 WEAKNESS: ICD-10-CM

## 2022-10-05 PROCEDURE — 97112 NEUROMUSCULAR REEDUCATION: CPT | Mod: PN,CQ

## 2022-10-05 PROCEDURE — 97110 THERAPEUTIC EXERCISES: CPT | Mod: PN,CQ

## 2022-10-05 PROCEDURE — 91110 GI TRC IMG INTRAL ESOPH-ILE: CPT | Mod: 26,,, | Performed by: INTERNAL MEDICINE

## 2022-10-05 PROCEDURE — 91110 PR GI TRACT CAPSULE ENDOSCOPY: ICD-10-PCS | Mod: 26,,, | Performed by: INTERNAL MEDICINE

## 2022-10-05 SDOH — SOCIAL DETERMINANTS OF HEALTH (SDOH): PROBLEMS RELATED TO EDUCATION AND LITERACY, UNSPECIFIED: Z55.9

## 2022-10-05 NOTE — PROVATION PATIENT INSTRUCTIONS
Discharge Summary/Instructions after an Endoscopic Procedure  Patient Name: Fabinao Jules  Patient MRN: [id number]  Patient YOB: 1970  Wednesday, October 5, 2022  Alondra Arias MD  Dear patient,  As a result of recent federal legislation (The Federal Cures Act), you may   receive lab or pathology results from your procedure in your [facility]   account before your physician is able to contact you. Your physician or   their representative will relay the results to you with their   recommendations at their soonest availability.  Thank you,  RESTRICTIONS:  During your procedure today, you received medications for sedation.  These   medications may affect your judgment, balance and coordination.  Therefore,   for 24 hours, you have the following restrictions:   - DO NOT drive a car, operate machinery, make legal/financial decisions,   sign important papers or drink alcohol.    ACTIVITY:  Today: no heavy lifting, straining or running due to procedural   sedation/anesthesia.  The following day: return to full activity including work.  DIET:  Eat and drink normally unless instructed otherwise.     TREATMENT FOR COMMON SIDE EFFECTS:  - Mild abdominal pain, nausea, belching, bloating or excessive gas:  rest,   eat lightly and use a heating pad.  - Sore Throat: treat with throat lozenges and/or gargle with warm salt   water.  - Because air was used during the procedure, expelling large amounts of air   from your rectum or belching is normal.  - If a bowel prep was taken, you may not have a bowel movement for 1-3 days.    This is normal.  SYMPTOMS TO WATCH FOR AND REPORT TO YOUR PHYSICIAN:  1. Abdominal pain or bloating, other than gas cramps.  2. Chest pain.  3. Back pain.  4. Signs of infection such as: chills or fever occurring within 24 hours   after the procedure.  5. Rectal bleeding, which would show as bright red, maroon, or black stools.   (A tablespoon of blood from the rectum is not serious,  especially if   hemorrhoids are present.)  6. Vomiting.  7. Weakness or dizziness.  GO DIRECTLY TO THE NEAREST EMERGENCY ROOM IF YOU HAVE ANY OF THE FOLLOWING:      Difficulty breathing              Chills and/or fever over 101 F   Persistent vomiting and/or vomiting blood   Severe abdominal pain   Severe chest pain   Black, tarry stools   Bleeding- more than one tablespoon   Any other symptom or condition that you feel may need urgent attention  Your doctor recommends these additional instructions:  If any biopsies were taken, your doctors clinic will contact you in 1 to 2   weeks with any results.  -Continue iron supplementation  -Repeat iron studies and blood counts in 3-6 months  For questions, problems or results please call your physician - Alondra Arias MD at Work:  [phone].  [facility], EMERGENCY ROOM PHONE NUMBER: [phone]  IF A COMPLICATION OR EMERGENCY SITUATION ARISES AND YOU ARE UNABLE TO REACH   YOUR PHYSICIAN - GO DIRECTLY TO THE EMERGENCY ROOM.  Alondra Arias MD  10/5/2022 12:36:13 PM  This report has been verified and signed electronically.  Dear patient,  As a result of recent federal legislation (The Federal Cures Act), you may   receive lab or pathology results from your procedure in your [facility]   account before your physician is able to contact you. Your physician or   their representative will relay the results to you with their   recommendations at their soonest availability.  Thank you,  PROVATION

## 2022-10-05 NOTE — PROGRESS NOTES
"  [facility]  Outpatient Physical Therapy Daily Treatment Note    Clinic Number: [id number]  Visit Date: 10/5/2022    Therapy Diagnosis:   Encounter Diagnoses   Name Primary?    Lumbar pain Yes    Weakness     Abnormal increased muscle tone     Decreased functional mobility and endurance     Special educational needs        Physician: Duran Adler Jr., MD     Physician Orders: PT Eval and Treat   Medical Diagnosis from Referral: M43.26 (ICD-10-CM) - Ankylosis of lumbar spine   Evaluation Date: 7/7/2022  Authorization Period Expiration: 11/17//2022  Plan of Care Expiration: 09/12/2022 to 10/28/2022   Will be discharged to home program unless insurance authorizes more: 10/05/2022+++++++++++++++++++  Visit # / Visits authorized: 21/ 21 (7/12 on plan of care)    Total visit #: 22    PTA visit #: 1/5  FOTO: 3/3 (3rd FOTO done 09/29/2022)     Time In: 10:55 am  Time Out: 11:45 am  Total Appointment Time (timed & untimed codes): 50 minutes      Precautions: Standard and and surgical  Subjective     The patient reports: " My doctor watched me stand up and said 'I can tell you haven't been missing your therapy. You are moving well.'" Minimal pain reported.     Response to previous treatment: No soreness reported after last treatment   Functional change: The patient reports he does not need a pain pill at times now. Also, he no longer has pain when just sitting at rest.      Pain: 2/10 stiffness upon entering the clinic today again today.   Location: bilateral back    Objective        Fabiano received therapeutic exercises to develop strength, endurance, ROM, flexibility, posture and core stabilization for 15 minutes including:    Sidelying hip abduction 2x 10   Sidelying clams 2 x 10   Sidelying rev clams 2 x 10   Sidleying hip circles 2 x 10   Standing hip flexor stretch 2 x 30 sec each   Bridges 2 x 10     Not performed today   -Precor chest press with 15 pounds for " abdominal contraction x 20   -Precor back extension with no pain with 45 pounds x 25  -DIPS with 40 pounds x 20  -Precor scapular retraction with 30 pounds x 25  -Precor knee flexion with 40 pounds x 25  -Precor knee extension with 25 pounds x 20  -Precor leg press with 40 pounds and seat on 13 x 25 repetitions.   -Precor outer thigh with 45 pounds x 25  -standing bilateral snow angels to 90* with green band x 20  +++add to home exercise program 10/05/2022+++      Fabiano participated in neuromuscular re-education activities to improve: Coordination, Proprioception and Posture for 30 minutes. The following activities were included:      -sit to stand from 3rd black box not using arms x 15 and on blue foam with yellow weighted ball  -kitchen sink walk outs with 20 second hold x 4  -Standing Paloff press standing on foam for stabilizers and multifidi activation 15 x each direction Blue theraband    - Pec stretch in doorway 3 x 30   - thoracic extension in chair 15 x   -standing lumbar extension 20 x   -transverse abdominis activation 2 minutes  - instructed in advancing to table top touches when ready.     Patient Education and HEP     He was compliant with home exercise program.    Education provided:   -home exercise program additional exercises added today  -08/01/2022 Patient was educated on moving his night stand to prevent too much rotation. He verbally acknowledged.  Final home exercise program issued 10/5/22    Written Home Exercises Provided: Patient instructed to cont prior HEP.  Exercises were reviewed and Fabiano was able to demonstrate them prior to the end of the session.  Fabiano demonstrated fair  understanding of the education provided.     See EMR under Patient Instructions for exercises provided prior visit.  Assessment      Today, treatment focused on ensuring comfort with final home exercise program and answering any questions pt has about being discharged from therapy. Pt was issued and returned good  demonstration of all recommended therapeutic exercises. Home exercise program included focused exercises on hip strength and pelvic mobility. Additional exercises issued for maintenance of improved posture to prevent an return of dysfunctional posture. Mr. Jules has been able to maintain improved pelvic alignment with home performance of side glides which presents as proper weightshifting during ambulation. Pt would have benefited from additional therapy for core strengthening and functional lifting training. However in light of insurance limitations pt was verbally instructed on safe and proper lifting as well as guidelines for advancing current core strengthening program. It is hopeful that pt will be able to indpendently advance strengthening and functional mobility without return of painful symptoms. Pt consistently put forth excellent effort in therapy and progressed towards his therapy goals. He was a pleasure to work with.        Fabiano Is progressing well towards his goals.   Pt prognosis is Good.     Pt's spiritual, cultural and educational needs considered and pt agreeable to plan of care and goals.    Anticipated barriers to physical therapy: co-morbidities    Goals:Goals:     STG  Weeks/Visits Date Established  Date Met   1.Decrease max lumbar pain to 6/10 to improve the patient's quality of life. 4 weeks. 7/7/2022    Goal Met 10/5/2022      2. Decrease bilateral hamstring, iliopsoas, and piriformis muscle tones to minimal to moderate increase to improve ease of bed mobility. 4 weeks. 7/7/2022    Goal Met 10/5/2022      3.Increase general core strength 1/2 grade to improve standing activity endurance. 4 weeks.                7/7/2022    Goal Met 10/5/2022      4.Decrease FOTO limitation to <=70% to improve job task ability. 4 weeks. 7/7/2022    Goal Met 10/5/2022     5.Fair initial written home exercise program knowledge and be without questions to have carryover after discharge from PT. 4 weeks.  7/7/2022    Goal Met 10/5/2022         LTG Weeks/Visits Date Established  Date Met   1.Decrease max lumbar pain to 2/10 to improve the patient's quality of life. 8 weeks. 7/7/2022    In progress  10/5/2022  3/10 today      2. Decrease bilateral hamstring, iliopsoas, and piriformis muscle tones to minimal increase to improve ease of bed mobility. 8 weeks 7/7/2022        In progress  10/5/2022     3.Increase general core strength one grade from evaluation date to improve standing activity endurance. 8 weeks. 7/7/2022    In progress  10/5/2022      4.Decrease FOTO limitation to <=60% to improve job task ability. 8 weeks. 7/7/2022    Goal Met 10/5/2022  49% limitation     5.Good progressed written home exercise program knowledge and be without questions to have carryover after discharge from PT. 8 weeks. 7/7/2022 In progress  10/5/2022         Plan     Updated plan of care: 09/12/2022 to 10/28/2022 at 2x/wk x 6 weeks. Plan to discharge the patient to his progressed home program after his 10/05/2022 PT session unless his insurance has authorized more visits by then. If they do, PT can update the plan of care at that time. The patient agrees.          Donya Coello, PTA

## 2022-10-06 ENCOUNTER — PATIENT MESSAGE (OUTPATIENT)
Dept: FAMILY MEDICINE | Facility: CLINIC | Age: 52
End: 2022-10-06
Payer: COMMERCIAL

## 2022-10-12 ENCOUNTER — OFFICE VISIT (OUTPATIENT)
Dept: BARIATRICS | Facility: CLINIC | Age: 52
End: 2022-10-12
Payer: COMMERCIAL

## 2022-10-12 VITALS
TEMPERATURE: 99 F | BODY MASS INDEX: 46.65 KG/M2 | DIASTOLIC BLOOD PRESSURE: 90 MMHG | SYSTOLIC BLOOD PRESSURE: 137 MMHG | WEIGHT: 315 LBS | HEIGHT: 69 IN | HEART RATE: 82 BPM | RESPIRATION RATE: 16 BRPM

## 2022-10-12 DIAGNOSIS — E66.01 MORBID OBESITY: Primary | ICD-10-CM

## 2022-10-12 DIAGNOSIS — I10 PRIMARY HYPERTENSION: ICD-10-CM

## 2022-10-12 DIAGNOSIS — G47.33 OSA (OBSTRUCTIVE SLEEP APNEA): ICD-10-CM

## 2022-10-12 DIAGNOSIS — E66.01 CLASS 3 SEVERE OBESITY DUE TO EXCESS CALORIES WITH SERIOUS COMORBIDITY AND BODY MASS INDEX (BMI) OF 50.0 TO 59.9 IN ADULT: ICD-10-CM

## 2022-10-12 PROCEDURE — 3008F BODY MASS INDEX DOCD: CPT | Mod: CPTII,S$GLB,, | Performed by: SURGERY

## 2022-10-12 PROCEDURE — 99999 PR PBB SHADOW E&M-EST. PATIENT-LVL III: CPT | Mod: PBBFAC,,, | Performed by: SURGERY

## 2022-10-12 PROCEDURE — 1159F PR MEDICATION LIST DOCUMENTED IN MEDICAL RECORD: ICD-10-PCS | Mod: CPTII,S$GLB,, | Performed by: SURGERY

## 2022-10-12 PROCEDURE — 3044F PR MOST RECENT HEMOGLOBIN A1C LEVEL <7.0%: ICD-10-PCS | Mod: CPTII,S$GLB,, | Performed by: SURGERY

## 2022-10-12 PROCEDURE — 3008F PR BODY MASS INDEX (BMI) DOCUMENTED: ICD-10-PCS | Mod: CPTII,S$GLB,, | Performed by: SURGERY

## 2022-10-12 PROCEDURE — 99999 PR PBB SHADOW E&M-EST. PATIENT-LVL III: ICD-10-PCS | Mod: PBBFAC,,, | Performed by: SURGERY

## 2022-10-12 PROCEDURE — 3075F SYST BP GE 130 - 139MM HG: CPT | Mod: CPTII,S$GLB,, | Performed by: SURGERY

## 2022-10-12 PROCEDURE — 3080F DIAST BP >= 90 MM HG: CPT | Mod: CPTII,S$GLB,, | Performed by: SURGERY

## 2022-10-12 PROCEDURE — 3044F HG A1C LEVEL LT 7.0%: CPT | Mod: CPTII,S$GLB,, | Performed by: SURGERY

## 2022-10-12 PROCEDURE — 1159F MED LIST DOCD IN RCRD: CPT | Mod: CPTII,S$GLB,, | Performed by: SURGERY

## 2022-10-12 PROCEDURE — 4010F ACE/ARB THERAPY RXD/TAKEN: CPT | Mod: CPTII,S$GLB,, | Performed by: SURGERY

## 2022-10-12 PROCEDURE — 99213 PR OFFICE/OUTPT VISIT, EST, LEVL III, 20-29 MIN: ICD-10-PCS | Mod: S$GLB,,, | Performed by: SURGERY

## 2022-10-12 PROCEDURE — 4010F PR ACE/ARB THEARPY RXD/TAKEN: ICD-10-PCS | Mod: CPTII,S$GLB,, | Performed by: SURGERY

## 2022-10-12 PROCEDURE — 3080F PR MOST RECENT DIASTOLIC BLOOD PRESSURE >= 90 MM HG: ICD-10-PCS | Mod: CPTII,S$GLB,, | Performed by: SURGERY

## 2022-10-12 PROCEDURE — 99213 OFFICE O/P EST LOW 20 MIN: CPT | Mod: S$GLB,,, | Performed by: SURGERY

## 2022-10-12 PROCEDURE — 3075F PR MOST RECENT SYSTOLIC BLOOD PRESS GE 130-139MM HG: ICD-10-PCS | Mod: CPTII,S$GLB,, | Performed by: SURGERY

## 2022-10-12 RX ORDER — DIAZEPAM 5 MG/1
TABLET ORAL
COMMUNITY
Start: 2022-09-13 | End: 2022-11-15

## 2022-10-12 NOTE — PROGRESS NOTES
Medically Supervised Weight Loss Documentation    Date of Visit: 10/12/2022    Patient: Fabiano Jules Jr.    Current Weight: 341  Current BMI: Body mass index is 50.42 kg/m².  Weight Change: - 9 pounds     Last Weight: 351    Beginning Weight: 350      Vitals:   Vitals:    10/12/22 1115   BP: (!) 137/90   Pulse: 82   Resp: 16   Temp: 98.9 °F (37.2 °C)       Comorbidities:   Past Medical History:   Diagnosis Date    Arthritis     Back injury     Colon polyp     COVID     CPAP (continuous positive airway pressure) dependence     Hypertension     PONV (postoperative nausea and vomiting)     Sleep apnea     c pap machine used       Medications:  Current Outpatient Medications on File Prior to Visit   Medication Sig Dispense Refill    amLODIPine (NORVASC) 5 MG tablet Take 1 tablet (5 mg total) by mouth once daily. 30 tablet 11    atorvastatin (LIPITOR) 40 MG tablet Take 1 tablet (40 mg total) by mouth every evening. 90 tablet 3    b complex vitamins (B COMPLEX-VITAMIN B12) tablet Take 1 tablet by mouth once daily. 90 tablet 1    cyclobenzaprine (FLEXERIL) 10 MG tablet Take 10 mg by mouth 3 (three) times daily.      diazePAM (VALIUM) 5 MG tablet       EPINEPHrine (EPIPEN) 0.3 mg/0.3 mL AtIn Inject into the muscle once for one dose as needed. 2 each 1    ergocalciferol (ERGOCALCIFEROL) 50,000 unit Cap Take 1 capsule (50,000 Units total) by mouth twice a week. 24 capsule 0    ferrous sulfate (FEROSUL) 325 mg (65 mg iron) Tab tablet Take 1 tablet (325 mg total) by mouth every other day. (Patient taking differently: Take 325 mg by mouth once daily.) 90 tablet 0    lisinopriL-hydrochlorothiazide (PRINZIDE,ZESTORETIC) 20-12.5 mg per tablet Take 2 tablets by mouth once daily. 180 tablet 1    lisinopriL-hydrochlorothiazide (PRINZIDE,ZESTORETIC) 20-12.5 mg per tablet Take 2 tablets by mouth once daily. 180 tablet 1    naproxen sodium (ANAPROX) 220 MG tablet Take 220 mg by mouth daily as needed.      oxyCODONE-acetaminophen (PERCOCET)  7.5-325 mg per tablet Take 1 tablet by mouth 3 (three) times daily as needed.      semaglutide (OZEMPIC) 1 mg/dose (4 mg/3 mL) Inject 1 mg into the skin every 7 days. 1 pen 11    cimetidine (TAGAMET) 400 MG tablet TAKE 1 TABLET (400 MG TOTAL) BY MOUTH 2 (TWO) TIMES DAILY AS NEEDED (ALLERGIC REACTION). (Patient not taking: No sig reported) 180 tablet 1     Current Facility-Administered Medications on File Prior to Visit   Medication Dose Route Frequency Provider Last Rate Last Admin    acetaminophen tablet 650 mg  650 mg Oral Q4H PRN Katie Shane NP        ceFAZolin in dextrose (iso-os) 2 gram/100 mL PgBk 2,000 mg  2 g Intravenous Q8H Katie Shaen NP   Stopped at 04/09/22 1036    HYDROmorphone injection 1 mg  1 mg Intravenous Q6H PRN Katie Shane NP   1 mg at 04/09/22 0539    ondansetron injection 4 mg  4 mg Intravenous Q6H PRN Katie Shane NP        oxyCODONE-acetaminophen  mg per tablet 1 tablet  1 tablet Oral Q4H PRN Katie Shane NP   1 tablet at 04/09/22 0906    oxyCODONE-acetaminophen 5-325 mg per tablet 1 tablet  1 tablet Oral Q4H PRN Katie Shane NP   1 tablet at 04/08/22 0947    polyethylene glycol packet 17 g  17 g Oral Daily Katie Shane NP   17 g at 04/08/22 0822         Body comp:  Fat Percent:  47.0 %  Fat Mass:  160.4 lb  FFM:  181 lb  BMR: 2604 kcal      Diet Education Discussed:    Breakfast:  tuna, eggs, protein shake  Lunch:  salad with veggies  Dinner:  beans and rice     Doing some snacking    Exercise/Activity Discussed:    Walking plenty     Behavior or Diet Goals for this patient:    Stop snacking   Continue low carb dieting  As much exercising as possible     Labs  EKG  UGI - Small sliding hiatal hernia otherwise negative upper GI  dietary consult- done  psych consult - done [facility]  Seminar        I will obtain the following clearances prior to surgery: Cardiology- done              Previous ECHO, Nuclear Stress Test (7/14/2021)    : total time spent for  visit: 20 minutes

## 2022-10-14 ENCOUNTER — PATIENT MESSAGE (OUTPATIENT)
Dept: FAMILY MEDICINE | Facility: CLINIC | Age: 52
End: 2022-10-14
Payer: COMMERCIAL

## 2022-10-14 DIAGNOSIS — Z01.818 PRE-OP EVALUATION: Primary | ICD-10-CM

## 2022-10-17 ENCOUNTER — PATIENT MESSAGE (OUTPATIENT)
Dept: FAMILY MEDICINE | Facility: CLINIC | Age: 52
End: 2022-10-17
Payer: COMMERCIAL

## 2022-10-30 NOTE — PROGRESS NOTES
"  [facility]  Outpatient Physical Therapy Daily Treatment Note/Updated Plan of Care    Clinic Number: [id number]  Visit Date: 10/31/2022    Therapy Diagnosis:   Encounter Diagnoses   Name Primary?    Lumbar pain     Weakness     Abnormal increased muscle tone     Decreased functional mobility and endurance Yes    Special educational needs        Physician: Duran Adler Jr., MD     Physician Orders: PT Eval and Treat   Medical Diagnosis from Referral: M43.26 (ICD-10-CM) - Ankylosis of lumbar spine   Evaluation Date: 7/7/2022  Authorization Period Expiration: 11/17//2022  Plan of Care Expiration: Updated Plan of Care: 10/31/2022 to 11/10/2022 for 2 visits  Anticipate discharge: 11/03/2022  Visit # / Visits authorized: 22/ 23  (1/2 on final plan of care)  Total visit #: 23    PTA visit #: 0/5  FOTO: 4/3 (4th done 10/31/2022)     Time In: 8:33 am  Time Out: 9:24 am  Total Appointment Time (timed & untimed codes): 40 minutes + 10 minutes of hot pack     Precautions: Standard and and surgical  Subjective     The patient reports: "I am walking 5,000 steps a day. I have been to 3-4 cities traveling with my job. It has been tough at times, but I am slowly progressing. I even returned to my job for a day or two. It was light duty, but I tolerated it. No pain right now just walking in here. Most pain is like a 2-3/10 when present." PT acknowledges.     Response to previous treatment: The patient enters with no new complaints.   Functional change: He reports an increase in his functional ambulation and endurance with ambulation ability.        Pain: 0/10 upon entering the clinic today again today. Max of 2-3/10 when present  Location: bilateral back    Objective     56% limitation today     Fabiano received therapeutic exercises to develop strength, endurance, ROM, flexibility, posture and core stabilization for 30 minutes including:  -Precor back extension with 45 pounds " x 25  -Precor chest press with 25 pounds for abdominal contraction x 25  -Precor scapular retraction with 30 pounds x 25  -DIPS with 40 pounds x 25  -Precor knee flexion with 40 pounds x 25  -Precor knee extension with 25 pounds x 25  -Precor leg press with 40 pounds and seat on 13 x 25 repetitions.   -Precor outer thigh with 50 pounds x 25    Not performed today   -Sidelying hip abduction 2x 10   -Sidelying clams 2 x 10   -Sidelying rev clams 2 x 10   -Sidleying hip circles 2 x 10   -Bridges 2 x 10 with band abduction+++  +++add to home exercise program 11/03/2022+++    Fabiano participated in neuromuscular re-education activities to improve: Coordination, Proprioception and Posture for 10 minutes. The following activities were included:    -sit to stand from 3rd black box not using arms x 15 and on blue foam with yellow weighted ball  -kitchen sink walk outs with 20 second hold x 4  -Standing Paloff press standing on foam for stabilizers and multifidi activation 15 x each direction Blue theraband    -+standing upper extremity extension with green band x 20 on blue foam    Below not performed this day:  -thoracic extension in chair 15 x   -standing lumbar extension 20 x        Flexibility        Muscle Left Right Comment             Hamstrings Minimal Increase > Minimal to Moderate Increase >Moderate Increase Minimal Increase >Minimal to Moderate Increase >Moderate Increase     Quadriceps Minimal Increase Minimal Increase     Illipsoas Minimal Increase > Minimal to Moderate Increase >Moderate Increase Minimal Increase >Minimal to Moderate Increase >Moderate Increase     Pirifromis Minimal Increase >Minimal to Moderate Increase >Moderate Increase Minimal Increase >Minimal to Moderate Increase >Moderate Increase                    MMT        L-spine     Comment     Left Right               Flexion 4/5 >4-/5 >3+/5 -     Extension 4+/5 >4/5 >4-/5 -     Side Bending 4/5 >4-/5 4/5 >4-/5               Abdominals 4/5 >4-/5  >3+/5 -     Lower Abdominals 4/5 >4-/5 >3+/5 -                      Limitation/Restriction for FOTO Lumbar Survey     Therapist reviewed FOTO scores for Fabiano Jules Jr. on 10/31/2022   FOTO documents entered into [employer] - see Media section.     Limitation Score:  56% Limitation 10/31/2022 > 49% Limitation 09/29/2022 > 66% limitation 08/15/2022 >81% Limitation at the evaluation.          Patient Education and HEP     He was compliant with home exercise program.    Education provided:   -home exercise program additional exercises added today  -08/01/2022 Patient was educated on moving his night stand to prevent too much rotation. He verbally acknowledged.  -Final home exercise program issued 10/5/22    Written Home Exercises Provided: Patient instructed to cont prior HEP.  Exercises were reviewed and Fabiano was able to demonstrate them prior to the end of the session.  Fabiano demonstrated fair  understanding of the education provided.     See EMR under Patient Instructions for exercises provided  10/05/2022 .  Assessment/Updated Plan of Care     Today, the patient had his plan of care updated as his insurance approved two final PT sessions. Fabiano entered reporting improved function with ambulation distance and with endurance with activity. He reports intermittent back pain. His strength is much improved. His muscle tone has decreased allowing improved flexibility. He tolerated today's PT session well. PT will make sure he is without questions his next PT session as PT anticipates discharge to his home program. He agrees. Patient consistently puts forth excellent effort in therapy. He is a pleasure to work with.      Fabiano Is progressing well towards his goals.   Pt prognosis is Good.     Pt's spiritual, cultural and educational needs considered and pt agreeable to plan of care and goals.    Anticipated barriers to physical therapy: co-morbidities    Goals:Goals:     STG  Weeks/Visits Date Established  Date Met   1.Decrease max  lumbar pain to 6/10 to improve the patient's quality of life. 4 weeks. 7/7/2022    Goal Met 10/31/2022      2. Decrease bilateral hamstring, iliopsoas, and piriformis muscle tones to minimal to moderate increase to improve ease of bed mobility. 4 weeks. 7/7/2022    Goal Met 10/31/2022      3.Increase general core strength 1/2 grade to improve standing activity endurance. 4 weeks.                7/7/2022    Goal Met 10/31/2022      4.Decrease FOTO limitation to <=70% to improve job task ability. 4 weeks. 7/7/2022    Goal Met 10/31/2022     5.Fair initial written home exercise program knowledge and be without questions to have carryover after discharge from PT. 4 weeks. 7/7/2022    Goal Met 10/31/2022         LTG Weeks/Visits Date Established  Date Met   1.Decrease max lumbar pain to 2/10 to improve the patient's quality of life. 8 weeks. 7/7/2022    In progress  10/31/2022  3/10 today      2. Decrease bilateral hamstring, iliopsoas, and piriformis muscle tones to minimal increase to improve ease of bed mobility. 8 weeks 7/7/2022       Goal Met 10/31/2022       3.Increase general core strength one grade from evaluation date to improve standing activity endurance. 8 weeks. 7/7/2022    In progress  10/31/2022      4.Decrease FOTO limitation to <=60% to improve job task ability. 8 weeks. 7/7/2022    Goal Met 10/31/2022  56% limitation     5.Good progressed written home exercise program knowledge and be without questions to have carryover after discharge from PT. 8 weeks. 7/7/2022 In progress  10/31/2022       Plan     Updated Plan of Care: 10/31/2022 to 11/10/2022 for 2 visits. These two visits are to include the following interventions: Electrical Stimulation unattended, Manual Therapy, Moist Heat/ Ice, Neuromuscular Re-ed, Patient Education, Self Care, Therapeutic Activities, Therapeutic Exercise, Ultrasound and care by a PTA.. Plan to discharge the patient to his progressed home program after this two visit plan of  care. Plan to continue to educate him as much as possible over these final two sessions and then discharge him to his home program.        Espinoza Jeffrey, PT

## 2022-10-31 ENCOUNTER — CLINICAL SUPPORT (OUTPATIENT)
Dept: REHABILITATION | Facility: HOSPITAL | Age: 52
End: 2022-10-31
Payer: COMMERCIAL

## 2022-10-31 DIAGNOSIS — Z74.09 DECREASED FUNCTIONAL MOBILITY AND ENDURANCE: Primary | ICD-10-CM

## 2022-10-31 DIAGNOSIS — M62.89 ABNORMAL INCREASED MUSCLE TONE: ICD-10-CM

## 2022-10-31 DIAGNOSIS — R53.1 WEAKNESS: ICD-10-CM

## 2022-10-31 DIAGNOSIS — M54.50 LUMBAR PAIN: ICD-10-CM

## 2022-10-31 DIAGNOSIS — Z55.9 SPECIAL EDUCATIONAL NEEDS: ICD-10-CM

## 2022-10-31 PROCEDURE — 97110 THERAPEUTIC EXERCISES: CPT | Mod: PN

## 2022-10-31 PROCEDURE — 97112 NEUROMUSCULAR REEDUCATION: CPT | Mod: PN

## 2022-10-31 SDOH — SOCIAL DETERMINANTS OF HEALTH (SDOH): PROBLEMS RELATED TO EDUCATION AND LITERACY, UNSPECIFIED: Z55.9

## 2022-10-31 NOTE — PLAN OF CARE
"  [facility]  Outpatient Physical Therapy Daily Treatment Note/Updated Plan of Care    Clinic Number: [id number]  Visit Date: 10/31/2022    Therapy Diagnosis:   Encounter Diagnoses   Name Primary?    Lumbar pain     Weakness     Abnormal increased muscle tone     Decreased functional mobility and endurance Yes    Special educational needs        Physician: Duran Adler Jr., MD     Physician Orders: PT Eval and Treat   Medical Diagnosis from Referral: M43.26 (ICD-10-CM) - Ankylosis of lumbar spine   Evaluation Date: 7/7/2022  Authorization Period Expiration: 11/17//2022  Plan of Care Expiration: Updated Plan of Care: 10/31/2022 to 11/10/2022 for 2 visits  Anticipate discharge: 11/03/2022  Visit # / Visits authorized: 22/ 23  (1/2 on final plan of care)  Total visit #: 23    PTA visit #: 0/5  FOTO: 4/3 (4th done 10/31/2022)     Time In: 8:33 am  Time Out: 9:24 am  Total Appointment Time (timed & untimed codes): 40 minutes + 10 minutes of hot pack     Precautions: Standard and and surgical  Subjective     The patient reports: "I am walking 5,000 steps a day. I have been to 3-4 cities traveling with my job. It has been tough at times, but I am slowly progressing. I even returned to my job for a day or two. It was light duty, but I tolerated it. No pain right now just walking in here. Most pain is like a 2-3/10 when present." PT acknowledges.     Response to previous treatment: The patient enters with no new complaints.   Functional change: He reports an increase in his functional ambulation and endurance with ambulation ability.        Pain: 0/10 upon entering the clinic today again today. Max of 2-3/10 when present  Location: bilateral back    Objective     56% limitation today     Fabiano received therapeutic exercises to develop strength, endurance, ROM, flexibility, posture and core stabilization for 30 minutes including:  -Precor back extension with 45 pounds " x 25  -Precor chest press with 25 pounds for abdominal contraction x 25  -Precor scapular retraction with 30 pounds x 25  -DIPS with 40 pounds x 25  -Precor knee flexion with 40 pounds x 25  -Precor knee extension with 25 pounds x 25  -Precor leg press with 40 pounds and seat on 13 x 25 repetitions.   -Precor outer thigh with 50 pounds x 25    Not performed today   -Sidelying hip abduction 2x 10   -Sidelying clams 2 x 10   -Sidelying rev clams 2 x 10   -Sidleying hip circles 2 x 10   -Bridges 2 x 10 with band abduction+++  +++add to home exercise program 11/03/2022+++    Fabiano participated in neuromuscular re-education activities to improve: Coordination, Proprioception and Posture for 10 minutes. The following activities were included:    -sit to stand from 3rd black box not using arms x 15 and on blue foam with yellow weighted ball  -kitchen sink walk outs with 20 second hold x 4  -Standing Paloff press standing on foam for stabilizers and multifidi activation 15 x each direction Blue theraband    -+standing upper extremity extension with green band x 20 on blue foam    Below not performed this day:  -thoracic extension in chair 15 x   -standing lumbar extension 20 x        Flexibility        Muscle Left Right Comment             Hamstrings Minimal Increase > Minimal to Moderate Increase >Moderate Increase Minimal Increase >Minimal to Moderate Increase >Moderate Increase     Quadriceps Minimal Increase Minimal Increase     Illipsoas Minimal Increase > Minimal to Moderate Increase >Moderate Increase Minimal Increase >Minimal to Moderate Increase >Moderate Increase     Pirifromis Minimal Increase >Minimal to Moderate Increase >Moderate Increase Minimal Increase >Minimal to Moderate Increase >Moderate Increase                    MMT        L-spine     Comment     Left Right               Flexion 4/5 >4-/5 >3+/5 -     Extension 4+/5 >4/5 >4-/5 -     Side Bending 4/5 >4-/5 4/5 >4-/5               Abdominals 4/5 >4-/5  >3+/5 -     Lower Abdominals 4/5 >4-/5 >3+/5 -                      Limitation/Restriction for FOTO Lumbar Survey     Therapist reviewed FOTO scores for Fabiano Jules Jr. on 10/31/2022   FOTO documents entered into [employer] - see Media section.     Limitation Score:  56% Limitation 10/31/2022 > 49% Limitation 09/29/2022 > 66% limitation 08/15/2022 >81% Limitation at the evaluation.          Patient Education and HEP     He was compliant with home exercise program.    Education provided:   -home exercise program additional exercises added today  -08/01/2022 Patient was educated on moving his night stand to prevent too much rotation. He verbally acknowledged.  -Final home exercise program issued 10/5/22    Written Home Exercises Provided: Patient instructed to cont prior HEP.  Exercises were reviewed and Fabiano was able to demonstrate them prior to the end of the session.  Fabiano demonstrated fair  understanding of the education provided.     See EMR under Patient Instructions for exercises provided 10/05/2022.  Assessment/Updated Plan of Care     Today, the patient had his plan of care updated as his insurance approved two final PT sessions. Fabiano entered reporting improved function with ambulation distance and with endurance with activity. He reports intermittent back pain. His strength is much improved. His muscle tone has decreased allowing improved flexibility. He tolerated today's PT session well. PT will make sure he is without questions his next PT session as PT anticipates discharge to his home program. He agrees. Patient consistently puts forth excellent effort in therapy. He is a pleasure to work with.      Fabiano Is progressing well towards his goals.   Pt prognosis is Good.     Pt's spiritual, cultural and educational needs considered and pt agreeable to plan of care and goals.    Anticipated barriers to physical therapy: co-morbidities    Goals:Goals:     STG  Weeks/Visits Date Established  Date Met   1.Decrease max  lumbar pain to 6/10 to improve the patient's quality of life. 4 weeks. 7/7/2022    Goal Met 10/31/2022      2. Decrease bilateral hamstring, iliopsoas, and piriformis muscle tones to minimal to moderate increase to improve ease of bed mobility. 4 weeks. 7/7/2022    Goal Met 10/31/2022      3.Increase general core strength 1/2 grade to improve standing activity endurance. 4 weeks.                7/7/2022    Goal Met 10/31/2022      4.Decrease FOTO limitation to <=70% to improve job task ability. 4 weeks. 7/7/2022    Goal Met 10/31/2022     5.Fair initial written home exercise program knowledge and be without questions to have carryover after discharge from PT. 4 weeks. 7/7/2022    Goal Met 10/31/2022         LTG Weeks/Visits Date Established  Date Met   1.Decrease max lumbar pain to 2/10 to improve the patient's quality of life. 8 weeks. 7/7/2022    In progress  10/31/2022  3/10 today      2. Decrease bilateral hamstring, iliopsoas, and piriformis muscle tones to minimal increase to improve ease of bed mobility. 8 weeks 7/7/2022       Goal Met 10/31/2022       3.Increase general core strength one grade from evaluation date to improve standing activity endurance. 8 weeks. 7/7/2022    In progress  10/31/2022      4.Decrease FOTO limitation to <=60% to improve job task ability. 8 weeks. 7/7/2022    Goal Met 10/31/2022  56% limitation     5.Good progressed written home exercise program knowledge and be without questions to have carryover after discharge from PT. 8 weeks. 7/7/2022 In progress  10/31/2022       Plan     Updated Plan of Care: 10/31/2022 to 11/10/2022 for 2 visits. These two visits are to include the following interventions: Electrical Stimulation unattended, Manual Therapy, Moist Heat/ Ice, Neuromuscular Re-ed, Patient Education, Self Care, Therapeutic Activities, Therapeutic Exercise, Ultrasound and care by a PTA.. Plan to discharge the patient to his progressed home program after this two visit plan of  care. Plan to continue to educate him as much as possible over these final two sessions and then discharge him to his home program.        Espinoza Jeffrey, PT

## 2022-11-03 ENCOUNTER — CLINICAL SUPPORT (OUTPATIENT)
Dept: REHABILITATION | Facility: HOSPITAL | Age: 52
End: 2022-11-03
Payer: COMMERCIAL

## 2022-11-03 DIAGNOSIS — R53.1 WEAKNESS: Primary | ICD-10-CM

## 2022-11-03 DIAGNOSIS — Z55.9 SPECIAL EDUCATIONAL NEEDS: ICD-10-CM

## 2022-11-03 DIAGNOSIS — M62.89 ABNORMAL INCREASED MUSCLE TONE: ICD-10-CM

## 2022-11-03 DIAGNOSIS — Z74.09 DECREASED FUNCTIONAL MOBILITY AND ENDURANCE: ICD-10-CM

## 2022-11-03 DIAGNOSIS — M54.50 LUMBAR PAIN: ICD-10-CM

## 2022-11-03 PROCEDURE — 97110 THERAPEUTIC EXERCISES: CPT | Mod: PN

## 2022-11-03 PROCEDURE — 97014 ELECTRIC STIMULATION THERAPY: CPT | Mod: PN

## 2022-11-03 SDOH — SOCIAL DETERMINANTS OF HEALTH (SDOH): PROBLEMS RELATED TO EDUCATION AND LITERACY, UNSPECIFIED: Z55.9

## 2022-11-03 NOTE — PROGRESS NOTES
"  [facility]  Outpatient Physical Therapy Daily Treatment Note/Discharge Summary    Clinic Number: [id number]  Visit Date: 11/3/2022    Therapy Diagnosis:   Encounter Diagnoses   Name Primary?    Lumbar pain     Weakness Yes    Abnormal increased muscle tone     Decreased functional mobility and endurance     Special educational needs        Physician: Duran Adler Jr., MD     Physician Orders: PT Eval and Treat   Medical Diagnosis from Referral: M43.26 (ICD-10-CM) - Ankylosis of lumbar spine   Evaluation Date: 7/7/2022  Authorization Period Expiration: 11/17//2022  Plan of Care Expiration: 11/10/2022 for 2 visits  Visit # / Visits authorized: 23/ 23  (2/2 on final plan of care)    PTA visit #: 0/5  FOTO: 4/3 (4th done 10/31/2022)    Date of Last visit: 11/03/2022  Total Visits Received: 24 with the evaluation     Time In: 8:35 am  Time Out: 9:25 am  Total Appointment Time (timed & untimed codes): 40 minutes      Precautions: Standard and and surgical  Subjective     The patient reports: "I am glad I got these final two visits. I sure have appreciated all of nehal's help. I am almost able to tie my shoes now. I am hoping I get referred to the Healthy [facility] program. I want to get my back as good as possible. I would sure like to have that heat probe one last time today. It really helped with my stiffness the last time I had it done. " PT acknowledges.     Response to previous treatment: The patient enters with no new complaints.   Functional change: He reports he can tolerate his current functional ability.        Pain: 0/10 upon entering the clinic today again today. Max of 5/10 temporarily over the past few days.  Location: bilateral back    Objective        Fabiano received therapeutic exercises to develop strength, endurance, ROM, flexibility, posture and core stabilization for 30 minutes including:  -Precor back extension with 45 pounds x 25  -Precor chest " press with 25 pounds for abdominal contraction x 25  -Precor scapular retraction with 30 pounds x 25  -DIPS with 40 pounds x 25  -Precor knee flexion with 40 pounds x 25  -Precor knee extension with 25 pounds x 25  -Precor leg press with 40 pounds and seat on 13 x 25 repetitions.   -Precor outer thigh with 50 pounds x 25      Fabiano received the following supervised modalities after being cleared for contradictions: IFC Electrical Stimulation:  Fabiano received IFC Electrical Stimulation for pain control applied to the bilateral lumbar muscles. Pt received heat probe stimulation at 100 % scan x 10 minutes while prone. Fabiano tolderated treatment well without any adverse effects.       Flexibility        Muscle Left Right Comment             Hamstrings Minimal Increase > Minimal to Moderate Increase >Moderate Increase Minimal Increase >Minimal to Moderate Increase >Moderate Increase     Quadriceps Minimal Increase Minimal Increase     Illipsoas Minimal Increase > Minimal to Moderate Increase >Moderate Increase Minimal Increase >Minimal to Moderate Increase >Moderate Increase     Pirifromis Minimal Increase >Minimal to Moderate Increase >Moderate Increase Minimal Increase >Minimal to Moderate Increase >Moderate Increase                    MMT        L-spine     Comment     Left Right               Flexion 4/5 >4-/5 >3+/5 -     Extension 4+/5 >4/5 >4-/5 -     Side Bending 4/5 >4-/5 4/5 >4-/5               Abdominals 4/5 >4-/5 >3+/5 -     Lower Abdominals 4/5 >4-/5 >3+/5 -            Limitation/Restriction for FOTO Lumbar Survey     Therapist reviewed FOTO scores for Fabiano Jules JrTish on 10/31/2022   FOTO documents entered into [employer] - see Media section.     Limitation Score:  56% Limitation 10/31/2022 > 49% Limitation 09/29/2022 > 66% limitation 08/15/2022 >81% Limitation at the evaluation.          Patient Education and HEP     He was compliant with home exercise program.    Education provided:   -home exercise program  additional exercises added today  -08/01/2022 Patient was educated on moving his night stand to prevent too much rotation. He verbally acknowledged.  -Final home exercise program issued 10/5/22  -+The patient reports being independent with the progressed home program he received on 10/05/2022.     Written Home Exercises Provided: Patient instructed to cont prior HEP.  Exercises were reviewed and Fabiano was able to demonstrate them prior to the end of the session.  Fabiano demonstrated fair  understanding of the education provided.     See EMR under Patient Instructions for exercises provided  10/05/2022 .  Assessment/Discharge summary     Today, the patient completed his PT plan of care. He reports tolerance to his current functional ability. His pain is periodic. He met 5 of 5 short term goals. He met 3 of 5 long term goals and showed progression on all. He is independent with his home program. PT believes he will be a good candidate for the [facility] in the future. He is now ready to continue with his exercises at home. He tolerated his final PT session well today.            Fabiano progressed well towards his goals.   Pt prognosis is Good.     Pt's spiritual, cultural and educational needs considered and pt agreeable to plan of care and goals.    Anticipated barriers to physical therapy: co-morbidities    Goals:Goals:     STG  Weeks/Visits Date Established  Date Met   1.Decrease max lumbar pain to 6/10 to improve the patient's quality of life. 4 weeks. 7/7/2022    Goal Met 11/3/2022      2. Decrease bilateral hamstring, iliopsoas, and piriformis muscle tones to minimal to moderate increase to improve ease of bed mobility. 4 weeks. 7/7/2022    Goal Met 11/3/2022      3.Increase general core strength 1/2 grade to improve standing activity endurance. 4 weeks.                7/7/2022    Goal Met 11/3/2022      4.Decrease FOTO limitation to <=70% to improve job task ability. 4 weeks. 7/7/2022    Goal Met  11/3/2022     5.Fair initial written home exercise program knowledge and be without questions to have carryover after discharge from PT. 4 weeks. 7/7/2022    Goal Met 11/3/2022         LTG Weeks/Visits Date Established  Date Met   1.Decrease max lumbar pain to 2/10 to improve the patient's quality of life. 8 weeks. 7/7/2022    In progress  11/3/2022  5/10 max pain      2. Decrease bilateral hamstring, iliopsoas, and piriformis muscle tones to minimal increase to improve ease of bed mobility. 8 weeks 7/7/2022       Goal Met 11/3/2022       3.Increase general core strength one grade from evaluation date to improve standing activity endurance. 8 weeks. 7/7/2022    In progress  11/3/2022      4.Decrease FOTO limitation to <=60% to improve job task ability. 8 weeks. 7/7/2022    Goal Met 11/3/2022  56% limitation     5.Good progressed written home exercise program knowledge and be without questions to have carryover after discharge from PT. 8 weeks. 7/7/2022  Goal Met 11/3/2022         Discharge reason: Patient has completed the physician's prescription, Patient has reached the maximum rehab potential for the present time, and the patient is now ready to continue with his exercises at home.     Plan     The patient is discharged from his PT program at this time. He is to continue with his exercises at home.          Espinoza Jeffrey, PT

## 2022-11-07 NOTE — DISCHARGE INSTRUCTIONS
Capsule Discharge Instructions     You have just swallowed a capsule endoscope.  This contains information about what to expect over the next 8 hours.  Please call our office if you have severe or persistent abdominal or chest pain, fever, difficulty swallowing or if you have any questions.  Our phone number is [phone].    Time Capsule ingested:_______________    You may drink clear liquids (water, apple juice) 4 hours after swallowing the capsule.  You may eat a light meal 6 hours after swallowing the capsule.  Medications may be resumed at 6 hours after swallowing the capsule.  Do not exercise, avoid heavy lifting.  You may walk, sit and lay down.  You can drive a car.  You may return to work, if you work allows avoiding unsuitable environments and /or physical movements.  Avoid going near MRI machines and radio transmitters.  You may use a computer, cell phone, radio or stereo.  Do not stand directly next to another person undergoing capsule endoscopy.  Try not to touch the recorder or the sensor array leads.  Do not remove the leads before 8 hours.  Avoid getting the data recorder or sensor array leads wet.  You may loosen the belt to allow yourself to go to the bathroom.  Do not take the belt off until the 8 hours have passed.  Observe the LED light on the data recorder at least every 15 minutes.  If the light stops blinking, document the time and call our office.  Return the unit to the hospital at the completion of 8 hour time frame.    May have clear liquids at ______________    May have light snack and medications at ______________    May remove the unit at ______________    Return the unit to Registration Desk at the completion of the study.    Post Capsule Instructions     This information is what to expect over the next 2 days.  Please call us or your doctor if you have severe or persistent abdominal or chest pain, fever, difficulty swallowing, or if you have any questions.  Our phone number is  adhesive bandage applied to wound securely. [phone].    Pain:  Pain is uncommon following capsule endoscopy.  Should you feel sharp or persistent pain, please call your doctor's office.  Nausea:  Nausea is also very uncommon and should it occur, please notify your doctor's office  Diet:  You may eat and drink with no restrictions 8 hours after ingesting capsule.  Activities:  Following the exam you may resume normal activities, including exercise.  Medications:  You may resume your medications 6 hours after ingesting the capsule.  Do NOT make up doses you have missed, just resume your normal dosage.  Further Testing:  Until the capsule passes, further testing which includes any type of MRI should be avoided.  If you have any type of MRI examination scheduled in the next 3 days, it should be postponed.  The Capsule:  The capsule passes naturally in a bowel movement typically in about 24 hours.  Most likely, you will be unaware of its passage.  It does not need to be retrieved and can safely be flushed down the toilet.  Occasionally the capsule light will still be flashing when it passes.  Should you be concerned that the capsule didn't pass, in the absence of symptoms; an abdominal x-ray can be obtained after 7 days to confirm its passage.  The capsule will make a beeping noise when finished.  It will shut off automatically.

## 2022-11-08 NOTE — PROGRESS NOTES
Subjective:       Patient ID: Fabiano Jules Jr. is a 49 y.o. male.    Vitals:  height is 6' (1.829 m) and weight is 145.2 kg (320 lb) (abnormal). His tympanic temperature is 98 °F (36.7 °C). His blood pressure is 136/86 and his pulse is 84. His respiration is 18 and oxygen saturation is 98%.     Chief Complaint: Cough    Cough   This is a new problem. The current episode started in the past 7 days. The problem has been gradually worsening. The problem occurs constantly. The cough is non-productive. Pertinent negatives include no chills, ear pain, eye redness, fever, hemoptysis, myalgias, rash, sore throat, shortness of breath or wheezing. Nothing aggravates the symptoms. He has tried OTC cough suppressant for the symptoms. The treatment provided no relief.       Constitution: Negative for chills, sweating, fatigue and fever.   HENT: Positive for congestion. Negative for ear pain, sinus pain, sinus pressure, sore throat and voice change.    Neck: Negative for painful lymph nodes.   Eyes: Negative for eye redness.   Respiratory: Positive for cough. Negative for chest tightness, sputum production, bloody sputum, COPD, shortness of breath, stridor, wheezing and asthma.    Gastrointestinal: Positive for nausea. Negative for vomiting.   Musculoskeletal: Negative for muscle ache.   Skin: Negative for rash.   Allergic/Immunologic: Negative for seasonal allergies and asthma.   Hematologic/Lymphatic: Negative for swollen lymph nodes.       Objective:      Physical Exam   Constitutional: He is oriented to person, place, and time. He appears well-developed and well-nourished. He is cooperative.  Non-toxic appearance. He does not have a sickly appearance. He does not appear ill. No distress.   HENT:   Head: Normocephalic and atraumatic.   Right Ear: Hearing, external ear and ear canal normal. Tympanic membrane is injected. Tympanic membrane is not erythematous.   Left Ear: Hearing, external ear and ear canal normal. Tympanic  Vaccine Information Statement(s) or the Emergency Use Authorization was given today. This has been reviewed, questions answered, and verbal consent given by Patient for injection(s) and administration of Influenza (Inactivated).      Patient tolerated without incident. See immunization grid for documentation.   membrane is injected and erythematous.   Nose: Mucosal edema, rhinorrhea and purulent discharge present. No nasal deformity. No epistaxis. Right sinus exhibits frontal sinus tenderness. Right sinus exhibits no maxillary sinus tenderness. Left sinus exhibits frontal sinus tenderness. Left sinus exhibits no maxillary sinus tenderness.   Mouth/Throat: Uvula is midline and mucous membranes are normal. No trismus in the jaw. Normal dentition. No uvula swelling. Posterior oropharyngeal edema present. No oropharyngeal exudate or posterior oropharyngeal erythema.   Eyes: Conjunctivae and lids are normal. No scleral icterus.   Neck: Trachea normal, full passive range of motion without pain and phonation normal. Neck supple. No neck rigidity. No edema and no erythema present.   Cardiovascular: Normal rate, regular rhythm, S1 normal, S2 normal, normal heart sounds, intact distal pulses and normal pulses.   No murmur heard.  Pulmonary/Chest: Effort normal and breath sounds normal. No respiratory distress. He has no decreased breath sounds. He has no wheezes. He has no rhonchi. He has no rales.   Abdominal: Normal appearance.   Musculoskeletal: Normal range of motion. He exhibits no edema or deformity.   Neurological: He is alert and oriented to person, place, and time. He exhibits normal muscle tone. Coordination normal.   Skin: Skin is warm, dry, intact, not diaphoretic and not pale.   Psychiatric: He has a normal mood and affect. His speech is normal and behavior is normal. Judgment and thought content normal. Cognition and memory are normal.   Nursing note and vitals reviewed.        Assessment:       1. Acute bacterial sinusitis    2. Acute serous otitis media of left ear, recurrence not specified    3. Cough        Plan:         Acute bacterial sinusitis    Acute serous otitis media of left ear, recurrence not specified    Cough    Other orders  -     amoxicillin-clavulanate 875-125mg (AUGMENTIN) 875-125 mg per tablet;  Take 1 tablet by mouth 2 (two) times daily. for 10 days  Dispense: 20 tablet; Refill: 0  -     benzonatate (TESSALON PERLES) 100 MG capsule; Take 2 capsules (200 mg total) by mouth 3 (three) times daily as needed for Cough.  Dispense: 30 capsule; Refill: 1  -     predniSONE (DELTASONE) 20 MG tablet; Take 1 tablet (20 mg total) by mouth once daily. for 5 days  Dispense: 5 tablet; Refill: 0

## 2022-11-15 ENCOUNTER — OFFICE VISIT (OUTPATIENT)
Dept: CARDIOLOGY | Facility: CLINIC | Age: 52
End: 2022-11-15
Payer: COMMERCIAL

## 2022-11-15 VITALS
DIASTOLIC BLOOD PRESSURE: 72 MMHG | OXYGEN SATURATION: 98 % | HEART RATE: 70 BPM | BODY MASS INDEX: 46.65 KG/M2 | SYSTOLIC BLOOD PRESSURE: 126 MMHG | WEIGHT: 315 LBS | HEIGHT: 69 IN

## 2022-11-15 DIAGNOSIS — Z01.818 PRE-OP EVALUATION: Primary | ICD-10-CM

## 2022-11-15 DIAGNOSIS — E78.2 MIXED HYPERLIPIDEMIA: ICD-10-CM

## 2022-11-15 PROCEDURE — 3044F PR MOST RECENT HEMOGLOBIN A1C LEVEL <7.0%: ICD-10-PCS | Mod: CPTII,S$GLB,, | Performed by: INTERNAL MEDICINE

## 2022-11-15 PROCEDURE — 3078F DIAST BP <80 MM HG: CPT | Mod: CPTII,S$GLB,, | Performed by: INTERNAL MEDICINE

## 2022-11-15 PROCEDURE — 3008F BODY MASS INDEX DOCD: CPT | Mod: CPTII,S$GLB,, | Performed by: INTERNAL MEDICINE

## 2022-11-15 PROCEDURE — 93000 EKG 12-LEAD: ICD-10-PCS | Mod: S$GLB,,, | Performed by: INTERNAL MEDICINE

## 2022-11-15 PROCEDURE — 4010F ACE/ARB THERAPY RXD/TAKEN: CPT | Mod: CPTII,S$GLB,, | Performed by: INTERNAL MEDICINE

## 2022-11-15 PROCEDURE — 99999 PR PBB SHADOW E&M-EST. PATIENT-LVL III: ICD-10-PCS | Mod: PBBFAC,,, | Performed by: INTERNAL MEDICINE

## 2022-11-15 PROCEDURE — 99204 OFFICE O/P NEW MOD 45 MIN: CPT | Mod: S$GLB,,, | Performed by: INTERNAL MEDICINE

## 2022-11-15 PROCEDURE — 3078F PR MOST RECENT DIASTOLIC BLOOD PRESSURE < 80 MM HG: ICD-10-PCS | Mod: CPTII,S$GLB,, | Performed by: INTERNAL MEDICINE

## 2022-11-15 PROCEDURE — 3008F PR BODY MASS INDEX (BMI) DOCUMENTED: ICD-10-PCS | Mod: CPTII,S$GLB,, | Performed by: INTERNAL MEDICINE

## 2022-11-15 PROCEDURE — 3074F SYST BP LT 130 MM HG: CPT | Mod: CPTII,S$GLB,, | Performed by: INTERNAL MEDICINE

## 2022-11-15 PROCEDURE — 93000 ELECTROCARDIOGRAM COMPLETE: CPT | Mod: S$GLB,,, | Performed by: INTERNAL MEDICINE

## 2022-11-15 PROCEDURE — 3044F HG A1C LEVEL LT 7.0%: CPT | Mod: CPTII,S$GLB,, | Performed by: INTERNAL MEDICINE

## 2022-11-15 PROCEDURE — 3074F PR MOST RECENT SYSTOLIC BLOOD PRESSURE < 130 MM HG: ICD-10-PCS | Mod: CPTII,S$GLB,, | Performed by: INTERNAL MEDICINE

## 2022-11-15 PROCEDURE — 99999 PR PBB SHADOW E&M-EST. PATIENT-LVL III: CPT | Mod: PBBFAC,,, | Performed by: INTERNAL MEDICINE

## 2022-11-15 PROCEDURE — 99204 PR OFFICE/OUTPT VISIT, NEW, LEVL IV, 45-59 MIN: ICD-10-PCS | Mod: S$GLB,,, | Performed by: INTERNAL MEDICINE

## 2022-11-15 PROCEDURE — 4010F PR ACE/ARB THEARPY RXD/TAKEN: ICD-10-PCS | Mod: CPTII,S$GLB,, | Performed by: INTERNAL MEDICINE

## 2022-11-15 RX ORDER — ROSUVASTATIN CALCIUM 10 MG/1
10 TABLET, COATED ORAL DAILY
Qty: 90 TABLET | Refills: 3 | Status: SHIPPED | OUTPATIENT
Start: 2022-11-15 | End: 2023-10-23

## 2022-11-15 NOTE — LETTER
November 15, 2022    Fabiano Jules Jr.  [address]  [place] [address]             [facility] - Cardiology  [address]  [place] [address]  Phone: [phone]  Fax: [phone] Patient: Fabiano Jules Jr.  : 1970  Referring Doctor:  Telephone:  Hospital:  Type of Anesthesia:  Date of Last Stent: na  Date of Last Office Visit: 11/15/2022    Current Outpatient Medications   Medication Sig    amLODIPine (NORVASC) 5 MG tablet Take 1 tablet (5 mg total) by mouth once daily.    b complex vitamins (B COMPLEX-VITAMIN B12) tablet Take 1 tablet by mouth once daily.    EPINEPHrine (EPIPEN) 0.3 mg/0.3 mL AtIn Inject into the muscle once for one dose as needed.    ergocalciferol (ERGOCALCIFEROL) 50,000 unit Cap Take 1 capsule (50,000 Units total) by mouth twice a week.    ferrous sulfate (FEROSUL) 325 mg (65 mg iron) Tab tablet Take 1 tablet (325 mg total) by mouth every other day. (Patient taking differently: Take 325 mg by mouth once daily.)    lisinopriL-hydrochlorothiazide (PRINZIDE,ZESTORETIC) 20-12.5 mg per tablet Take 2 tablets by mouth once daily.    lisinopriL-hydrochlorothiazide (PRINZIDE,ZESTORETIC) 20-12.5 mg per tablet Take 2 tablets by mouth once daily.    oxyCODONE-acetaminophen (PERCOCET) 7.5-325 mg per tablet Take 1 tablet by mouth 3 (three) times daily as needed.    semaglutide (OZEMPIC) 1 mg/dose (4 mg/3 mL) Inject 1 mg into the skin every 7 days.    rosuvastatin (CRESTOR) 10 MG tablet Take 1 tablet (10 mg total) by mouth once daily.     No current facility-administered medications for this visit.     Facility-Administered Medications Ordered in Other Visits   Medication    acetaminophen tablet 650 mg    ceFAZolin in dextrose (iso-os) 2 gram/100 mL PgBk 2,000 mg    HYDROmorphone injection 1 mg    ondansetron injection 4 mg    oxyCODONE-acetaminophen  mg per tablet 1 tablet    oxyCODONE-acetaminophen 5-325 mg per tablet 1 tablet    polyethylene glycol packet 17 g       This  patient has been assessed for risk factors for clearance of surgery with the following stipulations:    _x__ No contraindications  ___ Recommendations for antiplatelet/anticoagulant medications:  ___ Cleared for surgery with the following contraindications/precautions:  ___ Not cleared for surgery due to the following reasons:      If you have any questions regarding the above, please contact my office at [phone].    Sincerely,    Finesse Lu MD  Interventional Cardiologist  [facility]/ [facility]

## 2022-11-15 NOTE — PROGRESS NOTES
[facility] - Cardiology    Subjective:     Patient ID:  Fabiano Jules Jr. is a 52 y.o. male patient here for evaluation New Patient (Patient need surgery clearance for gastric sleeve , Dr Lay )      HPI:  52-year-old male here for preoperative cardiac clearance.  He had a stress test and an echocardiogram last year which were normal.  Patient reports he is doing 5000 steps a day without any exertional chest pain or shortness of breath.  He reports he was prescribed Lipitor for cholesterol issues last year but he did not take get after a while.  His ASCVD risk score was calculated to be around 9.2%    Review of Systems   All other systems reviewed and are negative.     Past Medical History:   Diagnosis Date    Arthritis     Back injury     Colon polyp     COVID     CPAP (continuous positive airway pressure) dependence     Hypertension     PONV (postoperative nausea and vomiting)     Sleep apnea     c pap machine used       Past Surgical History:   Procedure Laterality Date    BONE GRAFT N/A 4/5/2022    Procedure: BONE GRAFT;  Surgeon: Duran Adler Jr., MD;  Location: [facility] OR;  Service: Orthopedics;  Laterality: N/A;    CIRCUMCISION      COLONOSCOPY N/A 12/3/2018    Procedure: COLONOSCOPY;  Surgeon: CHRISSY Reyna MD;  Location: [facility] (4TH FLR);  Service: Endoscopy;  Laterality: N/A;    COLONOSCOPY N/A 9/1/2022    Procedure: COLONOSCOPY;  Surgeon: Alondra Arias MD;  Location: [facility];  Service: Endoscopy;  Laterality: N/A;    epidural steroid injection X 2      ESOPHAGOGASTRODUODENOSCOPY N/A 9/1/2022    Procedure: EGD (ESOPHAGOGASTRODUODENOSCOPY);  Surgeon: Alondra Arias MD;  Location: [facility];  Service: Endoscopy;  Laterality: N/A;    facet injection       [facility] [place]     INTRALUMINAL GASTROINTESTINAL TRACT IMAGING VIA CAPSULE N/A 10/3/2022    Procedure: IMAGING PROCEDURE, GI TRACT, INTRALUMINAL, VIA CAPSULE;  Surgeon: Alondra Arias MD;  Location:  [facility] ENDO;  Service: Endoscopy;  Laterality: N/A;    LIPOMA RESECTION      Back of neck    MAGNETIC RESONANCE IMAGING N/A 2020    Procedure: MRI (MAGNETIC RESONANCE IMAGING) LUMBAR SPINE;  Surgeon: [facility] Provider;  Location: [facility];  Service: General;  Laterality: N/A;  COVID NEG    MAGNETIC RESONANCE IMAGING N/A 12/3/2021    Procedure: MRI (MAGNETIC RESONANCE IMAGING), LUMBAR SPINE;  Surgeon: [facility] Diagnostic Provider;  Location: [facility];  Service: General;  Laterality: N/A;  In MRI @ 7:50 per Ariela    MEDIAL COLLATERAL LIGAMENT AND LATERAL COLLATERAL LIGAMENT REPAIR, KNEE Right 2018    Dr. Christophe Gore in [place]     MINIMALLY INVASIVE TRANSFORAMINAL LUMBAR INTERBODY FUSION (TLIF) N/A 2022    Procedure: FUSION, SPINE, LUMBAR, TLIF, MINIMALLY INVASIVE, WITH INSTRUMENTATION,  L4/5 RIGHT;  Surgeon: Duran Adler Jr., MD;  Location: [facility];  Service: Orthopedics;  Laterality: N/A;  10:30am per Tamela    MYELOGRAPHY N/A 2020    Procedure: MYELOGRAM;  Surgeon: [facility] Diagnostic Provider;  Location: [facility];  Service: General;  Laterality: N/A;       Family History   Problem Relation Age of Onset    Hypertension Mother     Diabetes Father     Cancer Father         mesotheliosma     Cataracts Neg Hx     Glaucoma Neg Hx     Macular degeneration Neg Hx     Retinal detachment Neg Hx     Colon cancer Neg Hx     Colon polyps Neg Hx     Crohn's disease Neg Hx     Ulcerative colitis Neg Hx        Social History     Socioeconomic History    Marital status:     Number of children: 4   Occupational History     Employer: [employer]   Tobacco Use    Smoking status: Former     Packs/day: 0.50     Years: 10.00     Pack years: 5.00     Types: Cigarettes     Quit date: 2009     Years since quittin.8    Smokeless tobacco: Never    Tobacco comments:     quit smoking in    Substance and Sexual Activity    Alcohol use: Not Currently     Comment: rare    Drug use: Not Currently     Sexual activity: Not Currently       Current Outpatient Medications   Medication Sig Dispense Refill    amLODIPine (NORVASC) 5 MG tablet Take 1 tablet (5 mg total) by mouth once daily. 30 tablet 11    b complex vitamins (B COMPLEX-VITAMIN B12) tablet Take 1 tablet by mouth once daily. 90 tablet 1    EPINEPHrine (EPIPEN) 0.3 mg/0.3 mL AtIn Inject into the muscle once for one dose as needed. 2 each 1    ergocalciferol (ERGOCALCIFEROL) 50,000 unit Cap Take 1 capsule (50,000 Units total) by mouth twice a week. 24 capsule 0    ferrous sulfate (FEROSUL) 325 mg (65 mg iron) Tab tablet Take 1 tablet (325 mg total) by mouth every other day. (Patient taking differently: Take 325 mg by mouth once daily.) 90 tablet 0    lisinopriL-hydrochlorothiazide (PRINZIDE,ZESTORETIC) 20-12.5 mg per tablet Take 2 tablets by mouth once daily. 180 tablet 1    lisinopriL-hydrochlorothiazide (PRINZIDE,ZESTORETIC) 20-12.5 mg per tablet Take 2 tablets by mouth once daily. 180 tablet 1    oxyCODONE-acetaminophen (PERCOCET) 7.5-325 mg per tablet Take 1 tablet by mouth 3 (three) times daily as needed.      semaglutide (OZEMPIC) 1 mg/dose (4 mg/3 mL) Inject 1 mg into the skin every 7 days. 1 pen 11    rosuvastatin (CRESTOR) 10 MG tablet Take 1 tablet (10 mg total) by mouth once daily. 90 tablet 3     No current facility-administered medications for this visit.     Facility-Administered Medications Ordered in Other Visits   Medication Dose Route Frequency Provider Last Rate Last Admin    acetaminophen tablet 650 mg  650 mg Oral Q4H PRN Katie Shane NP        ceFAZolin in dextrose (iso-os) 2 gram/100 mL PgBk 2,000 mg  2 g Intravenous Q8H Katie Shane NP   Stopped at 04/09/22 1036    HYDROmorphone injection 1 mg  1 mg Intravenous Q6H PRN Katie Shane NP   1 mg at 04/09/22 0539    ondansetron injection 4 mg  4 mg Intravenous Q6H PRN Katie Shane NP        oxyCODONE-acetaminophen  mg per tablet 1 tablet  1 tablet Oral Q4H PRN Katie  LAUREL Shane NP   1 tablet at 04/09/22 0906    oxyCODONE-acetaminophen 5-325 mg per tablet 1 tablet  1 tablet Oral Q4H PRN Katie Shane NP   1 tablet at 04/08/22 0947    polyethylene glycol packet 17 g  17 g Oral Daily Katie BARRAGAN DEANGELO Shane   17 g at 04/08/22 0822       Review of patient's allergies indicates:   Allergen Reactions    Peaches [peach (prunus persica)] Anaphylaxis    Peanut Anaphylaxis         Objective:        Vitals:    11/15/22 1534   BP: 126/72   Pulse: 70       Physical Exam  Vitals reviewed.   Constitutional:       Appearance: Normal appearance. He is obese.   HENT:      Mouth/Throat:      Mouth: Mucous membranes are moist.   Eyes:      Extraocular Movements: Extraocular movements intact.      Pupils: Pupils are equal, round, and reactive to light.   Cardiovascular:      Rate and Rhythm: Normal rate and regular rhythm.      Pulses: Normal pulses.      Heart sounds: Normal heart sounds. No murmur heard.    No gallop.   Pulmonary:      Effort: Pulmonary effort is normal.      Breath sounds: Normal breath sounds.   Abdominal:      General: Bowel sounds are normal.      Palpations: Abdomen is soft.   Musculoskeletal:         General: Normal range of motion.      Cervical back: Normal range of motion.   Skin:     General: Skin is warm and dry.   Neurological:      General: No focal deficit present.      Mental Status: He is alert and oriented to person, place, and time.   Psychiatric:         Mood and Affect: Mood normal.       LIPIDS - LAST 2   Lab Results   Component Value Date    CHOL 177 12/04/2021    CHOL 146 11/12/2020    HDL 37 (L) 12/04/2021    HDL 44 11/12/2020    LDLCALC 117.0 12/04/2021    LDLCALC 91.0 11/12/2020    TRIG 115 12/04/2021    TRIG 55 11/12/2020    CHOLHDL 20.9 12/04/2021    CHOLHDL 30.1 11/12/2020       CBC - LAST 2  Lab Results   Component Value Date    WBC 5.36 08/03/2022    WBC 11.80 04/09/2022    RBC 4.73 08/03/2022    RBC 3.78 (L) 04/09/2022    HGB 12.2 (L) 08/03/2022     HGB 10.0 (L) 04/09/2022    HCT 37.6 (L) 08/03/2022    HCT 30.6 (L) 04/09/2022    MCV 80 (L) 08/03/2022    MCV 81 (L) 04/09/2022    MCH 25.8 (L) 08/03/2022    MCH 26.4 (L) 04/09/2022    MCHC 32.4 08/03/2022    MCHC 32.6 04/09/2022    RDW 16.1 (H) 08/03/2022    RDW 13.8 04/09/2022     08/03/2022     04/09/2022    MPV 10.2 08/03/2022    MPV 7.5 04/09/2022    GRAN 2.3 08/03/2022    GRAN 43.6 08/03/2022    LYMPH 2.0 08/03/2022    LYMPH 37.5 08/03/2022    MONO 0.9 08/03/2022    MONO 17.5 (H) 08/03/2022    BASO 0.03 08/03/2022    BASO 0.10 04/09/2022    NRBC 0 08/03/2022    NRBC 0 04/09/2022       CHEMISTRY & LIVER FUNCTION - LAST 2  Lab Results   Component Value Date     08/03/2022     (L) 04/09/2022    K 3.8 08/03/2022    K 3.9 04/09/2022     08/03/2022    CL 93 (L) 04/09/2022    CO2 29 08/03/2022    CO2 33 (H) 04/09/2022    ANIONGAP 10 08/03/2022    ANIONGAP 6 (L) 04/09/2022    BUN 9 08/03/2022    BUN 12 04/09/2022    CREATININE 0.9 08/03/2022    CREATININE 0.73 (L) 04/09/2022    GLU 92 08/03/2022     (H) 04/09/2022    CALCIUM 9.7 08/03/2022    CALCIUM 8.9 04/09/2022    MG 2.4 08/03/2022    MG 2.1 04/06/2022    ALBUMIN 4.3 08/03/2022    ALBUMIN 3.6 04/09/2022    PROT 7.9 08/03/2022    PROT 6.4 04/09/2022    ALKPHOS 57 08/03/2022    ALKPHOS 54 04/09/2022    ALT 19 08/03/2022    ALT 42 04/09/2022    AST 29 08/03/2022     (H) 04/09/2022    BILITOT 0.6 08/03/2022    BILITOT 1.1 04/09/2022        CARDIAC PROFILE - LAST 2  Lab Results   Component Value Date    BNP 27 07/13/2021     10/27/2007    TROPONINI 0.073 (H) 07/14/2021    TROPONINI 0.073 (H) 07/14/2021        COAGULATION - LAST 2  No results found for: LABPT, INR, APTT    ENDOCRINE & PSA - LAST 2  Lab Results   Component Value Date    HGBA1C 6.0 (H) 08/03/2022    HGBA1C 5.6 06/23/2021    TSH 2.002 08/03/2022    TSH 3.530 07/14/2021    PROCAL <0.05 07/14/2021    PSA 3.1 12/04/2021    PSA 2.1 11/12/2020         ECHOCARDIOGRAM RESULTS  Results for orders placed during the hospital encounter of 07/13/21    Echo    Interpretation Summary  · The estimated PA systolic pressure is 34 mmHg.  · The left ventricle is normal in size with normal systolic function.  · Normal left ventricular diastolic function.  · The estimated ejection fraction is 60%.  · Normal right ventricular size with normal right ventricular systolic function.  · Mild tricuspid regurgitation.  · Mild mitral regurgitation.  · Mild aortic regurgitation.  · Normal central venous pressure (3 mmHg).      CURRENT/PREVIOUS VISIT EKG  Results for orders placed or performed in visit on 03/29/22   IN OFFICE EKG 12-LEAD (to [facility])    Collection Time: 03/29/22  9:01 AM    Narrative    Test Reason : Z01.818,    Vent. Rate : 068 BPM     Atrial Rate : 068 BPM     P-R Int : 140 ms          QRS Dur : 114 ms      QT Int : 400 ms       P-R-T Axes : 068 077 039 degrees     QTc Int : 425 ms    Normal sinus rhythm with sinus arrhythmia  Normal ECG  When compared with ECG of 16-JUL-2021 11:55,  No significant change was found  Confirmed by Eran Aquino MD ([id number]) on 3/29/2022 11:58:53 AM    Referred By: CHARITO QUEEN           Confirmed By:Erna Aquino MD     No valid procedures specified.   Results for orders placed during the hospital encounter of 07/13/21    Nuclear Stress Test    Interpretation Summary    The EKG portion of this study is negative for ischemia.    The patient reported no chest pain during the stress test.    During stress, occasional PVCs are noted.    The nuclear portion of this study will be reported separately.    No valid procedures specified.        Assessment:       1. Pre-op evaluation    2. Mixed hyperlipidemia             Plan:       Pre-op evaluation  -     Ambulatory referral/consult to Cardiology  -     IN OFFICE EKG 12-LEAD (to [facility])    Mixed hyperlipidemia    Other orders  -     rosuvastatin (CRESTOR) 10 MG tablet; Take 1 tablet (10 mg total) by mouth  once daily.  Dispense: 90 tablet; Refill: 3  No evidence of angina or heart failure.  No evidence of valvular heart disease on last year's echo or on physical exam today.  Cardiac clearance letter signed and sent to the surgeon.  ASCVD risk score calculated with the patient and recommended moderate intensity statin.  Patient was on atorvastatin previously but it has peanuts as inactive a gradient, patient would like to not tried and would like an alternative statin.  Crestor prescribed to the patient.    Follow up in about 1 year (around 11/15/2023) for HTN, HLD.          Finesse Lu MD  [facility] - Cardiology

## 2022-11-16 ENCOUNTER — OFFICE VISIT (OUTPATIENT)
Dept: BARIATRICS | Facility: CLINIC | Age: 52
End: 2022-11-16
Payer: COMMERCIAL

## 2022-11-16 VITALS
HEIGHT: 69 IN | BODY MASS INDEX: 46.65 KG/M2 | WEIGHT: 315 LBS | SYSTOLIC BLOOD PRESSURE: 144 MMHG | TEMPERATURE: 99 F | DIASTOLIC BLOOD PRESSURE: 77 MMHG | RESPIRATION RATE: 16 BRPM

## 2022-11-16 DIAGNOSIS — I10 PRIMARY HYPERTENSION: ICD-10-CM

## 2022-11-16 DIAGNOSIS — E66.01 CLASS 3 SEVERE OBESITY DUE TO EXCESS CALORIES WITH SERIOUS COMORBIDITY AND BODY MASS INDEX (BMI) OF 50.0 TO 59.9 IN ADULT: ICD-10-CM

## 2022-11-16 DIAGNOSIS — E66.01 MORBID OBESITY: Primary | ICD-10-CM

## 2022-11-16 DIAGNOSIS — G47.33 OSA (OBSTRUCTIVE SLEEP APNEA): ICD-10-CM

## 2022-11-16 PROCEDURE — 4010F ACE/ARB THERAPY RXD/TAKEN: CPT | Mod: CPTII,S$GLB,, | Performed by: SURGERY

## 2022-11-16 PROCEDURE — 3044F PR MOST RECENT HEMOGLOBIN A1C LEVEL <7.0%: ICD-10-PCS | Mod: CPTII,S$GLB,, | Performed by: SURGERY

## 2022-11-16 PROCEDURE — 3078F PR MOST RECENT DIASTOLIC BLOOD PRESSURE < 80 MM HG: ICD-10-PCS | Mod: CPTII,S$GLB,, | Performed by: SURGERY

## 2022-11-16 PROCEDURE — 3078F DIAST BP <80 MM HG: CPT | Mod: CPTII,S$GLB,, | Performed by: SURGERY

## 2022-11-16 PROCEDURE — 3008F BODY MASS INDEX DOCD: CPT | Mod: CPTII,S$GLB,, | Performed by: SURGERY

## 2022-11-16 PROCEDURE — 4010F PR ACE/ARB THEARPY RXD/TAKEN: ICD-10-PCS | Mod: CPTII,S$GLB,, | Performed by: SURGERY

## 2022-11-16 PROCEDURE — 1159F PR MEDICATION LIST DOCUMENTED IN MEDICAL RECORD: ICD-10-PCS | Mod: CPTII,S$GLB,, | Performed by: SURGERY

## 2022-11-16 PROCEDURE — 99999 PR PBB SHADOW E&M-EST. PATIENT-LVL IV: CPT | Mod: PBBFAC,,, | Performed by: SURGERY

## 2022-11-16 PROCEDURE — 99214 OFFICE O/P EST MOD 30 MIN: CPT | Mod: S$GLB,,, | Performed by: SURGERY

## 2022-11-16 PROCEDURE — 3008F PR BODY MASS INDEX (BMI) DOCUMENTED: ICD-10-PCS | Mod: CPTII,S$GLB,, | Performed by: SURGERY

## 2022-11-16 PROCEDURE — 3077F SYST BP >= 140 MM HG: CPT | Mod: CPTII,S$GLB,, | Performed by: SURGERY

## 2022-11-16 PROCEDURE — 3077F PR MOST RECENT SYSTOLIC BLOOD PRESSURE >= 140 MM HG: ICD-10-PCS | Mod: CPTII,S$GLB,, | Performed by: SURGERY

## 2022-11-16 PROCEDURE — 99999 PR PBB SHADOW E&M-EST. PATIENT-LVL IV: ICD-10-PCS | Mod: PBBFAC,,, | Performed by: SURGERY

## 2022-11-16 PROCEDURE — 1159F MED LIST DOCD IN RCRD: CPT | Mod: CPTII,S$GLB,, | Performed by: SURGERY

## 2022-11-16 PROCEDURE — 3044F HG A1C LEVEL LT 7.0%: CPT | Mod: CPTII,S$GLB,, | Performed by: SURGERY

## 2022-11-16 PROCEDURE — 99214 PR OFFICE/OUTPT VISIT, EST, LEVL IV, 30-39 MIN: ICD-10-PCS | Mod: S$GLB,,, | Performed by: SURGERY

## 2022-11-16 NOTE — PROGRESS NOTES
Follow up    SUBJECTIVE:     Chief Complaint   Patient presents with    Obesity       History of Present Illness:    Patient is a 52 y.o. male who is referred for evaluation of surgical treatment of morbid obesity. His Body mass index is 51.6 kg/m². He has known comorbidities of hypertension, obstructive sleep apnea and arthritis. He has not attended the bariatric seminar and is most interested in gastric sleeve.    Review of patient's allergies indicates:   Allergen Reactions    Peaches [peach (prunus persica)] Anaphylaxis    Peanut Anaphylaxis       Current Outpatient Medications   Medication Sig Dispense Refill    amLODIPine (NORVASC) 5 MG tablet Take 1 tablet (5 mg total) by mouth once daily. 30 tablet 11    b complex vitamins (B COMPLEX-VITAMIN B12) tablet Take 1 tablet by mouth once daily. 90 tablet 1    EPINEPHrine (EPIPEN) 0.3 mg/0.3 mL AtIn Inject into the muscle once for one dose as needed. 2 each 1    ergocalciferol (ERGOCALCIFEROL) 50,000 unit Cap Take 1 capsule (50,000 Units total) by mouth twice a week. 24 capsule 0    ferrous sulfate (FEROSUL) 325 mg (65 mg iron) Tab tablet Take 1 tablet (325 mg total) by mouth every other day. (Patient taking differently: Take 325 mg by mouth once daily.) 90 tablet 0    lisinopriL-hydrochlorothiazide (PRINZIDE,ZESTORETIC) 20-12.5 mg per tablet Take 2 tablets by mouth once daily. 180 tablet 1    lisinopriL-hydrochlorothiazide (PRINZIDE,ZESTORETIC) 20-12.5 mg per tablet Take 2 tablets by mouth once daily. 180 tablet 1    oxyCODONE-acetaminophen (PERCOCET) 7.5-325 mg per tablet Take 1 tablet by mouth 3 (three) times daily as needed.      rosuvastatin (CRESTOR) 10 MG tablet Take 1 tablet (10 mg total) by mouth once daily. 90 tablet 3    semaglutide (OZEMPIC) 1 mg/dose (4 mg/3 mL) Inject 1 mg into the skin every 7 days. 1 pen 11     No current facility-administered medications for this visit.     Facility-Administered Medications Ordered in Other Visits   Medication  Dose Route Frequency Provider Last Rate Last Admin    acetaminophen tablet 650 mg  650 mg Oral Q4H PRN Katie Shane NP        ceFAZolin in dextrose (iso-os) 2 gram/100 mL PgBk 2,000 mg  2 g Intravenous Q8H Katie Shane NP   Stopped at 04/09/22 1036    HYDROmorphone injection 1 mg  1 mg Intravenous Q6H PRN Katie Shane NP   1 mg at 04/09/22 0539    ondansetron injection 4 mg  4 mg Intravenous Q6H PRN Katie Shane NP        oxyCODONE-acetaminophen  mg per tablet 1 tablet  1 tablet Oral Q4H PRN Katie Shane NP   1 tablet at 04/09/22 0906    oxyCODONE-acetaminophen 5-325 mg per tablet 1 tablet  1 tablet Oral Q4H PRN Katie Shane NP   1 tablet at 04/08/22 0947    polyethylene glycol packet 17 g  17 g Oral Daily Katie Shane NP   17 g at 04/08/22 0822       Past Medical History:   Diagnosis Date    Arthritis     Back injury     Colon polyp     COVID     CPAP (continuous positive airway pressure) dependence     Hypertension     PONV (postoperative nausea and vomiting)     Sleep apnea     c pap machine used     Past Surgical History:   Procedure Laterality Date    BONE GRAFT N/A 4/5/2022    Procedure: BONE GRAFT;  Surgeon: Duran Adler Jr., MD;  Location: [facility] OR;  Service: Orthopedics;  Laterality: N/A;    CIRCUMCISION      COLONOSCOPY N/A 12/3/2018    Procedure: COLONOSCOPY;  Surgeon: CHRISSY Reyna MD;  Location: [address]);  Service: Endoscopy;  Laterality: N/A;    COLONOSCOPY N/A 9/1/2022    Procedure: COLONOSCOPY;  Surgeon: Alondra Arias MD;  Location: [facility];  Service: Endoscopy;  Laterality: N/A;    epidural steroid injection X 2      ESOPHAGOGASTRODUODENOSCOPY N/A 9/1/2022    Procedure: EGD (ESOPHAGOGASTRODUODENOSCOPY);  Surgeon: Alondra Arias MD;  Location: [facility];  Service: Endoscopy;  Laterality: N/A;    facet injection       [facility] [place]     INTRALUMINAL GASTROINTESTINAL TRACT IMAGING VIA CAPSULE N/A  10/3/2022    Procedure: IMAGING PROCEDURE, GI TRACT, INTRALUMINAL, VIA CAPSULE;  Surgeon: Alondra Arias MD;  Location: [facility] ENDO;  Service: Endoscopy;  Laterality: N/A;    LIPOMA RESECTION      Back of neck    MAGNETIC RESONANCE IMAGING N/A 2020    Procedure: MRI (MAGNETIC RESONANCE IMAGING) LUMBAR SPINE;  Surgeon: [facility];  Location: [facility];  Service: General;  Laterality: N/A;  COVID NEG    MAGNETIC RESONANCE IMAGING N/A 12/3/2021    Procedure: MRI (MAGNETIC RESONANCE IMAGING), LUMBAR SPINE;  Surgeon: [facility];  Location: [facility];  Service: General;  Laterality: N/A;  In MRI @ 7:50 per Ariela    MEDIAL COLLATERAL LIGAMENT AND LATERAL COLLATERAL LIGAMENT REPAIR, KNEE Right 2018    Dr. Christophe Gore in [place]     MINIMALLY INVASIVE TRANSFORAMINAL LUMBAR INTERBODY FUSION (TLIF) N/A 2022    Procedure: FUSION, SPINE, LUMBAR, TLIF, MINIMALLY INVASIVE, WITH INSTRUMENTATION,  L4/5 RIGHT;  Surgeon: Duran Adler Jr., MD;  Location: [facility];  Service: Orthopedics;  Laterality: N/A;  10:30am per Tamela    MYELOGRAPHY N/A 2020    Procedure: MYELOGRAM;  Surgeon: [facility];  Location: [facility];  Service: General;  Laterality: N/A;     Family History   Problem Relation Age of Onset    Hypertension Mother     Diabetes Father     Cancer Father         mesotheliosma     Cataracts Neg Hx     Glaucoma Neg Hx     Macular degeneration Neg Hx     Retinal detachment Neg Hx     Colon cancer Neg Hx     Colon polyps Neg Hx     Crohn's disease Neg Hx     Ulcerative colitis Neg Hx      Social History     Tobacco Use    Smoking status: Former     Packs/day: 0.50     Years: 10.00     Pack years: 5.00     Types: Cigarettes     Quit date: 2009     Years since quittin.8    Smokeless tobacco: Never    Tobacco comments:     quit smoking in    Substance Use Topics    Alcohol use: Not Currently     Comment: rare    Drug use: Not Currently        Review of  Systems:  Review of Systems   Constitutional:  Positive for activity change. Negative for appetite change, chills, diaphoresis, fatigue and fever.   HENT:  Negative for congestion, dental problem, drooling, ear discharge, ear pain, facial swelling, hearing loss, mouth sores, nosebleeds, postnasal drip, rhinorrhea, sinus pressure, sinus pain, sneezing, sore throat, tinnitus, trouble swallowing and voice change.    Eyes:  Negative for photophobia, pain, discharge, itching and visual disturbance.   Respiratory:  Negative for apnea, cough, chest tightness, shortness of breath and wheezing.    Cardiovascular:  Negative for chest pain, palpitations and leg swelling.   Gastrointestinal:  Negative for abdominal distention, abdominal pain, anal bleeding, blood in stool, constipation, diarrhea, nausea, rectal pain and vomiting.   Endocrine: Negative for cold intolerance, heat intolerance, polydipsia, polyphagia and polyuria.   Genitourinary:  Negative for difficulty urinating, dysuria, enuresis, flank pain, frequency, genital sores and hematuria.   Musculoskeletal:  Positive for arthralgias, back pain, gait problem and joint swelling. Negative for myalgias, neck pain and neck stiffness.        Reports increased pain over 2 weeks, does not remember specifically the cause. Pain radiating down right leg. Denies numbness, tingling, loss of sensation, loss of bowel/bladder or erection.   Skin:  Negative for color change, pallor, rash and wound.   Allergic/Immunologic: Negative.  Negative for environmental allergies, food allergies and immunocompromised state.   Neurological:  Negative for dizziness, tremors, seizures, syncope, facial asymmetry, speech difficulty, weakness, light-headedness, numbness and headaches.   Hematological:  Negative for adenopathy. Does not bruise/bleed easily.   Psychiatric/Behavioral:  Negative for agitation, behavioral problems, confusion, decreased concentration, dysphoric mood, hallucinations,  "self-injury, sleep disturbance and suicidal ideas. The patient is not nervous/anxious and is not hyperactive.      OBJECTIVE:     Vital Signs (Most Recent)  Temp: 98.5 °F (36.9 °C) (11/16/22 1135)  Resp: 16 (11/16/22 1135)  BP: (!) 144/77 (11/16/22 1135)  5' 9" (1.753 m)  (!) 158.5 kg (349 lb 6.4 oz)     Body comp:  Fat Percent:  45.6 %  Fat Mass:  159.4 lb  FFM:  190 lb  TBW: 152 lb  TBW %:  43.5 %  BMR: 2734 kcal      Physical Exam  Vitals and nursing note reviewed.   Constitutional:       General: He is not in acute distress.     Appearance: Normal appearance. He is well-developed. He is not ill-appearing or diaphoretic.   HENT:      Head: Normocephalic and atraumatic.      Right Ear: External ear normal.      Left Ear: External ear normal.      Nose: Nose normal. No congestion or rhinorrhea.      Mouth/Throat:      Mouth: Mucous membranes are moist.      Pharynx: Oropharynx is clear. No oropharyngeal exudate.   Eyes:      General: No scleral icterus.        Right eye: No discharge.         Left eye: No discharge.      Conjunctiva/sclera: Conjunctivae normal.      Pupils: Pupils are equal, round, and reactive to light.   Neck:      Thyroid: No thyromegaly.      Vascular: No JVD.      Trachea: No tracheal deviation.   Cardiovascular:      Rate and Rhythm: Normal rate and regular rhythm.      Pulses: Normal pulses.      Heart sounds: Normal heart sounds. No murmur heard.    No friction rub. No gallop.   Pulmonary:      Effort: Pulmonary effort is normal. No respiratory distress.      Breath sounds: Normal breath sounds. No stridor. No wheezing or rales.   Chest:      Chest wall: No tenderness.   Abdominal:      General: Bowel sounds are normal. There is no distension.      Palpations: Abdomen is soft. There is no mass.      Tenderness: There is no abdominal tenderness. There is no guarding or rebound.      Hernia: No hernia is present.   Musculoskeletal:         General: No swelling, tenderness, deformity or signs " of injury. Normal range of motion.      Cervical back: Normal range of motion and neck supple. No rigidity or tenderness.      Right lower leg: Edema present.      Left lower leg: Edema present.   Skin:     General: Skin is warm and dry.      Capillary Refill: Capillary refill takes less than 2 seconds.      Coloration: Skin is not jaundiced or pale.      Findings: No bruising, erythema, lesion or rash.   Neurological:      General: No focal deficit present.      Mental Status: He is alert and oriented to person, place, and time. Mental status is at baseline.      Cranial Nerves: No cranial nerve deficit.      Motor: No weakness.      Coordination: Coordination abnormal.      Gait: Gait abnormal.   Psychiatric:         Mood and Affect: Mood normal.         Thought Content: Thought content normal.         Judgment: Judgment normal.       ASSESSMENT/PLAN:        Labs  EKG  UGI - Small sliding hiatal hernia otherwise negative upper GI  dietary consult- done  psych consult - done [facility]  Seminar        I will obtain the following clearances prior to surgery: Cardiology- done    1. Morbid obesity        2. Class 3 severe obesity due to excess calories with serious comorbidity and body mass index (BMI) of 50.0 to 59.9 in adult        3. RAFAEL (obstructive sleep apnea)        4. Primary hypertension            Plan:  Fabiano Jules Jr. has morbid obesity as their Body mass index is 51.6 kg/m². He would benefit from weight loss surgery and has chosen gastric sleeve surgery as the preferred procedure. He understands that this is a tool and lifestyle change will be necessary to keep weight off. I went over possible complications of the chosen surgery with the patient and he is agreeable to surgery.    He has been instructed to start the pre operative diet.    He surgical date is December 19       The patient has been taught the post operative diet and understands that he will need to stay on this diet to prevent complication.  They  are aware of the post operative follow up and return to see me after surgery to treat/prevent/identify any complications.  he has been advised to attend support groups post operatively.

## 2022-11-16 NOTE — PATIENT INSTRUCTIONS
Pre op Diet- start dec 5  Fast between meals     Breakfast- protein shake  Lunch- protein shake  Dinner- 3 ounces of meat and vegetables ( from list)    NO SNACKING  Only water to drink.

## 2022-11-18 DIAGNOSIS — M79.672 BILATERAL FOOT PAIN: Primary | ICD-10-CM

## 2022-11-18 DIAGNOSIS — M25.572 ACUTE BILATERAL ANKLE PAIN: ICD-10-CM

## 2022-11-18 DIAGNOSIS — M25.571 ACUTE BILATERAL ANKLE PAIN: ICD-10-CM

## 2022-11-18 DIAGNOSIS — M79.671 BILATERAL FOOT PAIN: Primary | ICD-10-CM

## 2022-11-21 ENCOUNTER — HOSPITAL ENCOUNTER (OUTPATIENT)
Dept: RADIOLOGY | Facility: HOSPITAL | Age: 52
Discharge: HOME | End: 2022-11-21
Attending: ORTHOPAEDIC SURGERY
Payer: COMMERCIAL

## 2022-11-21 ENCOUNTER — OFFICE VISIT (OUTPATIENT)
Dept: ORTHOPEDICS | Facility: CLINIC | Age: 52
End: 2022-11-21
Payer: COMMERCIAL

## 2022-11-21 VITALS — WEIGHT: 315 LBS | HEIGHT: 69 IN | BODY MASS INDEX: 46.65 KG/M2

## 2022-11-21 DIAGNOSIS — M79.671 BILATERAL FOOT PAIN: ICD-10-CM

## 2022-11-21 DIAGNOSIS — M79.672 BILATERAL FOOT PAIN: ICD-10-CM

## 2022-11-21 DIAGNOSIS — M21.41 PES PLANOVALGUS, ACQUIRED, RIGHT: ICD-10-CM

## 2022-11-21 DIAGNOSIS — M19.072 ARTHRITIS OF ANKLE, LEFT: ICD-10-CM

## 2022-11-21 DIAGNOSIS — M19.071 ARTHRITIS OF ANKLE, RIGHT: Primary | ICD-10-CM

## 2022-11-21 DIAGNOSIS — M21.42 PES PLANOVALGUS, ACQUIRED, LEFT: ICD-10-CM

## 2022-11-21 DIAGNOSIS — M25.572 ACUTE BILATERAL ANKLE PAIN: ICD-10-CM

## 2022-11-21 DIAGNOSIS — M76.822 POSTERIOR TIBIAL TENDINITIS OF BOTH LOWER EXTREMITIES: ICD-10-CM

## 2022-11-21 DIAGNOSIS — M25.571 ACUTE BILATERAL ANKLE PAIN: ICD-10-CM

## 2022-11-21 DIAGNOSIS — M76.821 POSTERIOR TIBIAL TENDINITIS OF BOTH LOWER EXTREMITIES: ICD-10-CM

## 2022-11-21 DIAGNOSIS — M62.89 TIGHTNESS OF BOTH GASTROCNEMIUS MUSCLES: ICD-10-CM

## 2022-11-21 PROCEDURE — 73630 XR FOOT COMPLETE 3 VIEW BILATERAL: ICD-10-PCS | Mod: 26,,, | Performed by: RADIOLOGY

## 2022-11-21 PROCEDURE — 3008F BODY MASS INDEX DOCD: CPT | Mod: CPTII,S$GLB,, | Performed by: ORTHOPAEDIC SURGERY

## 2022-11-21 PROCEDURE — 3044F HG A1C LEVEL LT 7.0%: CPT | Mod: CPTII,S$GLB,, | Performed by: ORTHOPAEDIC SURGERY

## 2022-11-21 PROCEDURE — 1160F PR REVIEW ALL MEDS BY PRESCRIBER/CLIN PHARMACIST DOCUMENTED: ICD-10-PCS | Mod: CPTII,S$GLB,, | Performed by: ORTHOPAEDIC SURGERY

## 2022-11-21 PROCEDURE — 73610 X-RAY EXAM OF ANKLE: CPT | Mod: TC,50,PO

## 2022-11-21 PROCEDURE — 99214 OFFICE O/P EST MOD 30 MIN: CPT | Mod: 25,S$GLB,, | Performed by: ORTHOPAEDIC SURGERY

## 2022-11-21 PROCEDURE — 73610 X-RAY EXAM OF ANKLE: CPT | Mod: 26,,, | Performed by: RADIOLOGY

## 2022-11-21 PROCEDURE — 1160F RVW MEDS BY RX/DR IN RCRD: CPT | Mod: CPTII,S$GLB,, | Performed by: ORTHOPAEDIC SURGERY

## 2022-11-21 PROCEDURE — 3044F PR MOST RECENT HEMOGLOBIN A1C LEVEL <7.0%: ICD-10-PCS | Mod: CPTII,S$GLB,, | Performed by: ORTHOPAEDIC SURGERY

## 2022-11-21 PROCEDURE — 73630 X-RAY EXAM OF FOOT: CPT | Mod: 26,,, | Performed by: RADIOLOGY

## 2022-11-21 PROCEDURE — 20605 INTERMEDIATE JOINT ASPIRATION/INJECTION: R ANKLE, L ANKLE: ICD-10-PCS | Mod: 50,S$GLB,, | Performed by: ORTHOPAEDIC SURGERY

## 2022-11-21 PROCEDURE — 4010F ACE/ARB THERAPY RXD/TAKEN: CPT | Mod: CPTII,S$GLB,, | Performed by: ORTHOPAEDIC SURGERY

## 2022-11-21 PROCEDURE — 1159F PR MEDICATION LIST DOCUMENTED IN MEDICAL RECORD: ICD-10-PCS | Mod: CPTII,S$GLB,, | Performed by: ORTHOPAEDIC SURGERY

## 2022-11-21 PROCEDURE — 3008F PR BODY MASS INDEX (BMI) DOCUMENTED: ICD-10-PCS | Mod: CPTII,S$GLB,, | Performed by: ORTHOPAEDIC SURGERY

## 2022-11-21 PROCEDURE — 1159F MED LIST DOCD IN RCRD: CPT | Mod: CPTII,S$GLB,, | Performed by: ORTHOPAEDIC SURGERY

## 2022-11-21 PROCEDURE — 4010F PR ACE/ARB THEARPY RXD/TAKEN: ICD-10-PCS | Mod: CPTII,S$GLB,, | Performed by: ORTHOPAEDIC SURGERY

## 2022-11-21 PROCEDURE — 20605 DRAIN/INJ JOINT/BURSA W/O US: CPT | Mod: 50,S$GLB,, | Performed by: ORTHOPAEDIC SURGERY

## 2022-11-21 PROCEDURE — 73610 XR ANKLE COMPLETE 3 VIEW BILATERAL: ICD-10-PCS | Mod: 26,,, | Performed by: RADIOLOGY

## 2022-11-21 PROCEDURE — 99999 PR PBB SHADOW E&M-EST. PATIENT-LVL II: ICD-10-PCS | Mod: PBBFAC,,, | Performed by: ORTHOPAEDIC SURGERY

## 2022-11-21 PROCEDURE — 73630 X-RAY EXAM OF FOOT: CPT | Mod: TC,50,PO

## 2022-11-21 PROCEDURE — 99214 PR OFFICE/OUTPT VISIT, EST, LEVL IV, 30-39 MIN: ICD-10-PCS | Mod: 25,S$GLB,, | Performed by: ORTHOPAEDIC SURGERY

## 2022-11-21 PROCEDURE — 99999 PR PBB SHADOW E&M-EST. PATIENT-LVL II: CPT | Mod: PBBFAC,,, | Performed by: ORTHOPAEDIC SURGERY

## 2022-11-21 RX ORDER — TRIAMCINOLONE ACETONIDE 40 MG/ML
40 INJECTION, SUSPENSION INTRA-ARTICULAR; INTRAMUSCULAR
Status: DISCONTINUED | OUTPATIENT
Start: 2022-11-21 | End: 2022-11-21 | Stop reason: HOSPADM

## 2022-11-21 RX ADMIN — TRIAMCINOLONE ACETONIDE 40 MG: 40 INJECTION, SUSPENSION INTRA-ARTICULAR; INTRAMUSCULAR at 09:11

## 2022-11-21 NOTE — PROCEDURES
Intermediate Joint Aspiration/Injection: R ankle, L ankle    Date/Time: 11/21/2022 9:00 AM  Performed by: Liban Hirsch MD  Authorized by: Liban Hirsch MD     Consent Done?:  Yes (Verbal)  Indications:  Arthritis  Site marked: The procedure site was marked    Timeout: Prior to procedure the correct patient, procedure, and site was verified      Location:  Ankle  Site:  R ankle and L ankle  Prep: Patient was prepped and draped in usual sterile fashion    Ultrasonic Guidance for needle placement: No  Needle size:  25 G  Approach:  Anteromedial  Medications:  40 mg triamcinolone acetonide 40 mg/mL  Patient tolerance:  Patient tolerated the procedure well with no immediate complications

## 2022-11-21 NOTE — PROGRESS NOTES
Status/Diagnosis: Bilateral, Left > Right PCFD; valgus talar tilt and end-stage tibiotalar arthritis; posterior tibial tendinitis; gastroc contracture  Date of Surgery: none  Date of Injury: none  Return visit: PRN  X-rays on Return: pending patient complaint     Chief Complaint:   Chief Complaint   Patient presents with    Left Ankle - Pain    Right Ankle - Pain     Present History:  Fabiano Jules Jr. is a 52 y.o. male who presents today for new patient evaluation.  Patient previously seen by Dr. Orozco.  Last seen in 2021 at which time he received bilateral ankle corticosteroid injections.  Patient endorses significant symptomatic relief at that time.  Patient has been managed for chronic symptomatic flatfoot deformity.  Has tried shoe wear and activity modification including not limited to custom orthotics, what sounds like an [place] brace, etc..  No recent immobilization or physical therapy.  Currently taking oral NSAIDs intermittently as needed for pain.  Denies any numbness or tingling.  Minimal pain at rest, increased with weight-bearing.  Also endorses recent history of lumbar fusion earlier this year which she believes has exacerbated his symptoms to bilateral feet.  States left greater than right foot and ankle pain.      Past Medical History:   Diagnosis Date    Arthritis     Back injury     Colon polyp     COVID     CPAP (continuous positive airway pressure) dependence     Hypertension     PONV (postoperative nausea and vomiting)     Sleep apnea     c pap machine used       Past Surgical History:   Procedure Laterality Date    BONE GRAFT N/A 4/5/2022    Procedure: BONE GRAFT;  Surgeon: Duran Adler Jr., MD;  [facility]: [facility];  Service: Orthopedics;  Laterality: N/A;    CIRCUMCISION      COLONOSCOPY N/A 12/3/2018    Procedure: COLONOSCOPY;  Surgeon: CHRISSY Reyna MD;  Location: [address]);  Service: Endoscopy;  Laterality: N/A;    COLONOSCOPY N/A 9/1/2022    Procedure: COLONOSCOPY;   Surgeon: Alondra Arias MD;  Location: [facility] ENDO;  Service: Endoscopy;  Laterality: N/A;    epidural steroid injection X 2      ESOPHAGOGASTRODUODENOSCOPY N/A 9/1/2022    Procedure: EGD (ESOPHAGOGASTRODUODENOSCOPY);  Surgeon: Alondra Arias MD;  Location: [facility] ENDO;  Service: Endoscopy;  Laterality: N/A;    facet injection       [facility] [place]     INTRALUMINAL GASTROINTESTINAL TRACT IMAGING VIA CAPSULE N/A 10/3/2022    Procedure: IMAGING PROCEDURE, GI TRACT, INTRALUMINAL, VIA CAPSULE;  Surgeon: Alondra Arias MD;  Location: [facility] ENDO;  Service: Endoscopy;  Laterality: N/A;    LIPOMA RESECTION      Back of neck    MAGNETIC RESONANCE IMAGING N/A 5/29/2020    Procedure: MRI (MAGNETIC RESONANCE IMAGING) LUMBAR SPINE;  Surgeon: [facility] Diagnostic Provider;  Location: [facility];  Service: General;  Laterality: N/A;  COVID NEG    MAGNETIC RESONANCE IMAGING N/A 12/3/2021    Procedure: MRI (MAGNETIC RESONANCE IMAGING), LUMBAR SPINE;  Surgeon: [facility] Diagnostic Provider;  Location: [facility];  Service: General;  Laterality: N/A;  In MRI @ 7:50 per Ariela    MEDIAL COLLATERAL LIGAMENT AND LATERAL COLLATERAL LIGAMENT REPAIR, KNEE Right 09/01/2018    Dr. Christophe Gore in [place]     MINIMALLY INVASIVE TRANSFORAMINAL LUMBAR INTERBODY FUSION (TLIF) N/A 4/5/2022    Procedure: FUSION, SPINE, LUMBAR, TLIF, MINIMALLY INVASIVE, WITH INSTRUMENTATION,  L4/5 RIGHT;  Surgeon: Duran Adler Jr., MD;  Location: [facility] OR;  Service: Orthopedics;  Laterality: N/A;  10:30am per Tamela    MYELOGRAPHY N/A 6/23/2020    Procedure: MYELOGRAM;  Surgeon: [facility] Diagnostic Provider;  Location: [facility] OR;  Service: General;  Laterality: N/A;       Current Outpatient Medications   Medication Sig    amLODIPine (NORVASC) 5 MG tablet Take 1 tablet (5 mg total) by mouth once daily.    b complex vitamins (B COMPLEX-VITAMIN B12) tablet Take 1 tablet by mouth once daily.    EPINEPHrine (EPIPEN) 0.3 mg/0.3 mL AtIn  Inject into the muscle once for one dose as needed.    ergocalciferol (ERGOCALCIFEROL) 50,000 unit Cap Take 1 capsule (50,000 Units total) by mouth twice a week.    ferrous sulfate (FEROSUL) 325 mg (65 mg iron) Tab tablet Take 1 tablet (325 mg total) by mouth every other day. (Patient taking differently: Take 325 mg by mouth once daily.)    lisinopriL-hydrochlorothiazide (PRINZIDE,ZESTORETIC) 20-12.5 mg per tablet Take 2 tablets by mouth once daily.    lisinopriL-hydrochlorothiazide (PRINZIDE,ZESTORETIC) 20-12.5 mg per tablet Take 2 tablets by mouth once daily.    oxyCODONE-acetaminophen (PERCOCET) 7.5-325 mg per tablet Take 1 tablet by mouth 3 (three) times daily as needed.    rosuvastatin (CRESTOR) 10 MG tablet Take 1 tablet (10 mg total) by mouth once daily.    semaglutide (OZEMPIC) 1 mg/dose (4 mg/3 mL) Inject 1 mg into the skin every 7 days.     No current facility-administered medications for this visit.     Facility-Administered Medications Ordered in Other Visits   Medication    acetaminophen tablet 650 mg    ceFAZolin in dextrose (iso-os) 2 gram/100 mL PgBk 2,000 mg    HYDROmorphone injection 1 mg    ondansetron injection 4 mg    oxyCODONE-acetaminophen  mg per tablet 1 tablet    oxyCODONE-acetaminophen 5-325 mg per tablet 1 tablet    polyethylene glycol packet 17 g       Review of patient's allergies indicates:   Allergen Reactions    Peaches [peach (prunus persica)] Anaphylaxis    Peanut Anaphylaxis       Family History   Problem Relation Age of Onset    Hypertension Mother     Diabetes Father     Cancer Father         mesotheliosma     Cataracts Neg Hx     Glaucoma Neg Hx     Macular degeneration Neg Hx     Retinal detachment Neg Hx     Colon cancer Neg Hx     Colon polyps Neg Hx     Crohn's disease Neg Hx     Ulcerative colitis Neg Hx        Social History     Socioeconomic History    Marital status:     Number of children: 4   Occupational History     Employer: [employer]   Tobacco Use     Smoking status: Former     Packs/day: 0.50     Years: 10.00     Pack years: 5.00     Types: Cigarettes     Quit date: 2009     Years since quittin.8    Smokeless tobacco: Never    Tobacco comments:     quit smoking in    Substance and Sexual Activity    Alcohol use: Not Currently     Comment: rare    Drug use: Not Currently    Sexual activity: Not Currently       Physical exam:  There were no vitals filed for this visit.  Body mass index is 51.54 kg/m².  General: In no apparent distress; well developed and well nourished.  HEENT: normocephalic; atraumatic.  Cardiovascular: regular rate.  Respiratory: no increased work of breathing.  Musculoskeletal:   Gait: antalgic  Inspection:  Significant flatfoot deformity with associated hindfoot and ankle valgus.  No significant forefoot abduction.  Hindfoot and ankle are not able to be completely passively corrected.  Unable to perform double or single limb heel rise bilaterally.  Patient localizes pain symmetrically.  Most prominent along the anteromedial ankle joint line as well as along the course of the posterior tibial tendon, left worse than right.  We will to no tenderness along the sinus tarsi.  No peroneal tenderness.  [place]:  Positive  Alignment:  Knee: neutral               Ankle: increased valgus              Hindfoot: increased valgus              Forefoot: neutral   Strength:              Dorsiflexion 5/5  Plantar flexion 5/5  Inversion 3+/5  Eversion 5/5   Sensation:             Altered but present sensation on monofilament testing bilaterally.  ROM:              Ankle: limited with pain at extremes              Subtalar:  limited with pain at extremes  Pulses: 2+ DP/PT pulses.                   Imaging Studies/Outside documentation:  I have ordered/reviewed/interpreted the following images/outside documentation:  1. Weight bearing 3-views of Bilateral foot and ankle:  No acute bony abnormality noted.  Severe, end-stage tibiotalar DJD  with valgus talar tilt, left worse than right.  Mild diastasis at the distal tib-fib articulation bilaterally, more prominent on the left with some degree of synostosis at the level of the incisura.  Large osteophytes both anteriorly and posteriorly at the tibiotalar articulation.  More prominent on the left.  Also with large osteophyte at the posterior margin of the posterior subtalar facet.  Significant decreased calcaneal pitch.  Smaller than normal navicular bilaterally, more pronounced on the left.  Flattening of the talar head bilaterally.        Assessment:  Fabiano Jules Jr. is a 52 y.o. male with Bilateral, Left > Right PCFD; valgus talar tilt and end-stage tibiotalar arthritis; posterior tibial tendinitis; gastroc contracture     Plan:   Clinical and radiographic findings were discussed.  Operative versus nonoperative treatment options were described.  Surgical intervention would likely include TTC joint fusion for PCFD and symptomatic severe tibiotalar arthritis.  Patient would like to defer surgical intervention as long as possible.  He had significant symptomatic relief with previous bilateral ankle corticosteroid injections.  We will perform these today in clinic.  See procedure note for details.  Patient tolerated this well.  [employer] handout provided today.  He will find out if he is able to take oral NSAIDs given his recent history of lumbar fusion.  He will call regarding future follow-up should symptoms persist or worsen.    This note was created using voice recognition software and may contain grammatical errors.

## 2022-11-23 ENCOUNTER — PATIENT MESSAGE (OUTPATIENT)
Dept: FAMILY MEDICINE | Facility: CLINIC | Age: 52
End: 2022-11-23
Payer: COMMERCIAL

## 2022-11-25 ENCOUNTER — TELEPHONE (OUTPATIENT)
Dept: BARIATRICS | Facility: CLINIC | Age: 52
End: 2022-11-25
Payer: COMMERCIAL

## 2022-11-25 NOTE — TELEPHONE ENCOUNTER
Called pt to reschedule pre-admit and NV to 12/14, as we will not be in [place] for clinic on 12/15 when he is currently scheduled. Pt agreed to new date/time.

## 2022-11-28 ENCOUNTER — OFFICE VISIT (OUTPATIENT)
Dept: FAMILY MEDICINE | Facility: CLINIC | Age: 52
End: 2022-11-28
Payer: COMMERCIAL

## 2022-11-28 VITALS
DIASTOLIC BLOOD PRESSURE: 88 MMHG | SYSTOLIC BLOOD PRESSURE: 136 MMHG | HEIGHT: 69 IN | OXYGEN SATURATION: 97 % | RESPIRATION RATE: 18 BRPM | HEART RATE: 72 BPM | BODY MASS INDEX: 46.65 KG/M2 | WEIGHT: 315 LBS

## 2022-11-28 DIAGNOSIS — Z01.818 PRE-OP EVALUATION: Primary | ICD-10-CM

## 2022-11-28 PROCEDURE — 99999 PR PBB SHADOW E&M-EST. PATIENT-LVL IV: CPT | Mod: PBBFAC,,, | Performed by: STUDENT IN AN ORGANIZED HEALTH CARE EDUCATION/TRAINING PROGRAM

## 2022-11-28 PROCEDURE — 4010F ACE/ARB THERAPY RXD/TAKEN: CPT | Mod: CPTII,S$GLB,, | Performed by: STUDENT IN AN ORGANIZED HEALTH CARE EDUCATION/TRAINING PROGRAM

## 2022-11-28 PROCEDURE — 3079F PR MOST RECENT DIASTOLIC BLOOD PRESSURE 80-89 MM HG: ICD-10-PCS | Mod: CPTII,S$GLB,, | Performed by: STUDENT IN AN ORGANIZED HEALTH CARE EDUCATION/TRAINING PROGRAM

## 2022-11-28 PROCEDURE — 99213 OFFICE O/P EST LOW 20 MIN: CPT | Mod: S$GLB,,, | Performed by: STUDENT IN AN ORGANIZED HEALTH CARE EDUCATION/TRAINING PROGRAM

## 2022-11-28 PROCEDURE — 3008F BODY MASS INDEX DOCD: CPT | Mod: CPTII,S$GLB,, | Performed by: STUDENT IN AN ORGANIZED HEALTH CARE EDUCATION/TRAINING PROGRAM

## 2022-11-28 PROCEDURE — 99213 PR OFFICE/OUTPT VISIT, EST, LEVL III, 20-29 MIN: ICD-10-PCS | Mod: S$GLB,,, | Performed by: STUDENT IN AN ORGANIZED HEALTH CARE EDUCATION/TRAINING PROGRAM

## 2022-11-28 PROCEDURE — 3044F PR MOST RECENT HEMOGLOBIN A1C LEVEL <7.0%: ICD-10-PCS | Mod: CPTII,S$GLB,, | Performed by: STUDENT IN AN ORGANIZED HEALTH CARE EDUCATION/TRAINING PROGRAM

## 2022-11-28 PROCEDURE — 3075F SYST BP GE 130 - 139MM HG: CPT | Mod: CPTII,S$GLB,, | Performed by: STUDENT IN AN ORGANIZED HEALTH CARE EDUCATION/TRAINING PROGRAM

## 2022-11-28 PROCEDURE — 3075F PR MOST RECENT SYSTOLIC BLOOD PRESS GE 130-139MM HG: ICD-10-PCS | Mod: CPTII,S$GLB,, | Performed by: STUDENT IN AN ORGANIZED HEALTH CARE EDUCATION/TRAINING PROGRAM

## 2022-11-28 PROCEDURE — 1159F PR MEDICATION LIST DOCUMENTED IN MEDICAL RECORD: ICD-10-PCS | Mod: CPTII,S$GLB,, | Performed by: STUDENT IN AN ORGANIZED HEALTH CARE EDUCATION/TRAINING PROGRAM

## 2022-11-28 PROCEDURE — 3008F PR BODY MASS INDEX (BMI) DOCUMENTED: ICD-10-PCS | Mod: CPTII,S$GLB,, | Performed by: STUDENT IN AN ORGANIZED HEALTH CARE EDUCATION/TRAINING PROGRAM

## 2022-11-28 PROCEDURE — 1159F MED LIST DOCD IN RCRD: CPT | Mod: CPTII,S$GLB,, | Performed by: STUDENT IN AN ORGANIZED HEALTH CARE EDUCATION/TRAINING PROGRAM

## 2022-11-28 PROCEDURE — 4010F PR ACE/ARB THEARPY RXD/TAKEN: ICD-10-PCS | Mod: CPTII,S$GLB,, | Performed by: STUDENT IN AN ORGANIZED HEALTH CARE EDUCATION/TRAINING PROGRAM

## 2022-11-28 PROCEDURE — 3044F HG A1C LEVEL LT 7.0%: CPT | Mod: CPTII,S$GLB,, | Performed by: STUDENT IN AN ORGANIZED HEALTH CARE EDUCATION/TRAINING PROGRAM

## 2022-11-28 PROCEDURE — 99999 PR PBB SHADOW E&M-EST. PATIENT-LVL IV: ICD-10-PCS | Mod: PBBFAC,,, | Performed by: STUDENT IN AN ORGANIZED HEALTH CARE EDUCATION/TRAINING PROGRAM

## 2022-11-28 PROCEDURE — 1160F RVW MEDS BY RX/DR IN RCRD: CPT | Mod: CPTII,S$GLB,, | Performed by: STUDENT IN AN ORGANIZED HEALTH CARE EDUCATION/TRAINING PROGRAM

## 2022-11-28 PROCEDURE — 3079F DIAST BP 80-89 MM HG: CPT | Mod: CPTII,S$GLB,, | Performed by: STUDENT IN AN ORGANIZED HEALTH CARE EDUCATION/TRAINING PROGRAM

## 2022-11-28 PROCEDURE — 1160F PR REVIEW ALL MEDS BY PRESCRIBER/CLIN PHARMACIST DOCUMENTED: ICD-10-PCS | Mod: CPTII,S$GLB,, | Performed by: STUDENT IN AN ORGANIZED HEALTH CARE EDUCATION/TRAINING PROGRAM

## 2022-11-28 NOTE — PROGRESS NOTES
"[facility] CLINIC NOTE    Patient Name: Fabiano Jules Jr.  YOB: 1970    PRESENTING HISTORY     History of Present Illness:  Mr. Fabiano Jules Jr. is a 52 y.o. male here with two issues.     1.  Working through disability process. Needed office note from me to submit.   Lumbar fusion 4/5/22  Dr. Duran Metcalf Jr. In [place]. [facility].   Did in home PT for 3 weeks.   After discharge physical therapy in office. Received 23 sessions, completed earlier this month.   Has evaluation with Dr. Metcalf Jan 23rd. Will get FCE at that visit.   He is on a 10 pound lifting restriction.   Limited mobility and pain with standing, ambulation.     2.   Bariatric surgery upcoming.     No previous anaesthesia issues.   Cardiology evaluation normal.   No medications requiring perioperative modification.   Mild chronic anemia.   Normal renal function.   No diabetes. Will hold ozempic 1 week prior to surgery.       ROS      OBJECTIVE:   Vital Signs:  Vitals:    11/28/22 1047   BP: 136/88   Pulse: 72   Resp: 18   SpO2: 97%   Weight: (!) 158.8 kg (350 lb 1.5 oz)   Height: 5' 9" (1.753 m)            Physical Exam  Vitals and nursing note reviewed.   Constitutional:       General: He is not in acute distress.  HENT:      Head: Normocephalic and atraumatic.      Right Ear: External ear normal.      Left Ear: External ear normal.   Eyes:      General: No scleral icterus.     Conjunctiva/sclera: Conjunctivae normal.      Pupils: Pupils are equal, round, and reactive to light.   Neck:      Thyroid: No thyromegaly.   Cardiovascular:      Rate and Rhythm: Normal rate and regular rhythm.      Heart sounds: Normal heart sounds. No murmur heard.  Pulmonary:      Effort: Pulmonary effort is normal. No respiratory distress.      Breath sounds: Normal breath sounds. No wheezing or rales.   Musculoskeletal:      Cervical back: Normal range of motion and neck supple.      Comments: Antalgic gait.    Lymphadenopathy:     "  Cervical: No cervical adenopathy.   Skin:     General: Skin is warm.      Findings: No erythema or rash.   Neurological:      Mental Status: He is alert and oriented to person, place, and time.      Gait: Gait is intact.   Psychiatric:         Mood and Affect: Mood and affect normal.         Cognition and Memory: Memory normal.         Judgment: Judgment normal.       ASSESSMENT & PLAN:     Pre-op evaluation    He is medically optimized for surgery.   No further testing needed.     Hold ozempic prior to surgery as it is not strictly necessary.          In terms of disability, I would defer restrictions to his operating surgeon and agree with 10lb restriction.       Howie Mathias MD   Internal Medicine    I spent a total of 20 minutes on the day of the visit.  This includes face-to-face time and non face-to-face time preparing to see the patient (e.g., review of tests) , obtaining and/or reviewing separately obtain history, documenting clinical information in the electronic or other health record, independently interpreting results and communicating results to the patient/family/caregiver, or care coordinator.

## 2022-11-28 NOTE — PATIENT INSTRUCTIONS
Hold ozempic 1 week prior to surgery.       Andrew Mario,     If you are due for any health screening(s) below please notify me so we can arrange them to be ordered and scheduled to maintain your health. Most healthy patients complete it. Don't lose out on improving your health.     All of your core healthy metrics are met.

## 2022-12-07 RX ORDER — ENOXAPARIN SODIUM 100 MG/ML
40 INJECTION SUBCUTANEOUS EVERY 12 HOURS
Status: CANCELLED | OUTPATIENT
Start: 2022-12-07

## 2022-12-07 RX ORDER — ONDANSETRON 8 MG/1
8 TABLET, ORALLY DISINTEGRATING ORAL EVERY 6 HOURS PRN
Qty: 30 TABLET | Refills: 0 | Status: CANCELLED | OUTPATIENT
Start: 2022-12-07

## 2022-12-07 RX ORDER — PROCHLORPERAZINE EDISYLATE 5 MG/ML
5 INJECTION INTRAMUSCULAR; INTRAVENOUS EVERY 6 HOURS PRN
Status: CANCELLED | OUTPATIENT
Start: 2022-12-07

## 2022-12-07 RX ORDER — HYDROCODONE BITARTRATE AND ACETAMINOPHEN 7.5; 325 MG/15ML; MG/15ML
15 SOLUTION ORAL 4 TIMES DAILY PRN
Qty: 200 ML | Refills: 0 | Status: CANCELLED | OUTPATIENT
Start: 2022-12-07

## 2022-12-07 RX ORDER — SODIUM CHLORIDE 9 MG/ML
INJECTION, SOLUTION INTRAVENOUS CONTINUOUS
Status: CANCELLED | OUTPATIENT
Start: 2022-12-07

## 2022-12-07 RX ORDER — DEXTROMETHORPHAN/PSEUDOEPHED 2.5-7.5/.8
40 DROPS ORAL 4 TIMES DAILY PRN
Status: CANCELLED | OUTPATIENT
Start: 2022-12-07

## 2022-12-07 RX ORDER — HYDROMORPHONE HYDROCHLORIDE 2 MG/ML
0.5 INJECTION, SOLUTION INTRAMUSCULAR; INTRAVENOUS; SUBCUTANEOUS
Status: CANCELLED | OUTPATIENT
Start: 2022-12-07

## 2022-12-07 RX ORDER — HYDROCODONE BITARTRATE AND ACETAMINOPHEN 7.5; 325 MG/15ML; MG/15ML
15 SOLUTION ORAL EVERY 4 HOURS PRN
Status: CANCELLED | OUTPATIENT
Start: 2022-12-08

## 2022-12-07 RX ORDER — FAMOTIDINE 10 MG/ML
20 INJECTION INTRAVENOUS ONCE
Status: CANCELLED | OUTPATIENT
Start: 2022-12-07 | End: 2022-12-07

## 2022-12-07 RX ORDER — PANTOPRAZOLE SODIUM 40 MG/10ML
40 INJECTION, POWDER, LYOPHILIZED, FOR SOLUTION INTRAVENOUS 2 TIMES DAILY
Status: CANCELLED | OUTPATIENT
Start: 2022-12-07

## 2022-12-07 RX ORDER — LIDOCAINE HYDROCHLORIDE 10 MG/ML
1 INJECTION, SOLUTION EPIDURAL; INFILTRATION; INTRACAUDAL; PERINEURAL ONCE
Status: CANCELLED | OUTPATIENT
Start: 2022-12-07 | End: 2022-12-07

## 2022-12-07 RX ORDER — GABAPENTIN 250 MG/5ML
300 SOLUTION ORAL 3 TIMES DAILY
Status: CANCELLED | OUTPATIENT
Start: 2022-12-07

## 2022-12-07 RX ORDER — ONDANSETRON 2 MG/ML
8 INJECTION INTRAMUSCULAR; INTRAVENOUS EVERY 6 HOURS PRN
Status: CANCELLED | OUTPATIENT
Start: 2022-12-07

## 2022-12-07 RX ORDER — HEPARIN SODIUM 5000 [USP'U]/ML
5000 INJECTION, SOLUTION INTRAVENOUS; SUBCUTANEOUS ONCE
Status: CANCELLED | OUTPATIENT
Start: 2022-12-07 | End: 2022-12-07

## 2022-12-07 RX ORDER — SODIUM CHLORIDE, SODIUM LACTATE, POTASSIUM CHLORIDE, CALCIUM CHLORIDE 600; 310; 30; 20 MG/100ML; MG/100ML; MG/100ML; MG/100ML
INJECTION, SOLUTION INTRAVENOUS CONTINUOUS
Status: CANCELLED | OUTPATIENT
Start: 2022-12-07

## 2022-12-07 RX ORDER — MUPIROCIN 20 MG/G
OINTMENT TOPICAL
Status: CANCELLED | OUTPATIENT
Start: 2022-12-07

## 2022-12-07 RX ORDER — OMEPRAZOLE 40 MG/1
40 CAPSULE, DELAYED RELEASE ORAL EVERY MORNING
Qty: 60 CAPSULE | Refills: 0 | Status: CANCELLED | OUTPATIENT
Start: 2022-12-07 | End: 2023-02-05

## 2022-12-07 RX ORDER — ACETAMINOPHEN 650 MG/20.3ML
500 LIQUID ORAL EVERY 8 HOURS
Status: CANCELLED | OUTPATIENT
Start: 2022-12-07 | End: 2022-12-08

## 2022-12-14 ENCOUNTER — CLINICAL SUPPORT (OUTPATIENT)
Dept: BARIATRICS | Facility: CLINIC | Age: 52
End: 2022-12-14
Payer: COMMERCIAL

## 2022-12-14 ENCOUNTER — HOSPITAL ENCOUNTER (OUTPATIENT)
Dept: PREADMISSION TESTING | Facility: HOSPITAL | Age: 52
Discharge: HOME | End: 2022-12-14
Attending: SURGERY
Payer: COMMERCIAL

## 2022-12-14 ENCOUNTER — HOSPITAL ENCOUNTER (OUTPATIENT)
Dept: RADIOLOGY | Facility: HOSPITAL | Age: 52
Discharge: HOME | End: 2022-12-14
Attending: SURGERY
Payer: COMMERCIAL

## 2022-12-14 VITALS
HEART RATE: 58 BPM | OXYGEN SATURATION: 98 % | DIASTOLIC BLOOD PRESSURE: 92 MMHG | HEIGHT: 70 IN | RESPIRATION RATE: 20 BRPM | BODY MASS INDEX: 45.1 KG/M2 | WEIGHT: 315 LBS | SYSTOLIC BLOOD PRESSURE: 138 MMHG | TEMPERATURE: 98 F

## 2022-12-14 DIAGNOSIS — Z01.818 PREOP TESTING: Primary | ICD-10-CM

## 2022-12-14 DIAGNOSIS — E66.01 MORBID OBESITY: Primary | ICD-10-CM

## 2022-12-14 DIAGNOSIS — Z01.818 PREOPERATIVE CLEARANCE: ICD-10-CM

## 2022-12-14 LAB
ALBUMIN SERPL BCP-MCNC: 4.6 G/DL (ref 3.5–5.2)
ALP SERPL-CCNC: 53 U/L (ref 55–135)
ALT SERPL W/O P-5'-P-CCNC: 15 U/L (ref 10–44)
ANION GAP SERPL CALC-SCNC: 9 MMOL/L (ref 8–16)
AST SERPL-CCNC: 21 U/L (ref 10–40)
BASOPHILS # BLD AUTO: 0.02 K/UL (ref 0–0.2)
BASOPHILS NFR BLD: 0.4 % (ref 0–1.9)
BILIRUB SERPL-MCNC: 0.8 MG/DL (ref 0.1–1)
BUN SERPL-MCNC: 17 MG/DL (ref 6–20)
CALCIUM SERPL-MCNC: 9.7 MG/DL (ref 8.7–10.5)
CHLORIDE SERPL-SCNC: 97 MMOL/L (ref 95–110)
CO2 SERPL-SCNC: 29 MMOL/L (ref 23–29)
CREAT SERPL-MCNC: 0.9 MG/DL (ref 0.5–1.4)
DIFFERENTIAL METHOD: ABNORMAL
EOSINOPHIL # BLD AUTO: 0.1 K/UL (ref 0–0.5)
EOSINOPHIL NFR BLD: 1.1 % (ref 0–8)
ERYTHROCYTE [DISTWIDTH] IN BLOOD BY AUTOMATED COUNT: 15.6 % (ref 11.5–14.5)
EST. GFR  (NO RACE VARIABLE): >60 ML/MIN/1.73 M^2
GLUCOSE SERPL-MCNC: 87 MG/DL (ref 70–110)
HCT VFR BLD AUTO: 40.2 % (ref 40–54)
HGB BLD-MCNC: 12.9 G/DL (ref 14–18)
IMM GRANULOCYTES # BLD AUTO: 0.01 K/UL (ref 0–0.04)
IMM GRANULOCYTES NFR BLD AUTO: 0.2 % (ref 0–0.5)
LYMPHOCYTES # BLD AUTO: 3.1 K/UL (ref 1–4.8)
LYMPHOCYTES NFR BLD: 58.8 % (ref 18–48)
MCH RBC QN AUTO: 26.4 PG (ref 27–31)
MCHC RBC AUTO-ENTMCNC: 32.1 G/DL (ref 32–36)
MCV RBC AUTO: 82 FL (ref 82–98)
MONOCYTES # BLD AUTO: 0.4 K/UL (ref 0.3–1)
MONOCYTES NFR BLD: 7.6 % (ref 4–15)
NEUTROPHILS # BLD AUTO: 1.7 K/UL (ref 1.8–7.7)
NEUTROPHILS NFR BLD: 31.9 % (ref 38–73)
NRBC BLD-RTO: 0 /100 WBC
PLATELET # BLD AUTO: 323 K/UL (ref 150–450)
PMV BLD AUTO: 9.5 FL (ref 9.2–12.9)
POTASSIUM SERPL-SCNC: 3.6 MMOL/L (ref 3.5–5.1)
PROT SERPL-MCNC: 8.1 G/DL (ref 6–8.4)
RBC # BLD AUTO: 4.88 M/UL (ref 4.6–6.2)
SODIUM SERPL-SCNC: 135 MMOL/L (ref 136–145)
WBC # BLD AUTO: 5.29 K/UL (ref 3.9–12.7)

## 2022-12-14 PROCEDURE — 93005 ELECTROCARDIOGRAM TRACING: CPT | Performed by: INTERNAL MEDICINE

## 2022-12-14 PROCEDURE — 99499 NO LOS: ICD-10-PCS | Mod: S$GLB,,, | Performed by: SURGERY

## 2022-12-14 PROCEDURE — 99999 PR PBB SHADOW E&M-EST. PATIENT-LVL I: CPT | Mod: PBBFAC,,,

## 2022-12-14 PROCEDURE — 80053 COMPREHEN METABOLIC PANEL: CPT | Performed by: SURGERY

## 2022-12-14 PROCEDURE — 85025 COMPLETE CBC W/AUTO DIFF WBC: CPT | Performed by: SURGERY

## 2022-12-14 PROCEDURE — 36415 COLL VENOUS BLD VENIPUNCTURE: CPT | Performed by: SURGERY

## 2022-12-14 PROCEDURE — 93010 ELECTROCARDIOGRAM REPORT: CPT | Mod: ,,, | Performed by: INTERNAL MEDICINE

## 2022-12-14 PROCEDURE — 99499 UNLISTED E&M SERVICE: CPT | Mod: S$GLB,,, | Performed by: SURGERY

## 2022-12-14 PROCEDURE — 71046 X-RAY EXAM CHEST 2 VIEWS: CPT | Mod: TC

## 2022-12-14 PROCEDURE — 93010 EKG 12-LEAD: ICD-10-PCS | Mod: ,,, | Performed by: INTERNAL MEDICINE

## 2022-12-14 PROCEDURE — 99999 PR PBB SHADOW E&M-EST. PATIENT-LVL I: ICD-10-PCS | Mod: PBBFAC,,,

## 2022-12-14 NOTE — DISCHARGE INSTRUCTIONS
To confirm, Your doctor has instructed you that surgery is scheduled for: Monday December 19, 2022    Pre-Op will call the afternoon prior to surgery between 4:00 and 6:00 PM with the final arrival time.  Friday 12/16/22    Please report to Registration at front of the hospital --it is best to park in the garage on [facility]    Do not eat or drink anything after midnight the night before your surgery - THIS INCLUDES  WATER, GUM, MINTS AND CANDY.    YOU MAY BRUSH YOUR TEETH     TAKE APPROVED  MEDICATIONS WITH A SMALL SIP OF WATER THE MORNING OF YOUR PROCEDURE: See Medication list    PLEASE NOTE:  The surgery schedule has many variables which may affect the time of your surgery case.  Family members should be available if your surgery time changes.  Plan to be here the day of your procedure between 4-6 hours.    DO NOT TAKE THESE MEDICATIONS 5-7 DAYS PRIOR to your procedure or per your surgeon's request: ASPIRIN, ALEVE, ADVIL, IBUPROFEN,  YOGI SELTZER, BC , FISH OIL , VITAMIN E, HERBALS  (May take Tylenol)        IMPORTANT INSTRUCTIONS    Do not smoke, vape or drink alcoholic beverages 24 hours prior to your procedure.  Shower the night before AND the morning of your procedure with a Chlorhexidine wash such as Hibiclens or Dial antibacterial soap from the neck down.    Do not get it on your face or in your eyes.  You may use your own shampoo and face wash. This helps your skin to be as bacteria free as possible.   Do not apply any deodorant, lotion or powder after you shower.   DO NOT remove hair from the surgery site.  Do not shave the incision site unless you are given specific instructions to do so.    Sleep in a bed with clean sheets.  Do not sleep with a pet in the bed.   If you wear contact lenses, dentures, hearing aids or glasses, bring a container to put them in during surgery and give to a family member for safe keeping.    Please leave all jewelry, piercing's and valuables at home.   ONLY if you have been  diagnosed with sleep apnea , please bring your C-PAP machine.  If your doctor has scheduled you for an overnight stay, bring a small overnight bag with any personal items you need.  Wear comfortable clothing that is easy to remove and place back on after surgery.     Make arrangements in advance for transportation home by a responsible adult.    If you have any questions about these instructions, call Pre-Op Admit  Nursing at [phone] , Pre-Op Day Surgery Unit at [phone]

## 2022-12-14 NOTE — PROGRESS NOTES
Confirmed with patient that he is having a  sleeve gastrectomy on  12/19/2022 at [facility]      Preop diet reviewed with patient.   Reminded of liquids only (water and protein shakes) on the day before surgery.   Reviewed progression of diet after surgery    ~Clear liquid diet for the 1st week post op with a goal of 64oz of fluid intake daily  ~encouraged protein intake to facilitate the healing process, this can include protein vargas, gatorade zero with protein, protein broth, etc  ~provided patient with (7) Liquicel protein packs to consume 1 pack daily x7 days of post-op week 1  ~avoid food/liquids with temperature extreme of too hot or too cold, as this may cause spasms of the stomach leading to increased pain and complications with the healing process  ~Full liquid diet for weeks 2 and 3 post-op with a goal of 60 gm of protein daily   ~begin bariatric vitamins on 1st day of post-op week 2  ~emphasized smooth, liquid consistency of intake. Any chunks or bits of food may cause complications of healing.   Reviewed the importance of  ~post op activity as tolerated  ~ incentive spirometry as provided by the respiratory therapist at the hospital   ~adequate oral fluid intake/hydration  ~written copy of ambulation, and hydration tracking tool provided  Reviewed post-op wound/incision care (written copy provided)   ~may shower 48 hours after surgery, no rubbing or scrubbing and pat to dry   ~no tub baths, swimming pool, hot tub until cleared at 2 week post-op visit   ~may remove outer bandage after 1st shower, steri-strips remain until they fall off  ~educated patient of signs and symptoms of infection and importance of   reporting them (redness, swelling, drainage, fever)  List of approved post-op over-the-counter meds provided   ~emphasized NO NSAIDS  Reviewed post-op constipation protocol, written copy provided.    Patient verbalized complete understanding.     Weight 327.8 lb  Fat%  45.3%  Fat mass 148.4 lb   FFM  179.4lb  TBW  143.2lb  TBW% 43.7%  BMI  48.4

## 2022-12-19 ENCOUNTER — ANESTHESIA (OUTPATIENT)
Dept: SURGERY | Facility: HOSPITAL | Age: 52
DRG: 621 | End: 2022-12-19
Payer: COMMERCIAL

## 2022-12-19 ENCOUNTER — HOSPITAL ENCOUNTER (INPATIENT)
Facility: HOSPITAL | Age: 52
LOS: 1 days | Discharge: HOME | DRG: 621 | End: 2022-12-20
Attending: SURGERY | Admitting: SURGERY
Payer: COMMERCIAL

## 2022-12-19 ENCOUNTER — ANESTHESIA EVENT (OUTPATIENT)
Dept: SURGERY | Facility: HOSPITAL | Age: 52
DRG: 621 | End: 2022-12-19
Payer: COMMERCIAL

## 2022-12-19 DIAGNOSIS — G47.33 OSA (OBSTRUCTIVE SLEEP APNEA): ICD-10-CM

## 2022-12-19 DIAGNOSIS — I10 PRIMARY HYPERTENSION: ICD-10-CM

## 2022-12-19 DIAGNOSIS — E66.01 CLASS 3 SEVERE OBESITY DUE TO EXCESS CALORIES WITH SERIOUS COMORBIDITY AND BODY MASS INDEX (BMI) OF 50.0 TO 59.9 IN ADULT: ICD-10-CM

## 2022-12-19 DIAGNOSIS — E66.01 MORBID OBESITY: ICD-10-CM

## 2022-12-19 PROBLEM — E66.9 OBESITY: Status: ACTIVE | Noted: 2022-12-19

## 2022-12-19 LAB — GLUCOSE SERPL-MCNC: 96 MG/DL (ref 70–110)

## 2022-12-19 PROCEDURE — 21400001 HC TELEMETRY ROOM

## 2022-12-19 PROCEDURE — 25000003 PHARM REV CODE 250: Performed by: NURSE PRACTITIONER

## 2022-12-19 PROCEDURE — 94761 N-INVAS EAR/PLS OXIMETRY MLT: CPT

## 2022-12-19 PROCEDURE — 37000008 HC ANESTHESIA 1ST 15 MINUTES: Performed by: SURGERY

## 2022-12-19 PROCEDURE — 71000039 HC RECOVERY, EACH ADD'L HOUR: Performed by: SURGERY

## 2022-12-19 PROCEDURE — 63600175 PHARM REV CODE 636 W HCPCS: Performed by: ANESTHESIOLOGY

## 2022-12-19 PROCEDURE — 25000003 PHARM REV CODE 250

## 2022-12-19 PROCEDURE — 63600175 PHARM REV CODE 636 W HCPCS: Performed by: NURSE PRACTITIONER

## 2022-12-19 PROCEDURE — D9220A PRA ANESTHESIA: Mod: CRNA,,, | Performed by: NURSE ANESTHETIST, CERTIFIED REGISTERED

## 2022-12-19 PROCEDURE — 36000711: Performed by: SURGERY

## 2022-12-19 PROCEDURE — 25000003 PHARM REV CODE 250: Performed by: ANESTHESIOLOGY

## 2022-12-19 PROCEDURE — D9220A PRA ANESTHESIA: ICD-10-PCS | Mod: CRNA,,, | Performed by: NURSE ANESTHETIST, CERTIFIED REGISTERED

## 2022-12-19 PROCEDURE — 63600175 PHARM REV CODE 636 W HCPCS: Performed by: NURSE ANESTHETIST, CERTIFIED REGISTERED

## 2022-12-19 PROCEDURE — D9220A PRA ANESTHESIA: ICD-10-PCS | Mod: ANES,,, | Performed by: ANESTHESIOLOGY

## 2022-12-19 PROCEDURE — 27201423 OPTIME MED/SURG SUP & DEVICES STERILE SUPPLY: Performed by: SURGERY

## 2022-12-19 PROCEDURE — 99900035 HC TECH TIME PER 15 MIN (STAT)

## 2022-12-19 PROCEDURE — 25000003 PHARM REV CODE 250: Performed by: SURGERY

## 2022-12-19 PROCEDURE — 63600175 PHARM REV CODE 636 W HCPCS: Performed by: SURGERY

## 2022-12-19 PROCEDURE — 71000033 HC RECOVERY, INTIAL HOUR: Performed by: SURGERY

## 2022-12-19 PROCEDURE — 25000003 PHARM REV CODE 250: Performed by: NURSE ANESTHETIST, CERTIFIED REGISTERED

## 2022-12-19 PROCEDURE — 36000710: Performed by: SURGERY

## 2022-12-19 PROCEDURE — D9220A PRA ANESTHESIA: Mod: ANES,,, | Performed by: ANESTHESIOLOGY

## 2022-12-19 PROCEDURE — 43775 LAP SLEEVE GASTRECTOMY: CPT | Mod: ,,, | Performed by: SURGERY

## 2022-12-19 PROCEDURE — 37000009 HC ANESTHESIA EA ADD 15 MINS: Performed by: SURGERY

## 2022-12-19 PROCEDURE — 99900031 HC PATIENT EDUCATION (STAT)

## 2022-12-19 PROCEDURE — 63600175 PHARM REV CODE 636 W HCPCS

## 2022-12-19 PROCEDURE — 43775 PR LAP, GAST RESTRICT PROC, LONGITUDINAL GASTRECTOMY: ICD-10-PCS | Mod: ,,, | Performed by: SURGERY

## 2022-12-19 RX ORDER — ACETAMINOPHEN 10 MG/ML
INJECTION, SOLUTION INTRAVENOUS
Status: DISCONTINUED | OUTPATIENT
Start: 2022-12-19 | End: 2022-12-19

## 2022-12-19 RX ORDER — FENTANYL CITRATE 50 UG/ML
25 INJECTION, SOLUTION INTRAMUSCULAR; INTRAVENOUS EVERY 5 MIN PRN
Status: COMPLETED | OUTPATIENT
Start: 2022-12-19 | End: 2022-12-19

## 2022-12-19 RX ORDER — DEXMEDETOMIDINE HYDROCHLORIDE 100 UG/ML
1 INJECTION, SOLUTION INTRAVENOUS ONCE
Status: COMPLETED | OUTPATIENT
Start: 2022-12-19 | End: 2022-12-19

## 2022-12-19 RX ORDER — HYDROMORPHONE HYDROCHLORIDE 1 MG/ML
1 INJECTION, SOLUTION INTRAMUSCULAR; INTRAVENOUS; SUBCUTANEOUS EVERY 6 HOURS PRN
Status: DISCONTINUED | OUTPATIENT
Start: 2022-12-19 | End: 2022-12-20 | Stop reason: HOSPADM

## 2022-12-19 RX ORDER — FAMOTIDINE 10 MG/ML
20 INJECTION INTRAVENOUS ONCE
Status: COMPLETED | OUTPATIENT
Start: 2022-12-19 | End: 2022-12-19

## 2022-12-19 RX ORDER — SODIUM CHLORIDE, SODIUM LACTATE, POTASSIUM CHLORIDE, CALCIUM CHLORIDE 600; 310; 30; 20 MG/100ML; MG/100ML; MG/100ML; MG/100ML
INJECTION, SOLUTION INTRAVENOUS CONTINUOUS
Status: DISCONTINUED | OUTPATIENT
Start: 2022-12-19 | End: 2022-12-19

## 2022-12-19 RX ORDER — FENTANYL CITRATE 50 UG/ML
25 INJECTION, SOLUTION INTRAMUSCULAR; INTRAVENOUS EVERY 5 MIN PRN
Status: DISCONTINUED | OUTPATIENT
Start: 2022-12-19 | End: 2022-12-19 | Stop reason: HOSPADM

## 2022-12-19 RX ORDER — SUCCINYLCHOLINE CHLORIDE 20 MG/ML
INJECTION INTRAMUSCULAR; INTRAVENOUS
Status: DISCONTINUED | OUTPATIENT
Start: 2022-12-19 | End: 2022-12-19

## 2022-12-19 RX ORDER — DEXTROMETHORPHAN/PSEUDOEPHED 2.5-7.5/.8
40 DROPS ORAL 4 TIMES DAILY PRN
Status: DISCONTINUED | OUTPATIENT
Start: 2022-12-19 | End: 2022-12-19

## 2022-12-19 RX ORDER — BUPIVACAINE HYDROCHLORIDE 2.5 MG/ML
INJECTION, SOLUTION EPIDURAL; INFILTRATION; INTRACAUDAL
Status: DISCONTINUED | OUTPATIENT
Start: 2022-12-19 | End: 2022-12-19 | Stop reason: HOSPADM

## 2022-12-19 RX ORDER — ONDANSETRON 2 MG/ML
4 INJECTION INTRAMUSCULAR; INTRAVENOUS DAILY PRN
Status: DISCONTINUED | OUTPATIENT
Start: 2022-12-19 | End: 2022-12-19 | Stop reason: HOSPADM

## 2022-12-19 RX ORDER — MUPIROCIN 20 MG/G
OINTMENT TOPICAL
Status: DISCONTINUED | OUTPATIENT
Start: 2022-12-19 | End: 2022-12-19 | Stop reason: HOSPADM

## 2022-12-19 RX ORDER — DEXAMETHASONE SODIUM PHOSPHATE 4 MG/ML
INJECTION, SOLUTION INTRA-ARTICULAR; INTRALESIONAL; INTRAMUSCULAR; INTRAVENOUS; SOFT TISSUE
Status: DISCONTINUED | OUTPATIENT
Start: 2022-12-19 | End: 2022-12-19

## 2022-12-19 RX ORDER — HYDROCODONE BITARTRATE AND ACETAMINOPHEN 7.5; 325 MG/15ML; MG/15ML
15 SOLUTION ORAL EVERY 4 HOURS PRN
Status: DISCONTINUED | OUTPATIENT
Start: 2022-12-19 | End: 2022-12-20

## 2022-12-19 RX ORDER — HEPARIN SODIUM 5000 [USP'U]/ML
5000 INJECTION, SOLUTION INTRAVENOUS; SUBCUTANEOUS ONCE
Status: COMPLETED | OUTPATIENT
Start: 2022-12-19 | End: 2022-12-19

## 2022-12-19 RX ORDER — SODIUM CHLORIDE, SODIUM LACTATE, POTASSIUM CHLORIDE, CALCIUM CHLORIDE 600; 310; 30; 20 MG/100ML; MG/100ML; MG/100ML; MG/100ML
INJECTION, SOLUTION INTRAVENOUS CONTINUOUS
Status: DISCONTINUED | OUTPATIENT
Start: 2022-12-19 | End: 2022-12-20

## 2022-12-19 RX ORDER — LIDOCAINE HYDROCHLORIDE 20 MG/ML
JELLY TOPICAL
Status: DISCONTINUED | OUTPATIENT
Start: 2022-12-19 | End: 2022-12-19

## 2022-12-19 RX ORDER — ENOXAPARIN SODIUM 100 MG/ML
40 INJECTION SUBCUTANEOUS EVERY 12 HOURS
Status: DISCONTINUED | OUTPATIENT
Start: 2022-12-19 | End: 2022-12-19

## 2022-12-19 RX ORDER — SODIUM CHLORIDE 9 MG/ML
INJECTION, SOLUTION INTRAVENOUS CONTINUOUS
Status: DISCONTINUED | OUTPATIENT
Start: 2022-12-19 | End: 2022-12-19

## 2022-12-19 RX ORDER — ROCURONIUM BROMIDE 10 MG/ML
INJECTION, SOLUTION INTRAVENOUS
Status: DISCONTINUED | OUTPATIENT
Start: 2022-12-19 | End: 2022-12-19

## 2022-12-19 RX ORDER — FENTANYL CITRATE 50 UG/ML
INJECTION, SOLUTION INTRAMUSCULAR; INTRAVENOUS
Status: DISCONTINUED | OUTPATIENT
Start: 2022-12-19 | End: 2022-12-19

## 2022-12-19 RX ORDER — ACETAMINOPHEN 650 MG/20.3ML
500 LIQUID ORAL EVERY 8 HOURS
Status: DISCONTINUED | OUTPATIENT
Start: 2022-12-19 | End: 2022-12-19

## 2022-12-19 RX ORDER — FENTANYL CITRATE 50 UG/ML
INJECTION, SOLUTION INTRAMUSCULAR; INTRAVENOUS
Status: COMPLETED
Start: 2022-12-19 | End: 2022-12-19

## 2022-12-19 RX ORDER — ONDANSETRON 2 MG/ML
8 INJECTION INTRAMUSCULAR; INTRAVENOUS EVERY 6 HOURS PRN
Status: DISCONTINUED | OUTPATIENT
Start: 2022-12-19 | End: 2022-12-20 | Stop reason: HOSPADM

## 2022-12-19 RX ORDER — LIDOCAINE HYDROCHLORIDE 10 MG/ML
1 INJECTION, SOLUTION EPIDURAL; INFILTRATION; INTRACAUDAL; PERINEURAL ONCE
Status: DISCONTINUED | OUTPATIENT
Start: 2022-12-19 | End: 2022-12-19 | Stop reason: HOSPADM

## 2022-12-19 RX ORDER — GABAPENTIN 300 MG/1
300 CAPSULE ORAL
Status: COMPLETED | OUTPATIENT
Start: 2022-12-19 | End: 2022-12-19

## 2022-12-19 RX ORDER — LIDOCAINE HYDROCHLORIDE 20 MG/ML
INJECTION, SOLUTION EPIDURAL; INFILTRATION; INTRACAUDAL; PERINEURAL
Status: DISCONTINUED | OUTPATIENT
Start: 2022-12-19 | End: 2022-12-19

## 2022-12-19 RX ORDER — PANTOPRAZOLE SODIUM 40 MG/10ML
40 INJECTION, POWDER, LYOPHILIZED, FOR SOLUTION INTRAVENOUS 2 TIMES DAILY
Status: DISCONTINUED | OUTPATIENT
Start: 2022-12-19 | End: 2022-12-19

## 2022-12-19 RX ORDER — PANTOPRAZOLE SODIUM 40 MG/1
40 TABLET, DELAYED RELEASE ORAL DAILY
Status: DISCONTINUED | OUTPATIENT
Start: 2022-12-20 | End: 2022-12-20 | Stop reason: HOSPADM

## 2022-12-19 RX ORDER — PROPOFOL 10 MG/ML
VIAL (ML) INTRAVENOUS
Status: DISCONTINUED | OUTPATIENT
Start: 2022-12-19 | End: 2022-12-19

## 2022-12-19 RX ORDER — AMLODIPINE BESYLATE 5 MG/1
5 TABLET ORAL DAILY
Status: DISCONTINUED | OUTPATIENT
Start: 2022-12-20 | End: 2022-12-20 | Stop reason: HOSPADM

## 2022-12-19 RX ORDER — ONDANSETRON 2 MG/ML
8 INJECTION INTRAMUSCULAR; INTRAVENOUS EVERY 6 HOURS PRN
Status: DISCONTINUED | OUTPATIENT
Start: 2022-12-19 | End: 2022-12-19

## 2022-12-19 RX ORDER — HYDROMORPHONE HYDROCHLORIDE 1 MG/ML
0.5 INJECTION, SOLUTION INTRAMUSCULAR; INTRAVENOUS; SUBCUTANEOUS
Status: DISCONTINUED | OUTPATIENT
Start: 2022-12-19 | End: 2022-12-19

## 2022-12-19 RX ORDER — NALOXONE HCL 0.4 MG/ML
0.02 VIAL (ML) INJECTION ONCE
Status: DISCONTINUED | OUTPATIENT
Start: 2022-12-19 | End: 2022-12-20

## 2022-12-19 RX ORDER — SCOLOPAMINE TRANSDERMAL SYSTEM 1 MG/1
1 PATCH, EXTENDED RELEASE TRANSDERMAL
Status: DISCONTINUED | OUTPATIENT
Start: 2022-12-19 | End: 2022-12-19

## 2022-12-19 RX ORDER — DIPHENHYDRAMINE HYDROCHLORIDE 50 MG/ML
12.5 INJECTION INTRAMUSCULAR; INTRAVENOUS EVERY 4 HOURS PRN
Status: DISCONTINUED | OUTPATIENT
Start: 2022-12-19 | End: 2022-12-20 | Stop reason: HOSPADM

## 2022-12-19 RX ORDER — CEFAZOLIN SODIUM 2 G/50ML
2 SOLUTION INTRAVENOUS
Status: COMPLETED | OUTPATIENT
Start: 2022-12-19 | End: 2022-12-20

## 2022-12-19 RX ORDER — GABAPENTIN 250 MG/5ML
300 SOLUTION ORAL 3 TIMES DAILY
Status: DISCONTINUED | OUTPATIENT
Start: 2022-12-19 | End: 2022-12-19

## 2022-12-19 RX ORDER — ENOXAPARIN SODIUM 100 MG/ML
40 INJECTION SUBCUTANEOUS EVERY 12 HOURS
Status: DISCONTINUED | OUTPATIENT
Start: 2022-12-19 | End: 2022-12-20 | Stop reason: HOSPADM

## 2022-12-19 RX ORDER — PROCHLORPERAZINE EDISYLATE 5 MG/ML
5 INJECTION INTRAMUSCULAR; INTRAVENOUS EVERY 6 HOURS PRN
Status: DISCONTINUED | OUTPATIENT
Start: 2022-12-19 | End: 2022-12-19

## 2022-12-19 RX ADMIN — ACETAMINOPHEN 1000 MG: 10 INJECTION, SOLUTION INTRAVENOUS at 10:12

## 2022-12-19 RX ADMIN — PROPOFOL 200 MG: 10 INJECTION, EMULSION INTRAVENOUS at 10:12

## 2022-12-19 RX ADMIN — GABAPENTIN 300 MG: 300 CAPSULE ORAL at 09:12

## 2022-12-19 RX ADMIN — ONDANSETRON 8 MG: 2 INJECTION INTRAMUSCULAR; INTRAVENOUS at 05:12

## 2022-12-19 RX ADMIN — Medication 160 MG: at 10:12

## 2022-12-19 RX ADMIN — FENTANYL CITRATE 25 MCG: 50 INJECTION INTRAMUSCULAR; INTRAVENOUS at 01:12

## 2022-12-19 RX ADMIN — CEFAZOLIN SODIUM 2 G: 2 SOLUTION INTRAVENOUS at 05:12

## 2022-12-19 RX ADMIN — GABAPENTIN 300 MG: 250 SOLUTION ORAL at 03:12

## 2022-12-19 RX ADMIN — FENTANYL CITRATE 25 MCG: 50 INJECTION INTRAMUSCULAR; INTRAVENOUS at 12:12

## 2022-12-19 RX ADMIN — SODIUM CHLORIDE, SODIUM LACTATE, POTASSIUM CHLORIDE, AND CALCIUM CHLORIDE: .6; .31; .03; .02 INJECTION, SOLUTION INTRAVENOUS at 05:12

## 2022-12-19 RX ADMIN — SUGAMMADEX 200 MG: 100 INJECTION, SOLUTION INTRAVENOUS at 12:12

## 2022-12-19 RX ADMIN — HYDROCODONE BITARTRATE AND ACETAMINOPHEN 15 ML: 7.5; 325 SOLUTION ORAL at 03:12

## 2022-12-19 RX ADMIN — SODIUM CHLORIDE, SODIUM LACTATE, POTASSIUM CHLORIDE, AND CALCIUM CHLORIDE: .6; .31; .03; .02 INJECTION, SOLUTION INTRAVENOUS at 10:12

## 2022-12-19 RX ADMIN — DEXMEDETOMIDINE HYDROCHLORIDE 150 MCG: 100 INJECTION, SOLUTION INTRAVENOUS at 10:12

## 2022-12-19 RX ADMIN — ONDANSETRON 4 MG: 2 INJECTION INTRAMUSCULAR; INTRAVENOUS at 10:12

## 2022-12-19 RX ADMIN — SCOPOLAMINE 1 PATCH: 1 PATCH TRANSDERMAL at 09:12

## 2022-12-19 RX ADMIN — CEFAZOLIN SODIUM 2 G: 2 SOLUTION INTRAVENOUS at 11:12

## 2022-12-19 RX ADMIN — FAMOTIDINE 20 MG: 10 INJECTION, SOLUTION INTRAVENOUS at 10:12

## 2022-12-19 RX ADMIN — LIDOCAINE HYDROCHLORIDE 100 MG: 20 INJECTION, SOLUTION EPIDURAL; INFILTRATION; INTRACAUDAL; PERINEURAL at 10:12

## 2022-12-19 RX ADMIN — HEPARIN SODIUM 5000 UNITS: 5000 INJECTION INTRAVENOUS; SUBCUTANEOUS at 10:12

## 2022-12-19 RX ADMIN — DEXAMETHASONE SODIUM PHOSPHATE 4 MG: 4 INJECTION, SOLUTION INTRAMUSCULAR; INTRAVENOUS at 10:12

## 2022-12-19 RX ADMIN — CEFAZOLIN SODIUM 3 ML: 2 SOLUTION INTRAVENOUS at 10:12

## 2022-12-19 RX ADMIN — FENTANYL CITRATE 50 MCG: 50 INJECTION INTRAMUSCULAR; INTRAVENOUS at 10:12

## 2022-12-19 RX ADMIN — ROCURONIUM BROMIDE 5 MG: 10 INJECTION, SOLUTION INTRAVENOUS at 10:12

## 2022-12-19 RX ADMIN — LIDOCAINE HYDROCHLORIDE 1 EACH: 20 JELLY TOPICAL at 10:12

## 2022-12-19 RX ADMIN — FENTANYL CITRATE 25 MCG: 50 INJECTION INTRAMUSCULAR; INTRAVENOUS at 11:12

## 2022-12-19 RX ADMIN — ENOXAPARIN SODIUM 40 MG: 100 INJECTION SUBCUTANEOUS at 09:12

## 2022-12-19 RX ADMIN — HYDROCODONE BITARTRATE AND ACETAMINOPHEN 15 ML: 7.5; 325 SOLUTION ORAL at 07:12

## 2022-12-19 NOTE — TRANSFER OF CARE
"Anesthesia Transfer of Care Note    Patient: Fabiano Jules Jr.    Procedure(s) Performed: Procedure(s) (LRB):  GASTRECTOMY, SLEEVE, LAPAROSCOPIC (N/A)    Patient location: PACU    Anesthesia Type: general    Transport from OR: Transported from OR on room air with adequate spontaneous ventilation    Post pain: adequate analgesia    Post assessment: no apparent anesthetic complications and tolerated procedure well    Post vital signs: stable    Level of consciousness: responds to stimulation    Nausea/Vomiting: no nausea/vomiting    Complications: none    Transfer of care protocol was followedComments: SV, exchanging well.  To PACU, VSS upon arrival. Report to RN       Last vitals:   Visit Vitals  /89 (BP Location: Right arm, Patient Position: Lying)   Pulse (!) 59   Temp 37 °C (98.6 °F) (Oral)   Resp 18   Ht 5' 10" (1.778 m)   Wt (!) 149.9 kg (330 lb 8 oz)   SpO2 99%   BMI 47.42 kg/m²     "

## 2022-12-19 NOTE — H&P
Follow up     SUBJECTIVE:          Chief Complaint   Patient presents with    Obesity         History of Present Illness:     Patient is a 52 y.o. male who is referred for evaluation of surgical treatment of morbid obesity. His Body mass index is 51.6 kg/m². He has known comorbidities of hypertension, obstructive sleep apnea and arthritis. He has not attended the bariatric seminar and is most interested in gastric sleeve.          Review of patient's allergies indicates:   Allergen Reactions    Peaches [peach (prunus persica)] Anaphylaxis    Peanut Anaphylaxis         Current Medications          Current Outpatient Medications   Medication Sig Dispense Refill    amLODIPine (NORVASC) 5 MG tablet Take 1 tablet (5 mg total) by mouth once daily. 30 tablet 11    b complex vitamins (B COMPLEX-VITAMIN B12) tablet Take 1 tablet by mouth once daily. 90 tablet 1    EPINEPHrine (EPIPEN) 0.3 mg/0.3 mL AtIn Inject into the muscle once for one dose as needed. 2 each 1    ergocalciferol (ERGOCALCIFEROL) 50,000 unit Cap Take 1 capsule (50,000 Units total) by mouth twice a week. 24 capsule 0    ferrous sulfate (FEROSUL) 325 mg (65 mg iron) Tab tablet Take 1 tablet (325 mg total) by mouth every other day. (Patient taking differently: Take 325 mg by mouth once daily.) 90 tablet 0    lisinopriL-hydrochlorothiazide (PRINZIDE,ZESTORETIC) 20-12.5 mg per tablet Take 2 tablets by mouth once daily. 180 tablet 1    lisinopriL-hydrochlorothiazide (PRINZIDE,ZESTORETIC) 20-12.5 mg per tablet Take 2 tablets by mouth once daily. 180 tablet 1    oxyCODONE-acetaminophen (PERCOCET) 7.5-325 mg per tablet Take 1 tablet by mouth 3 (three) times daily as needed.        rosuvastatin (CRESTOR) 10 MG tablet Take 1 tablet (10 mg total) by mouth once daily. 90 tablet 3    semaglutide (OZEMPIC) 1 mg/dose (4 mg/3 mL) Inject 1 mg into the skin every 7 days. 1 pen 11      No current facility-administered medications for this visit.                 Facility-Administered Medications Ordered in Other Visits   Medication Dose Route Frequency Provider Last Rate Last Admin    acetaminophen tablet 650 mg  650 mg Oral Q4H PRN Katie Shane NP        ceFAZolin in dextrose (iso-os) 2 gram/100 mL PgBk 2,000 mg  2 g Intravenous Q8H Katie Shane NP   Stopped at 04/09/22 1036    HYDROmorphone injection 1 mg  1 mg Intravenous Q6H PRN Katie Shane NP   1 mg at 04/09/22 0539    ondansetron injection 4 mg  4 mg Intravenous Q6H PRN Katie Shane NP        oxyCODONE-acetaminophen  mg per tablet 1 tablet  1 tablet Oral Q4H PRN Katie Shane NP   1 tablet at 04/09/22 0906    oxyCODONE-acetaminophen 5-325 mg per tablet 1 tablet  1 tablet Oral Q4H PRN Katie Shane NP   1 tablet at 04/08/22 0947    polyethylene glycol packet 17 g  17 g Oral Daily Katie Shane NP   17 g at 04/08/22 0822                 Past Medical History:   Diagnosis Date    Arthritis      Back injury      Colon polyp      COVID      CPAP (continuous positive airway pressure) dependence      Hypertension      PONV (postoperative nausea and vomiting)      Sleep apnea       c pap machine used            Past Surgical History:   Procedure Laterality Date    BONE GRAFT N/A 4/5/2022     Procedure: BONE GRAFT;  Surgeon: Duran Adler Jr., MD;  Location: [facility] OR;  Service: Orthopedics;  Laterality: N/A;    CIRCUMCISION        COLONOSCOPY N/A 12/3/2018     Procedure: COLONOSCOPY;  Surgeon: CHRISSY Reyna MD;  Location: [address]);  Service: Endoscopy;  Laterality: N/A;    COLONOSCOPY N/A 9/1/2022     Procedure: COLONOSCOPY;  Surgeon: Alondra Arias MD;  Location: [facility];  Service: Endoscopy;  Laterality: N/A;    epidural steroid injection X 2        ESOPHAGOGASTRODUODENOSCOPY N/A 9/1/2022     Procedure: EGD (ESOPHAGOGASTRODUODENOSCOPY);  Surgeon: Alondra Arias MD;  Location: [facility];  Service: Endoscopy;  Laterality: N/A;    facet injection          Dr Case  [facility] [place]     INTRALUMINAL GASTROINTESTINAL TRACT IMAGING VIA CAPSULE N/A 10/3/2022     Procedure: IMAGING PROCEDURE, GI TRACT, INTRALUMINAL, VIA CAPSULE;  Surgeon: Alondra Arias MD;  Location: [facility];  Service: Endoscopy;  Laterality: N/A;    LIPOMA RESECTION         Back of neck    MAGNETIC RESONANCE IMAGING N/A 2020     Procedure: MRI (MAGNETIC RESONANCE IMAGING) LUMBAR SPINE;  Surgeon: [facility] Diagnostic Provider;  Location: [facility];  Service: General;  Laterality: N/A;  COVID NEG    MAGNETIC RESONANCE IMAGING N/A 12/3/2021     Procedure: MRI (MAGNETIC RESONANCE IMAGING), LUMBAR SPINE;  Surgeon: [facility] Diagnostic Provider;  Location: [facility];  Service: General;  Laterality: N/A;  In MRI @ 7:50 per Ariela    MEDIAL COLLATERAL LIGAMENT AND LATERAL COLLATERAL LIGAMENT REPAIR, KNEE Right 2018     Dr. Christophe Gore in [place]     MINIMALLY INVASIVE TRANSFORAMINAL LUMBAR INTERBODY FUSION (TLIF) N/A 2022     Procedure: FUSION, SPINE, LUMBAR, TLIF, MINIMALLY INVASIVE, WITH INSTRUMENTATION,  L4/5 RIGHT;  Surgeon: Duran Adler Jr., MD;  Location: [facility];  Service: Orthopedics;  Laterality: N/A;  10:30am per Tamela    MYELOGRAPHY N/A 2020     Procedure: MYELOGRAM;  Surgeon: [facility] Diagnostic Provider;  Location: [facility];  Service: General;  Laterality: N/A;            Family History   Problem Relation Age of Onset    Hypertension Mother      Diabetes Father      Cancer Father           mesotheliosma     Cataracts Neg Hx      Glaucoma Neg Hx      Macular degeneration Neg Hx      Retinal detachment Neg Hx      Colon cancer Neg Hx      Colon polyps Neg Hx      Crohn's disease Neg Hx      Ulcerative colitis Neg Hx        Social History            Tobacco Use    Smoking status: Former       Packs/day: 0.50       Years: 10.00       Pack years: 5.00       Types: Cigarettes       Quit date: 2009       Years since quittin.8    Smokeless tobacco: Never     Tobacco comments:       quit smoking in 2010   Substance Use Topics    Alcohol use: Not Currently       Comment: rare    Drug use: Not Currently         Review of Systems:  Review of Systems   Constitutional:  Positive for activity change. Negative for appetite change, chills, diaphoresis, fatigue and fever.   HENT:  Negative for congestion, dental problem, drooling, ear discharge, ear pain, facial swelling, hearing loss, mouth sores, nosebleeds, postnasal drip, rhinorrhea, sinus pressure, sinus pain, sneezing, sore throat, tinnitus, trouble swallowing and voice change.    Eyes:  Negative for photophobia, pain, discharge, itching and visual disturbance.   Respiratory:  Negative for apnea, cough, chest tightness, shortness of breath and wheezing.    Cardiovascular:  Negative for chest pain, palpitations and leg swelling.   Gastrointestinal:  Negative for abdominal distention, abdominal pain, anal bleeding, blood in stool, constipation, diarrhea, nausea, rectal pain and vomiting.   Endocrine: Negative for cold intolerance, heat intolerance, polydipsia, polyphagia and polyuria.   Genitourinary:  Negative for difficulty urinating, dysuria, enuresis, flank pain, frequency, genital sores and hematuria.   Musculoskeletal:  Positive for arthralgias, back pain, gait problem and joint swelling. Negative for myalgias, neck pain and neck stiffness.        Reports increased pain over 2 weeks, does not remember specifically the cause. Pain radiating down right leg. Denies numbness, tingling, loss of sensation, loss of bowel/bladder or erection.   Skin:  Negative for color change, pallor, rash and wound.   Allergic/Immunologic: Negative.  Negative for environmental allergies, food allergies and immunocompromised state.   Neurological:  Negative for dizziness, tremors, seizures, syncope, facial asymmetry, speech difficulty, weakness, light-headedness, numbness and headaches.   Hematological:  Negative for adenopathy. Does not  "bruise/bleed easily.   Psychiatric/Behavioral:  Negative for agitation, behavioral problems, confusion, decreased concentration, dysphoric mood, hallucinations, self-injury, sleep disturbance and suicidal ideas. The patient is not nervous/anxious and is not hyperactive.       OBJECTIVE:      Vital Signs (Most Recent)  Temp: 98.5 °F (36.9 °C) (11/16/22 1135)  Resp: 16 (11/16/22 1135)  BP: (!) 144/77 (11/16/22 1135)  5' 9" (1.753 m)  (!) 158.5 kg (349 lb 6.4 oz)      Body comp:  Fat Percent:  45.6 %  Fat Mass:  159.4 lb  FFM:  190 lb  TBW: 152 lb  TBW %:  43.5 %  BMR: 2734 kcal        Physical Exam  Vitals and nursing note reviewed.   Constitutional:       General: He is not in acute distress.     Appearance: Normal appearance. He is well-developed. He is not ill-appearing or diaphoretic.   HENT:      Head: Normocephalic and atraumatic.      Right Ear: External ear normal.      Left Ear: External ear normal.      Nose: Nose normal. No congestion or rhinorrhea.      Mouth/Throat:      Mouth: Mucous membranes are moist.      Pharynx: Oropharynx is clear. No oropharyngeal exudate.   Eyes:      General: No scleral icterus.        Right eye: No discharge.         Left eye: No discharge.      Conjunctiva/sclera: Conjunctivae normal.      Pupils: Pupils are equal, round, and reactive to light.   Neck:      Thyroid: No thyromegaly.      Vascular: No JVD.      Trachea: No tracheal deviation.   Cardiovascular:      Rate and Rhythm: Normal rate and regular rhythm.      Pulses: Normal pulses.      Heart sounds: Normal heart sounds. No murmur heard.    No friction rub. No gallop.   Pulmonary:      Effort: Pulmonary effort is normal. No respiratory distress.      Breath sounds: Normal breath sounds. No stridor. No wheezing or rales.   Chest:      Chest wall: No tenderness.   Abdominal:      General: Bowel sounds are normal. There is no distension.      Palpations: Abdomen is soft. There is no mass.      Tenderness: There is no " abdominal tenderness. There is no guarding or rebound.      Hernia: No hernia is present.   Musculoskeletal:         General: No swelling, tenderness, deformity or signs of injury. Normal range of motion.      Cervical back: Normal range of motion and neck supple. No rigidity or tenderness.      Right lower leg: Edema present.      Left lower leg: Edema present.   Skin:     General: Skin is warm and dry.      Capillary Refill: Capillary refill takes less than 2 seconds.      Coloration: Skin is not jaundiced or pale.      Findings: No bruising, erythema, lesion or rash.   Neurological:      General: No focal deficit present.      Mental Status: He is alert and oriented to person, place, and time. Mental status is at baseline.      Cranial Nerves: No cranial nerve deficit.      Motor: No weakness.      Coordination: Coordination abnormal.      Gait: Gait abnormal.   Psychiatric:         Mood and Affect: Mood normal.         Thought Content: Thought content normal.         Judgment: Judgment normal.         ASSESSMENT/PLAN:      Labs  EKG  UGI - Small sliding hiatal hernia otherwise negative upper GI  dietary consult- done  psych consult - done [facility]  Seminar        I will obtain the following clearances prior to surgery: Cardiology- done     1. Morbid obesity          2. Class 3 severe obesity due to excess calories with serious comorbidity and body mass index (BMI) of 50.0 to 59.9 in adult          3. RAFAEL (obstructive sleep apnea)          4. Primary hypertension                Plan:  Fabiano Jules Jr. has morbid obesity as their Body mass index is 51.6 kg/m². He would benefit from weight loss surgery and has chosen gastric sleeve surgery as the preferred procedure. He understands that this is a tool and lifestyle change will be necessary to keep weight off. I went over possible complications of the chosen surgery with the patient and he is agreeable to surgery.     He has been instructed to start the pre operative  diet.     He surgical date is December 19         The patient has been taught the post operative diet and understands that he will need to stay on this diet to prevent complication.  They are aware of the post operative follow up and return to see me after surgery to treat/prevent/identify any complications.  he has been advised to attend support groups post operatively.

## 2022-12-19 NOTE — PLAN OF CARE
PATIENT RESTING WITH EYES CLOSED, SNORING WITH APNEIC EPISODES. OXYGEN SATURATION DECREASED TO 88% - HISTORY OF OBSTRUCTIVE SLEEP APNEA  WITH NIGHTLY USE OF CPAP  SET PATIENT UP WITH HIS CPAP MACHINE. OXYGEN SATURATION LEVEL ON CPAP - 98%

## 2022-12-19 NOTE — ANESTHESIA PREPROCEDURE EVALUATION
12/19/2022  Fabiano Jules Jr. is a 52 y.o., male.      Patient Active Problem List   Diagnosis    HTN (hypertension)    Motion sickness    Male circumcision    Phimosis    Obesity, morbid, BMI 40.0-49.9    Class 3 severe obesity due to excess calories with serious comorbidity and body mass index (BMI) of 50.0 to 59.9 in adult    Vitamin D deficiency    Microcytic anemia    Screening for colon cancer    Low back pain    Chronic pain of right ankle    Difficulty walking    Degenerative joint disease of right hindfoot    Congenital pes planovalgus    Arthritis of ankle, left    Arthritis of right ankle    Degenerative joint disease of left hindfoot    Syncope    Morbid obesity    Elevated troponin    RAFAEL (obstructive sleep apnea)    Contusion of left leg, initial encounter    Cognitive communication deficit    Neuropathy    Chronic right-sided low back pain with right-sided sciatica    Spinal stenosis of lumbar region with neurogenic claudication    Polypharmacy    Lumbar pain    Weakness    Abnormal increased muscle tone    Decreased functional mobility and endurance    Special educational needs       Past Surgical History:   Procedure Laterality Date    BONE GRAFT N/A 04/05/2022    Procedure: BONE GRAFT;  Surgeon: Duran Adler Jr., MD;  [facility];  Service: Orthopedics;  Laterality: N/A;    CIRCUMCISION      COLONOSCOPY N/A 12/03/2018    Procedure: COLONOSCOPY;  Surgeon: CHRISSY Reyna MD;  Location: [facility] ([address] [place] [address]);  Service: Endoscopy;  Laterality: N/A;    COLONOSCOPY N/A 09/01/2022    Procedure: COLONOSCOPY;  Surgeon: Alondra Arias MD;  Location: [facility];  Service: Endoscopy;  Laterality: N/A;    epidural steroid injection X 2      lower lumbar    ESOPHAGOGASTRODUODENOSCOPY N/A 09/01/2022    Procedure: EGD (ESOPHAGOGASTRODUODENOSCOPY);   Surgeon: Alondra Arias MD;  Location: [facility] ENDO;  Service: Endoscopy;  Laterality: N/A;    facet injection       [facility] [place]     INTRALUMINAL GASTROINTESTINAL TRACT IMAGING VIA CAPSULE N/A 10/03/2022    Procedure: IMAGING PROCEDURE, GI TRACT, INTRALUMINAL, VIA CAPSULE;  Surgeon: Alondra Arias MD;  Location: [facility] ENDO;  Service: Endoscopy;  Laterality: N/A;    LIPOMA RESECTION      Back of neck    MAGNETIC RESONANCE IMAGING N/A 05/29/2020    Procedure: MRI (MAGNETIC RESONANCE IMAGING) LUMBAR SPINE;  Surgeon: [facility] Diagnostic Provider;  Location: [facility];  Service: General;  Laterality: N/A;  COVID NEG    MAGNETIC RESONANCE IMAGING N/A 12/03/2021    Procedure: MRI (MAGNETIC RESONANCE IMAGING), LUMBAR SPINE;  Surgeon: [facility] Diagnostic Provider;  Location: [facility];  Service: General;  Laterality: N/A;  In MRI @ 7:50 per Ariela    MEDIAL COLLATERAL LIGAMENT AND LATERAL COLLATERAL LIGAMENT REPAIR, KNEE Right 09/01/2018    Dr. Christophe Gore in [place]     MINIMALLY INVASIVE TRANSFORAMINAL LUMBAR INTERBODY FUSION (TLIF) N/A 04/05/2022    Procedure: FUSION, SPINE, LUMBAR, TLIF, MINIMALLY INVASIVE, WITH INSTRUMENTATION,  L4/5 RIGHT;  Surgeon: Duran Adler Jr., MD;  Location: [facility];  Service: Orthopedics;  Laterality: N/A;  10:30am per Tamela    MYELOGRAPHY N/A 06/23/2020    Procedure: MYELOGRAM;  Surgeon: [facility] Diagnostic Provider;  Location: [facility] OR;  Service: General;  Laterality: N/A;        Tobacco Use:  The patient  reports that he quit smoking about 13 years ago. His smoking use included cigarettes. He has a 5.00 pack-year smoking history. He has never used smokeless tobacco.     Results for orders placed or performed during the hospital encounter of 12/14/22   EKG 12-lead    Collection Time: 12/14/22  2:11 PM    Narrative    Test Reason : Z01.818,    Vent. Rate : 059 BPM     Atrial Rate : 059 BPM     P-R Int : 148 ms          QRS Dur : 114 ms      QT  Int : 414 ms       P-R-T Axes : 062 087 -04 degrees     QTc Int : 409 ms    Sinus bradycardia with sinus arrhythmia  Minimal voltage criteria for LVH, may be normal variant ( Lemuel product )  Abnormal QRS-T angle, consider primary T wave abnormality  Abnormal ECG  When compared with ECG of 15-NOV-2022 15:47,  No significant change was found  Confirmed by Sung Comer MD ([id number]) on 12/16/2022 12:07:03 PM    Referred By:             Confirmed By:Sung Comer MD             Lab Results   Component Value Date    WBC 5.29 12/14/2022    HGB 12.9 (L) 12/14/2022    HCT 40.2 12/14/2022    MCV 82 12/14/2022     12/14/2022     BMP  Lab Results   Component Value Date     (L) 12/14/2022    K 3.6 12/14/2022    CL 97 12/14/2022    CO2 29 12/14/2022    BUN 17 12/14/2022    CREATININE 0.9 12/14/2022    CALCIUM 9.7 12/14/2022    ANIONGAP 9 12/14/2022    GLU 87 12/14/2022    GLU 92 08/03/2022     (H) 04/09/2022       Results for orders placed during the hospital encounter of 07/13/21    Echo    Interpretation Summary  · The estimated PA systolic pressure is 34 mmHg.  · The left ventricle is normal in size with normal systolic function.  · Normal left ventricular diastolic function.  · The estimated ejection fraction is 60%.  · Normal right ventricular size with normal right ventricular systolic function.  · Mild tricuspid regurgitation.  · Mild mitral regurgitation.  · Mild aortic regurgitation.  · Normal central venous pressure (3 mmHg).        Pre-op Assessment    I have reviewed the Patient Summary Reports.     I have reviewed the Nursing Notes. I have reviewed the NPO Status.   I have reviewed the Medications.     Review of Systems  Anesthesia Hx:  No problems with previous Anesthesia Denies Hx of Anesthetic complications  Denies Family Hx of Anesthesia complications.  Personal Hx of Anesthesia complications, Post-Operative Nausea/Vomiting   Social:  Former Smoker, No Alcohol Use     Hematology/Oncology:  Hematology Normal   Oncology Normal     EENT/Dental:EENT/Dental Normal   Cardiovascular:   Hypertension, well controlled ECG has been reviewed.    Pulmonary:   Sleep Apnea, CPAP    Education provided regarding risk of obstructive sleep apnea     Renal/:  Renal/ Normal     Hepatic/GI:  Hepatic/GI Normal    Musculoskeletal:   Arthritis  Chronic right-sided low back pain with right-sided sciatica  Spinal stenosis of lumbar region with neurogenic claudication   Spine Disorders: lumbar Chronic Pain    Neurological:   Neuromuscular Disease, Weakness  Peripheral Neuropathy    Endocrine:  Endocrine Normal  Morbid Obesity / BMI > 40  Dermatological:  Skin Normal    Psych:  Psychiatric Normal           Physical Exam  General: Well nourished, Cooperative, Alert and Oriented    Airway:  Mallampati: III / II      Dental:  Intact    Chest/Lungs:  Clear to auscultation, Normal Respiratory Rate    Heart:  Rate: Normal  Rhythm: Regular Rhythm  Sounds: Normal        Anesthesia Plan  Type of Anesthesia, risks & benefits discussed:    Anesthesia Type: Gen ETT  Intra-op Monitoring Plan: Standard ASA Monitors  Post Op Pain Control Plan: multimodal analgesia and IV/PO Opioids PRN  Induction:  IV  Airway Plan: Direct and Video, Post-Induction  Informed Consent: Informed consent signed with the Patient and all parties understand the risks and agree with anesthesia plan.  All questions answered.   ASA Score: 3  Anesthesia Plan Notes:   GETA  Minimal Versed & Fentanyl (RAFAEL)  Benadryl 6.25 mg iv, Decadron 8 mg, iv Zofran 4 mg iv, Pepcid 20 mg iv   Ofirmev 1000 mg iv , Neurontin 300 mg po, Precedex, Scopolamine Patch, Sugammadex   RAFAEL Precautions: Extubate patient awake with HOB elevated and oral airway in place    Ready For Surgery From Anesthesia Perspective.     .

## 2022-12-19 NOTE — PLAN OF CARE
Pain medication offered for 10/10 abdominal. Pt reports that he wants the minimal amount of pain medication.

## 2022-12-19 NOTE — OP NOTE
Laparoscopic Sleeve Gastrectomy Procedure Note    Date of procedure:   12/19/2022    Indications: Morbid obesity unresponsive to medical treatment.    Pre-operative Diagnosis:    1. Morbid obesity          2. Class 3 severe obesity due to excess calories with serious comorbidity and body mass index (BMI) of 50.0 to 59.9 in adult          3. RAFAEL (obstructive sleep apnea)          4. Primary hypertension              Post-operative Diagnosis: Same    Surgeon: Lashonda Pinedo MD    Assistants: Zuly Kauffman CFA    No qualified resident was available to assist in this case    Anesthesia: General endotracheal anesthesia    ASA Class: 3    Procedure Details   The patient was seen in the Holding Room. The risks, benefits, complications, treatment options, and expected outcomes were discussed with the patient. The possibilities of reaction to medication, pulmonary aspiration, perforation of viscus, bleeding, recurrent infection, the need for additional procedures, failure to diagnose a condition, and creating a complication requiring transfusion or operation were discussed with the patient. The patient concurred with the proposed plan, giving informed consent. The site of surgery properly noted/marked. The patient was taken to Operating Room 7 identified as Fabiano Jules Jr. and the procedure verified as Sleeve Gastrectomy. A Time Out was held and the above information confirmed.    Full general anesthesia was induced with orotracheal intubation. The patient was prepped and draped in a supine position.  Foot board was placed. Appropriate antibiotics were given intravenously.    The 5 mm TV Compass medical optiview was used to enter into the abdominal cavity under direct vision in the right upper quadrant. The abdomen was insufflated.    There were no untoward findings on diagnostic laparoscopy. Trocars were placed under direct vision in the following fashion: a 5 mm trocar in the lateral right (optiview location) and left upper  quadrant; a 12 mm trocar in the left and right upper quadrant; and a 12 mm trocar in the jailene umbilical area. The patient was placed into steep reverse trendelenburg position.    The liver retractor was then placed through a high epigastric incision site and positioned to hold the liver.    The procedure was started by identifying an area approx. 5 cm proximal to the pylorus. The voyant was then used to take down the short gastrics up to the left rachel which was identified and cleared.    The sleeve was started by using a purple load with reinforcement to reduce the size of the antrum. The sleeve was then shaped using purple loads without reinforcement up the the left rachel using the 40 [place] Visi G bougie as a sizing device.  The distal sleeve was stapled slightly further away from the scope than the proximal sleeve to avoid encroachment of the incisura.     A provacative leak test, looking for air bubbles while the sleeve is insufflated and clamped, was preformed and did not reveal any leaks.     Hemostasis was achieved.  Tiseel was sprayed on the staple line.    The specimen was removed out of the left upper quadrant port and the site was closed with a zero vicryl.     Marcaine was injected at each port site.    The wounds were heavily irrigated. The skin was closed with 4-0 suture. Sterile dressings were applied.    Instrument, sponge, and needle counts were correct prior to wound closure and at the conclusion of the case.     Findings:  normal     Estimated Blood Loss: 20.0 cc    Drains: none    Total IV Fluids: 1000 ml    Specimens: gastrectomy    Implants: none    Complications:  None; patient tolerated the procedure well.    Disposition: PACU - hemodynamically stable.    Condition: stable    Attending Attestation: I was present and scrubbed for the entire procedure.

## 2022-12-19 NOTE — ANESTHESIA POSTPROCEDURE EVALUATION
Anesthesia Post Evaluation    Patient: Fabiano Jules Jr.    Procedure(s) Performed: Procedure(s) (LRB):  GASTRECTOMY, SLEEVE, LAPAROSCOPIC (N/A)    Final Anesthesia Type: general      Patient location during evaluation: PACU  Patient participation: Yes- Able to Participate  Level of consciousness: awake and alert, oriented and awake  Post-procedure vital signs: reviewed and stable  Pain management: adequate  Airway patency: patent  RAFAEL mitigation strategies: Multimodal analgesia, Extubation while patient is awake, Verification of full reversal of neuromuscular block and Extubation and recovery carried out in lateral, semiupright, or other nonsupine position  PONV status at discharge: No PONV  Anesthetic complications: no      Cardiovascular status: blood pressure returned to baseline, hemodynamically stable and stable  Respiratory status: unassisted, spontaneous ventilation and room air  Hydration status: euvolemic  Follow-up not needed.          Vitals Value Taken Time   /76 12/19/22 1317   Temp 36.1 °C (96.9 °F) 12/19/22 1315   Pulse 54 12/19/22 1321   Resp 17 12/19/22 1321   SpO2 98 % 12/19/22 1321   Vitals shown include unvalidated device data.      No case tracking events are documented in the log.      Pain/Arian Score: Pain Rating Prior to Med Admin: 5 (12/19/2022  1:15 PM)  Pain Rating Post Med Admin: 5 (12/19/2022 12:25 PM)  Arian Score: 10 (12/19/2022  1:15 PM)

## 2022-12-20 VITALS
OXYGEN SATURATION: 93 % | HEART RATE: 79 BPM | DIASTOLIC BLOOD PRESSURE: 89 MMHG | TEMPERATURE: 98 F | BODY MASS INDEX: 45.1 KG/M2 | HEIGHT: 70 IN | RESPIRATION RATE: 20 BRPM | WEIGHT: 315 LBS | SYSTOLIC BLOOD PRESSURE: 140 MMHG

## 2022-12-20 PROBLEM — Z98.84 S/P LAPAROSCOPIC SLEEVE GASTRECTOMY: Status: ACTIVE | Noted: 2022-12-20

## 2022-12-20 PROBLEM — E87.6 HYPOKALEMIA: Status: ACTIVE | Noted: 2022-12-20

## 2022-12-20 PROBLEM — E66.01 SEVERE OBESITY (BMI >= 40): Chronic | Status: ACTIVE | Noted: 2022-12-20

## 2022-12-20 LAB
ANION GAP SERPL CALC-SCNC: 10 MMOL/L (ref 8–16)
BASOPHILS # BLD AUTO: 0.01 K/UL (ref 0–0.2)
BASOPHILS NFR BLD: 0.1 % (ref 0–1.9)
BUN SERPL-MCNC: 8 MG/DL (ref 6–20)
CALCIUM SERPL-MCNC: 9.2 MG/DL (ref 8.7–10.5)
CHLORIDE SERPL-SCNC: 96 MMOL/L (ref 95–110)
CO2 SERPL-SCNC: 27 MMOL/L (ref 23–29)
CREAT SERPL-MCNC: 0.6 MG/DL (ref 0.5–1.4)
DIFFERENTIAL METHOD: ABNORMAL
EOSINOPHIL # BLD AUTO: 0 K/UL (ref 0–0.5)
EOSINOPHIL NFR BLD: 0 % (ref 0–8)
ERYTHROCYTE [DISTWIDTH] IN BLOOD BY AUTOMATED COUNT: 15 % (ref 11.5–14.5)
EST. GFR  (NO RACE VARIABLE): >60 ML/MIN/1.73 M^2
GLUCOSE SERPL-MCNC: 102 MG/DL (ref 70–110)
HCT VFR BLD AUTO: 37.9 % (ref 40–54)
HGB BLD-MCNC: 12.2 G/DL (ref 14–18)
IMM GRANULOCYTES # BLD AUTO: 0.03 K/UL (ref 0–0.04)
IMM GRANULOCYTES NFR BLD AUTO: 0.4 % (ref 0–0.5)
LYMPHOCYTES # BLD AUTO: 1.5 K/UL (ref 1–4.8)
LYMPHOCYTES NFR BLD: 22.1 % (ref 18–48)
MAGNESIUM SERPL-MCNC: 1.9 MG/DL (ref 1.6–2.6)
MCH RBC QN AUTO: 26.5 PG (ref 27–31)
MCHC RBC AUTO-ENTMCNC: 32.2 G/DL (ref 32–36)
MCV RBC AUTO: 82 FL (ref 82–98)
MONOCYTES # BLD AUTO: 0.5 K/UL (ref 0.3–1)
MONOCYTES NFR BLD: 7.6 % (ref 4–15)
NEUTROPHILS # BLD AUTO: 4.7 K/UL (ref 1.8–7.7)
NEUTROPHILS NFR BLD: 69.8 % (ref 38–73)
NRBC BLD-RTO: 0 /100 WBC
PHOSPHATE SERPL-MCNC: 3.5 MG/DL (ref 2.7–4.5)
PLATELET # BLD AUTO: 286 K/UL (ref 150–450)
PMV BLD AUTO: 10.8 FL (ref 9.2–12.9)
POTASSIUM SERPL-SCNC: 3.3 MMOL/L (ref 3.5–5.1)
RBC # BLD AUTO: 4.61 M/UL (ref 4.6–6.2)
SODIUM SERPL-SCNC: 133 MMOL/L (ref 136–145)
WBC # BLD AUTO: 6.74 K/UL (ref 3.9–12.7)

## 2022-12-20 PROCEDURE — 99900035 HC TECH TIME PER 15 MIN (STAT)

## 2022-12-20 PROCEDURE — 85025 COMPLETE CBC W/AUTO DIFF WBC: CPT | Performed by: SURGERY

## 2022-12-20 PROCEDURE — 63600175 PHARM REV CODE 636 W HCPCS: Performed by: SURGERY

## 2022-12-20 PROCEDURE — 25000003 PHARM REV CODE 250: Performed by: SURGERY

## 2022-12-20 PROCEDURE — 25000003 PHARM REV CODE 250: Performed by: INTERNAL MEDICINE

## 2022-12-20 PROCEDURE — 84100 ASSAY OF PHOSPHORUS: CPT | Performed by: SURGERY

## 2022-12-20 PROCEDURE — 83735 ASSAY OF MAGNESIUM: CPT | Performed by: SURGERY

## 2022-12-20 PROCEDURE — 25000003 PHARM REV CODE 250: Performed by: NURSE PRACTITIONER

## 2022-12-20 PROCEDURE — 63600175 PHARM REV CODE 636 W HCPCS: Performed by: INTERNAL MEDICINE

## 2022-12-20 PROCEDURE — 80048 BASIC METABOLIC PNL TOTAL CA: CPT | Performed by: SURGERY

## 2022-12-20 PROCEDURE — 36415 COLL VENOUS BLD VENIPUNCTURE: CPT | Performed by: SURGERY

## 2022-12-20 RX ORDER — SODIUM,POTASSIUM PHOSPHATES 280-250MG
2 POWDER IN PACKET (EA) ORAL
Status: DISCONTINUED | OUTPATIENT
Start: 2022-12-20 | End: 2022-12-20 | Stop reason: HOSPADM

## 2022-12-20 RX ORDER — HYDROCODONE BITARTRATE AND ACETAMINOPHEN 7.5; 325 MG/1; MG/1
1 TABLET ORAL EVERY 4 HOURS PRN
Qty: 20 TABLET | Refills: 0 | Status: SHIPPED | OUTPATIENT
Start: 2022-12-20 | End: 2023-09-27

## 2022-12-20 RX ORDER — LANOLIN ALCOHOL/MO/W.PET/CERES
800 CREAM (GRAM) TOPICAL
Status: DISCONTINUED | OUTPATIENT
Start: 2022-12-20 | End: 2022-12-20 | Stop reason: HOSPADM

## 2022-12-20 RX ORDER — AMOXICILLIN 250 MG
2 CAPSULE ORAL 2 TIMES DAILY PRN
Status: DISCONTINUED | OUTPATIENT
Start: 2022-12-20 | End: 2022-12-20 | Stop reason: HOSPADM

## 2022-12-20 RX ORDER — ACETAMINOPHEN 325 MG/1
650 TABLET ORAL EVERY 6 HOURS PRN
Status: DISCONTINUED | OUTPATIENT
Start: 2022-12-20 | End: 2022-12-20 | Stop reason: HOSPADM

## 2022-12-20 RX ORDER — LISINOPRIL 20 MG/1
20 TABLET ORAL DAILY
Status: DISCONTINUED | OUTPATIENT
Start: 2022-12-20 | End: 2022-12-20 | Stop reason: HOSPADM

## 2022-12-20 RX ORDER — SODIUM CHLORIDE AND POTASSIUM CHLORIDE 150; 900 MG/100ML; MG/100ML
INJECTION, SOLUTION INTRAVENOUS CONTINUOUS
Status: DISCONTINUED | OUTPATIENT
Start: 2022-12-20 | End: 2022-12-20 | Stop reason: HOSPADM

## 2022-12-20 RX ORDER — POTASSIUM CHLORIDE 20 MEQ/1
40 TABLET, EXTENDED RELEASE ORAL ONCE
Status: DISCONTINUED | OUTPATIENT
Start: 2022-12-20 | End: 2022-12-20

## 2022-12-20 RX ORDER — OXYCODONE AND ACETAMINOPHEN 7.5; 325 MG/1; MG/1
1 TABLET ORAL EVERY 4 HOURS PRN
Status: DISCONTINUED | OUTPATIENT
Start: 2022-12-20 | End: 2022-12-20 | Stop reason: HOSPADM

## 2022-12-20 RX ORDER — ONDANSETRON 4 MG/1
4 TABLET, ORALLY DISINTEGRATING ORAL EVERY 6 HOURS PRN
Qty: 30 TABLET | Refills: 0 | Status: SHIPPED | OUTPATIENT
Start: 2022-12-20 | End: 2023-03-31

## 2022-12-20 RX ADMIN — HYDROMORPHONE HYDROCHLORIDE 1 MG: 1 INJECTION, SOLUTION INTRAMUSCULAR; INTRAVENOUS; SUBCUTANEOUS at 06:12

## 2022-12-20 RX ADMIN — PANTOPRAZOLE SODIUM 40 MG: 40 TABLET, DELAYED RELEASE ORAL at 09:12

## 2022-12-20 RX ADMIN — ENOXAPARIN SODIUM 40 MG: 100 INJECTION SUBCUTANEOUS at 09:12

## 2022-12-20 RX ADMIN — CEFAZOLIN SODIUM 2 G: 2 SOLUTION INTRAVENOUS at 06:12

## 2022-12-20 RX ADMIN — HYDROMORPHONE HYDROCHLORIDE 1 MG: 1 INJECTION, SOLUTION INTRAMUSCULAR; INTRAVENOUS; SUBCUTANEOUS at 11:12

## 2022-12-20 RX ADMIN — SODIUM CHLORIDE AND POTASSIUM CHLORIDE: .9; .15 SOLUTION INTRAVENOUS at 02:12

## 2022-12-20 RX ADMIN — AMLODIPINE BESYLATE 5 MG: 5 TABLET ORAL at 04:12

## 2022-12-20 RX ADMIN — ONDANSETRON 8 MG: 2 INJECTION INTRAMUSCULAR; INTRAVENOUS at 06:12

## 2022-12-20 RX ADMIN — HYDROCODONE BITARTRATE AND ACETAMINOPHEN 15 ML: 7.5; 325 SOLUTION ORAL at 12:12

## 2022-12-20 RX ADMIN — LISINOPRIL 20 MG: 20 TABLET ORAL at 11:12

## 2022-12-20 RX ADMIN — ONDANSETRON 8 MG: 2 INJECTION INTRAMUSCULAR; INTRAVENOUS at 11:12

## 2022-12-20 NOTE — DISCHARGE SUMMARY
[facility]  Short Stay  Discharge Summary    Admit Date: 12/19/2022    Discharge Date and Time:  12/20/2022 4:11 PM      Discharge Attending Physician: Candace Dunlap MD     Hospital Course (synopsis of major diagnoses, care, treatment, and services provided during the course of the hospital stay): Did well post procedure and dcd home    Final Diagnoses:    Principal Problem: S/P laparoscopic sleeve gastrectomy   Secondary Diagnoses:   Active Hospital Problems    Diagnosis  POA    *S/P laparoscopic sleeve gastrectomy [Z98.84]  Not Applicable    Severe obesity (BMI >= 40) [E66.01]  Yes     Chronic    Hypokalemia [E87.6]  No    RAFAEL (obstructive sleep apnea) [G47.33]  Yes    Low back pain [M54.50]  Yes    HTN (hypertension) [I10]  Yes     Chronic      Resolved Hospital Problems   No resolved problems to display.       Discharged Condition: good    Disposition: Home or Self Care    Follow up/Patient Instructions:    Medications:  Reconciled Home Medications:      Medication List        START taking these medications      HYDROcodone-acetaminophen 7.5-325 mg per tablet  Commonly known as: NORCO  Take 1 tablet by mouth every 4 (four) hours as needed for Pain.     ondansetron 4 MG Tbdl  Commonly known as: ZOFRAN-ODT  Take 1 tablet (4 mg total) by mouth every 6 (six) hours as needed (nv).            CONTINUE taking these medications      amLODIPine 5 MG tablet  Commonly known as: NORVASC  Take 1 tablet (5 mg total) by mouth once daily.     EPINEPHrine 0.3 mg/0.3 mL Atin  Commonly known as: EPIPEN  Inject into the muscle once for one dose as needed.     rosuvastatin 10 MG tablet  Commonly known as: CRESTOR  Take 1 tablet (10 mg total) by mouth once daily.     VITAMIN D2 50,000 unit Cap  Generic drug: ergocalciferol  Take 1 capsule (50,000 Units total) by mouth twice a week.     VITAMINS B COMPLEX tablet  Generic drug: b complex vitamins  Take 1 tablet by mouth once daily.            STOP taking these  medications      FeroSuL 325 mg (65 mg iron) Tab tablet  Generic drug: ferrous sulfate     lisinopriL-hydrochlorothiazide 20-12.5 mg per tablet  Commonly known as: PRINZIDE,ZESTORETIC     oxyCODONE-acetaminophen 7.5-325 mg per tablet  Commonly known as: PERCOCET     OZEMPIC 1 mg/dose (4 mg/3 mL)  Generic drug: semaglutide            Discharge Procedure Orders   Diet clear liquid     Lifting restrictions     Notify your health care provider if you experience any of the following:  temperature >100.4     Notify your health care provider if you experience any of the following:  persistent nausea and vomiting or diarrhea     Notify your health care provider if you experience any of the following:  severe uncontrolled pain     Notify your health care provider if you experience any of the following:  redness, tenderness, or signs of infection (pain, swelling, redness, odor or green/yellow discharge around incision site)     Remove dressing in 48 hours      Follow-up Information       Lashonda Pinedo MD Follow up.    Specialties: General Surgery, Bariatrics, Surgery  Contact information:  [address] [id number] [phone]

## 2022-12-20 NOTE — PLAN OF CARE
Problem: Adult Inpatient Plan of Care  Goal: Plan of Care Review  Outcome: Ongoing, Progressing  Goal: Patient-Specific Goal (Individualized)  Outcome: Ongoing, Progressing  Goal: Absence of Hospital-Acquired Illness or Injury  Outcome: Ongoing, Progressing  Goal: Optimal Comfort and Wellbeing  Outcome: Ongoing, Progressing  Goal: Readiness for Transition of Care  Outcome: Ongoing, Progressing     Problem: Bariatric Environmental Safety  Goal: Safety Maintained with Care  Outcome: Ongoing, Progressing     Problem: Bariatric Care Environmental Safety (Bariatric Surgery)  Goal: Safety Maintained with Care  Outcome: Ongoing, Progressing     Problem: Bleeding (Bariatric Surgery)  Goal: Absence of Bleeding  Outcome: Ongoing, Progressing     Problem: Bowel Motility Impaired (Bariatric Surgery)  Goal: Effective Bowel Motility and Elimination  Outcome: Ongoing, Progressing     Problem: Fluid and Electrolyte Imbalance (Bariatric Surgery)  Goal: Fluid and Electrolyte Balance  Outcome: Ongoing, Progressing

## 2022-12-20 NOTE — CARE UPDATE
12/19/22 2018   PRE-TX-O2   Device (Oxygen Therapy) CPAP  (HOME)   SpO2 98 %   Pulse Oximetry Type Continuous   $ Pulse Oximetry - Multiple Charge Pulse Oximetry - Multiple   Pulse 66   Resp 20   Positioning   Body Position position changed independently   Head of Bed (HOB) Positioning HOB elevated   Positioning/Transfer Devices pillows;in use   ETCO2   $ ETCO2 Usage Currently wearing   ETCO2 (mmHg) 39 mmHg   ETCO2 Device Type Bedside Monitor   Education   $ Education 15 min   Respiratory Evaluation   $ Care Plan Tech Time 15 min

## 2022-12-20 NOTE — PLAN OF CARE
[facility]  Initial Discharge Assessment       Primary Care Provider: Howie Mathias MD    Admission Diagnosis: Morbid obesity [E66.01]  Class 3 severe obesity due to excess calories with serious comorbidity and body mass index (BMI) of 50.0 to 59.9 in adult [E66.01, Z68.43]  RAFAEL (obstructive sleep apnea) [G47.33]  Primary hypertension [I10]  Obesity [E66.9]    Admission Date: 12/19/2022  Expected Discharge Date:      met with Pt at bedside to complete discharge assessment. Pt AAOx4s. Demographics, PCP, and insurance verified. No home health. No dialysis. Pt reports ability to complete ADLs with the assistance of a CPAP. Pt verbalized plan to discharge home via family transport. Pt has no other needs to be addressed at this time.    Discharge Barriers Identified: None    Payor: BLUE CROSS BLUE SHIELD / Plan: BCBS ALL OUT OF STATE / Product Type: PPO /     Extended Emergency Contact Information  Primary Emergency Contact: Dominik Jules  Address: [address] [place] [address]           [place] [address] [facility]  Home Phone: [phone]  Mobile Phone: [phone]  Relation: Spouse    Discharge Plan A: Home with family  Discharge Plan B: Home with family      [id number] - [place] - [address]  [address] [place] [address]  Phone: [phone] Fax: [phone]    [id number] - [place]  [address]  [address]  [place] [address]  Phone: [phone] Fax: [phone]    [facility]  [address]  [place] [address]  Phone: [phone] Fax: [phone]      Initial Assessment (most recent)       Adult Discharge Assessment - 12/20/22 1551          Discharge Assessment    Assessment Type Discharge Planning Assessment     Confirmed/corrected address, phone number and insurance Yes     Confirmed Demographics Correct on Facesheet     Source of Information  patient     Does patient/caregiver understand observation status Yes     Communicated MARIZA with patient/caregiver Yes     Reason For Admission S/P laparoscopic sleeve gastrectomy     People in Home child(odette), adult;child(odette), dependent;spouse     Facility Arrived From: Home     Do you expect to return to your current living situation? Yes     Do you have help at home or someone to help you manage your care at home? Yes     Who are your caregiver(s) and their phone number(s)? Dominik Jules (Spouse)   [phone] (Mobile)     Prior to hospitilization cognitive status: Alert/Oriented     Current cognitive status: Alert/Oriented     Home Accessibility wheelchair accessible     Home Layout Able to live on 1st floor     Equipment Currently Used at Home CPAP     Readmission within 30 days? No     Patient currently being followed by outpatient case management? No     Do you currently have service(s) that help you manage your care at home? No     Do you take prescription medications? Yes     Do you have prescription coverage? Yes     Coverage Payor:  [employer] [facility] ALL OUT OF STATE     Do you have any problems affording any of your prescribed medications? No     Is the patient taking medications as prescribed? yes     Who is going to help you get home at discharge? Dominik Jules (Spouse)   [phone] (Mobile)     How do you get to doctors appointments? car, drives self     Are you on dialysis? No     Do you take coumadin? No     Discharge Plan A Home with family     Discharge Plan B Home with family     DME Needed Upon Discharge  none     Discharge Plan discussed with: Spouse/sig other;Patient     Name(s) and Number(s) Dominik Jules (Spouse)   [phone] (Mobile)     Discharge Barriers Identified None        OTHER    Name(s) of People in Home Dominik Jules (Spouse)   [phone] (Mobile); child, independent, unnamed; child dependent, unnamed

## 2022-12-20 NOTE — H&P
[facility]  Consultation note    Patient Name: Fabiano Jules Jr.  MRN: [id number]  Patient Class: IP- Inpatient  Admission Date: 12/19/2022  Attending Physician: Candace Dunlap MD   Primary Care Provider: Howie Mathias MD         Patient information was obtained from patient, past medical records and ER records.     Subjective:     Principal Problem:S/P laparoscopic sleeve gastrectomy    Chief Complaint: No chief complaint on file.       HPI: Mr. Jules is a 45-year-old male with history of morbid obesity (BMI 47) with comorbidities, underwent laparoscopic sleeve gastrectomy on 12/20/22 by Dr. Pinedo, [facility] has been consulted postoperatively for medical management.  Patient reports history of obesity, he is on Ozempic for appetite suppressant, states he lost about 10 lb, reports over the last month with dietary changes he did lose additional 30 lb.  Currently he reports 10/10 generalized abdominal pain, associated with nausea with episodes of clear emesis.  States he is passing small amount of flatus.  He was only able to tolerate small sips of clear liquid.  Denies any shortness of breath, chest pain, headache, urinary symptoms, leg swelling. Known history of essential hypertension as well as RAFAEL, compliant with home CPAP.  States he had prior history of lumbar fusion earlier this year.  Overnight blood pressure noted to be elevated.  Patient states liquid pain medication causes nausea and emesis, at home he is able to tolerate pill Norco or Percocet without any issue.  Afebrile overnight.  Blood pressure trend reviewed.  Labs with hemoglobin 12.2, potassium 3.3, BUN/creatinine 8/0.6.  Chart review 2021 nuclear stress test no reversible ischemia with EF of 51%, transthoracic echocardiogram with EF of 60%.  Discussed with patient.      Past Medical History:   Diagnosis Date    Arthritis     Back injury     Colon polyp     COVID     CPAP (continuous positive airway  pressure) dependence     Hypertension     PONV (postoperative nausea and vomiting)     Sleep apnea     c pap machine used       Past Surgical History:   Procedure Laterality Date    BONE GRAFT N/A 04/05/2022    Procedure: BONE GRAFT;  Surgeon: Duran Adler Jr., MD;  Location: [facility];  Service: Orthopedics;  Laterality: N/A;    CIRCUMCISION      COLONOSCOPY N/A 12/03/2018    Procedure: COLONOSCOPY;  Surgeon: CHRISSY Reyna MD;  Location: [facility] ENDO (4TH FLR);  Service: Endoscopy;  Laterality: N/A;    COLONOSCOPY N/A 09/01/2022    Procedure: COLONOSCOPY;  Surgeon: Alondra Arias MD;  Location: [facility] ENDO;  Service: Endoscopy;  Laterality: N/A;    epidural steroid injection X 2      lower lumbar    ESOPHAGOGASTRODUODENOSCOPY N/A 09/01/2022    Procedure: EGD (ESOPHAGOGASTRODUODENOSCOPY);  Surgeon: Alondra Arias MD;  Location: [facility] ENDO;  Service: Endoscopy;  Laterality: N/A;    facet injection       [facility] [place]     INTRALUMINAL GASTROINTESTINAL TRACT IMAGING VIA CAPSULE N/A 10/03/2022    Procedure: IMAGING PROCEDURE, GI TRACT, INTRALUMINAL, VIA CAPSULE;  Surgeon: Alondra Arias MD;  Location: [facility] ENDO;  Service: Endoscopy;  Laterality: N/A;    LAPAROSCOPIC SLEEVE GASTRECTOMY N/A 12/19/2022    Procedure: GASTRECTOMY, SLEEVE, LAPAROSCOPIC;  Surgeon: Lashonda Pinedo MD;  [facility];  Service: General;  Laterality: N/A;    LIPOMA RESECTION      Back of neck    MAGNETIC RESONANCE IMAGING N/A 05/29/2020    Procedure: MRI (MAGNETIC RESONANCE IMAGING) LUMBAR SPINE;  Surgeon: [facility] Diagnostic Provider;  Location: [facility];  Service: General;  Laterality: N/A;  COVID NEG    MAGNETIC RESONANCE IMAGING N/A 12/03/2021    Procedure: MRI (MAGNETIC RESONANCE IMAGING), LUMBAR SPINE;  Surgeon: [facility] Diagnostic Provider;  Location: [facility];  Service: General;  Laterality: N/A;  In MRI @ 7:50 per Ariela    MEDIAL COLLATERAL LIGAMENT AND LATERAL COLLATERAL LIGAMENT  REPAIR, KNEE Right 09/01/2018    Dr. Christophe Gore in [place]     MINIMALLY INVASIVE TRANSFORAMINAL LUMBAR INTERBODY FUSION (TLIF) N/A 04/05/2022    Procedure: FUSION, SPINE, LUMBAR, TLIF, MINIMALLY INVASIVE, WITH INSTRUMENTATION,  L4/5 RIGHT;  Surgeon: Duran Adler Jr., MD;  Location: [facility] OR;  Service: Orthopedics;  Laterality: N/A;  10:30am per Tamela    MYELOGRAPHY N/A 06/23/2020    Procedure: MYELOGRAM;  [facility];  Location: [facility] OR;  Service: General;  Laterality: N/A;       Review of patient's allergies indicates:   Allergen Reactions    Peaches [peach (prunus persica)] Anaphylaxis    Peanut Anaphylaxis    Tree pollen-red birch Anaphylaxis       Current Facility-Administered Medications on File Prior to Encounter   Medication    acetaminophen tablet 650 mg    ceFAZolin in dextrose (iso-os) 2 gram/100 mL PgBk 2,000 mg    HYDROmorphone injection 1 mg    ondansetron injection 4 mg    oxyCODONE-acetaminophen  mg per tablet 1 tablet    oxyCODONE-acetaminophen 5-325 mg per tablet 1 tablet    polyethylene glycol packet 17 g     Current Outpatient Medications on File Prior to Encounter   Medication Sig    amLODIPine (NORVASC) 5 MG tablet Take 1 tablet (5 mg total) by mouth once daily.    b complex vitamins (B COMPLEX-VITAMIN B12) tablet Take 1 tablet by mouth once daily.    ferrous sulfate (FEROSUL) 325 mg (65 mg iron) Tab tablet Take 1 tablet (325 mg total) by mouth every other day. (Patient taking differently: Take 325 mg by mouth once daily.)    lisinopriL-hydrochlorothiazide (PRINZIDE,ZESTORETIC) 20-12.5 mg per tablet Take 2 tablets by mouth once daily.    rosuvastatin (CRESTOR) 10 MG tablet Take 1 tablet (10 mg total) by mouth once daily.    EPINEPHrine (EPIPEN) 0.3 mg/0.3 mL AtIn Inject into the muscle once for one dose as needed.    ergocalciferol (ERGOCALCIFEROL) 50,000 unit Cap Take 1 capsule (50,000 Units total) by mouth twice a week.    oxyCODONE-acetaminophen (PERCOCET)  7.5-325 mg per tablet Take 1 tablet by mouth 3 (three) times daily as needed.    semaglutide (OZEMPIC) 1 mg/dose (4 mg/3 mL) Inject 1 mg into the skin every 7 days.     Family History       Problem Relation (Age of Onset)    Cancer Father    Diabetes Father    Hypertension Mother          Tobacco Use    Smoking status: Former     Packs/day: 0.50     Years: 10.00     Pack years: 5.00     Types: Cigarettes     Quit date: 2009     Years since quittin.9    Smokeless tobacco: Never    Tobacco comments:     quit smoking in    Substance and Sexual Activity    Alcohol use: Yes     Comment: rarely    Drug use: Not Currently     Types: Marijuana     Comment: medical---oil, smoke and edibles    Sexual activity: Not Currently     Review of Systems   Constitutional:  Negative for chills and fever.   HENT:  Negative for trouble swallowing.    Respiratory:  Negative for shortness of breath.    Cardiovascular:  Negative for chest pain.   Gastrointestinal:  Positive for abdominal pain, nausea and vomiting.   Genitourinary:  Negative for difficulty urinating.   Musculoskeletal:  Positive for back pain.   Allergic/Immunologic: Negative for immunocompromised state.   Neurological:  Negative for headaches.   Psychiatric/Behavioral:  Negative for confusion.    Objective:     Vital Signs (Most Recent):  Temp: 98.2 °F (36.8 °C) (22 0758)  Pulse: 69 (22 0758)  Resp: 20 (22 0758)  BP: (!) 168/98 (22 0758)  SpO2: (!) 94 % (22 0758)   Vital Signs (24h Range):  Temp:  [96.9 °F (36.1 °C)-98.2 °F (36.8 °C)] 98.2 °F (36.8 °C)  Pulse:  [59-73] 69  Resp:  [12-31] 20  SpO2:  [94 %-100 %] 94 %  BP: (136-178)/() 168/98     Weight: (!) 149.9 kg (330 lb 8 oz)  Body mass index is 47.42 kg/m².    Physical Exam  Vitals and nursing note reviewed.   Constitutional:       General: He is not in acute distress.     Appearance: He is obese. He is not ill-appearing, toxic-appearing or diaphoretic.   HENT:      Head:  Normocephalic and atraumatic.      Mouth/Throat:      Mouth: Mucous membranes are moist.   Eyes:      General:         Right eye: No discharge.         Left eye: No discharge.   Cardiovascular:      Rate and Rhythm: Normal rate.      Comments: Regular with occasional skipped beats, warm peripheries  Pulmonary:      Comments: On RA, no wheeze or accessory muscle use  Abdominal:      Comments: Steri strips over lap incision with band aids overlying, + appropriately tender, + BS   Skin:     General: Skin is warm and dry.   Neurological:      General: No focal deficit present.      Mental Status: He is alert and oriented to person, place, and time. Mental status is at baseline.   Psychiatric:         Mood and Affect: Mood normal.           Significant Labs: Blood Culture: No results for input(s): LABBLOO in the last 48 hours.  BMP:   Recent Labs   Lab 12/20/22  0418      *   K 3.3*   CL 96   CO2 27   BUN 8   CREATININE 0.6   CALCIUM 9.2   MG 1.9     CBC:   Recent Labs   Lab 12/20/22 0418   WBC 6.74   HGB 12.2*   HCT 37.9*        CMP:   Recent Labs   Lab 12/20/22  0418   *   K 3.3*   CL 96   CO2 27      BUN 8   CREATININE 0.6   CALCIUM 9.2   ANIONGAP 10     Cardiac Markers: No results for input(s): CKMB, MYOGLOBIN, BNP, TROPISTAT in the last 48 hours.  Lactic Acid: No results for input(s): LACTATE in the last 48 hours.  Magnesium:   Recent Labs   Lab 12/20/22  0418   MG 1.9       Significant Imaging: I have reviewed all pertinent imaging results/findings within the past 24 hours.    X-Ray Ankle Complete 3 View Bilateral    Result Date: 11/21/2022  EXAMINATION: XR ANKLE COMPLETE 3 VIEW BILATERAL CLINICAL HISTORY: Pain in right ankle and joints of right foot TECHNIQUE: AP, lateral and oblique views of both ankles were performed. COMPARISON: September 21, 2021 FINDINGS: On the right there is lateral tibiotalar joint space narrowing with flattening the plantar arch this is similar to the  June 4, 2021 exam.  On the left there is prominent lateral tibiotalar are joint space narrowing with osteophyte formation.  There is some deformity of the distal fibula and some bony hypertrophy in the region of the tibiofibular syndesmosis.  The lateral view demonstrates prominent flattening the plantar arch and posterior anterior talo tibial osteophyte formation Electronically signed by: Peyman Simpson MD Date:    11/21/2022 Time:    12:40    X-Ray Foot Complete 3 view Bilateral    Result Date: 11/21/2022  EXAMINATION: XR FOOT COMPLETE 3 VIEW BILATERAL CLINICAL HISTORY: Pain in right foot TECHNIQUE: AP, lateral, and oblique views of both feet were performed. COMPARISON: June 11, 2021 FINDINGS: On the left, there is flattening of the plantar arch with tibiotalar osteophyte formation and ossifications projecting anterior and posterior to the tibiotalar joint line.  On the right there is mild joint space narrowing at the 1st metatarsophalangeal joint and there is also flattening of the plantar arch with lesser tibiotalar and subtalar osteophyte formation. Electronically signed by: Peyman Simpson MD Date:    11/21/2022 Time:    12:42    X-Ray Chest PA And Lateral Pre-OP    Result Date: 12/14/2022  Reason: Pre-op for gastric sleeve surgery on Dec 19, 2022. Htn-cont, lung-sleep apnea, covid, cpap, heart-neg FINDINGS: PA and lateral chest is compared to 7/13/2021 show normal cardiomediastinal silhouette. Lungs are clear. Pulmonary vasculature is normal. Bones are normal. IMPRESSION: Normal chest. Electronically signed by:  Bhavya Fagan MD  12/14/2022 3:11 PM [facility] Workstation: [id number]       Assessment/Plan:     Active Hospital Problems    Diagnosis    *S/P laparoscopic sleeve gastrectomy    Severe obesity (BMI >= 40)    Hypokalemia    RAFAEL (obstructive sleep apnea)    Low back pain    HTN (hypertension)     Recommendations:  Continue current level of care  Postop antibiotics as per General surgery   Currently on  clear liquid diet, still with significant GI symptoms, advanced as per general surgery   P.r.n. pain control as ordered, adjust as needed   Change IV fluids to normal saline with 20 mEq potassium additive, 75 cc/hour   Resume home amlodipine and lisinopril, continue to hold hydrochlorothiazide until oral intake improved, monitor blood pressure and adjust as needed  Known history of RAFAEL, his home CPAP is present at bedside and he has been compliant  Serial abdominal examination   Electrolytes sliding scale repletion  Increase activity, out of bed to chair, ambulate, incentive spirometer use  General surgery following postoperatively   Further recommendations as per clinical course    VTE Risk Mitigation (From admission, onward)           Ordered     enoxaparin injection 40 mg  Every 12 hours         12/19/22 1624     IP VTE HIGH RISK PATIENT  Once         12/19/22 1624     Place RICKIE hose  Until discontinued         12/19/22 1624     Place sequential compression device  Until discontinued         12/19/22 1624                       Candace Dunlap MD  Department of Hospital Medicine   [facility]

## 2022-12-20 NOTE — SUBJECTIVE & OBJECTIVE
Past Medical History:   Diagnosis Date    Arthritis     Back injury     Colon polyp     COVID     CPAP (continuous positive airway pressure) dependence     Hypertension     PONV (postoperative nausea and vomiting)     Sleep apnea     c pap machine used       Past Surgical History:   Procedure Laterality Date    BONE GRAFT N/A 04/05/2022    Procedure: BONE GRAFT;  Surgeon: Duran Adler Jr., MD;  Location: [facility] OR;  Service: Orthopedics;  Laterality: N/A;    CIRCUMCISION      COLONOSCOPY N/A 12/03/2018    Procedure: COLONOSCOPY;  Surgeon: CHRISSY Reyna MD;  Location: [facility] ENDO ([facility] FLR);  Service: Endoscopy;  Laterality: N/A;    COLONOSCOPY N/A 09/01/2022    Procedure: COLONOSCOPY;  Surgeon: Alondra Arias MD;  Location: [facility] ENDO;  Service: Endoscopy;  Laterality: N/A;    epidural steroid injection X 2      lower lumbar    ESOPHAGOGASTRODUODENOSCOPY N/A 09/01/2022    Procedure: EGD (ESOPHAGOGASTRODUODENOSCOPY);  Surgeon: Alondra Arias MD;  Location: [facility] ENDO;  Service: Endoscopy;  Laterality: N/A;    facet injection       [facility] [place]     INTRALUMINAL GASTROINTESTINAL TRACT IMAGING VIA CAPSULE N/A 10/03/2022    Procedure: IMAGING PROCEDURE, GI TRACT, INTRALUMINAL, VIA CAPSULE;  Surgeon: Alondra Arias MD;  Location: [facility];  Service: Endoscopy;  Laterality: N/A;    LAPAROSCOPIC SLEEVE GASTRECTOMY N/A 12/19/2022    Procedure: GASTRECTOMY, SLEEVE, LAPAROSCOPIC;  Surgeon: Lashonda Pinedo MD;  Location: [facility] OR;  Service: General;  Laterality: N/A;    LIPOMA RESECTION      Back of neck    MAGNETIC RESONANCE IMAGING N/A 05/29/2020    Procedure: MRI (MAGNETIC RESONANCE IMAGING) LUMBAR SPINE;  Surgeon: [facility] Diagnostic Provider;  Location: [facility];  Service: General;  Laterality: N/A;  COVID NEG    MAGNETIC RESONANCE IMAGING N/A 12/03/2021    Procedure: MRI (MAGNETIC RESONANCE IMAGING), LUMBAR SPINE;  Surgeon: [facility] Diagnostic Provider;  Location: [facility];  Service: General;  Laterality: N/A;  In MRI @ 7:50 per Ariela    MEDIAL COLLATERAL LIGAMENT AND LATERAL COLLATERAL LIGAMENT REPAIR, KNEE Right 09/01/2018    Dr. Christophe Gore in [place]     MINIMALLY INVASIVE TRANSFORAMINAL LUMBAR INTERBODY FUSION (TLIF) N/A 04/05/2022    Procedure: FUSION, SPINE, LUMBAR, TLIF, MINIMALLY INVASIVE, WITH INSTRUMENTATION,  L4/5 RIGHT;  Surgeon: Duran Adler Jr., MD;  Location: [facility] OR;  Service: Orthopedics;  Laterality: N/A;  10:30am per Tamela    MYELOGRAPHY N/A 06/23/2020    Procedure: MYELOGRAM;  [facility];  Location: [facility] OR;  Service: General;  Laterality: N/A;       Review of patient's allergies indicates:   Allergen Reactions    Peaches [peach (prunus persica)] Anaphylaxis    Peanut Anaphylaxis    Tree pollen-red birch Anaphylaxis       Current Facility-Administered Medications on File Prior to Encounter   Medication    acetaminophen tablet 650 mg    ceFAZolin in dextrose (iso-os) 2 gram/100 mL PgBk 2,000 mg    HYDROmorphone injection 1 mg    ondansetron injection 4 mg    oxyCODONE-acetaminophen  mg per tablet 1 tablet    oxyCODONE-acetaminophen 5-325 mg per tablet 1 tablet    polyethylene glycol packet 17 g     Current Outpatient Medications on File Prior to Encounter   Medication Sig    amLODIPine (NORVASC) 5 MG tablet Take 1 tablet (5 mg total) by mouth once daily.    b complex vitamins (B COMPLEX-VITAMIN B12) tablet Take 1 tablet by mouth once daily.    ferrous sulfate (FEROSUL) 325 mg (65 mg iron) Tab tablet Take 1 tablet (325 mg total) by mouth every other day. (Patient taking differently: Take 325 mg by mouth once daily.)    lisinopriL-hydrochlorothiazide (PRINZIDE,ZESTORETIC) 20-12.5 mg per tablet Take 2 tablets by mouth once daily.    rosuvastatin (CRESTOR) 10 MG tablet Take 1 tablet (10 mg total) by mouth once daily.    EPINEPHrine (EPIPEN) 0.3 mg/0.3 mL AtIn Inject into the muscle once for one dose as needed.    ergocalciferol  (ERGOCALCIFEROL) 50,000 unit Cap Take 1 capsule (50,000 Units total) by mouth twice a week.    oxyCODONE-acetaminophen (PERCOCET) 7.5-325 mg per tablet Take 1 tablet by mouth 3 (three) times daily as needed.    semaglutide (OZEMPIC) 1 mg/dose (4 mg/3 mL) Inject 1 mg into the skin every 7 days.     Family History       Problem Relation (Age of Onset)    Cancer Father    Diabetes Father    Hypertension Mother          Tobacco Use    Smoking status: Former     Packs/day: 0.50     Years: 10.00     Pack years: 5.00     Types: Cigarettes     Quit date: 2009     Years since quittin.9    Smokeless tobacco: Never    Tobacco comments:     quit smoking in    Substance and Sexual Activity    Alcohol use: Yes     Comment: rarely    Drug use: Not Currently     Types: Marijuana     Comment: medical---oil, smoke and edibles    Sexual activity: Not Currently     Review of Systems   Constitutional:  Negative for chills and fever.   HENT:  Negative for trouble swallowing.    Respiratory:  Negative for shortness of breath.    Cardiovascular:  Negative for chest pain.   Gastrointestinal:  Positive for abdominal pain, nausea and vomiting.   Genitourinary:  Negative for difficulty urinating.   Musculoskeletal:  Positive for back pain.   Allergic/Immunologic: Negative for immunocompromised state.   Neurological:  Negative for headaches.   Psychiatric/Behavioral:  Negative for confusion.    Objective:     Vital Signs (Most Recent):  Temp: 98.2 °F (36.8 °C) (22 0758)  Pulse: 69 (22 0758)  Resp: 20 (22 0758)  BP: (!) 168/98 (22 0758)  SpO2: (!) 94 % (22 0758)   Vital Signs (24h Range):  Temp:  [96.9 °F (36.1 °C)-98.2 °F (36.8 °C)] 98.2 °F (36.8 °C)  Pulse:  [59-73] 69  Resp:  [12-31] 20  SpO2:  [94 %-100 %] 94 %  BP: (136-178)/() 168/98     Weight: (!) 149.9 kg (330 lb 8 oz)  Body mass index is 47.42 kg/m².    Physical Exam  Vitals and nursing note reviewed.   Constitutional:       General: He  is not in acute distress.     Appearance: He is obese. He is not ill-appearing, toxic-appearing or diaphoretic.   HENT:      Head: Normocephalic and atraumatic.      Mouth/Throat:      Mouth: Mucous membranes are moist.   Eyes:      General:         Right eye: No discharge.         Left eye: No discharge.   Cardiovascular:      Rate and Rhythm: Normal rate.      Comments: Regular with occasional skipped beats, warm peripheries  Pulmonary:      Comments: On RA, no wheeze or accessory muscle use  Abdominal:      Comments: Steri strips over lap incision with band aids overlying, + appropriately tender, + BS   Skin:     General: Skin is warm and dry.   Neurological:      General: No focal deficit present.      Mental Status: He is alert and oriented to person, place, and time. Mental status is at baseline.   Psychiatric:         Mood and Affect: Mood normal.           Significant Labs: Blood Culture: No results for input(s): LABBLOO in the last 48 hours.  BMP:   Recent Labs   Lab 12/20/22  0418      *   K 3.3*   CL 96   CO2 27   BUN 8   CREATININE 0.6   CALCIUM 9.2   MG 1.9     CBC:   Recent Labs   Lab 12/20/22 0418   WBC 6.74   HGB 12.2*   HCT 37.9*        CMP:   Recent Labs   Lab 12/20/22  0418   *   K 3.3*   CL 96   CO2 27      BUN 8   CREATININE 0.6   CALCIUM 9.2   ANIONGAP 10     Cardiac Markers: No results for input(s): CKMB, MYOGLOBIN, BNP, TROPISTAT in the last 48 hours.  Lactic Acid: No results for input(s): LACTATE in the last 48 hours.  Magnesium:   Recent Labs   Lab 12/20/22  0418   MG 1.9       Significant Imaging: I have reviewed all pertinent imaging results/findings within the past 24 hours.    X-Ray Ankle Complete 3 View Bilateral    Result Date: 11/21/2022  EXAMINATION: XR ANKLE COMPLETE 3 VIEW BILATERAL CLINICAL HISTORY: Pain in right ankle and joints of right foot TECHNIQUE: AP, lateral and oblique views of both ankles were performed. COMPARISON: September 21, 2021  FINDINGS: On the right there is lateral tibiotalar joint space narrowing with flattening the plantar arch this is similar to the June 4, 2021 exam.  On the left there is prominent lateral tibiotalar are joint space narrowing with osteophyte formation.  There is some deformity of the distal fibula and some bony hypertrophy in the region of the tibiofibular syndesmosis.  The lateral view demonstrates prominent flattening the plantar arch and posterior anterior talo tibial osteophyte formation Electronically signed by: Peyman Simpson MD Date:    11/21/2022 Time:    12:40    X-Ray Foot Complete 3 view Bilateral    Result Date: 11/21/2022  EXAMINATION: XR FOOT COMPLETE 3 VIEW BILATERAL CLINICAL HISTORY: Pain in right foot TECHNIQUE: AP, lateral, and oblique views of both feet were performed. COMPARISON: June 11, 2021 FINDINGS: On the left, there is flattening of the plantar arch with tibiotalar osteophyte formation and ossifications projecting anterior and posterior to the tibiotalar joint line.  On the right there is mild joint space narrowing at the 1st metatarsophalangeal joint and there is also flattening of the plantar arch with lesser tibiotalar and subtalar osteophyte formation. Electronically signed by: Peyman Simpson MD Date:    11/21/2022 Time:    12:42    X-Ray Chest PA And Lateral Pre-OP    Result Date: 12/14/2022  Reason: Pre-op for gastric sleeve surgery on Dec 19, 2022. Htn-cont, lung-sleep apnea, covid, cpap, heart-neg FINDINGS: PA and lateral chest is compared to 7/13/2021 show normal cardiomediastinal silhouette. Lungs are clear. Pulmonary vasculature is normal. Bones are normal. IMPRESSION: Normal chest. Electronically signed by:  Bhavya Fagan MD  12/14/2022 3:11 PM [facility] Workstation: [id number]

## 2022-12-20 NOTE — PLAN OF CARE
Patient cleared for discharge from case management standpoint.    Follow up appointments scheduled and added to AVS.    Chart and discharge orders reviewed.  Patient discharged home with no further case management needs.       12/20/22 7109   Final Note   Assessment Type Final Discharge Note   Anticipated Discharge Disposition Home   Hospital Resources/Appts/Education Provided Provided patient/caregiver with written discharge plan information;Appointments scheduled and added to AVS   Post-Acute Status   Discharge Delays None known at this time

## 2022-12-20 NOTE — PT/OT/SLP PROGRESS
Physical Therapy      Patient Name:  Fabiano Jules Jr.   MRN:  [id number]    Orders received for a trapeze. Patient declined.

## 2022-12-20 NOTE — NURSING
"Pt s SBP ran in the 160's and 170"s DBP a little over 100 through shift . Spoke with the Hospitalis Dr. Barragan and administered AM BP meds early . @ 0420  Pt had a couple episodes off dry heaves the worst nausea this AM and zofran 8mg  was administered  IV. Pain  meds also administered per PRN orders.   Pt is urinating and passing gas. Ambulated to the BR .   Puncture sites intact  . NAD noted at this time  "

## 2022-12-20 NOTE — HPI
Mr. Jules is a 45-year-old male with history of morbid obesity (BMI 47) with comorbidities, underwent laparoscopic sleeve gastrectomy on 12/20/22 by Dr. Pinedo, [facility] has been consulted postoperatively for medical management.  Patient reports history of obesity, he is on Ozempic for appetite suppressant, states he lost about 10 lb, reports over the last month with dietary changes he did lose additional 30 lb.  Currently he reports 10/10 generalized abdominal pain, associated with nausea with episodes of clear emesis.  States he is passing small amount of flatus.  He was only able to tolerate small sips of clear liquid.  Denies any shortness of breath, chest pain, headache, urinary symptoms, leg swelling. Known history of essential hypertension as well as RAFAEL, compliant with home CPAP.  States he had prior history of lumbar fusion earlier this year.  Overnight blood pressure noted to be elevated.  Patient states liquid pain medication causes nausea and emesis, at home he is able to tolerate pill Norco or Percocet without any issue.  Afebrile overnight.  Blood pressure trend reviewed.  Labs with hemoglobin 12.2, potassium 3.3, BUN/creatinine 8/0.6.  Chart review 2021 nuclear stress test no reversible ischemia with EF of 51%, transthoracic echocardiogram with EF of 60%.  Discussed with patient.

## 2022-12-20 NOTE — PLAN OF CARE
Problem: Adult Inpatient Plan of Care  Goal: Plan of Care Review  12/20/2022 1702 by Tyrell Malik RN  Outcome: Met  12/20/2022 1442 by Tyrell Malik RN  Outcome: Ongoing, Progressing  Goal: Patient-Specific Goal (Individualized)  12/20/2022 1702 by Tyrell Malik RN  Outcome: Met  12/20/2022 1442 by Tyrell Malik RN  Outcome: Ongoing, Progressing  Goal: Absence of Hospital-Acquired Illness or Injury  12/20/2022 1702 by Tyrell Malik RN  Outcome: Met  12/20/2022 1442 by Tyrell Malik RN  Outcome: Ongoing, Progressing  Goal: Optimal Comfort and Wellbeing  12/20/2022 1702 by Tyrell Malik RN  Outcome: Met  12/20/2022 1442 by Tyrell Malik RN  Outcome: Ongoing, Progressing  Goal: Readiness for Transition of Care  12/20/2022 1702 by Tyrell Malik RN  Outcome: Met  12/20/2022 1442 by Tyrell Malik RN  Outcome: Ongoing, Progressing     Problem: Bariatric Environmental Safety  Goal: Safety Maintained with Care  12/20/2022 1702 by Tyrell Malik RN  Outcome: Met  12/20/2022 1442 by Tyrell Malik RN  Outcome: Ongoing, Progressing     Problem: Bariatric Care Environmental Safety (Bariatric Surgery)  Goal: Safety Maintained with Care  12/20/2022 1702 by Tyrell Malik RN  Outcome: Met  12/20/2022 1442 by Tyrell Malik RN  Outcome: Ongoing, Progressing     Problem: Bleeding (Bariatric Surgery)  Goal: Absence of Bleeding  12/20/2022 1702 by Tyrell Malik RN  Outcome: Met  12/20/2022 1442 by Tyrell Malik RN  Outcome: Ongoing, Progressing     Problem: Bowel Motility Impaired (Bariatric Surgery)  Goal: Effective Bowel Motility and Elimination  12/20/2022 1702 by Tyrell Malik RN  Outcome: Met  12/20/2022 1442 by Tyrell Malik RN  Outcome: Ongoing, Progressing     Problem: Glycemic Control Impaired (Bariatric Surgery)  Goal: Blood Glucose Level Within Desired Range  12/20/2022 1702 by Tyrell Malik RN  Outcome: Met  12/20/2022 1442 by Tyrell Malik RN  Outcome: Ongoing, Progressing     Problem: Infection  (Bariatric Surgery)  Goal: Absence of Infection Signs and Symptoms  12/20/2022 1702 by Tyrell Malik RN  Outcome: Met  12/20/2022 1442 by Tyrell Malik RN  Outcome: Ongoing, Progressing     Problem: Ongoing Anesthesia Effects (Bariatric Surgery)  Goal: Anesthesia/Sedation Recovery  12/20/2022 1702 by Tyrell Malik RN  Outcome: Met  12/20/2022 1442 by Tyrell Malik RN  Outcome: Ongoing, Progressing     Problem: Postoperative Nausea and Vomiting (Bariatric Surgery)  Goal: Nausea and Vomiting Relief  12/20/2022 1702 by Tyrell Malik RN  Outcome: Met  12/20/2022 1442 by Tyrell Malik RN  Outcome: Ongoing, Progressing     Problem: Postoperative Urinary Retention (Bariatric Surgery)  Goal: Effective Urinary Elimination  12/20/2022 1702 by Tyrell Malik RN  Outcome: Met  12/20/2022 1442 by Tyrell Malik RN  Outcome: Ongoing, Progressing     Problem: Respiratory Compromise (Bariatric Surgery)  Goal: Effective Oxygenation and Ventilation  12/20/2022 1702 by Tyrell Malik RN  Outcome: Met  12/20/2022 1442 by Tyrell Malik RN  Outcome: Ongoing, Progressing

## 2022-12-21 ENCOUNTER — PATIENT MESSAGE (OUTPATIENT)
Dept: FAMILY MEDICINE | Facility: CLINIC | Age: 52
End: 2022-12-21
Payer: COMMERCIAL

## 2022-12-21 DIAGNOSIS — I10 BENIGN ESSENTIAL HTN: Primary | ICD-10-CM

## 2022-12-21 RX ORDER — LISINOPRIL 20 MG/1
20 TABLET ORAL DAILY
Qty: 90 TABLET | Refills: 3 | Status: SHIPPED | OUTPATIENT
Start: 2022-12-21 | End: 2023-01-04 | Stop reason: SDUPTHER

## 2022-12-22 ENCOUNTER — PATIENT MESSAGE (OUTPATIENT)
Dept: BARIATRICS | Facility: CLINIC | Age: 52
End: 2022-12-22
Payer: COMMERCIAL

## 2022-12-29 ENCOUNTER — OFFICE VISIT (OUTPATIENT)
Dept: FAMILY MEDICINE | Facility: CLINIC | Age: 52
End: 2022-12-29
Payer: COMMERCIAL

## 2022-12-29 VITALS
BODY MASS INDEX: 45.1 KG/M2 | WEIGHT: 315 LBS | OXYGEN SATURATION: 98 % | SYSTOLIC BLOOD PRESSURE: 128 MMHG | HEIGHT: 70 IN | TEMPERATURE: 98 F | HEART RATE: 76 BPM | DIASTOLIC BLOOD PRESSURE: 78 MMHG

## 2022-12-29 DIAGNOSIS — I10 PRIMARY HYPERTENSION: Primary | Chronic | ICD-10-CM

## 2022-12-29 DIAGNOSIS — E66.01 OBESITY, MORBID, BMI 40.0-49.9: ICD-10-CM

## 2022-12-29 PROCEDURE — 1111F PR DISCHARGE MEDS RECONCILED W/ CURRENT OUTPATIENT MED LIST: ICD-10-PCS | Mod: CPTII,S$GLB,, | Performed by: NURSE PRACTITIONER

## 2022-12-29 PROCEDURE — 1111F DSCHRG MED/CURRENT MED MERGE: CPT | Mod: CPTII,S$GLB,, | Performed by: NURSE PRACTITIONER

## 2022-12-29 PROCEDURE — 3074F SYST BP LT 130 MM HG: CPT | Mod: CPTII,S$GLB,, | Performed by: NURSE PRACTITIONER

## 2022-12-29 PROCEDURE — 3044F HG A1C LEVEL LT 7.0%: CPT | Mod: CPTII,S$GLB,, | Performed by: NURSE PRACTITIONER

## 2022-12-29 PROCEDURE — 99213 OFFICE O/P EST LOW 20 MIN: CPT | Mod: S$GLB,,, | Performed by: NURSE PRACTITIONER

## 2022-12-29 PROCEDURE — 3008F PR BODY MASS INDEX (BMI) DOCUMENTED: ICD-10-PCS | Mod: CPTII,S$GLB,, | Performed by: NURSE PRACTITIONER

## 2022-12-29 PROCEDURE — 1159F MED LIST DOCD IN RCRD: CPT | Mod: CPTII,S$GLB,, | Performed by: NURSE PRACTITIONER

## 2022-12-29 PROCEDURE — 1159F PR MEDICATION LIST DOCUMENTED IN MEDICAL RECORD: ICD-10-PCS | Mod: CPTII,S$GLB,, | Performed by: NURSE PRACTITIONER

## 2022-12-29 PROCEDURE — 99999 PR PBB SHADOW E&M-EST. PATIENT-LVL III: CPT | Mod: PBBFAC,,, | Performed by: NURSE PRACTITIONER

## 2022-12-29 PROCEDURE — 3008F BODY MASS INDEX DOCD: CPT | Mod: CPTII,S$GLB,, | Performed by: NURSE PRACTITIONER

## 2022-12-29 PROCEDURE — 99999 PR PBB SHADOW E&M-EST. PATIENT-LVL III: ICD-10-PCS | Mod: PBBFAC,,, | Performed by: NURSE PRACTITIONER

## 2022-12-29 PROCEDURE — 3044F PR MOST RECENT HEMOGLOBIN A1C LEVEL <7.0%: ICD-10-PCS | Mod: CPTII,S$GLB,, | Performed by: NURSE PRACTITIONER

## 2022-12-29 PROCEDURE — 3078F DIAST BP <80 MM HG: CPT | Mod: CPTII,S$GLB,, | Performed by: NURSE PRACTITIONER

## 2022-12-29 PROCEDURE — 4010F PR ACE/ARB THEARPY RXD/TAKEN: ICD-10-PCS | Mod: CPTII,S$GLB,, | Performed by: NURSE PRACTITIONER

## 2022-12-29 PROCEDURE — 3078F PR MOST RECENT DIASTOLIC BLOOD PRESSURE < 80 MM HG: ICD-10-PCS | Mod: CPTII,S$GLB,, | Performed by: NURSE PRACTITIONER

## 2022-12-29 PROCEDURE — 3074F PR MOST RECENT SYSTOLIC BLOOD PRESSURE < 130 MM HG: ICD-10-PCS | Mod: CPTII,S$GLB,, | Performed by: NURSE PRACTITIONER

## 2022-12-29 PROCEDURE — 4010F ACE/ARB THERAPY RXD/TAKEN: CPT | Mod: CPTII,S$GLB,, | Performed by: NURSE PRACTITIONER

## 2022-12-29 PROCEDURE — 99213 PR OFFICE/OUTPT VISIT, EST, LEVL III, 20-29 MIN: ICD-10-PCS | Mod: S$GLB,,, | Performed by: NURSE PRACTITIONER

## 2022-12-29 NOTE — PROGRESS NOTES
This dictation has been generated using Modal Fluency Dictation some phonetic errors may occur. Please contact author for clarification if needed.     Problem List Items Addressed This Visit       HTN (hypertension) - Primary (Chronic)    Obesity, morbid, BMI 40.0-49.9            Hospital follow up.  Post op gastric sleeve.  HTN stable and controlled.   Obesity wt improved.     Follow up in about 3 months (around 3/29/2023).    ________________________________________________________________  ________________________________________________________________      Chief Complaint   Patient presents with    Hospital Follow Up     History of present illness  This 52 y.o. presents today for complaint of hospital follow-up.  Had gastric sleeve surgeon was Dr. Pinedo.  Notes that he is tolerating his liquid diet.  He is passing gas and had a bowel movement this morning.  Notes surgical incision sites improving.  Blood pressure is stable.  He has lost weight since surgery.  No other complaints.  Limited review of systems negative      Past Medical History:   Diagnosis Date    Arthritis     Back injury     Colon polyp     COVID     CPAP (continuous positive airway pressure) dependence     Hypertension     PONV (postoperative nausea and vomiting)     Sleep apnea     c pap machine used       Past Surgical History:   Procedure Laterality Date    BONE GRAFT N/A 04/05/2022    Procedure: BONE GRAFT;  Surgeon: Duran Adler Jr., MD;  Location: [facility] OR;  Service: Orthopedics;  Laterality: N/A;    CIRCUMCISION      COLONOSCOPY N/A 12/03/2018    Procedure: COLONOSCOPY;  Surgeon: CHRISSY Reyna MD;  Location: [address]);  Service: Endoscopy;  Laterality: N/A;    COLONOSCOPY N/A 09/01/2022    Procedure: COLONOSCOPY;  Surgeon: Alondra Arias MD;  Location: [facility];  Service: Endoscopy;  Laterality: N/A;    epidural steroid injection X 2      lower lumbar    ESOPHAGOGASTRODUODENOSCOPY N/A 09/01/2022    Procedure:  EGD (ESOPHAGOGASTRODUODENOSCOPY);  Surgeon: Alondra Arias MD;  Location: [facility] ENDO;  Service: Endoscopy;  Laterality: N/A;    facet injection       [facility] [place]     INTRALUMINAL GASTROINTESTINAL TRACT IMAGING VIA CAPSULE N/A 10/03/2022    Procedure: IMAGING PROCEDURE, GI TRACT, INTRALUMINAL, VIA CAPSULE;  Surgeon: Alondra Arias MD;  Location: [facility] ENDO;  Service: Endoscopy;  Laterality: N/A;    LAPAROSCOPIC SLEEVE GASTRECTOMY N/A 12/19/2022    Procedure: GASTRECTOMY, SLEEVE, LAPAROSCOPIC;  Surgeon: Lashonda Pinedo MD;  Location: [facility] OR;  Service: General;  Laterality: N/A;    LIPOMA RESECTION      Back of neck    MAGNETIC RESONANCE IMAGING N/A 05/29/2020    Procedure: MRI (MAGNETIC RESONANCE IMAGING) LUMBAR SPINE;  Surgeon: [facility] Provider;  Location: [facility];  Service: General;  Laterality: N/A;  COVID NEG    MAGNETIC RESONANCE IMAGING N/A 12/03/2021    Procedure: MRI (MAGNETIC RESONANCE IMAGING), LUMBAR SPINE;  Surgeon: [facility] Provider;  Location: [facility];  Service: General;  Laterality: N/A;  In MRI @ 7:50 per Ariela    MEDIAL COLLATERAL LIGAMENT AND LATERAL COLLATERAL LIGAMENT REPAIR, KNEE Right 09/01/2018    Dr. Christophe Gore in [place]     MINIMALLY INVASIVE TRANSFORAMINAL LUMBAR INTERBODY FUSION (TLIF) N/A 04/05/2022    Procedure: FUSION, SPINE, LUMBAR, TLIF, MINIMALLY INVASIVE, WITH INSTRUMENTATION,  L4/5 RIGHT;  Surgeon: Duran Adler Jr., MD;  Location: [facility];  Service: Orthopedics;  Laterality: N/A;  10:30am per Tamela    MYELOGRAPHY N/A 06/23/2020    Procedure: MYELOGRAM;  Surgeon: [facility] Provider;  Location: [facility];  Service: General;  Laterality: N/A;       Family History   Problem Relation Age of Onset    Hypertension Mother     Diabetes Father     Cancer Father         mesotheliosma     Cataracts Neg Hx     Glaucoma Neg Hx     Macular degeneration Neg Hx     Retinal detachment Neg Hx     Colon cancer Neg Hx      Colon polyps Neg Hx     Crohn's disease Neg Hx     Ulcerative colitis Neg Hx        Social History     Socioeconomic History    Marital status:     Number of children: 4   Occupational History     Employer: [employer]   Tobacco Use    Smoking status: Former     Packs/day: 0.50     Years: 10.00     Pack years: 5.00     Types: Cigarettes     Quit date: 2009     Years since quittin.0    Smokeless tobacco: Never    Tobacco comments:     quit smoking in    Substance and Sexual Activity    Alcohol use: Yes     Comment: rarely    Drug use: Not Currently     Types: Marijuana     Comment: medical---oil, smoke and edibles    Sexual activity: Not Currently       Current Outpatient Medications   Medication Sig Dispense Refill    amLODIPine (NORVASC) 5 MG tablet Take 1 tablet (5 mg total) by mouth once daily. 30 tablet 11    EPINEPHrine (EPIPEN) 0.3 mg/0.3 mL AtIn Inject into the muscle once for one dose as needed. 2 each 1    ergocalciferol (ERGOCALCIFEROL) 50,000 unit Cap Take 1 capsule (50,000 Units total) by mouth twice a week. 24 capsule 0    HYDROcodone-acetaminophen (NORCO) 7.5-325 mg per tablet Take 1 tablet by mouth every 4 (four) hours as needed for Pain. 20 tablet 0    lisinopriL (PRINIVIL,ZESTRIL) 20 MG tablet Take 1 tablet (20 mg total) by mouth once daily. 90 tablet 3    multivit-min/iron/folic acid/K (BARIATRIC MULTIVITAMINS ORAL) Take by mouth.      ondansetron (ZOFRAN-ODT) 4 MG TbDL Take 1 tablet (4 mg total) by mouth every 6 (six) hours as needed (nv). 30 tablet 0    rosuvastatin (CRESTOR) 10 MG tablet Take 1 tablet (10 mg total) by mouth once daily. 90 tablet 3    b complex vitamins (B COMPLEX-VITAMIN B12) tablet Take 1 tablet by mouth once daily. 90 tablet 1     No current facility-administered medications for this visit.     Facility-Administered Medications Ordered in Other Visits   Medication Dose Route Frequency Provider Last Rate Last Admin    acetaminophen tablet 650 mg  650 mg  Oral Q4H PRN Katie Shane NP        ceFAZolin in dextrose (iso-os) 2 gram/100 mL PgBk 2,000 mg  2 g Intravenous Q8H Katie Shane NP   Stopped at 04/09/22 1036    HYDROmorphone injection 1 mg  1 mg Intravenous Q6H PRN Katie Shane NP   1 mg at 04/09/22 0539    ondansetron injection 4 mg  4 mg Intravenous Q6H PRN Katie Shane NP        oxyCODONE-acetaminophen  mg per tablet 1 tablet  1 tablet Oral Q4H PRN Katie Shane NP   1 tablet at 04/09/22 0906    polyethylene glycol packet 17 g  17 g Oral Daily Katie Shane NP   17 g at 04/08/22 0822       Review of patient's allergies indicates:   Allergen Reactions    Peaches [peach (prunus persica)] Anaphylaxis    Peanut Anaphylaxis    Tree pollen-red birch Anaphylaxis       Physical examination  Vitals Reviewed\  Vitals:    12/29/22 0816   BP: 128/78   Pulse: 76   Temp: 98 °F (36.7 °C)     Body mass index is 45.39 kg/m².      Weight: (!) 143.5 kg (316 lb 5.8 oz)    Gen. Well-dressed well-nourished   Skin warm dry and intact.  No rashes noted.  Chest.  Respirations are even unlabored.  Lungs are clear to auscultation.  Cardiac regular rate and rhythm.  No chest wall adenopathy noted.  Abdomen is soft and not distended.  Bowel sounds are present.  No tenderness during palpation of the abdomen.    Neuro. Awake alert oriented x4.  Normal judgment and cognition noted.  Extremities no clubbing cyanosis or edema noted.     Call or return to clinic prn if these symptoms worsen or fail to improve as anticipated.

## 2022-12-29 NOTE — PATIENT INSTRUCTIONS
Andrew Mario,     If you are due for any health screening(s) below please notify me so we can arrange them to be ordered and scheduled to maintain your health. Most healthy patients complete it. Don't lose out on improving your health.     All of your core healthy metrics are met.

## 2023-01-03 DIAGNOSIS — M54.50 LOW BACK PAIN: Primary | ICD-10-CM

## 2023-01-04 DIAGNOSIS — M54.50 LOW BACK PAIN: Primary | ICD-10-CM

## 2023-01-04 RX ORDER — EPINEPHRINE 0.3 MG/.3ML
INJECTION SUBCUTANEOUS
Qty: 2 EACH | Refills: 1 | Status: SHIPPED | OUTPATIENT
Start: 2023-01-04 | End: 2024-03-26 | Stop reason: SDUPTHER

## 2023-01-05 ENCOUNTER — CLINICAL SUPPORT (OUTPATIENT)
Dept: REHABILITATION | Facility: HOSPITAL | Age: 53
End: 2023-01-05
Payer: COMMERCIAL

## 2023-01-05 ENCOUNTER — PATIENT MESSAGE (OUTPATIENT)
Dept: FAMILY MEDICINE | Facility: CLINIC | Age: 53
End: 2023-01-05
Payer: COMMERCIAL

## 2023-01-05 DIAGNOSIS — M54.50 LOW BACK PAIN: ICD-10-CM

## 2023-01-05 NOTE — PROGRESS NOTES
[facility] - PHYSICAL THERAPY LUMBAR EVALUATION     Name: Fabiano Jules Jr.  Clinic Number: [id number]    Therapy Diagnosis: No diagnosis found.  Physician: Duran Adler    Physician Orders: PT Eval and Treat   Medical Diagnosis from Referral: Low back pain [M54.50]  Evaluation Date: 1/5/2023  Authorization Period Expiration: 01-  Plan of Care Expiration: 03-  Reassessment Due: V10  Visit # / Visits authorized: 1/ 1    Time In: ***  Time Out: ***  Total Billable Time: *** minutes  Insurance:Fee for service Insurance Patient      Precautions: {IP WOUND PRECAUTIONS OHS:43727}    Pattern of pain determined: ***      Subjective   Date of onset: ***  History of current condition - Fabiano reports: ***     Medical History:   Past Medical History:   Diagnosis Date    Arthritis     Back injury     Colon polyp     COVID     CPAP (continuous positive airway pressure) dependence     Hypertension     PONV (postoperative nausea and vomiting)     Sleep apnea     c pap machine used       Surgical History:   Fabiano Jules Jr.  has a past surgical history that includes Lipoma resection; Circumcision; Medial collateral ligament and lateral collateral ligament repair, knee (Right, 09/01/2018); facet injection ; Colonoscopy (N/A, 12/03/2018); Magnetic resonance imaging (N/A, 05/29/2020); epidural steroid injection X 2; Myelography (N/A, 06/23/2020); Magnetic resonance imaging (N/A, 12/03/2021); Minimally invasive transforaminal lumbar interbody fusion (TLIF) (N/A, 04/05/2022); Bone graft (N/A, 04/05/2022); Esophagogastroduodenoscopy (N/A, 09/01/2022); Colonoscopy (N/A, 09/01/2022); Intraluminal gastrointestinal tract imaging via capsule (N/A, 10/03/2022); and Laparoscopic sleeve gastrectomy (N/A, 12/19/2022).    Medications:   Fabiano has a current medication list which includes the following prescription(s): amlodipine, b complex vitamins, epinephrine, ergocalciferol, hydrocodone-acetaminophen, lisinopril,  "multivit-min/iron/folic acid/k, ondansetron, and rosuvastatin, and the following Facility-Administered Medications: acetaminophen, cefazolin in dextrose (iso-os), hydromorphone, ondansetron, oxycodone-acetaminophen, and polyethylene glycol.    Allergies:   Review of patient's allergies indicates:   Allergen Reactions    Peaches [peach (prunus persica)] Anaphylaxis    Peanut Anaphylaxis    Tree pollen-red birch Anaphylaxis        Imaging, CT scan films: 04-    Prior Therapy: PT at this facility in 2022  Prior Treatment: ***  Social History: *** {LIVES WITH:41458}  Occupation: ***  Leisure: ***      Prior Level of Function: ***  Current Level of Function: ***  DME owned/used: ***        Pain:  Current {0-10:20507::"0"}/10, worst {0-10:20507::"0"}/10, best {0-10:20507::"0"}/10   Location: {RIGHT LEFT BILATERAL:87060} {LOCATION ON BODY:94930}  Description: {Pain Description:21976}  Aggravating Factors: {Causes; Pain:32640}  Easing Factors: {Pain (activities that relieve):41842}  Disturbed Sleep: ***        Pattern of pain questions:  1.  Where is your pain the worst? ***  2.  Is your pain constant or intermittent? ***  3.  Does bending forward make your typical pain worse? ***  4.  Since the start of your back pain, has there been a change in your bowel or bladder? ***  5.  What can't you do now that you use to be able to do? ***      Pts goals: ***      Red Flag Screening:   Cough  Sneeze  Strain: {+ OR -:40180}  Bladder/ bowel: {+ OR -:10459}  Falls: {+ OR -:60054}  Night pain: {+ OR -:14614}  Unexplained weight loss: {+ OR -:36446}  General health: ***    OBJECTIVE     Postural examination/scapula alignment: {AMB OT NEURO POSTURAL EXAM:14841}  Joint integrity: {AMB OT NEURO JOINT INTEGRITY:31032}  Skin integrity: {AMB OT NEURO SKIN INTEGRITY:87304}  Edema: {AMB OT NEURO EDEMA:93231}  Sitting: ***  Standing: ***  Correction of posture: {Correction of Posture:38584}    MOVEMENT LOSS    ROM Loss   Flexion " {Movement Loss:97108}   Extension {Movement Loss:71316}   Side glide Right {Movement Loss:90534}   Side glide Left {Movement Loss:77194}   Rotation Right {Movement Loss:81755}   Rotation Left {Movement Loss:04776}     Lower Extremity Strength  Right LE  Left LE    Hip flexion: {AMB PT VESTIBULAR STRENGTH:06936} Hip flexion: {AMB PT VESTIBULAR STRENGTH:60894}   Hip extension:  {AMB PT VESTIBULAR STRENGTH:95354} Hip extension: {AMB PT VESTIBULAR STRENGTH:57465}   Hip abduction: {AMB PT VESTIBULAR STRENGTH:08547} Hip abduction: {AMB PT VESTIBULAR STRENGTH:28812}   Hip adduction:  {AMB PT VESTIBULAR STRENGTH:11435} Hip adduction:  {AMB PT VESTIBULAR STRENGTH:63508}   Hip External Rotation {AMB PT VESTIBULAR STRENGTH:27049} Hip External Rotation {AMB PT VESTIBULAR STRENGTH:60768}   Hip Internal rotation   {AMB PT VESTIBULAR STRENGTH:35425} Hip Internal rotation {AMB PT VESTIBULAR STRENGTH:86732}   Knee Flexion {AMB PT VESTIBULAR STRENGTH:25819} Knee Flexion {AMB PT VESTIBULAR STRENGTH:05991}   Knee Extension {AMB PT VESTIBULAR STRENGTH:86917} Knee Extension {AMB PT VESTIBULAR STRENGTH:38902}   Ankle dorsiflexion: {AMB PT VESTIBULAR STRENGTH:39599} Ankle dorsiflexion: {AMB PT VESTIBULAR STRENGTH:48529}   Ankle plantarflexion: {AMB PT VESTIBULAR STRENGTH:02167} Ankle plantarflexion: {AMB PT VESTIBULAR STRENGTH:96867}       GAIT:  Assistive Device used: {AMB PT KNEE GAIT ASSISTIVE DEVICE:47549}  Level of Assistance: {AMB PT KNEE LEVEL OF ASSISTANCE:52210}  Patient displays the following gait deviations:  {AMB PT KNEE DISPLAYS:28721}.     Special Tests:   Test Name  Test Result   Prone Instability Test {+ OR -:35676}   SI Joint Provocation Test {+ OR -:43062}   Straight Leg Raise {+ OR -:51168}   Neural Tension Test {+ OR -:53995}   Crossed Straight Leg Raise {+ OR -:38406}   Walking on toes {+ OR -:19145}   Walking on heels  {+ OR -:93834}       NEUROLOGICAL SCREENING     Sensory deficit: ***    Reflexes:    Left Right  "  Patella Tendon {AMB PT KNEE DTR'S:98801} {AMB PT KNEE DTR'S:49655}   Achilles Tendon {AMB PT KNEE DTR'S:74003} {AMB PT KNEE DTR'S:73558}   Babinski  {+ OR -:98927} {+ OR -:87117}   Clonus {+ OR -:74983} {+ OR -:36969}     REPEATED TEST MOVEMENTS:    Baseline symptoms:  Repeated Flexion in Standing {Repeated Test Movements:68397}   Repeated Extension in Standing {Repeated Test Movements:71438}   Repeated Flexion in lying {Repeated Test Movements:05201}   Repeated Extension in lying  {Repeated Test Movements:98946}       STATIC TESTS and other movements:   Baseline symptoms:  Prone lie {Repeated Test Movements:16955}   Prone lie on elbows {Repeated Test Movements:45710}   Sustained prone with  using mat {Repeated Test Movements:73827}   Sustained flexion {Repeated Test Movements:41596}   Sitting slouched  {Repeated Test Movements:83055}   Sitting erect {Repeated Test Movements:22810}   Standing slouched {Repeated Test Movements:33374}   Standing erect  {Repeated Test Movements:19263}   Lying prone in extension  {Repeated Test Movements:83863}   Long sitting   {Repeated Test Movements:21060}   Other tests Repeated Test Movements:79639}       Baseline Isometric Testing on Med X equipment: Testing administered by PT  Date of testing: ***  ROM *** deg   Max Peak Torque ***    Min Peak Torque ***    Flex/Ext Ratio ***   % below normative data ***         Limitation/Restriction for FOTO *** Survey    Therapist reviewed FOTO scores for Fabiano Jules JrTish on 1/5/2023.   FOTO documents entered into [employer] - see Media section.    Limitation Score: ***%             Treatment   Treatment Time In: ***  Treatment Time Out: ***  Total Treatment time separate from Evaluation: *** minutes      Fabiano received therapeutic exercises to develop/improve posture, lumbar/cervical ROM, strength and muscular endurance for *** minutes including the following exercises:         Written Home Exercises Provided: {Blank single:56672::"yes","Patient " "instructed to cont prior HEP"}.  Exercises were reviewed and Fabiano was able to demonstrate them prior to the end of the session.  Fabiano demonstrated {Desc; good/fair/poor:56641} understanding of the education provided.     See EMR under {Blank single:40171::"Media","Patient Instructions"} for exercises provided {Blank single:73462::"1/5/2023","prior visit"}.      Education provided:   - Patient received education regarding proper posture and body mechanics.  Patient was given top [facility] Visit 1 handouts which discuss what to expect in therapy, the purpose and opportunity for health coaching, the program,  wellness when discharged from therapy, back education and care specifically for posture seated, standing, lifting correctly, components of exercise, importance of nutrition and hydration, and importance of sleep.   Information on lumbar rolls provided.  - Luz roll tried, recommended, and purchase information was provided.    - Patient received a handout regarding anticipated muscular soreness following the isometric test and strategies for management were reviewed with patient including stretching, using ice and scheduled rest.   - Patient received education on the Healthy Back program, purpose of the isometric test, progression of back strengthening as well as wellness approach and systemic strengthening.  Details of the program were discussed.  Reviewed that patient should feel support/pressure from med ex restraints but no pain or discomfort and patient expressed understanding.  Med x dynamic exercise and baseline IM test performed with instructions to guide the patient safely through the isometric testing.  Patient informed to perform isometric test correctly, and safely, building best force they safely can and not pushing through pain.  Patient demonstrated understanding of information      Fabiano received cold pack for *** minutes to ***.    Assessment   Fabiano is a 52 y.o. male referred to " [facility] with a medical diagnosis of ***. Pt presents with ***    Pain Pattern: ***       Pt prognosis is {REHAB PROGNOSIS OHS:81948}.   Pt will benefit from skilled outpatient Physical Therapy to address the deficits stated above and in the chart below, provide pt/family education, and to maximize pt's level of independence. Based on the above history and physical examination an active physical therapy program is recommended.  Pt will continue to benefit from skilled outpatient physical therapy to address the deficits listed below in the chart, provide pt/family education and to maximize pt's level of independence in the home and community environment. .       Plan of care discussed with patient: {YES:45935}  Pt's spiritual, cultural and educational needs considered and patient is agreeable to the plan of care and goals as stated below:     Anticipated Barriers for therapy: ***    PT Evaluation Completed? {YES:84101}    Medical necessity is demonstrated by the following problem list.    Pt presents with the following impairments:     History  Co-morbidities and personal factors that may impact the plan of care Co-morbidities:   {Co-morbidities:53391}    Personal Factors:   {Personal Factors:79129}     {Desc; low/moderate/high:691200}   Examination  Body Structures and Functions, activity limitations and participation restrictions that may impact the plan of care Body Regions:   {Body Regions:43585}    Body Systems:    {Body Systems:01046}    Participation Restrictions:   ***    Activity limitations:   Learning and applying knowledge  {Learning and applying knowledge:24398}    General Tasks and Commands  {Gen tasks and commands:13540}    Communication  {Communication:10895}    Mobility  {Mobility:86817}    Self care  {Self Care:99936}    Domestic Life  {Domestic Life:93552}    Interactions/Relationships  {Interactions/Relationships:38690}    Life Areas  {Life Areas:22399}    Community and Social  Life  {Community/Social Life:84210}         {Desc; low/moderate/high:671502}   Clinical Presentation {Clinical Presentation :21830} {Desc; low/moderate/high:301323}   Decision Making/ Complexity Score: {Desc; low/moderate/high:786586}       GOALS: Pt is in agreement with the following goals.    Short term goals:  6 weeks or 10 visits   1.  Pt will demonstrate increased lumbar ROM by at least *** degrees from the initial ROM value with improvements noted in functional ROM and ability to perform ADLs.  (approp and ongoing)  2.  Pt will demonstrate increased MedX average isometric strength value  by ***% from initial test resulting in improved ability to perform bending, lifting, and carrying activities safely, confidently.  (approp and ongoing)  3.  Patient report a reduction in worst pain score by 1-2 points for improved tolerance for ***.  (approp and ongoing)  4.  Pt able to perform HEP correctly with minimal cueing or supervision from therapist to encourage independent management of symptoms. (approp and ongoing)      Long term goals: 10 weeks or 20 visits   1. Pt will demonstrate increased lumbar ROM by at least *** degrees from initial ROM value, resulting in improved ability to perform functional fwd bending while standing and sitting. (approp and ongoing)  2. Pt will demonstrate increased MedX average isometric strength value  by ***% from initial test resulting in improved ability to perform bending, lifting, and carrying activities safely, confidently.  (approp and ongoing)  3. Pt to demonstrate ability to independently control and reduce their pain through posture positioning and mechanical movements throughout a typical day.  (approp and ongoing)  4.  Pt will demonstrate reduced pain and improved functional outcomes as reported on the {outcome measures:08005}   (approp and ongoing)  5. Pt will demonstrate independence with the HEP at discharge  (approp and ongoing)  6.  ***(patient goal)***  (approp and  "ongoing)      Plan   Outpatient physical therapy 2x week for 10 weeks or 20 visits to include the following:   - Patient education  - Therapeutic exercise  - Manual therapy  - Performance testing   - Neuromuscular Re-education  - Therapeutic activity   - Modalities    Pt may be seen by PTA as part of the rehabilitation team.     Therapist: Charlotte Guallpa, PT  1/5/2023    "I certify the need for these services furnished under this plan of treatment and while under my care."    ____________________________________  Physician/Referring Practitioner    _______________  Date of Signature        [facility] - PHYSICAL THERAPY LUMBAR EVALUATION     Name: Fabiano Jules Jr.  Clinic Number: [id number]    Therapy Diagnosis: No diagnosis found.  Physician: Duran Adler    Physician Orders: {AMB PT KNEE ORDERS:86850} ***  Medical Diagnosis from Referral: ***  Evaluation Date: 1/5/2023  Authorization Period Expiration: ***  Plan of Care Expiration: ***  Reassessment Due: ***  Visit # / Visits authorized: ***/ ***    Time In: ***  Time Out: ***  Total Billable Time: *** minutes  Insurance:{Insurance type:[id number]}      Precautions: {IP WOUND PRECAUTIONS OHS:11257}    Pattern of pain determined: ***      Subjective   Date of onset: ***  History of current condition - Fabiano reports: ***     Medical History:   Past Medical History:   Diagnosis Date    Arthritis     Back injury     Colon polyp     COVID     CPAP (continuous positive airway pressure) dependence     Hypertension     PONV (postoperative nausea and vomiting)     Sleep apnea     c pap machine used       Surgical History:   Fabiano Jules Jr.  has a past surgical history that includes Lipoma resection; Circumcision; Medial collateral ligament and lateral collateral ligament repair, knee (Right, 09/01/2018); facet injection ; Colonoscopy (N/A, 12/03/2018); Magnetic resonance imaging (N/A, 05/29/2020); epidural steroid injection X 2; Myelography (N/A, 06/23/2020); " "Magnetic resonance imaging (N/A, 12/03/2021); Minimally invasive transforaminal lumbar interbody fusion (TLIF) (N/A, 04/05/2022); Bone graft (N/A, 04/05/2022); Esophagogastroduodenoscopy (N/A, 09/01/2022); Colonoscopy (N/A, 09/01/2022); Intraluminal gastrointestinal tract imaging via capsule (N/A, 10/03/2022); and Laparoscopic sleeve gastrectomy (N/A, 12/19/2022).    Medications:   Fabiano has a current medication list which includes the following prescription(s): amlodipine, b complex vitamins, epinephrine, ergocalciferol, hydrocodone-acetaminophen, lisinopril, multivit-min/iron/folic acid/k, ondansetron, and rosuvastatin, and the following Facility-Administered Medications: acetaminophen, cefazolin in dextrose (iso-os), hydromorphone, ondansetron, oxycodone-acetaminophen, and polyethylene glycol.    Allergies:   Review of patient's allergies indicates:   Allergen Reactions    Peaches [peach (prunus persica)] Anaphylaxis    Peanut Anaphylaxis    Tree pollen-red birch Anaphylaxis        Imaging, {Mri/ctscan/bone scan:94571}: ***    Prior Therapy: ***  Prior Treatment: ***  Social History: *** {LIVES WITH:04981}  Occupation: ***  Leisure: ***      Prior Level of Function: ***  Current Level of Function: ***  DME owned/used: ***        Pain:  Current {0-10:20507::"0"}/10, worst {0-10:20507::"0"}/10, best {0-10:20507::"0"}/10   Location: {RIGHT LEFT BILATERAL:48686} {LOCATION ON BODY:39993}  Description: {Pain Description:74417}  Aggravating Factors: {Causes; Pain:99475}  Easing Factors: {Pain (activities that relieve):99822}  Disturbed Sleep: ***        Pattern of pain questions:  1.  Where is your pain the worst? ***  2.  Is your pain constant or intermittent? ***  3.  Does bending forward make your typical pain worse? ***  4.  Since the start of your back pain, has there been a change in your bowel or bladder? ***  5.  What can't you do now that you use to be able to do? ***      Pts goals: ***      Red Flag " Screening:   Cough  Sneeze  Strain: {+ OR -:93370}  Bladder/ bowel: {+ OR -:83054}  Falls: {+ OR -:20132}  Night pain: {+ OR -:64610}  Unexplained weight loss: {+ OR -:02834}  General health: ***    OBJECTIVE     Postural examination/scapula alignment: {AMB OT NEURO POSTURAL EXAM:15273}  Joint integrity: {AMB OT NEURO JOINT INTEGRITY:32928}  Skin integrity: {AMB OT NEURO SKIN INTEGRITY:63375}  Edema: {AMB OT NEURO EDEMA:57792}  Sitting: ***  Standing: ***  Correction of posture: {Correction of Posture:68080}    MOVEMENT LOSS    ROM Loss   Flexion {Movement Loss:48488}   Extension {Movement Loss:29325}   Side glide Right {Movement Loss:62310}   Side glide Left {Movement Loss:39418}   Rotation Right {Movement Loss:24465}   Rotation Left {Movement Loss:89884}     Lower Extremity Strength  Right LE  Left LE    Hip flexion: {AMB PT VESTIBULAR STRENGTH:06512} Hip flexion: {AMB PT VESTIBULAR STRENGTH:13911}   Hip extension:  {AMB PT VESTIBULAR STRENGTH:62164} Hip extension: {AMB PT VESTIBULAR STRENGTH:00986}   Hip abduction: {AMB PT VESTIBULAR STRENGTH:40678} Hip abduction: {AMB PT VESTIBULAR STRENGTH:70229}   Hip adduction:  {AMB PT VESTIBULAR STRENGTH:84547} Hip adduction:  {AMB PT VESTIBULAR STRENGTH:27003}   Hip External Rotation {AMB PT VESTIBULAR STRENGTH:00163} Hip External Rotation {AMB PT VESTIBULAR STRENGTH:67953}   Hip Internal rotation   {AMB PT VESTIBULAR STRENGTH:34243} Hip Internal rotation {AMB PT VESTIBULAR STRENGTH:56345}   Knee Flexion {AMB PT VESTIBULAR STRENGTH:35137} Knee Flexion {AMB PT VESTIBULAR STRENGTH:64910}   Knee Extension {AMB PT VESTIBULAR STRENGTH:67227} Knee Extension {AMB PT VESTIBULAR STRENGTH:71581}   Ankle dorsiflexion: {AMB PT VESTIBULAR STRENGTH:92080} Ankle dorsiflexion: {AMB PT VESTIBULAR STRENGTH:41541}   Ankle plantarflexion: {AMB PT VESTIBULAR STRENGTH:58775} Ankle plantarflexion: {AMB PT VESTIBULAR STRENGTH:41248}       GAIT:  Assistive Device used: {AMB PT KNEE GAIT ASSISTIVE  DEVICE:67018}  Level of Assistance: {AMB PT KNEE LEVEL OF ASSISTANCE:96286}  Patient displays the following gait deviations:  {AMB PT KNEE DISPLAYS:75920}.     Special Tests:   Test Name  Test Result   Prone Instability Test {+ OR -:20589}   SI Joint Provocation Test {+ OR -:83997}   Straight Leg Raise {+ OR -:38300}   Neural Tension Test {+ OR -:61464}   Crossed Straight Leg Raise {+ OR -:35032}   Walking on toes {+ OR -:85317}   Walking on heels  {+ OR -:90909}       NEUROLOGICAL SCREENING     Sensory deficit: ***    Reflexes:    Left Right   Patella Tendon {AMB PT KNEE DTR'S:64794} {AMB PT KNEE DTR'S:49636}   Achilles Tendon {AMB PT KNEE DTR'S:16459} {AMB PT KNEE DTR'S:27029}   Babinski  {+ OR -:65704} {+ OR -:40028}   Clonus {+ OR -:82958} {+ OR -:37743}     REPEATED TEST MOVEMENTS:    Baseline symptoms:  Repeated Flexion in Standing {Repeated Test Movements:95638}   Repeated Extension in Standing {Repeated Test Movements:69557}   Repeated Flexion in lying {Repeated Test Movements:90221}   Repeated Extension in lying  {Repeated Test Movements:46322}       STATIC TESTS and other movements:   Baseline symptoms:  Prone lie {Repeated Test Movements:54685}   Prone lie on elbows {Repeated Test Movements:32589}   Sustained prone with  using mat {Repeated Test Movements:12134}   Sustained flexion {Repeated Test Movements:22594}   Sitting slouched  {Repeated Test Movements:57134}   Sitting erect {Repeated Test Movements:32460}   Standing slouched {Repeated Test Movements:43833}   Standing erect  {Repeated Test Movements:07888}   Lying prone in extension  {Repeated Test Movements:10367}   Long sitting   {Repeated Test Movements:24891}   Other tests Repeated Test Movements:89209}       Baseline Isometric Testing on Med X equipment: Testing administered by PT  Date of testing: ***  ROM *** deg   Max Peak Torque ***    Min Peak Torque ***    Flex/Ext Ratio ***   % below normative data ***         Limitation/Restriction for  "FOTO *** Survey    Therapist reviewed FOTO scores for Fabiano Jules Jr. on 1/5/2023.   FOTO documents entered into [employer] - see Media section.    Limitation Score: ***%             Treatment   Treatment Time In: ***  Treatment Time Out: ***  Total Treatment time separate from Evaluation: *** minutes      Fabiano received therapeutic exercises to develop/improve posture, lumbar/cervical ROM, strength and muscular endurance for *** minutes including the following exercises:         Written Home Exercises Provided: {Blank single:22162::"yes","Patient instructed to cont prior HEP"}.  Exercises were reviewed and Fabiano was able to demonstrate them prior to the end of the session.  Fabiano demonstrated {Desc; good/fair/poor:77985} understanding of the education provided.     See EMR under {Blank single:82281::"Media","Patient Instructions"} for exercises provided {Blank single:77802::"1/5/2023","prior visit"}.      Education provided:   - Patient received education regarding proper posture and body mechanics.  Patient was given top [facility] Visit 1 handouts which discuss what to expect in therapy, the purpose and opportunity for health coaching, the program,  wellness when discharged from therapy, back education and care specifically for posture seated, standing, lifting correctly, components of exercise, importance of nutrition and hydration, and importance of sleep.   Information on lumbar rolls provided.  - Luz roll tried, recommended, and purchase information was provided.    - Patient received a handout regarding anticipated muscular soreness following the isometric test and strategies for management were reviewed with patient including stretching, using ice and scheduled rest.   - Patient received education on the Healthy Back program, purpose of the isometric test, progression of back strengthening as well as wellness approach and systemic strengthening.  Details of the program were discussed.  Reviewed that " patient should feel support/pressure from med ex restraints but no pain or discomfort and patient expressed understanding.  Med x dynamic exercise and baseline IM test performed with instructions to guide the patient safely through the isometric testing.  Patient informed to perform isometric test correctly, and safely, building best force they safely can and not pushing through pain.  Patient demonstrated understanding of information      Fabiano received cold pack for *** minutes to ***.    Assessment   Fabiano is a 52 y.o. male referred to [facility] with a medical diagnosis of ***. Pt presents with ***    Pain Pattern: ***       Pt prognosis is {REHAB PROGNOSIS OHS:86352}.   Pt will benefit from skilled outpatient Physical Therapy to address the deficits stated above and in the chart below, provide pt/family education, and to maximize pt's level of independence. Based on the above history and physical examination an active physical therapy program is recommended.  Pt will continue to benefit from skilled outpatient physical therapy to address the deficits listed below in the chart, provide pt/family education and to maximize pt's level of independence in the home and community environment. .       Plan of care discussed with patient: {YES:28149}  Pt's spiritual, cultural and educational needs considered and patient is agreeable to the plan of care and goals as stated below:     Anticipated Barriers for therapy: ***    PT Evaluation Completed? {YES:16974}    Medical necessity is demonstrated by the following problem list.    Pt presents with the following impairments:     History  Co-morbidities and personal factors that may impact the plan of care Co-morbidities:   {Co-morbidities:41418}    Personal Factors:   {Personal Factors:15943}     {Desc; low/moderate/high:365605}   Examination  Body Structures and Functions, activity limitations and participation restrictions that may impact the plan of care Body  Regions:   {Body Regions:57952}    Body Systems:    {Body Systems:00752}    Participation Restrictions:   ***    Activity limitations:   Learning and applying knowledge  {Learning and applying knowledge:65383}    General Tasks and Commands  {Gen tasks and commands:71276}    Communication  {Communication:53224}    Mobility  {Mobility:02941}    Self care  {Self Care:19013}    Domestic Life  {Domestic Life:27790}    Interactions/Relationships  {Interactions/Relationships:02081}    Life Areas  {Life Areas:69159}    Community and Social Life  {Community/Social Life:69244}         {Desc; low/moderate/high:135931}   Clinical Presentation {Clinical Presentation :42646} {Desc; low/moderate/high:984961}   Decision Making/ Complexity Score: {Desc; low/moderate/high:098828}       GOALS: Pt is in agreement with the following goals.    Short term goals:  6 weeks or 10 visits   1.  Pt will demonstrate increased lumbar ROM by at least *** degrees from the initial ROM value with improvements noted in functional ROM and ability to perform ADLs.  (approp and ongoing)  2.  Pt will demonstrate increased MedX average isometric strength value  by ***% from initial test resulting in improved ability to perform bending, lifting, and carrying activities safely, confidently.  (approp and ongoing)  3.  Patient report a reduction in worst pain score by 1-2 points for improved tolerance for ***.  (approp and ongoing)  4.  Pt able to perform HEP correctly with minimal cueing or supervision from therapist to encourage independent management of symptoms. (approp and ongoing)      Long term goals: 10 weeks or 20 visits   1. Pt will demonstrate increased lumbar ROM by at least *** degrees from initial ROM value, resulting in improved ability to perform functional fwd bending while standing and sitting. (approp and ongoing)  2. Pt will demonstrate increased MedX average isometric strength value  by ***% from initial test resulting in improved ability  "to perform bending, lifting, and carrying activities safely, confidently.  (approp and ongoing)  3. Pt to demonstrate ability to independently control and reduce their pain through posture positioning and mechanical movements throughout a typical day.  (approp and ongoing)  4.  Pt will demonstrate reduced pain and improved functional outcomes as reported on the {outcome measures:39095}   (approp and ongoing)  5. Pt will demonstrate independence with the HEP at discharge  (approp and ongoing)  6.  ***(patient goal)***  (approp and ongoing)      Plan   Outpatient physical therapy 2x week for 10 weeks or 20 visits to include the following:   - Patient education  - Therapeutic exercise  - Manual therapy  - Performance testing   - Neuromuscular Re-education  - Therapeutic activity   - Modalities    Pt may be seen by PTA as part of the rehabilitation team.     Therapist: Charlotte Guallpa, PT CLT  1/5/2023    "I certify the need for these services furnished under this plan of treatment and while under my care."    ____________________________________  Physician/Referring Practitioner    _______________  Date of Signature          "

## 2023-01-06 ENCOUNTER — DOCUMENTATION ONLY (OUTPATIENT)
Dept: REHABILITATION | Facility: HOSPITAL | Age: 53
End: 2023-01-06
Payer: COMMERCIAL

## 2023-01-06 NOTE — PROGRESS NOTES
Patient presents for PT - Healthy Back program evaluation.  He has recent gastric sleeve procedure on 12- and has not yet been released by his surgeon for unrestricted activity, including lifting.  Evaluation was deferred at present, once patient is cleared by the surgeon to participate with PT, his evaluation will be rescheduled.

## 2023-01-10 ENCOUNTER — OFFICE VISIT (OUTPATIENT)
Dept: BARIATRICS | Facility: CLINIC | Age: 53
End: 2023-01-10
Payer: COMMERCIAL

## 2023-01-10 ENCOUNTER — TELEPHONE (OUTPATIENT)
Dept: BARIATRICS | Facility: CLINIC | Age: 53
End: 2023-01-10

## 2023-01-10 ENCOUNTER — PATIENT MESSAGE (OUTPATIENT)
Dept: BARIATRICS | Facility: CLINIC | Age: 53
End: 2023-01-10

## 2023-01-10 VITALS
RESPIRATION RATE: 16 BRPM | SYSTOLIC BLOOD PRESSURE: 132 MMHG | WEIGHT: 307.19 LBS | HEART RATE: 63 BPM | BODY MASS INDEX: 45.5 KG/M2 | HEIGHT: 69 IN | DIASTOLIC BLOOD PRESSURE: 84 MMHG | TEMPERATURE: 98 F

## 2023-01-10 DIAGNOSIS — E66.01 CLASS 3 SEVERE OBESITY DUE TO EXCESS CALORIES WITH SERIOUS COMORBIDITY AND BODY MASS INDEX (BMI) OF 50.0 TO 59.9 IN ADULT: ICD-10-CM

## 2023-01-10 DIAGNOSIS — G47.33 OSA (OBSTRUCTIVE SLEEP APNEA): ICD-10-CM

## 2023-01-10 DIAGNOSIS — E66.01 MORBID OBESITY: Primary | ICD-10-CM

## 2023-01-10 DIAGNOSIS — I10 PRIMARY HYPERTENSION: ICD-10-CM

## 2023-01-10 PROCEDURE — 4010F ACE/ARB THERAPY RXD/TAKEN: CPT | Mod: CPTII,S$GLB,, | Performed by: SURGERY

## 2023-01-10 PROCEDURE — 3008F PR BODY MASS INDEX (BMI) DOCUMENTED: ICD-10-PCS | Mod: CPTII,S$GLB,, | Performed by: SURGERY

## 2023-01-10 PROCEDURE — 3075F PR MOST RECENT SYSTOLIC BLOOD PRESS GE 130-139MM HG: ICD-10-PCS | Mod: CPTII,S$GLB,, | Performed by: SURGERY

## 2023-01-10 PROCEDURE — 1159F PR MEDICATION LIST DOCUMENTED IN MEDICAL RECORD: ICD-10-PCS | Mod: CPTII,S$GLB,, | Performed by: SURGERY

## 2023-01-10 PROCEDURE — 99999 PR PBB SHADOW E&M-EST. PATIENT-LVL III: CPT | Mod: PBBFAC,,, | Performed by: SURGERY

## 2023-01-10 PROCEDURE — 3008F BODY MASS INDEX DOCD: CPT | Mod: CPTII,S$GLB,, | Performed by: SURGERY

## 2023-01-10 PROCEDURE — 1159F MED LIST DOCD IN RCRD: CPT | Mod: CPTII,S$GLB,, | Performed by: SURGERY

## 2023-01-10 PROCEDURE — 99999 PR PBB SHADOW E&M-EST. PATIENT-LVL III: ICD-10-PCS | Mod: PBBFAC,,, | Performed by: SURGERY

## 2023-01-10 PROCEDURE — 3075F SYST BP GE 130 - 139MM HG: CPT | Mod: CPTII,S$GLB,, | Performed by: SURGERY

## 2023-01-10 PROCEDURE — 99024 POSTOP FOLLOW-UP VISIT: CPT | Mod: S$GLB,,, | Performed by: SURGERY

## 2023-01-10 PROCEDURE — 3079F PR MOST RECENT DIASTOLIC BLOOD PRESSURE 80-89 MM HG: ICD-10-PCS | Mod: CPTII,S$GLB,, | Performed by: SURGERY

## 2023-01-10 PROCEDURE — 4010F PR ACE/ARB THEARPY RXD/TAKEN: ICD-10-PCS | Mod: CPTII,S$GLB,, | Performed by: SURGERY

## 2023-01-10 PROCEDURE — 99024 PR POST-OP FOLLOW-UP VISIT: ICD-10-PCS | Mod: S$GLB,,, | Performed by: SURGERY

## 2023-01-10 PROCEDURE — 3079F DIAST BP 80-89 MM HG: CPT | Mod: CPTII,S$GLB,, | Performed by: SURGERY

## 2023-01-10 NOTE — PROGRESS NOTES
Post Op Note    Surgery: gastric sleeve surgery  Date: 12/19/22  Initial weight: 350  Last weight: 327  Current weight: 307    Constipation: none  Reflux: none  Vomiting: none    Diet:  Protein shake  1 egg    Exercise:  elliptical    MVI: barbara mvi, calcium citrate, vit d     Vitals:    01/10/23 1149   BP: 132/84   Pulse: 63   Resp: 16   Temp: 98.2 °F (36.8 °C)       Body comp:  Fat Percent:  37.9 %  Fat Mass:  116.4 lb  FFM:  190.8 lb  TBW: 141.6 lb  TBW %:  46.1 %  BMR: 2673 kcal    PE:  NAD  RRR  Soft/nt/nd  Incisions: well healed    Labs: none    A/P: s/p sleeve     Counseling for patient:    Diet: continue protocol  Exercise: ok for cardio, no heavy lifting over 30 pounds for another month  Vitamins: 2 mvi daily, calcium, and b complex    Co morbidities:     1. Morbid obesity        2. Class 3 severe obesity due to excess calories with serious comorbidity and body mass index (BMI) of 50.0 to 59.9 in adult        3. RAFAEL (obstructive sleep apnea)        4. Primary hypertension            : I met with the patient for 15 minutes and counseled his for over 50% of that time

## 2023-01-10 NOTE — TELEPHONE ENCOUNTER
called pt and let him know that yes it is in the progress note for today.  Pt decided to get a note to bring it to them. Pt will

## 2023-01-25 RX ORDER — AMLODIPINE BESYLATE 5 MG/1
5 TABLET ORAL DAILY
Qty: 30 TABLET | Refills: 11 | Status: CANCELLED | OUTPATIENT
Start: 2023-01-25 | End: 2024-01-25

## 2023-01-25 NOTE — TELEPHONE ENCOUNTER
No new care gaps identified.  [facility] Embedded Care Gaps. Reference number: [id number]. 1/25/2023   8:35:18 AM CST

## 2023-01-31 NOTE — PATIENT INSTRUCTIONS
Carole Hutchinson (:  1983) is a Established patient, here for evaluation of the following:Chemical burn on scalp due to dying hair yesterday    Assessment & Plan   Below is the assessment and plan developed based on review of pertinent history, physical exam, labs, studies, and medications. 1. Chemical burn  Problem  -     cephALEXin (KEFLEX) 500 MG capsule; Take 1 capsule by mouth 2 times daily for 7 days, Disp-14 capsule, R-0Normal  -     mupirocin (BACTROBAN) 2 % cream; Apply topically 3 times daily for 7 days, Disp-30 g, R-1, Normal  See patient instructions    2. Skin burn  Problem  -     cephALEXin (KEFLEX) 500 MG capsule; Take 1 capsule by mouth 2 times daily for 7 days, Disp-14 capsule, R-0Normal  -     mupirocin (BACTROBAN) 2 % cream; Apply topically 3 times daily for 7 days, Disp-30 g, R-1, Normal    She was instructed to call the clinic today to get an appointment at the end of this week for a recheck of her scalp. She verbalized understanding of this        Subjective   This is a 38-year-old female patient of SALLY Conde consenting to a virtual visit  She has complaints of a scalp burn/infection due to drying her hair yesterday. She did feel burning when applying the chemical dye but she left it on her scalp for 30 minutes she states. Today she noticed that her scalp is reacting to some of the chemical from the dye. She does notice some bleeding to the scalp area. Review of Systems   Skin:         Chemical burn to the scalp area due to dying hair   All other systems reviewed and are negative.       Objective   Patient-Reported Vitals  No data recorded     Physical Exam  [INSTRUCTIONS:  \"[x]\" Indicates a positive item  \"[]\" Indicates a negative item  -- DELETE ALL ITEMS NOT EXAMINED]    Constitutional: [x] Appears well-developed and well-nourished [x] No apparent distress      [] Abnormal -     Mental status: [x] Alert and awake  [x] Oriented to person/place/time [x] [facility] will call you regarding  in approximately 4 - 7 business days. If you have not heard from them after that time frame, please call them directly at [phone] to follow-up     If sleep testing needs to be repeated first, I will call to let you know and order a home sleep test. Meanwhile, you can request replacement supplies from [facility] while we wait for your new machine.     After obtaining your CPAP machine, please make a follow-up appointment to see me back in Sleep Clinic approximately 5 weeks from the date you received your machine. This appointment is recommended no sooner than 31 days from set up date, but no later than 90 days.        to follow commands    [] Abnormal -     Eyes:   EOM    [x]  Normal    [] Abnormal -   Sclera  [x]  Normal    [] Abnormal -          Discharge [x]  None visible   [] Abnormal -     HENT: [x] Normocephalic, atraumatic  [] Abnormal -   [] Mouth/Throat: Mucous membranes are moist    External Ears [] Normal  [] Abnormal -    Neck: [x] No visualized mass [] Abnormal -     Pulmonary/Chest: [x] Respiratory effort normal   [x] No visualized signs of difficulty breathing or respiratory distress        [] Abnormal -      Musculoskeletal:   [] Normal gait with no signs of ataxia         [x] Normal range of motion of neck        [] Abnormal -     Neurological:        [x] No Facial Asymmetry (Cranial nerve 7 motor function) (limited exam due to video visit)          [] No gaze palsy        [] Abnormal -          Skin:        [x] No significant exanthematous lesions or discoloration noted on facial skin         [x] Abnormal -video distorted scalp  Skin burn noted to some areas of the scalp  Scant amount of small blood noted to the left upper scalp area           Psychiatric:       [x] Normal Affect [] Abnormal -         On this date 1/31/2023 I have spent 20 minutes reviewing previous notes, test results and face to face (virtual) with the patient discussing the diagnosis and importance of compliance with the treatment plan as well as documenting on the day of the visit. Karenkrystal Lisbet was evaluated through a synchronous (real-time) audio-video encounter. The patient (or guardian if applicable) is aware that this is a billable service, which includes applicable co-pays. This Virtual Visit was conducted with patient's (and/or legal guardian's) consent. The visit was conducted pursuant to the emergency declaration under the [address] [place] [address], [address] [place] [address] authority and the [employer] and [employer] General Act.   Patient identification was verified, and a caregiver was present when appropriate. The patient was located at Home: [address] [place] [id number].    Provider was located at Home ([facility]): Gloria Ahmadi-07-A [id number] SALLY Lofton CNP

## 2023-02-15 ENCOUNTER — OFFICE VISIT (OUTPATIENT)
Dept: BARIATRICS | Facility: CLINIC | Age: 53
End: 2023-02-15
Payer: COMMERCIAL

## 2023-02-15 VITALS
TEMPERATURE: 99 F | HEIGHT: 69 IN | HEART RATE: 90 BPM | SYSTOLIC BLOOD PRESSURE: 118 MMHG | BODY MASS INDEX: 41.56 KG/M2 | DIASTOLIC BLOOD PRESSURE: 74 MMHG | RESPIRATION RATE: 16 BRPM | WEIGHT: 280.63 LBS

## 2023-02-15 DIAGNOSIS — I10 PRIMARY HYPERTENSION: ICD-10-CM

## 2023-02-15 DIAGNOSIS — E66.01 MORBID OBESITY: Primary | ICD-10-CM

## 2023-02-15 DIAGNOSIS — E66.01 CLASS 3 SEVERE OBESITY DUE TO EXCESS CALORIES WITH SERIOUS COMORBIDITY AND BODY MASS INDEX (BMI) OF 50.0 TO 59.9 IN ADULT: ICD-10-CM

## 2023-02-15 DIAGNOSIS — G47.33 OSA (OBSTRUCTIVE SLEEP APNEA): ICD-10-CM

## 2023-02-15 PROCEDURE — 3008F BODY MASS INDEX DOCD: CPT | Mod: CPTII,S$GLB,, | Performed by: SURGERY

## 2023-02-15 PROCEDURE — 99024 PR POST-OP FOLLOW-UP VISIT: ICD-10-PCS | Mod: S$GLB,,, | Performed by: SURGERY

## 2023-02-15 PROCEDURE — 4010F ACE/ARB THERAPY RXD/TAKEN: CPT | Mod: CPTII,S$GLB,, | Performed by: SURGERY

## 2023-02-15 PROCEDURE — 1159F MED LIST DOCD IN RCRD: CPT | Mod: CPTII,S$GLB,, | Performed by: SURGERY

## 2023-02-15 PROCEDURE — 3074F PR MOST RECENT SYSTOLIC BLOOD PRESSURE < 130 MM HG: ICD-10-PCS | Mod: CPTII,S$GLB,, | Performed by: SURGERY

## 2023-02-15 PROCEDURE — 4010F PR ACE/ARB THEARPY RXD/TAKEN: ICD-10-PCS | Mod: CPTII,S$GLB,, | Performed by: SURGERY

## 2023-02-15 PROCEDURE — 3078F PR MOST RECENT DIASTOLIC BLOOD PRESSURE < 80 MM HG: ICD-10-PCS | Mod: CPTII,S$GLB,, | Performed by: SURGERY

## 2023-02-15 PROCEDURE — 99999 PR PBB SHADOW E&M-EST. PATIENT-LVL IV: ICD-10-PCS | Mod: PBBFAC,,, | Performed by: SURGERY

## 2023-02-15 PROCEDURE — 1159F PR MEDICATION LIST DOCUMENTED IN MEDICAL RECORD: ICD-10-PCS | Mod: CPTII,S$GLB,, | Performed by: SURGERY

## 2023-02-15 PROCEDURE — 99999 PR PBB SHADOW E&M-EST. PATIENT-LVL IV: CPT | Mod: PBBFAC,,, | Performed by: SURGERY

## 2023-02-15 PROCEDURE — 3074F SYST BP LT 130 MM HG: CPT | Mod: CPTII,S$GLB,, | Performed by: SURGERY

## 2023-02-15 PROCEDURE — 3008F PR BODY MASS INDEX (BMI) DOCUMENTED: ICD-10-PCS | Mod: CPTII,S$GLB,, | Performed by: SURGERY

## 2023-02-15 PROCEDURE — 3078F DIAST BP <80 MM HG: CPT | Mod: CPTII,S$GLB,, | Performed by: SURGERY

## 2023-02-15 PROCEDURE — 99024 POSTOP FOLLOW-UP VISIT: CPT | Mod: S$GLB,,, | Performed by: SURGERY

## 2023-02-15 NOTE — PROGRESS NOTES
Post Op Note    Surgery: gastric sleeve surgery  Date: 12/19/22  Initial weight: 350  Last weight: 307  Current weight: 280    Constipation: none  Reflux: none  Vomiting: none    Diet:  Breakfast:  slice of ham with cheese  Lunch: protein shake  Dinner: meat and vegetable    Exercise:  Still doing elliptical  Walking about 4k steps per day     MVI: barbara mvi, calcium, d    Vitals:    02/15/23 1108   BP: 118/74   Pulse: 90   Resp: 16   Temp: 98.8 °F (37.1 °C)       Body comp:  Fat Percent:  45.6 %  Fat Mass:  128 lb  FFM:  152.6 lb  TBW: 112.2 lb  TBW %:  40 %  BMR: 2152 kcal    PE:  NAD  RRR  Soft/nt/nd    Labs: none    A/P: s/p sleeve     Counseling for patient:    Diet: continue low carb dieting  Exercise:  As much as possible, no restrictions  Vitamins:  Continue regimen    Co morbidities:     1. Morbid obesity  CBC w/ Auto Differential    CMP    B12    B1    Lipid Panel    Iron Studies      2. Class 3 severe obesity due to excess calories with serious comorbidity and body mass index (BMI) of 50.0 to 59.9 in adult        3. RAFAEL (obstructive sleep apnea)        4. Primary hypertension            -All above: Evaluated, monitored, and treated with diet and exercise program.

## 2023-03-31 ENCOUNTER — OFFICE VISIT (OUTPATIENT)
Dept: FAMILY MEDICINE | Facility: CLINIC | Age: 53
End: 2023-03-31
Payer: COMMERCIAL

## 2023-03-31 VITALS
WEIGHT: 269.38 LBS | OXYGEN SATURATION: 98 % | HEIGHT: 69 IN | SYSTOLIC BLOOD PRESSURE: 122 MMHG | BODY MASS INDEX: 39.9 KG/M2 | TEMPERATURE: 99 F | DIASTOLIC BLOOD PRESSURE: 70 MMHG | HEART RATE: 57 BPM

## 2023-03-31 DIAGNOSIS — E55.9 VITAMIN D DEFICIENCY: ICD-10-CM

## 2023-03-31 DIAGNOSIS — E53.8 B12 DEFICIENCY: ICD-10-CM

## 2023-03-31 PROCEDURE — 99999 PR PBB SHADOW E&M-EST. PATIENT-LVL III: CPT | Mod: PBBFAC,,, | Performed by: NURSE PRACTITIONER

## 2023-03-31 PROCEDURE — 3074F SYST BP LT 130 MM HG: CPT | Mod: CPTII,S$GLB,, | Performed by: NURSE PRACTITIONER

## 2023-03-31 PROCEDURE — 99999 PR PBB SHADOW E&M-EST. PATIENT-LVL III: ICD-10-PCS | Mod: PBBFAC,,, | Performed by: NURSE PRACTITIONER

## 2023-03-31 PROCEDURE — 3078F DIAST BP <80 MM HG: CPT | Mod: CPTII,S$GLB,, | Performed by: NURSE PRACTITIONER

## 2023-03-31 PROCEDURE — 4010F ACE/ARB THERAPY RXD/TAKEN: CPT | Mod: CPTII,S$GLB,, | Performed by: NURSE PRACTITIONER

## 2023-03-31 PROCEDURE — 1159F PR MEDICATION LIST DOCUMENTED IN MEDICAL RECORD: ICD-10-PCS | Mod: CPTII,S$GLB,, | Performed by: NURSE PRACTITIONER

## 2023-03-31 PROCEDURE — 3078F PR MOST RECENT DIASTOLIC BLOOD PRESSURE < 80 MM HG: ICD-10-PCS | Mod: CPTII,S$GLB,, | Performed by: NURSE PRACTITIONER

## 2023-03-31 PROCEDURE — 3074F PR MOST RECENT SYSTOLIC BLOOD PRESSURE < 130 MM HG: ICD-10-PCS | Mod: CPTII,S$GLB,, | Performed by: NURSE PRACTITIONER

## 2023-03-31 PROCEDURE — 99214 OFFICE O/P EST MOD 30 MIN: CPT | Mod: S$GLB,,, | Performed by: NURSE PRACTITIONER

## 2023-03-31 PROCEDURE — 99214 PR OFFICE/OUTPT VISIT, EST, LEVL IV, 30-39 MIN: ICD-10-PCS | Mod: S$GLB,,, | Performed by: NURSE PRACTITIONER

## 2023-03-31 PROCEDURE — 3008F BODY MASS INDEX DOCD: CPT | Mod: CPTII,S$GLB,, | Performed by: NURSE PRACTITIONER

## 2023-03-31 PROCEDURE — 4010F PR ACE/ARB THEARPY RXD/TAKEN: ICD-10-PCS | Mod: CPTII,S$GLB,, | Performed by: NURSE PRACTITIONER

## 2023-03-31 PROCEDURE — 1159F MED LIST DOCD IN RCRD: CPT | Mod: CPTII,S$GLB,, | Performed by: NURSE PRACTITIONER

## 2023-03-31 PROCEDURE — 3008F PR BODY MASS INDEX (BMI) DOCUMENTED: ICD-10-PCS | Mod: CPTII,S$GLB,, | Performed by: NURSE PRACTITIONER

## 2023-03-31 RX ORDER — ERGOCALCIFEROL 1.25 MG/1
50000 CAPSULE ORAL
Qty: 24 CAPSULE | Refills: 0 | Status: SHIPPED | OUTPATIENT
Start: 2023-04-03 | End: 2023-07-13

## 2023-03-31 RX ORDER — MULTIVIT WITH MINERALS/HERBS
1 TABLET ORAL DAILY
Qty: 90 TABLET | Refills: 1 | Status: SHIPPED | OUTPATIENT
Start: 2023-03-31 | End: 2023-08-18 | Stop reason: SDUPTHER

## 2023-03-31 NOTE — PROGRESS NOTES
This dictation has been generated using Modal Fluency Dictation some phonetic errors may occur. Please contact author for clarification if needed.     Problem List Items Addressed This Visit       Vitamin D deficiency    Relevant Medications    ergocalciferol (ERGOCALCIFEROL) 50,000 unit Cap (Start on 4/3/2023)     Other Visit Diagnoses       B12 deficiency        Relevant Medications    b complex vitamins (B COMPLEX-VITAMIN B12) tablet            Orders Placed This Encounter    ergocalciferol (ERGOCALCIFEROL) 50,000 unit Cap    b complex vitamins (B COMPLEX-VITAMIN B12) tablet     Post op gastric sleeve.  HTN stable and controlled.   Obesity wt improved.     Follow up in about 6 months (around 9/30/2023).    ________________________________________________________________  ________________________________________________________________      Chief Complaint   Patient presents with    Follow-up     History of present illness  This 53 y.o. presents today for complaint of month follow-up.  Blood pressure improved.  Notes losing weight since gastric sleeve.  Ask about getting his vitamin-D level check but discussed with him shortage for tubes for lab draw.  Discussed with him taking vitamins risk benefit.  Patient currently taking appropriate dosing.  [facility] follow-up.  Had gastric sleeve surgeon was Dr. Pinedo.  Notes that he is tolerating his liquid diet.  He is passing gas and had a bowel movement this morning.  Notes surgical incision sites improving.  Blood pressure is stable.  He has lost weight since surgery.  No other complaints.  Limited review of systems negative      Past Medical History:   Diagnosis Date    Arthritis     Back injury     Colon polyp     COVID     CPAP (continuous positive airway pressure) dependence     Hypertension     PONV (postoperative nausea and vomiting)     Sleep apnea     c pap machine used       Past Surgical History:   Procedure Laterality Date    BONE GRAFT N/A 04/05/2022     Procedure: BONE GRAFT;  Surgeon: Duran Adler Jr., MD;  Location: [facility];  Service: Orthopedics;  Laterality: N/A;    CIRCUMCISION      COLONOSCOPY N/A 12/03/2018    Procedure: COLONOSCOPY;  Surgeon: CHRISSY Reyna MD;  Location: [facility] ENDO (4TH FLR);  Service: Endoscopy;  Laterality: N/A;    COLONOSCOPY N/A 09/01/2022    Procedure: COLONOSCOPY;  Surgeon: Alondra Arias MD;  Location: [facility] ENDO;  Service: Endoscopy;  Laterality: N/A;    epidural steroid injection X 2      lower lumbar    ESOPHAGOGASTRODUODENOSCOPY N/A 09/01/2022    Procedure: EGD (ESOPHAGOGASTRODUODENOSCOPY);  Surgeon: Alondra Arias MD;  Location: [facility] ENDO;  Service: Endoscopy;  Laterality: N/A;    facet injection       [facility] [place]     INTRALUMINAL GASTROINTESTINAL TRACT IMAGING VIA CAPSULE N/A 10/03/2022    Procedure: IMAGING PROCEDURE, GI TRACT, INTRALUMINAL, VIA CAPSULE;  Surgeon: Alondra Arias MD;  Location: [facility];  Service: Endoscopy;  Laterality: N/A;    LAPAROSCOPIC SLEEVE GASTRECTOMY N/A 12/19/2022    Procedure: GASTRECTOMY, SLEEVE, LAPAROSCOPIC;  Surgeon: Lashonda Pinedo MD;  Location: [facility];  Service: General;  Laterality: N/A;    LIPOMA RESECTION      Back of neck    MAGNETIC RESONANCE IMAGING N/A 05/29/2020    Procedure: MRI (MAGNETIC RESONANCE IMAGING) LUMBAR SPINE;  Surgeon: [facility] Diagnostic Provider;  Location: [facility];  Service: General;  Laterality: N/A;  COVID NEG    MAGNETIC RESONANCE IMAGING N/A 12/03/2021    Procedure: MRI (MAGNETIC RESONANCE IMAGING), LUMBAR SPINE;  Surgeon: [facility] Diagnostic Provider;  Location: [facility];  Service: General;  Laterality: N/A;  In MRI @ 7:50 per Ariela    MEDIAL COLLATERAL LIGAMENT AND LATERAL COLLATERAL LIGAMENT REPAIR, KNEE Right 09/01/2018    Dr. Christophe Gore in [place]     MINIMALLY INVASIVE TRANSFORAMINAL LUMBAR INTERBODY FUSION (TLIF) N/A 04/05/2022    Procedure: FUSION, SPINE, LUMBAR, TLIF, MINIMALLY INVASIVE, WITH  INSTRUMENTATION,  L4/5 RIGHT;  Surgeon: Duran Adler Jr., MD;  Location: [facility] OR;  Service: Orthopedics;  Laterality: N/A;  10:30am per Tamela    MYELOGRAPHY N/A 2020    Procedure: MYELOGRAM;  Surgeon: Andi Diagnostic Provider;  Location: [facility] OR;  Service: General;  Laterality: N/A;       Family History   Problem Relation Age of Onset    Hypertension Mother     Diabetes Father     Cancer Father         mesotheliosma     Cataracts Neg Hx     Glaucoma Neg Hx     Macular degeneration Neg Hx     Retinal detachment Neg Hx     Colon cancer Neg Hx     Colon polyps Neg Hx     Crohn's disease Neg Hx     Ulcerative colitis Neg Hx        Social History     Socioeconomic History    Marital status:     Number of children: 4   Occupational History     Employer: [employer]   Tobacco Use    Smoking status: Former     Packs/day: 0.50     Years: 10.00     Pack years: 5.00     Types: Cigarettes     Quit date: 2009     Years since quittin.2    Smokeless tobacco: Never    Tobacco comments:     quit smoking in    Substance and Sexual Activity    Alcohol use: Yes     Comment: rarely    Drug use: Not Currently     Types: Marijuana     Comment: medical---oil, smoke and edibles    Sexual activity: Not Currently       Current Outpatient Medications   Medication Sig Dispense Refill    amLODIPine (NORVASC) 5 MG tablet Take 1 tablet (5 mg total) by mouth once daily. 30 tablet 11    EPINEPHrine (EPIPEN) 0.3 mg/0.3 mL AtIn Inject into the muscle once for one dose as needed. 2 each 1    HYDROcodone-acetaminophen (NORCO) 7.5-325 mg per tablet Take 1 tablet by mouth every 4 (four) hours as needed for Pain. 20 tablet 0    multivit-min/iron/folic acid/K (BARIATRIC MULTIVITAMINS ORAL) Take by mouth.      rosuvastatin (CRESTOR) 10 MG tablet Take 1 tablet (10 mg total) by mouth once daily. 90 tablet 3    b complex vitamins (B COMPLEX-VITAMIN B12) tablet Take 1 tablet by mouth once daily. 90 tablet 1    [START ON  4/3/2023] ergocalciferol (ERGOCALCIFEROL) 50,000 unit Cap Take 1 capsule (50,000 Units total) by mouth twice a week. 24 capsule 0    lisinopriL (PRINIVIL,ZESTRIL) 20 MG tablet Take 1 tablet (20 mg total) by mouth once daily. (Patient not taking: Reported on 3/31/2023) 90 tablet 3     No current facility-administered medications for this visit.     Facility-Administered Medications Ordered in Other Visits   Medication Dose Route Frequency Provider Last Rate Last Admin    acetaminophen tablet 650 mg  650 mg Oral Q4H PRN Katie Shane NP        ceFAZolin in dextrose (iso-os) 2 gram/100 mL PgBk 2,000 mg  2 g Intravenous Q8H Katie Shane NP   Stopped at 04/09/22 1036    HYDROmorphone injection 1 mg  1 mg Intravenous Q6H PRN Katie Shane NP   1 mg at 04/09/22 0539    ondansetron injection 4 mg  4 mg Intravenous Q6H PRN Katie Shane NP        oxyCODONE-acetaminophen  mg per tablet 1 tablet  1 tablet Oral Q4H PRN Katie Shane NP   1 tablet at 04/09/22 0906    polyethylene glycol packet 17 g  17 g Oral Daily Katie Shane NP   17 g at 04/08/22 0822       Review of patient's allergies indicates:   Allergen Reactions    Peaches [peach (prunus persica)] Anaphylaxis    Peanut Anaphylaxis    Tree pollen-red birch Anaphylaxis       Physical examination  Vitals Reviewed\  Vitals:    03/31/23 1132   BP: 122/70   Pulse: (!) 57   Temp: 98.7 °F (37.1 °C)     Body mass index is 39.78 kg/m².      Weight: 122.2 kg (269 lb 6.4 oz)    Gen. Well-dressed well-nourished   Skin warm dry and intact.  No rashes noted.  Chest.  Respirations are even unlabored.  Lungs are clear to auscultation.  Cardiac regular rate and rhythm.  No chest wall adenopathy noted.  Abdomen is soft and not distended.  Bowel sounds are present.  No tenderness during palpation of the abdomen.    Neuro. Awake alert oriented x4.  Normal judgment and cognition noted.  Extremities no clubbing cyanosis or edema noted.     Call or return to clinic  prn if these symptoms worsen or fail to improve as anticipated.

## 2023-04-06 ENCOUNTER — LAB VISIT (OUTPATIENT)
Dept: LAB | Facility: HOSPITAL | Age: 53
End: 2023-04-06
Attending: SURGERY
Payer: COMMERCIAL

## 2023-04-06 ENCOUNTER — HOSPITAL ENCOUNTER (OUTPATIENT)
Facility: HOSPITAL | Age: 53
Discharge: HOME | End: 2023-04-07
Attending: EMERGENCY MEDICINE | Admitting: STUDENT IN AN ORGANIZED HEALTH CARE EDUCATION/TRAINING PROGRAM
Payer: COMMERCIAL

## 2023-04-06 DIAGNOSIS — I95.9 HYPOTENSION, UNSPECIFIED HYPOTENSION TYPE: ICD-10-CM

## 2023-04-06 DIAGNOSIS — E66.01 MORBID OBESITY: ICD-10-CM

## 2023-04-06 DIAGNOSIS — I48.91 ATRIAL FIBRILLATION AND FLUTTER: ICD-10-CM

## 2023-04-06 DIAGNOSIS — I48.92 ATRIAL FIBRILLATION AND FLUTTER: ICD-10-CM

## 2023-04-06 DIAGNOSIS — T78.2XXA ANAPHYLAXIS, INITIAL ENCOUNTER: Primary | ICD-10-CM

## 2023-04-06 DIAGNOSIS — I48.91 ATRIAL FIBRILLATION: ICD-10-CM

## 2023-04-06 LAB
ALBUMIN SERPL BCP-MCNC: 4.4 G/DL (ref 3.5–5.2)
ALP SERPL-CCNC: 58 U/L (ref 55–135)
ALT SERPL W/O P-5'-P-CCNC: 16 U/L (ref 10–44)
AMPHET+METHAMPHET UR QL: NEGATIVE
ANION GAP SERPL CALC-SCNC: 11 MMOL/L (ref 8–16)
APTT PPP: 23.9 SEC (ref 21–32)
AST SERPL-CCNC: 27 U/L (ref 10–40)
BARBITURATES UR QL SCN>200 NG/ML: NEGATIVE
BASOPHILS # BLD AUTO: 0.03 K/UL (ref 0–0.2)
BASOPHILS # BLD AUTO: 0.04 K/UL (ref 0–0.2)
BASOPHILS NFR BLD: 0.6 % (ref 0–1.9)
BASOPHILS NFR BLD: 0.6 % (ref 0–1.9)
BENZODIAZ UR QL SCN>200 NG/ML: NEGATIVE
BILIRUB SERPL-MCNC: 0.7 MG/DL (ref 0.1–1)
BILIRUB UR QL STRIP: NEGATIVE
BNP SERPL-MCNC: 19 PG/ML (ref 0–99)
BUN SERPL-MCNC: 14 MG/DL (ref 6–20)
BZE UR QL SCN: NEGATIVE
CALCIUM SERPL-MCNC: 9.2 MG/DL (ref 8.7–10.5)
CANNABINOIDS UR QL SCN: ABNORMAL
CHLORIDE SERPL-SCNC: 101 MMOL/L (ref 95–110)
CLARITY UR: CLEAR
CO2 SERPL-SCNC: 25 MMOL/L (ref 23–29)
COLOR UR: YELLOW
CREAT SERPL-MCNC: 1.1 MG/DL (ref 0.5–1.4)
CREAT UR-MCNC: 122 MG/DL (ref 23–375)
DIFFERENTIAL METHOD: ABNORMAL
DIFFERENTIAL METHOD: ABNORMAL
EOSINOPHIL # BLD AUTO: 0.1 K/UL (ref 0–0.5)
EOSINOPHIL # BLD AUTO: 0.1 K/UL (ref 0–0.5)
EOSINOPHIL NFR BLD: 0.9 % (ref 0–8)
EOSINOPHIL NFR BLD: 1.3 % (ref 0–8)
ERYTHROCYTE [DISTWIDTH] IN BLOOD BY AUTOMATED COUNT: 16.9 % (ref 11.5–14.5)
ERYTHROCYTE [DISTWIDTH] IN BLOOD BY AUTOMATED COUNT: 17 % (ref 11.5–14.5)
EST. GFR  (NO RACE VARIABLE): >60 ML/MIN/1.73 M^2
GLUCOSE SERPL-MCNC: 138 MG/DL (ref 70–110)
GLUCOSE UR QL STRIP: NEGATIVE
HCT VFR BLD AUTO: 41.9 % (ref 40–54)
HCT VFR BLD AUTO: 42.4 % (ref 40–54)
HGB BLD-MCNC: 13.2 G/DL (ref 14–18)
HGB BLD-MCNC: 13.4 G/DL (ref 14–18)
HGB UR QL STRIP: NEGATIVE
IMM GRANULOCYTES # BLD AUTO: 0 K/UL (ref 0–0.04)
IMM GRANULOCYTES # BLD AUTO: 0.01 K/UL (ref 0–0.04)
IMM GRANULOCYTES NFR BLD AUTO: 0 % (ref 0–0.5)
IMM GRANULOCYTES NFR BLD AUTO: 0.2 % (ref 0–0.5)
INR PPP: 1.1 (ref 0.8–1.2)
KETONES UR QL STRIP: ABNORMAL
LEUKOCYTE ESTERASE UR QL STRIP: NEGATIVE
LYMPHOCYTES # BLD AUTO: 2.3 K/UL (ref 1–4.8)
LYMPHOCYTES # BLD AUTO: 3.5 K/UL (ref 1–4.8)
LYMPHOCYTES NFR BLD: 48.2 % (ref 18–48)
LYMPHOCYTES NFR BLD: 53.4 % (ref 18–48)
MAGNESIUM SERPL-MCNC: 2 MG/DL (ref 1.6–2.6)
MCH RBC QN AUTO: 25.6 PG (ref 27–31)
MCH RBC QN AUTO: 25.7 PG (ref 27–31)
MCHC RBC AUTO-ENTMCNC: 31.5 G/DL (ref 32–36)
MCHC RBC AUTO-ENTMCNC: 31.6 G/DL (ref 32–36)
MCV RBC AUTO: 81 FL (ref 82–98)
MCV RBC AUTO: 82 FL (ref 82–98)
MONOCYTES # BLD AUTO: 0.5 K/UL (ref 0.3–1)
MONOCYTES # BLD AUTO: 0.5 K/UL (ref 0.3–1)
MONOCYTES NFR BLD: 7.5 % (ref 4–15)
MONOCYTES NFR BLD: 9.7 % (ref 4–15)
NEUTROPHILS # BLD AUTO: 1.9 K/UL (ref 1.8–7.7)
NEUTROPHILS # BLD AUTO: 2.5 K/UL (ref 1.8–7.7)
NEUTROPHILS NFR BLD: 37.4 % (ref 38–73)
NEUTROPHILS NFR BLD: 40.2 % (ref 38–73)
NITRITE UR QL STRIP: NEGATIVE
NRBC BLD-RTO: 0 /100 WBC
NRBC BLD-RTO: 0 /100 WBC
OPIATES UR QL SCN: NEGATIVE
PCP UR QL SCN>25 NG/ML: NEGATIVE
PH UR STRIP: 6 [PH] (ref 5–8)
PLATELET # BLD AUTO: 325 K/UL (ref 150–450)
PLATELET # BLD AUTO: 372 K/UL (ref 150–450)
PMV BLD AUTO: 10.5 FL (ref 9.2–12.9)
PMV BLD AUTO: 9.8 FL (ref 9.2–12.9)
POTASSIUM SERPL-SCNC: 3.1 MMOL/L (ref 3.5–5.1)
PROT SERPL-MCNC: 7.8 G/DL (ref 6–8.4)
PROT UR QL STRIP: ABNORMAL
PROTHROMBIN TIME: 11.4 SEC (ref 9–12.5)
RBC # BLD AUTO: 5.13 M/UL (ref 4.6–6.2)
RBC # BLD AUTO: 5.23 M/UL (ref 4.6–6.2)
SODIUM SERPL-SCNC: 137 MMOL/L (ref 136–145)
SP GR UR STRIP: 1.01 (ref 1–1.03)
T4 FREE SERPL-MCNC: 1.14 NG/DL (ref 0.71–1.51)
TOXICOLOGY INFORMATION: ABNORMAL
TROPONIN I SERPL HS-MCNC: 5.8 PG/ML (ref 0–14.9)
TSH SERPL DL<=0.005 MIU/L-ACNC: 9.09 UIU/ML (ref 0.34–5.6)
URN SPEC COLLECT METH UR: ABNORMAL
UROBILINOGEN UR STRIP-ACNC: NEGATIVE EU/DL
WBC # BLD AUTO: 4.75 K/UL (ref 3.9–12.7)
WBC # BLD AUTO: 6.56 K/UL (ref 3.9–12.7)

## 2023-04-06 PROCEDURE — 25000003 PHARM REV CODE 250: Performed by: EMERGENCY MEDICINE

## 2023-04-06 PROCEDURE — 81003 URINALYSIS AUTO W/O SCOPE: CPT | Performed by: EMERGENCY MEDICINE

## 2023-04-06 PROCEDURE — 85025 COMPLETE CBC W/AUTO DIFF WBC: CPT | Performed by: EMERGENCY MEDICINE

## 2023-04-06 PROCEDURE — 85025 COMPLETE CBC W/AUTO DIFF WBC: CPT | Mod: 91 | Performed by: SURGERY

## 2023-04-06 PROCEDURE — 80061 LIPID PANEL: CPT | Performed by: SURGERY

## 2023-04-06 PROCEDURE — 80053 COMPREHEN METABOLIC PANEL: CPT | Mod: 91 | Performed by: SURGERY

## 2023-04-06 PROCEDURE — 96374 THER/PROPH/DIAG INJ IV PUSH: CPT

## 2023-04-06 PROCEDURE — 82607 VITAMIN B-12: CPT | Performed by: SURGERY

## 2023-04-06 PROCEDURE — 80307 DRUG TEST PRSMV CHEM ANLYZR: CPT | Performed by: EMERGENCY MEDICINE

## 2023-04-06 PROCEDURE — 85730 THROMBOPLASTIN TIME PARTIAL: CPT | Performed by: EMERGENCY MEDICINE

## 2023-04-06 PROCEDURE — 93005 ELECTROCARDIOGRAM TRACING: CPT | Performed by: INTERNAL MEDICINE

## 2023-04-06 PROCEDURE — 96375 TX/PRO/DX INJ NEW DRUG ADDON: CPT

## 2023-04-06 PROCEDURE — G0378 HOSPITAL OBSERVATION PER HR: HCPCS

## 2023-04-06 PROCEDURE — 84466 ASSAY OF TRANSFERRIN: CPT | Performed by: SURGERY

## 2023-04-06 PROCEDURE — 63600175 PHARM REV CODE 636 W HCPCS: Performed by: EMERGENCY MEDICINE

## 2023-04-06 PROCEDURE — 83735 ASSAY OF MAGNESIUM: CPT | Performed by: EMERGENCY MEDICINE

## 2023-04-06 PROCEDURE — 99291 CRITICAL CARE FIRST HOUR: CPT | Mod: 25

## 2023-04-06 PROCEDURE — 84484 ASSAY OF TROPONIN QUANT: CPT | Performed by: EMERGENCY MEDICINE

## 2023-04-06 PROCEDURE — 93010 EKG 12-LEAD: ICD-10-PCS | Mod: ,,, | Performed by: INTERNAL MEDICINE

## 2023-04-06 PROCEDURE — 93010 ELECTROCARDIOGRAM REPORT: CPT | Mod: ,,, | Performed by: INTERNAL MEDICINE

## 2023-04-06 PROCEDURE — 84425 ASSAY OF VITAMIN B-1: CPT | Performed by: SURGERY

## 2023-04-06 PROCEDURE — 85610 PROTHROMBIN TIME: CPT | Performed by: EMERGENCY MEDICINE

## 2023-04-06 PROCEDURE — 84443 ASSAY THYROID STIM HORMONE: CPT | Performed by: EMERGENCY MEDICINE

## 2023-04-06 PROCEDURE — 12000002 HC ACUTE/MED SURGE SEMI-PRIVATE ROOM

## 2023-04-06 PROCEDURE — 83880 ASSAY OF NATRIURETIC PEPTIDE: CPT | Performed by: EMERGENCY MEDICINE

## 2023-04-06 PROCEDURE — 84439 ASSAY OF FREE THYROXINE: CPT | Performed by: EMERGENCY MEDICINE

## 2023-04-06 PROCEDURE — 96361 HYDRATE IV INFUSION ADD-ON: CPT

## 2023-04-06 PROCEDURE — 80053 COMPREHEN METABOLIC PANEL: CPT | Performed by: EMERGENCY MEDICINE

## 2023-04-06 RX ORDER — FAMOTIDINE 10 MG/ML
20 INJECTION INTRAVENOUS
Status: COMPLETED | OUTPATIENT
Start: 2023-04-06 | End: 2023-04-06

## 2023-04-06 RX ORDER — DIPHENHYDRAMINE HYDROCHLORIDE 50 MG/ML
25 INJECTION INTRAMUSCULAR; INTRAVENOUS
Status: COMPLETED | OUTPATIENT
Start: 2023-04-06 | End: 2023-04-06

## 2023-04-06 RX ADMIN — SODIUM CHLORIDE 1000 ML: 0.9 INJECTION, SOLUTION INTRAVENOUS at 08:04

## 2023-04-06 RX ADMIN — DIPHENHYDRAMINE HYDROCHLORIDE 25 MG: 50 INJECTION INTRAMUSCULAR; INTRAVENOUS at 08:04

## 2023-04-06 RX ADMIN — POTASSIUM BICARBONATE 50 MEQ: 977.5 TABLET, EFFERVESCENT ORAL at 11:04

## 2023-04-06 RX ADMIN — FAMOTIDINE 20 MG: 10 INJECTION INTRAVENOUS at 08:04

## 2023-04-07 ENCOUNTER — PATIENT MESSAGE (OUTPATIENT)
Dept: CARDIOLOGY | Facility: CLINIC | Age: 53
End: 2023-04-07
Payer: COMMERCIAL

## 2023-04-07 ENCOUNTER — CLINICAL SUPPORT (OUTPATIENT)
Dept: CARDIOLOGY | Facility: HOSPITAL | Age: 53
End: 2023-04-07
Attending: STUDENT IN AN ORGANIZED HEALTH CARE EDUCATION/TRAINING PROGRAM
Payer: COMMERCIAL

## 2023-04-07 VITALS
HEIGHT: 70 IN | TEMPERATURE: 98 F | OXYGEN SATURATION: 100 % | HEART RATE: 58 BPM | SYSTOLIC BLOOD PRESSURE: 149 MMHG | BODY MASS INDEX: 36.8 KG/M2 | WEIGHT: 257.06 LBS | DIASTOLIC BLOOD PRESSURE: 91 MMHG | RESPIRATION RATE: 18 BRPM

## 2023-04-07 VITALS — BODY MASS INDEX: 36.79 KG/M2 | HEIGHT: 70 IN | WEIGHT: 257 LBS

## 2023-04-07 PROBLEM — I48.91 ATRIAL FIBRILLATION AND FLUTTER: Status: ACTIVE | Noted: 2023-04-07

## 2023-04-07 PROBLEM — I48.92 ATRIAL FIBRILLATION AND FLUTTER: Status: ACTIVE | Noted: 2023-04-07

## 2023-04-07 PROBLEM — I48.92 ATRIAL FIBRILLATION AND FLUTTER: Status: RESOLVED | Noted: 2023-04-07 | Resolved: 2023-04-07

## 2023-04-07 PROBLEM — I48.91 ATRIAL FIBRILLATION AND FLUTTER: Status: RESOLVED | Noted: 2023-04-07 | Resolved: 2023-04-07

## 2023-04-07 LAB
ALBUMIN SERPL BCP-MCNC: 3.8 G/DL (ref 3.5–5.2)
ALBUMIN SERPL BCP-MCNC: 4.5 G/DL (ref 3.5–5.2)
ALP SERPL-CCNC: 53 U/L (ref 55–135)
ALP SERPL-CCNC: 73 U/L (ref 55–135)
ALT SERPL W/O P-5'-P-CCNC: 11 U/L (ref 10–44)
ALT SERPL W/O P-5'-P-CCNC: 14 U/L (ref 10–44)
ANION GAP SERPL CALC-SCNC: 13 MMOL/L (ref 8–16)
ANION GAP SERPL CALC-SCNC: 6 MMOL/L (ref 8–16)
AORTIC ROOT ANNULUS: 3.6 CM
AORTIC VALVE CUSP SEPERATION: 2.5 CM
ASCENDING AORTA: 3.2 CM
AST SERPL-CCNC: 18 U/L (ref 10–40)
AST SERPL-CCNC: 26 U/L (ref 10–40)
AV INDEX (PROSTH): 0.4
AV MEAN GRADIENT: 7 MMHG
AV PEAK GRADIENT: 12 MMHG
AV VALVE AREA: 1.83 CM2
AV VELOCITY RATIO: 0.41
BASOPHILS # BLD AUTO: 0.02 K/UL (ref 0–0.2)
BASOPHILS NFR BLD: 0.3 % (ref 0–1.9)
BILIRUB SERPL-MCNC: 0.9 MG/DL (ref 0.1–1)
BILIRUB SERPL-MCNC: 1 MG/DL (ref 0.1–1)
BSA FOR ECHO PROCEDURE: 2.4 M2
BUN SERPL-MCNC: 11 MG/DL (ref 6–20)
BUN SERPL-MCNC: 9 MG/DL (ref 6–20)
CALCIUM SERPL-MCNC: 10.6 MG/DL (ref 8.7–10.5)
CALCIUM SERPL-MCNC: 8.8 MG/DL (ref 8.7–10.5)
CHLORIDE SERPL-SCNC: 100 MMOL/L (ref 95–110)
CHLORIDE SERPL-SCNC: 104 MMOL/L (ref 95–110)
CHOLEST SERPL-MCNC: 113 MG/DL (ref 120–199)
CHOLEST/HDLC SERPL: 3.1 {RATIO} (ref 2–5)
CO2 SERPL-SCNC: 27 MMOL/L (ref 23–29)
CO2 SERPL-SCNC: 29 MMOL/L (ref 23–29)
CREAT SERPL-MCNC: 0.8 MG/DL (ref 0.5–1.4)
CREAT SERPL-MCNC: 0.8 MG/DL (ref 0.5–1.4)
CV ECHO LV RWT: 0.54 CM
DIFFERENTIAL METHOD: ABNORMAL
DOP CALC AO PEAK VEL: 1.76 M/S
DOP CALC AO VTI: 37.3 CM
DOP CALC LVOT AREA: 4.5 CM2
DOP CALC LVOT DIAMETER: 2.4 CM
DOP CALC LVOT PEAK VEL: 0.73 M/S
DOP CALC LVOT STROKE VOLUME: 68.28 CM3
DOP CALC MV VTI: 27.9 CM
DOP CALCLVOT PEAK VEL VTI: 15.1 CM
E WAVE DECELERATION TIME: 216 MSEC
E/A RATIO: 1.12
E/E' RATIO: 8.59 M/S
ECHO LV POSTERIOR WALL: 1.61 CM (ref 0.6–1.1)
EJECTION FRACTION: 60 %
EOSINOPHIL # BLD AUTO: 0.1 K/UL (ref 0–0.5)
EOSINOPHIL NFR BLD: 0.8 % (ref 0–8)
ERYTHROCYTE [DISTWIDTH] IN BLOOD BY AUTOMATED COUNT: 17 % (ref 11.5–14.5)
EST. GFR  (NO RACE VARIABLE): >60 ML/MIN/1.73 M^2
EST. GFR  (NO RACE VARIABLE): >60 ML/MIN/1.73 M^2
FRACTIONAL SHORTENING: 34 % (ref 28–44)
GLUCOSE SERPL-MCNC: 65 MG/DL (ref 70–110)
GLUCOSE SERPL-MCNC: 99 MG/DL (ref 70–110)
HCT VFR BLD AUTO: 38.2 % (ref 40–54)
HDLC SERPL-MCNC: 36 MG/DL (ref 40–75)
HDLC SERPL: 31.9 % (ref 20–50)
HGB BLD-MCNC: 12.2 G/DL (ref 14–18)
IMM GRANULOCYTES # BLD AUTO: 0.01 K/UL (ref 0–0.04)
IMM GRANULOCYTES NFR BLD AUTO: 0.2 % (ref 0–0.5)
INTERVENTRICULAR SEPTUM: 1.33 CM (ref 0.6–1.1)
IRON SERPL-MCNC: 52 UG/DL (ref 45–160)
IVC DIAMETER: 2.2 CM
LDLC SERPL CALC-MCNC: 66.8 MG/DL (ref 63–159)
LEFT ATRIUM VOLUME INDEX MOD: 35.8 ML/M2
LEFT ATRIUM VOLUME MOD: 83 CM3
LEFT INTERNAL DIMENSION IN SYSTOLE: 3.94 CM (ref 2.1–4)
LEFT VENTRICLE DIASTOLIC VOLUME INDEX: 75.86 ML/M2
LEFT VENTRICLE DIASTOLIC VOLUME: 176 ML
LEFT VENTRICLE MASS INDEX: 176 G/M2
LEFT VENTRICLE SYSTOLIC VOLUME INDEX: 29.1 ML/M2
LEFT VENTRICLE SYSTOLIC VOLUME: 67.5 ML
LEFT VENTRICULAR INTERNAL DIMENSION IN DIASTOLE: 5.94 CM (ref 3.5–6)
LEFT VENTRICULAR MASS: 408.81 G
LV LATERAL E/E' RATIO: 8.11 M/S
LV SEPTAL E/E' RATIO: 9.13 M/S
LVOT MG: 1 MMHG
LVOT MV: 0.45 CM/S
LYMPHOCYTES # BLD AUTO: 2.5 K/UL (ref 1–4.8)
LYMPHOCYTES NFR BLD: 41.6 % (ref 18–48)
MAGNESIUM SERPL-MCNC: 2.1 MG/DL (ref 1.6–2.6)
MCH RBC QN AUTO: 26 PG (ref 27–31)
MCHC RBC AUTO-ENTMCNC: 31.9 G/DL (ref 32–36)
MCV RBC AUTO: 81 FL (ref 82–98)
MONOCYTES # BLD AUTO: 0.4 K/UL (ref 0.3–1)
MONOCYTES NFR BLD: 6.9 % (ref 4–15)
MV MEAN GRADIENT: 1 MMHG
MV PEAK A VEL: 0.65 M/S
MV PEAK E VEL: 0.73 M/S
MV PEAK GRADIENT: 3 MMHG
MV STENOSIS PRESSURE HALF TIME: 121 MS
MV VALVE AREA BY CONTINUITY EQUATION: 2.45 CM2
MV VALVE AREA P 1/2 METHOD: 1.82 CM2
NEUTROPHILS # BLD AUTO: 3 K/UL (ref 1.8–7.7)
NEUTROPHILS NFR BLD: 50.2 % (ref 38–73)
NONHDLC SERPL-MCNC: 77 MG/DL
NRBC BLD-RTO: 0 /100 WBC
PLATELET # BLD AUTO: 302 K/UL (ref 150–450)
PMV BLD AUTO: 9.6 FL (ref 9.2–12.9)
POTASSIUM SERPL-SCNC: 3.5 MMOL/L (ref 3.5–5.1)
POTASSIUM SERPL-SCNC: 3.6 MMOL/L (ref 3.5–5.1)
PROT SERPL-MCNC: 6.8 G/DL (ref 6–8.4)
PROT SERPL-MCNC: 8.2 G/DL (ref 6–8.4)
PV MV: 0.68 M/S
PV PEAK VELOCITY: 1.04 CM/S
RA PRESSURE: 3 MMHG
RBC # BLD AUTO: 4.7 M/UL (ref 4.6–6.2)
RV MID DIAMA: 3.25 CM
RV TISSUE DOPPLER FREE WALL SYSTOLIC VELOCITY 1 (APICAL 4 CHAMBER VIEW): 0.02 CM/S
SATURATED IRON: 13 % (ref 20–50)
SINUS: 3.48 CM
SODIUM SERPL-SCNC: 139 MMOL/L (ref 136–145)
SODIUM SERPL-SCNC: 140 MMOL/L (ref 136–145)
STJ: 2.73 CM
TDI LATERAL: 0.09 M/S
TDI SEPTAL: 0.08 M/S
TDI: 0.09 M/S
TOTAL IRON BINDING CAPACITY: 394 UG/DL (ref 250–450)
TRANSFERRIN SERPL-MCNC: 266 MG/DL (ref 200–375)
TRICUSPID ANNULAR PLANE SYSTOLIC EXCURSION: 2.14 CM
TRIGL SERPL-MCNC: 51 MG/DL (ref 30–150)
TROPONIN I SERPL HS-MCNC: 5.2 PG/ML (ref 0–14.9)
TROPONIN I SERPL HS-MCNC: 5.3 PG/ML (ref 0–14.9)
VIT B12 SERPL-MCNC: 407 PG/ML (ref 210–950)
WBC # BLD AUTO: 5.94 K/UL (ref 3.9–12.7)

## 2023-04-07 PROCEDURE — 80053 COMPREHEN METABOLIC PANEL: CPT | Performed by: STUDENT IN AN ORGANIZED HEALTH CARE EDUCATION/TRAINING PROGRAM

## 2023-04-07 PROCEDURE — 85025 COMPLETE CBC W/AUTO DIFF WBC: CPT | Performed by: STUDENT IN AN ORGANIZED HEALTH CARE EDUCATION/TRAINING PROGRAM

## 2023-04-07 PROCEDURE — 83735 ASSAY OF MAGNESIUM: CPT | Performed by: STUDENT IN AN ORGANIZED HEALTH CARE EDUCATION/TRAINING PROGRAM

## 2023-04-07 PROCEDURE — 84484 ASSAY OF TROPONIN QUANT: CPT | Performed by: STUDENT IN AN ORGANIZED HEALTH CARE EDUCATION/TRAINING PROGRAM

## 2023-04-07 PROCEDURE — G0378 HOSPITAL OBSERVATION PER HR: HCPCS

## 2023-04-07 PROCEDURE — 99222 1ST HOSP IP/OBS MODERATE 55: CPT | Mod: ,,, | Performed by: INTERNAL MEDICINE

## 2023-04-07 PROCEDURE — 36415 COLL VENOUS BLD VENIPUNCTURE: CPT | Performed by: STUDENT IN AN ORGANIZED HEALTH CARE EDUCATION/TRAINING PROGRAM

## 2023-04-07 PROCEDURE — 93306 TTE W/DOPPLER COMPLETE: CPT | Mod: 26,,, | Performed by: INTERNAL MEDICINE

## 2023-04-07 PROCEDURE — 99222 PR INITIAL HOSPITAL CARE,LEVL II: ICD-10-PCS | Mod: ,,, | Performed by: INTERNAL MEDICINE

## 2023-04-07 PROCEDURE — 93306 TTE W/DOPPLER COMPLETE: CPT

## 2023-04-07 PROCEDURE — 63600175 PHARM REV CODE 636 W HCPCS: Performed by: INTERNAL MEDICINE

## 2023-04-07 PROCEDURE — 25000003 PHARM REV CODE 250: Performed by: STUDENT IN AN ORGANIZED HEALTH CARE EDUCATION/TRAINING PROGRAM

## 2023-04-07 PROCEDURE — 96375 TX/PRO/DX INJ NEW DRUG ADDON: CPT

## 2023-04-07 PROCEDURE — 93306 ECHO (CUPID ONLY): ICD-10-PCS | Mod: 26,,, | Performed by: INTERNAL MEDICINE

## 2023-04-07 PROCEDURE — 25000003 PHARM REV CODE 250: Performed by: INTERNAL MEDICINE

## 2023-04-07 RX ORDER — SODIUM CHLORIDE 0.9 % (FLUSH) 0.9 %
3 SYRINGE (ML) INJECTION EVERY 6 HOURS PRN
Status: DISCONTINUED | OUTPATIENT
Start: 2023-04-07 | End: 2023-04-07 | Stop reason: HOSPADM

## 2023-04-07 RX ORDER — DIPHENHYDRAMINE HYDROCHLORIDE 50 MG/ML
25 INJECTION INTRAMUSCULAR; INTRAVENOUS EVERY 6 HOURS PRN
Status: DISCONTINUED | OUTPATIENT
Start: 2023-04-07 | End: 2023-04-07 | Stop reason: HOSPADM

## 2023-04-07 RX ORDER — ACETAMINOPHEN 325 MG/1
650 TABLET ORAL EVERY 8 HOURS PRN
Status: DISCONTINUED | OUTPATIENT
Start: 2023-04-07 | End: 2023-04-07 | Stop reason: HOSPADM

## 2023-04-07 RX ORDER — AMLODIPINE BESYLATE 5 MG/1
5 TABLET ORAL DAILY
Status: DISCONTINUED | OUTPATIENT
Start: 2023-04-07 | End: 2023-04-07 | Stop reason: HOSPADM

## 2023-04-07 RX ORDER — LANOLIN ALCOHOL/MO/W.PET/CERES
800 CREAM (GRAM) TOPICAL
Status: DISCONTINUED | OUTPATIENT
Start: 2023-04-07 | End: 2023-04-07 | Stop reason: HOSPADM

## 2023-04-07 RX ORDER — POTASSIUM CHLORIDE 20 MEQ/1
20 TABLET, EXTENDED RELEASE ORAL DAILY
Qty: 30 TABLET | Refills: 0 | Status: SHIPPED | OUTPATIENT
Start: 2023-04-07 | End: 2023-06-01

## 2023-04-07 RX ORDER — ONDANSETRON 2 MG/ML
4 INJECTION INTRAMUSCULAR; INTRAVENOUS EVERY 8 HOURS PRN
Status: DISCONTINUED | OUTPATIENT
Start: 2023-04-07 | End: 2023-04-07 | Stop reason: HOSPADM

## 2023-04-07 RX ORDER — ENOXAPARIN SODIUM 100 MG/ML
40 INJECTION SUBCUTANEOUS EVERY 24 HOURS
Status: DISCONTINUED | OUTPATIENT
Start: 2023-04-07 | End: 2023-04-07 | Stop reason: HOSPADM

## 2023-04-07 RX ORDER — SODIUM,POTASSIUM PHOSPHATES 280-250MG
2 POWDER IN PACKET (EA) ORAL
Status: DISCONTINUED | OUTPATIENT
Start: 2023-04-07 | End: 2023-04-07 | Stop reason: HOSPADM

## 2023-04-07 RX ORDER — TALC
6 POWDER (GRAM) TOPICAL NIGHTLY PRN
Status: DISCONTINUED | OUTPATIENT
Start: 2023-04-07 | End: 2023-04-07 | Stop reason: HOSPADM

## 2023-04-07 RX ORDER — POTASSIUM CHLORIDE 7.45 MG/ML
10 INJECTION INTRAVENOUS ONCE
Status: COMPLETED | OUTPATIENT
Start: 2023-04-07 | End: 2023-04-07

## 2023-04-07 RX ORDER — METOPROLOL TARTRATE 1 MG/ML
5 INJECTION, SOLUTION INTRAVENOUS EVERY 5 MIN PRN
Status: DISCONTINUED | OUTPATIENT
Start: 2023-04-07 | End: 2023-04-07 | Stop reason: HOSPADM

## 2023-04-07 RX ADMIN — POTASSIUM CHLORIDE 10 MEQ: 7.46 INJECTION, SOLUTION INTRAVENOUS at 09:04

## 2023-04-07 RX ADMIN — AMLODIPINE BESYLATE 5 MG: 5 TABLET ORAL at 08:04

## 2023-04-07 RX ADMIN — POTASSIUM BICARBONATE 50 MEQ: 977.5 TABLET, EFFERVESCENT ORAL at 09:04

## 2023-04-07 NOTE — ED PROVIDER NOTES
Encounter Date: 4/6/2023       History     Chief Complaint   Patient presents with    Allergic Reaction     Pt reports eating meal with peanuts this evening.    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  Patient reports that he was eating granola and a bulge over the past hour and then went into his garage.  He began to feel lightheaded, diaphoretic, and felt that his throat was closing.  He denied any shortness of breath or wheezing.  Denied chest pain.  He reports no rashes or pallor.  No hives.  No nausea or vomiting no abdominal pain.  Patient reports he does feel the need to have a bowel movement.  Reports this is not as severe as when he had anaphylaxis to eating a handful peanuts in the past.  He was given his epinephrine pen at 7:45 p.m. by family.  Afterward he developed hypotension with a systolic of 91 and felt very weak and fatigued.  Blood pressure now is 118 systolic.  EMS reported on the monitor they saw that patient was in a flutter with a rate in the 80s.  Patient denies any history of AFib a flutter.  EMS was unable to get a IV.  No meds have been given    Review of patient's allergies indicates:   Allergen Reactions    Peaches [peach (prunus persica)] Anaphylaxis    Peanut Anaphylaxis    Tree pollen-red birch Anaphylaxis     Past Medical History:   Diagnosis Date    Arthritis     Back injury     Colon polyp     COVID     CPAP (continuous positive airway pressure) dependence     Hypertension     PONV (postoperative nausea and vomiting)     Sleep apnea     c pap machine used     Past Surgical History:   Procedure Laterality Date    BONE GRAFT N/A 04/05/2022    Procedure: BONE GRAFT;  Surgeon: Duran Adler Jr., MD;  Location:  [facility] OR;  Service: Orthopedics;  Laterality: N/A;    CIRCUMCISION      COLONOSCOPY N/A 12/03/2018    Procedure: COLONOSCOPY;  Surgeon: CHRISSY Reyna MD;  [facility]  Service: Endoscopy;  Laterality: N/A;    COLONOSCOPY N/A 09/01/2022    Procedure: COLONOSCOPY;  Surgeon: Alondra Arias MD;  Location: [facility] ENDO;  Service: Endoscopy;  Laterality: N/A;    epidural steroid injection X 2      lower lumbar    ESOPHAGOGASTRODUODENOSCOPY N/A 09/01/2022    Procedure: EGD (ESOPHAGOGASTRODUODENOSCOPY);  Surgeon: Alondra Arias MD;  Location: [facility] ENDO;  Service: Endoscopy;  Laterality: N/A;    facet injection       [facility] [place]     INTRALUMINAL GASTROINTESTINAL TRACT IMAGING VIA CAPSULE N/A 10/03/2022    Procedure: IMAGING PROCEDURE, GI TRACT, INTRALUMINAL, VIA CAPSULE;  Surgeon: Alondra Arias MD;  Location: [facility] ENDO;  Service: Endoscopy;  Laterality: N/A;    LAPAROSCOPIC SLEEVE GASTRECTOMY N/A 12/19/2022    Procedure: GASTRECTOMY, SLEEVE, LAPAROSCOPIC;  Surgeon: Lashonda Pinedo MD;  Location: [facility] OR;  Service: General;  Laterality: N/A;    LIPOMA RESECTION      Back of neck    MAGNETIC RESONANCE IMAGING N/A 05/29/2020    Procedure: MRI (MAGNETIC RESONANCE IMAGING) LUMBAR SPINE;  Surgeon: [facility] Diagnostic Provider;  Location: [facility];  Service: General;  Laterality: N/A;  COVID NEG    MAGNETIC RESONANCE IMAGING N/A 12/03/2021    Procedure: MRI (MAGNETIC RESONANCE IMAGING), LUMBAR SPINE;  Surgeon: [facility] Diagnostic Provider;  Location: [facility];  Service: General;  Laterality: N/A;  In MRI @ 7:50 per Ariela    MEDIAL COLLATERAL LIGAMENT AND LATERAL COLLATERAL LIGAMENT REPAIR, KNEE Right 09/01/2018    Dr. Christophe Gore in [place]     MINIMALLY INVASIVE TRANSFORAMINAL LUMBAR INTERBODY FUSION (TLIF) N/A 04/05/2022    Procedure: FUSION, SPINE, LUMBAR, TLIF, MINIMALLY INVASIVE, WITH INSTRUMENTATION,  L4/5 RIGHT;  Surgeon: Duran Adler Jr., MD;   Location: [facility] OR;  Service: Orthopedics;  Laterality: N/A;  10:30am per Tamela    MYELOGRAPHY N/A 2020    Procedure: MYELOGRAM;  Surgeon: Andi Diagnostic Provider;  Location: [facility] OR;  Service: General;  Laterality: N/A;     Family History   Problem Relation Age of Onset    Hypertension Mother     Diabetes Father     Cancer Father         mesotheliosma     Cataracts Neg Hx     Glaucoma Neg Hx     Macular degeneration Neg Hx     Retinal detachment Neg Hx     Colon cancer Neg Hx     Colon polyps Neg Hx     Crohn's disease Neg Hx     Ulcerative colitis Neg Hx      Social History     Tobacco Use    Smoking status: Former     Packs/day: 0.50     Years: 10.00     Pack years: 5.00     Types: Cigarettes     Quit date: 2009     Years since quittin.2    Smokeless tobacco: Never    Tobacco comments:     quit smoking in    Substance Use Topics    Alcohol use: Yes     Comment: rarely    Drug use: Not Currently     Types: Marijuana     Comment: medical---oil, smoke and edibles     Review of Systems   Constitutional:  Positive for fatigue. Negative for activity change, appetite change, chills, diaphoresis and fever.   HENT:  Positive for voice change. Negative for congestion, dental problem, drooling, facial swelling, postnasal drip, rhinorrhea, sinus pressure, sinus pain, sneezing, sore throat, tinnitus and trouble swallowing.         He reports his voice sounds hoarse than usual but he was thirsty  He reports he feels that his throat is mildly swollen   Eyes:  Negative for visual disturbance.   Respiratory:  Negative for cough, chest tightness, shortness of breath and wheezing.    Cardiovascular:  Negative for chest pain and palpitations.   Gastrointestinal:  Negative for abdominal pain, constipation, diarrhea, nausea and vomiting.   Genitourinary:  Negative for dysuria, frequency and urgency.   Musculoskeletal:  Negative for myalgias, neck pain and neck stiffness.   Skin:  Negative for color change, pallor  and rash.   Allergic/Immunologic: Positive for food allergies.   Neurological:  Positive for dizziness and light-headedness. Negative for weakness, numbness and headaches.   Psychiatric/Behavioral:  Negative for confusion.    All other systems reviewed and are negative.    Physical Exam     Initial Vitals [04/06/23 2043]   BP Pulse Resp Temp SpO2   125/73 87 18 98.4 °F (36.9 °C) 100 %      MAP       --         Physical Exam    Nursing note and vitals reviewed.  Constitutional: He appears well-developed and well-nourished. He is not diaphoretic. No distress.   HENT:   Head: Normocephalic and atraumatic.   Right Ear: External ear normal.   Left Ear: External ear normal.   Nose: Nose normal.   Mouth/Throat: Oropharynx is clear and moist.   No swelling of tongue sublingual space soft palate tonsils are pharynx.  Normal sounding voice without hoarseness or stridor no trismus   Eyes: Conjunctivae and EOM are normal. Pupils are equal, round, and reactive to light.   Neck: Neck supple. No tracheal deviation present.   Normal range of motion.  Cardiovascular:  Normal rate, regular rhythm, normal heart sounds and intact distal pulses.     Exam reveals no gallop and no friction rub.       No murmur heard.  Blood pressure 118 systolic pulse 82 a flutter/AFib on the monitor   Pulmonary/Chest: Breath sounds normal. No stridor. No respiratory distress. He has no wheezes. He has no rhonchi. He has no rales. He exhibits no tenderness.   Abdominal: Abdomen is soft. Bowel sounds are normal. He exhibits no distension and no mass. There is no abdominal tenderness. There is no rebound and no guarding.   Musculoskeletal:         General: No edema. Normal range of motion.      Cervical back: Normal range of motion and neck supple.     Neurological: He is alert and oriented to person, place, and time. He has normal strength. No cranial nerve deficit or sensory deficit.   Skin: Skin is warm and dry. No rash noted. No erythema. No pallor.    No hives   Psychiatric: He has a normal mood and affect. His behavior is normal. Judgment and thought content normal.       ED Course   Procedures  Labs Reviewed   CBC W/ AUTO DIFFERENTIAL - Abnormal; Notable for the following components:       Result Value    Hemoglobin 13.2 (*)     MCH 25.7 (*)     MCHC 31.5 (*)     RDW 16.9 (*)     Gran % 37.4 (*)     Lymph % 53.4 (*)     All other components within normal limits   COMPREHENSIVE METABOLIC PANEL - Abnormal; Notable for the following components:    Potassium 3.1 (*)     Glucose 138 (*)     All other components within normal limits   TSH - Abnormal; Notable for the following components:    TSH 9.090 (*)     All other components within normal limits   B-TYPE NATRIURETIC PEPTIDE   APTT   MAGNESIUM   PROTIME-INR   TROPONIN I HIGH SENSITIVITY   T4, FREE   URINALYSIS, REFLEX TO URINE CULTURE   DRUG SCREEN PANEL, URINE EMERGENCY     EKG Readings: (Independently Interpreted)   Initial Reading: No STEMI. Heart Rate: 81. Ectopy: No Ectopy. Conduction: Normal.   Atrial fibrillation rate 81 no ischemic changes     Imaging Results              X-Ray Chest AP Portable (In process)                   X-Rays:   Independently Interpreted Readings:   Chest X-Ray: Normal heart size.  No infiltrates.  No acute abnormalities.   Medications   potassium bicarbonate disintegrating tablet 50 mEq (has no administration in time range)   diphenhydrAMINE injection 25 mg (25 mg Intravenous Given 4/6/23 2058)   famotidine (PF) injection 20 mg (20 mg Intravenous Given 4/6/23 2059)   sodium chloride 0.9% bolus 1,000 mL 1,000 mL (1,000 mLs Intravenous New Bag 4/6/23 2057)     Medical Decision Making:   Independently Interpreted Test(s):   I have ordered and independently interpreted X-rays - see prior notes.  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  Patient reports that he was eating granola and a bulge over the past hour and then went into his garage.  He began to feel lightheaded, diaphoretic, and felt that his throat was closing.  He denied any shortness of breath or wheezing.  Denied chest pain.  He reports no rashes or pallor.  No hives.  No nausea or vomiting no abdominal pain.  Patient reports he does feel the need to have a bowel movement.  Reports this is not as severe as when he had anaphylaxis to eating a handful peanuts in the past.  He was given his epinephrine pen at 7:45 p.m. by family.  Afterward he developed hypotension with a systolic of 91 and felt very weak and fatigued.  Blood pressure now is 118 systolic.  EMS reported on the monitor they saw that patient was in a flutter with a rate in the 80s.  Patient denies any history of AFib a flutter.  EMS was unable to get a IV.  No meds have been given  On arrival patient was seen on the EMS stretcher blood pressure is currently 118 systolic heart rate 80 to their monitor strips look like a flutter here he is in AFib with a rate of 81 he reports that he feels mildly hoarse and that he was mild airway swelling on physical exam I do not appreciate any intraoral swelling, or hoarseness.   MDM    Patient presents for emergent evaluation of acute anaphylaxis to peanuts with hypotension dizziness airway swelling, new onset AFib that poses a possible threat to life and/or bodily function.    Differential diagnosis includes but is not limited to anaphylaxis, adverse reaction to epinephrine,  AFib a flutter ventricular tachycardia SVT, sinus tach.  In the ED patient found to have acute new onset AFib/a flutter with anaphylaxis to possible peanuts with resolved hypotension and airway swelling.   I ordered labs and personally reviewed them.  Labs significant for see above.    I ordered X-rays and personally reviewed them and reviewed the radiologist interpretation.  Xray significant for see above.    I ordered EKG and personally reviewed it.  EKG significant for see above.    I ordered CT scan and    Admission MDM  I discussed the patient presentation labs, ekg, X-rays with the Hospitalist   Patient was managed in the ED with IV Benadryl 50 mg, Solu-Medrol 125 mg, Pepcid 20 mg and 1 L IV fluids.  Patient is allergic reaction symptoms have resolved blood pressure is now stable 132/96 no further symptoms.  Patient has remained in AFib with a rate in the 80s here or require admission to the hospital for new onset AFib as well as observation after anaphylaxis  The response to treatment was good.    Patient required emergent consultation to hospitalist for admission.  Yazmin Rivera M.D.     Other:   I have discussed this case with another health care provider.          Attending Attestation:         Attending Critical Care:   Critical Care Times:   Direct Patient Care (initial evaluation, reassessments, and time considering the case)................................................................10 minutes.   Additional History from reviewing old medical records or taking additional history from the family, EMS, PCP, etc.......................5 minutes.   Ordering, Reviewing, and Interpreting Diagnostic Studies...............................................................................................................5 minutes.    Documentation..................................................................................................................................................................................10 minutes.   Consultation with other Physicians. .................................................................................................................................................5 minutes.   Consultation with the patient's family directly relating to the patient's condition, care, and DNR status (when patient unable)......5 minutes.   ==============================================================  Total Critical Care Time - exclusive of procedural time: 40 minutes.  ==============================================================  Critical care was necessary to treat or prevent imminent or life-threatening deterioration of the following conditions: cardiac arrhythmia and anaphylaxis.   Critical care was time spent personally by me on the following activities: obtaining history from patient or relative, examination of patient, review of old charts, ordering lab, x-rays, and/or EKG, development of treatment plan with patient or relative, ordering and performing treatments and interventions, evaluation of patient's response to treatment, discussion with consultants, interpretation of cardiac measurements and re-evaluation of patient's conition.   Critical Care Condition: life-threatening                      Clinical Impression:   Final diagnoses:  [I48.91] Atrial fibrillation  [T78.2XXA] Anaphylaxis, initial encounter (Primary)  [I95.9] Hypotension, unspecified hypotension type        ED Disposition Condition    Admit Stable                Yazmin Rivera MD  04/06/23 2068

## 2023-04-07 NOTE — PROGRESS NOTES
Mr. Jules was admitted with peanut allergy leading to anaphylaxis.  He took his epi pen at home with good result.  Initially mildly hypotensive but that responded to treatment in the ED and has resolved.  He was noted to be in Afib/ Aflutter in the ED which is new.  It was rate controlled. He was admitted and cardiology consulted as a result.  He has transitioned back into a NSR at 808am per my conversation with tele monitoring. Echo ordered with results pending. Troponin has been normal.  No chest pain. Will follow up on cardiology evaluation and recommendations.  If no further inpatient cardiac workup planned/required, will plan to discharge with outpatient follow up.  K was 3.1 on presentation. Being replaced. Could hypokalemia in setting of anaphylaxis have led to arrhythmia?

## 2023-04-07 NOTE — SUBJECTIVE & OBJECTIVE
Past Medical History:   Diagnosis Date    Arthritis     Back injury     Colon polyp     COVID     CPAP (continuous positive airway pressure) dependence     Hypertension     PONV (postoperative nausea and vomiting)     Sleep apnea     c pap machine used       Past Surgical History:   Procedure Laterality Date    BONE GRAFT N/A 04/05/2022    Procedure: BONE GRAFT;  Surgeon: Duran Adler Jr., MD;  Location: [facility] OR;  Service: Orthopedics;  Laterality: N/A;    CIRCUMCISION      COLONOSCOPY N/A 12/03/2018    Procedure: COLONOSCOPY;  Surgeon: CHRISSY Reyna MD;  Location: [facility] ENDO ([facility]);  Service: Endoscopy;  Laterality: N/A;    COLONOSCOPY N/A 09/01/2022    Procedure: COLONOSCOPY;  Surgeon: Alondra Arias MD;  Location: [facility] ENDO;  Service: Endoscopy;  Laterality: N/A;    epidural steroid injection X 2      lower lumbar    ESOPHAGOGASTRODUODENOSCOPY N/A 09/01/2022    Procedure: EGD (ESOPHAGOGASTRODUODENOSCOPY);  Surgeon: Alondra Arias MD;  Location: [facility] ENDO;  Service: Endoscopy;  Laterality: N/A;    facet injection       [facility] [place]     INTRALUMINAL GASTROINTESTINAL TRACT IMAGING VIA CAPSULE N/A 10/03/2022    Procedure: IMAGING PROCEDURE, GI TRACT, INTRALUMINAL, VIA CAPSULE;  Surgeon: Alondra Arias MD;  Location: [facility];  Service: Endoscopy;  Laterality: N/A;    LAPAROSCOPIC SLEEVE GASTRECTOMY N/A 12/19/2022    Procedure: GASTRECTOMY, SLEEVE, LAPAROSCOPIC;  Surgeon: Lashonda Pinedo MD;  Location: [facility] OR;  Service: General;  Laterality: N/A;    LIPOMA RESECTION      Back of neck    MAGNETIC RESONANCE IMAGING N/A 05/29/2020    Procedure: MRI (MAGNETIC RESONANCE IMAGING) LUMBAR SPINE;  Surgeon: [facility] Diagnostic Provider;  Location: [facility];  Service: General;  Laterality: N/A;  COVID NEG    MAGNETIC RESONANCE IMAGING N/A 12/03/2021    Procedure: MRI (MAGNETIC RESONANCE IMAGING), LUMBAR SPINE;  Surgeon: [facility] Diagnostic Provider;  Location: [facility]  [facility];  Service: General;  Laterality: N/A;  In MRI @ 7:50 per Ariela    MEDIAL COLLATERAL LIGAMENT AND LATERAL COLLATERAL LIGAMENT REPAIR, KNEE Right 09/01/2018    Dr. Christophe Gore in [place]     MINIMALLY INVASIVE TRANSFORAMINAL LUMBAR INTERBODY FUSION (TLIF) N/A 04/05/2022    Procedure: FUSION, SPINE, LUMBAR, TLIF, MINIMALLY INVASIVE, WITH INSTRUMENTATION,  L4/5 RIGHT;  Surgeon: Duran Adler Jr., MD;  Location: [facility] OR;  Service: Orthopedics;  Laterality: N/A;  10:30am per Tamela    MYELOGRAPHY N/A 06/23/2020    Procedure: MYELOGRAM;  [facility];  Location: [facility] OR;  Service: General;  Laterality: N/A;       Review of patient's allergies indicates:   Allergen Reactions    Peaches [peach (prunus persica)] Anaphylaxis    Peanut Anaphylaxis    Tree pollen-red birch Anaphylaxis       Current Facility-Administered Medications on File Prior to Encounter   Medication    acetaminophen tablet 650 mg    ceFAZolin in dextrose (iso-os) 2 gram/100 mL PgBk 2,000 mg    HYDROmorphone injection 1 mg    ondansetron injection 4 mg    oxyCODONE-acetaminophen  mg per tablet 1 tablet    polyethylene glycol packet 17 g     Current Outpatient Medications on File Prior to Encounter   Medication Sig    amLODIPine (NORVASC) 5 MG tablet Take 1 tablet (5 mg total) by mouth once daily.    b complex vitamins (B COMPLEX-VITAMIN B12) tablet Take 1 tablet by mouth once daily.    EPINEPHrine (EPIPEN) 0.3 mg/0.3 mL AtIn Inject into the muscle once for one dose as needed.    ergocalciferol (ERGOCALCIFEROL) 50,000 unit Cap Take 1 capsule (50,000 Units total) by mouth twice a week.    HYDROcodone-acetaminophen (NORCO) 7.5-325 mg per tablet Take 1 tablet by mouth every 4 (four) hours as needed for Pain.    lisinopriL (PRINIVIL,ZESTRIL) 20 MG tablet Take 1 tablet (20 mg total) by mouth once daily. (Patient not taking: Reported on 3/31/2023)    multivit-min/iron/folic acid/K (BARIATRIC MULTIVITAMINS ORAL) Take by mouth.     rosuvastatin (CRESTOR) 10 MG tablet Take 1 tablet (10 mg total) by mouth once daily.     Family History       Problem Relation (Age of Onset)    Cancer Father    Diabetes Father    Hypertension Mother          Tobacco Use    Smoking status: Former     Packs/day: 0.50     Years: 10.00     Pack years: 5.00     Types: Cigarettes     Quit date: 2009     Years since quittin.2    Smokeless tobacco: Never    Tobacco comments:     quit smoking in    Substance and Sexual Activity    Alcohol use: Yes     Comment: rarely    Drug use: Not Currently     Types: Marijuana     Comment: medical---oil, smoke and edibles    Sexual activity: Not Currently     Review of Systems   Constitutional:  Positive for diaphoresis. Negative for chills and fever.   HENT:  Negative for hearing loss and sore throat.    Eyes:  Negative for pain and redness.   Respiratory:  Negative for cough and shortness of breath.    Cardiovascular:  Negative for chest pain and palpitations.   Gastrointestinal:  Negative for diarrhea and nausea.   Genitourinary:  Negative for dysuria and flank pain.   Musculoskeletal:  Positive for back pain. Negative for gait problem.   Skin:  Negative for rash and wound.   Allergic/Immunologic: Positive for environmental allergies and food allergies.   Neurological:  Positive for dizziness. Negative for headaches.   Psychiatric/Behavioral:  Negative for confusion and hallucinations.    Objective:     Vital Signs (Most Recent):  Temp: 98.4 °F (36.9 °C) (23)  Pulse: 80 (23)  Resp: 18 (23)  BP: (!) 138/96 (23)  SpO2: 98 % (23)   Vital Signs (24h Range):  Temp:  [98.4 °F (36.9 °C)] 98.4 °F (36.9 °C)  Pulse:  [80-87] 80  Resp:  [18-19] 18  SpO2:  [98 %-100 %] 98 %  BP: (125-138)/(73-96) 138/96     Weight: 117.9 kg (260 lb)  Body mass index is 37.31 kg/m².    Physical Exam  Vitals reviewed.   Constitutional:       General: He is not in acute distress.      Appearance: He is not toxic-appearing.   HENT:      Head: Normocephalic and atraumatic.   Eyes:      Extraocular Movements: Extraocular movements intact.      Conjunctiva/sclera: Conjunctivae normal.   Cardiovascular:      Rate and Rhythm: Normal rate. Rhythm irregular.   Pulmonary:      Breath sounds: No wheezing or rhonchi.   Abdominal:      General: There is no distension.      Palpations: Abdomen is soft.      Tenderness: There is no abdominal tenderness.   Musculoskeletal:      Cervical back: Neck supple. No tenderness.      Right lower leg: No edema.      Left lower leg: No edema.   Skin:     General: Skin is warm and dry.   Neurological:      General: No focal deficit present.      Mental Status: He is alert and oriented to person, place, and time.   Psychiatric:         Mood and Affect: Mood normal.         Judgment: Judgment normal.           Significant Labs: All pertinent labs within the past 24 hours have been reviewed.    Significant Imaging: I have reviewed all pertinent imaging results/findings within the past 24 hours.

## 2023-04-07 NOTE — PLAN OF CARE
[facility]  Initial Discharge Assessment       Primary Care Provider: Howie Mathias MD    Admission Diagnosis: Anaphylaxis, initial encounter [T78.2XXA]    Admission Date: 4/6/2023  Expected Discharge Date: TBD    Patient completes ADL's independently, works full time, drives himself and does not rely on DME or HH prior to admission. No discharge needs anticipated.         Payor: BLUE CROSS BLUE SHIELD / Plan: BCBS ALL OUT OF STATE / Product Type: PPO /     Extended Emergency Contact Information  Primary Emergency Contact: Dominik Jules  Address: [address] [place] [address]           [place] [address] [facility]  Home Phone: [phone]  Mobile Phone: [phone]  Relation: Spouse    Discharge Plan A: Home with family  Discharge Plan B: Home with family      [id number] - [place] - [address] [facility] [address]  [address] [address]  Phone: [phone] Fax: [phone]    [facility]  [address]  [facility] [address]  Phone: [phone] Fax: [phone]      Initial Assessment (most recent)       Adult Discharge Assessment - 04/07/23 1219          Discharge Assessment    Assessment Type Discharge Planning Assessment     Confirmed/corrected address, phone number and insurance Yes     Confirmed Demographics Correct on Facesheet     Source of Information health record     Communicated MARIZA with patient/caregiver Date not available/Unable to determine     Reason For Admission Atrial fibrillation and flutter     People in Home spouse     Facility Arrived From: home     Do you expect to return to your current living situation? Yes     Do you have help at home or someone to help you manage your care at home? Yes     Who are your caregiver(s) and their phone number(s)? wife / Dominik Jules [phone]     Prior to hospitilization cognitive status: Alert/Oriented     Current cognitive status:  Alert/Oriented     Equipment Currently Used at Home none     Readmission within 30 days? No     Patient currently being followed by outpatient case management? No     Do you currently have service(s) that help you manage your care at home? No     Do you take prescription medications? Yes     Do you have prescription coverage? Yes     Coverage Payor:  [facility] ALL OUT OF STATE     Do you have any problems affording any of your prescribed medications? No     Is the patient taking medications as prescribed? yes     Who is going to help you get home at discharge? wife / Dominik Jules [phone]     How do you get to doctors appointments? car, drives self     Are you on dialysis? No     Do you take coumadin? No     Discharge Plan A Home with family     Discharge Plan B Home with family     DME Needed Upon Discharge  none

## 2023-04-07 NOTE — CONSULTS
[facility]  Department of Cardiology  Consult Note      PATIENT NAME: Fabiano Jules Jr.    MRN: [id number]  TODAY'S DATE: 04/07/2023  ADMIT DATE: 4/6/2023                          CONSULT REQUESTED BY: Sudhir Beltran MD    SUBJECTIVE     PRINCIPAL PROBLEM: Atrial fibrillation and flutter      REASON FOR CONSULT:  Atrial fibrillation      HPI:  Mr. Jules is a 53-year-old male with history of hypertension, gastric sleeve, chronic back pain, allergic to peanuts.  Yesterday he ate a granola bar that might have had some peanuts he became lightheaded diaphoretic he had a shot from his EpiPen.  The symptoms continue he called the paramedics his blood pressure in the ER was 80-90 the EKG revealed atrial fibrillation rate in the 80s.  He was given IV fluids Pepcid Benadryl he was noted to be potassium depleted this was corrected his blood pressure improved he converted to normal sinus rhythm.  He has lost 91 lb since November of last year.  He has only be taking amlodipine 5 mg for his blood pressure.          Review of patient's allergies indicates:   Allergen Reactions    Peaches [peach (prunus persica)] Anaphylaxis    Peanut Anaphylaxis    Tree pollen-red birch Anaphylaxis       Past Medical History:   Diagnosis Date    Arthritis     Back injury     Colon polyp     COVID     CPAP (continuous positive airway pressure) dependence     Hypertension     PONV (postoperative nausea and vomiting)     Sleep apnea     c pap machine used     Past Surgical History:   Procedure Laterality Date    BONE GRAFT N/A 04/05/2022    Procedure: BONE GRAFT;  Surgeon: Duran Adler Jr., MD;  Location: [facility] OR;  Service: Orthopedics;  Laterality: N/A;    CIRCUMCISION      COLONOSCOPY N/A 12/03/2018    Procedure: COLONOSCOPY;  Surgeon: CHRISSY Reyna MD;  Location: [address]);  Service: Endoscopy;  Laterality: N/A;    COLONOSCOPY N/A 09/01/2022    Procedure: COLONOSCOPY;  Surgeon: Alondra Arias MD;  Location: [facility]  ENDO;  Service: Endoscopy;  Laterality: N/A;    epidural steroid injection X 2      lower lumbar    ESOPHAGOGASTRODUODENOSCOPY N/A 09/01/2022    Procedure: EGD (ESOPHAGOGASTRODUODENOSCOPY);  Surgeon: Alondra Arias MD;  Location: [facility];  Service: Endoscopy;  Laterality: N/A;    facet injection       [facility] [place]     INTRALUMINAL GASTROINTESTINAL TRACT IMAGING VIA CAPSULE N/A 10/03/2022    Procedure: IMAGING PROCEDURE, GI TRACT, INTRALUMINAL, VIA CAPSULE;  Surgeon: Alondra Arias MD;  Location: [facility] ENDO;  Service: Endoscopy;  Laterality: N/A;    LAPAROSCOPIC SLEEVE GASTRECTOMY N/A 12/19/2022    Procedure: GASTRECTOMY, SLEEVE, LAPAROSCOPIC;  Surgeon: Lashonda Pinedo MD;  Location: [facility] OR;  Service: General;  Laterality: N/A;    LIPOMA RESECTION      Back of neck    MAGNETIC RESONANCE IMAGING N/A 05/29/2020    Procedure: MRI (MAGNETIC RESONANCE IMAGING) LUMBAR SPINE;  Surgeon: [facility] Diagnostic Provider;  Location: [facility];  Service: General;  Laterality: N/A;  COVID NEG    MAGNETIC RESONANCE IMAGING N/A 12/03/2021    Procedure: MRI (MAGNETIC RESONANCE IMAGING), LUMBAR SPINE;  Surgeon: [facility] Diagnostic Provider;  Location: [facility];  Service: General;  Laterality: N/A;  In MRI @ 7:50 per Ariela    MEDIAL COLLATERAL LIGAMENT AND LATERAL COLLATERAL LIGAMENT REPAIR, KNEE Right 09/01/2018    Dr. Christophe Gore in [place]     MINIMALLY INVASIVE TRANSFORAMINAL LUMBAR INTERBODY FUSION (TLIF) N/A 04/05/2022    Procedure: FUSION, SPINE, LUMBAR, TLIF, MINIMALLY INVASIVE, WITH INSTRUMENTATION,  L4/5 RIGHT;  Surgeon: Duran Adler Jr., MD;  Location: [facility];  Service: Orthopedics;  Laterality: N/A;  10:30am per Tamela    MYELOGRAPHY N/A 06/23/2020    Procedure: MYELOGRAM;  Surgeon: [facility] Diagnostic Provider;  Location: [facility];  Service: General;  Laterality: N/A;     Social History     Tobacco Use    Smoking status: Former     Packs/day: 0.50     Years: 10.00     Pack  years: 5.00     Types: Cigarettes     Quit date: 2009     Years since quittin.2    Smokeless tobacco: Never    Tobacco comments:     quit smoking in 2010   Substance Use Topics    Alcohol use: Yes     Comment: rarely    Drug use: Not Currently     Types: Marijuana     Comment: medical---oil, smoke and edibles        REVIEW OF SYSTEMS  CONSTITUTIONAL: Negative for chills, positive for diaphoresis and weakness  EYES: No double vision, No blurred vision  NEURO: No headaches, positive for dizziness  RESPIRATORY: Negative for cough, mild shortness of breath and stricture of his throat   CARDIOVASCULAR: Negative for chest pain. Negative for palpitations and leg swelling.   GI: Negative for abdominal pain, No melena, diarrhea, nausea and vomiting.   : Negative for dysuria and frequency, Negative for hematuria  SKIN: Negative for bruising, Negative for edema or discoloration noted.     OBJECTIVE     VITAL SIGNS (Most Recent)  Temp: 98.4 °F (36.9 °C) (23 1215)  Pulse: (!) 58 (23 121)  Resp: 18 (23)  BP: (!) 149/91 (23 121)  SpO2: 100 % (23)    VENTILATION STATUS  Resp: 18 (23)  SpO2: 100 % (23 121)           I & O (Last 24H):  Intake/Output Summary (Last 24 hours) at 2023 1217  Last data filed at 2023 2312  Gross per 24 hour   Intake 1000 ml   Output --   Net 1000 ml       WEIGHTS  Wt Readings from Last 1 Encounters:   23 0048 116.6 kg (257 lb 0.9 oz)   23 117.9 kg (260 lb)       PHYSICAL EXAM  GENERAL: well built, well nourished, well-developed in no apparent distress alert and oriented.   HEENT: Normocephalic. Pupils normal and conjunctivae normal.  Mucous membranes normal, no cyanosis or icterus, trachea central,no pallor or icterus is noted..   NECK: No JVD. No bruit..   THYROID: Thyroid not enlarged. No nodules present..   CARDIAC: Regular rate and rhythm. S1 is normal.S2 is normal.No gallops, clicks or murmurs noted at this  time.  CHEST ANATOMY: normal.   LUNGS: Clear to auscultation. No wheezing or rhonchi..   ABDOMEN: Soft no masses or organomegaly.  No abdomen pulsations or bruits.  Normal bowel sounds. No pulsations and no masses felt, No guarding or rebound.   URINARY: No durant catheter   EXTREMITIES: No cyanosis, clubbing or edema noted at this time., no calf tenderness bilaterally.   PERIPHERAL VASCULAR SYSTEM: Good palpable distal pulses.   CENTRAL NERVOUS SYSTEM: No focal motor or sensory deficits noted.   SKIN: Skin without lesions, moist, well perfused.   MUSCLE STRENGTH & TONE: No noteable weakness, atrophy or abnormal movement.     HOME MEDICATIONS:  Current Facility-Administered Medications on File Prior to Encounter   Medication Dose Route Frequency Provider Last Rate Last Admin    ceFAZolin in dextrose (iso-os) 2 gram/100 mL PgBk 2,000 mg  2 g Intravenous Q8H Katie Shaen NP   Stopped at 04/09/22 1036    HYDROmorphone injection 1 mg  1 mg Intravenous Q6H PRN Katie Shane NP   1 mg at 04/09/22 0539    oxyCODONE-acetaminophen  mg per tablet 1 tablet  1 tablet Oral Q4H PRN Katie Shane NP   1 tablet at 04/09/22 0906    polyethylene glycol packet 17 g  17 g Oral Daily Katie Shane NP   17 g at 04/08/22 0822    [DISCONTINUED] acetaminophen tablet 650 mg  650 mg Oral Q4H PRN Katie Shane NP        [DISCONTINUED] ondansetron injection 4 mg  4 mg Intravenous Q6H PRN Katie Shane NP         Current Outpatient Medications on File Prior to Encounter   Medication Sig Dispense Refill    amLODIPine (NORVASC) 5 MG tablet Take 1 tablet (5 mg total) by mouth once daily. 30 tablet 11    b complex vitamins (B COMPLEX-VITAMIN B12) tablet Take 1 tablet by mouth once daily. 90 tablet 1    EPINEPHrine (EPIPEN) 0.3 mg/0.3 mL AtIn Inject into the muscle once for one dose as needed. 2 each 1    ergocalciferol (ERGOCALCIFEROL) 50,000 unit Cap Take 1 capsule (50,000 Units total) by mouth twice a week. (Patient taking  differently: Take 50,000 Units by mouth every 7 days. Patient takes on Saturdays) 24 capsule 0    HYDROcodone-acetaminophen (NORCO) 7.5-325 mg per tablet Take 1 tablet by mouth every 4 (four) hours as needed for Pain. 20 tablet 0    multivit-min/iron/folic acid/K (BARIATRIC MULTIVITAMINS ORAL) Take by mouth.      rosuvastatin (CRESTOR) 10 MG tablet Take 1 tablet (10 mg total) by mouth once daily. 90 tablet 3    lisinopriL (PRINIVIL,ZESTRIL) 20 MG tablet Take 1 tablet (20 mg total) by mouth once daily. (Patient not taking: Reported on 3/31/2023) 90 tablet 3       SCHEDULED MEDS:   amLODIPine  5 mg Oral Daily    enoxaparin  40 mg Subcutaneous Daily       CONTINUOUS INFUSIONS:    PRN MEDS:acetaminophen, diphenhydrAMINE, magnesium oxide, magnesium oxide, melatonin, metoprolol, ondansetron, potassium bicarbonate, potassium bicarbonate, potassium bicarbonate, potassium, sodium phosphates, potassium, sodium phosphates, potassium, sodium phosphates, sodium chloride 0.9%    LABS AND DIAGNOSTICS     CBC LAST 3 DAYS  Recent Labs   Lab 04/06/23 1203 04/06/23 2107 04/07/23  0605   WBC 4.75 6.56 5.94   RBC 5.23 5.13 4.70   HGB 13.4* 13.2* 12.2*   HCT 42.4 41.9 38.2*   MCV 81* 82 81*   MCH 25.6* 25.7* 26.0*   MCHC 31.6* 31.5* 31.9*   RDW 17.0* 16.9* 17.0*    325 302   MPV 10.5 9.8 9.6   GRAN 40.2  1.9 37.4*  2.5 50.2  3.0   LYMPH 48.2*  2.3 53.4*  3.5 41.6  2.5   MONO 9.7  0.5 7.5  0.5 6.9  0.4   BASO 0.03 0.04 0.02   NRBC 0 0 0       COAGULATION LAST 3 DAYS  Recent Labs   Lab 04/06/23 2107   INR 1.1   APTT 23.9       CHEMISTRY LAST 3 DAYS  Recent Labs   Lab 04/06/23  1203 04/06/23 2107 04/07/23  0605    137 139   K 3.6 3.1* 3.5    101 104   CO2 27 25 29   ANIONGAP 13 11 6*   BUN 9 14 11   CREATININE 0.8 1.1 0.8   GLU 65* 138* 99   CALCIUM 10.6* 9.2 8.8   MG  --  2.0 2.1   ALBUMIN 4.5 4.4 3.8   PROT 8.2 7.8 6.8   ALKPHOS 73 58 53*   ALT 11 16 14   AST 26 27 18   BILITOT 1.0 0.7 0.9       CARDIAC  PROFILE LAST 3 DAYS  Recent Labs   Lab 04/06/23 2107 04/07/23  0605   BNP 19  --    TROPONINIHS 5.8 5.2       ENDOCRINE LAST 3 DAYS  Recent Labs   Lab 04/06/23 2107   TSH 9.090*       LAST ARTERIAL BLOOD GAS  ABG  No results for input(s): PH, PO2, PCO2, HCO3, BE in the last 168 hours.    LAST 7 DAYS MICROBIOLOGY   Microbiology Results (last 7 days)       ** No results found for the last 168 hours. **            MOST RECENT IMAGING  Echo  · The left ventricle is normal in size with concentric hypertrophy and   normal systolic function.  · The estimated ejection fraction is 60%.  · Normal left ventricular diastolic function.  · Normal right ventricular size with normal right ventricular systolic   function.  · Mild left atrial enlargement.  · Mild aortic regurgitation.  · Mild mitral regurgitation.  · Normal central venous pressure (3 mmHg).     X-Ray Chest AP Portable  EXAM DESCRIPTION: XR CHEST AP PORTABLE    CLINICAL HISTORY: 53 years Male, atrial fibrillation.    COMPARISON: December 14, 2022.    FINDINGS: The heart size is normal and appears to be unchanged. The pulmonary vasculature is normal. No consolidations are seen, nor is there evidence of pleural fluid. The osseous structures are unremarkable.    IMPRESSION:  No acute cardiopulmonary disease is seen.    Electronically signed by:  Tyrell Ragland MD  4/7/2023 6:09 AM CDT Workstation: [id number]      ECHOCARDIOGRAM RESULTS (last 5)  Results for orders placed during the hospital encounter of 04/06/23    Echo    Interpretation Summary  · The left ventricle is normal in size with concentric hypertrophy and normal systolic function.  · The estimated ejection fraction is 60%.  · Normal left ventricular diastolic function.  · Normal right ventricular size with normal right ventricular systolic function.  · Mild left atrial enlargement.  · Mild aortic regurgitation.  · Mild mitral regurgitation.  · Normal central venous pressure (3 mmHg).      Results for orders  placed during the hospital encounter of 07/13/21    Echo    Interpretation Summary  · The estimated PA systolic pressure is 34 mmHg.  · The left ventricle is normal in size with normal systolic function.  · Normal left ventricular diastolic function.  · The estimated ejection fraction is 60%.  · Normal right ventricular size with normal right ventricular systolic function.  · Mild tricuspid regurgitation.  · Mild mitral regurgitation.  · Mild aortic regurgitation.  · Normal central venous pressure (3 mmHg).      CURRENT/PREVIOUS VISIT EKG  Results for orders placed or performed during the hospital encounter of 04/06/23   EKG 12-lead    Collection Time: 04/06/23  8:13 PM    Narrative    Test Reason : I48.91,    Vent. Rate : 081 BPM     Atrial Rate : 000 BPM     P-R Int : 000 ms          QRS Dur : 108 ms      QT Int : 356 ms       P-R-T Axes : 000 036 011 degrees     QTc Int : 413 ms    Atrial fibrillation  Nonspecific T wave abnormality  Abnormal ECG  When compared with ECG of 14-DEC-2022 14:11,  Atrial fibrillation has replaced Sinus rhythm    Referred By: [email or web address]   SELF           Confirmed By:            ASSESSMENT/PLAN:     Active Hospital Problems    Diagnosis    *Atrial fibrillation and flutter       ASSESSMENT & PLAN:   Atrial fibrillation with a controlled ventricular response for less than 12 hours.  The patient has converted to normal sinus rhythm once his potassium was replaced and the effect of the epi pen resolved.  Hypertension treated with amlodipine  Hypokalemia  Status post gastric sleeve      RECOMMENDATIONS:  Consider baby aspirin  Continue amlodipine  Potassium chloride 10 mEq daily  Follow his potassium in approximately 2 weeks  Follow-up in the office in proximally a month at that time will entertain a Holter monitor        Frank Alcocer MD  Date of Service: 04/07/2023  12:17 PM

## 2023-04-07 NOTE — HPI
52 yo M with PMH including HTN, hx of gastric sleeve, chronic back pain who presents after allergic reaction to peanuts. Patient has had a prior anaphylactic reaction to peanuts. On this occasion it did not feel as severe as prior but he was eating granola and began to feel unwell, lightheaded and diaphoretic, he took his home BP meds then also administered epinephrine. States throat did not close. Denies chest pain, nausea/vomiting. BP dropped to 80s-90s. It was improved to 110s in the ED. EKG revealed atrial flutter in the 80s per EMS with atrial fibrillation noted upon arrival to the ED. In the ED, patient received fluids, pepcid, benadryl, and potassium repletion. BP is improved. Still feels fatigued and lightheaded but otherwise improved. Denies prior knowledge of arrhythmia and reports has been doing well since his gastric sleeve in December and follow-up with his physicians including recent visit to PCP.

## 2023-04-07 NOTE — ED NOTES
"Pt reports "ate a smoothie bowl" "may have had peanuts or something in it" "started feeling flush and throat itching about seven o'clock" no SOB / no chest pain / no rash / no hives / no diff swallowing / no drooling / conversing approp with staff / visitor at side / BBS cta / skin w/d to touch / resp non-labored / vss  "

## 2023-04-07 NOTE — PLAN OF CARE
Pt left hospital campus before  cleared Pt. Pt discharged home via family transport.  sent message to Ophelia ([facility]) to schedule Pt's hospital follow-up. Pt has no other needs to be addressed by case management. Pt cleared to discharge from case management standpoint.       04/07/23 1435   Final Note   Assessment Type Final Discharge Note   Anticipated Discharge Disposition Home   What phone number can be called within the next 1-3 days to see how you are doing after discharge? [phone]   Hospital Resources/Appts/Education Provided Provided patient/caregiver with written discharge plan information;Post-Acute resouces added to AVS;Appointments scheduled by Navigator/Coordinator   Post-Acute Status   Coverage Payor:  [place] CROSS BLUE SHIELD - BCBS ALL OUT OF STATE   Discharge Delays None known at this time

## 2023-04-07 NOTE — H&P
[facility] - Emergency Dept  Hospital Medicine  History & Physical    Patient Name: Fabiano Jules Jr.  MRN: [id number]  Patient Class: IP- Inpatient  Admission Date: 4/6/2023  Attending Physician: Wen Sanches MD   Primary Care Provider: Howie Matihas MD         Patient information was obtained from patient and ER records.     Subjective:     Principal Problem:Atrial fibrillation and flutter    Chief Complaint:   Chief Complaint   Patient presents with    Allergic Reaction     Pt reports eating meal with peanuts this evening.    He used an EPI pen when allergy symptoms began, and then began to feel weak        HPI: 52 yo M with PMH including HTN, hx of gastric sleeve, chronic back pain who presents after allergic reaction to peanuts. Patient has had a prior anaphylactic reaction to peanuts. On this occasion it did not feel as severe as prior but he was eating granola and began to feel unwell, lightheaded and diaphoretic, he took his home BP meds then also administered epinephrine. States throat did not close. Denies chest pain, nausea/vomiting. BP dropped to 80s-90s. It was improved to 110s in the ED. EKG revealed atrial flutter in the 80s per EMS with atrial fibrillation noted upon arrival to the ED. In the ED, patient received fluids, pepcid, benadryl, and potassium repletion. BP is improved. Still feels fatigued and lightheaded but otherwise improved. Denies prior knowledge of arrhythmia and reports has been doing well since his gastric sleeve in December and follow-up with his physicians including recent visit to PCP.       Past Medical History:   Diagnosis Date    Arthritis     Back injury     Colon polyp     COVID     CPAP (continuous positive airway pressure) dependence     Hypertension     PONV (postoperative nausea and vomiting)     Sleep apnea     c pap machine used       Past Surgical History:   Procedure Laterality Date    BONE GRAFT N/A 04/05/2022    Procedure: BONE GRAFT;   Surgeon: Duran Adler Jr., MD;  Location: [facility] OR;  Service: Orthopedics;  Laterality: N/A;    CIRCUMCISION      COLONOSCOPY N/A 12/03/2018    Procedure: COLONOSCOPY;  Surgeon: CHRISSY Reyna MD;  Location: [facility] ENDO (4TH FLR);  Service: Endoscopy;  Laterality: N/A;    COLONOSCOPY N/A 09/01/2022    Procedure: COLONOSCOPY;  Surgeon: Alondra Arias MD;  Location: [facility] ENDO;  Service: Endoscopy;  Laterality: N/A;    epidural steroid injection X 2      lower lumbar    ESOPHAGOGASTRODUODENOSCOPY N/A 09/01/2022    Procedure: EGD (ESOPHAGOGASTRODUODENOSCOPY);  Surgeon: Alondra Arias MD;  Location: [facility] ENDO;  Service: Endoscopy;  Laterality: N/A;    facet injection       [facility] [place]     INTRALUMINAL GASTROINTESTINAL TRACT IMAGING VIA CAPSULE N/A 10/03/2022    Procedure: IMAGING PROCEDURE, GI TRACT, INTRALUMINAL, VIA CAPSULE;  Surgeon: Alondra Arias MD;  Location: [facility] ENDO;  Service: Endoscopy;  Laterality: N/A;    LAPAROSCOPIC SLEEVE GASTRECTOMY N/A 12/19/2022    Procedure: GASTRECTOMY, SLEEVE, LAPAROSCOPIC;  Surgeon: Lashonda Pinedo MD;  Location: [facility] OR;  Service: General;  Laterality: N/A;    LIPOMA RESECTION      Back of neck    MAGNETIC RESONANCE IMAGING N/A 05/29/2020    Procedure: MRI (MAGNETIC RESONANCE IMAGING) LUMBAR SPINE;  Surgeon: [facility] Diagnostic Provider;  Location: [facility];  Service: General;  Laterality: N/A;  COVID NEG    MAGNETIC RESONANCE IMAGING N/A 12/03/2021    Procedure: MRI (MAGNETIC RESONANCE IMAGING), LUMBAR SPINE;  Surgeon: [facility] Diagnostic Provider;  Location: [facility];  Service: General;  Laterality: N/A;  In MRI @ 7:50 per Ariela    MEDIAL COLLATERAL LIGAMENT AND LATERAL COLLATERAL LIGAMENT REPAIR, KNEE Right 09/01/2018    Dr. Christophe Gore in [place]     MINIMALLY INVASIVE TRANSFORAMINAL LUMBAR INTERBODY FUSION (TLIF) N/A 04/05/2022    Procedure: FUSION, SPINE, LUMBAR, TLIF, MINIMALLY INVASIVE, WITH  INSTRUMENTATION,  L4/5 RIGHT;  Surgeon: Duran Adler Jr., MD;  Location: [facility] OR;  Service: Orthopedics;  Laterality: N/A;  10:30am per Tamela    MYELOGRAPHY N/A 06/23/2020    Procedure: MYELOGRAM;  Surgeon: Andi Diagnostic Provider;  Location: [facility] OR;  Service: General;  Laterality: N/A;       Review of patient's allergies indicates:   Allergen Reactions    Peaches [peach (prunus persica)] Anaphylaxis    Peanut Anaphylaxis    Tree pollen-red birch Anaphylaxis       Current Facility-Administered Medications on File Prior to Encounter   Medication    acetaminophen tablet 650 mg    ceFAZolin in dextrose (iso-os) 2 gram/100 mL PgBk 2,000 mg    HYDROmorphone injection 1 mg    ondansetron injection 4 mg    oxyCODONE-acetaminophen  mg per tablet 1 tablet    polyethylene glycol packet 17 g     Current Outpatient Medications on File Prior to Encounter   Medication Sig    amLODIPine (NORVASC) 5 MG tablet Take 1 tablet (5 mg total) by mouth once daily.    b complex vitamins (B COMPLEX-VITAMIN B12) tablet Take 1 tablet by mouth once daily.    EPINEPHrine (EPIPEN) 0.3 mg/0.3 mL AtIn Inject into the muscle once for one dose as needed.    ergocalciferol (ERGOCALCIFEROL) 50,000 unit Cap Take 1 capsule (50,000 Units total) by mouth twice a week.    HYDROcodone-acetaminophen (NORCO) 7.5-325 mg per tablet Take 1 tablet by mouth every 4 (four) hours as needed for Pain.    lisinopriL (PRINIVIL,ZESTRIL) 20 MG tablet Take 1 tablet (20 mg total) by mouth once daily. (Patient not taking: Reported on 3/31/2023)    multivit-min/iron/folic acid/K (BARIATRIC MULTIVITAMINS ORAL) Take by mouth.    rosuvastatin (CRESTOR) 10 MG tablet Take 1 tablet (10 mg total) by mouth once daily.     Family History       Problem Relation (Age of Onset)    Cancer Father    Diabetes Father    Hypertension Mother          Tobacco Use    Smoking status: Former     Packs/day: 0.50     Years: 10.00     Pack years: 5.00     Types:  Cigarettes     Quit date: 2009     Years since quittin.2    Smokeless tobacco: Never    Tobacco comments:     quit smoking in 2010   Substance and Sexual Activity    Alcohol use: Yes     Comment: rarely    Drug use: Not Currently     Types: Marijuana     Comment: medical---oil, smoke and edibles    Sexual activity: Not Currently     Review of Systems   Constitutional:  Positive for diaphoresis. Negative for chills and fever.   HENT:  Negative for hearing loss and sore throat.    Eyes:  Negative for pain and redness.   Respiratory:  Negative for cough and shortness of breath.    Cardiovascular:  Negative for chest pain and palpitations.   Gastrointestinal:  Negative for diarrhea and nausea.   Genitourinary:  Negative for dysuria and flank pain.   Musculoskeletal:  Positive for back pain. Negative for gait problem.   Skin:  Negative for rash and wound.   Allergic/Immunologic: Positive for environmental allergies and food allergies.   Neurological:  Positive for dizziness. Negative for headaches.   Psychiatric/Behavioral:  Negative for confusion and hallucinations.    Objective:     Vital Signs (Most Recent):  Temp: 98.4 °F (36.9 °C) (23)  Pulse: 80 (23)  Resp: 18 (23)  BP: (!) 138/96 (23)  SpO2: 98 % (23)   Vital Signs (24h Range):  Temp:  [98.4 °F (36.9 °C)] 98.4 °F (36.9 °C)  Pulse:  [80-87] 80  Resp:  [18-19] 18  SpO2:  [98 %-100 %] 98 %  BP: (125-138)/(73-96) 138/96     Weight: 117.9 kg (260 lb)  Body mass index is 37.31 kg/m².    Physical Exam  Vitals reviewed.   Constitutional:       General: He is not in acute distress.     Appearance: He is not toxic-appearing.   HENT:      Head: Normocephalic and atraumatic.   Eyes:      Extraocular Movements: Extraocular movements intact.      Conjunctiva/sclera: Conjunctivae normal.   Cardiovascular:      Rate and Rhythm: Normal rate. Rhythm irregular.   Pulmonary:      Breath sounds: No wheezing or  rhonchi.   Abdominal:      General: There is no distension.      Palpations: Abdomen is soft.      Tenderness: There is no abdominal tenderness.   Musculoskeletal:      Cervical back: Neck supple. No tenderness.      Right lower leg: No edema.      Left lower leg: No edema.   Skin:     General: Skin is warm and dry.   Neurological:      General: No focal deficit present.      Mental Status: He is alert and oriented to person, place, and time.   Psychiatric:         Mood and Affect: Mood normal.         Judgment: Judgment normal.           Significant Labs: All pertinent labs within the past 24 hours have been reviewed.    Significant Imaging: I have reviewed all pertinent imaging results/findings within the past 24 hours.    Assessment/Plan:     Atrial flutter and fibrillation, likely new onset  Hypokalemia  Anaphylaxis to peanuts  Hypotension due to above and medication - Resolved  Hypertension  Hx of gastric sleeve  Chronic back pain    -Telemetry  -Trend troponins  -Echo  -TSH elev but T4 normal  -Replace K   -Repeat electrolytes in AM  -Patient CHADS only 1, will defer anticoagulation for now  -Consult cardiology  -Resume home amlodipine in AM with parameter as BP appears to be improved  -Monitor for worsening of anaphylaxis symptoms    FULL CODE  DVT ppx Lovenox    Wen Sanches MD  Department of Hospital Medicine  [facility]

## 2023-04-08 NOTE — HOSPITAL COURSE
Mr. Jules was admitted with peanut allergy leading to anaphylaxis.  He took his epi pen at home with good result.  Initially mildly hypotensive but that responded to treatment in the ED and has resolved.  He was noted to be in Afib/ Aflutter in the ED which is new.  It was rate controlled. He was admitted and cardiology consulted as a result.  He has transitioned back into a NSR at 808am per my conversation with tele monitoring. Echo ordered with results pending. Troponin has been normal.  No chest pain.  K was 3.1 on presentation. Being replaced. Could hypokalemia in setting of anaphylaxis have led to arrhythmia? Seen by Cardiology. Given some HRs already in the 50s, decided against changing his Norvasc to Cardizem. He will be dicharged on daily K replacement to follow up with PCP to recheck K. He will follow up in cardiology in one month for re eval and consideration for holter monitor.  Examined on day of discharge and alert, NAD, comfortable respirations on room air. Return precautions given.

## 2023-04-08 NOTE — DISCHARGE SUMMARY
[facility] Medicine  Discharge Summary      Patient Name: Fabiano Jules Jr.  MRN: [id number]  YVETTE: [id number]  Patient Class: IP- Inpatient  Admission Date: 4/6/2023  Hospital Length of Stay: 1 days  Discharge Date and Time: 4/7/2023  2:32 PM  Attending Physician: No att. providers found   Discharging Provider: Sudhir Beltran MD  Primary Care Provider: Howie Mathias MD    Primary Care Team: Networked reference to record PCT     HPI:   54 yo M with PMH including HTN, hx of gastric sleeve, chronic back pain who presents after allergic reaction to peanuts. Patient has had a prior anaphylactic reaction to peanuts. On this occasion it did not feel as severe as prior but he was eating granola and began to feel unwell, lightheaded and diaphoretic, he took his home BP meds then also administered epinephrine. States throat did not close. Denies chest pain, nausea/vomiting. BP dropped to 80s-90s. It was improved to 110s in the ED. EKG revealed atrial flutter in the 80s per EMS with atrial fibrillation noted upon arrival to the ED. In the ED, patient received fluids, pepcid, benadryl, and potassium repletion. BP is improved. Still feels fatigued and lightheaded but otherwise improved. Denies prior knowledge of arrhythmia and reports has been doing well since his gastric sleeve in December and follow-up with his physicians including recent visit to PCP.       * No surgery found *      Hospital Course:   Mr. Jules was admitted with peanut allergy leading to anaphylaxis.  He took his epi pen at home with good result.  Initially mildly hypotensive but that responded to treatment in the ED and has resolved.  He was noted to be in Afib/ Aflutter in the ED which is new.  It was rate controlled. He was admitted and cardiology consulted as a result.  He has transitioned back into a NSR at 808am per my conversation with tele monitoring. Echo ordered with results pending. Troponin has been normal.  No chest pain.   K was 3.1 on presentation. Being replaced. Could hypokalemia in setting of anaphylaxis have led to arrhythmia? Seen by Cardiology. Given some HRs already in the 50s, decided against changing his Norvasc to Cardizem. He will be dicharged on daily K replacement to follow up with PCP to recheck K. He will follow up in cardiology in one month for re eval and consideration for holter monitor.  Examined on day of discharge and alert, NAD, comfortable respirations on room air. Return precautions given.       Goals of Care Treatment Preferences:  Code Status: Full Code      Consults:     No new Assessment & Plan notes have been filed under this hospital service since the last note was generated.  Service: Hospital Medicine    Final Active Diagnoses:      Problems Resolved During this Admission:    Diagnosis Date Noted Date Resolved POA    PRINCIPAL PROBLEM:  Atrial fibrillation and flutter [I48.91, I48.92] 04/07/2023 04/07/2023 Yes       Discharged Condition: good    Disposition: Home or Self Care    Follow Up:   Follow-up Information     Howie Mathias MD. Schedule an appointment as soon as possible for a visit in 2 week(s).    Specialty: Family Medicine  Why: post hospital discharge follow up and recheck potassium level  Contact information:  [address] [facility] [address]  [phone]             Frank Alcocer MD. Schedule an appointment as soon as possible for a visit in 1 month(s).    Specialties: Interventional Cardiology, Cardiovascular Disease, Cardiology  Why: post hospital discharge follow up for atrial fibrillation  Contact information:  [address] [facility]  [address]  [facility]  [place] [address]  [phone]                       Patient Instructions:      Diet Cardiac     Notify your health care provider if you experience any of the following:  difficulty breathing or increased cough     Notify your health care provider if you experience any of the following:  persistent dizziness,  light-headedness, or visual disturbances     Activity as tolerated       Significant Diagnostic Studies: Labs:   CMP   Recent Labs   Lab 04/06/23  1203 04/06/23  2107 04/07/23  0605    137 139   K 3.6 3.1* 3.5    101 104   CO2 27 25 29   GLU 65* 138* 99   BUN 9 14 11   CREATININE 0.8 1.1 0.8   CALCIUM 10.6* 9.2 8.8   PROT 8.2 7.8 6.8   ALBUMIN 4.5 4.4 3.8   BILITOT 1.0 0.7 0.9   ALKPHOS 73 58 53*   AST 26 27 18   ALT 11 16 14   ANIONGAP 13 11 6*    and CBC   Recent Labs   Lab 04/06/23  1203 04/06/23  2107 04/07/23  0605   WBC 4.75 6.56 5.94   HGB 13.4* 13.2* 12.2*   HCT 42.4 41.9 38.2*    325 302       Pending Diagnostic Studies:     None         Medications:  Reconciled Home Medications:      Medication List      START taking these medications    potassium chloride SA 20 MEQ tablet  Commonly known as: K-DUR,KLOR-CON  Take 1 tablet (20 mEq total) by mouth once daily.        CONTINUE taking these medications    amLODIPine 5 MG tablet  Commonly known as: NORVASC  Take 1 tablet (5 mg total) by mouth once daily.     b complex vitamins tablet  Commonly known as: B COMPLEX-VITAMIN B12  Take 1 tablet by mouth once daily.     BARIATRIC MULTIVITAMINS ORAL  Take by mouth.     EPINEPHrine 0.3 mg/0.3 mL Atin  Commonly known as: EPIPEN  Inject into the muscle once for one dose as needed.     ergocalciferol 50,000 unit Cap  Commonly known as: ERGOCALCIFEROL  Take 1 capsule (50,000 Units total) by mouth twice a week.     HYDROcodone-acetaminophen 7.5-325 mg per tablet  Commonly known as: NORCO  Take 1 tablet by mouth every 4 (four) hours as needed for Pain.     rosuvastatin 10 MG tablet  Commonly known as: CRESTOR  Take 1 tablet (10 mg total) by mouth once daily.        ASK your doctor about these medications    lisinopriL 20 MG tablet  Commonly known as: PRINIVIL,ZESTRIL  Take 1 tablet (20 mg total) by mouth once daily.            Indwelling Lines/Drains at time of discharge:   Lines/Drains/Airways     None                  Time spent on the discharge of patient: 35 minutes         Sudhir Beltran MD  Department of Hospital Medicine  [facility]

## 2023-04-10 ENCOUNTER — TELEPHONE (OUTPATIENT)
Dept: CARDIOLOGY | Facility: CLINIC | Age: 53
End: 2023-04-10
Payer: COMMERCIAL

## 2023-04-12 ENCOUNTER — PATIENT MESSAGE (OUTPATIENT)
Dept: CARDIOLOGY | Facility: CLINIC | Age: 53
End: 2023-04-12
Payer: COMMERCIAL

## 2023-04-14 ENCOUNTER — OFFICE VISIT (OUTPATIENT)
Dept: BARIATRICS | Facility: CLINIC | Age: 53
End: 2023-04-14
Payer: COMMERCIAL

## 2023-04-14 ENCOUNTER — TELEPHONE (OUTPATIENT)
Dept: BARIATRICS | Facility: CLINIC | Age: 53
End: 2023-04-14
Payer: COMMERCIAL

## 2023-04-14 VITALS
HEART RATE: 67 BPM | TEMPERATURE: 98 F | BODY MASS INDEX: 39.69 KG/M2 | SYSTOLIC BLOOD PRESSURE: 131 MMHG | RESPIRATION RATE: 16 BRPM | DIASTOLIC BLOOD PRESSURE: 82 MMHG | WEIGHT: 268 LBS | HEIGHT: 69 IN

## 2023-04-14 DIAGNOSIS — E66.01 MORBID OBESITY: Primary | ICD-10-CM

## 2023-04-14 DIAGNOSIS — E66.01 MORBID OBESITY WITH BMI OF 40.0-44.9, ADULT: ICD-10-CM

## 2023-04-14 LAB — VIT B1 BLD-MCNC: 56 UG/L (ref 38–122)

## 2023-04-14 PROCEDURE — 99213 OFFICE O/P EST LOW 20 MIN: CPT | Mod: S$GLB,,, | Performed by: SURGERY

## 2023-04-14 PROCEDURE — 3008F BODY MASS INDEX DOCD: CPT | Mod: CPTII,S$GLB,, | Performed by: SURGERY

## 2023-04-14 PROCEDURE — 4010F PR ACE/ARB THEARPY RXD/TAKEN: ICD-10-PCS | Mod: CPTII,S$GLB,, | Performed by: SURGERY

## 2023-04-14 PROCEDURE — 3079F PR MOST RECENT DIASTOLIC BLOOD PRESSURE 80-89 MM HG: ICD-10-PCS | Mod: CPTII,S$GLB,, | Performed by: SURGERY

## 2023-04-14 PROCEDURE — 99999 PR PBB SHADOW E&M-EST. PATIENT-LVL IV: CPT | Mod: PBBFAC,,, | Performed by: SURGERY

## 2023-04-14 PROCEDURE — 99999 PR PBB SHADOW E&M-EST. PATIENT-LVL IV: ICD-10-PCS | Mod: PBBFAC,,, | Performed by: SURGERY

## 2023-04-14 PROCEDURE — 3075F SYST BP GE 130 - 139MM HG: CPT | Mod: CPTII,S$GLB,, | Performed by: SURGERY

## 2023-04-14 PROCEDURE — 3008F PR BODY MASS INDEX (BMI) DOCUMENTED: ICD-10-PCS | Mod: CPTII,S$GLB,, | Performed by: SURGERY

## 2023-04-14 PROCEDURE — 3075F PR MOST RECENT SYSTOLIC BLOOD PRESS GE 130-139MM HG: ICD-10-PCS | Mod: CPTII,S$GLB,, | Performed by: SURGERY

## 2023-04-14 PROCEDURE — 99213 PR OFFICE/OUTPT VISIT, EST, LEVL III, 20-29 MIN: ICD-10-PCS | Mod: S$GLB,,, | Performed by: SURGERY

## 2023-04-14 PROCEDURE — 3079F DIAST BP 80-89 MM HG: CPT | Mod: CPTII,S$GLB,, | Performed by: SURGERY

## 2023-04-14 PROCEDURE — 4010F ACE/ARB THERAPY RXD/TAKEN: CPT | Mod: CPTII,S$GLB,, | Performed by: SURGERY

## 2023-04-14 NOTE — TELEPHONE ENCOUNTER
Called pt to see if he can come early to his appt due to Dr. Pinedo having a surgery scheduled this afternoon. No answer, LMOM

## 2023-04-14 NOTE — PROGRESS NOTES
Post Op Note    Surgery: gastric sleeve surgery  Date: 12/19/22  Initial weight: 350  Last weight: 280  Current weight: 268    Constipation: wants to start stool softener   Reflux: none  Vomiting: one time    Feels cold  Had anaphylactic reaction to peanut requiring admission to hospital  Had afib while in hospital     Diet:  Breakfast:  2 boiled eggs  Lunch: corn dog - plant based  Dinner: beef stew with vegetables    Exercise:  Was doing plenty before hospitlization    MVI: barbara mvi, calcium, d     Vitals:    04/14/23 1509   BP: 131/82   Pulse: 67   Resp: 16   Temp: 98.2 °F (36.8 °C)       Body comp:  Fat Percent:  28.1 %  Fat Mass:  75.4 lb  FFM:  192.6 lb  TBW: 136.4 lb  TBW %:  50.9 %  BMR: 2629 kcal    PE:  NAD  RRR  Soft/nt/nd    Labs: reviewed    A/P: s/p sleeve     Counseling for patient:    Diet: continue low carb dieting  Exercise: as much as possible  Vitamins: daily mvi, calcium  Co morbidities:     1. Morbid obesity  CBC w/ Auto Differential    CMP    B12    B1    Lipid Panel    Iron Studies    Vitamin D 25 Hydroxy      2. Morbid obesity with BMI of 40.0-44.9, adult            -All above: Evaluated, monitored, and treated with diet and exercise program.

## 2023-05-01 ENCOUNTER — OFFICE VISIT (OUTPATIENT)
Dept: CARDIOLOGY | Facility: CLINIC | Age: 53
End: 2023-05-01
Payer: COMMERCIAL

## 2023-05-01 VITALS
SYSTOLIC BLOOD PRESSURE: 122 MMHG | BODY MASS INDEX: 39.51 KG/M2 | HEIGHT: 69 IN | DIASTOLIC BLOOD PRESSURE: 80 MMHG | WEIGHT: 266.75 LBS | HEART RATE: 64 BPM

## 2023-05-01 DIAGNOSIS — I48.0 PAROXYSMAL ATRIAL FIBRILLATION: Primary | ICD-10-CM

## 2023-05-01 PROCEDURE — 1160F RVW MEDS BY RX/DR IN RCRD: CPT | Mod: CPTII,S$GLB,, | Performed by: INTERNAL MEDICINE

## 2023-05-01 PROCEDURE — 4010F PR ACE/ARB THEARPY RXD/TAKEN: ICD-10-PCS | Mod: CPTII,S$GLB,, | Performed by: INTERNAL MEDICINE

## 2023-05-01 PROCEDURE — 3074F PR MOST RECENT SYSTOLIC BLOOD PRESSURE < 130 MM HG: ICD-10-PCS | Mod: CPTII,S$GLB,, | Performed by: INTERNAL MEDICINE

## 2023-05-01 PROCEDURE — 1160F PR REVIEW ALL MEDS BY PRESCRIBER/CLIN PHARMACIST DOCUMENTED: ICD-10-PCS | Mod: CPTII,S$GLB,, | Performed by: INTERNAL MEDICINE

## 2023-05-01 PROCEDURE — 4010F ACE/ARB THERAPY RXD/TAKEN: CPT | Mod: CPTII,S$GLB,, | Performed by: INTERNAL MEDICINE

## 2023-05-01 PROCEDURE — 99999 PR PBB SHADOW E&M-EST. PATIENT-LVL III: CPT | Mod: PBBFAC,,, | Performed by: INTERNAL MEDICINE

## 2023-05-01 PROCEDURE — 3008F BODY MASS INDEX DOCD: CPT | Mod: CPTII,S$GLB,, | Performed by: INTERNAL MEDICINE

## 2023-05-01 PROCEDURE — 99214 OFFICE O/P EST MOD 30 MIN: CPT | Mod: S$GLB,,, | Performed by: INTERNAL MEDICINE

## 2023-05-01 PROCEDURE — 99214 PR OFFICE/OUTPT VISIT, EST, LEVL IV, 30-39 MIN: ICD-10-PCS | Mod: S$GLB,,, | Performed by: INTERNAL MEDICINE

## 2023-05-01 PROCEDURE — 3008F PR BODY MASS INDEX (BMI) DOCUMENTED: ICD-10-PCS | Mod: CPTII,S$GLB,, | Performed by: INTERNAL MEDICINE

## 2023-05-01 PROCEDURE — 1159F MED LIST DOCD IN RCRD: CPT | Mod: CPTII,S$GLB,, | Performed by: INTERNAL MEDICINE

## 2023-05-01 PROCEDURE — 3079F PR MOST RECENT DIASTOLIC BLOOD PRESSURE 80-89 MM HG: ICD-10-PCS | Mod: CPTII,S$GLB,, | Performed by: INTERNAL MEDICINE

## 2023-05-01 PROCEDURE — 3079F DIAST BP 80-89 MM HG: CPT | Mod: CPTII,S$GLB,, | Performed by: INTERNAL MEDICINE

## 2023-05-01 PROCEDURE — 3074F SYST BP LT 130 MM HG: CPT | Mod: CPTII,S$GLB,, | Performed by: INTERNAL MEDICINE

## 2023-05-01 PROCEDURE — 1159F PR MEDICATION LIST DOCUMENTED IN MEDICAL RECORD: ICD-10-PCS | Mod: CPTII,S$GLB,, | Performed by: INTERNAL MEDICINE

## 2023-05-01 PROCEDURE — 99999 PR PBB SHADOW E&M-EST. PATIENT-LVL III: ICD-10-PCS | Mod: PBBFAC,,, | Performed by: INTERNAL MEDICINE

## 2023-05-01 NOTE — PROGRESS NOTES
Subjective:    Patient ID:  Fabiano Jules Jr. is a 53 y.o. male patient here for evaluation Hospital Follow Up and Hypertension      History of Present Illness:     53-year-old male came here for follow up after the hospital discharge.  Patient with past medical history of hypertension, RAFAEL on CPAP, morbid obesity status post gastric sleeve surgery. Patient was admitted to the ED 3 weeks ago after an allergic reaction possibly to peanuts.  He took EpiPen at home and went to the ER where he was found to be in new onset AFib and hypokalemia.  AFib resolved after the potassium repletion and he was seen by Cardiology.  He was advised to take aspirin and potassium supplementation with outpatient follow up with Cardiology.  Patient states he felt mild palpitations on the day prior to coming to ER.  Denies any history of heart disease in the past.  Patient states he lost 100 lb after the bariatric surgery.  Denies any palpitations recently.         Review of patient's allergies indicates:   Allergen Reactions    Peaches [peach (prunus persica)] Anaphylaxis    Peanut Anaphylaxis    Tree pollen-red birch Anaphylaxis       Past Medical History:   Diagnosis Date    Arthritis     Back injury     Colon polyp     COVID     CPAP (continuous positive airway pressure) dependence     Hypertension     PONV (postoperative nausea and vomiting)     Sleep apnea     c pap machine used     Past Surgical History:   Procedure Laterality Date    BONE GRAFT N/A 04/05/2022    Procedure: BONE GRAFT;  Surgeon: Duran Adler Jr., MD;  Location: [facility] OR;  Service: Orthopedics;  Laterality: N/A;    CIRCUMCISION      COLONOSCOPY N/A 12/03/2018    Procedure: COLONOSCOPY;  Surgeon: CHRISSY Reyna MD;  Location: [facility] ([address] [place] [address]);  Service: Endoscopy;  Laterality: N/A;    COLONOSCOPY N/A 09/01/2022    Procedure: COLONOSCOPY;  Surgeon: Alondra Arias MD;  Location: [facility] ENDO;  Service: Endoscopy;  Laterality: N/A;    epidural steroid  injection X 2      lower lumbar    ESOPHAGOGASTRODUODENOSCOPY N/A 09/01/2022    Procedure: EGD (ESOPHAGOGASTRODUODENOSCOPY);  Surgeon: Alondra Arias MD;  Location: [facility] ENDO;  Service: Endoscopy;  Laterality: N/A;    facet injection       Dr Manpreet Gore at [facility] in [place]     INTRALUMINAL GASTROINTESTINAL TRACT IMAGING VIA CAPSULE N/A 10/03/2022    Procedure: IMAGING PROCEDURE, GI TRACT, INTRALUMINAL, VIA CAPSULE;  Surgeon: Alondra Arias MD;  Location: [facility] ENDO;  Service: Endoscopy;  Laterality: N/A;    LAPAROSCOPIC SLEEVE GASTRECTOMY N/A 12/19/2022    Procedure: GASTRECTOMY, SLEEVE, LAPAROSCOPIC;  Surgeon: Lashonda Pinedo MD;  Location: [facility] OR;  Service: General;  Laterality: N/A;    LIPOMA RESECTION      Back of neck    MAGNETIC RESONANCE IMAGING N/A 05/29/2020    Procedure: MRI (MAGNETIC RESONANCE IMAGING) LUMBAR SPINE;  Surgeon: [facility] Diagnostic Provider;  Location: [facility];  Service: General;  Laterality: N/A;  COVID NEG    MAGNETIC RESONANCE IMAGING N/A 12/03/2021    Procedure: MRI (MAGNETIC RESONANCE IMAGING), LUMBAR SPINE;  Surgeon: [facility] Diagnostic Provider;  Location: [facility];  Service: General;  Laterality: N/A;  In MRI @ 7:50 per Ariela    MEDIAL COLLATERAL LIGAMENT AND LATERAL COLLATERAL LIGAMENT REPAIR, KNEE Right 09/01/2018    Dr. Christophe Gore in [place]     MINIMALLY INVASIVE TRANSFORAMINAL LUMBAR INTERBODY FUSION (TLIF) N/A 04/05/2022    Procedure: FUSION, SPINE, LUMBAR, TLIF, MINIMALLY INVASIVE, WITH INSTRUMENTATION,  L4/5 RIGHT;  Surgeon: Duran Adler Jr., MD;  Location: [facility];  Service: Orthopedics;  Laterality: N/A;  10:30am per Tamela    MYELOGRAPHY N/A 06/23/2020    Procedure: MYELOGRAM;  Surgeon: [facility] Diagnostic Provider;  Location: [facility];  Service: General;  Laterality: N/A;     Social History     Tobacco Use    Smoking status: Former     Packs/day: 0.50     Years: 10.00     Pack years: 5.00     Types: Cigarettes     Quit date: 1/1/2009     Years  since quittin.3    Smokeless tobacco: Never    Tobacco comments:     quit smoking in 2010   Substance Use Topics    Alcohol use: Yes     Comment: rarely    Drug use: Not Currently     Types: Marijuana     Comment: medical---oil, smoke and edibles        Review of Systems   Negative except as mentioned in HPI         Objective        Vitals:    23 1457   BP: 122/80   Pulse: 64       LIPIDS - LAST 2   Lab Results   Component Value Date    CHOL 113 (L) 2023    CHOL 177 2021    HDL 36 (L) 2023    HDL 37 (L) 2021    LDLCALC 66.8 2023    LDLCALC 117.0 2021    TRIG 51 2023    TRIG 115 2021    CHOLHDL 31.9 2023    CHOLHDL 20.9 2021       CBC - LAST 2  Lab Results   Component Value Date    WBC 5.94 2023    WBC 6.56 2023    RBC 4.70 2023    RBC 5.13 2023    HGB 12.2 (L) 2023    HGB 13.2 (L) 2023    HCT 38.2 (L) 2023    HCT 41.9 2023    MCV 81 (L) 2023    MCV 82 2023    MCH 26.0 (L) 2023    MCH 25.7 (L) 2023    MCHC 31.9 (L) 2023    MCHC 31.5 (L) 2023    RDW 17.0 (H) 2023    RDW 16.9 (H) 2023     2023     2023    MPV 9.6 2023    MPV 9.8 2023    GRAN 3.0 2023    GRAN 50.2 2023    LYMPH 2.5 2023    LYMPH 41.6 2023    MONO 0.4 2023    MONO 6.9 2023    BASO 0.02 2023    BASO 0.04 2023    NRBC 0 2023    NRBC 0 2023       CHEMISTRY & LIVER FUNCTION - LAST 2  Lab Results   Component Value Date     2023     2023    K 3.5 2023    K 3.1 (L) 2023     2023     2023    CO2 29 2023    CO2 25 2023    ANIONGAP 6 (L) 2023    ANIONGAP 11 2023    BUN 11 2023    BUN 14 2023    CREATININE 0.8 2023    CREATININE 1.1 2023    GLU 99 2023     (H) 2023    CALCIUM 8.8  04/07/2023    CALCIUM 9.2 04/06/2023    MG 2.1 04/07/2023    MG 2.0 04/06/2023    ALBUMIN 3.8 04/07/2023    ALBUMIN 4.4 04/06/2023    PROT 6.8 04/07/2023    PROT 7.8 04/06/2023    ALKPHOS 53 (L) 04/07/2023    ALKPHOS 58 04/06/2023    ALT 14 04/07/2023    ALT 16 04/06/2023    AST 18 04/07/2023    AST 27 04/06/2023    BILITOT 0.9 04/07/2023    BILITOT 0.7 04/06/2023        CARDIAC PROFILE - LAST 2  Lab Results   Component Value Date    BNP 19 04/06/2023    BNP 27 07/13/2021     10/27/2007    TROPONINI 0.073 (H) 07/14/2021    TROPONINI 0.073 (H) 07/14/2021    TROPONINIHS 5.3 04/07/2023    TROPONINIHS 5.2 04/07/2023        COAGULATION - LAST 2  Lab Results   Component Value Date    INR 1.1 04/06/2023    APTT 23.9 04/06/2023       ENDOCRINE & PSA - LAST 2  Lab Results   Component Value Date    HGBA1C 6.0 (H) 08/03/2022    HGBA1C 5.6 06/23/2021    TSH 9.090 (H) 04/06/2023    TSH 2.002 08/03/2022    PROCAL <0.05 07/14/2021    PSA 3.1 12/04/2021    PSA 2.1 11/12/2020        ECHOCARDIOGRAM RESULTS  Results for orders placed during the hospital encounter of 04/06/23    Echo    Interpretation Summary  · The left ventricle is normal in size with concentric hypertrophy and normal systolic function.  · The estimated ejection fraction is 60%.  · Normal left ventricular diastolic function.  · Normal right ventricular size with normal right ventricular systolic function.  · Mild left atrial enlargement.  · Mild aortic regurgitation.  · Mild mitral regurgitation.  · Normal central venous pressure (3 mmHg).      CURRENT/PREVIOUS VISIT EKG  Results for orders placed or performed during the hospital encounter of 04/06/23   EKG 12-lead    Collection Time: 04/06/23  8:13 PM    Narrative    Test Reason : I48.91,    Vent. Rate : 081 BPM     Atrial Rate : 000 BPM     P-R Int : 000 ms          QRS Dur : 108 ms      QT Int : 356 ms       P-R-T Axes : 000 036 011 degrees     QTc Int : 413 ms    Atrial fibrillation  Nonspecific T wave  abnormality  Abnormal ECG  When compared with ECG of 14-DEC-2022 14:11,  Atrial fibrillation has replaced Sinus rhythm  Confirmed by Sung Comer MD ([id number]) on 4/12/2023 8:47:02 AM    Referred By: MAYA   SELF           Confirmed By:Sung Comer MD     No valid procedures specified.   Results for orders placed during the hospital encounter of 07/13/21    Nuclear Stress Test    Interpretation Summary    The EKG portion of this study is negative for ischemia.    The patient reported no chest pain during the stress test.    During stress, occasional PVCs are noted.    The nuclear portion of this study will be reported separately.    No valid procedures specified.          PREVIOUS STRESS TEST              PREVIOUS ANGIOGRAM        PHYSICAL EXAM    CONSTITUTIONAL: Well built, well nourished in no apparent distress  HEENT: No pallor  NECK: no JVD  LUNGS: CTA b/l  HEART: regular rate and rhythm, S1, S2 normal, no murmur   ABDOMEN: soft, non-tender; bowel sounds normal  EXTREMITIES: No edema  NEURO: AAO X 3   SKIN:  No rash  Psych:  Normal affect    I HAVE REVIEWED :    The vital signs, nurses notes, and all the pertinent radiology and labs.        Current Outpatient Medications   Medication Instructions    amLODIPine (NORVASC) 5 mg, Oral, Daily    b complex vitamins (B COMPLEX-VITAMIN B12) tablet 1 tablet, Oral, Daily    EPINEPHrine (EPIPEN) 0.3 mg/0.3 mL AtIn Inject into the muscle once for one dose as needed.    ergocalciferol (ERGOCALCIFEROL) 50,000 Units, Oral, Twice weekly    HYDROcodone-acetaminophen (NORCO) 7.5-325 mg per tablet 1 tablet, Oral, Every 4 hours PRN    multivit-min/iron/folic acid/K (BARIATRIC MULTIVITAMINS ORAL) Oral    potassium chloride SA (K-DUR,KLOR-CON) 20 MEQ tablet 20 mEq, Oral, Daily    rosuvastatin (CRESTOR) 10 mg, Oral, Daily          Assessment & Plan     53-year-old male came here for follow up after the hospital discharge.  Patient with past medical history of hypertension, RAFAEL on  CPAP, morbid obesity status post gastric sleeve surgery. Patient was admitted to the ED 3 weeks ago after an allergic reaction possibly to peanuts.  He took EpiPen at home and went to the ER where he was found to be in new onset AFib and hypokalemia.  AFib resolved after the potassium repletion and he was seen by Cardiology.  He was advised to take aspirin and potassium supplementation with outpatient follow up with Cardiology.  Patient states he felt mild palpitations on the day prior to coming to ER.  Denies any history of heart disease in the past.  Patient states he lost 100 lb after the bariatric surgery.  Denies any palpitations recently.  Denies any history of stroke or congestive heart failure or thromboembolism in the past.     Recent onset paroxysmal Afib- chads Vasc 1: ?  Induced by EpiPen/hypokalemia vs chronic structural heart changes  Hypertension-controlled  Dyslipidemia-controlled    -discussed with the patient regarding benefits and risks of long-term anticoagulation and antiplatelet therapy as well.  Patient would like to take only aspirin at this point.  Continue aspirin 325 mg daily.  -continue amlodipine Crestor  -repeat BMP for potassium levels  -recent echo with normal ejection fraction  -continue CPAP for RAFAEL  -follow up with PCP regarding elevated TSH levels.   -2 week event monitor to assess for any recurrence of atrial fibrillation.        Follow up in about 4 weeks (around 5/29/2023).

## 2023-05-02 DIAGNOSIS — I48.91 ATRIAL FIBRILLATION, UNSPECIFIED TYPE: Primary | ICD-10-CM

## 2023-05-04 ENCOUNTER — CLINICAL SUPPORT (OUTPATIENT)
Dept: REHABILITATION | Facility: HOSPITAL | Age: 53
End: 2023-05-04
Payer: COMMERCIAL

## 2023-05-04 DIAGNOSIS — R29.898 WEAKNESS OF BACK: ICD-10-CM

## 2023-05-04 DIAGNOSIS — Z73.89 PAIN SELF-MANAGEMENT DEFICIT: ICD-10-CM

## 2023-05-04 DIAGNOSIS — R29.898 WEAKNESS OF BOTH HIPS: ICD-10-CM

## 2023-05-04 PROCEDURE — 97530 THERAPEUTIC ACTIVITIES: CPT | Mod: PN

## 2023-05-04 PROCEDURE — 97112 NEUROMUSCULAR REEDUCATION: CPT | Mod: PN

## 2023-05-04 PROCEDURE — 97162 PT EVAL MOD COMPLEX 30 MIN: CPT | Mod: PN

## 2023-05-04 PROCEDURE — 97750 PHYSICAL PERFORMANCE TEST: CPT | Mod: PN

## 2023-05-04 NOTE — PLAN OF CARE
[facility]  Physical Therapy Lumbar Evaluation      Name: Fabiano Jules Jr.  Clinic Number: [id number]    Therapy Diagnosis: No diagnosis found.  Physician: Duran Adler    Physician Orders: PT Eval and Treat   Medical Diagnosis from Referral:   Low back pain  - Primary   Evaluation Date: 5/4/2023  Authorization Period Expiration: 01-    Plan of Care Expiration: 7/13/2023  Reassessment Due: 6/4/2023  Visit # / Visits authorized: 1/ 1    Time In: 1245  Time Out: 1405  Total Billable Time: 70 minutes  INSURANCE and OUTCOMES: Fee for Service with FOTO Outcomes 1/3    Precautions: standard    Pattern of pain determined: 1 PEP    Subjective     Date of onset: initial injury  - He was in a store and something fell on his back, injuring his back and knee  History of current condition: Fabiano reports: He has had constant low back pain since injury as above.   He has has lumbar fusion L4-5 which quieted sciatic symptoms, but center pain persists, primarily center low back described as tight/stiffness, with occasional symptoms extending to right posterior hip area.  He reports that he has not yet been cleared to return to work as an , it is pending a completion of an FCE.   He reports that he is most limited with his capacity for sitting.  He recently had a sleeve gastrectomy and has lost 91# thus far.      Medical History:   Past Medical History:   Diagnosis Date    Arthritis     Back injury     Colon polyp     COVID     CPAP (continuous positive airway pressure) dependence     Hypertension     PONV (postoperative nausea and vomiting)     Sleep apnea     c pap machine used       Surgical History:   Fabiano Jules Jr.  has a past surgical history that includes Lipoma resection; Circumcision; Medial collateral ligament and lateral collateral ligament repair, knee (Right, 09/01/2018); facet injection ; Colonoscopy (N/A, 12/03/2018); Magnetic resonance imaging  (N/A, 05/29/2020); epidural steroid injection X 2; Myelography (N/A, 06/23/2020); Magnetic resonance imaging (N/A, 12/03/2021); Minimally invasive transforaminal lumbar interbody fusion (TLIF) (N/A, 04/05/2022); Bone graft (N/A, 04/05/2022); Esophagogastroduodenoscopy (N/A, 09/01/2022); Colonoscopy (N/A, 09/01/2022); Intraluminal gastrointestinal tract imaging via capsule (N/A, 10/03/2022); and Laparoscopic sleeve gastrectomy (N/A, 12/19/2022).    Medications:   Fabiano has a current medication list which includes the following prescription(s): amlodipine, b complex vitamins, epinephrine, ergocalciferol, hydrocodone-acetaminophen, multivit-min/iron/folic acid/k, potassium chloride sa, and rosuvastatin, and the following Facility-Administered Medications: cefazolin in dextrose (iso-os), hydromorphone, oxycodone-acetaminophen, and polyethylene glycol.    Allergies:   Review of patient's allergies indicates:   Allergen Reactions    Peaches [peach (prunus persica)] Anaphylaxis    Peanut Anaphylaxis    Tree pollen-red birch Anaphylaxis        Imaging: MRI pre-op 12-21  FINDINGS:  Disc desiccation at L4-5.  The cord ends at L1-L2.  The vertebral bodies are of normal height and alignment.  There is normal bone marrow signal.  Mild facet changes at L2-3 small circumferential bulge slightly greater to the left lateral position with mild narrowing of the left neural foramina.     At L3-4 small bulge greater to the left lateral position with mild narrowing of the left neural foramina.     At L4-5 a broad-based disc bulge with a more focal protrusion in the far right lateral position with moderate to severe right and mild-to-moderate left neural foramina narrowing.     L5-S1 levels normal.     Impression:     1. Mild facet changes as above  2. Mild narrowing of the left neural foramina at L2-3 and L3-4  3. Broad-based disc bulge with a more focal protrusion in the far right lateral position at L4-5 with moderate to severe right  "and mild-to-moderate left neural foramina narrowing    POSTOP CT: 04-     FINDINGS:  There are bilateral transpedicular screws at L4 and L5 with interconnecting bars as well as an interbody spacer and right L4 laminectomy change.  Hardware appears intact.  Foci of air amongst the tissues at the laminectomy site are not unexpected given the history of recent surgery.  There is a slight grade 1 anterolisthesis of L4 over L5, new from the previous exam.  Remaining lumbar levels are unremarkable.  No acute osseous finding.     Impression:     Interval placement of posterior fusion hardware across L4-L5 along with an interbody spacer and right L4 laminectomy change.     Slight grade 1 anterolisthesis of L4 over L5, new from the previous exam.     No acute osseous finding.      Prior Therapy: yes  Prior Treatment: JESSICA x 2, nerve block then RFA - limited relief.   Had lumbar fusion L4-5 April 2022  Social History: SSH, no stairs lives with their family  Occupation:  by trade  Leisure: gardening, fishing      Prior Level of Function: independent   Current Level of Function: limited sitting time, unable to lift > 20#, "twisting"  DME owned/used: none  Gym Membership: yes, does not go regularly    Pain:  Current 3/10, worst 5/10, best 3/10   Location:  center low back  Description: tight, stiff, constant  Aggravating Factors: Sitting, Bending, Coughing/Sneezing, Flexing, Lifting, Getting out of bed/chair  Easing Factors: pain medication, hot bath, and interrupting of sitting  Disturbed Sleep: wakes due to pain, 3-4 times/night.  Can go back to sleep after stretching, having a sip of water.  Takes about 5 mins    Pattern of pain questions:  1.  Where is your pain the worst? Center low back   2.  Is your pain constant or intermittent? constant  3.  Does bending forward make your typical pain worse? yes  4.  Since the start of your back pain, has there been a change in your bowel or bladder? no  5.  What can't " you do now that you use to be able to do? Lifting carrying    Pts goals: symptoms relief    Red Flag Screening:   Cough/Sneeze  Strain: (--), increased symptoms with sneeze  Bladder/Bowel: (--)  Falls: (--)  Night pain: (--)  Unexplained weight loss: (--)  General health: fair, better since weight loss    Objective      Postural examination/scapula alignment: Slouched posture and Midline awareness  Joint integrity: Firm end feeling  Skin integrity: good  Edema: none  Sitting: increases pain  Standing: decreases symptoms   Correction of posture: better with lumbar roll    MOVEMENT LOSS - Lumbar   Norms ROM Loss Initial   Flexion Fingers touch toes, sacral angle >/= 70 deg, uniform spinal curvature, posterior weight shift  moderate loss   Extension ASIS surpasses toes, spine of scapulae surpasses heels, uniform spinal curve minimal loss   Side glide Right  moderate loss   Side glide Left  major loss   Rotation Right PT observes contralateral shoulder moderate loss   Rotation Left PT observes contralateral shoulder major loss     Lower Extremity Strength  Right LE  Left LE    Hip flexion: 4+/5 Hip flexion: 4/5   Hip extension:  4-/5 Hip extension: 4-/5   Hip abduction: 4/5 Hip abduction: 4/5   Hip adduction:  4+/5 Hip adduction:  4+/5   Hip External Rotation 4/5 Hip External Rotation 4/5   Hip Internal rotation   4+/5 Hip Internal rotation 4+/5   Knee Flexion 4+/5 Knee Flexion 4+/5   Knee Extension 5/5 Knee Extension 5/5   Ankle dorsiflexion: 5/5 Ankle dorsiflexion: 5/5   Ankle plantarflexion: 5/5 Ankle plantarflexion: 5/5     GAIT:  Assistive Device used: none  Level of Assistance: independent  Patient displays the following gait deviations: no gait deviations observed.     Special Tests:   Test Name  Test Result   Prone Instability Test (--)   SI Joint Provocation Test (--)   Straight Leg Raise (--)   Neural Tension Test (--)   Crossed Straight Leg Raise (--)   Walking on toes (--)   Walking on heels  (--)      NEUROLOGICAL SCREENING:     Sensory deficits: Patient denies paresthesias, proprioception and kinesthesia appear intact        Reflexes:    Left Right   Patella Tendon 1+ absent   Achilles Tendon     Babinski      Clonus (--) (--)     REPEATED TEST MOVEMENTS:    Baseline symptoms:  Repeated Flexion in Standing pain during motion  no worse   Repeated Extension in Standing end range pain  no worse   Repeated Flexion in lying end range pain  pain during motion  no worse   Repeated Extension in lying  end range pain  pain during motion  no worse       STATIC TESTS and other movements:   Baseline symptoms:  Prone lie no effect   Prone lie on elbows no effect   Sustained prone with  using mat better   Sustained flexion end range pain  worse   Sitting slouched  no effect   Sitting erect better   Standing slouched no effect   Standing erect  better   Lying prone in extension  better   Long sitting   no effect   Other tests Repeated Test Movements:[id number]}       Baseline Isometric Testing on Med X equipment: Testing administered by PT    Date of testin2023  ROM 0-39 deg   Max Peak Torque 79    Min Peak Torque 1    Flex/Ext Ratio 79 / 1    % below normative data 86     Limitation/Restriction for FOTO initial Survey    Therapist reviewed FOTO scores for Fabiano Jules JrTish on 2023.   FOTO documents entered into [employer] - see Media section.    Limitation Score: 42. 58% limitation  Visit 5 Score:   Visit 10 Score:   Discharge Score:   Projected - 55/ 45% limitation at v 13     Treatment     Treatment Time In: 1245  Treatment Time Out: 1405  Total Treatment time separate from Evaluation:  40 minutes      Fabiano received neuromuscular education to engage spinal musculature correctly for motor control and engagement of musculature for 20 minutes including the MedX exercise component and practice and standard testing. MedX dynamic exercise and baseline isometric test performed with instructions to guide the patient safely  through the testing procedure. Patient instructed to perform isometric test correctly and safely while building to an optimal force with a pain-free effort. Patient also instructed that he should feel support/pressure from MedX restraints but no pain/discomfort. Patient demonstrated appropriate understanding of information.     Prone propping with lift, sag, breathe x 10, verbal cues to release tension to low back and gluteals - decreased low back tension reported.  Extension in prone x 10 with sag and breathe - less stiffness reported.    Slouch overcorrection in sitting x 10 with manual cues for increasing active range     [facility] 5/4/2023   Visit Number 1   VAS Pain Rating 3   Lumbar Extension Seat Pad 0   Femur Restraint 6   Top Dead Center 24   Counterweight 315   Lumbar Flexion 39   Lumbar Extension 0   Lumbar Peak Torque 74   Min Torque 1   Test Percent Below Normative Data 86   Ice - Z Lie (in min.) 5            Patient Education and Home Exercises     Written Home Exercises Provided: yes.    HEP AS FOLLOWS:  See wrap up    Exercises were reviewed and Fabiano was able to demonstrate them prior to the end of the session.  Fabiano demonstrated good  understanding of the education provided.     See EMR under Patient Instructions for exercises provided 5/4/2023.    Therapeutic Education/Activity provided for 15 minutes:   - Patient was given an [facility] Visit 1 handout which discusses the following:  - what to expect in therapy  - an overview of the program, including health coaching and wellness  - importance of spinal hygiene, proper posture, lifting mechanics, sleep quality, and nutrition/hydration   - Luz roll trialed, recommended, and purchase information was provided.  - Patient received a handout regarding anticipated muscular soreness following the isometric test and strategies for management were reviewed with patient including stretching, using ice and scheduled rest.   - Patient  received verbal education on the following:   - Healthy Back program,   - purpose of the isometric test,   - safe progression of lumbar strengthening, wellness approach, and systemic strengthening.   - safe usage of MedX machine and testing protocols.    Fabiano received cold pack for 5 minutes to low back  in Z-lie.    Assessment     Fabiano is a 53 y.o. male referred to [facility] with a medical diagnosis of low back pain . Pt presents with reported chronic low back pain described as stiffness,  that is reproduced forward bending  and reduced with extension  indicating directional preference of extension.   Upon physical assessment, pt demonstrates slouched posturing in sitting, mild hip weakness bilaterally, and trunk and hip mobility deficits. Upon further local examination, hip, lumbar, and thoracic rotation were all limited significantly. Per lumbar MedX testing, pt was 86% below average in strength when compared to those of  same age/height/weight/gender. All of the above noted supports potential lumbar classification as a pattern 1 PEP with recurrent/ or consistent symptoms, thus pt is a good candidate for the Healthy [facility] Program. Pt would benefit from LE and trunk mobility training, stability training,  improved cardiovascular and muscular endurance, neuromuscular re-education for posture, coordination, and muscular recruitment and education on positional offloading techniques to decrease the intensity and frequency of flare-ups.     Pain Pattern: 1 PEP       Pt prognosis is Good.     Pt will benefit from skilled outpatient Physical Therapy to address the deficits stated above and in the chart below, to provide pt/family education, and to maximize pt's level of independence. Based on the above history and physical examination an active physical therapy program is recommended.      Plan of care discussed with patient: Yes  Pt's spiritual, cultural and educational needs considered and patient is  agreeable to the plan of care and goals as stated below:     Anticipated Barriers for therapy: chronicity of his sxs    PT Evaluation Completed? Yes    Medical necessity is demonstrated by the following problem list:    History  Co-morbidities and personal factors that may impact the plan of care Co-morbidities:   high BMI, prior lumbar surgery, and osteoarthritis to bilateral ankles, right hindfoot, spinal stenosis, neuropathy    Personal Factors:   Lacks effective home exercise program, pain self management deficit     moderate   Examination  Body Structures and Functions, activity limitations and participation restrictions that may impact the plan of care Body Regions:   back  lower extremities  upper extremities  trunk    Body Systems:    gross symmetry  ROM  strength  gait    Participation Restrictions:   none    Activity limitations:   Learning and applying knowledge  no deficits    General Tasks and Commands  no deficits    Communication  no deficits    Mobility  lifting and carrying objects    Self care  no deficits    Domestic Life  doing house work (cleaning house, washing dishes, laundry)  Yard work/gardening    Interactions/Relationships  no deficits    Life Areas  no deficits    Community and Social Life  no deficits         moderate   Clinical Presentation unstable clinical presentation with unpredictable characteristics high   Decision Making/ Complexity Score: moderate       GOALS: Pt is in agreement with the following goals.    Short term goals:  6 weeks or 10 visits   - Pt will demonstrate increased lumbar ROM by at least 3 degrees from the initial ROM value with improvements noted in functional ROM and ability to perform ADLs. Appropriate and Ongoing  - Pt will demonstrate increased MedX average isometric strength value by 25% from initial test resulting in improved ability to perform bending, lifting, and carrying activities safely, confidently. Appropriate and Ongoing  - Pt will report a  reduction in worst pain score by 1-2 points for improved tolerance for sitting. Appropriate and Ongoing  - Pt able to perform HEP correctly with minimal cueing or supervision from therapist to encourage independent management of symptoms. Appropriate and Ongoing    Long term goals: 10 weeks or 20 visits   - Pt will demonstrate increased lumbar ROM by at least 6 degrees from initial ROM value, resulting in improved ability to perform functional forward bending while standing and sitting. Appropriate and Ongoing  - Pt will demonstrate increased MedX average isometric strength value by 40% from initial test resulting in improved ability to perform bending, lifting, and carrying activities safely and confidently. Appropriate and Ongoing  - Pt to demonstrate ability to independently control and reduce their pain through posture positioning and mechanical movements throughout a typical day. Appropriate and Ongoing  - Pt will demonstrate reduced pain and improved functional outcomes as reported on the FOTO by reaching a limitation score of < or = 50% or less in order to demonstrate subjective improvement in pt's condition.   Appropriate and Ongoing  - Pt will demonstrate independence with the HEP at discharge. Appropriate and Ongoing  - Patient to report that he is able to sit for a meal with wife at a restaurant without increase low back pain, using self management strategies learned with PT(patient goal) Appropriate and Ongoing    Plan     Outpatient physical therapy 2x week for 10 weeks or 20 visits to include the following:   - Patient education  - Therapeutic exercise  - Manual therapy  - Performance testing   - Neuromuscular Re-education  - Therapeutic activity   - Modalities    Pt may be seen by PTA as part of the rehabilitation team.     Therapist: Charlotte Guallpa, PT CLT  5/4/2023

## 2023-05-09 ENCOUNTER — CLINICAL SUPPORT (OUTPATIENT)
Dept: REHABILITATION | Facility: HOSPITAL | Age: 53
End: 2023-05-09
Payer: COMMERCIAL

## 2023-05-09 DIAGNOSIS — R29.898 WEAKNESS OF BACK: ICD-10-CM

## 2023-05-09 DIAGNOSIS — Z73.89 PAIN SELF-MANAGEMENT DEFICIT: Primary | ICD-10-CM

## 2023-05-09 PROCEDURE — 97110 THERAPEUTIC EXERCISES: CPT | Mod: PN

## 2023-05-09 PROCEDURE — 97112 NEUROMUSCULAR REEDUCATION: CPT | Mod: PN

## 2023-05-09 NOTE — PROGRESS NOTES
[facility]      Name: Fabiano Jules Jr.  Clinic Number: [id number]    Therapy Diagnosis:   Encounter Diagnoses   Name Primary?    Pain self-management deficit Yes    Weakness of back      Physician: Duran Adler    Visit Date: 2023    Physician Orders: PT Eval and Treat   Medical Diagnosis from Referral:   Low back pain  - Primary   Evaluation Date: 2023  Authorization Period Expiration: 2023  Plan of Care Expiration: 2023  Reassessment Due: 2023  Visit # / Visits authorized:     PTA Visit #: 0/5     Time In: 827 AM  Time Out: 925 AM  Total Billable Time: 48 minutes  INSURANCE and OUTCOMES: Fee for Service with FOTO Outcomes 1/3    Precautions: standard    Pattern of pain determined: 1 PEP    Subjective   Fabiano reports improvement of symptoms.  He thinks the exercises are helping.     Patient reports tolerating previous visit 1  Patient reports their pain to be 3/10 on a 0-10 scale with 0 being no pain and 10 being the worst pain imaginable.  Pain Location: center low back      Leisure: gardening, fishing   Occupation: [employer]   Pt goals: symptoms relief    Objective     Baseline Isometric Testing on Med X equipment: Testing administered by PT     Date of testin2023  ROM 0-39 deg   Max Peak Torque 79    Min Peak Torque 1    Flex/Ext Ratio 79 / 1    % below normative data 86       Outcomes:  Limitation Score: 58%  Visit 5 Score:   Visit 10 Score:   Discharge Score:     Treatment    Fabiano received the treatments listed below:      Fabiano received neuromuscular education for 8 minutes via participation on the [employer] Machine. Therapist assisted patient in isolating and engaging spinal stabilization musculature in order to improve functional ability and postural control. Patient performed exercise with therapist guidance in order to accurately use pacer function, avoid valsalva, and optimally exert effort within a safe and  effective range via the David Exertion Rating Scale. Patient instructed to perform at a midrange of exertion and to complete 15-20 repetitions within appropriate split time, with proper technique, and while maintaining safety.     [facility] 5/9/2023   Visit Number 2   VAS Pain Rating 3   Lumbar Extension Seat Pad -   Femur Restraint -   Top Dead Center -   Counterweight -   Lumbar Flexion -   Lumbar Extension -   Lumbar Peak Torque -   Min Torque -   Test Percent Below Normative Data -   Lumbar Weight 40   Repetitions 17   Rating of Perceived Exertion 3   Ice - Z Lie (in min.) 10     Fabiano participated in neuromuscular re-education activities to improve balance, coordination, proprioception, motor control and/or posture for  minutes. The following activities were included:       Fabiano participated in therapeutic exercises to develop strength, endurance, ROM, flexibility, posture, and core stabilization for 40 minutes including:    Prone on elbows x 2 min   Prone press ups 20x   Prone press up with sag 10x    Increased stiffness with standing flexion post prone exercises   Standing extensions at counter 20x then 15x    Improvement in stiffness with standing flexion   Bridges 20x  SL hip abduction 2 x 10 each side    Tactile cues for proper for  SL open books 10x each side   Prone hip extension 2 x 10 each side     Peripheral muscle strengthening which included one set of 15-20 repetitions at a slow and controlled 10-13 second per rep pace focused on strengthening supporting musculature in order to improve body mechanics and functional mobility.  Patient and therapist focused on proper form during treatment to ensure optimal strengthening of each targeted muscle group.  Machines utilized include torso rotation, chest press, triceps extension, bicep curl, and upright row. Leg extension, leg curl, leg press, and hip adduction/abduction added visit 3.    Fabiano participated in dynamic functional therapeutic  activities to improve functional performance and simulate household and community activities for 0  minutes. The following activities were included:    Pt given cold pack for 10 minutes to low back in z lie position.    Home Exercises Provided and Patient Education Provided    Home exercises include:   Seated sag, prone press ups, standing extensions, slouch overcorrect   Cardio program (V5): -  Lifting education (V11): -  Posture/ Using Lumbar Roll: educated  [employer] Magnet Discharge handout (date given): -  Equipment at home/gym membership: yes doesn't go frequently     Education provided:   - PT role and POC  - HEP    Written Home Exercises Provided: .  Exercises were reviewed and Fabiano was able to demonstrate them prior to the end of the session. Fabiano demonstrated good  understanding of the education provided.     See EMR under Patient Instructions for exercises provided prior visit.    Assessment     Good tolerance to tx session. Started in prone with prone on elbows, prone press ups and prone with sag. Then assessed standing flexion demonstrating increased stiffness compared to stiffness reported on arrival. Then performed standing extensions 2 sets with improvement in stiffness with forward flexion. Other exercises focused on LE hip strengthening. Good tolerance on medx for first tx session today with 3/10 reporting with weight set at 50% of peak torque.     Patient is making good progress towards established goals.  Pt will continue to benefit from skilled outpatient physical therapy to address the deficits stated in the impairment chart, provide pt/family education and to maximize pt's level of independence in the home and community environment.     Anticipated Barriers for therapy: history of spinal fusion   Pt's spiritual, cultural and educational needs considered and pt agreeable to plan of care and goals as stated below:     Goals:    Short term goals:  6 weeks or 10 visits   - Pt will demonstrate  increased lumbar ROM by at least 3 degrees from the initial ROM value with improvements noted in functional ROM and ability to perform ADLs. Appropriate and Ongoing  - Pt will demonstrate increased MedX average isometric strength value by 25% from initial test resulting in improved ability to perform bending, lifting, and carrying activities safely, confidently. Appropriate and Ongoing  - Pt will report a reduction in worst pain score by 1-2 points for improved tolerance for sitting. Appropriate and Ongoing  - Pt able to perform HEP correctly with minimal cueing or supervision from therapist to encourage independent management of symptoms. Appropriate and Ongoing     Long term goals: 10 weeks or 20 visits   - Pt will demonstrate increased lumbar ROM by at least 6 degrees from initial ROM value, resulting in improved ability to perform functional forward bending while standing and sitting. Appropriate and Ongoing  - Pt will demonstrate increased MedX average isometric strength value by 40% from initial test resulting in improved ability to perform bending, lifting, and carrying activities safely and confidently. Appropriate and Ongoing  - Pt to demonstrate ability to independently control and reduce their pain through posture positioning and mechanical movements throughout a typical day. Appropriate and Ongoing  - Pt will demonstrate reduced pain and improved functional outcomes as reported on the FOTO by reaching a limitation score of < or = 50% or less in order to demonstrate subjective improvement in pt's condition.   Appropriate and Ongoing  - Pt will demonstrate independence with the HEP at discharge. Appropriate and Ongoing  - Patient to report that he is able to sit for a meal with wife at a restaurant without increase low back pain, using self management strategies learned with PT(patient goal) Appropriate and Ongoing    Plan   Continue with established Plan of Care towards established PT goals.      Therapist: Billie Santiago, PT  5/9/2023

## 2023-05-10 ENCOUNTER — TELEPHONE (OUTPATIENT)
Dept: CARDIOLOGY | Facility: CLINIC | Age: 53
End: 2023-05-10
Payer: COMMERCIAL

## 2023-05-10 DIAGNOSIS — I48.91 ATRIAL FIBRILLATION, UNSPECIFIED TYPE: Primary | ICD-10-CM

## 2023-05-15 ENCOUNTER — PATIENT MESSAGE (OUTPATIENT)
Dept: CARDIOLOGY | Facility: CLINIC | Age: 53
End: 2023-05-15
Payer: COMMERCIAL

## 2023-05-16 ENCOUNTER — CLINICAL SUPPORT (OUTPATIENT)
Dept: REHABILITATION | Facility: HOSPITAL | Age: 53
End: 2023-05-16
Payer: COMMERCIAL

## 2023-05-16 DIAGNOSIS — R29.898 WEAKNESS OF BACK: ICD-10-CM

## 2023-05-16 DIAGNOSIS — Z73.89 PAIN SELF-MANAGEMENT DEFICIT: Primary | ICD-10-CM

## 2023-05-16 PROCEDURE — 97112 NEUROMUSCULAR REEDUCATION: CPT | Mod: PN

## 2023-05-16 PROCEDURE — 97110 THERAPEUTIC EXERCISES: CPT | Mod: PN

## 2023-05-16 NOTE — PROGRESS NOTES
[facility]      Name: Fabiano Jules Jr.  Clinic Number: [id number]    Therapy Diagnosis:   Encounter Diagnoses   Name Primary?    Pain self-management deficit Yes    Weakness of back      Physician: Duran Adler    Visit Date: 2023    Physician Orders: PT Eval and Treat   Medical Diagnosis from Referral:   Low back pain  - Primary   Evaluation Date: 2023  Authorization Period Expiration: 2023  Plan of Care Expiration: 2023  Reassessment Due: 2023  Visit # / Visits authorized:     PTA Visit #: 0/5     Time In: 817 AM  Time Out: 920   Total Billable Time: 50 minutes  INSURANCE and OUTCOMES: Fee for Service with FOTO Outcomes 1/3    Precautions: standard    Pattern of pain determined: 1 PEP    Subjective   Fabiano reports minimal change in low back stiffness. After doing his extension he feels like he needs to reach forward over counter. Reports forward flexion over the counter seems to help.     Patient reports tolerating previous visit well.   Patient reports their pain to be 3/10 on a 0-10 scale with 0 being no pain and 10 being the worst pain imaginable.  Pain Location: center low back      Leisure: gardening, fishing   Occupation:  by [employer]   Pt goals: symptoms relief    Objective     Baseline Isometric Testing on Med X equipment: Testing administered by PT     Date of testin2023  ROM 0-39 deg   Max Peak Torque 79    Min Peak Torque 1    Flex/Ext Ratio 79 / 1    % below normative data 86       Outcomes:  Limitation Score: 58%  Visit 5 Score:   Visit 10 Score:   Discharge Score:     Treatment    Fabiano received the treatments listed below:      Fabiano received neuromuscular education for 8 minutes via participation on the [employer] Machine. Therapist assisted patient in isolating and engaging spinal stabilization musculature in order to improve functional ability and postural control. Patient performed exercise with therapist  guidance in order to accurately use pacer function, avoid valsalva, and optimally exert effort within a safe and effective range via the David Exertion Rating Scale. Patient instructed to perform at a midrange of exertion and to complete 15-20 repetitions within appropriate split time, with proper technique, and while maintaining safety.     [facility] 5/9/2023   Visit Number 2   VAS Pain Rating 3   Lumbar Extension Seat Pad -   Femur Restraint -   Top Dead Center -   Counterweight -   Lumbar Flexion -   Lumbar Extension -   Lumbar Peak Torque -   Min Torque -   Test Percent Below Normative Data -   Lumbar Weight 40   Repetitions 17   Rating of Perceived Exertion 3   Ice - Z Lie (in min.) 10     Fabiano participated in neuromuscular re-education activities to improve balance, coordination, proprioception, motor control and/or posture for  minutes. The following activities were included:     Fabiano participated in therapeutic exercises to develop strength, endurance, ROM, flexibility, posture, and core stabilization for 42 minutes including:    Assessment:   Standing flexion: Stiffness, limited   Standing extension: pain free, WFL      Treatment:   Repeated prone press ups 15x, increased stiffness when checking standing flexion   Repeated prone press up with sag 15x, increased stiffness checking standing flexion   Repeated prone press ups with therapist overpressure 15x, increased stiffness when rechecking standing flexion   DKTC 10x improved stiffness when rechecking flexion  Bridges 3 x 10    Reported increased stiffness post   Seated repeated flexion 10x     NP:   SL open books 10x each side   Prone press ups 20x   Prone press up with sag 10x   Standing extensions at counter 20x then 15x   SL hip abduction 2 x 10 each side    Tactile cues for proper for  Prone hip extension 2 x 10 each side     Peripheral muscle strengthening which included one set of 15-20 repetitions at a slow and controlled 10-13 second per  rep pace focused on strengthening supporting musculature in order to improve body mechanics and functional mobility.  Patient and therapist focused on proper form during treatment to ensure optimal strengthening of each targeted muscle group.  Machines utilized include torso rotation, chest press, triceps extension, bicep curl, and upright row. Leg extension, leg curl, leg press, and hip adduction/abduction.     Fabiano participated in dynamic functional therapeutic activities to improve functional performance and simulate household and community activities for 0  minutes. The following activities were included:    Pt given cold pack for 10 minutes to low back in z lie position.    Home Exercises Provided and Patient Education Provided    Home exercises include:   Seated sag, prone press ups, standing extensions, slouch overcorrect   Cardio program (V5): -  Lifting education (V11): -  Posture/ Using Lumbar Roll: educated  [facility] Magnet Discharge handout (date given): -  Equipment at home/gym membership: yes doesn't go frequently     Education provided:   - PT role and POC  - HEP    Written Home Exercises Provided: .  Exercises were reviewed and Fabiano was able to demonstrate them prior to the end of the session. Fabiano demonstrated good  understanding of the education provided.     See EMR under Patient Instructions for exercises provided prior visit.    Assessment     Arrived with same reports of low back stiffness. He reports compliance with HEP and reports most relief when leaning over the counter at home. He does report some relief with standing extensions as well at home. He continues to report increased stiffness following prone press ups as he did at last tx session. Attempted more flexion based repeated movements today with improvement in standing flexion ROM and decreased stiffness therefore updated HEP to included repeated flexion as will assess change at next tx session. Good tolerance to 10% increase on medx  without complaints. Also tolerated increase on some peripheral machines today well.    Patient is making good progress towards established goals.  Pt will continue to benefit from skilled outpatient physical therapy to address the deficits stated in the impairment chart, provide pt/family education and to maximize pt's level of independence in the home and community environment.     Anticipated Barriers for therapy: history of spinal fusion   Pt's spiritual, cultural and educational needs considered and pt agreeable to plan of care and goals as stated below:     Goals:    Short term goals:  6 weeks or 10 visits   - Pt will demonstrate increased lumbar ROM by at least 3 degrees from the initial ROM value with improvements noted in functional ROM and ability to perform ADLs. Appropriate and Ongoing  - Pt will demonstrate increased MedX average isometric strength value by 25% from initial test resulting in improved ability to perform bending, lifting, and carrying activities safely, confidently. Appropriate and Ongoing  - Pt will report a reduction in worst pain score by 1-2 points for improved tolerance for sitting. Appropriate and Ongoing  - Pt able to perform HEP correctly with minimal cueing or supervision from therapist to encourage independent management of symptoms. Appropriate and Ongoing     Long term goals: 10 weeks or 20 visits   - Pt will demonstrate increased lumbar ROM by at least 6 degrees from initial ROM value, resulting in improved ability to perform functional forward bending while standing and sitting. Appropriate and Ongoing  - Pt will demonstrate increased MedX average isometric strength value by 40% from initial test resulting in improved ability to perform bending, lifting, and carrying activities safely and confidently. Appropriate and Ongoing  - Pt to demonstrate ability to independently control and reduce their pain through posture positioning and mechanical movements throughout a typical  day. Appropriate and Ongoing  - Pt will demonstrate reduced pain and improved functional outcomes as reported on the FOTO by reaching a limitation score of < or = 50% or less in order to demonstrate subjective improvement in pt's condition.   Appropriate and Ongoing  - Pt will demonstrate independence with the HEP at discharge. Appropriate and Ongoing  - Patient to report that he is able to sit for a meal with wife at a restaurant without increase low back pain, using self management strategies learned with PT(patient goal) Appropriate and Ongoing    Plan   Continue with established Plan of Care towards established PT goals.     Therapist: Billie Santiago, PT  5/16/2023

## 2023-05-19 ENCOUNTER — CLINICAL SUPPORT (OUTPATIENT)
Dept: REHABILITATION | Facility: HOSPITAL | Age: 53
End: 2023-05-19
Payer: COMMERCIAL

## 2023-05-19 DIAGNOSIS — R29.898 WEAKNESS OF BACK: ICD-10-CM

## 2023-05-19 DIAGNOSIS — Z73.89 PAIN SELF-MANAGEMENT DEFICIT: Primary | ICD-10-CM

## 2023-05-19 PROCEDURE — 97110 THERAPEUTIC EXERCISES: CPT | Mod: PN

## 2023-05-19 PROCEDURE — 97112 NEUROMUSCULAR REEDUCATION: CPT | Mod: PN

## 2023-05-19 NOTE — PROGRESS NOTES
"[facility]      Name: Fabiano Jules Jr.  Clinic Number: [id number]    Therapy Diagnosis:   Encounter Diagnoses   Name Primary?    Pain self-management deficit Yes    Weakness of back        Physician: Duran Adler    Visit Date: 2023    Physician Orders: PT Eval and Treat   Medical Diagnosis from Referral:   Low back pain  - Primary   Evaluation Date: 2023  Authorization Period Expiration: 2023  Plan of Care Expiration: 2023  Reassessment Due: 2023  Visit # / Visits authorized: 3 / 20    PTA Visit #: 0/5     FOTO DUE next visit - v5    Time In: 0930  Time Out: 1030  Total Billable Time: 55 minutes  INSURANCE and OUTCOMES: Fee for Service with FOTO Outcomes 1/3    Precautions: standard    Pattern of pain determined: 1 PEP    Subjective   Fabiano reports he did well after last treatment.  He is sore today from FCE (4 Hour) completed Tuesday afternoon.  He said that he was fatigued.  "I know I failed the test".  He was told that he is not ready to return to work 2' safety concerns, except for light to medium duty.  He reports that he did not do anything to moderate his symptoms after the FCE, he performed extension in stance and flexion in lying the next day.    Patient reports tolerating previous visit well. He continues to complaint of stiffness  Patient reports their pain to be 3/10 on a 0-10 scale with 0 being no pain and 10 being the worst pain imaginable.  Pain Location: center low back      Leisure: gardening, fishing   Occupation:  by trade   Pt goals: symptoms relief    Objective     Baseline Isometric Testing on Med X equipment: Testing administered by PT     Date of testin2023  ROM 0-39 deg   Max Peak Torque 79    Min Peak Torque 1    Flex/Ext Ratio 79 / 1    % below normative data 86       Outcomes:  Limitation Score: 58%  Visit 5 Score:   Visit 10 Score:   Discharge Score:     Treatment    Fabiano received the treatments " listed below:      Fabiano received neuromuscular education for 8 minutes via participation on the [email or web address] Machine. Therapist assisted patient in isolating and engaging spinal stabilization musculature in order to improve functional ability and postural control. Patient performed exercise with therapist guidance in order to accurately use pacer function, avoid valsalva, and optimally exert effort within a safe and effective range via the David Exertion Rating Scale. Patient instructed to perform at a midrange of exertion and to complete 15-20 repetitions within appropriate split time, with proper technique, and while maintaining safety.       Fabiano participated in neuromuscular re-education activities to improve balance, coordination, proprioception, motor control and/or posture for  25 minutes. The following activities were included:    [facility] - Short 5/19/2023   Visit Number 4   VAS Pain Rating 3   Treadmill Time (in min.) 6   Speed 2   Incline 5   Lumbar Extension - Seat Pad -   Femur Restraint -   Top Dead Center -   Counterweight -   Lumbar Flexion -   Lumbar Extension -   Lumbar Peak Torque -   Lumbar Weight 48   Repetitions 20   Rating of Perceived Exertion 5        Fabiano participated in therapeutic exercises to develop strength, endurance, ROM, flexibility, posture, and core stabilization for 35 minutes including:    RE - Assessment:   Standing flexion: Stiffness, limited   Standing extension: pain free, WFL    Treatment:   Extension in stance 2 x 10 - symptoms unchanged except for decreased stiffness  Seated repeated flexion 10x - reports decreased stiffness  Hooklying abdominal setting and POSTERIOR PELVIC TILTS 2 x 10 - patient wi difficulty isolating movement.  LTR with verbal cues to go to end range of motion, left and right, x 10 each  Open books, left and right, with visual tracking, x 10 each     Stated he had decreased stiffness after focus on slow, controlled range of motion  activities      NP:   SL open books 10x each side   Prone press ups 20x   Prone press up with sag 10x   Standing extensions at counter 20x then 15x   SL hip abduction 2 x 10 each side    Tactile cues for proper for  Prone hip extension 2 x 10 each side     Peripheral muscle strengthening which included one set of 15-20 repetitions at a slow and controlled 10-13 second per rep pace focused on strengthening supporting musculature in order to improve body mechanics and functional mobility.  Patient and therapist focused on proper form during treatment to ensure optimal strengthening of each targeted muscle group.  Machines utilized include torso rotation, chest press, triceps extension, bicep curl, and upright row. Leg extension, leg curl, leg press, and hip adduction/abduction.     Fabiano participated in dynamic functional therapeutic activities to improve functional performance and simulate household and community activities for 0  minutes. The following activities were included:    Pt given cold pack for 10 minutes to low back in z lie position.    Home Exercises Provided and Patient Education Provided    Home exercises include:   Seated sag, prone press ups, standing extensions, slouch overcorrect   Cardio program (V5): -  Lifting education (V11): -  Posture/ Using Lumbar Roll: educated  Fridge Magnet Discharge handout (date given): -  Equipment at home/gym membership: yes doesn't go frequently     Education provided:   - PT role and POC  - HEP    Written Home Exercises Provided: .  Exercises were reviewed and Fabiano was able to demonstrate them prior to the end of the session. Fabiano demonstrated good  understanding of the education provided.     See EMR under Patient Instructions for exercises provided prior visit.    Assessment     Arrived with same reports of low back stiffness. He report ed relief with active range of motion activities, cueing   Patient is making good progress towards established goals.  Pt will  continue to benefit from skilled outpatient physical therapy to address the deficits stated in the impairment chart, provide pt/family education and to maximize pt's level of independence in the home and community environment.     Anticipated Barriers for therapy: history of spinal fusion   Pt's spiritual, cultural and educational needs considered and pt agreeable to plan of care and goals as stated below:     Goals:    Short term goals:  6 weeks or 10 visits   - Pt will demonstrate increased lumbar ROM by at least 3 degrees from the initial ROM value with improvements noted in functional ROM and ability to perform ADLs. Appropriate and Ongoing  - Pt will demonstrate increased MedX average isometric strength value by 25% from initial test resulting in improved ability to perform bending, lifting, and carrying activities safely, confidently. Appropriate and Ongoing  - Pt will report a reduction in worst pain score by 1-2 points for improved tolerance for sitting. Appropriate and Ongoing  - Pt able to perform HEP correctly with minimal cueing or supervision from therapist to encourage independent management of symptoms. Appropriate and Ongoing     Long term goals: 10 weeks or 20 visits   - Pt will demonstrate increased lumbar ROM by at least 6 degrees from initial ROM value, resulting in improved ability to perform functional forward bending while standing and sitting. Appropriate and Ongoing  - Pt will demonstrate increased MedX average isometric strength value by 40% from initial test resulting in improved ability to perform bending, lifting, and carrying activities safely and confidently. Appropriate and Ongoing  - Pt to demonstrate ability to independently control and reduce their pain through posture positioning and mechanical movements throughout a typical day. Appropriate and Ongoing  - Pt will demonstrate reduced pain and improved functional outcomes as reported on the FOTO by reaching a limitation score of <  or = 50% or less in order to demonstrate subjective improvement in pt's condition.   Appropriate and Ongoing  - Pt will demonstrate independence with the HEP at discharge. Appropriate and Ongoing  - Patient to report that he is able to sit for a meal with wife at a restaurant without increase low back pain, using self management strategies learned with PT(patient goal) Appropriate and Ongoing    Plan   Continue with established Plan of Care towards established PT goals.     Therapist: Charlotte Guallpa, PT CLT  5/19/2023

## 2023-05-22 ENCOUNTER — CLINICAL SUPPORT (OUTPATIENT)
Dept: REHABILITATION | Facility: HOSPITAL | Age: 53
End: 2023-05-22
Payer: COMMERCIAL

## 2023-05-22 ENCOUNTER — DOCUMENTATION ONLY (OUTPATIENT)
Dept: REHABILITATION | Facility: HOSPITAL | Age: 53
End: 2023-05-22

## 2023-05-22 DIAGNOSIS — Z73.89 PAIN SELF-MANAGEMENT DEFICIT: Primary | ICD-10-CM

## 2023-05-22 DIAGNOSIS — R29.898 WEAKNESS OF BACK: ICD-10-CM

## 2023-05-22 PROCEDURE — 97112 NEUROMUSCULAR REEDUCATION: CPT | Mod: PN,CQ

## 2023-05-22 PROCEDURE — 97110 THERAPEUTIC EXERCISES: CPT | Mod: PN,CQ

## 2023-05-22 NOTE — PROGRESS NOTES
[facility]      Name: Fabiano Jules Jr.  Clinic Number: [id number]    Therapy Diagnosis:   Encounter Diagnoses   Name Primary?    Pain self-management deficit Yes    Weakness of back        Physician: Duran Adler    Visit Date: 2023    Physician Orders: PT Eval and Treat   Medical Diagnosis from Referral:   Low back pain  - Primary   Evaluation Date: 2023  Authorization Period Expiration: 2023  Plan of Care Expiration: 2023  Reassessment Due: 2023  Visit # / Visits authorized:     PTA Visit #:      FOTO DUE next visit - v5    Time In: 330  Time Out: 430  Total Billable Time: 55 minutes  INSURANCE and OUTCOMES: Fee for Service with FOTO Outcomes 1/3    Precautions: standard    Pattern of pain determined: 1 PEP    Subjective   Fabiano reports failing his FCE and wants to work towards some of the skills that he was unable to perform on that day.     Patient reports tolerating previous visit well. He continues to complaint of stiffness  Patient reports their pain to be 3/10 on a 0-10 scale with 0 being no pain and 10 being the worst pain imaginable.  Pain Location: center low back      Leisure: gardening, fishing   Occupation:  by [employer]   Pt goals: symptoms relief    Objective     Baseline Isometric Testing on Med X equipment: Testing administered by PT     Date of testin2023  ROM 0-39 deg   Max Peak Torque 79    Min Peak Torque 1    Flex/Ext Ratio 79 / 1    % below normative data 86       Outcomes:  Limitation Score: 58%  Visit 5 Score:   Visit 10 Score:   Discharge Score:     Treatment    Fabiano received the treatments listed below:      Fabiano received neuromuscular education for 8 minutes via participation on the [employer] Study Course Machine. Therapist assisted patient in isolating and engaging spinal stabilization musculature in order to improve functional ability and postural control. Patient performed exercise with therapist  guidance in order to accurately use pacer function, avoid valsalva, and optimally exert effort within a safe and effective range via the David Exertion Rating Scale. Patient instructed to perform at a midrange of exertion and to complete 15-20 repetitions within appropriate split time, with proper technique, and while maintaining safety.       Fabiano participated in neuromuscular re-education activities to improve balance, coordination, proprioception, motor control and/or posture for  25 minutes. The following activities were included:    Tandem walking 5 ft x 5   Single leg stance 10 sec x 4 each side     [facility] 5/22/2023   Visit Number 5   VAS Pain Rating 2   Treadmill Time (in min.) 6   Speed 2   Incline -   Lumbar Extension Seat Pad -   Femur Restraint -   Top Dead Center -   Counterweight -   Lumbar Flexion -   Lumbar Extension -   Lumbar Peak Torque -   Min Torque -   Test Percent Below Normative Data -   Lumbar Weight 48   Repetitions 20   Rating of Perceived Exertion 4   Ice - Z Lie (in min.) 5          Fabiano participated in therapeutic exercises to develop strength, endurance, ROM, flexibility, posture, and core stabilization for 35 minutes including:    RE - Assessment:   Standing flexion: Stiffness, limited   Standing extension: pain free, WFL    Treatment:     Cat/cow 7 x     Extension in stance 2 x 10 - symptoms unchanged except for decreased stiffness  Seated repeated flexion 10x - reports decreased stiffness  Hooklying abdominal setting and POSTERIOR PELVIC TILTS 2 x 10 - patient wi difficulty isolating movement. Not performed today   LTR with verbal cues to go to end range of motion, left and right, x 10 each  Open books, left and right, with visual tracking, x 10 each     Stated he had decreased stiffness after focus on slow, controlled range of motion activities      NP:   SL open books 10x each side   Prone press ups 20x   Prone press up with sag 10x   Standing extensions at counter 20x  then 15x   SL hip abduction 2 x 10 each side    Tactile cues for proper for  Prone hip extension 2 x 10 each side     Peripheral muscle strengthening which included one set of 15-20 repetitions at a slow and controlled 10-13 second per rep pace focused on strengthening supporting musculature in order to improve body mechanics and functional mobility.  Patient and therapist focused on proper form during treatment to ensure optimal strengthening of each targeted muscle group.  Machines utilized include torso rotation, chest press, triceps extension, bicep curl, and upright row. Leg extension, leg curl, leg press, and hip adduction/abduction.     Fabiano participated in dynamic functional therapeutic activities to improve functional performance and simulate household and community activities for 0  minutes. The following activities were included:    Pt given cold pack for 10 minutes to low back in z lie position.    Home Exercises Provided and Patient Education Provided    Home exercises include:   Seated sag, prone press ups, standing extensions, slouch overcorrect   Cardio program (V5): -  Lifting education (V11): -  Posture/ Using Lumbar Roll: educated  [employer] Discharge handout (date given): -  Equipment at home/gym membership: yes doesn't go frequently     Education provided:   - PT role and POC  - HEP    Written Home Exercises Provided: .  Exercises were reviewed and Fabiano was able to demonstrate them prior to the end of the session. Fabiano demonstrated good  understanding of the education provided.     See EMR under Patient Instructions for exercises provided prior visit.    Assessment     Included some neuromuscular re-education activities to address balance deficits from the FCE. He will benefit from continued training for functional balance and strength.   Patient is making good progress towards established goals.  Pt will continue to benefit from skilled outpatient physical therapy to address the deficits  stated in the impairment chart, provide pt/family education and to maximize pt's level of independence in the home and community environment.     Anticipated Barriers for therapy: history of spinal fusion   Pt's spiritual, cultural and educational needs considered and pt agreeable to plan of care and goals as stated below:     Goals:    Short term goals:  6 weeks or 10 visits   - Pt will demonstrate increased lumbar ROM by at least 3 degrees from the initial ROM value with improvements noted in functional ROM and ability to perform ADLs. Appropriate and Ongoing  - Pt will demonstrate increased MedX average isometric strength value by 25% from initial test resulting in improved ability to perform bending, lifting, and carrying activities safely, confidently. Appropriate and Ongoing  - Pt will report a reduction in worst pain score by 1-2 points for improved tolerance for sitting. Appropriate and Ongoing  - Pt able to perform HEP correctly with minimal cueing or supervision from therapist to encourage independent management of symptoms. Appropriate and Ongoing     Long term goals: 10 weeks or 20 visits   - Pt will demonstrate increased lumbar ROM by at least 6 degrees from initial ROM value, resulting in improved ability to perform functional forward bending while standing and sitting. Appropriate and Ongoing  - Pt will demonstrate increased MedX average isometric strength value by 40% from initial test resulting in improved ability to perform bending, lifting, and carrying activities safely and confidently. Appropriate and Ongoing  - Pt to demonstrate ability to independently control and reduce their pain through posture positioning and mechanical movements throughout a typical day. Appropriate and Ongoing  - Pt will demonstrate reduced pain and improved functional outcomes as reported on the FOTO by reaching a limitation score of < or = 50% or less in order to demonstrate subjective improvement in pt's condition.    Appropriate and Ongoing  - Pt will demonstrate independence with the HEP at discharge. Appropriate and Ongoing  - Patient to report that he is able to sit for a meal with wife at a restaurant without increase low back pain, using self management strategies learned with PT(patient goal) Appropriate and Ongoing    Plan   Continue with established Plan of Care towards established PT goals.     Therapist: Donya Coello, PTA   5/22/2023

## 2023-05-29 ENCOUNTER — CLINICAL SUPPORT (OUTPATIENT)
Dept: REHABILITATION | Facility: HOSPITAL | Age: 53
End: 2023-05-29
Payer: COMMERCIAL

## 2023-05-29 DIAGNOSIS — R29.898 WEAKNESS OF BACK: ICD-10-CM

## 2023-05-29 DIAGNOSIS — Z73.89 PAIN SELF-MANAGEMENT DEFICIT: Primary | ICD-10-CM

## 2023-05-29 PROCEDURE — 97110 THERAPEUTIC EXERCISES: CPT | Mod: PN,CQ

## 2023-05-29 PROCEDURE — 97112 NEUROMUSCULAR REEDUCATION: CPT | Mod: PN,CQ

## 2023-05-29 NOTE — PROGRESS NOTES
[facility]      Name: Fabiano Jules Jr.  Clinic Number: [id number]    Therapy Diagnosis:   Encounter Diagnoses   Name Primary?    Pain self-management deficit Yes    Weakness of back        Physician: Duran Adler    Visit Date: 2023    Physician Orders: PT Eval and Treat   Medical Diagnosis from Referral:   Low back pain  - Primary   Evaluation Date: 2023  Authorization Period Expiration: 2023  Plan of Care Expiration: 2023  Reassessment Due: 2023  Visit # / Visits authorized:     PTA Visit #:      FOTO DUE next visit - v5    Time In: 330  Time Out: 430  Total Billable Time: 55 minutes  INSURANCE and OUTCOMES: Fee for Service with FOTO Outcomes 1/3    Precautions: standard    Pattern of pain determined: 1 PEP    Subjective   Fabiano reports increased pain today after having an active weekend.     Patient reports tolerating previous visit well. He continues to complaint of stiffness  Patient reports their pain to be 8/10 on a 0-10 scale with 0 being no pain and 10 being the worst pain imaginable.  Pain Location: center low back      Leisure: gardening, fishing   Occupation:  by [employer]   Pt goals: symptoms relief    Objective     Baseline Isometric Testing on Med X equipment: Testing administered by PT     Date of testin2023  ROM 0-39 deg   Max Peak Torque 79    Min Peak Torque 1    Flex/Ext Ratio 79 / 1    % below normative data 86       Outcomes:  Limitation Score: 58%  Visit 5 Score:   Visit 10 Score:   Discharge Score:     Treatment    Fabiano received the treatments listed below:      Fabiano received neuromuscular education for 8 minutes via participation on the [employer] Machine. Therapist assisted patient in isolating and engaging spinal stabilization musculature in order to improve functional ability and postural control. Patient performed exercise with therapist guidance in order to accurately use pacer function, avoid  valsalva, and optimally exert effort within a safe and effective range via the David Exertion Rating Scale. Patient instructed to perform at a midrange of exertion and to complete 15-20 repetitions within appropriate split time, with proper technique, and while maintaining safety.       Fabiano participated in neuromuscular re-education activities to improve balance, coordination, proprioception, motor control and/or posture for  0 minutes. The following activities were included:    Tandem walking 5 ft x 5   Single leg stance 10 sec x 4 each side     [facility] 5/29/2023   Visit Number 6   VAS Pain Rating 8   Treadmill Time (in min.) 5   Speed 2   Incline -   Lumbar Extension Seat Pad -   Femur Restraint -   Top Dead Center -   Counterweight -   Lumbar Flexion -   Lumbar Extension -   Lumbar Peak Torque -   Min Torque -   Test Percent Below Normative Data -   Lumbar Weight 48   Repetitions 20   Rating of Perceived Exertion 4   Ice - Z Lie (in min.) 5         Fabiano participated in therapeutic exercises to develop strength, endurance, ROM, flexibility, posture, and core stabilization for 35 minutes including:    RE - Assessment:   Standing flexion: Stiffness, limited   Standing extension: pain free, WFL    Treatment:     Cat/cow 7 x     Lateral glides 10 x   Extension in stance 2 x 10   Sidelying BOSU stretch right side up   Seated repeated flexion 10x - reports decreased stiffness  Open books, left and right, with visual tracking, x 10 each     Stated he had decreased stiffness after focus on slow, controlled range of motion activities      NP:   Seated repeated flexion 10x - reports decreased stiffness  Hooklying abdominal setting and POSTERIOR PELVIC TILTS 2 x 10 - patient wi difficulty isolating movement. Not performed today   Prone press ups 20x   Prone press up with sag 10x   Standing extensions at counter 20x then 15x   SL hip abduction 2 x 10 each side    Tactile cues for proper for  Prone hip extension 2  x 10 each side     Peripheral muscle strengthening which included one set of 15-20 repetitions at a slow and controlled 10-13 second per rep pace focused on strengthening supporting musculature in order to improve body mechanics and functional mobility.  Patient and therapist focused on proper form during treatment to ensure optimal strengthening of each targeted muscle group.  Machines utilized include torso rotation, chest press, triceps extension, bicep curl, and upright row. Leg extension, leg curl, leg press, and hip adduction/abduction.     Fabiano participated in dynamic functional therapeutic activities to improve functional performance and simulate household and community activities for 0  minutes. The following activities were included:    Pt given cold pack for 10 minutes to low back in z lie position.    Home Exercises Provided and Patient Education Provided    Home exercises include:   Seated sag, prone press ups, standing extensions, slouch overcorrect   Cardio program (V5): -  Lifting education (V11): -  Posture/ Using Lumbar Roll: educated  [employer] Discharge handout (date given): -  Equipment at home/gym membership: yes doesn't go frequently     Education provided:   - PT role and POC  - HEP    Written Home Exercises Provided: .  Exercises were reviewed and Fabiano was able to demonstrate them prior to the end of the session. Fabiano demonstrated good  understanding of the education provided.     See EMR under Patient Instructions for exercises provided prior visit.    Assessment     Today's treatment focused on pain relief for pt. He presented with lateral shift of pelvis so lateral glides and sidelying stretches were performed to address this. Pt was encouraged to walk in between exercises to reset mobility and decrease antalgic gait pattern. He responded well to therapy with reduction of pain from 8/10 to 5/10 upon completion of therapy session.     Patient is making good progress towards established  goals.  Pt will continue to benefit from skilled outpatient physical therapy to address the deficits stated in the impairment chart, provide pt/family education and to maximize pt's level of independence in the home and community environment.     Anticipated Barriers for therapy: history of spinal fusion   Pt's spiritual, cultural and educational needs considered and pt agreeable to plan of care and goals as stated below:     Goals:    Short term goals:  6 weeks or 10 visits   - Pt will demonstrate increased lumbar ROM by at least 3 degrees from the initial ROM value with improvements noted in functional ROM and ability to perform ADLs. Appropriate and Ongoing  - Pt will demonstrate increased MedX average isometric strength value by 25% from initial test resulting in improved ability to perform bending, lifting, and carrying activities safely, confidently. Appropriate and Ongoing  - Pt will report a reduction in worst pain score by 1-2 points for improved tolerance for sitting. Appropriate and Ongoing  - Pt able to perform HEP correctly with minimal cueing or supervision from therapist to encourage independent management of symptoms. Appropriate and Ongoing     Long term goals: 10 weeks or 20 visits   - Pt will demonstrate increased lumbar ROM by at least 6 degrees from initial ROM value, resulting in improved ability to perform functional forward bending while standing and sitting. Appropriate and Ongoing  - Pt will demonstrate increased MedX average isometric strength value by 40% from initial test resulting in improved ability to perform bending, lifting, and carrying activities safely and confidently. Appropriate and Ongoing  - Pt to demonstrate ability to independently control and reduce their pain through posture positioning and mechanical movements throughout a typical day. Appropriate and Ongoing  - Pt will demonstrate reduced pain and improved functional outcomes as reported on the FOTO by reaching a  limitation score of < or = 50% or less in order to demonstrate subjective improvement in pt's condition.   Appropriate and Ongoing  - Pt will demonstrate independence with the HEP at discharge. Appropriate and Ongoing  - Patient to report that he is able to sit for a meal with wife at a restaurant without increase low back pain, using self management strategies learned with PT(patient goal) Appropriate and Ongoing    Plan   Continue with established Plan of Care towards established PT goals.     Therapist: Donya Coello, PTA   5/29/2023

## 2023-05-30 ENCOUNTER — PATIENT MESSAGE (OUTPATIENT)
Dept: ORTHOPEDICS | Facility: CLINIC | Age: 53
End: 2023-05-30
Payer: COMMERCIAL

## 2023-06-01 ENCOUNTER — OFFICE VISIT (OUTPATIENT)
Dept: ORTHOPEDICS | Facility: CLINIC | Age: 53
End: 2023-06-01
Payer: COMMERCIAL

## 2023-06-01 DIAGNOSIS — M21.42 PES PLANOVALGUS, ACQUIRED, LEFT: ICD-10-CM

## 2023-06-01 DIAGNOSIS — M19.071 ARTHRITIS OF ANKLE, RIGHT: Primary | ICD-10-CM

## 2023-06-01 DIAGNOSIS — M19.072 ARTHRITIS OF ANKLE, LEFT: ICD-10-CM

## 2023-06-01 DIAGNOSIS — M21.41 PES PLANOVALGUS, ACQUIRED, RIGHT: ICD-10-CM

## 2023-06-01 PROCEDURE — 4010F PR ACE/ARB THEARPY RXD/TAKEN: ICD-10-PCS | Mod: CPTII,S$GLB,, | Performed by: ORTHOPAEDIC SURGERY

## 2023-06-01 PROCEDURE — 99999 PR PBB SHADOW E&M-EST. PATIENT-LVL II: ICD-10-PCS | Mod: PBBFAC,,, | Performed by: ORTHOPAEDIC SURGERY

## 2023-06-01 PROCEDURE — 1159F PR MEDICATION LIST DOCUMENTED IN MEDICAL RECORD: ICD-10-PCS | Mod: CPTII,S$GLB,, | Performed by: ORTHOPAEDIC SURGERY

## 2023-06-01 PROCEDURE — 99213 OFFICE O/P EST LOW 20 MIN: CPT | Mod: 25,S$GLB,, | Performed by: ORTHOPAEDIC SURGERY

## 2023-06-01 PROCEDURE — 20605 INTERMEDIATE JOINT ASPIRATION/INJECTION: R ANKLE, L ANKLE: ICD-10-PCS | Mod: 50,S$GLB,, | Performed by: ORTHOPAEDIC SURGERY

## 2023-06-01 PROCEDURE — 1159F MED LIST DOCD IN RCRD: CPT | Mod: CPTII,S$GLB,, | Performed by: ORTHOPAEDIC SURGERY

## 2023-06-01 PROCEDURE — 99999 PR PBB SHADOW E&M-EST. PATIENT-LVL II: CPT | Mod: PBBFAC,,, | Performed by: ORTHOPAEDIC SURGERY

## 2023-06-01 PROCEDURE — 1160F PR REVIEW ALL MEDS BY PRESCRIBER/CLIN PHARMACIST DOCUMENTED: ICD-10-PCS | Mod: CPTII,S$GLB,, | Performed by: ORTHOPAEDIC SURGERY

## 2023-06-01 PROCEDURE — 20605 DRAIN/INJ JOINT/BURSA W/O US: CPT | Mod: 50,S$GLB,, | Performed by: ORTHOPAEDIC SURGERY

## 2023-06-01 PROCEDURE — 99213 PR OFFICE/OUTPT VISIT, EST, LEVL III, 20-29 MIN: ICD-10-PCS | Mod: 25,S$GLB,, | Performed by: ORTHOPAEDIC SURGERY

## 2023-06-01 PROCEDURE — 1160F RVW MEDS BY RX/DR IN RCRD: CPT | Mod: CPTII,S$GLB,, | Performed by: ORTHOPAEDIC SURGERY

## 2023-06-01 PROCEDURE — 4010F ACE/ARB THERAPY RXD/TAKEN: CPT | Mod: CPTII,S$GLB,, | Performed by: ORTHOPAEDIC SURGERY

## 2023-06-01 RX ORDER — OXYCODONE AND ACETAMINOPHEN 7.5; 325 MG/1; MG/1
TABLET ORAL
COMMUNITY
Start: 2023-05-26 | End: 2023-06-01

## 2023-06-01 RX ORDER — TRIAMCINOLONE ACETONIDE 40 MG/ML
40 INJECTION, SUSPENSION INTRA-ARTICULAR; INTRAMUSCULAR
Status: DISCONTINUED | OUTPATIENT
Start: 2023-06-01 | End: 2023-06-01 | Stop reason: HOSPADM

## 2023-06-01 RX ADMIN — TRIAMCINOLONE ACETONIDE 40 MG: 40 INJECTION, SUSPENSION INTRA-ARTICULAR; INTRAMUSCULAR at 04:06

## 2023-06-01 NOTE — PROCEDURES
Intermediate Joint Aspiration/Injection: R ankle, L ankle    Date/Time: 6/1/2023 4:00 PM  Performed by: Liban Hirsch MD  Authorized by: Liban Hirsch MD     Consent Done?:  Yes (Verbal)  Indications:  Arthritis  Site marked: The procedure site was marked    Timeout: Prior to procedure the correct patient, procedure, and site was verified      Location:  Ankle  Site:  R ankle and L ankle  Prep: Patient was prepped and draped in usual sterile fashion    Ultrasonic Guidance for needle placement: No  Needle size:  25 G  Approach:  Anteromedial  Medications:  40 mg triamcinolone acetonide 40 mg/mL  Patient tolerance:  Patient tolerated the procedure well with no immediate complications

## 2023-06-01 NOTE — PROGRESS NOTES
Status/Diagnosis: Bilateral, Left > Right PCFD; valgus talar tilt and end-stage tibiotalar arthritis; posterior tibial tendinitis; gastroc contracture  Date of Surgery: none  Date of Injury: none  Return visit: 3-6 months for repeat injection  X-rays on Return: pending patient complaint     Chief Complaint:   Chief Complaint   Patient presents with    Right Foot - Pain, Swelling    Left Foot - Pain, Swelling     Present History:  Fabiano Jules Jr. is a 53 y.o. male who presents today for new patient evaluation.  Patient previously seen by Dr. Orozco.  Last seen in 2021 at which time he received bilateral ankle corticosteroid injections.  Patient endorses significant symptomatic relief at that time.  Patient has been managed for chronic symptomatic flatfoot deformity.  Has tried shoe wear and activity modification including not limited to custom orthotics, what sounds like an [place] brace, etc..  No recent immobilization or physical therapy.  Currently taking oral NSAIDs intermittently as needed for pain.  Denies any numbness or tingling.  Minimal pain at rest, increased with weight-bearing.  Also endorses recent history of lumbar fusion earlier this year which she believes has exacerbated his symptoms to bilateral feet.  States left greater than right foot and ankle pain.    06/01/2023:  Patient returns after last being seen in late November 2022.  Endorses near complete symptomatic relief with bilateral intra-articular corticosteroid injection the lasted 4+ month.  Presents today for repeat evaluation and repeat injection.    Since last being seen, patient underwent gastric bypass surgery.  Has lost 100+ lb postoperatively.        Past Medical History:   Diagnosis Date    Arthritis     Back injury     Colon polyp     COVID     CPAP (continuous positive airway pressure) dependence     Hypertension     PONV (postoperative nausea and vomiting)     Sleep apnea     c pap machine used       Past Surgical History:    Procedure Laterality Date    BONE GRAFT N/A 04/05/2022    Procedure: BONE GRAFT;  Surgeon: Duran Adler Jr., MD;  Location: [facility] OR;  Service: Orthopedics;  Laterality: N/A;    CIRCUMCISION      COLONOSCOPY N/A 12/03/2018    Procedure: COLONOSCOPY;  Surgeon: CHRISSY Reyna MD;  Location: [facility] ENDO (4TH FLR);  Service: Endoscopy;  Laterality: N/A;    COLONOSCOPY N/A 09/01/2022    Procedure: COLONOSCOPY;  Surgeon: Alondra Arias MD;  Location: [facility];  Service: Endoscopy;  Laterality: N/A;    epidural steroid injection X 2      lower lumbar    ESOPHAGOGASTRODUODENOSCOPY N/A 09/01/2022    Procedure: EGD (ESOPHAGOGASTRODUODENOSCOPY);  Surgeon: Alondra Arias MD;  Location: [facility];  Service: Endoscopy;  Laterality: N/A;    facet injection       [facility] [place]     INTRALUMINAL GASTROINTESTINAL TRACT IMAGING VIA CAPSULE N/A 10/03/2022    Procedure: IMAGING PROCEDURE, GI TRACT, INTRALUMINAL, VIA CAPSULE;  Surgeon: Alondra Arias MD;  Location: [facility];  Service: Endoscopy;  Laterality: N/A;    LAPAROSCOPIC SLEEVE GASTRECTOMY N/A 12/19/2022    Procedure: GASTRECTOMY, SLEEVE, LAPAROSCOPIC;  Surgeon: Lashonda Pinedo MD;  Location: [facility] OR;  Service: General;  Laterality: N/A;    LIPOMA RESECTION      Back of neck    MAGNETIC RESONANCE IMAGING N/A 05/29/2020    Procedure: MRI (MAGNETIC RESONANCE IMAGING) LUMBAR SPINE;  Surgeon: [facility] Diagnostic Provider;  Location: [facility];  Service: General;  Laterality: N/A;  COVID NEG    MAGNETIC RESONANCE IMAGING N/A 12/03/2021    Procedure: MRI (MAGNETIC RESONANCE IMAGING), LUMBAR SPINE;  Surgeon: [facility] Diagnostic Provider;  Location: [facility];  Service: General;  Laterality: N/A;  In MRI @ 7:50 per Ariela    MEDIAL COLLATERAL LIGAMENT AND LATERAL COLLATERAL LIGAMENT REPAIR, KNEE Right 09/01/2018    Dr. Christophe Gore in [place]     MINIMALLY INVASIVE TRANSFORAMINAL LUMBAR INTERBODY FUSION (TLIF) N/A 04/05/2022     Procedure: FUSION, SPINE, LUMBAR, TLIF, MINIMALLY INVASIVE, WITH INSTRUMENTATION,  L4/5 RIGHT;  Surgeon: Duran Adler Jr., MD;  Location: [facility];  Service: Orthopedics;  Laterality: N/A;  10:30am per Tamela    MYELOGRAPHY N/A 06/23/2020    Procedure: MYELOGRAM;  Surgeon: Andi Diagnostic Provider;  Location: [facility];  Service: General;  Laterality: N/A;       Current Outpatient Medications   Medication Sig    amLODIPine (NORVASC) 5 MG tablet Take 1 tablet (5 mg total) by mouth once daily.    b complex vitamins (B COMPLEX-VITAMIN B12) tablet Take 1 tablet by mouth once daily.    EPINEPHrine (EPIPEN) 0.3 mg/0.3 mL AtIn Inject into the muscle once for one dose as needed.    ergocalciferol (ERGOCALCIFEROL) 50,000 unit Cap Take 1 capsule (50,000 Units total) by mouth twice a week. (Patient taking differently: Take 50,000 Units by mouth every 7 days. Patient takes on Saturdays)    HYDROcodone-acetaminophen (NORCO) 7.5-325 mg per tablet Take 1 tablet by mouth every 4 (four) hours as needed for Pain.    rosuvastatin (CRESTOR) 10 MG tablet Take 1 tablet (10 mg total) by mouth once daily.     No current facility-administered medications for this visit.     Facility-Administered Medications Ordered in Other Visits   Medication    ceFAZolin in dextrose (iso-os) 2 gram/100 mL PgBk 2,000 mg    HYDROmorphone injection 1 mg    oxyCODONE-acetaminophen  mg per tablet 1 tablet    polyethylene glycol packet 17 g       Review of patient's allergies indicates:   Allergen Reactions    Peaches [peach (prunus persica)] Anaphylaxis    Peanut Anaphylaxis    Tree pollen-red birch Anaphylaxis       Family History   Problem Relation Age of Onset    Hypertension Mother     Diabetes Father     Cancer Father         mesotheliosma     Cataracts Neg Hx     Glaucoma Neg Hx     Macular degeneration Neg Hx     Retinal detachment Neg Hx     Colon cancer Neg Hx     Colon polyps Neg Hx     Crohn's disease Neg Hx     Ulcerative colitis Neg Hx         Social History     Socioeconomic History    Marital status:     Number of children: 4   Occupational History     Employer: [employer]   Tobacco Use    Smoking status: Former     Packs/day: 0.50     Years: 10.00     Pack years: 5.00     Types: Cigarettes     Quit date: 2009     Years since quittin.4    Smokeless tobacco: Never    Tobacco comments:     quit smoking in    Substance and Sexual Activity    Alcohol use: Yes     Comment: rarely    Drug use: Not Currently     Types: Marijuana     Comment: medical---oil, smoke and edibles    Sexual activity: Not Currently       Physical exam:  There were no vitals filed for this visit.  There is no height or weight on file to calculate BMI.  General: In no apparent distress; well developed and well nourished.  HEENT: normocephalic; atraumatic.  Cardiovascular: regular rate.  Respiratory: no increased work of breathing.  Musculoskeletal:   Gait: antalgic  Inspection:  Exam unchanged.  Significant flatfoot deformity with associated hindfoot and ankle valgus.  No significant forefoot abduction.  Hindfoot and ankle are not able to be completely passively corrected.  Unable to perform double or single limb heel rise bilaterally.  Patient localizes pain symmetrically.  Most prominent along the anteromedial ankle joint line as well as along the course of the posterior tibial tendon, left worse than right.  Minimal tenderness along the sinus tarsi.  No peroneal tenderness.  Silfverskiold:  Positive  Alignment:  Knee: neutral               Ankle: increased valgus              Hindfoot: increased valgus              Forefoot: neutral   Strength:              Dorsiflexion 5/5  Plantar flexion 5/5  Inversion 3+/5  Eversion 5/5   Sensation:             Altered but present sensation on monofilament testing bilaterally.  ROM:              Ankle: limited with pain at extremes              Subtalar:  limited with pain at extremes  Pulses: 2+ DP/PT pulses.                    Imaging Studies/Outside documentation:  I have ordered/reviewed/interpreted the following images/outside documentation:  1. Weight bearing 3-views of Bilateral foot and ankle:  No acute bony abnormality noted.  Severe, end-stage tibiotalar DJD with valgus talar tilt, left worse than right.  Mild diastasis at the distal tib-fib articulation bilaterally, more prominent on the left with some degree of synostosis at the level of the incisura.  Large osteophytes both anteriorly and posteriorly at the tibiotalar articulation.  More prominent on the left.  Also with large osteophyte at the posterior margin of the posterior subtalar facet.  Significant decreased calcaneal pitch.  Smaller than normal navicular bilaterally, more pronounced on the left.  Flattening of the talar head bilaterally.        Assessment:  Fabiano Jules Jr. is a 53 y.o. male with Bilateral, Left > Right PCFD; valgus talar tilt and end-stage tibiotalar arthritis; posterior tibial tendinitis; gastroc contracture     Plan:   Patient returns today after last being seen in November 2022.  Endorses significant symptomatic relief with previous corticosteroid injection.  We will repeat injection today.  Patient tolerated this well.  See procedure note for details.  Again discussed the potential for future surgical intervention.  Patient voiced understanding.  All questions were answered.  We will plan to return to clinic in 3-6 months for repeat injection.      This note was created using voice recognition software and may contain grammatical errors.

## 2023-06-02 ENCOUNTER — CLINICAL SUPPORT (OUTPATIENT)
Dept: REHABILITATION | Facility: HOSPITAL | Age: 53
End: 2023-06-02
Payer: COMMERCIAL

## 2023-06-02 DIAGNOSIS — R29.898 WEAKNESS OF BACK: ICD-10-CM

## 2023-06-02 DIAGNOSIS — Z73.89 PAIN SELF-MANAGEMENT DEFICIT: Primary | ICD-10-CM

## 2023-06-02 PROCEDURE — 97110 THERAPEUTIC EXERCISES: CPT | Mod: PN

## 2023-06-02 PROCEDURE — 97112 NEUROMUSCULAR REEDUCATION: CPT | Mod: PN

## 2023-06-02 NOTE — PROGRESS NOTES
[facility] Back Physical Therapy Treatment      Name: Fabiano Jules Jr.  Clinic Number: [id number]    Therapy Diagnosis:   Encounter Diagnoses   Name Primary?    Pain self-management deficit Yes    Weakness of back        Physician: Duran Adler    Visit Date: 2023    Physician Orders: PT Eval and Treat   Medical Diagnosis from Referral:   Low back pain  - Primary   Evaluation Date: 2023  Authorization Period Expiration: 2023  Plan of Care Expiration: 2023  Reassessment Due: 2023  Visit # / Visits authorized:     PTA Visit #: 0/5     FOTO DUE next visit - v5    Time In: 1645  Time Out: 1730  Total Billable Time: 45 minutes  INSURANCE and OUTCOMES: Fee for Service with FOTO Outcomes 1/3    Precautions: standard    Pattern of pain determined: 1 PEP    Subjective   Fabiano reports he fell about 1 week ago. He denies bowel and bladder changes since then. Reports being on muscle relaxers so he is more constipated. Received injections in the ankle which is why he is not get much tingling. He reports the L side has improved since the fall but continue to have R sided back pain. Reports this is the side where the nerve was pinched from the disc. FCE done on may 16th in which he failed. Knees to chest exercise helps but only for about 20 minutes.     Patient reports tolerating previous visit well. He continues to complaint of stiffness  Patient reports their pain to be 0/10 on a 0-10 scale with 0 being no pain and 10 being the worst pain imaginable.    Pain Location: center low back      Leisure: gardening, fishing   Occupation:  by trade   Pt goals: symptoms relief    Objective     Baseline Isometric Testing on Med X equipment: Testing administered by PT     Date of testin2023  ROM 0-39 deg   Max Peak Torque 79    Min Peak Torque 1    Flex/Ext Ratio 79 / 1    % below normative data 86     Outcomes:  Limitation Score: 58%  Visit 5 Score:   Visit 10 Score:   Discharge  Score:     Treatment    Fabiano received the treatments listed below:      Fabiano received neuromuscular education for 10 minutes via participation on the [employer] Machine. Therapist assisted patient in isolating and engaging spinal stabilization musculature in order to improve functional ability and postural control. Patient performed exercise with therapist guidance in order to accurately use pacer function, avoid valsalva, and optimally exert effort within a safe and effective range via the David Exertion Rating Scale. Patient instructed to perform at a midrange of exertion and to complete 15-20 repetitions within appropriate split time, with proper technique, and while maintaining safety.     [facility] 6/2/2023   Visit Number 7   VAS Pain Rating 6   Treadmill Time (in min.) 5   Speed 2   Incline -   Lumbar Extension Seat Pad -   Femur Restraint -   Top Dead Center -   Counterweight -   Lumbar Flexion -   Lumbar Extension -   Lumbar Peak Torque -   Min Torque -   Test Percent Below Normative Data -   Lumbar Weight 52   Repetitions 20   Rating of Perceived Exertion 4   Ice - Z Lie (in min.) -     Fabiano participated in therapeutic exercises to develop strength, endurance, ROM, flexibility, posture, and core stabilization for 35 minutes including:    Prone press ups 4 x 10 repetitions   Prone alt UE / LE lifts 2 x 10 repetitions each side   Sidelying hip abduction 3 x 8 repetitions     Peripheral muscle strengthening which included one set of 15-20 repetitions at a slow and controlled 10-13 second per rep pace focused on strengthening supporting musculature in order to improve body mechanics and functional mobility.  Patient and therapist focused on proper form during treatment to ensure optimal strengthening of each targeted muscle group.  Machines utilized include torso rotation, chest press, triceps extension, bicep curl, and upright row. Leg extension, leg curl, leg press, and hip adduction/abduction.      Fabiano participated in dynamic functional therapeutic activities to improve functional performance and simulate household and community activities for 5 minutes. The following activities were included:    Lateral stepping red theraband at ankles 2 x 10 yards each way     Pt given cold pack for 10 minutes to low back in z lie position.    Home Exercises Provided and Patient Education Provided    Home exercises include:   Seated sag, prone press ups, standing extensions, slouch overcorrect   Cardio program (V5): -  Lifting education (V11): -  Posture/ Using Lumbar Roll: educated  Fridge Magnet Discharge handout (date given): -  Equipment at home/gym membership: yes doesn't go frequently     Education provided:   - PT role and POC  - HEP    Written Home Exercises Provided: .  Exercises were reviewed and Fabiano was able to demonstrate them prior to the end of the session. Fabiano demonstrated good  understanding of the education provided.     See EMR under Patient Instructions for exercises provided prior visit.    Assessment     Arrives with continuation of R sided low back pain but had recent fall about 1 week ago with increased pain and L sided low back pain but reports the pain has improved since the fall. Continues to have some intermittent tingling into the RLE. Based on subjective reports that walking leads to cessation of pain, he seems to be an extension responder although he reports improvement in pain following standing flexion. Prone press ups performed and he reported pain at end range but this improved with repeated repetitions. Today we focused on extension and reported some improvement but continues to report pain with extension. Patient educated pt to try prone press ups at home to see if he gets more relief and longer relief compared to DKTC if worsening occurs go back to knees to chest. Patient verbalized good understanding.     Patient is making good progress towards established goals.  Pt will continue  to benefit from skilled outpatient physical therapy to address the deficits stated in the impairment chart, pt/family education and to maximize pt's level of independence in the home and community environment.     Anticipated Barriers for therapy: history of spinal fusion   Pt's spiritual, cultural and educational needs considered and pt agreeable to plan of care and goals as stated below:     Goals:    Short term goals:  6 weeks or 10 visits   - Pt will demonstrate increased lumbar ROM by at least 3 degrees from the initial ROM value with improvements noted in functional ROM and ability to perform ADLs. Appropriate and Ongoing  - Pt will demonstrate increased MedX average isometric strength value by 25% from initial test resulting in improved ability to perform bending, lifting, and carrying activities safely, confidently. Appropriate and Ongoing  - Pt will report a reduction in worst pain score by 1-2 points for improved tolerance for sitting. Appropriate and Ongoing  - Pt able to perform HEP correctly with minimal cueing or supervision from therapist to encourage independent management of symptoms. Appropriate and Ongoing     Long term goals: 10 weeks or 20 visits   - Pt will demonstrate increased lumbar ROM by at least 6 degrees from initial ROM value, resulting in improved ability to perform functional forward bending while standing and sitting. Appropriate and Ongoing  - Pt will demonstrate increased MedX average isometric strength value by 40% from initial test resulting in improved ability to perform bending, lifting, and carrying activities safely and confidently. Appropriate and Ongoing  - Pt to demonstrate ability to independently control and reduce their pain through posture positioning and mechanical movements throughout a typical day. Appropriate and Ongoing  - Pt will demonstrate reduced pain and improved functional outcomes as reported on the FOTO by reaching a limitation score of < or = 50% or less  in order to demonstrate subjective improvement in pt's condition.   Appropriate and Ongoing  - Pt will demonstrate independence with the HEP at discharge. Appropriate and Ongoing  - Patient to report that he is able to sit for a meal with wife at a restaurant without increase low back pain, using self management strategies learned with PT(patient goal) Appropriate and Ongoing    Plan   Continue with established Plan of Care towards established PT goals.     Therapist: Billie Santiago, PT   6/2/2023

## 2023-06-09 ENCOUNTER — CLINICAL SUPPORT (OUTPATIENT)
Dept: REHABILITATION | Facility: HOSPITAL | Age: 53
End: 2023-06-09
Payer: COMMERCIAL

## 2023-06-09 DIAGNOSIS — Z73.89 PAIN SELF-MANAGEMENT DEFICIT: Primary | ICD-10-CM

## 2023-06-09 DIAGNOSIS — R29.898 WEAKNESS OF BACK: ICD-10-CM

## 2023-06-09 PROCEDURE — 97112 NEUROMUSCULAR REEDUCATION: CPT | Mod: PN

## 2023-06-09 PROCEDURE — 97110 THERAPEUTIC EXERCISES: CPT | Mod: PN

## 2023-06-12 NOTE — PROGRESS NOTES
"[facility]      Name: Fabiano Jules Jr.  Clinic Number: [id number]    Therapy Diagnosis:   Encounter Diagnoses   Name Primary?    Pain self-management deficit Yes    Weakness of back        Physician: Duran Adler    Visit Date: 2023    Physician Orders: PT Eval and Treat   Medical Diagnosis from Referral:   Low back pain  - Primary   Evaluation Date: 2023  Authorization Period Expiration: 2023  Plan of Care Expiration: 2023  Reassessment Due: 2023  Visit # / Visits authorized:     PTA Visit #: 0/5     FOTO DUE next visit - v5    Time In: 1645  Time Out: 1730  Total Billable Time: 40 minutes (5 minute ice)   INSURANCE and OUTCOMES: Fee for Service with FOTO Outcomes 1/3    Precautions: standard    Pattern of pain determined: 1 PEP    Subjective   Fabiano reports his back feels really good. "Whatever you did last time really helped". Reports no pain upon arrival and reports the back was sore a day and two following last tx session but he really has not had pain since then.     Patient reports tolerating previous visit well. He continues to complaint of stiffness  Patient reports their pain to be 0/10 on a 0-10 scale with 0 being no pain and 10 being the worst pain imaginable.    Pain Location: center low back      Leisure: gardening, fishing   Occupation:  by trade   Pt goals: symptoms relief    Objective     Baseline Isometric Testing on Med X equipment: Testing administered by PT     Date of testin2023  ROM 0-39 deg   Max Peak Torque 79    Min Peak Torque 1    Flex/Ext Ratio 79 / 1    % below normative data 86     Outcomes:  Limitation Score: 58%  Visit 5 Score:   Visit 10 Score:   Discharge Score:     Treatment    Fabiano received the treatments listed below:      Fabiano received neuromuscular education for 10 minutes via participation on the [employer] Machine. Therapist assisted patient in isolating and engaging spinal " stabilization musculature in order to improve functional ability and postural control. Patient performed exercise with therapist guidance in order to accurately use pacer function, avoid valsalva, and optimally exert effort within a safe and effective range via the David Exertion Rating Scale. Patient instructed to perform at a midrange of exertion and to complete 15-20 repetitions within appropriate split time, with proper technique, and while maintaining safety.     [facility] 6/10/2023   Visit Number 8   VAS Pain Rating 0   Treadmill Time (in min.) -   Speed -   Incline -   Lumbar Extension Seat Pad -   Femur Restraint -   Top Dead Center -   Counterweight -   Lumbar Flexion -   Lumbar Extension -   Lumbar Peak Torque -   Min Torque -   Test Percent Below Normative Data -   Lumbar Weight 55   Repetitions 20   Rating of Perceived Exertion 4   Ice - Z Lie (in min.) -       Fabiano participated in therapeutic exercises to develop strength, endurance, ROM, flexibility, posture, and core stabilization for 30 minutes including:    Prone press ups 2 x 10 repetitions   Prone alt UE / LE lifts 2 x 10 repetitions each side   Sidelying hip abduction 3 x 8 repetitions   ER clams red theraband 3 x 8 repetitions   Sidelying open books 10x each side     Peripheral muscle strengthening which included one set of 15-20 repetitions at a slow and controlled 10-13 second per rep pace focused on strengthening supporting musculature in order to improve body mechanics and functional mobility.  Patient and therapist focused on proper form during treatment to ensure optimal strengthening of each targeted muscle group.  Machines utilized include torso rotation, chest press, triceps extension, bicep curl, and upright row. Leg extension, leg curl, leg press, and hip adduction/abduction.     Fabiano participated in dynamic functional therapeutic activities to improve functional performance and simulate household and community activities for  0 minutes. The following activities were included:    Lateral stepping red theraband at ankles 2 x 10 yards each way     Pt given cold pack for 5 minutes to low back in z lie position.    Home Exercises Provided and Patient Education Provided    Home exercises include:   Seated sag, prone press ups, standing extensions, slouch overcorrect   Cardio program (V5): -  Lifting education (V11): -  Posture/ Using Lumbar Roll: educated  Fridge Magnet Discharge handout (date given): -  Equipment at home/gym membership: yes doesn't go frequently     Education provided:   - PT role and POC  - HEP    Written Home Exercises Provided: .  Exercises were reviewed and Fabiano was able to demonstrate them prior to the end of the session. Fabiano demonstrated good  understanding of the education provided.     See EMR under Patient Instructions for exercises provided prior visit.    Assessment     Arrived with decreased back pain since last tx session. He tolerated extension based movements well today without complaints of back pain so shifted focus to extension verse flexion based movements. Also focused on B hip strengthening as he continues to demonstrate bilateral hip weakness. Good tolerance to medx and peripheral machines.     Patient is making good progress towards established goals.  Pt will continue to benefit from skilled outpatient physical therapy to address the deficits stated in the impairment chart, pt/family education and to maximize pt's level of independence in the home and community environment.     Anticipated Barriers for therapy: history of spinal fusion   Pt's spiritual, cultural and educational needs considered and pt agreeable to plan of care and goals as stated below:     Goals:    Short term goals:  6 weeks or 10 visits   - Pt will demonstrate increased lumbar ROM by at least 3 degrees from the initial ROM value with improvements noted in functional ROM and ability to perform ADLs. Appropriate and Ongoing  - Pt  will demonstrate increased MedX average isometric strength value by 25% from initial test resulting in improved ability to perform bending, lifting, and carrying activities safely, confidently. Appropriate and Ongoing  - Pt will report a reduction in worst pain score by 1-2 points for improved tolerance for sitting. Appropriate and Ongoing  - Pt able to perform HEP correctly with minimal cueing or supervision from therapist to encourage independent management of symptoms. Appropriate and Ongoing     Long term goals: 10 weeks or 20 visits   - Pt will demonstrate increased lumbar ROM by at least 6 degrees from initial ROM value, resulting in improved ability to perform functional forward bending while standing and sitting. Appropriate and Ongoing  - Pt will demonstrate increased MedX average isometric strength value by 40% from initial test resulting in improved ability to perform bending, lifting, and carrying activities safely and confidently. Appropriate and Ongoing  - Pt to demonstrate ability to independently control and reduce their pain through posture positioning and mechanical movements throughout a typical day. Appropriate and Ongoing  - Pt will demonstrate reduced pain and improved functional outcomes as reported on the FOTO by reaching a limitation score of < or = 50% or less in order to demonstrate subjective improvement in pt's condition.   Appropriate and Ongoing  - Pt will demonstrate independence with the HEP at discharge. Appropriate and Ongoing  - Patient to report that he is able to sit for a meal with wife at a restaurant without increase low back pain, using self management strategies learned with PT(patient goal) Appropriate and Ongoing    Plan   Continue with established Plan of Care towards established PT goals.     Therapist: Billie Santiago, PT   6/12/2023

## 2023-06-14 ENCOUNTER — CLINICAL SUPPORT (OUTPATIENT)
Dept: REHABILITATION | Facility: HOSPITAL | Age: 53
End: 2023-06-14
Payer: COMMERCIAL

## 2023-06-14 DIAGNOSIS — R29.898 WEAKNESS OF BACK: ICD-10-CM

## 2023-06-14 DIAGNOSIS — Z73.89 PAIN SELF-MANAGEMENT DEFICIT: Primary | ICD-10-CM

## 2023-06-14 PROCEDURE — 97110 THERAPEUTIC EXERCISES: CPT | Mod: PN,CQ

## 2023-06-14 PROCEDURE — 97112 NEUROMUSCULAR REEDUCATION: CPT | Mod: PN,CQ

## 2023-06-14 NOTE — PROGRESS NOTES
[facility]      Name: Fabiano Jules Jr.  Clinic Number: [id number]    Therapy Diagnosis:   Encounter Diagnoses   Name Primary?    Pain self-management deficit Yes    Weakness of back        Physician: Duran Adler    Visit Date: 2023    Physician Orders: PT Eval and Treat   Medical Diagnosis from Referral:   Low back pain  - Primary   Evaluation Date: 2023  Authorization Period Expiration: 2023  Plan of Care Expiration: 2023  Reassessment Due: 2023  Visit # / Visits authorized:     PTA Visit #:      FOTO DUE next visit - v5    Time In: 1600  Time Out: 1700  Total Billable Time: 55 minutes (5 minute ice)   INSURANCE and OUTCOMES: Fee for Service with FOTO Outcomes 1/3    Precautions: standard    Pattern of pain determined: 1 PEP    Subjective   Fabiano reports his back feels better today after having slipped last week.     Patient reports tolerating previous visit well. He continues to complaint of stiffness  Patient reports their pain to be 0/10 on a 0-10 scale with 0 being no pain and 10 being the worst pain imaginable.    Pain Location: center low back      Leisure: gardening, fishing   Occupation:  by [employer]   Pt goals: symptoms relief    Objective     Baseline Isometric Testing on Med X equipment: Testing administered by PT     Date of testin2023  ROM 0-39 deg   Max Peak Torque 79    Min Peak Torque 1    Flex/Ext Ratio 79 / 1    % below normative data 86     Outcomes:  Limitation Score: 58%  Visit 5 Score:   Visit 10 Score:   Discharge Score:     Treatment    Fabiano received the treatments listed below:      Fabiano received neuromuscular education for 10 minutes via participation on the [employer] Machine. Therapist assisted patient in isolating and engaging spinal stabilization musculature in order to improve functional ability and postural control. Patient performed exercise with therapist guidance in order to accurately  use pacer function, avoid valsalva, and optimally exert effort within a safe and effective range via the David Exertion Rating Scale. Patient instructed to perform at a midrange of exertion and to complete 15-20 repetitions within appropriate split time, with proper technique, and while maintaining safety.     [facility] 6/14/2023   Visit Number 9   VAS Pain Rating 3   Treadmill Time (in min.) 5   Speed 2   Incline -   Lumbar Extension Seat Pad -   Femur Restraint -   Top Dead Center -   Counterweight -   Lumbar Flexion -   Lumbar Extension -   Lumbar Peak Torque -   Min Torque -   Test Percent Below Normative Data -   Lumbar Weight 55   Repetitions 20   Rating of Perceived Exertion 4   Ice - Z Lie (in min.) 5        Fabiano participated in therapeutic exercises to develop strength, endurance, ROM, flexibility, posture, and core stabilization for 30 minutes including:    Prone press ups 2 x 10 repetitions   Prone alt UE / LE lifts 2 x 10 repetitions each side   Sidelying hip abduction 3 x 8 repetitions   ER clams red theraband 3 x 8 repetitions   Sidelying open books 10x each side     Peripheral muscle strengthening which included one set of 15-20 repetitions at a slow and controlled 10-13 second per rep pace focused on strengthening supporting musculature in order to improve body mechanics and functional mobility.  Patient and therapist focused on proper form during treatment to ensure optimal strengthening of each targeted muscle group.  Machines utilized include torso rotation, chest press, triceps extension, bicep curl, and upright row. Leg extension, leg curl, leg press, and hip adduction/abduction.     Fabiano participated in dynamic functional therapeutic activities to improve functional performance and simulate household and community activities for 0 minutes. The following activities were included:    Lateral stepping red theraband at ankles 2 x 10 yards each way     Pt given cold pack for 5 minutes to  low back in z lie position.    Home Exercises Provided and Patient Education Provided    Home exercises include:   Seated sag, prone press ups, standing extensions, slouch overcorrect   Cardio program (V5): -  Lifting education (V11): -  Posture/ Using Lumbar Roll: educated  Fridge Magnet Discharge handout (date given): -  Equipment at home/gym membership: yes doesn't go frequently     Education provided:   - PT role and POC  - HEP    Written Home Exercises Provided: .  Exercises were reviewed and Fabiano was able to demonstrate them prior to the end of the session. Fabiano demonstrated good  understanding of the education provided.     See EMR under Patient Instructions for exercises provided prior visit.    Assessment     Today's treatment included extension based movements which he reported decreased pain and stiffness in low back. Also focused on B hip strengthening as he continues to demonstrate bilateral hip weakness. Good tolerance to medx and peripheral machines.     Patient is making good progress towards established goals.  Pt will continue to benefit from skilled outpatient physical therapy to address the deficits stated in the impairment chart, pt/family education and to maximize pt's level of independence in the home and community environment.     Anticipated Barriers for therapy: history of spinal fusion   Pt's spiritual, cultural and educational needs considered and pt agreeable to plan of care and goals as stated below:     Goals:    Short term goals:  6 weeks or 10 visits   - Pt will demonstrate increased lumbar ROM by at least 3 degrees from the initial ROM value with improvements noted in functional ROM and ability to perform ADLs. Appropriate and Ongoing  - Pt will demonstrate increased MedX average isometric strength value by 25% from initial test resulting in improved ability to perform bending, lifting, and carrying activities safely, confidently. Appropriate and Ongoing  - Pt will report a  reduction in worst pain score by 1-2 points for improved tolerance for sitting. Appropriate and Ongoing  - Pt able to perform HEP correctly with minimal cueing or supervision from therapist to encourage independent management of symptoms. Appropriate and Ongoing     Long term goals: 10 weeks or 20 visits   - Pt will demonstrate increased lumbar ROM by at least 6 degrees from initial ROM value, resulting in improved ability to perform functional forward bending while standing and sitting. Appropriate and Ongoing  - Pt will demonstrate increased MedX average isometric strength value by 40% from initial test resulting in improved ability to perform bending, lifting, and carrying activities safely and confidently. Appropriate and Ongoing  - Pt to demonstrate ability to independently control and reduce their pain through posture positioning and mechanical movements throughout a typical day. Appropriate and Ongoing  - Pt will demonstrate reduced pain and improved functional outcomes as reported on the FOTO by reaching a limitation score of < or = 50% or less in order to demonstrate subjective improvement in pt's condition.   Appropriate and Ongoing  - Pt will demonstrate independence with the HEP at discharge. Appropriate and Ongoing  - Patient to report that he is able to sit for a meal with wife at a restaurant without increase low back pain, using self management strategies learned with PT(patient goal) Appropriate and Ongoing    Plan   Continue with established Plan of Care towards established PT goals.     Therapist: Donya Coello, PTA   6/14/2023

## 2023-06-23 ENCOUNTER — CLINICAL SUPPORT (OUTPATIENT)
Dept: REHABILITATION | Facility: HOSPITAL | Age: 53
End: 2023-06-23
Payer: COMMERCIAL

## 2023-06-23 DIAGNOSIS — Z73.89 PAIN SELF-MANAGEMENT DEFICIT: Primary | ICD-10-CM

## 2023-06-23 DIAGNOSIS — R29.898 WEAKNESS OF BACK: ICD-10-CM

## 2023-06-23 PROCEDURE — 97110 THERAPEUTIC EXERCISES: CPT | Mod: PN

## 2023-06-23 PROCEDURE — 97530 THERAPEUTIC ACTIVITIES: CPT | Mod: PN

## 2023-06-23 PROCEDURE — 97112 NEUROMUSCULAR REEDUCATION: CPT | Mod: PN

## 2023-06-23 RX ORDER — EPINEPHRINE 0.3 MG/.3ML
INJECTION SUBCUTANEOUS
Qty: 2 EACH | Refills: 1 | OUTPATIENT
Start: 2023-06-23

## 2023-06-23 NOTE — PROGRESS NOTES
[facility] Back Physical Therapy Treatment      Name: Fabiano Jules Jr.  Clinic Number: [id number]    Therapy Diagnosis:   Encounter Diagnoses   Name Primary?    Pain self-management deficit Yes    Weakness of back      Physician: Duran Adler    Visit Date: 2023    Physician Orders: PT Eval and Treat   Medical Diagnosis from Referral:   Low back pain  - Primary   Evaluation Date: 2023  Authorization Period Expiration: 2023  Plan of Care Expiration: 2023  Reassessment Due: 2023  Visit # / Visits authorized:     PTA Visit #: 0/5     Needs FOTO!!!    Time In: 1355  Time Out: 1455  Total Billable Time: 55 minutes (5 minute ice)   INSURANCE and OUTCOMES: Fee for Service with FOTO Outcomes 1/3    Precautions: standard    Pattern of pain determined: 1 PEP    Subjective   Fabiano reports no new complaints. Reports 3/10 pain today.    Patient reports tolerating previous visit well. He continues to complaint of stiffness  Patient reports their pain to be 0/10 on a 0-10 scale with 0 being no pain and 10 being the worst pain imaginable.    Pain Location: center low back      Leisure: gardening, fishing   Occupation:  by [employer]   Pt goals: symptoms relief    Objective     Baseline Isometric Testing on Med X equipment: Testing administered by PT     Date of testin2023  ROM 0-39 deg   Max Peak Torque 79    Min Peak Torque 1    Flex/Ext Ratio 79 / 1    % below normative data 86     Date of testin2023  ROM 0-48 deg   Max Peak Torque 90   Min Peak Torque 59   Flex/Ext Ratio 1.5    % below normative data 63     Outcomes:  Limitation Score: 58%  Visit 5 Score:   Visit 10 Score:   Discharge Score:     Treatment    Fabiano received the treatments listed below:      Fabiano received neuromuscular education for 15 minutes via participation on the eduPad Machine. Therapist assisted patient in isolating and engaging spinal stabilization musculature in order to improve  functional ability and postural control. Patient performed exercise with therapist guidance in order to accurately use pacer function, avoid valsalva, and optimally exert effort within a safe and effective range via the David Exertion Rating Scale. Patient instructed to perform at a midrange of exertion and to complete 15-20 repetitions within appropriate split time, with proper technique, and while maintaining safety.     [facility] 6/23/2023   Visit Number 10   VAS Pain Rating 3   Treadmill Time (in min.) -   Speed -   Incline -   Lumbar Extension Seat Pad -   Femur Restraint -   Top Dead Center -   Counterweight -   Lumbar Flexion 48   Lumbar Extension 0   Lumbar Peak Torque 90   Min Torque 59   Test Percent Below Normative Data 63   Test Percent Gain in Strength from Initial  346   Lumbar Weight -   Repetitions -   Rating of Perceived Exertion -   Ice - Z Lie (in min.) 5       Fabiano participated in therapeutic exercises to develop strength, endurance, ROM, flexibility, posture, and core stabilization for 32 minutes including:    Prone press ups 1 x 10    No effect   Prone press up with sag 1 x 10    Decrease better    Single limb bridges 2 x 5 repetitions each side   Sidelying hip abduction 3 x 8 repetitions each side     NP:   Prone alt UE / LE lifts 2 x 10 repetitions each side   Sidelying hip abduction 3 x 8 repetitions   ER clams red theraband 3 x 8 repetitions   Sidelying open books 10x each side     Peripheral muscle strengthening which included one set of 15-20 repetitions at a slow and controlled 10-13 second per rep pace focused on strengthening supporting musculature in order to improve body mechanics and functional mobility.  Patient and therapist focused on proper form during treatment to ensure optimal strengthening of each targeted muscle group.  Machines utilized include torso rotation, chest press, triceps extension, bicep curl, and upright row. Leg extension, leg curl, leg press, and hip  adduction/abduction.     Fabiano participated in dynamic functional therapeutic activities to improve functional performance and simulate household and community activities for 8 minutes. The following activities were included:    Donkey kicks 12lbs 20 repetitions each side   Lateral stepping red theraband 3 x 5 yards each way     Pt given cold pack for 5 minutes to low back in z lie position.    Home Exercises Provided and Patient Education Provided    Home exercises include:   Seated sag, prone press ups, standing extensions, slouch overcorrect   Cardio program (V5): -  Lifting education (V11): -  Posture/ Using Lumbar Roll: educated  Fridge Magnet Discharge handout (date given): -  Equipment at home/gym membership: yes doesn't go frequently     Education provided:   - PT role and POC  - HEP    Written Home Exercises Provided: .  Exercises were reviewed and Fabiano was able to demonstrate them prior to the end of the session. Fabiano demonstrated good  understanding of the education provided.     See EMR under Patient Instructions for exercises provided prior visit.    Assessment     Started with prone extensions to assess change in pain. One set of prone press ups performed with minimal change. Then added patient overpressure with sag with decrease in pain reported. Progressed bridges with LE extensions with good tolerance and form. Tx continues to focus on hip strengthening. Medx reassessment performed demonstrating 9 deg increase in range of motion and 346% increase in strength. He will continue to benefit from skilled Physical Therapist services to address remaining strengthen and range of motion deficits. No pain reported post tx session. He is progressing well meeting range of motion and strength short and long term goals.     Patient is making good progress towards established goals.  Pt will continue to benefit from skilled outpatient physical therapy to address the deficits stated in the impairment chart,  pt/family education and to maximize pt's level of independence in the home and community environment.     Anticipated Barriers for therapy: history of spinal fusion   Pt's spiritual, cultural and educational needs considered and pt agreeable to plan of care and goals as stated below:     Goals:    Short term goals:  6 weeks or 10 visits   - Pt will demonstrate increased lumbar ROM by at least 3 degrees from the initial ROM value with improvements noted in functional ROM and ability to perform ADLs. MET 6/23/2023  - Pt will demonstrate increased MedX average isometric strength value by 25% from initial test resulting in improved ability to perform bending, lifting, and carrying activities safely, confidently. MET 6/23/2023  - Pt will report a reduction in worst pain score by 1-2 points for improved tolerance for sitting. MET 6/23/2023  - Pt able to perform HEP correctly with minimal cueing or supervision from therapist to encourage independent management of symptoms. Appropriate and Ongoing     Long term goals: 10 weeks or 20 visits   - Pt will demonstrate increased lumbar ROM by at least 6 degrees from initial ROM value, resulting in improved ability to perform functional forward bending while standing and sitting. MET 6/23/2023  - Pt will demonstrate increased MedX average isometric strength value by 40% from initial test resulting in improved ability to perform bending, lifting, and carrying activities safely and confidently.MET 6/23/2023  - Pt to demonstrate ability to independently control and reduce their pain through posture positioning and mechanical movements throughout a typical day. Appropriate and Ongoing  - Pt will demonstrate reduced pain and improved functional outcomes as reported on the FOTO by reaching a limitation score of < or = 50% or less in order to demonstrate subjective improvement in pt's condition.   Appropriate and Ongoing  - Pt will demonstrate independence with the HEP at discharge.  Appropriate and Ongoing  - Patient to report that he is able to sit for a meal with wife at a restaurant without increase low back pain, using self management strategies learned with PT(patient goal) Appropriate and Ongoing    Plan   Continue with established Plan of Care towards established PT goals.     Therapist: Billie Santiago, PT   6/23/2023

## 2023-06-26 ENCOUNTER — CLINICAL SUPPORT (OUTPATIENT)
Dept: REHABILITATION | Facility: HOSPITAL | Age: 53
End: 2023-06-26
Payer: COMMERCIAL

## 2023-06-26 DIAGNOSIS — Z73.89 PAIN SELF-MANAGEMENT DEFICIT: Primary | ICD-10-CM

## 2023-06-26 DIAGNOSIS — R29.898 WEAKNESS OF BACK: ICD-10-CM

## 2023-06-26 PROCEDURE — 97530 THERAPEUTIC ACTIVITIES: CPT | Mod: PN

## 2023-06-26 PROCEDURE — 97110 THERAPEUTIC EXERCISES: CPT | Mod: PN

## 2023-06-26 PROCEDURE — 97112 NEUROMUSCULAR REEDUCATION: CPT | Mod: PN

## 2023-06-26 NOTE — PROGRESS NOTES
[facility] Back Physical Therapy Treatment      Name: Fabiano Jules Jr.  Clinic Number: [id number]    Therapy Diagnosis:   Encounter Diagnoses   Name Primary?    Pain self-management deficit Yes    Weakness of back      Physician: Duran Adler    Visit Date: 2023    Physician Orders: PT Eval and Treat   Medical Diagnosis from Referral:   Low back pain  - Primary   Evaluation Date: 2023  Authorization Period Expiration: 2023  Plan of Care Expiration: 2023  Reassessment Due: 2023  Visit # / Visits authorized: 10 / 20    PTA Visit #: 0/5     Time In: 1605  Time Out: 1700  Total Billable Time: 55 minutes  INSURANCE and OUTCOMES: Fee for Service with FOTO Outcomes 1/3    Precautions: standard    Pattern of pain determined: 1 PEP    Subjective   Fabiano reports 4/10 pain upon arrival. He did a lot over the weekend. Thinks he might have over done it some leading to increased pain today. Reports  he was moving around and today he has been sitting more than usual.     Patient reports tolerating previous visit well. He continues to complaint of stiffness  Patient reports their pain to be 0/10 on a 0-10 scale with 0 being no pain and 10 being the worst pain imaginable.    Pain Location: center low back      Leisure: gardening, fishing   Occupation:  by trade   Pt goals: symptoms relief    Objective     Baseline Isometric Testing on Med X equipment: Testing administered by PT     Date of testin2023  ROM 0-39 deg   Max Peak Torque 79    Min Peak Torque 1    Flex/Ext Ratio 79 / 1    % below normative data 86     Date of testin2023  ROM 0-48 deg   Max Peak Torque 90   Min Peak Torque 59   Flex/Ext Ratio 1.5    % below normative data 63     Outcomes:  Limitation Score: 58%  Visit 5 Score:   Visit 10 Score:   Discharge Score:     Treatment    Fabiano received the treatments listed below:      Fabiano received neuromuscular education for 8  minutes via participation on the  Medical MedX Machine. Therapist assisted patient in isolating and engaging spinal stabilization musculature in order to improve functional ability and postural control. Patient performed exercise with therapist guidance in order to accurately use pacer function, avoid valsalva, and optimally exert effort within a safe and effective range via the David Exertion Rating Scale. Patient instructed to perform at a midrange of exertion and to complete 15-20 repetitions within appropriate split time, with proper technique, and while maintaining safety.     [facility] 6/26/2023   Visit Number 11   VAS Pain Rating 4   Treadmill Time (in min.) 5   Speed 2   Incline -   Lumbar Extension Seat Pad -   Femur Restraint -   Top Dead Center -   Counterweight -   Lumbar Flexion -   Lumbar Extension -   Lumbar Peak Torque -   Min Torque -   Test Percent Below Normative Data -   Test Percent Gain in Strength from Initial  -   Lumbar Weight 60   Repetitions 20   Rating of Perceived Exertion 3   Ice - Z Lie (in min.) -     Fabiano participated in therapeutic exercises to develop strength, endurance, ROM, flexibility, posture, and core stabilization for 32 minutes including:    Treadmill x 5 minutes at 2.0 mph   Prone press ups 1 x 10    No effect   Prone press up with sag 1 x 10    Decrease better    Prone press up with overpressure therapist 2 x 10    Decrease, better   Prone hip extension knee flexion 2 x 10 each side     Peripheral muscle strengthening which included one set of 15-20 repetitions at a slow and controlled 10-13 second per rep pace focused on strengthening supporting musculature in order to improve body mechanics and functional mobility.  Patient and therapist focused on proper form during treatment to ensure optimal strengthening of each targeted muscle group.  Machines utilized include torso rotation, chest press, triceps extension, bicep curl, and upright row. Leg extension, leg curl, leg press, and hip  adduction/abduction.     NP:   Prone alt UE / LE lifts 2 x 10 repetitions each side   Sidelying hip abduction 3 x 8 repetitions   ER clams red theraband 3 x 8 repetitions   Sidelying open books 10x each side       Fabiano participated in dynamic functional therapeutic activities to improve functional performance and simulate household and community activities for 15 minutes. The following activities were included:    + Lifting:    Hip hinge with dowel along spine to 20 inch step    Hip hinge/ double limb deadlift with 12 lb weighted dowel 20 repetitions    Single limb deadlift with UE support on chair 2 x 10 each side     NP:   Donkey kicks 12lbs 20 repetitions each side   Lateral stepping red theraband 3 x 5 yards each way     Pt given cold pack for 5 minutes to low back in z lie position.    Home Exercises Provided and Patient Education Provided    Home exercises include:   Seated sag, prone press ups, standing extensions, slouch overcorrect   Cardio program (V5): -  Lifting education (V11): -  Posture/ Using Lumbar Roll: educated  Fridge Magnet Discharge handout (date given): -  Equipment at home/gym membership: yes doesn't go frequently     Education provided:   - PT role and POC  - HEP    Written Home Exercises Provided: .  Exercises were reviewed and Fabiano was able to demonstrate them prior to the end of the session. Fabiano demonstrated good  understanding of the education provided.     See EMR under Patient Instructions for exercises provided prior visit.    Assessment     Added lifting education today. He tolerated lifting well without complaints of pain. He required verbal and tactile cues for proper set up. He required UE support for single deadlift due to balance and hip weakness. He tolerated prone press ups well. He responded best with overpressure by Physical Therapist demonstrating mechanical improvement in range of motion following and reported improvement in pain following. Medx increased by 10% with good  tolerance in which he was able to perform 20 repetitions with RPE of 3/10.     Patient is making good progress towards established goals.  Pt will continue to benefit from skilled outpatient physical therapy to address the deficits stated in the impairment chart, pt/family education and to maximize pt's level of independence in the home and community environment.     Anticipated Barriers for therapy: history of spinal fusion   Pt's spiritual, cultural and educational needs considered and pt agreeable to plan of care and goals as stated below:     Goals:    Short term goals:  6 weeks or 10 visits   - Pt will demonstrate increased lumbar ROM by at least 3 degrees from the initial ROM value with improvements noted in functional ROM and ability to perform ADLs. MET 6/23/2023  - Pt will demonstrate increased MedX average isometric strength value by 25% from initial test resulting in improved ability to perform bending, lifting, and carrying activities safely, confidently. MET 6/23/2023  - Pt will report a reduction in worst pain score by 1-2 points for improved tolerance for sitting. MET 6/23/2023  - Pt able to perform HEP correctly with minimal cueing or supervision from therapist to encourage independent management of symptoms. Appropriate and Ongoing     Long term goals: 10 weeks or 20 visits   - Pt will demonstrate increased lumbar ROM by at least 6 degrees from initial ROM value, resulting in improved ability to perform functional forward bending while standing and sitting. MET 6/23/2023  - Pt will demonstrate increased MedX average isometric strength value by 40% from initial test resulting in improved ability to perform bending, lifting, and carrying activities safely and confidently.MET 6/23/2023  - Pt to demonstrate ability to independently control and reduce their pain through posture positioning and mechanical movements throughout a typical day. Appropriate and Ongoing  - Pt will demonstrate reduced pain and  improved functional outcomes as reported on the FOTO by reaching a limitation score of < or = 50% or less in order to demonstrate subjective improvement in pt's condition.   Appropriate and Ongoing  - Pt will demonstrate independence with the HEP at discharge. Appropriate and Ongoing  - Patient to report that he is able to sit for a meal with wife at a restaurant without increase low back pain, using self management strategies learned with PT(patient goal) Appropriate and Ongoing    Plan   Continue with established Plan of Care towards established PT goals.     Therapist: Billie Santiago, PT   6/26/2023

## 2023-06-30 DIAGNOSIS — I47.20 VT (VENTRICULAR TACHYCARDIA): ICD-10-CM

## 2023-06-30 DIAGNOSIS — I47.10 SVT (SUPRAVENTRICULAR TACHYCARDIA): Primary | ICD-10-CM

## 2023-07-02 ENCOUNTER — PATIENT MESSAGE (OUTPATIENT)
Dept: INTERNAL MEDICINE | Facility: CLINIC | Age: 53
End: 2023-07-02
Payer: COMMERCIAL

## 2023-07-02 ENCOUNTER — HOSPITAL ENCOUNTER (EMERGENCY)
Facility: HOSPITAL | Age: 53
Discharge: HOME | End: 2023-07-02
Attending: EMERGENCY MEDICINE
Payer: COMMERCIAL

## 2023-07-02 VITALS
TEMPERATURE: 100 F | BODY MASS INDEX: 35.07 KG/M2 | HEIGHT: 70 IN | SYSTOLIC BLOOD PRESSURE: 125 MMHG | RESPIRATION RATE: 15 BRPM | OXYGEN SATURATION: 98 % | HEART RATE: 81 BPM | WEIGHT: 245 LBS | DIASTOLIC BLOOD PRESSURE: 85 MMHG

## 2023-07-02 DIAGNOSIS — B02.21 RAMSAY HUNT SYNDROME (GENICULATE HERPES ZOSTER): Primary | ICD-10-CM

## 2023-07-02 PROCEDURE — 99284 EMERGENCY DEPT VISIT MOD MDM: CPT

## 2023-07-02 PROCEDURE — 25000003 PHARM REV CODE 250: Performed by: EMERGENCY MEDICINE

## 2023-07-02 PROCEDURE — 63600175 PHARM REV CODE 636 W HCPCS: Performed by: EMERGENCY MEDICINE

## 2023-07-02 RX ORDER — PREDNISONE 20 MG/1
60 TABLET ORAL
Status: COMPLETED | OUTPATIENT
Start: 2023-07-02 | End: 2023-07-02

## 2023-07-02 RX ORDER — VALACYCLOVIR HYDROCHLORIDE 500 MG/1
1000 TABLET, FILM COATED ORAL
Status: COMPLETED | OUTPATIENT
Start: 2023-07-02 | End: 2023-07-02

## 2023-07-02 RX ORDER — VALACYCLOVIR HYDROCHLORIDE 1 G/1
1000 TABLET, FILM COATED ORAL 3 TIMES DAILY
Qty: 30 TABLET | Refills: 0 | Status: SHIPPED | OUTPATIENT
Start: 2023-07-02 | End: 2024-03-26

## 2023-07-02 RX ORDER — PREDNISONE 20 MG/1
40 TABLET ORAL 2 TIMES DAILY
Qty: 20 TABLET | Refills: 0 | Status: SHIPPED | OUTPATIENT
Start: 2023-07-02 | End: 2023-07-07

## 2023-07-02 RX ADMIN — PREDNISONE 60 MG: 20 TABLET ORAL at 09:07

## 2023-07-02 RX ADMIN — VALACYCLOVIR HYDROCHLORIDE 1000 MG: 500 TABLET, FILM COATED ORAL at 09:07

## 2023-07-03 NOTE — DISCHARGE INSTRUCTIONS
Take the steroid and the antiviral as instructed.  Make sure to put the lubrication on her eye and taper eyelid closed at night.  You need to follow-up with a neurologist.  Call tomorrow to schedule follow-up appointment.  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

## 2023-07-03 NOTE — ED PROVIDER NOTES
Encounter Date: 7/2/2023       History     Chief Complaint   Patient presents with    Otalgia     Left ear pain. Treated with abx at  on Friday       Headache     Began Thursday    Dizziness     Began yesterday    Facial Swelling     Began 2 days ago        Patient is a 53-year-old male with a past medical history of arthritis COVID hypertension sleep apnea presents emergency room for evaluation of left ear pain left facial droop mild headache mild dizziness.  He denies any significant fevers vision loss double vision weakness numbness neck stiffness chest pain shortness of breath.  He went to urgent care was given antibiotics for otitis media 2 days ago.    Review of patient's allergies indicates:   Allergen Reactions    Peaches [peach (prunus persica)] Anaphylaxis    Peanut Anaphylaxis    Tree pollen-red birch Anaphylaxis     Past Medical History:   Diagnosis Date    Arthritis     Back injury     Colon polyp     COVID     CPAP (continuous positive airway pressure) dependence     Hypertension     PONV (postoperative nausea and vomiting)     Sleep apnea     c pap machine used     Past Surgical History:   Procedure Laterality Date    BONE GRAFT N/A 04/05/2022    Procedure: BONE GRAFT;  Surgeon: Duran Adler Jr., MD;  Location: [facility] OR;  Service: Orthopedics;  Laterality: N/A;    CIRCUMCISION      COLONOSCOPY N/A 12/03/2018    Procedure: COLONOSCOPY;  Surgeon: CHRISSY Reyna MD;  [facility]  Service: Endoscopy;  Laterality: N/A;    COLONOSCOPY N/A 09/01/2022    Procedure: COLONOSCOPY;  Surgeon: Alondra Arias MD;  Location: [facility];  Service: Endoscopy;  Laterality: N/A;    epidural steroid injection X 2      lower lumbar    ESOPHAGOGASTRODUODENOSCOPY N/A 09/01/2022    Procedure: EGD (ESOPHAGOGASTRODUODENOSCOPY);  Surgeon: Alondra Arias MD;  Location: [facility];  Service: Endoscopy;  Laterality: N/A;    facet injection       Dr Manpreet [facility] in  [place]     INTRALUMINAL GASTROINTESTINAL TRACT IMAGING VIA CAPSULE N/A 10/03/2022    Procedure: IMAGING PROCEDURE, GI TRACT, INTRALUMINAL, VIA CAPSULE;  Surgeon: Alondra Arias MD;  Location: [facility];  Service: Endoscopy;  Laterality: N/A;    LAPAROSCOPIC SLEEVE GASTRECTOMY N/A 12/19/2022    Procedure: GASTRECTOMY, SLEEVE, LAPAROSCOPIC;  Surgeon: Lashonda Pinedo MD;  Location: [facility];  Service: General;  Laterality: N/A;    LIPOMA RESECTION      Back of neck    MAGNETIC RESONANCE IMAGING N/A 05/29/2020    Procedure: MRI (MAGNETIC RESONANCE IMAGING) LUMBAR SPINE;  Surgeon: [facility];  Location: [facility];  Service: General;  Laterality: N/A;  COVID NEG    MAGNETIC RESONANCE IMAGING N/A 12/03/2021    Procedure: MRI (MAGNETIC RESONANCE IMAGING), LUMBAR SPINE;  Surgeon: [facility] Provider;  Location: [facility];  Service: General;  Laterality: N/A;  In MRI @ 7:50 per Ariela    MEDIAL COLLATERAL LIGAMENT AND LATERAL COLLATERAL LIGAMENT REPAIR, KNEE Right 09/01/2018    Dr. Christophe Gore in [place]     MINIMALLY INVASIVE TRANSFORAMINAL LUMBAR INTERBODY FUSION (TLIF) N/A 04/05/2022    Procedure: FUSION, SPINE, LUMBAR, TLIF, MINIMALLY INVASIVE, WITH INSTRUMENTATION,  L4/5 RIGHT;  Surgeon: Duran Adler Jr., MD;  Location: [facility];  Service: Orthopedics;  Laterality: N/A;  10:30am per Tamela    MYELOGRAPHY N/A 06/23/2020    Procedure: MYELOGRAM;  Surgeon: [facility] Provider;  Location: [facility];  Service: General;  Laterality: N/A;     Family History   Problem Relation Age of Onset    Hypertension Mother     Diabetes Father     Cancer Father         mesotheliosma     Cataracts Neg Hx     Glaucoma Neg Hx     Macular degeneration Neg Hx     Retinal detachment Neg Hx     Colon cancer Neg Hx     Colon polyps Neg Hx     Crohn's disease Neg Hx     Ulcerative colitis Neg Hx      Social History     Tobacco Use    Smoking status: Former     Packs/day: 0.50     Years: 10.00     Pack years: 5.00      Types: Cigarettes     Quit date: 2009     Years since quittin.5    Smokeless tobacco: Never    Tobacco comments:     quit smoking in 2010   Substance Use Topics    Alcohol use: Yes     Comment: rarely    Drug use: Not Currently     Types: Marijuana     Comment: medical---oil, smoke and edibles     Review of Systems   Constitutional:  Negative for fatigue and fever.   HENT:  Negative for congestion and facial swelling.    Eyes:  Negative for photophobia, pain, discharge, redness and itching.   Respiratory:  Negative for cough and shortness of breath.    Cardiovascular:  Negative for chest pain.   Gastrointestinal:  Negative for abdominal pain.   Musculoskeletal:  Negative for arthralgias, back pain and neck pain.   Skin:  Negative for rash and wound.   Neurological:  Positive for weakness (left facial), numbness (Paresthesias over the left side of the face) and headaches. Negative for seizures, syncope, speech difficulty and light-headedness.   Hematological:  Does not bruise/bleed easily.     Physical Exam     Initial Vitals [23]   BP Pulse Resp Temp SpO2   125/85 81 15 99.6 °F (37.6 °C) 98 %      MAP       --         Physical Exam    Constitutional: He appears well-developed and well-nourished. No distress.   HENT:   Head: Normocephalic and atraumatic.   Right Ear: External ear normal.   Left Ear: External ear normal.   Bilateral TMs appear to be clear.  There is no effusions or vesicles.  There is no erythema.  No mastoid tenderness or swelling   Eyes: Conjunctivae and EOM are normal. Pupils are equal, round, and reactive to light.   Neck: Neck supple.   No bruits   Cardiovascular:  Normal rate, regular rhythm and normal heart sounds.     Exam reveals no gallop and no friction rub.       No murmur heard.  Pulmonary/Chest: Breath sounds normal. He has no wheezes. He has no rhonchi. He has no rales.   Abdominal: Abdomen is soft. There is no abdominal tenderness.   Musculoskeletal:      Cervical  back: Neck supple.     Neurological: He is alert and oriented to person, place, and time. He has normal strength. A cranial nerve deficit (left facial droop includes the forehead) and sensory deficit (mild paresthesias forehead cheek and mandibular region) is present.   Skin:   No lesions.  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  He does have a mild headache complete facial droop no vesicles mild paresthesias and mild dizziness.  These can all be found with [place] Rushing.  Temperature is 99.6°.  No signs of encephalitis meningitis or significant myelitis.  I do not think he needs IV antivirals.  I will have him follow up with Neurology to see how he is doing and follow up through this process.  I will start him on prednisone as well as acyclovir.  I doubt this is a infectious process such as mastoiditis or cerebral venous thrombosis or acoustic or vestibular mass lesion.  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 Provider    valACYclovir (VALTREX) 1000 MG tablet Take 1 tablet (1,000 mg total) by mouth 3 (three) times daily. for 10 days 30 tablet 7/2/2023 7/12/2023 Iftikhar Urban MD    predniSONE (DELTASONE) 20 MG tablet Take 2 tablets (40 mg total) by mouth 2 (two) times daily. for 5 days 20 tablet 7/2/2023  7/7/2023 Iftikhar Urban MD    white petrolatum-mineral oil 56.8-42.5% (LACRI-LUBE S.O.P.) 56.8-42.5 % Oint Place into the left eye every evening. Use the lubrication as needed and definitely at night.  Take your eye closed at night. 15 g 7/2/2023 -- Iftikhar Urban MD          Follow-up Information       Follow up With Specialties Details Why Contact Info Additional Information    Vickey Shore MD Vascular Neurology, Neurology Schedule an appointment as soon as possible for a visit  Call the neurologist tomorrow to schedule follow-up appointment. [address]  [facility] [address]  [phone]       [facility]  Return to the ER for increasing headaches persistent fevers altered mental status weakness or numbness in the arms or legs double vision [address] [place] [address] Dr [place] [id number] 1st floor             Iftikhar Urban MD  07/02/23 2342

## 2023-07-07 ENCOUNTER — LAB VISIT (OUTPATIENT)
Dept: LAB | Facility: HOSPITAL | Age: 53
End: 2023-07-07
Attending: SURGERY
Payer: COMMERCIAL

## 2023-07-07 DIAGNOSIS — E66.01 MORBID OBESITY: ICD-10-CM

## 2023-07-07 LAB
25(OH)D3+25(OH)D2 SERPL-MCNC: 59 NG/ML (ref 30–96)
ALBUMIN SERPL BCP-MCNC: 3.8 G/DL (ref 3.5–5.2)
ALP SERPL-CCNC: 43 U/L (ref 55–135)
ALT SERPL W/O P-5'-P-CCNC: 16 U/L (ref 10–44)
ANION GAP SERPL CALC-SCNC: 7 MMOL/L (ref 8–16)
AST SERPL-CCNC: 18 U/L (ref 10–40)
BASOPHILS # BLD AUTO: 0.02 K/UL (ref 0–0.2)
BASOPHILS NFR BLD: 0.3 % (ref 0–1.9)
BILIRUB SERPL-MCNC: 0.6 MG/DL (ref 0.1–1)
BUN SERPL-MCNC: 13 MG/DL (ref 6–20)
CALCIUM SERPL-MCNC: 9.4 MG/DL (ref 8.7–10.5)
CHLORIDE SERPL-SCNC: 104 MMOL/L (ref 95–110)
CHOLEST SERPL-MCNC: 123 MG/DL (ref 120–199)
CHOLEST/HDLC SERPL: 2.9 {RATIO} (ref 2–5)
CO2 SERPL-SCNC: 29 MMOL/L (ref 23–29)
CREAT SERPL-MCNC: 0.7 MG/DL (ref 0.5–1.4)
DIFFERENTIAL METHOD: ABNORMAL
EOSINOPHIL # BLD AUTO: 0 K/UL (ref 0–0.5)
EOSINOPHIL NFR BLD: 0.2 % (ref 0–8)
ERYTHROCYTE [DISTWIDTH] IN BLOOD BY AUTOMATED COUNT: 16 % (ref 11.5–14.5)
EST. GFR  (NO RACE VARIABLE): >60 ML/MIN/1.73 M^2
GLUCOSE SERPL-MCNC: 83 MG/DL (ref 70–110)
HCT VFR BLD AUTO: 38.7 % (ref 40–54)
HDLC SERPL-MCNC: 42 MG/DL (ref 40–75)
HDLC SERPL: 34.1 % (ref 20–50)
HGB BLD-MCNC: 12.1 G/DL (ref 14–18)
IMM GRANULOCYTES # BLD AUTO: 0.02 K/UL (ref 0–0.04)
IMM GRANULOCYTES NFR BLD AUTO: 0.3 % (ref 0–0.5)
IRON SERPL-MCNC: 78 UG/DL (ref 45–160)
LDLC SERPL CALC-MCNC: 73.4 MG/DL (ref 63–159)
LYMPHOCYTES # BLD AUTO: 3.4 K/UL (ref 1–4.8)
LYMPHOCYTES NFR BLD: 50.6 % (ref 18–48)
MCH RBC QN AUTO: 26.5 PG (ref 27–31)
MCHC RBC AUTO-ENTMCNC: 31.3 G/DL (ref 32–36)
MCV RBC AUTO: 85 FL (ref 82–98)
MONOCYTES # BLD AUTO: 0.4 K/UL (ref 0.3–1)
MONOCYTES NFR BLD: 5.9 % (ref 4–15)
NEUTROPHILS # BLD AUTO: 2.8 K/UL (ref 1.8–7.7)
NEUTROPHILS NFR BLD: 42.7 % (ref 38–73)
NONHDLC SERPL-MCNC: 81 MG/DL
NRBC BLD-RTO: 0 /100 WBC
PLATELET # BLD AUTO: 329 K/UL (ref 150–450)
PMV BLD AUTO: 10.2 FL (ref 9.2–12.9)
POTASSIUM SERPL-SCNC: 3.7 MMOL/L (ref 3.5–5.1)
PROT SERPL-MCNC: 6.7 G/DL (ref 6–8.4)
RBC # BLD AUTO: 4.56 M/UL (ref 4.6–6.2)
SATURATED IRON: 24 % (ref 20–50)
SODIUM SERPL-SCNC: 140 MMOL/L (ref 136–145)
TOTAL IRON BINDING CAPACITY: 325 UG/DL (ref 250–450)
TRANSFERRIN SERPL-MCNC: 232 MG/DL (ref 200–375)
TRIGL SERPL-MCNC: 38 MG/DL (ref 30–150)
VIT B12 SERPL-MCNC: 415 PG/ML (ref 210–950)
WBC # BLD AUTO: 6.62 K/UL (ref 3.9–12.7)

## 2023-07-07 PROCEDURE — 85025 COMPLETE CBC W/AUTO DIFF WBC: CPT | Performed by: SURGERY

## 2023-07-07 PROCEDURE — 80053 COMPREHEN METABOLIC PANEL: CPT | Performed by: SURGERY

## 2023-07-07 PROCEDURE — 84425 ASSAY OF VITAMIN B-1: CPT | Performed by: SURGERY

## 2023-07-07 PROCEDURE — 82306 VITAMIN D 25 HYDROXY: CPT | Performed by: SURGERY

## 2023-07-07 PROCEDURE — 82607 VITAMIN B-12: CPT | Performed by: SURGERY

## 2023-07-07 PROCEDURE — 80061 LIPID PANEL: CPT | Performed by: SURGERY

## 2023-07-07 PROCEDURE — 84466 ASSAY OF TRANSFERRIN: CPT | Performed by: SURGERY

## 2023-07-10 ENCOUNTER — PATIENT MESSAGE (OUTPATIENT)
Dept: FAMILY MEDICINE | Facility: CLINIC | Age: 53
End: 2023-07-10
Payer: COMMERCIAL

## 2023-07-10 NOTE — TELEPHONE ENCOUNTER
Unable to book appt today in clinic as there is no availability. Pt will see Dr. Mathias in the morning at 7:30.

## 2023-07-11 ENCOUNTER — OFFICE VISIT (OUTPATIENT)
Dept: FAMILY MEDICINE | Facility: CLINIC | Age: 53
End: 2023-07-11
Payer: COMMERCIAL

## 2023-07-11 VITALS
BODY MASS INDEX: 35.87 KG/M2 | WEIGHT: 250 LBS | DIASTOLIC BLOOD PRESSURE: 82 MMHG | SYSTOLIC BLOOD PRESSURE: 138 MMHG | HEART RATE: 80 BPM

## 2023-07-11 DIAGNOSIS — G51.0 BELL'S PALSY: Primary | ICD-10-CM

## 2023-07-11 LAB — VIT B1 BLD-MCNC: 63 UG/L (ref 38–122)

## 2023-07-11 PROCEDURE — 1159F PR MEDICATION LIST DOCUMENTED IN MEDICAL RECORD: ICD-10-PCS | Mod: CPTII,S$GLB,, | Performed by: STUDENT IN AN ORGANIZED HEALTH CARE EDUCATION/TRAINING PROGRAM

## 2023-07-11 PROCEDURE — 99213 PR OFFICE/OUTPT VISIT, EST, LEVL III, 20-29 MIN: ICD-10-PCS | Mod: S$GLB,,, | Performed by: STUDENT IN AN ORGANIZED HEALTH CARE EDUCATION/TRAINING PROGRAM

## 2023-07-11 PROCEDURE — 99999 PR PBB SHADOW E&M-EST. PATIENT-LVL III: CPT | Mod: PBBFAC,,, | Performed by: STUDENT IN AN ORGANIZED HEALTH CARE EDUCATION/TRAINING PROGRAM

## 2023-07-11 PROCEDURE — 99213 OFFICE O/P EST LOW 20 MIN: CPT | Mod: S$GLB,,, | Performed by: STUDENT IN AN ORGANIZED HEALTH CARE EDUCATION/TRAINING PROGRAM

## 2023-07-11 PROCEDURE — 1160F RVW MEDS BY RX/DR IN RCRD: CPT | Mod: CPTII,S$GLB,, | Performed by: STUDENT IN AN ORGANIZED HEALTH CARE EDUCATION/TRAINING PROGRAM

## 2023-07-11 PROCEDURE — 4010F PR ACE/ARB THEARPY RXD/TAKEN: ICD-10-PCS | Mod: CPTII,S$GLB,, | Performed by: STUDENT IN AN ORGANIZED HEALTH CARE EDUCATION/TRAINING PROGRAM

## 2023-07-11 PROCEDURE — 99999 PR PBB SHADOW E&M-EST. PATIENT-LVL III: ICD-10-PCS | Mod: PBBFAC,,, | Performed by: STUDENT IN AN ORGANIZED HEALTH CARE EDUCATION/TRAINING PROGRAM

## 2023-07-11 PROCEDURE — 1160F PR REVIEW ALL MEDS BY PRESCRIBER/CLIN PHARMACIST DOCUMENTED: ICD-10-PCS | Mod: CPTII,S$GLB,, | Performed by: STUDENT IN AN ORGANIZED HEALTH CARE EDUCATION/TRAINING PROGRAM

## 2023-07-11 PROCEDURE — 3079F PR MOST RECENT DIASTOLIC BLOOD PRESSURE 80-89 MM HG: ICD-10-PCS | Mod: CPTII,S$GLB,, | Performed by: STUDENT IN AN ORGANIZED HEALTH CARE EDUCATION/TRAINING PROGRAM

## 2023-07-11 PROCEDURE — 3075F SYST BP GE 130 - 139MM HG: CPT | Mod: CPTII,S$GLB,, | Performed by: STUDENT IN AN ORGANIZED HEALTH CARE EDUCATION/TRAINING PROGRAM

## 2023-07-11 PROCEDURE — 4010F ACE/ARB THERAPY RXD/TAKEN: CPT | Mod: CPTII,S$GLB,, | Performed by: STUDENT IN AN ORGANIZED HEALTH CARE EDUCATION/TRAINING PROGRAM

## 2023-07-11 PROCEDURE — 3008F BODY MASS INDEX DOCD: CPT | Mod: CPTII,S$GLB,, | Performed by: STUDENT IN AN ORGANIZED HEALTH CARE EDUCATION/TRAINING PROGRAM

## 2023-07-11 PROCEDURE — 1159F MED LIST DOCD IN RCRD: CPT | Mod: CPTII,S$GLB,, | Performed by: STUDENT IN AN ORGANIZED HEALTH CARE EDUCATION/TRAINING PROGRAM

## 2023-07-11 PROCEDURE — 3079F DIAST BP 80-89 MM HG: CPT | Mod: CPTII,S$GLB,, | Performed by: STUDENT IN AN ORGANIZED HEALTH CARE EDUCATION/TRAINING PROGRAM

## 2023-07-11 PROCEDURE — 3008F PR BODY MASS INDEX (BMI) DOCUMENTED: ICD-10-PCS | Mod: CPTII,S$GLB,, | Performed by: STUDENT IN AN ORGANIZED HEALTH CARE EDUCATION/TRAINING PROGRAM

## 2023-07-11 PROCEDURE — 3075F PR MOST RECENT SYSTOLIC BLOOD PRESS GE 130-139MM HG: ICD-10-PCS | Mod: CPTII,S$GLB,, | Performed by: STUDENT IN AN ORGANIZED HEALTH CARE EDUCATION/TRAINING PROGRAM

## 2023-07-11 RX ORDER — CARBAMAZEPINE 100 MG/1
TABLET, EXTENDED RELEASE ORAL
Qty: 67 TABLET | Refills: 0 | Status: SHIPPED | OUTPATIENT
Start: 2023-07-11 | End: 2023-09-27 | Stop reason: DRUGHIGH

## 2023-07-11 RX ORDER — CARBAMAZEPINE 100 MG/1
TABLET, EXTENDED RELEASE ORAL
Qty: 67 TABLET | Refills: 0 | Status: SHIPPED | OUTPATIENT
Start: 2023-07-11 | End: 2023-07-11

## 2023-07-11 RX ORDER — IBUPROFEN 200 MG
1 CAPSULE ORAL 2 TIMES DAILY
COMMUNITY

## 2023-07-11 RX ORDER — MULTIVITAMIN
1 TABLET ORAL DAILY
COMMUNITY

## 2023-07-11 NOTE — PATIENT INSTRUCTIONS
Take carbamazepine once daily for pain. If not effective after one week, increase to twice daily.     If you can see a neurologist, can cancel follow up with me. Otherwise see me again in 4 weeks.       Andrew Mario,     If you are due for any health screening(s) below please notify me so we can arrange them to be ordered and scheduled to maintain your health. Most healthy patients complete it. Don't lose out on improving your health.     All of your core healthy metrics are met.

## 2023-07-11 NOTE — PROGRESS NOTES
[facility] CLINIC NOTE    Patient Name: Fabiano Jules Jr.  YOB: 1970    PRESENTING HISTORY     History of Present Illness:  Mr. Fabiano Jules Jr. is a 53 y.o. male here with left facial pain.     Developed left ear pain on Friday. Saw a physician where he was working who looked in his ear, told inflamed. Given rx for abx.   The next day developed facial droop. Seen in ED with facial droop.   Started on 80mg pred QD, antiviral therapy.   He has had no sig improvement on this regimen.     Continues to complain of pain anterior to his left ear, on left face, inferior to left jaw.     He has had some issue with eye closure, using lubricating drops and taping eye shut.         ROS      OBJECTIVE:   Vital Signs:  Vitals:    07/11/23 0727   BP: 138/82   Pulse: 80   Weight: 113.4 kg (250 lb)          Physical Exam: Left ear: No pain with manipulation of pinna or percussion over mastoid. Ear canal and TM grossly WNL.     Marked left facial droop involving forehead. House Brackman IV. Able to close eye, but not tightly.    No other neurological deficits on exam.     Physical Exam    ASSESSMENT & PLAN:     Bell's palsy  -     Discontinue: carBAMazepine (TEGRETOL XR) 100 MG 12 hr tablet; Take 1 tablet (100 mg total) by mouth once daily for 7 days, THEN 1 tablet (100 mg total) 2 (two) times daily.  Dispense: 67 tablet; Refill: 0  -     CBC Auto Differential; Future; Expected date: 07/11/2023  -     Comprehensive Metabolic Panel; Future; Expected date: 07/11/2023  -     carBAMazepine (TEGRETOL XR) 100 MG 12 hr tablet; Take 1 tablet (100 mg total) by mouth once daily for 7 days, THEN 1 tablet (100 mg total) 2 (two) times daily.  Dispense: 67 tablet; Refill: 0      He has Bell's palsy but having sig pain associated. He has been unable to secure a neurology appointment. Steroids were not helpful for pain so I do not suspect sig relief with NSAIDs.   Will try off label use of carbamazepine as this has some  benefit in trigeminal neuralgia so I would expect some improvement in neurologic pain from this condition. Discussed risk of blood cell line abnormalities.     1 mo f/u. If not improved will get MRI        Howie Mathias MD   Internal Medicine

## 2023-07-12 ENCOUNTER — OFFICE VISIT (OUTPATIENT)
Dept: CARDIOLOGY | Facility: CLINIC | Age: 53
End: 2023-07-12
Payer: COMMERCIAL

## 2023-07-12 ENCOUNTER — PATIENT MESSAGE (OUTPATIENT)
Dept: INTERNAL MEDICINE | Facility: CLINIC | Age: 53
End: 2023-07-12
Payer: COMMERCIAL

## 2023-07-12 VITALS
HEIGHT: 70 IN | RESPIRATION RATE: 16 BRPM | OXYGEN SATURATION: 98 % | HEART RATE: 69 BPM | WEIGHT: 247 LBS | DIASTOLIC BLOOD PRESSURE: 88 MMHG | SYSTOLIC BLOOD PRESSURE: 131 MMHG | BODY MASS INDEX: 35.36 KG/M2

## 2023-07-12 DIAGNOSIS — I47.20 VT (VENTRICULAR TACHYCARDIA): ICD-10-CM

## 2023-07-12 DIAGNOSIS — I48.0 PAROXYSMAL ATRIAL FIBRILLATION: ICD-10-CM

## 2023-07-12 DIAGNOSIS — I47.10 SVT (SUPRAVENTRICULAR TACHYCARDIA): ICD-10-CM

## 2023-07-12 PROCEDURE — 1159F MED LIST DOCD IN RCRD: CPT | Mod: CPTII,S$GLB,, | Performed by: INTERNAL MEDICINE

## 2023-07-12 PROCEDURE — 3079F DIAST BP 80-89 MM HG: CPT | Mod: CPTII,S$GLB,, | Performed by: INTERNAL MEDICINE

## 2023-07-12 PROCEDURE — 99205 OFFICE O/P NEW HI 60 MIN: CPT | Mod: S$GLB,,, | Performed by: INTERNAL MEDICINE

## 2023-07-12 PROCEDURE — 1160F RVW MEDS BY RX/DR IN RCRD: CPT | Mod: CPTII,S$GLB,, | Performed by: INTERNAL MEDICINE

## 2023-07-12 PROCEDURE — 3079F PR MOST RECENT DIASTOLIC BLOOD PRESSURE 80-89 MM HG: ICD-10-PCS | Mod: CPTII,S$GLB,, | Performed by: INTERNAL MEDICINE

## 2023-07-12 PROCEDURE — 4010F ACE/ARB THERAPY RXD/TAKEN: CPT | Mod: CPTII,S$GLB,, | Performed by: INTERNAL MEDICINE

## 2023-07-12 PROCEDURE — 1159F PR MEDICATION LIST DOCUMENTED IN MEDICAL RECORD: ICD-10-PCS | Mod: CPTII,S$GLB,, | Performed by: INTERNAL MEDICINE

## 2023-07-12 PROCEDURE — 3008F BODY MASS INDEX DOCD: CPT | Mod: CPTII,S$GLB,, | Performed by: INTERNAL MEDICINE

## 2023-07-12 PROCEDURE — 1160F PR REVIEW ALL MEDS BY PRESCRIBER/CLIN PHARMACIST DOCUMENTED: ICD-10-PCS | Mod: CPTII,S$GLB,, | Performed by: INTERNAL MEDICINE

## 2023-07-12 PROCEDURE — 99205 PR OFFICE/OUTPT VISIT, NEW, LEVL V, 60-74 MIN: ICD-10-PCS | Mod: S$GLB,,, | Performed by: INTERNAL MEDICINE

## 2023-07-12 PROCEDURE — 4010F PR ACE/ARB THEARPY RXD/TAKEN: ICD-10-PCS | Mod: CPTII,S$GLB,, | Performed by: INTERNAL MEDICINE

## 2023-07-12 PROCEDURE — 3075F PR MOST RECENT SYSTOLIC BLOOD PRESS GE 130-139MM HG: ICD-10-PCS | Mod: CPTII,S$GLB,, | Performed by: INTERNAL MEDICINE

## 2023-07-12 PROCEDURE — 3008F PR BODY MASS INDEX (BMI) DOCUMENTED: ICD-10-PCS | Mod: CPTII,S$GLB,, | Performed by: INTERNAL MEDICINE

## 2023-07-12 PROCEDURE — 3075F SYST BP GE 130 - 139MM HG: CPT | Mod: CPTII,S$GLB,, | Performed by: INTERNAL MEDICINE

## 2023-07-12 NOTE — PROGRESS NOTES
Subjective:     HPI    I had the pleasure of seeing Fabiano Jules Jr. in consultation at your request for the evaluation of AF. He is a 53M with a history of Bell's palsy (diagnosed 2 weeks ago), hypertension, RAFAEL on CPAP, morbid obesity status post gastric sleeve surgery. Patient was admitted to the in 4/2023 after an allergic reaction possibly to peanuts.  He took EpiPen at home and went to the ER where he was found to be in AF at 81 bpm. Spontaneously converted to NSR within 24 hours. He was advised to take aspirin and potassium supplementation with outpatient follow up with Cardiology.      A 15 day Zio monitor worn in 5/2023 showed sinus rhythm with a min HR of 33 bpm, max HR of 179 bpm, and avg HR of 70 bpm. Predominant underlying rhythm was Sinus Rhythm. 1 run of Ventricular Tachycardia occurred lasting 4 beats with a max rate of 179 bpm (avg 145 bpm). 32 Supraventricular Tachycardia runs occurred, the run with the fastest interval lasting 7 beats with a max rate of 167 bpm, the longest lasting 35.8 secs with an avg rate of 136 bpm. Idioventricular Rhythm was present. Second Degree AV Block-Mobitz I (Wenckebach) was present. Supraventricular Tachycardia was detected within +/- 45 seconds of symptomatic patient event(s). Isolated SVEs were occasional (1.7%, 89056), SVE Couplets were rare (<1.0%, 593), and SVE Triplets were rare (<1.0%, 130). Isolated VEs were occasional (1.3%, 74186), VE Couplets were rare (<1.0%, 978), and VE Triplets were rare (<1.0%, 43). Ventricular Bigeminy and Trigeminy were present.     Echo 4/2023 showed EF 60%, mild LAE.    My interpretation of today's ECG is    Review of Systems   Constitutional: Negative for decreased appetite, malaise/fatigue, weight gain and weight loss.   HENT:  Negative for sore throat.    Eyes:  Positive for pain and visual disturbance. Negative for blurred vision.   Cardiovascular:  Negative for chest pain, dyspnea on exertion, irregular heartbeat, leg swelling,  near-syncope, orthopnea, palpitations, paroxysmal nocturnal dyspnea and syncope.   Respiratory:  Negative for shortness of breath.    Skin:  Negative for rash.   Musculoskeletal:  Negative for arthritis.   Gastrointestinal:  Negative for abdominal pain.   Neurological:  Positive for headaches. Negative for focal weakness.   Psychiatric/Behavioral:  Negative for altered mental status.      Objective:   Physical Exam  Vitals and nursing note reviewed.   Constitutional:       General: He is not in acute distress.     Appearance: He is well-developed.   HENT:      Head: Normocephalic and atraumatic.   Eyes:      General: No scleral icterus.     Pupils: Pupils are equal, round, and reactive to light.   Neck:      Thyroid: No thyromegaly.   Cardiovascular:      Rate and Rhythm: Regular rhythm.      Pulses: Normal pulses.      Heart sounds: Normal heart sounds. No murmur heard.    No friction rub. No gallop.   Pulmonary:      Effort: Pulmonary effort is normal.      Breath sounds: Normal breath sounds.   Abdominal:      General: Bowel sounds are normal. There is no distension.      Palpations: Abdomen is soft.      Tenderness: There is no abdominal tenderness.   Musculoskeletal:      Cervical back: Neck supple.   Skin:     General: Skin is warm and dry.      Findings: No rash.   Neurological:      Mental Status: He is alert and oriented to person, place, and time.   Psychiatric:         Behavior: Behavior normal.       Assessment:      1. SVT (supraventricular tachycardia)    2. VT (ventricular tachycardia)    3. Paroxysmal atrial fibrillation        Plan:     In summary, Fabiano Jules Jr. is a 53M with a single episode of AF which occurred in the setting of significant physiologic stress. He felt sustained palpitations with this event, which he has never felt before. Mild LAE, however. Discussed risks, benefits, indications, alternatives to ILR implantation. After considering his options he has agreed to proceed.    This  will be done locally. He will see me PRN (should sustained AF be seen).    Thank you for allowing me to participate in the care of this patient. Please do not hesitate to call me with any questions or concerns.

## 2023-07-13 ENCOUNTER — OFFICE VISIT (OUTPATIENT)
Dept: BARIATRICS | Facility: CLINIC | Age: 53
End: 2023-07-13
Payer: COMMERCIAL

## 2023-07-13 VITALS
DIASTOLIC BLOOD PRESSURE: 95 MMHG | HEART RATE: 118 BPM | TEMPERATURE: 99 F | SYSTOLIC BLOOD PRESSURE: 169 MMHG | BODY MASS INDEX: 35.67 KG/M2 | RESPIRATION RATE: 16 BRPM | HEIGHT: 69 IN | WEIGHT: 240.81 LBS

## 2023-07-13 DIAGNOSIS — I10 PRIMARY HYPERTENSION: ICD-10-CM

## 2023-07-13 DIAGNOSIS — E66.01 MORBID OBESITY: Primary | ICD-10-CM

## 2023-07-13 DIAGNOSIS — G47.33 OSA (OBSTRUCTIVE SLEEP APNEA): ICD-10-CM

## 2023-07-13 DIAGNOSIS — E66.01 MORBID OBESITY WITH BMI OF 40.0-44.9, ADULT: ICD-10-CM

## 2023-07-13 DIAGNOSIS — E66.01 CLASS 3 SEVERE OBESITY DUE TO EXCESS CALORIES WITH SERIOUS COMORBIDITY AND BODY MASS INDEX (BMI) OF 50.0 TO 59.9 IN ADULT: ICD-10-CM

## 2023-07-13 PROCEDURE — 1159F PR MEDICATION LIST DOCUMENTED IN MEDICAL RECORD: ICD-10-PCS | Mod: CPTII,S$GLB,, | Performed by: SURGERY

## 2023-07-13 PROCEDURE — 1159F MED LIST DOCD IN RCRD: CPT | Mod: CPTII,S$GLB,, | Performed by: SURGERY

## 2023-07-13 PROCEDURE — 3008F BODY MASS INDEX DOCD: CPT | Mod: CPTII,S$GLB,, | Performed by: SURGERY

## 2023-07-13 PROCEDURE — 3080F DIAST BP >= 90 MM HG: CPT | Mod: CPTII,S$GLB,, | Performed by: SURGERY

## 2023-07-13 PROCEDURE — 3077F SYST BP >= 140 MM HG: CPT | Mod: CPTII,S$GLB,, | Performed by: SURGERY

## 2023-07-13 PROCEDURE — 4010F PR ACE/ARB THEARPY RXD/TAKEN: ICD-10-PCS | Mod: CPTII,S$GLB,, | Performed by: SURGERY

## 2023-07-13 PROCEDURE — 99213 OFFICE O/P EST LOW 20 MIN: CPT | Mod: S$GLB,,, | Performed by: SURGERY

## 2023-07-13 PROCEDURE — 3077F PR MOST RECENT SYSTOLIC BLOOD PRESSURE >= 140 MM HG: ICD-10-PCS | Mod: CPTII,S$GLB,, | Performed by: SURGERY

## 2023-07-13 PROCEDURE — 3008F PR BODY MASS INDEX (BMI) DOCUMENTED: ICD-10-PCS | Mod: CPTII,S$GLB,, | Performed by: SURGERY

## 2023-07-13 PROCEDURE — 99213 PR OFFICE/OUTPT VISIT, EST, LEVL III, 20-29 MIN: ICD-10-PCS | Mod: S$GLB,,, | Performed by: SURGERY

## 2023-07-13 PROCEDURE — 99999 PR PBB SHADOW E&M-EST. PATIENT-LVL III: ICD-10-PCS | Mod: PBBFAC,,, | Performed by: SURGERY

## 2023-07-13 PROCEDURE — 99999 PR PBB SHADOW E&M-EST. PATIENT-LVL III: CPT | Mod: PBBFAC,,, | Performed by: SURGERY

## 2023-07-13 PROCEDURE — 4010F ACE/ARB THERAPY RXD/TAKEN: CPT | Mod: CPTII,S$GLB,, | Performed by: SURGERY

## 2023-07-13 PROCEDURE — 3080F PR MOST RECENT DIASTOLIC BLOOD PRESSURE >= 90 MM HG: ICD-10-PCS | Mod: CPTII,S$GLB,, | Performed by: SURGERY

## 2023-07-13 NOTE — PROGRESS NOTES
Post Op Note    Surgery: gastric sleeve surgery  Date: 12/19/22  Initial weight: 350  Last weight: 268  Current weight: 240    Started with bell's palsy about 2 weeks ago     Constipation: none  Reflux: none  Vomiting: none    Diet:  Breakfast:  wrap with egg lettuce tomato  Lunch: red beans  Dinner: small low carb pizza wrap; chicken- baked    Exercise:  Doing well, wants to do more at gym  5-6 k steps per day     MVI: barbara mvi, calcium, d    Vitals:    07/13/23 0842   BP: (!) 169/95   Pulse: (!) 118   Resp: 16   Temp: 99.3 °F (37.4 °C)       Body comp:  Fat Percent:  29.9 %  Fat Mass:  72 lb  FFM:  168.8 lb  TBW: 119.4 lb  TBW %:  49.6 %  BMR: 2279 kcal    PE:  NAD  RRR  Soft/nt/nd    Labs: reviewed    A/P: s/p sleeve     Counseling for patient:    Diet: continue low carb dieting  Exercise: as much as possible  Vitamins: continue vitamin regimen    Co morbidities:     1. Morbid obesity  CBC w/ Auto Differential    CMP    B12    B1    Lipid Panel    Iron Studies    Vitamin D 25 Hydroxy      2. Morbid obesity with BMI of 40.0-44.9, adult        3. Class 3 severe obesity due to excess calories with serious comorbidity and body mass index (BMI) of 50.0 to 59.9 in adult        4. RAFAEL (obstructive sleep apnea)        5. Primary hypertension            -All above: Evaluated, monitored, and treated with diet and exercise program.

## 2023-07-17 ENCOUNTER — OFFICE VISIT (OUTPATIENT)
Dept: NEUROLOGY | Facility: CLINIC | Age: 53
End: 2023-07-17
Payer: COMMERCIAL

## 2023-07-17 VITALS
BODY MASS INDEX: 35 KG/M2 | HEART RATE: 66 BPM | WEIGHT: 244.5 LBS | HEIGHT: 70 IN | SYSTOLIC BLOOD PRESSURE: 132 MMHG | DIASTOLIC BLOOD PRESSURE: 89 MMHG

## 2023-07-17 DIAGNOSIS — G62.9 NEUROPATHY: Primary | ICD-10-CM

## 2023-07-17 DIAGNOSIS — R73.03 PREDIABETES: ICD-10-CM

## 2023-07-17 DIAGNOSIS — G51.0 LEFT-SIDED BELL'S PALSY: ICD-10-CM

## 2023-07-17 PROCEDURE — 3008F BODY MASS INDEX DOCD: CPT | Mod: CPTII,S$GLB,, | Performed by: PSYCHIATRY & NEUROLOGY

## 2023-07-17 PROCEDURE — 4010F PR ACE/ARB THEARPY RXD/TAKEN: ICD-10-PCS | Mod: CPTII,S$GLB,, | Performed by: PSYCHIATRY & NEUROLOGY

## 2023-07-17 PROCEDURE — 4010F ACE/ARB THERAPY RXD/TAKEN: CPT | Mod: CPTII,S$GLB,, | Performed by: PSYCHIATRY & NEUROLOGY

## 2023-07-17 PROCEDURE — 1159F MED LIST DOCD IN RCRD: CPT | Mod: CPTII,S$GLB,, | Performed by: PSYCHIATRY & NEUROLOGY

## 2023-07-17 PROCEDURE — 1159F PR MEDICATION LIST DOCUMENTED IN MEDICAL RECORD: ICD-10-PCS | Mod: CPTII,S$GLB,, | Performed by: PSYCHIATRY & NEUROLOGY

## 2023-07-17 PROCEDURE — 99999 PR PBB SHADOW E&M-EST. PATIENT-LVL IV: CPT | Mod: PBBFAC,,, | Performed by: PSYCHIATRY & NEUROLOGY

## 2023-07-17 PROCEDURE — 99999 PR PBB SHADOW E&M-EST. PATIENT-LVL IV: ICD-10-PCS | Mod: PBBFAC,,, | Performed by: PSYCHIATRY & NEUROLOGY

## 2023-07-17 PROCEDURE — 1160F RVW MEDS BY RX/DR IN RCRD: CPT | Mod: CPTII,S$GLB,, | Performed by: PSYCHIATRY & NEUROLOGY

## 2023-07-17 PROCEDURE — 3079F DIAST BP 80-89 MM HG: CPT | Mod: CPTII,S$GLB,, | Performed by: PSYCHIATRY & NEUROLOGY

## 2023-07-17 PROCEDURE — 3008F PR BODY MASS INDEX (BMI) DOCUMENTED: ICD-10-PCS | Mod: CPTII,S$GLB,, | Performed by: PSYCHIATRY & NEUROLOGY

## 2023-07-17 PROCEDURE — 3079F PR MOST RECENT DIASTOLIC BLOOD PRESSURE 80-89 MM HG: ICD-10-PCS | Mod: CPTII,S$GLB,, | Performed by: PSYCHIATRY & NEUROLOGY

## 2023-07-17 PROCEDURE — 3075F SYST BP GE 130 - 139MM HG: CPT | Mod: CPTII,S$GLB,, | Performed by: PSYCHIATRY & NEUROLOGY

## 2023-07-17 PROCEDURE — 1160F PR REVIEW ALL MEDS BY PRESCRIBER/CLIN PHARMACIST DOCUMENTED: ICD-10-PCS | Mod: CPTII,S$GLB,, | Performed by: PSYCHIATRY & NEUROLOGY

## 2023-07-17 PROCEDURE — 3075F PR MOST RECENT SYSTOLIC BLOOD PRESS GE 130-139MM HG: ICD-10-PCS | Mod: CPTII,S$GLB,, | Performed by: PSYCHIATRY & NEUROLOGY

## 2023-07-17 PROCEDURE — 99205 PR OFFICE/OUTPT VISIT, NEW, LEVL V, 60-74 MIN: ICD-10-PCS | Mod: S$GLB,,, | Performed by: PSYCHIATRY & NEUROLOGY

## 2023-07-17 PROCEDURE — 99205 OFFICE O/P NEW HI 60 MIN: CPT | Mod: S$GLB,,, | Performed by: PSYCHIATRY & NEUROLOGY

## 2023-07-17 NOTE — PATIENT INSTRUCTIONS
"Ophthalmology referral  Left facial exercises  Walk and exercise    EYE CHAMBER:  Your eye muscles are weak, which means that at night, your eye cannot close completely to protect your eyeball from dust, debris, and bedding. This puts you at risk for your eye drying out and developing PAINFUL scratches on the cornea. You will need to protect your eye every night using an eye chamber and lubricant, until your eye muscles return to normal strength. Normal strength means that no one is able to force your eyelids open while you are trying to close them with maximal force.     Cover your eye-ball with eye lubrictant called Lacri-lube available in any pharmacy, such as the one below:      Next, cover your eye ball with an "eye chamber."  Do NOT put gauze on your eye!     An "eye chamber" for Bell's palsy can be purchased online or certain pharmacies and looks like this, or you can create one at home using some routine supplies:              To make an eye chamber at home, you will need to cut heavy- duty plastic wrap (Saran wrap) to fit over your eye socket, and tape it to the bony parts around your eye (forehead, bridge of nose, temple, and cheek). Using alcohol to dry the skin can make the tape stick better if it comes loose. Be careful for to drip alcohol into the eye.      "

## 2023-07-17 NOTE — PROGRESS NOTES
[facility] - NEUROLOGY 7TH [place] [facility], [place]    Date: 7/17/23  Patient Name: Fabiano Jules Jr.   MRN: [id number]   Referring Provider: Howie Mathias MD    Thank you so much Howie Mathias MD for your patient referral to Neuromuscular team at [facility]. We take pride in our care coordination and look forward to your feedback and questions.    Assessment:   Fabiano Jules Jr. is a 53 y.o. male is a very pleasant patient with left-sided moderate lower motor neuron type facial palsy for evaluation.  I guided the patient about the prognosis and red flag signs.  I discussed about ongoing physical therapy for facial muscles and healthy lifestyle to improve  possibility of complete recovery.  I guided him about better control of diabetes mellitus/prediabetes as it can affect his recovery.  I discussed about ophthalmology referral to avoid dryness of cornea especially at night.  I provided him information about eye patch/eye chamber.     The dose of carbamazepine can be increased in future.  I discussed about need for Physiotherapy. I addressed his complaints. I provided information about fall precautions and healthy lifestyle.    I would wish him very best for improvement/recovery in his condition.    Future direction based on feedback:    Plan:     Problem List Items Addressed This Visit          Neuro    Neuropathy - Primary     Other Visit Diagnoses       Left-sided Bell's palsy        Relevant Orders    Ambulatory consult to Ophthalmology    Prediabetes                Hair Samuel MD    This evaluation was completed in >75  Minutes over 50% of the time spent on education & counseling. This includes face to face time and non-face to face time preparing to see the patient (eg, review of tests), obtaining and/or reviewing separately obtained history, documenting clinical information in the electronic or other health record, independently interpreting results and  communicating results to the patient/family/caregiver, or care coordinator.    Patient note was created using Piece of Cakeodal Dictation.  Any errors in syntax or even information may not have been identified and edited on initial review prior to signing this note.    Details provided by:    Patient    Chief complaint today:  Left-sided painful Bell's palsy    History of Present Illness:      Mr. Fabiano Jules Jr. is a 53 y.o. male presenting for evaluation of  left-sided painful Bell's palsy. He has PMH of prediabetes, hypertension, RAFAEL on CPAP, morbid obesity status post gastric sleeve surgery.    The detail was confirmed with the patient who mentioned that he had left-sided facial palsy 2 weeks ago.  He initially noted pain behind the ear next day he noticed weakness of the face and he was started on steroids.  As pain was severe in the neck and head area especially on the left side which feels like a squeezing sensation;  he was started on carbamazepine which helped the pain 20-30% but the effect does not last long.  There is no history of nausea or vomiting.  He feels that his vision is blurry in both eyes.  He did mention some vertigo which is intermittent in nature especially as he tends to walk fast.  He ranks the pain 10/10 in intensity with tingling burning sensation and occasionally it could be sharp.  He feels hearing is mildly reduced on the left side.  There is change of taste with a metal like sensation.  There is no history of swallowing difficulty.      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  There is no history of smoking and rare alcohol use.    Chart Review:  07/11/2023 family medicine note   Developed left ear pain on Friday. Saw a physician where he was working who looked in his ear, told inflamed. Given rx for abx. The next day developed facial droop. Seen in ED with facial droop.  Started on 80mg pred QD, antiviral therapy.   He has had no sig improvement on this  regimen. Continues to complain of pain anterior to his left ear, on left face, inferior to left jaw.     Surgery: gastric sleeve surgery  Date: 12/19/22  Initial weight: 350  Last weight: 268  Current weight: 240    Pertinent work up based on chart review for current condition:  Vitamin-D level 59   Vitamin-B 1 level 63 normal   Vitamin B12 415   Comprehensive metabolic panel normal   TSH 9.0 hi   THC positive on urine drug screen   Hemoglobin A1c 6.0    Echo 4/2023 showed EF 60%, mild LAE    MRI lumbar spine on 05/29/2020   1. Moderate degenerate facet arthropathy and moderate right and mild left foraminal stenosis L4-5.  Also mild concentric annular bulge with mild impression upon the anterior spinal canal.  2. Mild degenerative facet changes L3-4 and L5-S1.    Last CBC Results:   Lab Results   Component Value Date    WBC 6.62 07/07/2023    HGB 12.1 (L) 07/07/2023    HCT 38.7 (L) 07/07/2023     07/07/2023       Last CMP Results  Lab Results   Component Value Date     07/07/2023    K 3.7 07/07/2023     07/07/2023    CO2 29 07/07/2023    BUN 13 07/07/2023    CREATININE 0.7 07/07/2023    CALCIUM 9.4 07/07/2023    ALBUMIN 3.8 07/07/2023    AST 18 07/07/2023    ALT 16 07/07/2023       Last Lipids  Lab Results   Component Value Date    CHOL 123 07/07/2023    TRIG 38 07/07/2023    HDL 42 07/07/2023    LDLCALC 73.4 07/07/2023       Last A1C  Lab Results   Component Value Date    HGBA1C 6.0 (H) 08/03/2022       Last TSH  Lab Results   Component Value Date    TSH 9.090 (H) 04/06/2023       MRI 07/17/2023    Review of Systems:  12 system review of systems is negative except for the symptoms mentioned in HPI.       PAST MEDICAL HISTORY:  Past Medical History:   Diagnosis Date    Arthritis     Back injury     Colon polyp     COVID     CPAP (continuous positive airway pressure) dependence     Hypertension     PONV (postoperative nausea and vomiting)     Sleep apnea     c pap machine used       PAST SURGICAL  HISTORY:  Past Surgical History:   Procedure Laterality Date    BONE GRAFT N/A 04/05/2022    Procedure: BONE GRAFT;  Surgeon: Duran Adler Jr., MD;  Location: [facility] OR;  Service: Orthopedics;  Laterality: N/A;    CIRCUMCISION      COLONOSCOPY N/A 12/03/2018    Procedure: COLONOSCOPY;  Surgeon: CHRISSY Reyna MD;  Location: [facility] ENDO (4TH FLR);  Service: Endoscopy;  Laterality: N/A;    COLONOSCOPY N/A 09/01/2022    Procedure: COLONOSCOPY;  Surgeon: Alondra Arias MD;  Location: [facility];  Service: Endoscopy;  Laterality: N/A;    epidural steroid injection X 2      lower lumbar    ESOPHAGOGASTRODUODENOSCOPY N/A 09/01/2022    Procedure: EGD (ESOPHAGOGASTRODUODENOSCOPY);  Surgeon: Alondra Arias MD;  Location: [facility];  Service: Endoscopy;  Laterality: N/A;    facet injection       [facility] [place]     INTRALUMINAL GASTROINTESTINAL TRACT IMAGING VIA CAPSULE N/A 10/03/2022    Procedure: IMAGING PROCEDURE, GI TRACT, INTRALUMINAL, VIA CAPSULE;  Surgeon: Alondra Arias MD;  Location: [facility];  Service: Endoscopy;  Laterality: N/A;    LAPAROSCOPIC SLEEVE GASTRECTOMY N/A 12/19/2022    Procedure: GASTRECTOMY, SLEEVE, LAPAROSCOPIC;  Surgeon: Lashonda Pinedo MD;  Location: [facility] OR;  Service: General;  Laterality: N/A;    LIPOMA RESECTION      Back of neck    MAGNETIC RESONANCE IMAGING N/A 05/29/2020    Procedure: MRI (MAGNETIC RESONANCE IMAGING) LUMBAR SPINE;  Surgeon: [facility] Diagnostic Provider;  Location: [facility];  Service: General;  Laterality: N/A;  COVID NEG    MAGNETIC RESONANCE IMAGING N/A 12/03/2021    Procedure: MRI (MAGNETIC RESONANCE IMAGING), LUMBAR SPINE;  Surgeon: [facility] Diagnostic Provider;  Location: [facility];  Service: General;  Laterality: N/A;  In MRI @ 7:50 per Ariela    MEDIAL COLLATERAL LIGAMENT AND LATERAL COLLATERAL LIGAMENT REPAIR, KNEE Right 09/01/2018    Dr. Christophe Gore in [place]     MINIMALLY INVASIVE TRANSFORAMINAL LUMBAR INTERBODY FUSION  (TLIF) N/A 04/05/2022    Procedure: FUSION, SPINE, LUMBAR, TLIF, MINIMALLY INVASIVE, WITH INSTRUMENTATION,  L4/5 RIGHT;  Surgeon: Duran Adler Jr., MD;  Location: [facility] OR;  Service: Orthopedics;  Laterality: N/A;  10:30am per Tamela    MYELOGRAPHY N/A 06/23/2020    Procedure: MYELOGRAM;  Surgeon: Andi Diagnostic Provider;  Location: [facility] OR;  Service: General;  Laterality: N/A;       CURRENT MEDS:  I have reconciled the patient's home medications and discharge medications with the patient/family. I have updated all changes.  Refer to After-Visit Medication List.    Current Outpatient Medications   Medication Sig Dispense Refill    amLODIPine (NORVASC) 5 MG tablet Take 1 tablet (5 mg total) by mouth once daily. 30 tablet 11    b complex vitamins (B COMPLEX-VITAMIN B12) tablet Take 1 tablet by mouth once daily. 90 tablet 1    calcium citrate (CALCITRATE) 200 mg (950 mg) tablet Take 1 tablet by mouth 2 (two) times daily.      carBAMazepine (TEGRETOL XR) 100 MG 12 hr tablet Take 1 tablet (100 mg total) by mouth once daily for 7 days, THEN 1 tablet (100 mg total) 2 (two) times daily. 67 tablet 0    EPINEPHrine (EPIPEN) 0.3 mg/0.3 mL AtIn Inject into the muscle once for one dose as needed. 2 each 1    HYDROcodone-acetaminophen (NORCO) 7.5-325 mg per tablet Take 1 tablet by mouth every 4 (four) hours as needed for Pain. 20 tablet 0    multivitamin (THERAGRAN) per tablet Take 1 tablet by mouth once daily.      rosuvastatin (CRESTOR) 10 MG tablet Take 1 tablet (10 mg total) by mouth once daily. 90 tablet 3    valACYclovir (VALTREX) 1000 MG tablet Take 1 tablet (1,000 mg total) by mouth 3 (three) times daily. for 10 days 30 tablet 0    white petrolatum-mineral oil 56.8-42.5% (LACRI-LUBE S.O.P.) 56.8-42.5 % Oint Place into the left eye every evening. Use the lubrication as needed and definitely at night.  Take your eye closed at night. 15 g 0     No current facility-administered medications for this visit.      Facility-Administered Medications Ordered in Other Visits   Medication Dose Route Frequency Provider Last Rate Last Admin    ceFAZolin in dextrose (iso-os) 2 gram/100 mL PgBk 2,000 mg  2 g Intravenous Q8H Katie Shane NP   Stopped at 22 1036    HYDROmorphone injection 1 mg  1 mg Intravenous Q6H PRN Katie Shane NP   1 mg at 22 0539    oxyCODONE-acetaminophen  mg per tablet 1 tablet  1 tablet Oral Q4H PRN Katie Shane NP   1 tablet at 22 0906    polyethylene glycol packet 17 g  17 g Oral Daily Katie Shane NP   17 g at 22 0822       ALLERGIES:  Review of patient's allergies indicates:   Allergen Reactions    Peaches [peach (prunus persica)] Anaphylaxis    Peanut Anaphylaxis    Tree pollen-red birch Anaphylaxis       FAMILY HISTORY:  Family History   Problem Relation Age of Onset    Hypertension Mother     Diabetes Father     Cancer Father         mesotheliosma     Cataracts Neg Hx     Glaucoma Neg Hx     Macular degeneration Neg Hx     Retinal detachment Neg Hx     Colon cancer Neg Hx     Colon polyps Neg Hx     Crohn's disease Neg Hx     Ulcerative colitis Neg Hx        SOCIAL HISTORY:  Social History     Tobacco Use    Smoking status: Former     Packs/day: 0.50     Years: 10.00     Pack years: 5.00     Types: Cigarettes     Quit date: 2009     Years since quittin.5    Smokeless tobacco: Never    Tobacco comments:     quit smoking in    Substance Use Topics    Alcohol use: Yes     Comment: rarely    Drug use: Not Currently     Types: Marijuana     Comment: medical---oil, smoke and edibles         Objective:   There were no vitals filed for this visit.  Wt Readings from Last 3 Encounters:   23 0842 109.2 kg (240 lb 12.8 oz)   23 0848 112.1 kg (247 lb 0.4 oz)   23 0727 113.4 kg (250 lb)     Body mass index is 35.08 kg/m².       Eyes: no tearing, discharge, no erythema   ENT: moist mucous membranes of the oral cavity, nares patent    Neck:  Supple, full range of motion  Cardiovascular: Warm and well perfused, pulses equal and symmetrical  Lungs: Normal work of breathing, normal chest wall excursions  Skin: No rash, lesions, or breakdown on exposed skin  Psychiatry: Mood and affect are appropriate   Extremeties: No cyanosis, clubbing or edema.    GENERAL/CONSTITUTIONAL:    -Well appearing; well nourished    HIGHER INTEGRATIVE FUNCTIONS:   -Attention & concentration: Normal   -Orientation: Oriented to person, place & time  -Memory: Normal  -Language: Normal   -Fund of Knowledge: Normal     FUNDOSCOPY:  Fundi visualized with clear disc margins    CRANIAL NERVES:   -CN 2: Visual fields full  -CN 2,3: PERRL  -CN 3,4,6: EOMI  -CN 5: Facial sensation intact bilaterally  -CN 7: Moderate left-sided low motor neuron type facial weakness with flattening of left side of forehead with minimal/no activation, inability to close left eye on the left side with inability to puff cheeks.  -CN 8: Hearing normal bilaterally  -CN 9,10: Palate elevates symmetrically  -CN 11: Normal shoulder shrug and head turn  -CN 12: Tongue protrudes midline     MOTOR:   -Tone: normal in upper and lower extremities  -UE/LE motor: 5/5 throughout, no pronator drift      Upper Ext Right Left Lower Ext Right Left   Shoulder Abd 5 5 Hip flexion 5 5   Elbow flexion 5 5 Knee extension 5 5   Elbow extension 5 5 Knee flexion 5 5   Fingers abduction 5 5 Ankle dorsiflexion 5 5   Wrist extension   Ankle plantar flexion     Wrist flexion   Great toe dorsiflexion     Finger extension   Thigh adduction     Finger flexion   Thigh abduction     Thumb abduction            REFLEXES:      R L  R L   Triceps 2 2 Knee 2 2   Biceps 2 2 Ankle 2 2   BR 2 2        -Mute plantar reflex bilaterally     SENSATION:   -Vibration sense is impaired up to ankles bilaterally.    -Pinprick sensation is normal on the face.  Pinprick sensation is normal in the feet with no sensory graded loss.      COORDINATION:   -FNF normal  bilaterally    GAIT:   -Normal casual gait. Able to walk on heels and toes without difficulty.  - He has flat feet.      Scheduled Follow-up :  Future Appointments   Date Time Provider Department Center   7/17/2023  5:00 PM Billie Santiago PT [facility] REHOP [place]   8/5/2023  9:40 AM LAB, [facility] [place]   8/8/2023  7:15 AM Howie Mathias MD [facility] [place]   8/18/2023  2:40 PM [facility]   9/7/2023  4:00 PM Liban Hirsch MD [facility] ORTHO [place]   9/27/2023  8:00 AM Howie Mathias MD [facility] [place]       After Visit Medication List :     Medication List            Accurate as of July 17, 2023  3:30 PM. If you have any questions, ask your nurse or doctor.                CONTINUE taking these medications      amLODIPine 5 MG tablet  Commonly known as: NORVASC  Take 1 tablet (5 mg total) by mouth once daily.     b complex vitamins tablet  Commonly known as: B COMPLEX-VITAMIN B12  Take 1 tablet by mouth once daily.     calcium citrate 200 mg (950 mg) tablet  Commonly known as: CALCITRATE     carBAMazepine 100 MG 12 hr tablet  Commonly known as: TEGRETOL XR  Take 1 tablet (100 mg total) by mouth once daily for 7 days, THEN 1 tablet (100 mg total) 2 (two) times daily.  Start taking on: July 11, 2023     EPINEPHrine 0.3 mg/0.3 mL Atin  Commonly known as: EPIPEN  Inject into the muscle once for one dose as needed.     HYDROcodone-acetaminophen 7.5-325 mg per tablet  Commonly known as: NORCO  Take 1 tablet by mouth every 4 (four) hours as needed for Pain.     multivitamin per tablet  Commonly known as: THERAGRAN     rosuvastatin 10 MG tablet  Commonly known as: CRESTOR  Take 1 tablet (10 mg total) by mouth once daily.     valACYclovir 1000 MG tablet  Commonly known as: VALTREX  Take 1 tablet (1,000 mg total) by mouth 3 (three) times daily. for 10 days     white petrolatum-mineral oil 56.8-42.5% 56.8-42.5 % Oint  Commonly known as: LACRI-LUBE S.O.P.  Place into  the left eye every evening. Use the lubrication as needed and definitely at night.  Take your eye closed at night.              Signing Physician:          Hair Samuel MD  , [employer] [facility] [employer] [facility]  Neurology Consultant. [facility].   [address],  [facility]. [address] [place] [address].

## 2023-07-21 ENCOUNTER — PATIENT MESSAGE (OUTPATIENT)
Dept: INTERNAL MEDICINE | Facility: CLINIC | Age: 53
End: 2023-07-21
Payer: COMMERCIAL

## 2023-07-24 RX ORDER — AMLODIPINE BESYLATE 5 MG/1
TABLET ORAL
Qty: 90 TABLET | Refills: 3 | Status: SHIPPED | OUTPATIENT
Start: 2023-07-24

## 2023-07-24 NOTE — TELEPHONE ENCOUNTER
No care due was identified.  [facility] Embedded Care Due Messages. Reference number: [id number].   7/24/2023 12:21:35 AM CDT

## 2023-07-24 NOTE — TELEPHONE ENCOUNTER
Refill Decision Note      Refill Decision Note   Fabiano Jules  is requesting a refill authorization.  Brief Assessment and Rationale for Refill:  Approve     Medication Therapy Plan:         Comments:     Note composed:5:32 AM 07/24/2023             Appointments     Last Visit   7/11/2023 Howie Mathias MD   Next Visit   8/8/2023 Howie Mathias MD

## 2023-07-31 ENCOUNTER — PATIENT MESSAGE (OUTPATIENT)
Dept: INTERNAL MEDICINE | Facility: CLINIC | Age: 53
End: 2023-07-31
Payer: COMMERCIAL

## 2023-07-31 ENCOUNTER — OFFICE VISIT (OUTPATIENT)
Dept: OPTOMETRY | Facility: CLINIC | Age: 53
End: 2023-07-31
Payer: COMMERCIAL

## 2023-07-31 VITALS — HEIGHT: 70 IN | WEIGHT: 244.5 LBS | BODY MASS INDEX: 35 KG/M2

## 2023-07-31 DIAGNOSIS — H16.9: ICD-10-CM

## 2023-07-31 DIAGNOSIS — G51.0 LEFT-SIDED BELL'S PALSY: ICD-10-CM

## 2023-07-31 DIAGNOSIS — H16.212 EXPOSURE KERATOPATHY, LEFT: Primary | ICD-10-CM

## 2023-07-31 PROCEDURE — 4010F PR ACE/ARB THEARPY RXD/TAKEN: ICD-10-PCS | Mod: CPTII,S$GLB,,

## 2023-07-31 PROCEDURE — 3008F PR BODY MASS INDEX (BMI) DOCUMENTED: ICD-10-PCS | Mod: CPTII,S$GLB,,

## 2023-07-31 PROCEDURE — 99999 PR PBB SHADOW E&M-EST. PATIENT-LVL III: ICD-10-PCS | Mod: PBBFAC,,,

## 2023-07-31 PROCEDURE — 1159F MED LIST DOCD IN RCRD: CPT | Mod: CPTII,S$GLB,,

## 2023-07-31 PROCEDURE — 99999 PR PBB SHADOW E&M-EST. PATIENT-LVL III: CPT | Mod: PBBFAC,,,

## 2023-07-31 PROCEDURE — 99213 OFFICE O/P EST LOW 20 MIN: CPT | Mod: S$GLB,,,

## 2023-07-31 PROCEDURE — 99213 PR OFFICE/OUTPT VISIT, EST, LEVL III, 20-29 MIN: ICD-10-PCS | Mod: S$GLB,,,

## 2023-07-31 PROCEDURE — 4010F ACE/ARB THERAPY RXD/TAKEN: CPT | Mod: CPTII,S$GLB,,

## 2023-07-31 PROCEDURE — 1159F PR MEDICATION LIST DOCUMENTED IN MEDICAL RECORD: ICD-10-PCS | Mod: CPTII,S$GLB,,

## 2023-07-31 PROCEDURE — 3008F BODY MASS INDEX DOCD: CPT | Mod: CPTII,S$GLB,,

## 2023-07-31 RX ORDER — ERYTHROMYCIN 5 MG/G
OINTMENT OPHTHALMIC NIGHTLY
Qty: 1 G | Refills: 1 | Status: SHIPPED | OUTPATIENT
Start: 2023-07-31 | End: 2023-08-10

## 2023-07-31 RX ORDER — PREDNISOLONE ACETATE 10 MG/ML
1 SUSPENSION/ DROPS OPHTHALMIC 4 TIMES DAILY
Qty: 5 ML | Refills: 0 | Status: SHIPPED | OUTPATIENT
Start: 2023-07-31 | End: 2023-08-10

## 2023-07-31 NOTE — PROGRESS NOTES
HPI    Pt diagnosed with Bell's Palsy secondary to an ear infection on the left   side x 3 weeks ago.    Pt states he's been taping lids shut at night time and also using   Lacrilube 5-6x/day. Pt has been having a lot of headaches lately (8/10   pain). Pt feels OS vision has been blurrier, but pt states both eyes have   been uncomfortable and sensitive to the light.   Last edited by Sonia Liz, OD on 7/31/2023 12:12 PM.            Assessment /Plan     For exam results, see Encounter Report.    Exposure keratopathy, left  -     prednisoLONE acetate (PRED FORTE) 1 % DrpS; Place 1 drop into both eyes 4 (four) times daily. for 10 days  Dispense: 5 mL; Refill: 0  -     erythromycin (ROMYCIN) ophthalmic ointment; Place into the left eye every evening. for 10 days  Dispense: 1 g; Refill: 1    Left-sided Bell's palsy  -     Ambulatory consult to Ophthalmology  -     erythromycin (ROMYCIN) ophthalmic ointment; Place into the left eye every evening. for 10 days  Dispense: 1 g; Refill: 1    Corneal inflammation, bilateral      1-2. Pt diagnosed with Bell's Palsy secondary to ear infection on the left side. Pt states it has been slowly improving, currently on medication for nerve pain . No cranial nerve abnormalities upon examination today. Prominent exposure keratopathy of the left eye. Advised pt continue taping lid shut at night with medical tape and Rx'd Erythromycin tk to begin applying QHS OS. Gave OTC PF AT recommendations to use q1-2h throughout the day to eliminate blur from ointment. Ed pt to RTC if changes in vision or pain/discomfort arise.    3. Few infiltrates of OD cornea, pt denies contact lens wear. No infiltrates OS. Rx'd Pred Forte to use QID OU x 10 days. Ed pt to RTC if changes in vision or worsening symptoms.    RTC: if symptoms worsen or fail to resolve; otherwise, annual eye exam

## 2023-08-08 ENCOUNTER — TELEPHONE (OUTPATIENT)
Dept: FAMILY MEDICINE | Facility: CLINIC | Age: 53
End: 2023-08-08
Payer: COMMERCIAL

## 2023-08-08 NOTE — TELEPHONE ENCOUNTER
I spoke with Dr. Mathias he states that if pt does not need anything he does not have to come in being that he has seen neurology.  I spoke with pt via phone. He states that he does not need anything at this time. Will complete blood work tomorrow, will await results from Dr Tish Mathias.    ----- Message from Kev Garzon sent at 8/8/2023  7:36 AM CDT -----  Regarding: sooner appt  Type:  Sooner Appointment Request    Caller is requesting a sooner appointment.  Caller declined first available appointment listed below.  Caller will not accept being placed on the waitlist and is requesting a message be sent to doctor.    Name of Caller:Pt  When is the first available appointment?oct 20    Symptoms:fu Joshua palsy    Would the patient rather a call back or a response via [facility]? Call back    Best Call Back Number:[phone]      Additional Information: Sts he overslept forgot about his appt. This morning.   Please advise -- Thank you

## 2023-08-17 NOTE — PROGRESS NOTES
Subjective:    Patient ID:  Fabiano Jules Jr. is a 53 y.o. male who presents for follow-up of   Chief Complaint   Patient presents with    LOOP RECORDER CONSULT       HPI:      REFERRED  BY DR PRADO   FOR  LOOP RECORDER      53-year-old male came here for follow up after the hospital discharge.  Patient with past medical history of hypertension, RAFAEL on CPAP, morbid obesity status post gastric sleeve surgery. Patient was admitted to the ED 3 weeks ago after an allergic reaction possibly to peanuts.  He took EpiPen at home and went to the ER where he was found to be in new onset AFib and hypokalemia.  AFib resolved after the potassium repletion and he was seen by Cardiology.  He was advised to take aspirin and potassium supplementation with outpatient follow up with Cardiology.  Patient states he felt mild palpitations on the day prior to coming to ER.  Denies any history of heart disease in the past.  Patient states he lost 100 lb after the bariatric surgery.  Denies any palpitations recently        53-year-old male came here for follow up after the hospital discharge.  Patient with past medical history of hypertension, RAFAEL on CPAP, morbid obesity status post gastric sleeve surgery. Patient was admitted to the ED 3 weeks ago after an allergic reaction possibly to peanuts.  He took EpiPen at home and went to the ER where he was found to be in new onset AFib and hypokalemia.  AFib resolved after the potassium repletion and he was seen by Cardiology.  He was advised to take aspirin and potassium supplementation with outpatient follow up with Cardiology.  Patient states he felt mild palpitations on the day prior to coming to ER.  Denies any history of heart disease in the past.  Patient states he lost 100 lb after the bariatric surgery.  Denies any palpitations recently.  Denies any history of stroke or congestive heart failure or thromboembolism in the past.      Recent onset paroxysmal Afib- chads Vasc 1: ?  Induced by  EpiPen/hypokalemia vs chronic structural heart changes  Hypertension-controlled  Dyslipidemia-controlled     -discussed with the patient regarding benefits and risks of long-term anticoagulation and antiplatelet therapy as well.  Patient would like to take only aspirin at this point.  Continue aspirin 325 mg daily.  -continue amlodipine Crestor  -repeat BMP for potassium levels  -recent echo with normal ejection fraction  -continue CPAP for RAFAEL  -follow up with PCP regarding elevated TSH levels.   -2 week event monitor to assess for any recurrence of atrial fibrillation.          Review of patient's allergies indicates:   Allergen Reactions    Peaches [peach (prunus persica)] Anaphylaxis    Peanut Anaphylaxis    Tree pollen-red birch Anaphylaxis       Past Medical History:   Diagnosis Date    Arthritis     Back injury     Colon polyp     COVID     CPAP (continuous positive airway pressure) dependence     Hypertension     PONV (postoperative nausea and vomiting)     Sleep apnea     c pap machine used     Past Surgical History:   Procedure Laterality Date    BONE GRAFT N/A 04/05/2022    Procedure: BONE GRAFT;  Surgeon: Duran Adler Jr., MD;  Location: [facility] OR;  Service: Orthopedics;  Laterality: N/A;    CIRCUMCISION      COLONOSCOPY N/A 12/03/2018    Procedure: COLONOSCOPY;  Surgeon: CHRISSY Reyna MD;  Location: [facility] ([facility]);  Service: Endoscopy;  Laterality: N/A;    COLONOSCOPY N/A 09/01/2022    Procedure: COLONOSCOPY;  Surgeon: Alondra Arias MD;  Location: [facility] ENDO;  Service: Endoscopy;  Laterality: N/A;    epidural steroid injection X 2      lower lumbar    ESOPHAGOGASTRODUODENOSCOPY N/A 09/01/2022    Procedure: EGD (ESOPHAGOGASTRODUODENOSCOPY);  Surgeon: Alondra Arias MD;  Location: [facility] ENDO;  Service: Endoscopy;  Laterality: N/A;    facet injection       [facility] [place]     INTRALUMINAL GASTROINTESTINAL TRACT IMAGING VIA CAPSULE N/A 10/03/2022     Procedure: IMAGING PROCEDURE, GI TRACT, INTRALUMINAL, VIA CAPSULE;  Surgeon: Alondra Arias MD;  Location: [facility] ENDO;  Service: Endoscopy;  Laterality: N/A;    LAPAROSCOPIC SLEEVE GASTRECTOMY N/A 2022    Procedure: GASTRECTOMY, SLEEVE, LAPAROSCOPIC;  Surgeon: Lashonda Pinedo MD;  Location: [facility] OR;  Service: General;  Laterality: N/A;    LIPOMA RESECTION      Back of neck    MAGNETIC RESONANCE IMAGING N/A 2020    Procedure: MRI (MAGNETIC RESONANCE IMAGING) LUMBAR SPINE;  Surgeon: [facility] Diagnostic Provider;  Location: [facility];  Service: General;  Laterality: N/A;  COVID NEG    MAGNETIC RESONANCE IMAGING N/A 2021    Procedure: MRI (MAGNETIC RESONANCE IMAGING), LUMBAR SPINE;  Surgeon: [facility] Diagnostic Provider;  Location: [facility];  Service: General;  Laterality: N/A;  In MRI @ 7:50 per Ariela    MEDIAL COLLATERAL LIGAMENT AND LATERAL COLLATERAL LIGAMENT REPAIR, KNEE Right 2018    Dr. Christophe Gore in [place]     MINIMALLY INVASIVE TRANSFORAMINAL LUMBAR INTERBODY FUSION (TLIF) N/A 2022    Procedure: FUSION, SPINE, LUMBAR, TLIF, MINIMALLY INVASIVE, WITH INSTRUMENTATION,  L4/5 RIGHT;  Surgeon: Duran Adler Jr., MD;  Location: [facility];  Service: Orthopedics;  Laterality: N/A;  10:30am per Tamela    MYELOGRAPHY N/A 2020    Procedure: MYELOGRAM;  Surgeon: [facility] Diagnostic Provider;  Location: [facility];  Service: General;  Laterality: N/A;     Social History     Tobacco Use    Smoking status: Former     Current packs/day: 0.00     Average packs/day: 0.5 packs/day for 10.0 years (5.0 ttl pk-yrs)     Types: Cigarettes     Start date: 1999     Quit date: 2009     Years since quittin.6    Smokeless tobacco: Never    Tobacco comments:     quit smoking in    Substance Use Topics    Alcohol use: Yes     Comment: rarely    Drug use: Not Currently     Types: Marijuana     Comment: medical---oil, smoke and edibles     Family History   Problem Relation Age of Onset     Hypertension Mother     Diabetes Father     Cancer Father         mesotheliosma     Cataracts Neg Hx     Glaucoma Neg Hx     Macular degeneration Neg Hx     Retinal detachment Neg Hx     Colon cancer Neg Hx     Colon polyps Neg Hx     Crohn's disease Neg Hx     Ulcerative colitis Neg Hx         Review of Systems:   Constitution: Negative for diaphoresis and fever.   HEENT: Negative for nosebleeds.    Cardiovascular: Negative for chest pain       No dyspnea on exertion       No leg swelling        No palpitations  Respiratory: Negative for shortness of breath and wheezing.    Hematologic/Lymphatic: Negative for bleeding problem. Does not bruise/bleed easily.   Skin: Negative for color change and rash.   Musculoskeletal: Negative for falls and myalgias.   Gastrointestinal: Negative for hematemesis and hematochezia.   Genitourinary: Negative for hematuria.   Neurological: Negative for dizziness and light-headedness.   Psychiatric/Behavioral: Negative for altered mental status and memory loss.          Objective:        Vitals:    08/18/23 1506   BP: 121/77   Pulse: 61       Lab Results   Component Value Date    WBC 6.62 07/07/2023    HGB 12.1 (L) 07/07/2023    HCT 38.7 (L) 07/07/2023     07/07/2023    CHOL 123 07/07/2023    TRIG 38 07/07/2023    HDL 42 07/07/2023    ALT 16 07/07/2023    AST 18 07/07/2023     07/07/2023    K 3.7 07/07/2023     07/07/2023    CREATININE 0.7 07/07/2023    BUN 13 07/07/2023    CO2 29 07/07/2023    TSH 9.090 (H) 04/06/2023    PSA 3.1 12/04/2021    INR 1.1 04/06/2023    HGBA1C 6.0 (H) 08/03/2022        ECHOCARDIOGRAM RESULTS  Results for orders placed during the hospital encounter of 04/06/23    Echo    Interpretation Summary  · The left ventricle is normal in size with concentric hypertrophy and normal systolic function.  · The estimated ejection fraction is 60%.  · Normal left ventricular diastolic function.  · Normal right ventricular size with normal right ventricular  systolic function.  · Mild left atrial enlargement.  · Mild aortic regurgitation.  · Mild mitral regurgitation.  · Normal central venous pressure (3 mmHg).        CURRENT/PREVIOUS VISIT EKG  Results for orders placed or performed during the hospital encounter of 04/06/23   EKG 12-lead    Collection Time: 04/06/23  8:13 PM    Narrative    Test Reason : I48.91,    Vent. Rate : 081 BPM     Atrial Rate : 000 BPM     P-R Int : 000 ms          QRS Dur : 108 ms      QT Int : 356 ms       P-R-T Axes : 000 036 011 degrees     QTc Int : 413 ms    Atrial fibrillation  Nonspecific T wave abnormality  Abnormal ECG  When compared with ECG of 14-DEC-2022 14:11,  Atrial fibrillation has replaced Sinus rhythm  Confirmed by Sung Comer MD ([id number]) on 4/12/2023 8:47:02 AM    Referred By: MAYA   SELF           Confirmed By:Sung Comer MD     No valid procedures specified.   Results for orders placed during the hospital encounter of 07/13/21    Nuclear Stress Test    Interpretation Summary    The EKG portion of this study is negative for ischemia.    The patient reported no chest pain during the stress test.    During stress, occasional PVCs are noted.    The nuclear portion of this study will be reported separately.      Physical Exam:  CONSTITUTIONAL: No fever, no chills  HEENT: Normocephalic, atraumatic,pupils reactive to light                 NECK:  No JVD no carotid bruit  CVS: S1S2+, RRR, no murmurs,   LUNGS: Clear  ABDOMEN: Soft, NT, BS+  EXTREMITIES: No cyanosis, edema  : No durant catheter  NEURO: AAO X 3  PSY: Normal affect      Medication List with Changes/Refills   Current Medications    AMLODIPINE (NORVASC) 5 MG TABLET    TAKE 1 TABLET BY MOUTH EVERY DAY    B COMPLEX VITAMINS (B COMPLEX-VITAMIN B12) TABLET    Take 1 tablet by mouth once daily.    CALCIUM CITRATE (CALCITRATE) 200 MG (950 MG) TABLET    Take 1 tablet by mouth 2 (two) times daily.    CARBAMAZEPINE (TEGRETOL XR) 100 MG 12 HR TABLET    Take 1 tablet (100  mg total) by mouth once daily for 7 days, THEN 1 tablet (100 mg total) 2 (two) times daily.    EPINEPHRINE (EPIPEN) 0.3 MG/0.3 ML ATIN    Inject into the muscle once for one dose as needed.    HYDROCODONE-ACETAMINOPHEN (NORCO) 7.5-325 MG PER TABLET    Take 1 tablet by mouth every 4 (four) hours as needed for Pain.    MULTIVITAMIN (THERAGRAN) PER TABLET    Take 1 tablet by mouth once daily.    ROSUVASTATIN (CRESTOR) 10 MG TABLET    Take 1 tablet (10 mg total) by mouth once daily.    VALACYCLOVIR (VALTREX) 1000 MG TABLET    Take 1 tablet (1,000 mg total) by mouth 3 (three) times daily. for 10 days    WHITE PETROLATUM-MINERAL OIL 56.8-42.5% (LACRI-LUBE S.O.P.) 56.8-42.5 % OINT    Place into the left eye every evening. Use the lubrication as needed and definitely at night.  Take your eye closed at night.             Assessment:       1. Paroxysmal atrial fibrillation    2. B12 deficiency    3. Peanut allergy         Plan:     Problem List Items Addressed This Visit          Cardiac/Vascular    Paroxysmal atrial fibrillation - Primary     Other Visit Diagnoses       B12 deficiency        Peanut allergy              EKG TODAY REVEALS SINUS BRADYCARDIA OTHERWISE NORMAL EKG    Loop recorder was recommended because he probably had onset of atrial fibrillation secondary to EpiPen or allergic reaction to peanuts.  He saw Dr. Lynn and no further treatment is recommended at the current time except for loop recorder to see is future risk.  He agrees to proceed with a loop recorder risks benefits options were explained.  The main risk is local of scar tissue formation or infection or bleed.  No anticoagulation at the current time  No follow-ups on file.    The patients questions were answered, they verbalized understanding, and agreed with the treatment plan.     KLARISSA CARRION MD  [facility]

## 2023-08-18 ENCOUNTER — OFFICE VISIT (OUTPATIENT)
Dept: CARDIOLOGY | Facility: CLINIC | Age: 53
End: 2023-08-18
Payer: COMMERCIAL

## 2023-08-18 VITALS
WEIGHT: 250.88 LBS | DIASTOLIC BLOOD PRESSURE: 77 MMHG | SYSTOLIC BLOOD PRESSURE: 121 MMHG | HEART RATE: 61 BPM | BODY MASS INDEX: 36 KG/M2 | OXYGEN SATURATION: 98 %

## 2023-08-18 DIAGNOSIS — G51.0 PALSY, BELL'S: ICD-10-CM

## 2023-08-18 DIAGNOSIS — I48.0 PAROXYSMAL ATRIAL FIBRILLATION: Primary | ICD-10-CM

## 2023-08-18 DIAGNOSIS — E53.8 B12 DEFICIENCY: ICD-10-CM

## 2023-08-18 DIAGNOSIS — Z91.010 PEANUT ALLERGY: ICD-10-CM

## 2023-08-18 PROCEDURE — 3078F PR MOST RECENT DIASTOLIC BLOOD PRESSURE < 80 MM HG: ICD-10-PCS | Mod: CPTII,S$GLB,, | Performed by: GENERAL PRACTICE

## 2023-08-18 PROCEDURE — 1159F MED LIST DOCD IN RCRD: CPT | Mod: CPTII,S$GLB,, | Performed by: GENERAL PRACTICE

## 2023-08-18 PROCEDURE — 4010F ACE/ARB THERAPY RXD/TAKEN: CPT | Mod: CPTII,S$GLB,, | Performed by: GENERAL PRACTICE

## 2023-08-18 PROCEDURE — 99214 PR OFFICE/OUTPT VISIT, EST, LEVL IV, 30-39 MIN: ICD-10-PCS | Mod: S$GLB,,, | Performed by: GENERAL PRACTICE

## 2023-08-18 PROCEDURE — 3008F PR BODY MASS INDEX (BMI) DOCUMENTED: ICD-10-PCS | Mod: CPTII,S$GLB,, | Performed by: GENERAL PRACTICE

## 2023-08-18 PROCEDURE — 1160F PR REVIEW ALL MEDS BY PRESCRIBER/CLIN PHARMACIST DOCUMENTED: ICD-10-PCS | Mod: CPTII,S$GLB,, | Performed by: GENERAL PRACTICE

## 2023-08-18 PROCEDURE — 99999 PR PBB SHADOW E&M-EST. PATIENT-LVL III: ICD-10-PCS | Mod: PBBFAC,,, | Performed by: GENERAL PRACTICE

## 2023-08-18 PROCEDURE — 93000 EKG 12-LEAD: ICD-10-PCS | Mod: S$GLB,,, | Performed by: GENERAL PRACTICE

## 2023-08-18 PROCEDURE — 3078F DIAST BP <80 MM HG: CPT | Mod: CPTII,S$GLB,, | Performed by: GENERAL PRACTICE

## 2023-08-18 PROCEDURE — 3074F PR MOST RECENT SYSTOLIC BLOOD PRESSURE < 130 MM HG: ICD-10-PCS | Mod: CPTII,S$GLB,, | Performed by: GENERAL PRACTICE

## 2023-08-18 PROCEDURE — 4010F PR ACE/ARB THEARPY RXD/TAKEN: ICD-10-PCS | Mod: CPTII,S$GLB,, | Performed by: GENERAL PRACTICE

## 2023-08-18 PROCEDURE — 3074F SYST BP LT 130 MM HG: CPT | Mod: CPTII,S$GLB,, | Performed by: GENERAL PRACTICE

## 2023-08-18 PROCEDURE — 99999 PR PBB SHADOW E&M-EST. PATIENT-LVL III: CPT | Mod: PBBFAC,,, | Performed by: GENERAL PRACTICE

## 2023-08-18 PROCEDURE — 99214 OFFICE O/P EST MOD 30 MIN: CPT | Mod: S$GLB,,, | Performed by: GENERAL PRACTICE

## 2023-08-18 PROCEDURE — 3008F BODY MASS INDEX DOCD: CPT | Mod: CPTII,S$GLB,, | Performed by: GENERAL PRACTICE

## 2023-08-18 PROCEDURE — 93000 ELECTROCARDIOGRAM COMPLETE: CPT | Mod: S$GLB,,, | Performed by: GENERAL PRACTICE

## 2023-08-18 PROCEDURE — 1159F PR MEDICATION LIST DOCUMENTED IN MEDICAL RECORD: ICD-10-PCS | Mod: CPTII,S$GLB,, | Performed by: GENERAL PRACTICE

## 2023-08-18 PROCEDURE — 1160F RVW MEDS BY RX/DR IN RCRD: CPT | Mod: CPTII,S$GLB,, | Performed by: GENERAL PRACTICE

## 2023-08-18 RX ORDER — MULTIVIT WITH MINERALS/HERBS
1 TABLET ORAL DAILY
Qty: 90 TABLET | Refills: 3 | Status: SHIPPED | OUTPATIENT
Start: 2023-08-18 | End: 2023-11-17 | Stop reason: SDUPTHER

## 2023-08-23 DIAGNOSIS — I48.0 PAF (PAROXYSMAL ATRIAL FIBRILLATION): Primary | ICD-10-CM

## 2023-09-05 ENCOUNTER — PATIENT MESSAGE (OUTPATIENT)
Dept: CARDIOLOGY | Facility: CLINIC | Age: 53
End: 2023-09-05
Payer: COMMERCIAL

## 2023-09-07 ENCOUNTER — OFFICE VISIT (OUTPATIENT)
Dept: ORTHOPEDICS | Facility: CLINIC | Age: 53
End: 2023-09-07
Payer: COMMERCIAL

## 2023-09-07 DIAGNOSIS — M19.072 ARTHRITIS OF ANKLE, LEFT: ICD-10-CM

## 2023-09-07 DIAGNOSIS — M19.071 ARTHRITIS OF ANKLE, RIGHT: Primary | ICD-10-CM

## 2023-09-07 PROCEDURE — 99999 PR PBB SHADOW E&M-EST. PATIENT-LVL III: CPT | Mod: PBBFAC,,, | Performed by: ORTHOPAEDIC SURGERY

## 2023-09-07 PROCEDURE — 99213 OFFICE O/P EST LOW 20 MIN: CPT | Mod: 25,S$GLB,, | Performed by: ORTHOPAEDIC SURGERY

## 2023-09-07 PROCEDURE — 4010F PR ACE/ARB THEARPY RXD/TAKEN: ICD-10-PCS | Mod: CPTII,S$GLB,, | Performed by: ORTHOPAEDIC SURGERY

## 2023-09-07 PROCEDURE — 1160F RVW MEDS BY RX/DR IN RCRD: CPT | Mod: CPTII,S$GLB,, | Performed by: ORTHOPAEDIC SURGERY

## 2023-09-07 PROCEDURE — 4010F ACE/ARB THERAPY RXD/TAKEN: CPT | Mod: CPTII,S$GLB,, | Performed by: ORTHOPAEDIC SURGERY

## 2023-09-07 PROCEDURE — 1160F PR REVIEW ALL MEDS BY PRESCRIBER/CLIN PHARMACIST DOCUMENTED: ICD-10-PCS | Mod: CPTII,S$GLB,, | Performed by: ORTHOPAEDIC SURGERY

## 2023-09-07 PROCEDURE — 20605 DRAIN/INJ JOINT/BURSA W/O US: CPT | Mod: 50,S$GLB,, | Performed by: ORTHOPAEDIC SURGERY

## 2023-09-07 PROCEDURE — 20605 INTERMEDIATE JOINT ASPIRATION/INJECTION: R ANKLE, L ANKLE: ICD-10-PCS | Mod: 50,S$GLB,, | Performed by: ORTHOPAEDIC SURGERY

## 2023-09-07 PROCEDURE — 1159F MED LIST DOCD IN RCRD: CPT | Mod: CPTII,S$GLB,, | Performed by: ORTHOPAEDIC SURGERY

## 2023-09-07 PROCEDURE — 1159F PR MEDICATION LIST DOCUMENTED IN MEDICAL RECORD: ICD-10-PCS | Mod: CPTII,S$GLB,, | Performed by: ORTHOPAEDIC SURGERY

## 2023-09-07 PROCEDURE — 99213 PR OFFICE/OUTPT VISIT, EST, LEVL III, 20-29 MIN: ICD-10-PCS | Mod: 25,S$GLB,, | Performed by: ORTHOPAEDIC SURGERY

## 2023-09-07 PROCEDURE — 99999 PR PBB SHADOW E&M-EST. PATIENT-LVL III: ICD-10-PCS | Mod: PBBFAC,,, | Performed by: ORTHOPAEDIC SURGERY

## 2023-09-07 RX ORDER — TRIAMCINOLONE ACETONIDE 40 MG/ML
40 INJECTION, SUSPENSION INTRA-ARTICULAR; INTRAMUSCULAR
Status: DISCONTINUED | OUTPATIENT
Start: 2023-09-07 | End: 2023-09-07 | Stop reason: HOSPADM

## 2023-09-07 RX ADMIN — TRIAMCINOLONE ACETONIDE 40 MG: 40 INJECTION, SUSPENSION INTRA-ARTICULAR; INTRAMUSCULAR at 04:09

## 2023-09-07 NOTE — PROGRESS NOTES
Status/Diagnosis: Bilateral, Left > Right PCFD; valgus talar tilt and end-stage tibiotalar arthritis; posterior tibial tendinitis; gastroc contracture  Date of Surgery: none  Date of Injury: none  Return visit: 3-6 months for repeat injection  X-rays on Return: pending patient complaint     Chief Complaint:   Chief Complaint   Patient presents with    Left Ankle - Pain, Injections    Right Ankle - Pain, Injections     Present History:  Fabiano Jules Jr. is a 53 y.o. male who presents today for new patient evaluation.  Patient previously seen by Dr. Orozco.  Last seen in 2021 at which time he received bilateral ankle corticosteroid injections.  Patient endorses significant symptomatic relief at that time.  Patient has been managed for chronic symptomatic flatfoot deformity.  Has tried shoe wear and activity modification including not limited to custom orthotics, what sounds like an [place] brace, etc..  No recent immobilization or physical therapy.  Currently taking oral NSAIDs intermittently as needed for pain.  Denies any numbness or tingling.  Minimal pain at rest, increased with weight-bearing.  Also endorses recent history of lumbar fusion earlier this year which she believes has exacerbated his symptoms to bilateral feet.  States left greater than right foot and ankle pain.    06/01/2023:  Patient returns after last being seen in late November 2022.  Endorses near complete symptomatic relief with bilateral intra-articular corticosteroid injection the lasted 4+ month.  Presents today for repeat evaluation and repeat injection.    Since last being seen, patient underwent gastric bypass surgery.  Has lost 100+ lb postoperatively.      09/07/2023:  Patient returns for repeat clinical evaluation and possible intra-articular corticosteroid injection.  After last clinic visit in early June, patient endorses near-complete symptomatic relief for the last 2.5 months.  No new injuries.    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  Does not believe that there is any relationship to previous corticosteroid injection.      Past Medical History:   Diagnosis Date    Arthritis     Back injury     Colon polyp     COVID     CPAP (continuous positive airway pressure) dependence     Hypertension     PONV (postoperative nausea and vomiting)     Sleep apnea     c pap machine used       Past Surgical History:   Procedure Laterality Date    BONE GRAFT N/A 04/05/2022    Procedure: BONE GRAFT;  Surgeon: Duran Adler Jr., MD;  Location: [facility] OR;  Service: Orthopedics;  Laterality: N/A;    CIRCUMCISION      COLONOSCOPY N/A 12/03/2018    Procedure: COLONOSCOPY;  Surgeon: CHRISSY Reyna MD;  [facility]  Service: Endoscopy;  Laterality: N/A;    COLONOSCOPY N/A 09/01/2022    Procedure: COLONOSCOPY;  Surgeon: Alondra Arias MD;  Location: [facility];  Service: Endoscopy;  Laterality: N/A;    epidural steroid injection X 2      lower lumbar    ESOPHAGOGASTRODUODENOSCOPY N/A 09/01/2022    Procedure: EGD (ESOPHAGOGASTRODUODENOSCOPY);  Surgeon: Alondra Arias MD;  Location: [facility];  Service: Endoscopy;  Laterality: N/A;    facet injection       [facility] [place]     INTRALUMINAL GASTROINTESTINAL TRACT IMAGING VIA CAPSULE N/A 10/03/2022    Procedure: IMAGING PROCEDURE, GI TRACT, INTRALUMINAL, VIA CAPSULE;  Surgeon: Alondra Arias MD;  Location: [facility];  Service: Endoscopy;  Laterality: N/A;    LAPAROSCOPIC SLEEVE GASTRECTOMY N/A 12/19/2022    Procedure: GASTRECTOMY, SLEEVE, LAPAROSCOPIC;  Surgeon: Lashonda Pinedo MD;  Location: [facility] OR;  Service: General;  Laterality: N/A;    LIPOMA RESECTION      Back of neck    MAGNETIC RESONANCE IMAGING N/A 05/29/2020    Procedure: MRI (MAGNETIC RESONANCE IMAGING) LUMBAR SPINE;  Surgeon: [facility] Diagnostic Provider;  Location: [facility];  Service: General;  Laterality:  N/A;  COVID NEG    MAGNETIC RESONANCE IMAGING N/A 12/03/2021    Procedure: MRI (MAGNETIC RESONANCE IMAGING), LUMBAR SPINE;  Surgeon: [facility] Diagnostic Provider;  Location: [facility];  Service: General;  Laterality: N/A;  In MRI @ 7:50 per Ariela    MEDIAL COLLATERAL LIGAMENT AND LATERAL COLLATERAL LIGAMENT REPAIR, KNEE Right 09/01/2018    Dr. Christophe Gore in [place]     MINIMALLY INVASIVE TRANSFORAMINAL LUMBAR INTERBODY FUSION (TLIF) N/A 04/05/2022    Procedure: FUSION, SPINE, LUMBAR, TLIF, MINIMALLY INVASIVE, WITH INSTRUMENTATION,  L4/5 RIGHT;  Surgeon: Duran Adler Jr., MD;  Location: [facility];  Service: Orthopedics;  Laterality: N/A;  10:30am per Tamela    MYELOGRAPHY N/A 06/23/2020    Procedure: MYELOGRAM;  Surgeon: [facility] Diagnostic Provider;  Location: [facility];  Service: General;  Laterality: N/A;       Current Outpatient Medications   Medication Sig    amLODIPine (NORVASC) 5 MG tablet TAKE 1 TABLET BY MOUTH EVERY DAY    b complex vitamins (B COMPLEX-VITAMIN B12) tablet Take 1 tablet by mouth once daily.    calcium citrate (CALCITRATE) 200 mg (950 mg) tablet Take 1 tablet by mouth 2 (two) times daily.    EPINEPHrine (EPIPEN) 0.3 mg/0.3 mL AtIn Inject into the muscle once for one dose as needed.    HYDROcodone-acetaminophen (NORCO) 7.5-325 mg per tablet Take 1 tablet by mouth every 4 (four) hours as needed for Pain.    multivitamin (THERAGRAN) per tablet Take 1 tablet by mouth once daily.    rosuvastatin (CRESTOR) 10 MG tablet Take 1 tablet (10 mg total) by mouth once daily.    white petrolatum-mineral oil 56.8-42.5% (LACRI-LUBE S.O.P.) 56.8-42.5 % Oint Place into the left eye every evening. Use the lubrication as needed and definitely at night.  Take your eye closed at night.    carBAMazepine (TEGRETOL XR) 100 MG 12 hr tablet Take 1 tablet (100 mg total) by mouth once daily for 7 days, THEN 1 tablet (100 mg total) 2 (two) times daily. (Patient not taking: Reported on 8/18/2023)    valACYclovir (VALTREX) 1000  MG tablet Take 1 tablet (1,000 mg total) by mouth 3 (three) times daily. for 10 days     No current facility-administered medications for this visit.     Facility-Administered Medications Ordered in Other Visits   Medication    ceFAZolin in dextrose (iso-os) 2 gram/100 mL PgBk 2,000 mg    HYDROmorphone injection 1 mg    oxyCODONE-acetaminophen  mg per tablet 1 tablet    polyethylene glycol packet 17 g       Review of patient's allergies indicates:   Allergen Reactions    Peaches [peach (prunus persica)] Anaphylaxis    Peanut Anaphylaxis    Tree pollen-red birch Anaphylaxis       Family History   Problem Relation Age of Onset    Hypertension Mother     Diabetes Father     Cancer Father         mesotheliosma     Cataracts Neg Hx     Glaucoma Neg Hx     Macular degeneration Neg Hx     Retinal detachment Neg Hx     Colon cancer Neg Hx     Colon polyps Neg Hx     Crohn's disease Neg Hx     Ulcerative colitis Neg Hx        Social History     Socioeconomic History    Marital status:     Number of children: 4   Occupational History     Employer: [employer]   Tobacco Use    Smoking status: Former     Current packs/day: 0.00     Average packs/day: 0.5 packs/day for 10.0 years (5.0 ttl pk-yrs)     Types: Cigarettes     Start date: 1999     Quit date: 2009     Years since quittin.6    Smokeless tobacco: Never    Tobacco comments:     quit smoking in    Substance and Sexual Activity    Alcohol use: Yes     Comment: rarely    Drug use: Not Currently     Types: Marijuana     Comment: medical---oil, smoke and edibles    Sexual activity: Not Currently       Physical exam:  There were no vitals filed for this visit.  There is no height or weight on file to calculate BMI.  General: In no apparent distress; well developed and well nourished.  HEENT: normocephalic; atraumatic.  Cardiovascular: regular rate.  Respiratory: no increased work of breathing.  Musculoskeletal:   Gait: antalgic  Inspection:  Exam  unchanged.  Significant flatfoot deformity with associated hindfoot and ankle valgus.  No significant forefoot abduction.  Hindfoot and ankle are not able to be completely passively corrected.  Unable to perform double or single limb heel rise bilaterally.  Patient localizes pain symmetrically.  Most prominent along the anteromedial ankle joint line as well as along the course of the posterior tibial tendon, left worse than right.  Minimal tenderness along the sinus tarsi.  No peroneal tenderness.  Silfverskiold:  Positive  Alignment:  Knee: neutral               Ankle: increased valgus              Hindfoot: increased valgus              Forefoot: neutral   Strength:              Dorsiflexion 5/5  Plantar flexion 5/5  Inversion 3+/5  Eversion 5/5   Sensation:             Altered but present sensation on monofilament testing bilaterally.  ROM:              Ankle: limited with pain at extremes              Subtalar:  limited with pain at extremes  Pulses: 2+ DP/PT pulses.                   Imaging Studies/Outside documentation:  I have ordered/reviewed/interpreted the following images/outside documentation:  No new imaging today.        Assessment:  Fabiano Jules Jr. is a 53 y.o. male with Bilateral, Left > Right PCFD; valgus talar tilt and end-stage tibiotalar arthritis; posterior tibial tendinitis; gastroc contracture     Plan:   Patient with chronic deformity and end-stage arthritis.  Repeat bilateral intra-articular corticosteroid injections performed today to both ankles.  Patient tolerated this well.  See procedure note for details.  Again discussed the potential for future surgical intervention.  Patient voiced understanding.  All questions were answered.  We will plan to return to clinic in 3-6 months for repeat injection.      This note was created using voice recognition software and may contain grammatical errors.

## 2023-09-07 NOTE — PROCEDURES
Intermediate Joint Aspiration/Injection: R ankle, L ankle    Date/Time: 9/7/2023 4:00 PM    Performed by: Liban Hirsch MD  Authorized by: Liban Hirsch MD    Consent Done?:  Yes (Verbal)  Indications:  Arthritis  Site marked: The procedure site was marked    Timeout: Prior to procedure the correct patient, procedure, and site was verified      Location:  Ankle  Site:  R ankle and L ankle  Prep: Patient was prepped and draped in usual sterile fashion    Ultrasonic Guidance for needle placement: No  Needle size:  25 G  Approach:  Anteromedial  Medications:  40 mg triamcinolone acetonide 40 mg/mL  Patient tolerance:  Patient tolerated the procedure well with no immediate complications

## 2023-09-08 ENCOUNTER — DOCUMENTATION ONLY (OUTPATIENT)
Dept: REHABILITATION | Facility: HOSPITAL | Age: 53
End: 2023-09-08
Payer: COMMERCIAL

## 2023-09-08 NOTE — PROGRESS NOTES
Patient is being Discharged from skilled PT at this time due to self DC from PT.  Attended 11 visits.

## 2023-09-15 ENCOUNTER — HOSPITAL ENCOUNTER (OUTPATIENT)
Facility: HOSPITAL | Age: 53
Discharge: HOME | End: 2023-09-15
Attending: GENERAL PRACTICE | Admitting: GENERAL PRACTICE
Payer: COMMERCIAL

## 2023-09-15 DIAGNOSIS — I48.0 PAF (PAROXYSMAL ATRIAL FIBRILLATION): ICD-10-CM

## 2023-09-15 PROCEDURE — C1764 EVENT RECORDER, CARDIAC: HCPCS | Performed by: GENERAL PRACTICE

## 2023-09-15 PROCEDURE — 33285 PR INSERTION,SUBQ CARDIAC RHYTHM MONITOR, W/PRGRMG: ICD-10-PCS | Mod: ,,, | Performed by: GENERAL PRACTICE

## 2023-09-15 PROCEDURE — 93291 INTERROG DEV EVAL SCRMS IP: CPT | Performed by: GENERAL PRACTICE

## 2023-09-15 PROCEDURE — 33285 INSJ SUBQ CAR RHYTHM MNTR: CPT | Mod: ,,, | Performed by: GENERAL PRACTICE

## 2023-09-15 PROCEDURE — 33285 INSJ SUBQ CAR RHYTHM MNTR: CPT | Performed by: GENERAL PRACTICE

## 2023-09-15 NOTE — DISCHARGE INSTRUCTIONS
You may shower after 24 hours.   Do not fully submerge site in water for one week or until the site is healed.   Do not remove the steri strip dressing over your incision, let it fall off on its own.  Inspect site daily for signs of bleeding or infection.   If you have any issues with your site, call you doctor or visit ER 24/7.

## 2023-09-16 ENCOUNTER — PATIENT MESSAGE (OUTPATIENT)
Dept: INTERNAL MEDICINE | Facility: CLINIC | Age: 53
End: 2023-09-16
Payer: COMMERCIAL

## 2023-09-18 VITALS — DIASTOLIC BLOOD PRESSURE: 74 MMHG | SYSTOLIC BLOOD PRESSURE: 118 MMHG

## 2023-09-21 ENCOUNTER — PATIENT MESSAGE (OUTPATIENT)
Dept: FAMILY MEDICINE | Facility: CLINIC | Age: 53
End: 2023-09-21
Payer: COMMERCIAL

## 2023-09-27 ENCOUNTER — OFFICE VISIT (OUTPATIENT)
Dept: FAMILY MEDICINE | Facility: CLINIC | Age: 53
End: 2023-09-27
Payer: COMMERCIAL

## 2023-09-27 ENCOUNTER — TELEPHONE (OUTPATIENT)
Dept: FAMILY MEDICINE | Facility: CLINIC | Age: 53
End: 2023-09-27

## 2023-09-27 VITALS
DIASTOLIC BLOOD PRESSURE: 84 MMHG | BODY MASS INDEX: 36 KG/M2 | HEART RATE: 60 BPM | HEIGHT: 70 IN | OXYGEN SATURATION: 98 % | RESPIRATION RATE: 18 BRPM | SYSTOLIC BLOOD PRESSURE: 128 MMHG

## 2023-09-27 DIAGNOSIS — G51.0 BELL'S PALSY: ICD-10-CM

## 2023-09-27 DIAGNOSIS — Z00.00 ROUTINE GENERAL MEDICAL EXAMINATION AT A HEALTH CARE FACILITY: Primary | ICD-10-CM

## 2023-09-27 DIAGNOSIS — I10 BENIGN ESSENTIAL HTN: ICD-10-CM

## 2023-09-27 DIAGNOSIS — R73.03 PRE-DIABETES: ICD-10-CM

## 2023-09-27 DIAGNOSIS — I48.0 PAROXYSMAL A-FIB: ICD-10-CM

## 2023-09-27 PROCEDURE — 1159F PR MEDICATION LIST DOCUMENTED IN MEDICAL RECORD: ICD-10-PCS | Mod: CPTII,S$GLB,, | Performed by: STUDENT IN AN ORGANIZED HEALTH CARE EDUCATION/TRAINING PROGRAM

## 2023-09-27 PROCEDURE — 3079F DIAST BP 80-89 MM HG: CPT | Mod: CPTII,S$GLB,, | Performed by: STUDENT IN AN ORGANIZED HEALTH CARE EDUCATION/TRAINING PROGRAM

## 2023-09-27 PROCEDURE — 3079F PR MOST RECENT DIASTOLIC BLOOD PRESSURE 80-89 MM HG: ICD-10-PCS | Mod: CPTII,S$GLB,, | Performed by: STUDENT IN AN ORGANIZED HEALTH CARE EDUCATION/TRAINING PROGRAM

## 2023-09-27 PROCEDURE — 3074F SYST BP LT 130 MM HG: CPT | Mod: CPTII,S$GLB,, | Performed by: STUDENT IN AN ORGANIZED HEALTH CARE EDUCATION/TRAINING PROGRAM

## 2023-09-27 PROCEDURE — 1160F PR REVIEW ALL MEDS BY PRESCRIBER/CLIN PHARMACIST DOCUMENTED: ICD-10-PCS | Mod: CPTII,S$GLB,, | Performed by: STUDENT IN AN ORGANIZED HEALTH CARE EDUCATION/TRAINING PROGRAM

## 2023-09-27 PROCEDURE — 90686 IIV4 VACC NO PRSV 0.5 ML IM: CPT | Mod: S$GLB,,, | Performed by: STUDENT IN AN ORGANIZED HEALTH CARE EDUCATION/TRAINING PROGRAM

## 2023-09-27 PROCEDURE — 99396 PREV VISIT EST AGE 40-64: CPT | Mod: 25,S$GLB,, | Performed by: STUDENT IN AN ORGANIZED HEALTH CARE EDUCATION/TRAINING PROGRAM

## 2023-09-27 PROCEDURE — 90686 FLU VACCINE (QUAD) GREATER THAN OR EQUAL TO 3YO PRESERVATIVE FREE IM: ICD-10-PCS | Mod: S$GLB,,, | Performed by: STUDENT IN AN ORGANIZED HEALTH CARE EDUCATION/TRAINING PROGRAM

## 2023-09-27 PROCEDURE — 99396 PR PREVENTIVE VISIT,EST,40-64: ICD-10-PCS | Mod: 25,S$GLB,, | Performed by: STUDENT IN AN ORGANIZED HEALTH CARE EDUCATION/TRAINING PROGRAM

## 2023-09-27 PROCEDURE — 3008F BODY MASS INDEX DOCD: CPT | Mod: CPTII,S$GLB,, | Performed by: STUDENT IN AN ORGANIZED HEALTH CARE EDUCATION/TRAINING PROGRAM

## 2023-09-27 PROCEDURE — 1160F RVW MEDS BY RX/DR IN RCRD: CPT | Mod: CPTII,S$GLB,, | Performed by: STUDENT IN AN ORGANIZED HEALTH CARE EDUCATION/TRAINING PROGRAM

## 2023-09-27 PROCEDURE — 90471 FLU VACCINE (QUAD) GREATER THAN OR EQUAL TO 3YO PRESERVATIVE FREE IM: ICD-10-PCS | Mod: S$GLB,,, | Performed by: STUDENT IN AN ORGANIZED HEALTH CARE EDUCATION/TRAINING PROGRAM

## 2023-09-27 PROCEDURE — 3008F PR BODY MASS INDEX (BMI) DOCUMENTED: ICD-10-PCS | Mod: CPTII,S$GLB,, | Performed by: STUDENT IN AN ORGANIZED HEALTH CARE EDUCATION/TRAINING PROGRAM

## 2023-09-27 PROCEDURE — 1159F MED LIST DOCD IN RCRD: CPT | Mod: CPTII,S$GLB,, | Performed by: STUDENT IN AN ORGANIZED HEALTH CARE EDUCATION/TRAINING PROGRAM

## 2023-09-27 PROCEDURE — 4010F ACE/ARB THERAPY RXD/TAKEN: CPT | Mod: CPTII,S$GLB,, | Performed by: STUDENT IN AN ORGANIZED HEALTH CARE EDUCATION/TRAINING PROGRAM

## 2023-09-27 PROCEDURE — 3074F PR MOST RECENT SYSTOLIC BLOOD PRESSURE < 130 MM HG: ICD-10-PCS | Mod: CPTII,S$GLB,, | Performed by: STUDENT IN AN ORGANIZED HEALTH CARE EDUCATION/TRAINING PROGRAM

## 2023-09-27 PROCEDURE — 90471 IMMUNIZATION ADMIN: CPT | Mod: S$GLB,,, | Performed by: STUDENT IN AN ORGANIZED HEALTH CARE EDUCATION/TRAINING PROGRAM

## 2023-09-27 PROCEDURE — 4010F PR ACE/ARB THEARPY RXD/TAKEN: ICD-10-PCS | Mod: CPTII,S$GLB,, | Performed by: STUDENT IN AN ORGANIZED HEALTH CARE EDUCATION/TRAINING PROGRAM

## 2023-09-27 PROCEDURE — 99999 PR PBB SHADOW E&M-EST. PATIENT-LVL V: ICD-10-PCS | Mod: PBBFAC,,, | Performed by: STUDENT IN AN ORGANIZED HEALTH CARE EDUCATION/TRAINING PROGRAM

## 2023-09-27 PROCEDURE — 99999 PR PBB SHADOW E&M-EST. PATIENT-LVL V: CPT | Mod: PBBFAC,,, | Performed by: STUDENT IN AN ORGANIZED HEALTH CARE EDUCATION/TRAINING PROGRAM

## 2023-09-27 RX ORDER — OXYCODONE AND ACETAMINOPHEN 7.5; 325 MG/1; MG/1
1 TABLET ORAL 2 TIMES DAILY PRN
COMMUNITY
Start: 2023-09-23 | End: 2024-03-26

## 2023-09-27 NOTE — PROGRESS NOTES
"[facility] MEDICINE CLINIC NOTE    Patient Name: Fabiano Jules Jr.  YOB: 1970    PRESENTING HISTORY     History of Present Illness:  Mr. Fabiano Jules Jr. is a 53 y.o. male here for routine exam.     Bell's palsy- pain improved. Off tegretol.   Eye pain, has to tape shut nightly.   Saw optometry.     Continues to lose weight.   Has f/u with Dr. Pinedo. Taking vitamins .    HTN- well controlled.     Due for A1c recheck, anticipate improvement s/p bariatric surgery.     OA ankles- seeing ortho. Recent injection.     pAfib- after epinephrine injection. Essentially provoked. Has loop recorder to gauge volume.     UTD on colonoscopy.   Needs PSA testing.     ROS      OBJECTIVE:   Vital Signs:  Vitals:    09/27/23 0807 09/27/23 0817   BP: (!) 152/92 128/84   Pulse: 60    Resp: 18    SpO2: 98%    Height: 5' 10" (1.778 m)          Physical Exam  Vitals and nursing note reviewed.   Constitutional:       Appearance: He is not diaphoretic.   HENT:      Head: Normocephalic and atraumatic.      Right Ear: External ear normal.      Left Ear: External ear normal.   Eyes:      Comments: Left eye with borderline approximation of lids, specifically lower lid droop. No injection.    Neck:      Thyroid: No thyromegaly.   Cardiovascular:      Rate and Rhythm: Normal rate and regular rhythm.      Heart sounds: Normal heart sounds. No murmur heard.  Pulmonary:      Effort: Pulmonary effort is normal. No respiratory distress.      Breath sounds: Normal breath sounds. No wheezing or rales.   Musculoskeletal:         General: No tenderness or deformity. Normal range of motion.      Cervical back: Normal range of motion and neck supple.   Lymphadenopathy:      Cervical: No cervical adenopathy.   Skin:     General: Skin is warm and dry.      Findings: No erythema or rash.   Neurological:      Mental Status: He is alert and oriented to person, place, and time.      Gait: Gait is intact.      Comments: Left facial droop " Caller would like to discuss an/a Return call for water pills and biopsy appointment. Writer advised caller of callback within 24-72 hours. Patient Name: Carmenza Farfan  Caller Name: patient  Name of Facility:   [address] [place] [address] Number: [phone]  Best Availability: any   Can A Detailed Message Be left? yes  Fax Number:   Additional Info: Patient called asking if he should or should not be taking his water pills. If he needs to continue taking them, he will need a prescription sent to his local pharmacy. Patient also asked about his appointment on Tuesday 4/13/21, regarding his biopsy.   Dose he still need to have that done, since all of his tests and lab results came back normal.  Please advise  Did you confirm the message with the caller?: yes       0 [address] [address]  Phone: [phone] Fax: [phone]        Thank you,  Norm Garrido  '   Psychiatric:         Mood and Affect: Mood and affect normal.         Cognition and Memory: Memory normal.         Judgment: Judgment normal.         ASSESSMENT & PLAN:     Routine general medical examination at a health care facility  -     PSA, SCREENING; Future; Expected date: 09/27/2023  -     HEMOGLOBIN A1C; Future; Expected date: 09/27/2023    Bell's palsy  -     Ambulatory referral/consult to Ophthalmology; Future; Expected date: 10/04/2023  Would ask for oculoplastics evaluation to see if anything can be done to help lid closure, unsure if that is available. Appreciate consultation.     Benign essential HTN  Stable.Continue current medications    Pre-diabetes  Monitor    Paroxysmal A-fib  No intervention, gauge burden with loop recorder.            Howie Mathias MD   Internal Medicine

## 2023-09-27 NOTE — PROGRESS NOTES
2 patient identifiers used (name and ). Administered Flu vaccine IM ( LD) . Patient tolerated well, no bleeding at insertion site noted. Pain 0 on scale 0/10. Aseptic technique maintained. Immunization information given to patient. Advised patient to remain in clinic for 15 minutes to monitor for reaction. No AR noted.

## 2023-09-27 NOTE — TELEPHONE ENCOUNTER
Pt seen in clinic today by Dr. Mathias. Dr. Mathias requesting for pt to see Dr. Aguilar. Can you please assist? Thank you so much !

## 2023-10-05 ENCOUNTER — PATIENT MESSAGE (OUTPATIENT)
Dept: INTERNAL MEDICINE | Facility: CLINIC | Age: 53
End: 2023-10-05
Payer: COMMERCIAL

## 2023-10-20 ENCOUNTER — LAB VISIT (OUTPATIENT)
Dept: LAB | Facility: HOSPITAL | Age: 53
End: 2023-10-20
Attending: INTERNAL MEDICINE
Payer: COMMERCIAL

## 2023-10-20 DIAGNOSIS — E66.01 MORBID OBESITY: ICD-10-CM

## 2023-10-20 DIAGNOSIS — I48.0 PAROXYSMAL ATRIAL FIBRILLATION: ICD-10-CM

## 2023-10-20 DIAGNOSIS — Z00.00 ROUTINE GENERAL MEDICAL EXAMINATION AT A HEALTH CARE FACILITY: ICD-10-CM

## 2023-10-20 LAB
25(OH)D3+25(OH)D2 SERPL-MCNC: 44 NG/ML (ref 30–96)
ALBUMIN SERPL BCP-MCNC: 4 G/DL (ref 3.5–5.2)
ALP SERPL-CCNC: 48 U/L (ref 55–135)
ALT SERPL W/O P-5'-P-CCNC: 9 U/L (ref 10–44)
ANION GAP SERPL CALC-SCNC: 9 MMOL/L (ref 8–16)
ANION GAP SERPL CALC-SCNC: 9 MMOL/L (ref 8–16)
AST SERPL-CCNC: 17 U/L (ref 10–40)
BASOPHILS # BLD AUTO: 0.01 K/UL (ref 0–0.2)
BASOPHILS NFR BLD: 0.3 % (ref 0–1.9)
BILIRUB SERPL-MCNC: 0.8 MG/DL (ref 0.1–1)
BUN SERPL-MCNC: 14 MG/DL (ref 6–20)
BUN SERPL-MCNC: 14 MG/DL (ref 6–20)
CALCIUM SERPL-MCNC: 9.9 MG/DL (ref 8.7–10.5)
CALCIUM SERPL-MCNC: 9.9 MG/DL (ref 8.7–10.5)
CHLORIDE SERPL-SCNC: 102 MMOL/L (ref 95–110)
CHLORIDE SERPL-SCNC: 102 MMOL/L (ref 95–110)
CHOLEST SERPL-MCNC: 134 MG/DL (ref 120–199)
CHOLEST/HDLC SERPL: 2.9 {RATIO} (ref 2–5)
CO2 SERPL-SCNC: 28 MMOL/L (ref 23–29)
CO2 SERPL-SCNC: 28 MMOL/L (ref 23–29)
COMPLEXED PSA SERPL-MCNC: 3.1 NG/ML (ref 0–4)
CREAT SERPL-MCNC: 0.8 MG/DL (ref 0.5–1.4)
CREAT SERPL-MCNC: 0.8 MG/DL (ref 0.5–1.4)
DIFFERENTIAL METHOD: ABNORMAL
EOSINOPHIL # BLD AUTO: 0 K/UL (ref 0–0.5)
EOSINOPHIL NFR BLD: 0.6 % (ref 0–8)
ERYTHROCYTE [DISTWIDTH] IN BLOOD BY AUTOMATED COUNT: 14.3 % (ref 11.5–14.5)
EST. GFR  (NO RACE VARIABLE): >60 ML/MIN/1.73 M^2
EST. GFR  (NO RACE VARIABLE): >60 ML/MIN/1.73 M^2
ESTIMATED AVG GLUCOSE: 103 MG/DL (ref 68–131)
GLUCOSE SERPL-MCNC: 83 MG/DL (ref 70–110)
GLUCOSE SERPL-MCNC: 83 MG/DL (ref 70–110)
HBA1C MFR BLD: 5.2 % (ref 4–5.6)
HCT VFR BLD AUTO: 39.4 % (ref 40–54)
HDLC SERPL-MCNC: 47 MG/DL (ref 40–75)
HDLC SERPL: 35.1 % (ref 20–50)
HGB BLD-MCNC: 12.5 G/DL (ref 14–18)
IMM GRANULOCYTES # BLD AUTO: 0 K/UL (ref 0–0.04)
IMM GRANULOCYTES NFR BLD AUTO: 0 % (ref 0–0.5)
IRON SERPL-MCNC: 68 UG/DL (ref 45–160)
LDLC SERPL CALC-MCNC: 76.8 MG/DL (ref 63–159)
LYMPHOCYTES # BLD AUTO: 2.1 K/UL (ref 1–4.8)
LYMPHOCYTES NFR BLD: 57.1 % (ref 18–48)
MCH RBC QN AUTO: 27.8 PG (ref 27–31)
MCHC RBC AUTO-ENTMCNC: 31.7 G/DL (ref 32–36)
MCV RBC AUTO: 88 FL (ref 82–98)
MONOCYTES # BLD AUTO: 0.4 K/UL (ref 0.3–1)
MONOCYTES NFR BLD: 11.4 % (ref 4–15)
NEUTROPHILS # BLD AUTO: 1.1 K/UL (ref 1.8–7.7)
NEUTROPHILS NFR BLD: 30.6 % (ref 38–73)
NONHDLC SERPL-MCNC: 87 MG/DL
NRBC BLD-RTO: 0 /100 WBC
PLATELET # BLD AUTO: 314 K/UL (ref 150–450)
PMV BLD AUTO: 10.5 FL (ref 9.2–12.9)
POTASSIUM SERPL-SCNC: 4.5 MMOL/L (ref 3.5–5.1)
POTASSIUM SERPL-SCNC: 4.5 MMOL/L (ref 3.5–5.1)
PROT SERPL-MCNC: 7 G/DL (ref 6–8.4)
RBC # BLD AUTO: 4.5 M/UL (ref 4.6–6.2)
SATURATED IRON: 19 % (ref 20–50)
SODIUM SERPL-SCNC: 139 MMOL/L (ref 136–145)
SODIUM SERPL-SCNC: 139 MMOL/L (ref 136–145)
TOTAL IRON BINDING CAPACITY: 366 UG/DL (ref 250–450)
TRANSFERRIN SERPL-MCNC: 247 MG/DL (ref 200–375)
TRIGL SERPL-MCNC: 51 MG/DL (ref 30–150)
VIT B12 SERPL-MCNC: 390 PG/ML (ref 210–950)
WBC # BLD AUTO: 3.61 K/UL (ref 3.9–12.7)

## 2023-10-20 PROCEDURE — 84425 ASSAY OF VITAMIN B-1: CPT | Performed by: SURGERY

## 2023-10-20 PROCEDURE — 80053 COMPREHEN METABOLIC PANEL: CPT | Performed by: SURGERY

## 2023-10-20 PROCEDURE — 82607 VITAMIN B-12: CPT | Performed by: SURGERY

## 2023-10-20 PROCEDURE — 83540 ASSAY OF IRON: CPT | Performed by: SURGERY

## 2023-10-20 PROCEDURE — 82306 VITAMIN D 25 HYDROXY: CPT | Performed by: SURGERY

## 2023-10-20 PROCEDURE — 36415 COLL VENOUS BLD VENIPUNCTURE: CPT | Mod: PO | Performed by: SURGERY

## 2023-10-20 PROCEDURE — 85025 COMPLETE CBC W/AUTO DIFF WBC: CPT | Performed by: SURGERY

## 2023-10-20 PROCEDURE — 84153 ASSAY OF PSA TOTAL: CPT | Performed by: STUDENT IN AN ORGANIZED HEALTH CARE EDUCATION/TRAINING PROGRAM

## 2023-10-20 PROCEDURE — 80061 LIPID PANEL: CPT | Performed by: SURGERY

## 2023-10-20 PROCEDURE — 83036 HEMOGLOBIN GLYCOSYLATED A1C: CPT | Performed by: STUDENT IN AN ORGANIZED HEALTH CARE EDUCATION/TRAINING PROGRAM

## 2023-10-20 PROCEDURE — 84466 ASSAY OF TRANSFERRIN: CPT | Performed by: SURGERY

## 2023-10-23 RX ORDER — ROSUVASTATIN CALCIUM 10 MG/1
10 TABLET, COATED ORAL
Qty: 90 TABLET | Refills: 3 | Status: SHIPPED | OUTPATIENT
Start: 2023-10-23

## 2023-10-25 ENCOUNTER — PATIENT MESSAGE (OUTPATIENT)
Dept: FAMILY MEDICINE | Facility: CLINIC | Age: 53
End: 2023-10-25
Payer: COMMERCIAL

## 2023-10-27 ENCOUNTER — OFFICE VISIT (OUTPATIENT)
Dept: BARIATRICS | Facility: CLINIC | Age: 53
End: 2023-10-27
Payer: COMMERCIAL

## 2023-10-27 VITALS
HEIGHT: 69 IN | HEART RATE: 57 BPM | TEMPERATURE: 98 F | BODY MASS INDEX: 35.22 KG/M2 | DIASTOLIC BLOOD PRESSURE: 86 MMHG | RESPIRATION RATE: 16 BRPM | WEIGHT: 237.81 LBS | SYSTOLIC BLOOD PRESSURE: 157 MMHG

## 2023-10-27 DIAGNOSIS — I10 PRIMARY HYPERTENSION: ICD-10-CM

## 2023-10-27 DIAGNOSIS — E66.01 CLASS 3 SEVERE OBESITY DUE TO EXCESS CALORIES WITH SERIOUS COMORBIDITY AND BODY MASS INDEX (BMI) OF 50.0 TO 59.9 IN ADULT: ICD-10-CM

## 2023-10-27 DIAGNOSIS — E66.01 MORBID OBESITY: Primary | ICD-10-CM

## 2023-10-27 DIAGNOSIS — G47.33 OSA (OBSTRUCTIVE SLEEP APNEA): ICD-10-CM

## 2023-10-27 LAB — VIT B1 BLD-MCNC: 59 UG/L (ref 38–122)

## 2023-10-27 PROCEDURE — 3044F HG A1C LEVEL LT 7.0%: CPT | Mod: CPTII,S$GLB,, | Performed by: SURGERY

## 2023-10-27 PROCEDURE — 99213 PR OFFICE/OUTPT VISIT, EST, LEVL III, 20-29 MIN: ICD-10-PCS | Mod: S$GLB,,, | Performed by: SURGERY

## 2023-10-27 PROCEDURE — 3079F DIAST BP 80-89 MM HG: CPT | Mod: CPTII,S$GLB,, | Performed by: SURGERY

## 2023-10-27 PROCEDURE — 3008F BODY MASS INDEX DOCD: CPT | Mod: CPTII,S$GLB,, | Performed by: SURGERY

## 2023-10-27 PROCEDURE — 4010F PR ACE/ARB THEARPY RXD/TAKEN: ICD-10-PCS | Mod: CPTII,S$GLB,, | Performed by: SURGERY

## 2023-10-27 PROCEDURE — 99999 PR PBB SHADOW E&M-EST. PATIENT-LVL III: ICD-10-PCS | Mod: PBBFAC,,, | Performed by: SURGERY

## 2023-10-27 PROCEDURE — 3008F PR BODY MASS INDEX (BMI) DOCUMENTED: ICD-10-PCS | Mod: CPTII,S$GLB,, | Performed by: SURGERY

## 2023-10-27 PROCEDURE — 3044F PR MOST RECENT HEMOGLOBIN A1C LEVEL <7.0%: ICD-10-PCS | Mod: CPTII,S$GLB,, | Performed by: SURGERY

## 2023-10-27 PROCEDURE — 3077F SYST BP >= 140 MM HG: CPT | Mod: CPTII,S$GLB,, | Performed by: SURGERY

## 2023-10-27 PROCEDURE — 99213 OFFICE O/P EST LOW 20 MIN: CPT | Mod: S$GLB,,, | Performed by: SURGERY

## 2023-10-27 PROCEDURE — 1159F MED LIST DOCD IN RCRD: CPT | Mod: CPTII,S$GLB,, | Performed by: SURGERY

## 2023-10-27 PROCEDURE — 1159F PR MEDICATION LIST DOCUMENTED IN MEDICAL RECORD: ICD-10-PCS | Mod: CPTII,S$GLB,, | Performed by: SURGERY

## 2023-10-27 PROCEDURE — 99999 PR PBB SHADOW E&M-EST. PATIENT-LVL III: CPT | Mod: PBBFAC,,, | Performed by: SURGERY

## 2023-10-27 PROCEDURE — 4010F ACE/ARB THERAPY RXD/TAKEN: CPT | Mod: CPTII,S$GLB,, | Performed by: SURGERY

## 2023-10-27 PROCEDURE — 3077F PR MOST RECENT SYSTOLIC BLOOD PRESSURE >= 140 MM HG: ICD-10-PCS | Mod: CPTII,S$GLB,, | Performed by: SURGERY

## 2023-10-27 PROCEDURE — 3079F PR MOST RECENT DIASTOLIC BLOOD PRESSURE 80-89 MM HG: ICD-10-PCS | Mod: CPTII,S$GLB,, | Performed by: SURGERY

## 2023-10-27 NOTE — PROGRESS NOTES
Post Op Note    Surgery: gastric sleeve surgery  Date: 12/19/22  Initial weight: 350  Last weight: 240  Current weight: 237    Started with bells palsy     Constipation: none  Reflux: none  Vomiting: none    Diet:  Mainly vegetables and protein  Few meats     Exercise:  Walking and elliptical at home    MVI: daily mvi, calcium     Vitals:    10/27/23 0926   BP: (!) 157/86   Pulse: (!) 57   Resp: 16   Temp: 97.5 °F (36.4 °C)       Body comp:  Fat Percent:  26.4 %  Fat Mass:  62.8 lb  FFM:  175 lb  TBW: 122.8 lb  TBW %:  54.6 %  BMR: 2351 kcal    PE:  NAD  RRR  Soft/nt/nd    Labs: reviewed    A/P: s/p sleeve     Seeing specialist for bell's palsy, may benefit from anti viral? Will defer to pcp     Counseling for patient:    Diet: continue low carb dieting  Exercise: as much as possible  Vitamins: daily mvi  Co morbidities:     1. Morbid obesity  CBC w/ Auto Differential    CMP    B12    B1    Lipid Panel    Iron Studies    Vitamin D 25 Hydroxy          -All above: Evaluated, monitored, and treated with diet and exercise program.

## 2023-11-01 ENCOUNTER — OFFICE VISIT (OUTPATIENT)
Dept: FAMILY MEDICINE | Facility: CLINIC | Age: 53
End: 2023-11-01
Payer: COMMERCIAL

## 2023-11-01 DIAGNOSIS — G51.0 BELL'S PALSY: Primary | ICD-10-CM

## 2023-11-01 PROCEDURE — 99212 PR OFFICE/OUTPT VISIT, EST, LEVL II, 10-19 MIN: ICD-10-PCS | Mod: 95,,, | Performed by: STUDENT IN AN ORGANIZED HEALTH CARE EDUCATION/TRAINING PROGRAM

## 2023-11-01 PROCEDURE — 1160F RVW MEDS BY RX/DR IN RCRD: CPT | Mod: CPTII,95,, | Performed by: STUDENT IN AN ORGANIZED HEALTH CARE EDUCATION/TRAINING PROGRAM

## 2023-11-01 PROCEDURE — 1159F MED LIST DOCD IN RCRD: CPT | Mod: CPTII,95,, | Performed by: STUDENT IN AN ORGANIZED HEALTH CARE EDUCATION/TRAINING PROGRAM

## 2023-11-01 PROCEDURE — 4010F PR ACE/ARB THEARPY RXD/TAKEN: ICD-10-PCS | Mod: CPTII,95,, | Performed by: STUDENT IN AN ORGANIZED HEALTH CARE EDUCATION/TRAINING PROGRAM

## 2023-11-01 PROCEDURE — 1159F PR MEDICATION LIST DOCUMENTED IN MEDICAL RECORD: ICD-10-PCS | Mod: CPTII,95,, | Performed by: STUDENT IN AN ORGANIZED HEALTH CARE EDUCATION/TRAINING PROGRAM

## 2023-11-01 PROCEDURE — 99212 OFFICE O/P EST SF 10 MIN: CPT | Mod: 95,,, | Performed by: STUDENT IN AN ORGANIZED HEALTH CARE EDUCATION/TRAINING PROGRAM

## 2023-11-01 PROCEDURE — 1160F PR REVIEW ALL MEDS BY PRESCRIBER/CLIN PHARMACIST DOCUMENTED: ICD-10-PCS | Mod: CPTII,95,, | Performed by: STUDENT IN AN ORGANIZED HEALTH CARE EDUCATION/TRAINING PROGRAM

## 2023-11-01 PROCEDURE — 4010F ACE/ARB THERAPY RXD/TAKEN: CPT | Mod: CPTII,95,, | Performed by: STUDENT IN AN ORGANIZED HEALTH CARE EDUCATION/TRAINING PROGRAM

## 2023-11-01 PROCEDURE — 3044F HG A1C LEVEL LT 7.0%: CPT | Mod: CPTII,95,, | Performed by: STUDENT IN AN ORGANIZED HEALTH CARE EDUCATION/TRAINING PROGRAM

## 2023-11-01 PROCEDURE — 3044F PR MOST RECENT HEMOGLOBIN A1C LEVEL <7.0%: ICD-10-PCS | Mod: CPTII,95,, | Performed by: STUDENT IN AN ORGANIZED HEALTH CARE EDUCATION/TRAINING PROGRAM

## 2023-11-01 NOTE — PROGRESS NOTES
The patient location is: [place], [place]  The chief complaint leading to consultation is: Bell's Palsy    Visit type: audiovisual    Face to Face time with patient: 7 minutes  9 minutes of total time spent on the encounter, which includes face to face time and non-face to face time preparing to see the patient (eg, review of tests), Obtaining and/or reviewing separately obtained history, Documenting clinical information in the electronic or other health record, Independently interpreting results (not separately reported) and communicating results to the patient/family/caregiver, or Care coordination (not separately reported).         Each patient to whom he or she provides medical services by telemedicine is:  (1) informed of the relationship between the physician and patient and the respective role of any other health care provider with respect to management of the patient; and (2) notified that he or she may decline to receive medical services by telemedicine and may withdraw from such care at any time.    Notes:        [facility] MEDICINE CLINIC NOTE    Patient Name: Fabiano Jules Jr.  YOB: 1970    PRESENTING HISTORY     History of Present Illness:  Mr. Fabiano Jules Jr. is a 53 y.o. male here to discuss his Winkelman Palsy    Several month hx of Winkelman Palsy which is very frustrating. He asks if there are any antiviral treatments which may be helpful. Unfortunately I do not think so, this will have to progress over time.   Has already seen Neurology.   Having lower lid lift upcoming.   Seeing Dr. Devi, probably oculoplastics.   Recent CT scan at [facility], I will get records.     C/o job stress, feels he needs to take time off. Multifactorial health distress from long COVID, Winkelman Palsy.   Coupled with high stress job.   Wants to take time off even if unpaid.     ROS      OBJECTIVE:   Vital Signs:  There were no vitals filed for this visit.       Physical Exam: Left sided facial droop involving forehead.      Physical Exam    ASSESSMENT & PLAN:     Bell's palsy  -     Ambulatory referral/consult to Physical/Occupational Therapy; Future; Expected date: 11/08/2023       Unfortunately I think further treatments are limited. Is interested in PT. Will try that.   Will review outside records.   I will look at any work forms and see if he is eligible for time off based on criteria.       Howie Mathias  Internal Medicine

## 2023-11-06 ENCOUNTER — CLINICAL SUPPORT (OUTPATIENT)
Dept: REHABILITATION | Facility: HOSPITAL | Age: 53
End: 2023-11-06
Attending: STUDENT IN AN ORGANIZED HEALTH CARE EDUCATION/TRAINING PROGRAM
Payer: COMMERCIAL

## 2023-11-06 DIAGNOSIS — G51.0 FACIAL PARALYSIS/BELLS PALSY: ICD-10-CM

## 2023-11-06 DIAGNOSIS — R42 DYSEQUILIBRIUM: ICD-10-CM

## 2023-11-06 DIAGNOSIS — G51.0 BELL'S PALSY: ICD-10-CM

## 2023-11-06 DIAGNOSIS — R26.89 IMBALANCE: ICD-10-CM

## 2023-11-06 PROCEDURE — 97112 NEUROMUSCULAR REEDUCATION: CPT | Mod: PO

## 2023-11-06 NOTE — PATIENT INSTRUCTIONS
JORGE Reyes (2001). The source for oral-facial exercises: Updated & expanded. [place], [place]: [employer].

## 2023-11-09 PROBLEM — R26.89 IMBALANCE: Status: ACTIVE | Noted: 2023-11-09

## 2023-11-09 PROBLEM — R42 DYSEQUILIBRIUM: Status: ACTIVE | Noted: 2023-11-09

## 2023-11-09 PROBLEM — G51.0 FACIAL PARALYSIS/BELLS PALSY: Status: ACTIVE | Noted: 2023-11-09

## 2023-11-09 NOTE — PLAN OF CARE
[facility]  Physical Therapy Neurological Rehabilitation Initial Evaluation    Name: Fabiano Jules Jr.  Clinic Number: [id number]    Therapy Diagnosis:   Encounter Diagnoses   Name Primary?    Bell's palsy     Dysequilibrium     Imbalance     Facial paralysis/Bunnlevel palsy        Physician: Howie Mathias MD    Physician Orders: PT Eval and Treat   Medical Diagnosis from Referral: G51.0 (ICD-10-CM) - Bell's palsy  Evaluation Date: 11/6/2023  Authorization Period Expiration: 11/20/2023  Plan of Care Expiration: 12/31/2023  Visit # / Visits authorized: 1/ 1    Time In: 2:40 pm  Time Out: 3:20 pm  Total Billable Time: 40 minutes    Precautions: Standard and fall    Subjective   Date of onset: diagnosed   History of current condition - Fabiano reports: Joseph's Palsy since July 2, 2023 following ear infection. States it has improved some since initial diagnosed. He presents left side effected. Works in a stressful environment as an addiction counselor. He is starting to take leave next Thursday November 16th. Noticed worsening of balance with walking around hospital at work. Due to facial partial paralysis he is seeing a specialist to assess eye lid due to inability to fully close; use of eye patch at night.     Medical History:   Past Medical History:   Diagnosis Date    Arthritis     Back injury     Colon polyp     COVID     CPAP (continuous positive airway pressure) dependence     Hypertension     PONV (postoperative nausea and vomiting)     Sleep apnea     c pap machine used       Surgical History:   Fabiano Jules Jr.  has a past surgical history that includes Lipoma resection; Circumcision; Medial collateral ligament and lateral collateral ligament repair, knee (Right, 09/01/2018); facet injection ; Colonoscopy (N/A, 12/03/2018); Magnetic resonance imaging (N/A, 05/29/2020); epidural steroid injection X 2; Myelography (N/A, 06/23/2020); Magnetic resonance imaging (N/A, 12/03/2021); Minimally  invasive transforaminal lumbar interbody fusion (TLIF) (N/A, 04/05/2022); Bone graft (N/A, 04/05/2022); Esophagogastroduodenoscopy (N/A, 09/01/2022); Colonoscopy (N/A, 09/01/2022); Intraluminal gastrointestinal tract imaging via capsule (N/A, 10/03/2022); Laparoscopic sleeve gastrectomy (N/A, 12/19/2022); and Insertion of implantable loop recorder (N/A, 9/15/2023).    Medications:   Fabiano has a current medication list which includes the following prescription(s): amlodipine, b complex vitamins, calcium citrate, epinephrine, multivitamin, oxycodone-acetaminophen, rosuvastatin, valacyclovir, and white petrolatum-mineral oil 56.8-42.5%, and the following Facility-Administered Medications: cefazolin in dextrose (iso-os), hydromorphone, oxycodone-acetaminophen, and polyethylene glycol.    Allergies:   Review of patient's allergies indicates:   Allergen Reactions    Peaches [peach (prunus persica)] Anaphylaxis    Peanut Anaphylaxis    Tree pollen-red birch Anaphylaxis        Imaging, none    Prior Therapy: none for Bell's Palsy  Social History:  lives with their spouse  Falls: has fallen once 2 weeks ago due to low blood pressure   DME: Walker and Straight cane when he had a lumbar fusion in 2022.    Home Environment: single story home, no STEPH   Exercise Routine / History: walking, cardio   Family Present at time of Eval: none   Occupation: addition specialist  Prior Level of Function: independent  Current Level of Function: independent with partial facial paralysis     Pain:  Current 10/10, worst 10/10, best 5/10   Location: bilateral neuropathy, tinnitus bilaterally but worse on L, midline and R sided back pain  Description: Aching, Dull, and Tingling  Aggravating Factors: weather changes, jaw pain  Easing Factors: pain medication    Pts goals: improve movement in his facial muscle    Objective     - Follows commands: yes   - Speech: has effected speech due to limited jaw and mouth muscle movements    Mental status:  alert, oriented to person, place, and time  Appearance: Casually dressed  Behavior:  calm and cooperative  Attention Span and Concentration:  Normal    Skin integrity:  Intact    Sensation:  Light Touch: Intact           Proprioception:   Impaired:     Visual/Auditory: left sided vision effected  Tracking: Impaired: onset of dizziness  Saccades: Impaired: onset of dizziness and blurred vision  Acuity: NT  R/L discrimination: Intact  Convergence: 6 inches    Smile: facial asymmetry with L side effected  Eye brow lift: facial asymmetry with L side effected  Cheek puffs: facial asymmetry with L side effected  Closing eye lids: unable to fully close L eye lid       Evaluation   Single Limb Stance R LE <3 sec surgery  (<10 sec = HIGH FALL RISK)   Single Limb Stance L LE <3 sec since back surgery  (<10 sec = HIGH FALL RISK)   5 times sit-stand NT  >12 sec= fall risk for general elderly  >16 sec= fall risk for Parkinson's disease  >10 sec= balance/vestibular dysfunction (<59 y/o)  >14.2 sec= balance/vestibular dysfunction (>59 y/o)  >12 sec= fall risk for CVA   FGA 17/30     Functional Gait Assessment:   1. Gait on level surface =  2   (3) Normal: less than 5.5 sec, no A.D., no imbalance, normal gait pattern, deviates< 6in   (2) Mild impairment: 7-5.6 sec, uses A.D., mild gait deviations, or deviates 6-10 in   (1) Moderate impairment: > 7 sec, slow speed, imbalance, deviates 10-15 in.   (0) Severe impairment: needs assist, deviates >15 in, reach/touch wall  2. Change in Gait Speed = 3   (3) Normal: smooth change w/o loss of balance or gait deviation, deviates < 6 in, significant difference between speeds   (2) Mild impairment: changes speed, but demonstrates mild gait deviations, deviates 6-10 in, OR no deviations but unable to significantly speed, OR uses A.D.   (1) Moderate impairment: minor changes to speed, OR changes speed w/ significant deviations, deviates 10-15 in, OR  Changes speed , but loses balance &  recovers   (0) Severe impairment: cannot change speed, deviates >15 in, or loses balance & needs assist  3. Gait with horizontal head turns  = 2   (3) Normal: no change in gait, deviates <6 in   (2) Mild impairment: slight change in speed, deviates 6-10 in, OR uses A.D.   (1) Moderate impairment: moderate change in speed, deviates 10-15 in   (0) Severe impairment: severe disruption of gait, deviates >15in  4. Gait with vertical head turns = 2   (3) Normal: no change in gait, deviates <6 in   (2) Mild impairment: slight change in speed, deviates 6-10 in OR uses A.D.   (1) Moderate impairment: moderate change in speed, deviates 10-15 in   (0) Severe impairment: severe disruption of gait, deviates >15 in  5. Gait with pivot turns = 2   (3) Normal: performs safely in 3 sec, no LOB   (2) Mild impairment: performs in >3 sec & no LOB, OR turns safely & requires several steps to regain LOB   (1) Moderate impairment: turns slow, OR requires several small steps for balance following turn & stop   (0) Severe impairment: cannot turn safely, needs assist  6. Step over obstacle = 2   (3) Normal: steps over 2 stacked boxes w/o change in speed or LOB   (2) Mild impairment: able to step over 1 box w/o change in speed or LOB   (1) Moderate impairment: steps over 1 box but must slow down, may require VC   (0) Severe impairment: cannot perform w/o assist  7. Gait with Narrow [place] = 0   (3) Normal: 10 steps no staggering   (2) Mild impairment: 7-9 steps   (1) Moderate impairment: 4-7 steps   (0) Severe impairment: < 4 steps or cannot perform w/o assist  8. Gait with eyes closed = 1   (3) Normal: < 7 sec, no A.D., no LOB, normal gait pattern, deviates <6 in   (2) Mild impairment: 7.1-9 sec, mild gait deviations, deviates 6-10 in   (1) Moderate impairment: > 9 sec, abnormal pattern, LOB, deviates 10-15 in   (0) Severe impairment: cannot perform w/o assist, LOB, deviates >15in  9. Ambulating Backwards = 1   (3) Normal: no A.D., no LOB,  normal gait pattern, deviates <6in   (2) Mild impairment: uses A.D., slower speed, mild gait deviations, deviates 6-10 in   (1) Moderate impairment: slow speed, abnormal gait pattern, LOB, deviates 10-15 in   (0) Severe impairment: severe gait deviations or LOB, deviates >15in  10. Steps = 2   (3) Normal: alternating feet, no rail   (2) Mild Impairment: alternating feet, uses rail   (1) Moderate impairment: step-to, uses rail   (0) Severe impairment: cannot perform safely    Score 17/30     Score:   <22/30 fall risk   <20/30 fall risk in older adults   <18/30 fall risk in Parkinsons   Scores by Decade:                        Age    Score                        40-49  (24-30)                       50-59  (25-30)                       60-69  (20-30)                       70-79  (16-30)  MAUREEN Concepcion (2007). Reference Group Data for the Functional Gait Assessment. Physical Therapy (34)11, [id number].      Postural control:  MCTSIB:  1. Eyes Open/feet together/Firm: 30 seconds  2. Eyes Closed/feet together/Firm: 15 seconds  3. Eyes Open/feet together/Foam: 22 seconds  4. Eyes Closed/feet together/Foam: 8 seconds        GAIT DEVIATIONS:  Gait component performance:   Deviation from path with scanning of room, unsteady gait if moving head or visual scanning performed.          CMS Impairment/Limitation/Restriction for FOTO  Survey    Therapist reviewed FOTO scores for Fabiano Jules Jr. on 11/6/2023.   FOTO documents entered into EPIC - see Media section.    Limitation Score: %  Category:          TREATMENT   Treatment Time In: 3:05 pm  Treatment Time Out: 3:20 pm  Total Treatment time separate from Evaluation: 15 minutes      Fabiano participated in neuromuscular re-education activities to improve: vestibular and proprioception for 15 minutes. The following activities were included:    Given handout for Bell's Palsy exercises, see patient instructions, for HEP     Standing smooth pursuits  Standing saccades  Standing  VORx1      Home Exercises and Patient Education Provided    Education provided:   - HEP, plan of care    Written Home Exercises Provided: yes.  Exercises were reviewed and Fabiano was able to demonstrate them prior to the end of the session.  Fabiano demonstrated good  understanding of the education provided.     See EMR under Patient Instructions for exercises provided 11/6/2023.    Assessment   Fabiano is a 53 y.o. male referred to outpatient Physical Therapy with a medical diagnosis of Bell's Palsy. Pt presents with facial asymmetry with weakness on L side following Bell's Palsy diagnosis 4 months ago. Pt reports some improvement in return of muscles but remains difficulty to close L eye lid, smile is asymmetrical, and lifting eye brows are asymmetrical. PT updated HEP to include variety of exercises and massage techniques to stimulate facial muscles. Pt also demonstrates dysequilibrium and oculomotor deficits possibility due to effects of change in vision with Bell's Palsy. He would benefit from physical therapy to address deficits.     Pt prognosis is Fair.   Pt will benefit from skilled outpatient Physical Therapy to address the deficits stated above and in the chart below, provide pt/family education, and to maximize pt's level of independence.     Plan of care discussed with patient: Yes  Pt's spiritual, cultural and educational needs considered and patient is agreeable to the plan of care and goals as stated below:     Anticipated Barriers for therapy: severity of symptoms    Medical Necessity is demonstrated by the following  History  Co-morbidities and personal factors that may impact the plan of care Co-morbidities:   History of long haul COVID, back surgery in 2022, sleep apnea    Personal Factors:   age  coping style  Stressful work environment     moderate   Examination  Body Structures and Functions, activity limitations and participation restrictions that may impact the plan of care Body Regions:    head  lower extremities  trunk    Body Systems:    gross symmetry  ROM  strength  balance  gait    Participation Restrictions:   Fall risk    Activity limitations:   Learning and applying knowledge  no deficits    General Tasks and Commands  no deficits    Communication  communicating with/receiving spoken language    Mobility  walking  Balance, vestibular deficits    Self care  washing oneself (bathing, drying, washing hands)  looking after one's health    Domestic Life  doing house work (cleaning house, washing dishes, laundry)    Interactions/Relationships  complex interpersonal interactions    Life Areas  employment    Community and Social Life  community life  recreation and leisure         moderate   Clinical Presentation evolving clinical presentation with changing clinical characteristics moderate   Decision Making/ Complexity Score: moderate     Goals:  Short Term Goals: 3 weeks   Pt will report compliance with HEP >/= 5 days per week to improve carry over.   Pt will improve FGA score to >/=20/30 to reduce fall risk.  Pt will demonstrate SLS >/= 5 seconds bilaterally without LOB.   Pt will tolerate 20 seconds of adaptation exercises in standing without dizziness worse than 4/10.    Long Term Goals: 6 weeks   Pt will improve FGA score to >/= 23/30 to reduce fall risk.   Pt to demonstrate high step through ladder without loss of balance to reduce risk of falls during gait.   Pt will demonstrate gait VORx1 without dizziness worse than 2/10.    Plan   Plan of care Certification: 11/6/2023 to 12/31/2023.    Outpatient Physical Therapy 2 times weekly for 6 weeks to include the following interventions: Electrical Stimulation NMES, Gait Training, Neuromuscular Re-ed, Patient Education, Therapeutic Activities, Therapeutic Exercise, and vestibular trainin.     Becca Hsu, PT, DPT

## 2023-11-17 DIAGNOSIS — E53.8 B12 DEFICIENCY: ICD-10-CM

## 2023-11-21 ENCOUNTER — CLINICAL SUPPORT (OUTPATIENT)
Dept: REHABILITATION | Facility: HOSPITAL | Age: 53
End: 2023-11-21
Payer: COMMERCIAL

## 2023-11-21 DIAGNOSIS — G51.0 BELL'S PALSY: Primary | ICD-10-CM

## 2023-11-21 DIAGNOSIS — R26.89 IMBALANCE: ICD-10-CM

## 2023-11-21 DIAGNOSIS — G51.0 FACIAL PARALYSIS/BELLS PALSY: ICD-10-CM

## 2023-11-21 DIAGNOSIS — R42 DYSEQUILIBRIUM: ICD-10-CM

## 2023-11-21 PROCEDURE — 97112 NEUROMUSCULAR REEDUCATION: CPT | Mod: PO

## 2023-11-21 NOTE — PROGRESS NOTES
[facility]   Physical Therapy Treatment Note      Name: Fabiano Jules Jr.  Clinic Number: [id number]    Therapy Diagnosis:   Encounter Diagnoses   Name Primary?    Bell's palsy Yes    Dysequilibrium     Imbalance     Facial paralysis/[place] palsy      Physician: Howie Mathias MD    Visit Date: 11/21/2023    Physician Orders: PT Eval and Treat   Medical Diagnosis from Referral: G51.0 (ICD-10-CM) - Bell's palsy  Evaluation Date: 11/6/2023  Authorization Period Expiration: 12/31/2023  Plan of Care Expiration: 12/31/2023  Visit # / Visits authorized: 1/ 10     Time In: 1733  Time Out: 1815  Total Billable Time: 42 minutes     Precautions: Fall      Subjective     Patient reports: intermittent, short-term lightheaded during general mobility = long walks, bending over (coming back up), and reaching up high.    He was compliant with home exercise program.  Response to previous treatment: no changes  Functional change: none    Pain: 5/10  Location: right to middle low back        Objective      Objective Measures updated at progress report unless specified.     Treatment     Fabiano received the treatments listed below:      neuromuscular re-education activities to improve: Balance and Vestibular function for 42 minutes. The following activities were included:    X 5 sit to stand while holding target  X 5 lean over touch stool while holding target  X 10 each bilateral head tilts while holding target  X 3 each big upper body circles while holding target = clockwise/counterclockwise   X 25 standing two-square saccades on AirEx (repeating random letters) = side to side  X 25 each standing VOR1 on AirEx (repeating random letters) = side to side*, up and down*  X 30 seconds each standing smooth pursuits on AirEx (single target) = side to side*, up and down*   X 45 seconds full Romberg stance = eyes open  X 30 seconds full Romberg stance = eyes closed*  2 x 20 feet VOR1 gait = side to side, up and down  2 x  20 feet balance gait = 360 turn midway, eyes closed     *minimal difficulty      Patient Education and Home Exercises       Education provided:   - proper therapeutic exercise technique    Written Home Exercises Provided: Patient instructed to cont prior HEP.       Assessment     Patient was able to tolerate habituation exercises without symptom elevation; cognitive task during standing saccades and VOR1 exercises set at repeating random letters; minimal symptom elevation reported during standing VOR1 and smooth pursuit exercises; patient lost his balance after 30 seconds of full Romberg training, eyes closed; VOR1 gait and balance gait performed without difficulty.    Fabiano Is progressing fairly well towards his goals.   Patient prognosis is Fair.     Patient will continue to benefit from skilled outpatient physical therapy to address the deficits listed in the problem list box on initial evaluation, provide pt/family education and to maximize pt's level of independence in the home and community environment.     Patient's spiritual, cultural and educational needs considered and pt agreeable to plan of care and goals.     Anticipated barriers to physical therapy: severity of symptoms    Goals: Short Term Goals: 3 weeks   Pt will report compliance with HEP >/= 5 days per week to improve carry over. (PART MET)  Pt will improve FGA score to >/=20/30 to reduce fall risk. (NOT MET)  Pt will demonstrate SLS >/= 5 seconds bilaterally without LOB. (NOT MET)  Pt will tolerate 20 seconds of adaptation exercises in standing without dizziness worse than 4/10. (PART MET)     Long Term Goals: 6 weeks   Pt will improve FGA score to >/= 23/30 to reduce fall risk. (NOT MET)  Pt to demonstrate high step through ladder without loss of balance to reduce risk of falls during gait. (NOT MET)  Pt will demonstrate gait VORx1 without dizziness worse than 2/10. (PART MET)      Plan     Continue to progress balance and vestibular training to  patient's tolerance.       Yossi Zhang, PT

## 2023-11-22 RX ORDER — MULTIVIT WITH MINERALS/HERBS
1 TABLET ORAL DAILY
Qty: 90 TABLET | Refills: 3 | Status: SHIPPED | OUTPATIENT
Start: 2023-11-22 | End: 2023-12-12 | Stop reason: SDUPTHER

## 2023-12-12 ENCOUNTER — CLINICAL SUPPORT (OUTPATIENT)
Dept: REHABILITATION | Facility: HOSPITAL | Age: 53
End: 2023-12-12
Payer: COMMERCIAL

## 2023-12-12 DIAGNOSIS — R26.89 IMBALANCE: ICD-10-CM

## 2023-12-12 DIAGNOSIS — G51.0 BELL'S PALSY: Primary | ICD-10-CM

## 2023-12-12 DIAGNOSIS — E53.8 B12 DEFICIENCY: ICD-10-CM

## 2023-12-12 DIAGNOSIS — G51.0 FACIAL PARALYSIS/BELLS PALSY: ICD-10-CM

## 2023-12-12 DIAGNOSIS — R42 DYSEQUILIBRIUM: ICD-10-CM

## 2023-12-12 PROCEDURE — 97112 NEUROMUSCULAR REEDUCATION: CPT | Mod: PO

## 2023-12-12 NOTE — PROGRESS NOTES
[facility]   Physical Therapy Discharge Note      Name: Fabiano Jules Jr.  Clinic Number: [id number]    Therapy Diagnosis:   Encounter Diagnoses   Name Primary?    Bell's palsy Yes    Dysequilibrium     Imbalance     Facial paralysis/[place] palsy      Physician: Howie Mathias MD    Visit Date: 12/12/2023    Physician Orders: PT Eval and Treat   Medical Diagnosis from Referral: G51.0 (ICD-10-CM) - Bell's palsy  Evaluation Date: 11/6/2023    Date of Last visit: 12/12/23  Total Visits Received: 3     Time In: 1733  Time Out: 1815  Total Billable Time: 42 minutes     Precautions: Fall      Subjective     Patient reports: intermittent tinnitus; denies dizziness; limited mostly by severe neuropathy left foot.    He was compliant with home exercise program.  Response to previous treatment: no changes  Functional change: none    Pain: 3/10; 10/10  Location: right to middle low back; left foot neuropathy      Objective      Functional Gait Assessment   1. Gait on level surface =  2  2. Change in Gait Speed = 3  3. Gait with horizontal head turns  = 3  4. Gait with vertical head turns = 3  5. Gait with pivot turns = 3  6. Step over obstacle = 2  7. Gait with Narrow [place] = 0  8. Gait with eyes closed = 2  9. Ambulating Backwards = 3  10. Steps = 2     Score 23/30   Score:   <22/30 fall risk   <20/30 fall risk in older adults   <18/30 fall risk in Parkinsons      Treatment     Fabiano received the treatments listed below:      neuromuscular re-education activities to improve: Balance and Vestibular function for 42 minutes. The following activities were included:    X 7 sit to stand while holding target  X 7 lean over touch stool while holding target*  X 15 each bilateral head tilts while holding target  Functional ambulation 2 x 20 feet at normal speed (avg 5.65 seconds)  2 X 20 feet ambulation with changing speeds (slow and fast)  X 20 feet ambulation with head turns  X 20 feet ambulation with head  nods  X 20 feet ambulation with pivot turns  X 20 feet each ambulation with stepping over obstacles (4 1/2 inch and then 9 inch*)  X 2 feet ambulation heel-toe**  X 20 feet ambulation with eyes closed*  X 20 feet ambulation backwards  2 x 4 up/down 6-inch stairs - step through gait and bilateral use of hand rails               Reviewed Bell's palsy and adaptation home exercise programs - instructed to continue twice a day     *minimal difficulty      Patient Education and Home Exercises       Education provided:   - proper therapeutic exercise technique  - continue home exercise programs twice a day     Written Home Exercises Provided: Patient instructed to cont prior HEP.       Assessment     Patient was able to tolerate increased repetitions during habituation exercises - minimal symptom elevation reported after bending over exercise; delay demonstrated while stepping over 9-inch obstacle; unable to effectively perform heel-toe gait - limited by left foot neuropathy; minimal path deviation noted during gait with eyes closed; bilateral hand rails needed during stair training due to left foot neuropathy; patient needed to stop before stepping over 9-inch obstacle; minimal fall risk as evidenced by score of 23/30 on the Functional Gait Assessment (<22/30 fall risk).     Fabiano has progressed fairly well towards his goals.     Patient's spiritual, cultural and educational needs considered and pt agreeable to plan of care and goals.     Goals: Short Term Goals: 3 weeks   Pt will report compliance with HEP >/= 5 days per week to improve carry over. (PART MET)  Pt will improve FGA score to >/=20/30 to reduce fall risk. (MET)  Pt will demonstrate SLS >/= 5 seconds bilaterally without LOB. (NOT MET)  Pt will tolerate 20 seconds of adaptation exercises in standing without dizziness worse than 4/10. (PART MET)     Long Term Goals: 6 weeks   Pt will improve FGA score to >/= 23/30 to reduce fall risk. (PART MET)  Pt to  demonstrate high step through ladder without loss of balance to reduce risk of falls during gait. (NOT MET)  Pt will demonstrate gait VORx1 without dizziness worse than 2/10. (PART MET)    Discharge reason: Patient has reached the maximum rehab potential for the present time.      PLAN     This patient is discharged from Physical Therapy.      Yossi Zhang, PT

## 2023-12-14 ENCOUNTER — PATIENT MESSAGE (OUTPATIENT)
Dept: FAMILY MEDICINE | Facility: CLINIC | Age: 53
End: 2023-12-14
Payer: COMMERCIAL

## 2023-12-14 NOTE — TELEPHONE ENCOUNTER
I spoke with pt via phone. Will see Dr. Mathias tomorrow at 3:30. No further questions at this time.

## 2023-12-15 ENCOUNTER — PATIENT MESSAGE (OUTPATIENT)
Dept: BARIATRICS | Facility: CLINIC | Age: 53
End: 2023-12-15
Payer: COMMERCIAL

## 2023-12-15 ENCOUNTER — OFFICE VISIT (OUTPATIENT)
Dept: FAMILY MEDICINE | Facility: CLINIC | Age: 53
End: 2023-12-15
Payer: COMMERCIAL

## 2023-12-15 VITALS
OXYGEN SATURATION: 99 % | RESPIRATION RATE: 18 BRPM | WEIGHT: 242.5 LBS | HEART RATE: 60 BPM | HEIGHT: 69 IN | BODY MASS INDEX: 35.92 KG/M2 | DIASTOLIC BLOOD PRESSURE: 80 MMHG | SYSTOLIC BLOOD PRESSURE: 130 MMHG

## 2023-12-15 DIAGNOSIS — F43.21 GRIEF REACTION: Primary | ICD-10-CM

## 2023-12-15 PROCEDURE — 4010F PR ACE/ARB THEARPY RXD/TAKEN: ICD-10-PCS | Mod: CPTII,S$GLB,, | Performed by: STUDENT IN AN ORGANIZED HEALTH CARE EDUCATION/TRAINING PROGRAM

## 2023-12-15 PROCEDURE — 1160F RVW MEDS BY RX/DR IN RCRD: CPT | Mod: CPTII,S$GLB,, | Performed by: STUDENT IN AN ORGANIZED HEALTH CARE EDUCATION/TRAINING PROGRAM

## 2023-12-15 PROCEDURE — 3075F PR MOST RECENT SYSTOLIC BLOOD PRESS GE 130-139MM HG: ICD-10-PCS | Mod: CPTII,S$GLB,, | Performed by: STUDENT IN AN ORGANIZED HEALTH CARE EDUCATION/TRAINING PROGRAM

## 2023-12-15 PROCEDURE — 3008F PR BODY MASS INDEX (BMI) DOCUMENTED: ICD-10-PCS | Mod: CPTII,S$GLB,, | Performed by: STUDENT IN AN ORGANIZED HEALTH CARE EDUCATION/TRAINING PROGRAM

## 2023-12-15 PROCEDURE — 1159F PR MEDICATION LIST DOCUMENTED IN MEDICAL RECORD: ICD-10-PCS | Mod: CPTII,S$GLB,, | Performed by: STUDENT IN AN ORGANIZED HEALTH CARE EDUCATION/TRAINING PROGRAM

## 2023-12-15 PROCEDURE — 99999 PR PBB SHADOW E&M-EST. PATIENT-LVL IV: CPT | Mod: PBBFAC,,, | Performed by: STUDENT IN AN ORGANIZED HEALTH CARE EDUCATION/TRAINING PROGRAM

## 2023-12-15 PROCEDURE — 1160F PR REVIEW ALL MEDS BY PRESCRIBER/CLIN PHARMACIST DOCUMENTED: ICD-10-PCS | Mod: CPTII,S$GLB,, | Performed by: STUDENT IN AN ORGANIZED HEALTH CARE EDUCATION/TRAINING PROGRAM

## 2023-12-15 PROCEDURE — 99213 OFFICE O/P EST LOW 20 MIN: CPT | Mod: S$GLB,,, | Performed by: STUDENT IN AN ORGANIZED HEALTH CARE EDUCATION/TRAINING PROGRAM

## 2023-12-15 PROCEDURE — 3008F BODY MASS INDEX DOCD: CPT | Mod: CPTII,S$GLB,, | Performed by: STUDENT IN AN ORGANIZED HEALTH CARE EDUCATION/TRAINING PROGRAM

## 2023-12-15 PROCEDURE — 1159F MED LIST DOCD IN RCRD: CPT | Mod: CPTII,S$GLB,, | Performed by: STUDENT IN AN ORGANIZED HEALTH CARE EDUCATION/TRAINING PROGRAM

## 2023-12-15 PROCEDURE — 3079F PR MOST RECENT DIASTOLIC BLOOD PRESSURE 80-89 MM HG: ICD-10-PCS | Mod: CPTII,S$GLB,, | Performed by: STUDENT IN AN ORGANIZED HEALTH CARE EDUCATION/TRAINING PROGRAM

## 2023-12-15 PROCEDURE — 3079F DIAST BP 80-89 MM HG: CPT | Mod: CPTII,S$GLB,, | Performed by: STUDENT IN AN ORGANIZED HEALTH CARE EDUCATION/TRAINING PROGRAM

## 2023-12-15 PROCEDURE — 4010F ACE/ARB THERAPY RXD/TAKEN: CPT | Mod: CPTII,S$GLB,, | Performed by: STUDENT IN AN ORGANIZED HEALTH CARE EDUCATION/TRAINING PROGRAM

## 2023-12-15 PROCEDURE — 3044F HG A1C LEVEL LT 7.0%: CPT | Mod: CPTII,S$GLB,, | Performed by: STUDENT IN AN ORGANIZED HEALTH CARE EDUCATION/TRAINING PROGRAM

## 2023-12-15 PROCEDURE — 3075F SYST BP GE 130 - 139MM HG: CPT | Mod: CPTII,S$GLB,, | Performed by: STUDENT IN AN ORGANIZED HEALTH CARE EDUCATION/TRAINING PROGRAM

## 2023-12-15 PROCEDURE — 3044F PR MOST RECENT HEMOGLOBIN A1C LEVEL <7.0%: ICD-10-PCS | Mod: CPTII,S$GLB,, | Performed by: STUDENT IN AN ORGANIZED HEALTH CARE EDUCATION/TRAINING PROGRAM

## 2023-12-15 PROCEDURE — 99213 PR OFFICE/OUTPT VISIT, EST, LEVL III, 20-29 MIN: ICD-10-PCS | Mod: S$GLB,,, | Performed by: STUDENT IN AN ORGANIZED HEALTH CARE EDUCATION/TRAINING PROGRAM

## 2023-12-15 PROCEDURE — 99999 PR PBB SHADOW E&M-EST. PATIENT-LVL IV: ICD-10-PCS | Mod: PBBFAC,,, | Performed by: STUDENT IN AN ORGANIZED HEALTH CARE EDUCATION/TRAINING PROGRAM

## 2023-12-15 RX ORDER — MIRTAZAPINE 15 MG/1
15 TABLET, FILM COATED ORAL NIGHTLY
Qty: 30 TABLET | Refills: 0 | Status: SHIPPED | OUTPATIENT
Start: 2023-12-15 | End: 2024-01-22

## 2023-12-15 NOTE — PROGRESS NOTES
"[facility] CLINIC NOTE    Patient Name: Fabiano Jules Jr.  YOB: 1970    PRESENTING HISTORY     History of Present Illness:  Mr. Fabiano Jules Jr. is a 53 y.o. male here with anxiousness and depression after acute trauma.     His daughter abruptly miscarried this week with him in the room. She was 5 months pregnant.   Since then has only slept a few hours.   She came home today.   He has been sad, crying, anxious.   Doesn't feel like he can work.   Is trying to understand why this would have happened.     He is actively Protestant, has spoken with his .     Wants to look into speaking with a therapist.     No SI/HI reported.       ROS      OBJECTIVE:   Vital Signs:  Vitals:    12/15/23 1529   BP: 130/80   Pulse: 60   Resp: 18   SpO2: 99%   Weight: 110 kg (242 lb 8.1 oz)   Height: 5' 9" (1.753 m)          Physical Exam: Intermittently tearful. Mood depressed, affect congruent.     Physical Exam    ASSESSMENT & PLAN:     Grief reaction  -     mirtazapine (REMERON) 15 MG tablet; Take 1 tablet (15 mg total) by mouth every evening.  Dispense: 30 tablet; Refill: 0  -     Ambulatory referral/consult to Psychiatry; Future; Expected date: 12/22/2023      We will try to get him in with a therapist. Given some outside resources.   Encouraged to f/u with his .   We can try some remeron QHS and I will write him off work through the new year.          Howie Mathias  Internal Medicine            "

## 2023-12-16 ENCOUNTER — PATIENT MESSAGE (OUTPATIENT)
Dept: INTERNAL MEDICINE | Facility: CLINIC | Age: 53
End: 2023-12-16
Payer: COMMERCIAL

## 2023-12-18 RX ORDER — MULTIVIT WITH MINERALS/HERBS
1 TABLET ORAL DAILY
Qty: 90 TABLET | Refills: 3 | Status: SHIPPED | OUTPATIENT
Start: 2023-12-18 | End: 2024-03-26 | Stop reason: SDUPTHER

## 2023-12-19 ENCOUNTER — TELEPHONE (OUTPATIENT)
Dept: PSYCHIATRY | Facility: CLINIC | Age: 53
End: 2023-12-19
Payer: COMMERCIAL

## 2023-12-19 ENCOUNTER — PATIENT MESSAGE (OUTPATIENT)
Dept: PSYCHIATRY | Facility: CLINIC | Age: 53
End: 2023-12-19
Payer: COMMERCIAL

## 2023-12-19 NOTE — TELEPHONE ENCOUNTER
Pt called to schedule an appt. Referral was placed by PCP. Advised pt he will be added to wait list which is extensive. It may be 8+ months to be scheduled for initial visit. He verbalized understanding. Resource list sent via portal.

## 2024-01-04 ENCOUNTER — PATIENT MESSAGE (OUTPATIENT)
Dept: INTERNAL MEDICINE | Facility: CLINIC | Age: 54
End: 2024-01-04
Payer: COMMERCIAL

## 2024-01-11 ENCOUNTER — HOSPITAL ENCOUNTER (OUTPATIENT)
Dept: RADIOLOGY | Facility: HOSPITAL | Age: 54
Discharge: HOME | End: 2024-01-11
Attending: ORTHOPAEDIC SURGERY
Payer: COMMERCIAL

## 2024-01-11 ENCOUNTER — OFFICE VISIT (OUTPATIENT)
Dept: ORTHOPEDICS | Facility: CLINIC | Age: 54
End: 2024-01-11
Payer: COMMERCIAL

## 2024-01-11 VITALS — BODY MASS INDEX: 35.84 KG/M2 | WEIGHT: 242 LBS | HEIGHT: 69 IN

## 2024-01-11 DIAGNOSIS — M19.072 ARTHRITIS OF ANKLE, LEFT: ICD-10-CM

## 2024-01-11 DIAGNOSIS — M19.071 ARTHRITIS OF ANKLE, RIGHT: Primary | ICD-10-CM

## 2024-01-11 DIAGNOSIS — M19.072 ARTHRITIS OF ANKLE, LEFT: Primary | ICD-10-CM

## 2024-01-11 PROCEDURE — 20605 DRAIN/INJ JOINT/BURSA W/O US: CPT | Mod: 50,S$GLB,, | Performed by: ORTHOPAEDIC SURGERY

## 2024-01-11 PROCEDURE — 99999 PR PBB SHADOW E&M-EST. PATIENT-LVL III: CPT | Mod: PBBFAC,,, | Performed by: ORTHOPAEDIC SURGERY

## 2024-01-11 PROCEDURE — 73610 X-RAY EXAM OF ANKLE: CPT | Mod: 26,LT,, | Performed by: RADIOLOGY

## 2024-01-11 PROCEDURE — 73610 X-RAY EXAM OF ANKLE: CPT | Mod: TC,PO,LT

## 2024-01-11 PROCEDURE — 99213 OFFICE O/P EST LOW 20 MIN: CPT | Mod: 25,S$GLB,, | Performed by: ORTHOPAEDIC SURGERY

## 2024-01-11 PROCEDURE — 1160F RVW MEDS BY RX/DR IN RCRD: CPT | Mod: CPTII,S$GLB,, | Performed by: ORTHOPAEDIC SURGERY

## 2024-01-11 PROCEDURE — 1159F MED LIST DOCD IN RCRD: CPT | Mod: CPTII,S$GLB,, | Performed by: ORTHOPAEDIC SURGERY

## 2024-01-11 PROCEDURE — 3008F BODY MASS INDEX DOCD: CPT | Mod: CPTII,S$GLB,, | Performed by: ORTHOPAEDIC SURGERY

## 2024-01-11 RX ADMIN — TRIAMCINOLONE ACETONIDE 40 MG: 40 INJECTION, SUSPENSION INTRA-ARTICULAR; INTRAMUSCULAR at 11:01

## 2024-01-16 RX ORDER — TRIAMCINOLONE ACETONIDE 40 MG/ML
40 INJECTION, SUSPENSION INTRA-ARTICULAR; INTRAMUSCULAR
Status: DISCONTINUED | OUTPATIENT
Start: 2024-01-11 | End: 2024-01-16 | Stop reason: HOSPADM

## 2024-01-16 NOTE — PROCEDURES
Intermediate Joint Aspiration/Injection: R ankle, L ankle    Date/Time: 1/11/2024 11:00 AM    Performed by: Liban Hirsch MD  Authorized by: Liban Hirsch MD    Consent Done?:  Yes (Verbal)  Indications:  Arthritis  Site marked: The procedure site was marked    Timeout: Prior to procedure the correct patient, procedure, and site was verified      Location:  Ankle  Site:  R ankle and L ankle  Prep: Patient was prepped and draped in usual sterile fashion    Ultrasonic Guidance for needle placement: No  Needle size:  25 G  Approach:  Anteromedial  Medications:  40 mg triamcinolone acetonide 40 mg/mL  Patient tolerance:  Patient tolerated the procedure well with no immediate complications

## 2024-01-16 NOTE — PROGRESS NOTES
Status/Diagnosis: Bilateral, Left > Right, PCFD; valgus talar tilt and end-stage tibiotalar arthritis; posterior tibial tendinitis; gastroc contracture  Date of Surgery: none  Date of Injury: none  Return visit: 3-6 months for repeat injection VS possible preop planning.  X-rays on Return: pending patient complaint     Chief Complaint:   Chief Complaint   Patient presents with    Right Ankle - Pain    Left Ankle - Pain     Present History:  Fabiano Jules Jr. is a 53 y.o. male who presents today for new patient evaluation.  Patient previously seen by Dr. Orozco.  Last seen in 2021 at which time he received bilateral ankle corticosteroid injections.  Patient endorses significant symptomatic relief at that time.  Patient has been managed for chronic symptomatic flatfoot deformity.  Has tried shoe wear and activity modification including not limited to custom orthotics, what sounds like an [place] brace, etc..  No recent immobilization or physical therapy.  Currently taking oral NSAIDs intermittently as needed for pain.  Denies any numbness or tingling.  Minimal pain at rest, increased with weight-bearing.  Also endorses recent history of lumbar fusion earlier this year which she believes has exacerbated his symptoms to bilateral feet.  States left greater than right foot and ankle pain.    06/01/2023:  Patient returns after last being seen in late November 2022.  Endorses near complete symptomatic relief with bilateral intra-articular corticosteroid injection the lasted 4+ month.  Presents today for repeat evaluation and repeat injection.    Since last being seen, patient underwent gastric bypass surgery.  Has lost 100+ lb postoperatively.      09/07/2023:  Patient returns for repeat clinical evaluation and possible intra-articular corticosteroid injection.  After last clinic visit in early June, patient endorses near-complete symptomatic relief for the last 2.5 months.  No new injuries.    Of note, patient did develop  Bell's palsy that was thought to be related to an ear infection since last being seen.  Does not believe that there is any relationship to previous corticosteroid injection.    01/11/2024:  Patient returns today for repeat clinical evaluation and possible repeat corticosteroid injection.    Last seen in September 2023.  At that time underwent bilateral ankle corticosteroid injections.  On the right, endorses 90+% relief even through today.    On the left with 75% relief but only for approximately 1 month.  Today with left >> right persistent ankle pain.  Localizes pain to the anterior ankle joint line.  No new injuries.  Denies any numbness or tingling.      Past Medical History:   Diagnosis Date    Arthritis     Back injury     Colon polyp     COVID     CPAP (continuous positive airway pressure) dependence     Hypertension     PONV (postoperative nausea and vomiting)     Sleep apnea     c pap machine used       Past Surgical History:   Procedure Laterality Date    BONE GRAFT N/A 04/05/2022    Procedure: BONE GRAFT;  Surgeon: Duran Adler Jr., MD;  Location: [facility] OR;  Service: Orthopedics;  Laterality: N/A;    CIRCUMCISION      COLONOSCOPY N/A 12/03/2018    Procedure: COLONOSCOPY;  Surgeon: CHRISSY Reyna MD;  Location: [facility] ([address] [place] [address]);  Service: Endoscopy;  Laterality: N/A;    COLONOSCOPY N/A 09/01/2022    Procedure: COLONOSCOPY;  Surgeon: Alondra Arias MD;  Location: [facility];  Service: Endoscopy;  Laterality: N/A;    epidural steroid injection X 2      lower lumbar    ESOPHAGOGASTRODUODENOSCOPY N/A 09/01/2022    Procedure: EGD (ESOPHAGOGASTRODUODENOSCOPY);  Surgeon: Alondra Arias MD;  Location: [facility];  Service: Endoscopy;  Laterality: N/A;    facet injection       [facility] [place]     INSERTION OF IMPLANTABLE LOOP RECORDER N/A 9/15/2023    Procedure: Insertion, Implantable Loop Recorder;  Surgeon: Romario Wallace MD;  Location[facility] CATH[facility]EP LAB;   Service: Cardiology;  Laterality: N/A;    INTRALUMINAL GASTROINTESTINAL TRACT IMAGING VIA CAPSULE N/A 10/03/2022    Procedure: IMAGING PROCEDURE, GI TRACT, INTRALUMINAL, VIA CAPSULE;  Surgeon: Alondra Arias MD;  Location: [facility];  Service: Endoscopy;  Laterality: N/A;    LAPAROSCOPIC SLEEVE GASTRECTOMY N/A 12/19/2022    Procedure: GASTRECTOMY, SLEEVE, LAPAROSCOPIC;  Surgeon: Lashonda Pinedo MD;  Location: [facility] OR;  Service: General;  Laterality: N/A;    LIPOMA RESECTION      Back of neck    MAGNETIC RESONANCE IMAGING N/A 05/29/2020    Procedure: MRI (MAGNETIC RESONANCE IMAGING) LUMBAR SPINE;  Surgeon: [facility] Diagnostic Provider;  Location: [facility];  Service: General;  Laterality: N/A;  COVID NEG    MAGNETIC RESONANCE IMAGING N/A 12/03/2021    Procedure: MRI (MAGNETIC RESONANCE IMAGING), LUMBAR SPINE;  Surgeon: [facility] Diagnostic Provider;  Location: [facility];  Service: General;  Laterality: N/A;  In MRI @ 7:50 per Ariela    MEDIAL COLLATERAL LIGAMENT AND LATERAL COLLATERAL LIGAMENT REPAIR, KNEE Right 09/01/2018    Dr. Christophe Gore in [place]     MINIMALLY INVASIVE TRANSFORAMINAL LUMBAR INTERBODY FUSION (TLIF) N/A 04/05/2022    Procedure: FUSION, SPINE, LUMBAR, TLIF, MINIMALLY INVASIVE, WITH INSTRUMENTATION,  L4/5 RIGHT;  Surgeon: Duran Adler Jr., MD;  Location: [facility];  Service: Orthopedics;  Laterality: N/A;  10:30am per Tamela    MYELOGRAPHY N/A 06/23/2020    Procedure: MYELOGRAM;  Surgeon: [facility] Diagnostic Provider;  Location: [facility];  Service: General;  Laterality: N/A;       Current Outpatient Medications   Medication Sig    amLODIPine (NORVASC) 5 MG tablet TAKE 1 TABLET BY MOUTH EVERY DAY    b complex vitamins (B COMPLEX-VITAMIN B12) tablet Take 1 tablet by mouth once daily.    calcium citrate (CALCITRATE) 200 mg (950 mg) tablet Take 1 tablet by mouth 2 (two) times daily.    EPINEPHrine (EPIPEN) 0.3 mg/0.3 mL AtIn Inject into the muscle once for one dose as needed.    mirtazapine (REMERON) 15  MG tablet Take 1 tablet (15 mg total) by mouth every evening.    multivitamin (THERAGRAN) per tablet Take 1 tablet by mouth once daily.    oxyCODONE-acetaminophen (PERCOCET) 7.5-325 mg per tablet Take 1 tablet by mouth 2 (two) times daily as needed.    rosuvastatin (CRESTOR) 10 MG tablet TAKE 1 TABLET BY MOUTH DAILY    valACYclovir (VALTREX) 1000 MG tablet Take 1 tablet (1,000 mg total) by mouth 3 (three) times daily. for 10 days    white petrolatum-mineral oil 56.8-42.5% (LACRI-LUBE S.O.P.) 56.8-42.5 % Oint Place into the left eye every evening. Use the lubrication as needed and definitely at night.  Take your eye closed at night.     No current facility-administered medications for this visit.     Facility-Administered Medications Ordered in Other Visits   Medication    ceFAZolin in dextrose (iso-os) 2 gram/100 mL PgBk 2,000 mg    HYDROmorphone injection 1 mg    oxyCODONE-acetaminophen  mg per tablet 1 tablet    polyethylene glycol packet 17 g       Review of patient's allergies indicates:   Allergen Reactions    Peaches [peach (prunus persica)] Anaphylaxis    Peanut Anaphylaxis    Tree pollen-red birch Anaphylaxis       Family History   Problem Relation Age of Onset    Hypertension Mother     Diabetes Father     Cancer Father         mesotheliosma     Cataracts Neg Hx     Glaucoma Neg Hx     Macular degeneration Neg Hx     Retinal detachment Neg Hx     Colon cancer Neg Hx     Colon polyps Neg Hx     Crohn's disease Neg Hx     Ulcerative colitis Neg Hx        Social History     Socioeconomic History    Marital status:     Number of children: 4   Occupational History     Employer: [employer]   Tobacco Use    Smoking status: Former     Current packs/day: 0.00     Average packs/day: 0.5 packs/day for 10.0 years (5.0 ttl pk-yrs)     Types: Cigarettes     Start date: 1/1/1999     Quit date: 1/1/2009     Years since quitting: 15.0    Smokeless tobacco: Never    Tobacco comments:     quit smoking in 2010    Substance and Sexual Activity    Alcohol use: Yes     Comment: rarely    Drug use: Not Currently     Types: Marijuana     Comment: medical---oil, smoke and edibles    Sexual activity: Not Currently     Social Determinants of Health     Physical Activity: Insufficiently Active (11/1/2023)    Exercise Vital Sign     Days of Exercise per Week: 2 days     Minutes of Exercise per Session: 60 min   Stress: Stress Concern Present (11/1/2023)    [employer] - Occupational Stress Questionnaire     Feeling of Stress : Very much   Social Connections: Unknown (11/1/2023)    Social Connection and Isolation Panel [NHANES]     Frequency of Communication with Friends and Family: Once a week     Frequency of Social Gatherings with Friends and Family: Once a week     Active Member of Clubs or Organizations: No     Attends Club or Organization Meetings: Patient declined     Marital Status:    Housing Stability: High Risk (11/1/2023)    Housing Stability Vital Sign     Unable to Pay for Housing in the Last Year: Yes     Unstable Housing in the Last Year: Patient refused       Physical exam:  There were no vitals filed for this visit.  Body mass index is 35.74 kg/m².  General: In no apparent distress; well developed and well nourished.  HEENT: normocephalic; atraumatic.  Cardiovascular: regular rate.  Respiratory: no increased work of breathing.  Musculoskeletal:   Gait: antalgic  Inspection:  Exam unchanged.  Significant flatfoot deformity with associated hindfoot and ankle valgus.  No significant forefoot abduction.  Hindfoot and ankle are not able to be completely passively corrected.  Unable to perform double or single limb heel rise bilaterally.  Patient localizes pain symmetrically.  Most prominent along the anteromedial ankle joint line and less so along the course of the posterior tibial tendon, left worse than right.  Minimal tenderness along the sinus tarsi.  No peroneal  tenderness.  [place]:  Positive  Alignment:  Knee: neutral               Ankle: increased valgus              Hindfoot: increased valgus              Forefoot: neutral   Strength:              Dorsiflexion 5/5  Plantar flexion 5/5  Inversion 3+/5  Eversion 5/5   Sensation:             Altered but present sensation on monofilament testing bilaterally.  ROM:              Ankle: limited with pain at extremes              Subtalar:  limited with pain at extremes  Pulses: 2+ DP/PT pulses.                   Imaging Studies/Outside documentation:  I have ordered/reviewed/interpreted the following images/outside documentation:  WB 3-views Left ankle:  New x-rays performed today at patient's request.    On my independent review, no acute bony abnormality noted.  Patient is still with severe tibiotalar joint space narrowing.  Significant lateral translation of the calcaneus consistent with hindfoot valgus.  Questionable widening of the distal tib-fib articulation.  Significant osteophytes diffusely about the tibiotalar joint.  Some degree of posterior translation of the talus relative to the tibial plafond.        Assessment:  Fabiano Jules Jr. is a 53 y.o. male with Bilateral, Left > Right, PCFD; valgus talar tilt and end-stage tibiotalar arthritis; posterior tibial tendinitis; gastroc contracture     Plan:   Clinical and radiographic findings were again discussed.  Operative versus nonoperative treatment options were described.    Patient would likely benefit from some degree of correction of hindfoot valgus and TTC nailing.  Currently left more symptomatic than right.    Patient would like to defer surgical intervention for now if at all possible.  Repeat bilateral ankle corticosteroid injection performed today without complication.  Patient tolerated this well.  See procedure note for details.    Patient will contact my office going forward for possible repeat injection versus preoperative planning.    Patient would  likely require a CT of the affected ankle with 3D recon for preoperative planning.  Patient voiced understanding.  All questions were answered.      This note was created using voice recognition software and may contain grammatical errors.

## 2024-01-17 DIAGNOSIS — F43.21 GRIEF REACTION: ICD-10-CM

## 2024-01-17 NOTE — TELEPHONE ENCOUNTER
No care due was identified.  [facility] Embedded Care Due Messages. Reference number: [id number].   1/17/2024 10:06:16 AM CST

## 2024-01-18 ENCOUNTER — PATIENT MESSAGE (OUTPATIENT)
Dept: RESEARCH | Facility: CLINIC | Age: 54
End: 2024-01-18
Payer: COMMERCIAL

## 2024-01-18 ENCOUNTER — OFFICE VISIT (OUTPATIENT)
Dept: OPHTHALMOLOGY | Facility: CLINIC | Age: 54
End: 2024-01-18
Payer: COMMERCIAL

## 2024-01-18 DIAGNOSIS — G51.0 FACIAL NERVE PALSY, SECONDARY: Primary | ICD-10-CM

## 2024-01-18 DIAGNOSIS — R68.89: ICD-10-CM

## 2024-01-18 PROCEDURE — 92285 EXTERNAL OCULAR PHOTOGRAPHY: CPT | Mod: S$GLB,,, | Performed by: OPHTHALMOLOGY

## 2024-01-18 PROCEDURE — 99204 OFFICE O/P NEW MOD 45 MIN: CPT | Mod: S$GLB,,, | Performed by: OPHTHALMOLOGY

## 2024-01-18 PROCEDURE — 99999 PR PBB SHADOW E&M-EST. PATIENT-LVL III: CPT | Mod: PBBFAC,,, | Performed by: OPHTHALMOLOGY

## 2024-01-18 PROCEDURE — 1159F MED LIST DOCD IN RCRD: CPT | Mod: CPTII,S$GLB,, | Performed by: OPHTHALMOLOGY

## 2024-01-18 PROCEDURE — 1160F RVW MEDS BY RX/DR IN RCRD: CPT | Mod: CPTII,S$GLB,, | Performed by: OPHTHALMOLOGY

## 2024-01-18 NOTE — PROGRESS NOTES
MATTHEW Jules is a/an 53 y.o. 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   Referred by:   Eye Meds:   Systane eye GTTS during the day  Systane ROGERIO during the night and evening while taping OS shut.   Oral Meds:  None at this time  Initially took oral steroids during first ten days after diagnosis.    PT d/x with [place] Palsy 7/2/2023  Left facial muscles don't allow OS to close properly   Patient does physical therapy and facial exercises/facial massages to   mitigate symptoms   Patient using body oil as well to try and stimulate left hemifacial nerves        Pt states vision is blurrier and has received a new glasses Rx recently 10   days ago.   Vision is extremely blurry OS but not as much OD  Extreme photosensitivity  Trouble driving at night   Experiencing headaches along with eye strain     Last edited by Riky Arce on 1/18/2024  9:49 AM.            Assessment /Plan     For exam results, see Encounter Report.    Facial nerve palsy, secondary  -     Ambulatory referral/consult to Ophthalmology  -     External Photography - OU - Both Eyes  -     MRI Brain W WO Contrast; Future; Expected date: 01/18/2024  -     Ambulatory referral/consult to ENT; Future; Expected date: 01/25/2024    Low blink rate      The patient is a pleasant 53-year-old male here for follow-up evaluation of left-sided facial weakness and exposure keratopathy.  The patient was diagnosed with Bell's palsy in July of 2023.  He was noted to have left-sided facial weakness and incomplete blink at that time along with exposure keratopathy.  He has had significant improvement in his left-sided facial weakness and blink since that time.  He has been using aggressive lubrication and taping his eyelids at nighttime on the left side.  He is using Systane preservative-free approximate 6 times daily to the left eye.  He is also using the Systane gel at nighttime at bedtime prior to taping his left eye.    On exam, the patient has left  sided facial weakness.  He has delayed blink on the left side.  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  He has a clear cornea on the left side.  He has mild weakness of the left orbicularis oris on smiling.    These findings were discussed with the patient.  The patient's left-sided facial weakness is resolving nicely. Recommend continued aggressive lubrication and taping of the left eye.  Plan for MRI Brain w and w/o contrast to evaluate CPA as to rule ou neoplasm.     Recommend consultation with my ENT colleagues to evaluate etiology of left-sided facial weakness.    Return to see me as needed

## 2024-01-18 NOTE — TELEPHONE ENCOUNTER
Refill Routing Note   Medication(s) are not appropriate for processing by [facility] for the following reason(s):        Responsible provider unclear    [facility] action(s):  Defer               Appointments  past 12m or future 3m with PCP    Date Provider   Last Visit   12/15/2023 Howie Mathias MD   Next Visit   Visit date not found Howie Mathias MD   ED visits in past 90 days: 0        Note composed:9:07 AM 01/18/2024

## 2024-01-20 ENCOUNTER — LAB VISIT (OUTPATIENT)
Dept: LAB | Facility: HOSPITAL | Age: 54
End: 2024-01-20
Attending: SURGERY
Payer: COMMERCIAL

## 2024-01-20 DIAGNOSIS — E66.01 MORBID OBESITY: ICD-10-CM

## 2024-01-20 LAB
25(OH)D3+25(OH)D2 SERPL-MCNC: 52 NG/ML (ref 30–96)
ALBUMIN SERPL BCP-MCNC: 4.1 G/DL (ref 3.5–5.2)
ALP SERPL-CCNC: 53 U/L (ref 55–135)
ALT SERPL W/O P-5'-P-CCNC: 32 U/L (ref 10–44)
ANION GAP SERPL CALC-SCNC: 8 MMOL/L (ref 8–16)
AST SERPL-CCNC: 29 U/L (ref 10–40)
BASOPHILS # BLD AUTO: 0.02 K/UL (ref 0–0.2)
BASOPHILS NFR BLD: 0.4 % (ref 0–1.9)
BILIRUB SERPL-MCNC: 0.9 MG/DL (ref 0.1–1)
BUN SERPL-MCNC: 14 MG/DL (ref 6–20)
CALCIUM SERPL-MCNC: 9.5 MG/DL (ref 8.7–10.5)
CHLORIDE SERPL-SCNC: 105 MMOL/L (ref 95–110)
CHOLEST SERPL-MCNC: 111 MG/DL (ref 120–199)
CHOLEST/HDLC SERPL: 2.6 {RATIO} (ref 2–5)
CO2 SERPL-SCNC: 29 MMOL/L (ref 23–29)
CREAT SERPL-MCNC: 0.7 MG/DL (ref 0.5–1.4)
DIFFERENTIAL METHOD BLD: ABNORMAL
EOSINOPHIL # BLD AUTO: 0.1 K/UL (ref 0–0.5)
EOSINOPHIL NFR BLD: 1.1 % (ref 0–8)
ERYTHROCYTE [DISTWIDTH] IN BLOOD BY AUTOMATED COUNT: 14.2 % (ref 11.5–14.5)
EST. GFR  (NO RACE VARIABLE): >60 ML/MIN/1.73 M^2
GLUCOSE SERPL-MCNC: 93 MG/DL (ref 70–110)
HCT VFR BLD AUTO: 39.1 % (ref 40–54)
HDLC SERPL-MCNC: 42 MG/DL (ref 40–75)
HDLC SERPL: 37.8 % (ref 20–50)
HGB BLD-MCNC: 12.5 G/DL (ref 14–18)
IMM GRANULOCYTES # BLD AUTO: 0.01 K/UL (ref 0–0.04)
IMM GRANULOCYTES NFR BLD AUTO: 0.2 % (ref 0–0.5)
IRON SERPL-MCNC: 77 UG/DL (ref 45–160)
LDLC SERPL CALC-MCNC: 64 MG/DL (ref 63–159)
LYMPHOCYTES # BLD AUTO: 2.4 K/UL (ref 1–4.8)
LYMPHOCYTES NFR BLD: 53.3 % (ref 18–48)
MCH RBC QN AUTO: 27.7 PG (ref 27–31)
MCHC RBC AUTO-ENTMCNC: 32 G/DL (ref 32–36)
MCV RBC AUTO: 87 FL (ref 82–98)
MONOCYTES # BLD AUTO: 0.4 K/UL (ref 0.3–1)
MONOCYTES NFR BLD: 8.1 % (ref 4–15)
NEUTROPHILS # BLD AUTO: 1.6 K/UL (ref 1.8–7.7)
NEUTROPHILS NFR BLD: 36.9 % (ref 38–73)
NONHDLC SERPL-MCNC: 69 MG/DL
NRBC BLD-RTO: 0 /100 WBC
PLATELET # BLD AUTO: 297 K/UL (ref 150–450)
PMV BLD AUTO: 9.3 FL (ref 9.2–12.9)
POTASSIUM SERPL-SCNC: 4.2 MMOL/L (ref 3.5–5.1)
PROT SERPL-MCNC: 7.3 G/DL (ref 6–8.4)
RBC # BLD AUTO: 4.51 M/UL (ref 4.6–6.2)
SATURATED IRON: 22 % (ref 20–50)
SODIUM SERPL-SCNC: 142 MMOL/L (ref 136–145)
TOTAL IRON BINDING CAPACITY: 346 UG/DL (ref 250–450)
TRANSFERRIN SERPL-MCNC: 247 MG/DL (ref 200–375)
TRIGL SERPL-MCNC: 25 MG/DL (ref 30–150)
VIT B12 SERPL-MCNC: 498 PG/ML (ref 210–950)
WBC # BLD AUTO: 4.45 K/UL (ref 3.9–12.7)

## 2024-01-20 PROCEDURE — 85025 COMPLETE CBC W/AUTO DIFF WBC: CPT | Performed by: SURGERY

## 2024-01-20 PROCEDURE — 82306 VITAMIN D 25 HYDROXY: CPT | Performed by: SURGERY

## 2024-01-20 PROCEDURE — 82607 VITAMIN B-12: CPT | Performed by: SURGERY

## 2024-01-20 PROCEDURE — 83540 ASSAY OF IRON: CPT | Performed by: SURGERY

## 2024-01-20 PROCEDURE — 80053 COMPREHEN METABOLIC PANEL: CPT | Performed by: SURGERY

## 2024-01-20 PROCEDURE — 84425 ASSAY OF VITAMIN B-1: CPT | Performed by: SURGERY

## 2024-01-20 PROCEDURE — 80061 LIPID PANEL: CPT | Performed by: SURGERY

## 2024-01-22 RX ORDER — MIRTAZAPINE 15 MG/1
15 TABLET, FILM COATED ORAL
Qty: 90 TABLET | Refills: 3 | Status: SHIPPED | OUTPATIENT
Start: 2024-01-22

## 2024-01-24 ENCOUNTER — TELEPHONE (OUTPATIENT)
Dept: OPHTHALMOLOGY | Facility: CLINIC | Age: 54
End: 2024-01-24
Payer: COMMERCIAL

## 2024-01-24 NOTE — TELEPHONE ENCOUNTER
----- Message from Alie Redd sent at 1/24/2024  9:39 AM CST -----    ----- Message -----  From: Mandy Nguyen  Sent: 1/24/2024   9:29 AM CST  To: Lauren Cabrera Staff    Needs advice from nurse:      Who Called:pt  Regarding:pt needs to verify if MRI on 1/30 is open MRI or not as he is severely claustrophobic  Would the patient rather a call back or VIA [employer]?  Best Call Back number:[phone]  Additional Info:

## 2024-01-25 ENCOUNTER — OFFICE VISIT (OUTPATIENT)
Dept: BARIATRICS | Facility: CLINIC | Age: 54
End: 2024-01-25
Payer: COMMERCIAL

## 2024-01-25 ENCOUNTER — OFFICE VISIT (OUTPATIENT)
Dept: OTOLARYNGOLOGY | Facility: CLINIC | Age: 54
End: 2024-01-25
Payer: COMMERCIAL

## 2024-01-25 VITALS
WEIGHT: 229.38 LBS | SYSTOLIC BLOOD PRESSURE: 190 MMHG | HEART RATE: 57 BPM | DIASTOLIC BLOOD PRESSURE: 101 MMHG | BODY MASS INDEX: 33.97 KG/M2 | HEIGHT: 69 IN | RESPIRATION RATE: 16 BRPM | TEMPERATURE: 99 F

## 2024-01-25 VITALS
BODY MASS INDEX: 33.86 KG/M2 | DIASTOLIC BLOOD PRESSURE: 97 MMHG | WEIGHT: 229.25 LBS | SYSTOLIC BLOOD PRESSURE: 165 MMHG | HEART RATE: 57 BPM

## 2024-01-25 DIAGNOSIS — H91.90 HEARING LOSS, UNSPECIFIED HEARING LOSS TYPE, UNSPECIFIED LATERALITY: ICD-10-CM

## 2024-01-25 DIAGNOSIS — E66.01 CLASS 3 SEVERE OBESITY DUE TO EXCESS CALORIES WITH SERIOUS COMORBIDITY AND BODY MASS INDEX (BMI) OF 50.0 TO 59.9 IN ADULT: ICD-10-CM

## 2024-01-25 DIAGNOSIS — G51.0 FACIAL NERVE PARESIS: Primary | Chronic | ICD-10-CM

## 2024-01-25 DIAGNOSIS — I10 PRIMARY HYPERTENSION: ICD-10-CM

## 2024-01-25 DIAGNOSIS — G47.33 OSA (OBSTRUCTIVE SLEEP APNEA): ICD-10-CM

## 2024-01-25 DIAGNOSIS — H93.13 TINNITUS OF BOTH EARS: Chronic | ICD-10-CM

## 2024-01-25 DIAGNOSIS — E66.01 MORBID OBESITY: Primary | ICD-10-CM

## 2024-01-25 LAB — VIT B1 BLD-MCNC: 57 UG/L (ref 38–122)

## 2024-01-25 PROCEDURE — 3008F BODY MASS INDEX DOCD: CPT | Mod: CPTII,S$GLB,, | Performed by: OTOLARYNGOLOGY

## 2024-01-25 PROCEDURE — 3080F DIAST BP >= 90 MM HG: CPT | Mod: CPTII,S$GLB,, | Performed by: SURGERY

## 2024-01-25 PROCEDURE — 3077F SYST BP >= 140 MM HG: CPT | Mod: CPTII,S$GLB,, | Performed by: OTOLARYNGOLOGY

## 2024-01-25 PROCEDURE — 1159F MED LIST DOCD IN RCRD: CPT | Mod: CPTII,S$GLB,, | Performed by: OTOLARYNGOLOGY

## 2024-01-25 PROCEDURE — 99203 OFFICE O/P NEW LOW 30 MIN: CPT | Mod: S$GLB,,, | Performed by: OTOLARYNGOLOGY

## 2024-01-25 PROCEDURE — 99999 PR PBB SHADOW E&M-EST. PATIENT-LVL IV: CPT | Mod: PBBFAC,,, | Performed by: SURGERY

## 2024-01-25 PROCEDURE — 3008F BODY MASS INDEX DOCD: CPT | Mod: CPTII,S$GLB,, | Performed by: SURGERY

## 2024-01-25 PROCEDURE — 99213 OFFICE O/P EST LOW 20 MIN: CPT | Mod: S$GLB,,, | Performed by: SURGERY

## 2024-01-25 PROCEDURE — 3077F SYST BP >= 140 MM HG: CPT | Mod: CPTII,S$GLB,, | Performed by: SURGERY

## 2024-01-25 PROCEDURE — 3080F DIAST BP >= 90 MM HG: CPT | Mod: CPTII,S$GLB,, | Performed by: OTOLARYNGOLOGY

## 2024-01-25 PROCEDURE — 1159F MED LIST DOCD IN RCRD: CPT | Mod: CPTII,S$GLB,, | Performed by: SURGERY

## 2024-01-25 PROCEDURE — 99999 PR PBB SHADOW E&M-EST. PATIENT-LVL III: CPT | Mod: PBBFAC,,, | Performed by: OTOLARYNGOLOGY
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
## 2024-01-25 NOTE — PROGRESS NOTES
"  Chief Complaint   Patient presents with    Other        HPI: Fabiano Jules Jr. is a 53 y.o. male who is referred by Dr. Aguilar for right facial droop.  He first noticed the weakness in conjunction with ear pain, decrease in hearing, pain throughout the right face in late June 2023.  He also notes the following associated symptoms dizziness, hearing loss, difficulty with eye closure, and facial twitching.  Patient notes he had tinnitus prior to the facial palsy but it has worsened since that time.  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  He was placed on Valtrex and high-dose prednisone taper, which he reports did help his symptoms.  The facial paresis has improved slowly over the last few months.  He was seen by his PCP and placed on carbamazepine for ongoing post herpetic pain.  Was also seen by Neurology who got ophthalmologic evaluation and recommended physical therapy.  His symptoms have continued to improve overall, though he is still left with right-sided facial weakness and feelings of "tingling" on that side that he notes with muscle contracture.                        Past Medical History:   Diagnosis Date    Arthritis     Back injury     Colon polyp     COVID     CPAP (continuous positive airway pressure) dependence     Hypertension     PONV (postoperative nausea and vomiting)     Sleep apnea     c pap machine used     Social History     Socioeconomic History    Marital status:     Number of children: 4   Occupational History     Employer: [employer]   Tobacco Use    Smoking status: Former     Current packs/day: 0.00     Average packs/day: 0.5 packs/day for 10.0 years (5.0 ttl pk-yrs)     Types: Cigarettes     Start date: 1/1/1999     Quit date: 1/1/2009     Years since quitting: 15.0    Smokeless tobacco: Never    Tobacco comments:     quit smoking in 2010   Substance and Sexual Activity    Alcohol use: Yes     Comment: rarely    Drug use: Not " Currently     Types: Marijuana     Comment: medical---oil, smoke and edibles    Sexual activity: Not Currently     Social Determinants of Health     Financial Resource Strain: Low Risk  (1/17/2024)    Overall Financial Resource Strain (CARDIA)     Difficulty of Paying Living Expenses: Not hard at all   Food Insecurity: No Food Insecurity (1/17/2024)    Hunger Vital Sign     Worried About Running Out of Food in the Last Year: Never true     Ran Out of Food in the Last Year: Never true   Transportation Needs: No Transportation Needs (1/17/2024)    PRAPARE - Transportation     Lack of Transportation (Medical): No     Lack of Transportation (Non-Medical): No   Physical Activity: Sufficiently Active (1/17/2024)    Exercise Vital Sign     Days of Exercise per Week: 4 days     Minutes of Exercise per Session: 120 min   Recent Concern: Physical Activity - Insufficiently Active (11/1/2023)    Exercise Vital Sign     Days of Exercise per Week: 2 days     Minutes of Exercise per Session: 60 min   Stress: Stress Concern Present (1/17/2024)    [employer] [place] [employer] - Occupational Stress Questionnaire     Feeling of Stress : To some extent   Social Connections: Unknown (1/17/2024)    Social Connection and Isolation Panel [NHANES]     Frequency of Communication with Friends and Family: Twice a week     Frequency of Social Gatherings with Friends and Family: Patient declined     Active Member of Clubs or Organizations: Yes     Attends Club or Organization Meetings: More than 4 times per year     Marital Status:    Housing Stability: High Risk (1/17/2024)    Housing Stability Vital Sign     Unable to Pay for Housing in the Last Year: Yes     Number of Places Lived in the Last Year: 1     Unstable Housing in the Last Year: No     Past Surgical History:   Procedure Laterality Date    BONE GRAFT N/A 04/05/2022    Procedure: BONE GRAFT;  Surgeon: Duran Adler Jr., MD;  Location: [facility] OR;  Service:  Orthopedics;  Laterality: N/A;    CIRCUMCISION      COLONOSCOPY N/A 12/03/2018    Procedure: COLONOSCOPY;  Surgeon: CHRISSY Reyna MD;  Location: [facility] ENDO (4TH FLR);  Service: Endoscopy;  Laterality: N/A;    COLONOSCOPY N/A 09/01/2022    Procedure: COLONOSCOPY;  Surgeon: Alondra Arias MD;  Location: [facility] ENDO;  Service: Endoscopy;  Laterality: N/A;    epidural steroid injection X 2      lower lumbar    ESOPHAGOGASTRODUODENOSCOPY N/A 09/01/2022    Procedure: EGD (ESOPHAGOGASTRODUODENOSCOPY);  Surgeon: Alondra Arias MD;  Location: [facility] ENDO;  Service: Endoscopy;  Laterality: N/A;    facet injection       [facility] Manpreet [facility] [place]     INSERTION OF IMPLANTABLE LOOP RECORDER N/A 9/15/2023    Procedure: Insertion, Implantable Loop Recorder;  Surgeon: Romario Wallace MD;  Location: [facility] CATH/EP LAB;  Service: Cardiology;  Laterality: N/A;    INTRALUMINAL GASTROINTESTINAL TRACT IMAGING VIA CAPSULE N/A 10/03/2022    Procedure: IMAGING PROCEDURE, GI TRACT, INTRALUMINAL, VIA CAPSULE;  Surgeon: Alondra Arias MD;  Location: [facility] ENDO;  Service: Endoscopy;  Laterality: N/A;    LAPAROSCOPIC SLEEVE GASTRECTOMY N/A 12/19/2022    Procedure: GASTRECTOMY, SLEEVE, LAPAROSCOPIC;  Surgeon: Lashonda Pinedo MD;  Location: [facility] OR;  Service: General;  Laterality: N/A;    LIPOMA RESECTION      Back of neck    MAGNETIC RESONANCE IMAGING N/A 05/29/2020    Procedure: MRI (MAGNETIC RESONANCE IMAGING) LUMBAR SPINE;  Surgeon: [facility] Diagnostic Provider;  Location: [facility];  Service: General;  Laterality: N/A;  COVID NEG    MAGNETIC RESONANCE IMAGING N/A 12/03/2021    Procedure: MRI (MAGNETIC RESONANCE IMAGING), LUMBAR SPINE;  Surgeon: [facility] Diagnostic Provider;  Location: [facility];  Service: General;  Laterality: N/A;  In MRI @ 7:50 per Ariela    MEDIAL COLLATERAL LIGAMENT AND LATERAL COLLATERAL LIGAMENT REPAIR, KNEE Right 09/01/2018    Dr. Christophe Gore in [place]     MINIMALLY INVASIVE TRANSFORAMINAL  LUMBAR INTERBODY FUSION (TLIF) N/A 04/05/2022    Procedure: FUSION, SPINE, LUMBAR, TLIF, MINIMALLY INVASIVE, WITH INSTRUMENTATION,  L4/5 RIGHT;  Surgeon: Duran Adler Jr., MD;  Location: [facility] OR;  Service: Orthopedics;  Laterality: N/A;  10:30am per Tamela    MYELOGRAPHY N/A 06/23/2020    Procedure: MYELOGRAM;  Surgeon: Andi Diagnostic Provider;  Location: [facility] OR;  Service: General;  Laterality: N/A;     Family History   Problem Relation Age of Onset    Hypertension Mother     Diabetes Father     Cancer Father         mesotheliosma     Cataracts Neg Hx     Glaucoma Neg Hx     Macular degeneration Neg Hx     Retinal detachment Neg Hx     Colon cancer Neg Hx     Colon polyps Neg Hx     Crohn's disease Neg Hx     Ulcerative colitis Neg Hx            Review of Systems  General: negative for chills, fever or weight loss  Psychological: negative for mood changes or depression  Ophthalmic: negative for blurry vision, photophobia or eye pain  ENT: see HPI  Respiratory: no cough, shortness of breath, or wheezing  Cardiovascular: no chest pain or dyspnea on exertion  Gastrointestinal: no abdominal pain, change in bowel habits, or black/ bloody stools  Musculoskeletal: negative for gait disturbance or muscular weakness  Neurological: no syncope or seizures; no ataxia  Dermatological: negative for pruritis,  rash and jaundice  Hematologic/lymphatic: no easy bruising, no new adenopathy      Physical Exam:    Vitals:    01/25/24 1045   BP: (!) 165/97   Pulse: (!) 57         Constitutional:   He is oriented to person, place, and time. Vital signs are normal. He appears well-developed and well-nourished. He appears alert. He is cooperative.  Non-toxic appearance. Normal speech.      Head:  Normocephalic and atraumatic. Salivary glands normal.  Facial weakness, right side and unilaterally (house Brackmann 1 on the left, house Brackmann 3 on the right).      Ears:  Hearing normal to normal and whispered voice; external ear  normal without scars, lesions, or masses; ear canal, tympanic membrane, and middle ear normal., right ear hearing normal to normal and whispered voice; external ear normal without scars, lesions, or masses; ear canal, tympanic membrane, and middle ear normal. and left ear hearing normal to normal and whispered voice; external ear normal without scars, lesions, or masses; ear canal, tympanic membrane, and middle ear normal..   Right Ear: Tympanic membrane is not perforated, not erythematous and not retracted. No middle ear effusion.   Left Ear: Tympanic membrane is not perforated, not erythematous and not retracted.  No middle ear effusion.     Nose:  Mucosal edema present. No rhinorrhea, septal deviation, nasal septal hematoma or polyps. No epistaxis. No turbinate masses and no turbinate hypertrophy (2+ size).  Right sinus exhibits no maxillary sinus tenderness and no frontal sinus tenderness. Left sinus exhibits no maxillary sinus tenderness and no frontal sinus tenderness.     Mouth/Throat  Oropharynx clear and moist without lesions or asymmetry, normal uvula midline, lips, teeth, and gums normal and oropharynx normal. Normal dentition. No uvula swelling or oral lesions. No oropharyngeal exudate, posterior oropharyngeal edema or posterior oropharyngeal erythema. Mirror exam not performed due to patient tolerance.  Mirror exam not performed due to patient tolerance.      Neck:  Neck normal without thyromegaly masses, asymmetry, normal tracheal structure, crepitus, and tenderness, thyroid normal, trachea normal, full range of motion with neck supple and no adenopathy. No JVD present. Carotid bruit is not present. Thyroid tenderness is present. No edema and no erythema present. No thyroid mass and no thyromegaly present.     He has no cervical adenopathy.     Cardiovascular:    Normal rate, regular rhythm, normal heart sounds and rate and rhythm, heart sounds, and pulses normal.              Pulmonary/Chest:   Effort  and breath sounds normal.     Psychiatric:   He has a normal mood and affect. His speech is normal and behavior is normal.     Neurological:   He is alert and oriented to person, place, and time. He has neurological normal, alert and oriented. A cranial nerve deficit (cranial nerves 2-12 intact except cranial nerve 7 right.) is present.     Skin:   No abrasions, lacerations, lesions, or rashes.               Assessment:    ICD-10-CM ICD-9-CM    1. Facial nerve paresis  G51.0 351.0 Ambulatory referral/consult to ENT      2. Hearing loss, unspecified hearing loss type, unspecified laterality  H91.90 389.9       3. Tinnitus of both ears  H93.13 388.30         The primary encounter diagnosis was Facial nerve paresis. Diagnoses of Hearing loss, unspecified hearing loss type, unspecified laterality and Tinnitus of both ears were also pertinent to this visit.      Plan:    We had a lengthy discussion on [place] syndrome, and I reviewed notes from the patient's neurologist, PCP, and ER physician.  I discussed with the patient that he had very appropriate treatment and the fact that he has had improvement of his facial function since that time is a good sign going forward.    I encouraged him to continue doing his facial exercises, as this has likely helped his symptoms.    Has not had audiogram, so will order that to be done and review the findings.    I will follow him up in 4-6 months and he will let me know if there are any other changes during that time.    Alie Mckinney MD

## 2024-01-30 ENCOUNTER — CLINICAL SUPPORT (OUTPATIENT)
Dept: OTOLARYNGOLOGY | Facility: CLINIC | Age: 54
End: 2024-01-30
Payer: COMMERCIAL

## 2024-01-30 DIAGNOSIS — H93.13 TINNITUS OF BOTH EARS: ICD-10-CM

## 2024-01-30 DIAGNOSIS — H90.3 SENSORINEURAL HEARING LOSS, BILATERAL: Primary | ICD-10-CM

## 2024-01-30 PROCEDURE — 92567 TYMPANOMETRY: CPT | Mod: S$GLB,,, | Performed by: PHYSICIAN ASSISTANT

## 2024-01-30 PROCEDURE — 99999 PR PBB SHADOW E&M-EST. PATIENT-LVL I: CPT | Mod: PBBFAC,,, | Performed by: PHYSICIAN ASSISTANT

## 2024-01-30 PROCEDURE — 92557 COMPREHENSIVE HEARING TEST: CPT | Mod: S$GLB,,, | Performed by: PHYSICIAN ASSISTANT

## 2024-01-30 NOTE — PROGRESS NOTES
Fabiano Jules Jr., a 53 y.o. male, was seen today in the clinic for an audiologic evaluation.  Patients main complaint was hearing loss.  He reports tinnitus which is worse in the left ear.  He also reported suffering with an ear infection in June 2023 and subsequently suffered with facial palsy.    Audiogram results revealed a mild/moderate sensorineural hearing loss bilaterally in the high frequency range.  Speech reception thresholds were noted at 15 dB in the right ear and 15 dB in the left ear.  Speech discrimination scores were 92% in the right ear and 88% in the left ear.  Tympanometry revealed Type A in the right ear and Type A in the left ear.     Recommendations:  Otologic evaluation  Annual audiogram  Hearing protection when in noise  Hearing aid consultation when patient is ready

## 2024-02-16 ENCOUNTER — PATIENT MESSAGE (OUTPATIENT)
Dept: OPHTHALMOLOGY | Facility: CLINIC | Age: 54
End: 2024-02-16
Payer: COMMERCIAL

## 2024-02-16 DIAGNOSIS — G51.0 FACIAL NERVE PALSY, SECONDARY: Primary | ICD-10-CM

## 2024-02-19 ENCOUNTER — TELEPHONE (OUTPATIENT)
Dept: OPHTHALMOLOGY | Facility: CLINIC | Age: 54
End: 2024-02-19
Payer: COMMERCIAL

## 2024-03-04 ENCOUNTER — ANESTHESIA EVENT (OUTPATIENT)
Dept: ENDOSCOPY | Facility: HOSPITAL | Age: 54
End: 2024-03-04

## 2024-03-04 NOTE — ANESTHESIA PREPROCEDURE EVALUATION
03/04/2024  Fabiano Jules Jr. is a 53 y.o., male with recent hx of herpetic neuralgia and facial droop. MRI scheduled today as part of further workup.    Past Medical History:   Diagnosis Date    Arthritis     Back injury     Colon polyp     COVID     CPAP (continuous positive airway pressure) dependence     Hypertension     PONV (postoperative nausea and vomiting)     Sleep apnea     c pap machine used     Lab Results   Component Value Date    WBC 4.45 01/20/2024    HGB 12.5 (L) 01/20/2024    HCT 39.1 (L) 01/20/2024    MCV 87 01/20/2024     01/20/2024       Results for orders placed during the hospital encounter of 04/06/23    Echo    Interpretation Summary  · The left ventricle is normal in size with concentric hypertrophy and normal systolic function.  · The estimated ejection fraction is 60%.  · Normal left ventricular diastolic function.  · Normal right ventricular size with normal right ventricular systolic function.  · Mild left atrial enlargement.  · Mild aortic regurgitation.  · Mild mitral regurgitation.  · Normal central venous pressure (3 mmHg).        Pre-op Assessment    I have reviewed the Patient Summary Reports.     I have reviewed the Nursing Notes. I have reviewed the NPO Status.      Review of Systems  Anesthesia Hx:  No problems with previous Anesthesia              Personal Hx of Anesthesia complications, Post-Operative Nausea/Vomiting, with every anesthetic, treatment not known                    Cardiovascular:  Exercise tolerance: good   Hypertension                                        Pulmonary:        Sleep Apnea, CPAP                Neurological:    Neuromuscular Disease,       Facial paralysis/bells palsy                                Physical Exam  General: Well nourished, Cooperative, Alert and Oriented    Airway:  Mallampati: III / II  TM Distance: Normal  Neck ROM:  Normal ROM    Dental:  Intact    Chest/Lungs:  Clear to auscultation, Normal Respiratory Rate    Heart:  Rate: Normal  Rhythm: Regular Rhythm  Sounds: Normal        Anesthesia Plan  Type of Anesthesia, risks & benefits discussed:    Anesthesia Type: Gen Natural Airway  Intra-op Monitoring Plan: Standard ASA Monitors  Post Op Pain Control Plan: multimodal analgesia  Induction:  IV  Informed Consent: Informed consent signed with the Patient and all parties understand the risks and agree with anesthesia plan.  All questions answered.   ASA Score: 3    Ready For Surgery From Anesthesia Perspective.     .  Loop Recorder - LNQ11 - [employer]/MRI conditional
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
## 2024-03-06 ENCOUNTER — HOSPITAL ENCOUNTER (OUTPATIENT)
Facility: HOSPITAL | Age: 54
Discharge: HOME | End: 2024-03-06
Attending: OPHTHALMOLOGY | Admitting: OPHTHALMOLOGY
Payer: MEDICAID

## 2024-03-06 ENCOUNTER — HOSPITAL ENCOUNTER (OUTPATIENT)
Dept: RADIOLOGY | Facility: HOSPITAL | Age: 54
Discharge: HOME | End: 2024-03-06
Attending: OPHTHALMOLOGY
Payer: COMMERCIAL

## 2024-03-06 ENCOUNTER — ANESTHESIA (OUTPATIENT)
Dept: ENDOSCOPY | Facility: HOSPITAL | Age: 54
End: 2024-03-06
Payer: COMMERCIAL

## 2024-03-06 VITALS
BODY MASS INDEX: 33.92 KG/M2 | WEIGHT: 229 LBS | SYSTOLIC BLOOD PRESSURE: 127 MMHG | HEART RATE: 56 BPM | TEMPERATURE: 99 F | RESPIRATION RATE: 16 BRPM | DIASTOLIC BLOOD PRESSURE: 88 MMHG | OXYGEN SATURATION: 100 % | HEIGHT: 69 IN

## 2024-03-06 DIAGNOSIS — R29.810 FACIAL DROOP: ICD-10-CM

## 2024-03-06 DIAGNOSIS — G51.0 FACIAL NERVE PALSY, SECONDARY: ICD-10-CM

## 2024-03-06 PROCEDURE — 70553 MRI BRAIN STEM W/O & W/DYE: CPT | Mod: 26,,, | Performed by: RADIOLOGY

## 2024-03-06 PROCEDURE — 63600175 PHARM REV CODE 636 W HCPCS: Performed by: STUDENT IN AN ORGANIZED HEALTH CARE EDUCATION/TRAINING PROGRAM

## 2024-03-06 PROCEDURE — D9220A PRA ANESTHESIA: Mod: ANES,,, | Performed by: ANESTHESIOLOGY

## 2024-03-06 PROCEDURE — D9220A PRA ANESTHESIA: Mod: CRNA,,, | Performed by: STUDENT IN AN ORGANIZED HEALTH CARE EDUCATION/TRAINING PROGRAM

## 2024-03-06 PROCEDURE — 71000044 HC DOSC ROUTINE RECOVERY FIRST HOUR

## 2024-03-06 PROCEDURE — 25000003 PHARM REV CODE 250: Performed by: STUDENT IN AN ORGANIZED HEALTH CARE EDUCATION/TRAINING PROGRAM

## 2024-03-06 PROCEDURE — 37000008 HC ANESTHESIA 1ST 15 MINUTES

## 2024-03-06 PROCEDURE — 37000009 HC ANESTHESIA EA ADD 15 MINS

## 2024-03-06 PROCEDURE — A9585 GADOBUTROL INJECTION: HCPCS | Performed by: OPHTHALMOLOGY

## 2024-03-06 PROCEDURE — 70553 MRI BRAIN STEM W/O & W/DYE: CPT | Mod: TC

## 2024-03-06 PROCEDURE — 25500020 PHARM REV CODE 255: Performed by: OPHTHALMOLOGY

## 2024-03-06 RX ORDER — MIDAZOLAM HYDROCHLORIDE 1 MG/ML
INJECTION INTRAMUSCULAR; INTRAVENOUS
Status: DISCONTINUED | OUTPATIENT
Start: 2024-03-06 | End: 2024-03-06

## 2024-03-06 RX ORDER — HYDROMORPHONE HYDROCHLORIDE 1 MG/ML
0.2 INJECTION, SOLUTION INTRAMUSCULAR; INTRAVENOUS; SUBCUTANEOUS EVERY 5 MIN PRN
Status: CANCELLED | OUTPATIENT
Start: 2024-03-06

## 2024-03-06 RX ORDER — PROPOFOL 10 MG/ML
VIAL (ML) INTRAVENOUS
Status: DISCONTINUED | OUTPATIENT
Start: 2024-03-06 | End: 2024-03-06

## 2024-03-06 RX ORDER — SODIUM CHLORIDE 0.9 % (FLUSH) 0.9 %
10 SYRINGE (ML) INJECTION
Status: CANCELLED | OUTPATIENT
Start: 2024-03-06

## 2024-03-06 RX ORDER — LIDOCAINE HYDROCHLORIDE 20 MG/ML
INJECTION INTRAVENOUS
Status: DISCONTINUED | OUTPATIENT
Start: 2024-03-06 | End: 2024-03-06

## 2024-03-06 RX ORDER — PROPOFOL 10 MG/ML
INJECTION, EMULSION INTRAVENOUS
Status: COMPLETED
Start: 2024-03-06 | End: 2024-03-06

## 2024-03-06 RX ORDER — GADOBUTROL 604.72 MG/ML
10 INJECTION INTRAVENOUS
Status: COMPLETED | OUTPATIENT
Start: 2024-03-06 | End: 2024-03-06

## 2024-03-06 RX ORDER — PROPOFOL 10 MG/ML
VIAL (ML) INTRAVENOUS CONTINUOUS PRN
Status: DISCONTINUED | OUTPATIENT
Start: 2024-03-06 | End: 2024-03-06

## 2024-03-06 RX ADMIN — PROPOFOL 20 MG: 10 INJECTION, EMULSION INTRAVENOUS at 03:03

## 2024-03-06 RX ADMIN — GADOBUTROL 10 ML: 604.72 INJECTION INTRAVENOUS at 03:03

## 2024-03-06 RX ADMIN — PROPOFOL 150 MCG/KG/MIN: 10 INJECTION, EMULSION INTRAVENOUS at 03:03

## 2024-03-06 RX ADMIN — SODIUM CHLORIDE: 0.9 INJECTION, SOLUTION INTRAVENOUS at 02:03

## 2024-03-06 RX ADMIN — GLYCOPYRROLATE 0.2 MG: 0.2 INJECTION, SOLUTION INTRAMUSCULAR; INTRAVENOUS at 03:03

## 2024-03-06 RX ADMIN — PROPOFOL 40 MG: 10 INJECTION, EMULSION INTRAVENOUS at 03:03

## 2024-03-06 RX ADMIN — LIDOCAINE HYDROCHLORIDE 100 MG: 20 INJECTION INTRAVENOUS at 03:03

## 2024-03-06 RX ADMIN — MIDAZOLAM HYDROCHLORIDE 2 MG: 1 INJECTION, SOLUTION INTRAMUSCULAR; INTRAVENOUS at 02:03

## 2024-03-06 NOTE — PROGRESS NOTES
Recovery care complete. Pt tolerated well. Discharge instructions and handouts provided. Pt verbalized understanding. Pt in NAD, VSS. Pt discharge home to self care.

## 2024-03-07 NOTE — ANESTHESIA POSTPROCEDURE EVALUATION
Anesthesia Discharge Summary    Admit Date: 3/6/2024    Discharge Date and Time: 3/6/2024  5:30 PM    Attending Physician:  No att. providers found    Discharge Provider:  Jen Fregoso MD    Active Problems:   Patient Active Problem List   Diagnosis    HTN (hypertension)    Motion sickness    Male circumcision    Phimosis    Obesity, morbid, BMI 40.0-49.9    Class 3 severe obesity due to excess calories with serious comorbidity and body mass index (BMI) of 50.0 to 59.9 in adult    Vitamin D deficiency    Microcytic anemia    Screening for colon cancer    Low back pain    Chronic pain of right ankle    Difficulty walking    Degenerative joint disease of right hindfoot    Congenital pes planovalgus    Arthritis of ankle, left    Arthritis of right ankle    Degenerative joint disease of left hindfoot    Syncope    Morbid obesity    Elevated troponin    RFAAEL (obstructive sleep apnea)    Contusion of left leg, initial encounter    Cognitive communication deficit    Neuropathy    Chronic right-sided low back pain with right-sided sciatica    Spinal stenosis of lumbar region with neurogenic claudication    Polypharmacy    Lumbar pain    Weakness    Abnormal increased muscle tone    Decreased functional mobility and endurance    Special educational needs    S/P laparoscopic sleeve gastrectomy    Severe obesity (BMI >= 40)    Hypokalemia    Pain self-management deficit    Weakness of back    Weakness of both hips    Paroxysmal atrial fibrillation    Dysequilibrium    Imbalance    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 Test performed without complication.    Consults: none    Significant Diagnostic Studies: MRI    Treatments/Procedures: Procedure(s) (LRB): anesthesia for exam    Disposition: Home or Self Care    Patient Instructions:   Discharge Medication List as of 3/6/2024  4:14 PM        CONTINUE  "these medications which have NOT CHANGED    Details   amLODIPine (NORVASC) 5 MG tablet TAKE 1 TABLET BY MOUTH EVERY DAY, Normal      b complex vitamins (B COMPLEX-VITAMIN B12) tablet Take 1 tablet by mouth once daily., Starting Mon 12/18/2023, Normal      calcium citrate (CALCITRATE) 200 mg (950 mg) tablet Take 1 tablet by mouth 2 (two) times daily., Historical Med      diazePAM (VALIUM) 5 MG tablet Take 1 tablet (5 mg total) by mouth at appointment as directed, Starting Mon 1/22/2024, Normal      EPINEPHrine (EPIPEN) 0.3 mg/0.3 mL AtIn Inject into the muscle once for one dose as needed., Starting Wed 1/4/2023, Normal      mirtazapine (REMERON) 15 MG tablet TAKE 1 TABLET(15 MG) BY MOUTH EVERY EVENING, Starting Mon 1/22/2024, Normal      multivitamin (THERAGRAN) per tablet Take 1 tablet by mouth once daily., Historical Med      oxyCODONE-acetaminophen (PERCOCET) 7.5-325 mg per tablet Take 1 tablet by mouth 2 (two) times daily as needed., Starting Sat 9/23/2023, Historical Med      rosuvastatin (CRESTOR) 10 MG tablet TAKE 1 TABLET BY MOUTH DAILY, Starting Mon 10/23/2023, Normal      valACYclovir (VALTREX) 1000 MG tablet Take 1 tablet (1,000 mg total) by mouth 3 (three) times daily. for 10 days, Starting Sun 7/2/2023, Until Thu 1/25/2024, Normal      white petrolatum-mineral oil 56.8-42.5% (LACRI-LUBE S.O.P.) 56.8-42.5 % Oint Place into the left eye every evening. Use the lubrication as needed and definitely at night.  Take your eye closed at night., Starting Sun 7/2/2023, Normal               Discharge Procedure Orders (must include Diet, Follow-up, Activity)  No discharge procedures on file.     Discharge instructions - Please return to clinic (contact pediatrician etc..) if:  1) Persistent cough.  2) Respiratory difficulty (including: noisy breathing, nasal flaring, "barky" cough or wheezing).  3) Persistent pain not responsive to prescribed medications (if any).  4) Change in current mental status (age " appropriate).  5) Repeating or recurrent episodes of vomiting.  6) Inability to tolerate oral fluids.      Anesthesia Post Evaluation    Patient: Fabiano Jules Jr.    Procedure(s) Performed: Procedure(s) (LRB):  MRI (Magnetic Resonance Imagine) (N/A)    Final Anesthesia Type: general      Patient location during evaluation: PACU  Patient participation: Yes- Able to Participate  Level of consciousness: awake and alert and oriented  Post-procedure vital signs: reviewed and stable  Pain management: adequate  Airway patency: patent    PONV status at discharge: No PONV  Anesthetic complications: no      Cardiovascular status: blood pressure returned to baseline and hemodynamically stable  Respiratory status: unassisted and spontaneous ventilation  Hydration status: euvolemic  Follow-up not needed.              Vitals Value Taken Time   /88 03/06/24 1645   Temp 37.1 °C (98.8 °F) 03/06/24 1402   Pulse 56 03/06/24 1645   Resp 16 03/06/24 1645   SpO2 100 % 03/06/24 1645         No case tracking events are documented in the log.      Pain/Arian Score: Arian Score: 10 (3/6/2024  4:30 PM)

## 2024-03-08 NOTE — H&P
Past Medical History:   Diagnosis Date    Arthritis     Back injury     Colon polyp     COVID     CPAP (continuous positive airway pressure) dependence     Hypertension     PONV (postoperative nausea and vomiting)     Sleep apnea     c pap machine used       Past Surgical History:   Procedure Laterality Date    BONE GRAFT N/A 04/05/2022    Procedure: BONE GRAFT;  Surgeon: Duran Adler Jr., MD;  Location: [facility] OR;  Service: Orthopedics;  Laterality: N/A;    CIRCUMCISION      COLONOSCOPY N/A 12/03/2018    Procedure: COLONOSCOPY;  Surgeon: CHRISSY Reyna MD;  Location: [facility] ENDO ([facility] FLR);  Service: Endoscopy;  Laterality: N/A;    COLONOSCOPY N/A 09/01/2022    Procedure: COLONOSCOPY;  Surgeon: Alondra Arias MD;  Location: [facility] ENDO;  Service: Endoscopy;  Laterality: N/A;    epidural steroid injection X 2      lower lumbar    ESOPHAGOGASTRODUODENOSCOPY N/A 09/01/2022    Procedure: EGD (ESOPHAGOGASTRODUODENOSCOPY);  Surgeon: Alondra Arias MD;  Location: [facility] ENDO;  Service: Endoscopy;  Laterality: N/A;    facet injection       [facility] [place]     INSERTION OF IMPLANTABLE LOOP RECORDER N/A 9/15/2023    Procedure: Insertion, Implantable Loop Recorder;  Surgeon: Romario Wallace MD;  Location: [facility] CATH/EP LAB;  Service: Cardiology;  Laterality: N/A;    INTRALUMINAL GASTROINTESTINAL TRACT IMAGING VIA CAPSULE N/A 10/03/2022    Procedure: IMAGING PROCEDURE, GI TRACT, INTRALUMINAL, VIA CAPSULE;  Surgeon: Alondra Arias MD;  Location: [facility];  Service: Endoscopy;  Laterality: N/A;    LAPAROSCOPIC SLEEVE GASTRECTOMY N/A 12/19/2022    Procedure: GASTRECTOMY, SLEEVE, LAPAROSCOPIC;  Surgeon: Lashonda Pinedo MD;  Location: [facility] OR;  Service: General;  Laterality: N/A;    LIPOMA RESECTION      Back of neck    MAGNETIC RESONANCE IMAGING N/A 05/29/2020    Procedure: MRI (MAGNETIC RESONANCE IMAGING) LUMBAR SPINE;  Surgeon: Andi Diagnostic Provider;  Location: [facility];  Service: General;  Laterality: N/A;  COVID NEG    MAGNETIC RESONANCE IMAGING N/A 12/03/2021    Procedure: MRI (MAGNETIC RESONANCE IMAGING), LUMBAR SPINE;  Surgeon: [facility] Diagnostic Provider;  Location: [facility];  Service: General;  Laterality: N/A;  In MRI @ 7:50 per Ariela    MAGNETIC RESONANCE IMAGING N/A 3/6/2024    Procedure: MRI (Magnetic Resonance Imagine);  Surgeon: SurgeonVicenta;  [facility];  Service: Anesthesiology;  Laterality: N/A;    MEDIAL COLLATERAL LIGAMENT AND LATERAL COLLATERAL LIGAMENT REPAIR, KNEE Right 09/01/2018    Dr. Christophe Gore in [place]     MINIMALLY INVASIVE TRANSFORAMINAL LUMBAR INTERBODY FUSION (TLIF) N/A 04/05/2022    Procedure: FUSION, SPINE, LUMBAR, TLIF, MINIMALLY INVASIVE, WITH INSTRUMENTATION,  L4/5 RIGHT;  Surgeon: Duran Adler Jr., MD;  Location: [facility];  Service: Orthopedics;  Laterality: N/A;  10:30am per Tamela    MYELOGRAPHY N/A 06/23/2020    Procedure: MYELOGRAM;  Surgeon: [facility] Diagnostic Provider;  Location: [facility];  Service: General;  Laterality: N/A;       Family History   Problem Relation Age of Onset    Hypertension Mother     Diabetes Father     Cancer Father         mesotheliosma     Cataracts Neg Hx     Glaucoma Neg Hx     Macular degeneration Neg Hx     Retinal detachment Neg Hx     Colon cancer Neg Hx     Colon polyps Neg Hx     Crohn's disease Neg Hx     Ulcerative colitis Neg Hx        Social History     Socioeconomic History    Marital status:     Number of children: 4   Occupational History     Employer: [employer]   Tobacco Use    Smoking status: Former     Current packs/day: 0.00     Average packs/day: 0.5 packs/day for 10.0 years (5.0 ttl pk-yrs)     Types: Cigarettes     Start date: 1/1/1999     Quit date: 1/1/2009     Years since quitting: 15.1    Smokeless tobacco: Never    Tobacco comments:     quit smoking in 2010   Substance and Sexual Activity    Alcohol use: Yes     Comment: rarely    Drug use: Not Currently      Types: Marijuana     Comment: medical---oil, smoke and edibles    Sexual activity: Not Currently     Social Determinants of Health     Financial Resource Strain: Low Risk  (1/17/2024)    Overall Financial Resource Strain (CARDIA)     Difficulty of Paying Living Expenses: Not hard at all   Food Insecurity: No Food Insecurity (1/17/2024)    Hunger Vital Sign     Worried About Running Out of Food in the Last Year: Never true     Ran Out of Food in the Last Year: Never true   Transportation Needs: No Transportation Needs (1/17/2024)    PRAPARE - Transportation     Lack of Transportation (Medical): No     Lack of Transportation (Non-Medical): No   Physical Activity: Sufficiently Active (1/17/2024)    Exercise Vital Sign     Days of Exercise per Week: 4 days     Minutes of Exercise per Session: 120 min   Recent Concern: Physical Activity - Insufficiently Active (11/1/2023)    Exercise Vital Sign     Days of Exercise per Week: 2 days     Minutes of Exercise per Session: 60 min   Stress: Stress Concern Present (1/17/2024)    [employer] - Occupational Stress Questionnaire     Feeling of Stress : To some extent   Social Connections: Unknown (1/17/2024)    Social Connection and Isolation Panel [NHANES]     Frequency of Communication with Friends and Family: Twice a week     Frequency of Social Gatherings with Friends and Family: Patient declined     Active Member of Clubs or Organizations: Yes     Attends Club or Organization Meetings: More than 4 times per year     Marital Status:    Housing Stability: High Risk (1/17/2024)    Housing Stability Vital Sign     Unable to Pay for Housing in the Last Year: Yes     Number of Places Lived in the Last Year: 1     Unstable Housing in the Last Year: No       No current facility-administered medications for this encounter.     Current Outpatient Medications   Medication Sig Dispense Refill    amLODIPine (NORVASC) 5 MG tablet TAKE 1 TABLET BY MOUTH  EVERY DAY 90 tablet 3    b complex vitamins (B COMPLEX-VITAMIN B12) tablet Take 1 tablet by mouth once daily. 90 tablet 3    calcium citrate (CALCITRATE) 200 mg (950 mg) tablet Take 1 tablet by mouth 2 (two) times daily.      mirtazapine (REMERON) 15 MG tablet TAKE 1 TABLET(15 MG) BY MOUTH EVERY EVENING 90 tablet 3    multivitamin (THERAGRAN) per tablet Take 1 tablet by mouth once daily.      oxyCODONE-acetaminophen (PERCOCET) 7.5-325 mg per tablet Take 1 tablet by mouth 2 (two) times daily as needed.      rosuvastatin (CRESTOR) 10 MG tablet TAKE 1 TABLET BY MOUTH DAILY 90 tablet 3    white petrolatum-mineral oil 56.8-42.5% (LACRI-LUBE S.O.P.) 56.8-42.5 % Oint Place into the left eye every evening. Use the lubrication as needed and definitely at night.  Take your eye closed at night. 15 g 0    diazePAM (VALIUM) 5 MG tablet Take 1 tablet (5 mg total) by mouth at appointment as directed 1 tablet 0    EPINEPHrine (EPIPEN) 0.3 mg/0.3 mL AtIn Inject into the muscle once for one dose as needed. 2 each 1    valACYclovir (VALTREX) 1000 MG tablet Take 1 tablet (1,000 mg total) by mouth 3 (three) times daily. for 10 days 30 tablet 0     Facility-Administered Medications Ordered in Other Encounters   Medication Dose Route Frequency Provider Last Rate Last Admin    ceFAZolin in dextrose (iso-os) 2 gram/100 mL PgBk 2,000 mg  2 g Intravenous Q8H Katie Shane NP   Stopped at 04/09/22 1036    HYDROmorphone injection 1 mg  1 mg Intravenous Q6H PRN Katie Shane NP   1 mg at 04/09/22 0539    oxyCODONE-acetaminophen  mg per tablet 1 tablet  1 tablet Oral Q4H PRN Katie Shane NP   1 tablet at 04/09/22 0906    polyethylene glycol packet 17 g  17 g Oral Daily Katie Shane NP   17 g at 04/08/22 0822       Review of patient's allergies indicates:   Allergen Reactions    Peaches [peach (prunus persica)] Anaphylaxis    Peanut Anaphylaxis    Tree pollen-red birch Anaphylaxis       A/p: To Or for MRI  Brain    PE:    HEENT: Normocephalic, atraumatic  CV: RRR nl s1 and s2  Chest: CTA-B  Abd: s/nt/nd  Ext: warm, perfused

## 2024-03-25 ENCOUNTER — PATIENT MESSAGE (OUTPATIENT)
Dept: FAMILY MEDICINE | Facility: CLINIC | Age: 54
End: 2024-03-25
Payer: COMMERCIAL
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
## 2024-03-26 ENCOUNTER — HOSPITAL ENCOUNTER (OUTPATIENT)
Dept: RADIOLOGY | Facility: CLINIC | Age: 54
Discharge: HOME | End: 2024-03-26
Payer: MEDICAID

## 2024-03-26 ENCOUNTER — OFFICE VISIT (OUTPATIENT)
Dept: FAMILY MEDICINE | Facility: CLINIC | Age: 54
End: 2024-03-26
Payer: COMMERCIAL

## 2024-03-26 ENCOUNTER — OFFICE VISIT (OUTPATIENT)
Dept: ORTHOPEDICS | Facility: CLINIC | Age: 54
End: 2024-03-26
Payer: COMMERCIAL

## 2024-03-26 VITALS
RESPIRATION RATE: 17 BRPM | BODY MASS INDEX: 35.3 KG/M2 | TEMPERATURE: 98 F | OXYGEN SATURATION: 98 % | HEIGHT: 69 IN | DIASTOLIC BLOOD PRESSURE: 80 MMHG | WEIGHT: 238.31 LBS | SYSTOLIC BLOOD PRESSURE: 130 MMHG | HEART RATE: 56 BPM

## 2024-03-26 VITALS — HEIGHT: 69 IN | BODY MASS INDEX: 35.3 KG/M2 | WEIGHT: 238.31 LBS

## 2024-03-26 DIAGNOSIS — M75.101 ROTATOR CUFF TEAR ARTHROPATHY OF RIGHT SHOULDER: Primary | ICD-10-CM

## 2024-03-26 DIAGNOSIS — I10 BENIGN ESSENTIAL HTN: ICD-10-CM

## 2024-03-26 DIAGNOSIS — M12.811 ROTATOR CUFF TEAR ARTHROPATHY OF RIGHT SHOULDER: Primary | ICD-10-CM

## 2024-03-26 DIAGNOSIS — E53.8 B12 DEFICIENCY: ICD-10-CM

## 2024-03-26 DIAGNOSIS — Z86.59 HISTORY OF CLAUSTROPHOBIA: ICD-10-CM

## 2024-03-26 DIAGNOSIS — M25.511 ACUTE PAIN OF RIGHT SHOULDER: Primary | ICD-10-CM

## 2024-03-26 DIAGNOSIS — I48.0 PAROXYSMAL A-FIB: ICD-10-CM

## 2024-03-26 DIAGNOSIS — E66.01 MORBID OBESITY: ICD-10-CM

## 2024-03-26 DIAGNOSIS — M25.511 ACUTE PAIN OF RIGHT SHOULDER: ICD-10-CM

## 2024-03-26 PROCEDURE — 99999 PR PBB SHADOW E&M-EST. PATIENT-LVL IV: CPT | Mod: PBBFAC,,, | Performed by: FAMILY MEDICINE

## 2024-03-26 PROCEDURE — 99214 OFFICE O/P EST MOD 30 MIN: CPT | Mod: S$GLB,,, | Performed by: FAMILY MEDICINE

## 2024-03-26 PROCEDURE — 3079F DIAST BP 80-89 MM HG: CPT | Mod: CPTII,S$GLB,,

## 2024-03-26 PROCEDURE — 99999 PR PBB SHADOW E&M-EST. PATIENT-LVL V: CPT | Mod: PBBFAC,,,

## 2024-03-26 PROCEDURE — 99214 OFFICE O/P EST MOD 30 MIN: CPT | Mod: S$GLB,,,

## 2024-03-26 PROCEDURE — 73030 X-RAY EXAM OF SHOULDER: CPT | Mod: 26,RT,S$GLB, | Performed by: RADIOLOGY

## 2024-03-26 PROCEDURE — 3008F BODY MASS INDEX DOCD: CPT | Mod: CPTII,S$GLB,, | Performed by: FAMILY MEDICINE

## 2024-03-26 PROCEDURE — 1160F RVW MEDS BY RX/DR IN RCRD: CPT | Mod: CPTII,S$GLB,,

## 2024-03-26 PROCEDURE — 1159F MED LIST DOCD IN RCRD: CPT | Mod: CPTII,S$GLB,, | Performed by: FAMILY MEDICINE

## 2024-03-26 PROCEDURE — 3008F BODY MASS INDEX DOCD: CPT | Mod: CPTII,S$GLB,,

## 2024-03-26 PROCEDURE — 1159F MED LIST DOCD IN RCRD: CPT | Mod: CPTII,S$GLB,,

## 2024-03-26 PROCEDURE — 3075F SYST BP GE 130 - 139MM HG: CPT | Mod: CPTII,S$GLB,,

## 2024-03-26 PROCEDURE — 73030 X-RAY EXAM OF SHOULDER: CPT | Mod: TC,FY,PO,RT

## 2024-03-26 RX ORDER — MULTIVIT WITH MINERALS/HERBS
1 TABLET ORAL DAILY
Qty: 90 TABLET | Refills: 3 | Status: SHIPPED | OUTPATIENT
Start: 2024-03-26

## 2024-03-26 RX ORDER — EPINEPHRINE 0.3 MG/.3ML
INJECTION SUBCUTANEOUS
Qty: 2 EACH | Refills: 1 | Status: SHIPPED | OUTPATIENT
Start: 2024-03-26
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
## 2024-03-28 ENCOUNTER — PATIENT MESSAGE (OUTPATIENT)
Dept: FAMILY MEDICINE | Facility: CLINIC | Age: 54
End: 2024-03-28
Payer: COMMERCIAL

## 2024-04-02 ENCOUNTER — PATIENT MESSAGE (OUTPATIENT)
Dept: OPHTHALMOLOGY | Facility: CLINIC | Age: 54
End: 2024-04-02

## 2024-04-02 DIAGNOSIS — G51.0 FACIAL NERVE PALSY, SECONDARY: Primary | ICD-10-CM

## 2024-04-03 ENCOUNTER — TELEPHONE (OUTPATIENT)
Dept: ORTHOPEDICS | Facility: CLINIC | Age: 54
End: 2024-04-03

## 2024-04-03 ENCOUNTER — PATIENT MESSAGE (OUTPATIENT)
Dept: ORTHOPEDICS | Facility: CLINIC | Age: 54
End: 2024-04-03

## 2024-04-03 RX ORDER — TRAMADOL HYDROCHLORIDE 50 MG/1
50 TABLET ORAL EVERY 6 HOURS PRN
Qty: 30 TABLET | Refills: 0 | Status: SHIPPED | OUTPATIENT
Start: 2024-04-03 | End: 2024-05-30

## 2024-04-03 NOTE — TELEPHONE ENCOUNTER
Patient is asking if you can prescribe something for nerve pain. He also wants the MRI under sedation. I explained to him our facility does not do MRI under sedation and if he had an MRI under sedation in the past he could contact that MRI facility and have them set up an appointment and if it is [facility] they will have access to the Orders placed.

## 2024-05-20 ENCOUNTER — PATIENT MESSAGE (OUTPATIENT)
Dept: FAMILY MEDICINE | Facility: CLINIC | Age: 54
End: 2024-05-20

## 2024-05-21 ENCOUNTER — TELEPHONE (OUTPATIENT)
Dept: PSYCHIATRY | Facility: CLINIC | Age: 54
End: 2024-05-21
Payer: MEDICAID

## 2024-05-21 NOTE — TELEPHONE ENCOUNTER
Spoke to patient regarding the referral on the wait list for therapy. Patient states he is not interested in therapy services. Patient requests the referral closed.     Referral closed per patient request.

## 2024-05-30 ENCOUNTER — PATIENT MESSAGE (OUTPATIENT)
Dept: OPHTHALMOLOGY | Facility: CLINIC | Age: 54
End: 2024-05-30
Payer: MEDICAID

## 2024-05-30 ENCOUNTER — PATIENT MESSAGE (OUTPATIENT)
Dept: ORTHOPEDICS | Facility: CLINIC | Age: 54
End: 2024-05-30
Payer: MEDICAID

## 2024-05-30 DIAGNOSIS — G51.0 FACIAL NERVE PALSY, SECONDARY: Primary | ICD-10-CM

## 2024-05-31 RX ORDER — MELOXICAM 15 MG/1
15 TABLET ORAL DAILY
Qty: 30 TABLET | Refills: 0 | Status: SHIPPED | OUTPATIENT
Start: 2024-05-31

## 2024-06-03 ENCOUNTER — TELEPHONE (OUTPATIENT)
Dept: OPHTHALMOLOGY | Facility: CLINIC | Age: 54
End: 2024-06-03
Payer: MEDICAID

## 2024-06-03 NOTE — TELEPHONE ENCOUNTER
----- Message from Ambika Cárdenas sent at 6/3/2024 11:54 AM CDT -----  Regarding: PA  Contact: García @ [phone]  García from [employer] is calling to inform office that additional inform is need for PA for MRI. Office need any clinicals that justify medical necessity for MRI of Brain. Asking that it be faxed to [phone]

## 2024-06-04 ENCOUNTER — PATIENT MESSAGE (OUTPATIENT)
Dept: ORTHOPEDICS | Facility: CLINIC | Age: 54
End: 2024-06-04
Payer: MEDICAID

## 2024-06-04 DIAGNOSIS — M12.811 ROTATOR CUFF TEAR ARTHROPATHY OF RIGHT SHOULDER: Primary | ICD-10-CM

## 2024-06-04 DIAGNOSIS — M75.101 ROTATOR CUFF TEAR ARTHROPATHY OF RIGHT SHOULDER: Primary | ICD-10-CM

## 2024-06-05 RX ORDER — TRAMADOL HYDROCHLORIDE 50 MG/1
50 TABLET ORAL EVERY 6 HOURS PRN
Qty: 28 TABLET | Refills: 0 | Status: SHIPPED | OUTPATIENT
Start: 2024-06-05

## 2024-06-26 ENCOUNTER — ANESTHESIA EVENT (OUTPATIENT)
Dept: ENDOSCOPY | Facility: HOSPITAL | Age: 54
End: 2024-06-26
Payer: MEDICAID

## 2024-06-26 NOTE — PRE-PROCEDURE INSTRUCTIONS
PreOp Instructions given:   - Verbal medication information (what to hold and what to take)   - NPO guidelines [id number]  - Arrival place directions given; time to be given the day before procedure by the   [facility] - 1230  - Bathing with antibacterial soap   - Don't wear any jewelry or bring any valuables AM of surgery   - No makeup or moisturizer to face   - No perfume/cologne, powder, lotions or aftershave   Pt. verbalized understanding.   Pt reports h/o PONV in the past, but not w/recent procedures.

## 2024-06-26 NOTE — ANESTHESIA PREPROCEDURE EVALUATION
06/28/2024  Fabiano Jules Jr. is a 54 y.o., male here for MRI shoulder & brain.    Past Medical History:   Diagnosis Date    Arthritis     Back injury     Colon polyp     COVID     CPAP (continuous positive airway pressure) dependence     Hypertension     PONV (postoperative nausea and vomiting)     Sleep apnea     c pap machine used     Lab Results   Component Value Date    WBC 4.45 01/20/2024    HGB 12.5 (L) 01/20/2024    HCT 39.1 (L) 01/20/2024    MCV 87 01/20/2024     01/20/2024       Results for orders placed during the hospital encounter of 04/06/23    Echo    Interpretation Summary  · The left ventricle is normal in size with concentric hypertrophy and normal systolic function.  · The estimated ejection fraction is 60%.  · Normal left ventricular diastolic function.  · Normal right ventricular size with normal right ventricular systolic function.  · Mild left atrial enlargement.  · Mild aortic regurgitation.  · Mild mitral regurgitation.  · Normal central venous pressure (3 mmHg).        Pre-op Assessment    I have reviewed the Patient Summary Reports.     I have reviewed the Nursing Notes. I have reviewed the NPO Status.      Review of Systems  Anesthesia Hx:  No problems with previous Anesthesia              Personal Hx of Anesthesia complications, Post-Operative Nausea/Vomiting, in the past, but not with recent anesthetics / prophylaxis                    Cardiovascular:  Exercise tolerance: good   Hypertension                                        Pulmonary:        Sleep Apnea, CPAP                Neurological:    Neuromuscular Disease,       Facial paralysis/bells palsy                                Physical Exam  General: Well nourished, Cooperative, Alert and Oriented    Airway:  Mallampati: III / II  TM Distance: Normal  Neck ROM: Normal  ROM    Dental:  Intact    Chest/Lungs:  Clear to auscultation, Normal Respiratory Rate    Heart:  Rate: Normal  Rhythm: Regular Rhythm  Sounds: Normal      Anesthesia Plan  Type of Anesthesia, risks & benefits discussed:    Anesthesia Type: Gen Natural Airway  Intra-op Monitoring Plan: Standard ASA Monitors  Post Op Pain Control Plan: multimodal analgesia  Induction:  IV  Informed Consent: Informed consent signed with the Patient and all parties understand the risks and agree with anesthesia plan.  All questions answered.   ASA Score: 3    Ready For Surgery From Anesthesia Perspective.     .  Loop Recorder - [id number] - [employer]/MRI conditional

## 2024-06-28 ENCOUNTER — ANESTHESIA (OUTPATIENT)
Dept: ENDOSCOPY | Facility: HOSPITAL | Age: 54
End: 2024-06-28
Payer: MEDICAID

## 2024-06-28 ENCOUNTER — HOSPITAL ENCOUNTER (OUTPATIENT)
Dept: RADIOLOGY | Facility: HOSPITAL | Age: 54
Discharge: HOME | End: 2024-06-28
Attending: FAMILY MEDICINE
Payer: MEDICAID

## 2024-06-28 ENCOUNTER — HOSPITAL ENCOUNTER (OUTPATIENT)
Dept: RADIOLOGY | Facility: HOSPITAL | Age: 54
Discharge: HOME | End: 2024-06-28
Attending: OPHTHALMOLOGY
Payer: MEDICAID

## 2024-06-28 ENCOUNTER — HOSPITAL ENCOUNTER (OUTPATIENT)
Facility: HOSPITAL | Age: 54
Discharge: HOME | End: 2024-06-28
Attending: ANESTHESIOLOGY | Admitting: ANESTHESIOLOGY
Payer: MEDICAID

## 2024-06-28 VITALS
WEIGHT: 225 LBS | HEIGHT: 70 IN | OXYGEN SATURATION: 97 % | RESPIRATION RATE: 200 BRPM | SYSTOLIC BLOOD PRESSURE: 144 MMHG | DIASTOLIC BLOOD PRESSURE: 84 MMHG | HEART RATE: 61 BPM | TEMPERATURE: 98 F | BODY MASS INDEX: 32.21 KG/M2

## 2024-06-28 DIAGNOSIS — M25.511 ACUTE PAIN OF RIGHT SHOULDER: ICD-10-CM

## 2024-06-28 DIAGNOSIS — R51.9 CHRONIC HEADACHES: ICD-10-CM

## 2024-06-28 DIAGNOSIS — M75.101 ROTATOR CUFF TEAR ARTHROPATHY OF RIGHT SHOULDER: ICD-10-CM

## 2024-06-28 DIAGNOSIS — G89.29 CHRONIC HEADACHES: ICD-10-CM

## 2024-06-28 DIAGNOSIS — G51.0 FACIAL NERVE PALSY, SECONDARY: ICD-10-CM

## 2024-06-28 DIAGNOSIS — Z86.59 HISTORY OF CLAUSTROPHOBIA: ICD-10-CM

## 2024-06-28 DIAGNOSIS — M12.811 ROTATOR CUFF TEAR ARTHROPATHY OF RIGHT SHOULDER: ICD-10-CM

## 2024-06-28 PROCEDURE — 63600175 PHARM REV CODE 636 W HCPCS: Performed by: REGISTERED NURSE

## 2024-06-28 PROCEDURE — 71000044 HC DOSC ROUTINE RECOVERY FIRST HOUR

## 2024-06-28 PROCEDURE — 70553 MRI BRAIN STEM W/O & W/DYE: CPT | Mod: TC

## 2024-06-28 PROCEDURE — 37000008 HC ANESTHESIA 1ST 15 MINUTES

## 2024-06-28 PROCEDURE — A9585 GADOBUTROL INJECTION: HCPCS | Performed by: OPHTHALMOLOGY

## 2024-06-28 PROCEDURE — 25500020 PHARM REV CODE 255: Performed by: OPHTHALMOLOGY

## 2024-06-28 PROCEDURE — 25000003 PHARM REV CODE 250: Performed by: REGISTERED NURSE

## 2024-06-28 PROCEDURE — 37000009 HC ANESTHESIA EA ADD 15 MINS

## 2024-06-28 PROCEDURE — 73221 MRI JOINT UPR EXTREM W/O DYE: CPT | Mod: TC,RT

## 2024-06-28 PROCEDURE — 73221 MRI JOINT UPR EXTREM W/O DYE: CPT | Mod: 26,RT,, | Performed by: RADIOLOGY

## 2024-06-28 PROCEDURE — 70553 MRI BRAIN STEM W/O & W/DYE: CPT | Mod: 26,,, | Performed by: RADIOLOGY

## 2024-06-28 RX ORDER — PROPOFOL 10 MG/ML
VIAL (ML) INTRAVENOUS
Status: DISCONTINUED | OUTPATIENT
Start: 2024-06-28 | End: 2024-06-28

## 2024-06-28 RX ORDER — PROPOFOL 10 MG/ML
INJECTION, EMULSION INTRAVENOUS
Status: COMPLETED
Start: 2024-06-28 | End: 2024-06-28

## 2024-06-28 RX ORDER — SODIUM CHLORIDE 0.9 % (FLUSH) 0.9 %
10 SYRINGE (ML) INJECTION
Status: DISCONTINUED | OUTPATIENT
Start: 2024-06-28 | End: 2024-06-28 | Stop reason: HOSPADM

## 2024-06-28 RX ORDER — GADOBUTROL 604.72 MG/ML
10 INJECTION INTRAVENOUS
Status: COMPLETED | OUTPATIENT
Start: 2024-06-28 | End: 2024-06-28

## 2024-06-28 RX ORDER — MIDAZOLAM HYDROCHLORIDE 1 MG/ML
INJECTION INTRAMUSCULAR; INTRAVENOUS
Status: DISCONTINUED | OUTPATIENT
Start: 2024-06-28 | End: 2024-06-28

## 2024-06-28 RX ORDER — PROPOFOL 10 MG/ML
INJECTION, EMULSION INTRAVENOUS CONTINUOUS PRN
Status: DISCONTINUED | OUTPATIENT
Start: 2024-06-28 | End: 2024-06-28

## 2024-06-28 RX ADMIN — PROPOFOL 50 MG: 10 INJECTION, EMULSION INTRAVENOUS at 01:06

## 2024-06-28 RX ADMIN — SODIUM CHLORIDE: 9 INJECTION, SOLUTION INTRAVENOUS at 01:06

## 2024-06-28 RX ADMIN — MIDAZOLAM HYDROCHLORIDE 2 MG: 2 INJECTION, SOLUTION INTRAMUSCULAR; INTRAVENOUS at 01:06

## 2024-06-28 RX ADMIN — PROPOFOL 175 MCG/KG/MIN: 10 INJECTION, EMULSION INTRAVENOUS at 01:06

## 2024-06-28 RX ADMIN — GADOBUTROL 10 ML: 604.72 INJECTION INTRAVENOUS at 03:06

## 2024-06-28 RX ADMIN — GLYCOPYRROLATE 0.2 MG: 0.2 INJECTION, SOLUTION INTRAMUSCULAR; INTRAVENOUS at 01:06

## 2024-06-28 NOTE — ANESTHESIA POSTPROCEDURE EVALUATION
Anesthesia Post Evaluation    Patient: Fabiano Jules Jr.    Procedure(s) Performed: Procedure(s) (LRB):  MRI (Magnetic Resonance Imagine) (Bilateral)    Final Anesthesia Type: general      Patient location during evaluation: PACU  Patient participation: Yes- Able to Participate  Level of consciousness: awake and alert  Post-procedure vital signs: reviewed and stable  Pain management: adequate  Airway patency: patent    PONV status at discharge: No PONV  Anesthetic complications: no      Cardiovascular status: hemodynamically stable  Respiratory status: unassisted, spontaneous ventilation and room air  Hydration status: euvolemic  Follow-up not needed.          Vitals Value Taken Time   /84 06/28/24 1617   Temp 36.5 °C (97.7 °F) 06/28/24 1548   Pulse 53 06/28/24 1618   Resp 200 06/28/24 1548   SpO2 92 % 06/28/24 1618   Vitals shown include unfiled device data.      No case tracking events are documented in the log.      Pain/Arian Score: Arian Score: 9 (6/28/2024  4:00 PM)

## 2024-06-28 NOTE — TRANSFER OF CARE
"Anesthesia Transfer of Care Note    Patient: Fabiano Jules Jr.    Procedure(s) Performed: Procedure(s) (LRB):  MRI (Magnetic Resonance Imagine) (Bilateral)    Patient location: PACU    Anesthesia Type: general    Transport from OR: Transported from OR on room air with adequate spontaneous ventilation    Post pain: adequate analgesia    Post assessment: no apparent anesthetic complications and tolerated procedure well    Post vital signs: stable    Level of consciousness: sedated    Nausea/Vomiting: no nausea/vomiting    Complications: none    Transfer of care protocol was followedComments: Nurse at bedside, VSS, spont reg resp noted      Last vitals: Visit Vitals  /66   Pulse (!) 56   Temp 36.7 °C (98.1 °F) (Skin)   Resp 17   Ht 5' 10" (1.778 m)   Wt 102.1 kg (225 lb)   SpO2 96%   BMI 32.28 kg/m²     "

## 2024-06-28 NOTE — ANESTHESIA RELEASE NOTE
"Anesthesia Release from PACU Note    Patient: Fabiano Jules Jr.    Procedure(s) Performed: Procedure(s) (LRB):  MRI (Magnetic Resonance Imagine) (Bilateral)    Anesthesia type: general    Post pain: Adequate analgesia    Post assessment: no apparent anesthetic complications and tolerated procedure well    Last Vitals: Visit Vitals  BP (!) 144/84 (BP Location: Left arm, Patient Position: Lying)   Pulse 61   Temp 36.5 °C (97.7 °F) (Temporal)   Resp (!) 200   Ht 5' 10" (1.778 m)   Wt 102.1 kg (225 lb)   SpO2 97%   BMI 32.28 kg/m²       Post vital signs: stable    Level of consciousness: awake and alert     Nausea/Vomiting: no nausea/no vomiting    Complications: none    Airway Patency: patent    Respiratory: unassisted, spontaneous ventilation    Cardiovascular: stable and blood pressure at baseline    Hydration: euvolemic  "

## 2024-06-30 ENCOUNTER — PATIENT MESSAGE (OUTPATIENT)
Dept: ORTHOPEDICS | Facility: CLINIC | Age: 54
End: 2024-06-30
Payer: MEDICAID

## 2024-07-01 ENCOUNTER — TELEPHONE (OUTPATIENT)
Dept: ORTHOPEDICS | Facility: CLINIC | Age: 54
End: 2024-07-01
Payer: MEDICAID

## 2024-07-01 NOTE — TELEPHONE ENCOUNTER
----- Message from Rohan Nguyễn sent at 7/1/2024  9:33 AM CDT -----  Patient would like a callback regarding rescheduling the appointment that he had missed this morning.    MRI results--Out of Template---EP- Medicaid     [phone]

## 2024-07-10 ENCOUNTER — PATIENT MESSAGE (OUTPATIENT)
Dept: OPHTHALMOLOGY | Facility: CLINIC | Age: 54
End: 2024-07-10
Payer: MEDICAID

## 2024-07-10 DIAGNOSIS — G51.0 FACIAL NERVE PALSY, SECONDARY: Primary | ICD-10-CM

## 2024-07-22 ENCOUNTER — OFFICE VISIT (OUTPATIENT)
Dept: ORTHOPEDICS | Facility: CLINIC | Age: 54
End: 2024-07-22
Payer: MEDICAID

## 2024-07-22 VITALS — WEIGHT: 225.06 LBS | HEIGHT: 70 IN | BODY MASS INDEX: 32.22 KG/M2

## 2024-07-22 DIAGNOSIS — M12.811 ROTATOR CUFF TEAR ARTHROPATHY OF RIGHT SHOULDER: Primary | ICD-10-CM

## 2024-07-22 DIAGNOSIS — M25.511 ACUTE PAIN OF RIGHT SHOULDER: ICD-10-CM

## 2024-07-22 DIAGNOSIS — M75.101 ROTATOR CUFF TEAR ARTHROPATHY OF RIGHT SHOULDER: Primary | ICD-10-CM

## 2024-07-22 PROCEDURE — 99214 OFFICE O/P EST MOD 30 MIN: CPT | Mod: S$PBB,,, | Performed by: FAMILY MEDICINE

## 2024-07-22 PROCEDURE — 99213 OFFICE O/P EST LOW 20 MIN: CPT | Mod: PBBFAC,PO | Performed by: FAMILY MEDICINE

## 2024-07-22 PROCEDURE — 99999 PR PBB SHADOW E&M-EST. PATIENT-LVL III: CPT | Mod: PBBFAC,,, | Performed by: FAMILY MEDICINE

## 2024-07-22 PROCEDURE — 3008F BODY MASS INDEX DOCD: CPT | Mod: CPTII,,, | Performed by: FAMILY MEDICINE

## 2024-07-22 PROCEDURE — 1159F MED LIST DOCD IN RCRD: CPT | Mod: CPTII,,, | Performed by: FAMILY MEDICINE

## 2024-07-22 NOTE — PROGRESS NOTES
Subjective     Patient ID: Fabiano Jules Jr. is a 54 y.o. male.    Chief Complaint: Pain and Follow-up of the Right Shoulder (MRI of R) shoulder, discuss results. Pt rates pain a 10. )    Returns today for MRI follow-up of right shoulder.  Continues to have severe shoulder pain in his limited in his day-to-day use.  It is affecting his performance at work.  He has not had any pain relief with any pharmacological intervention.  His initial injury was in January of this year.  He was 1st seen by me in March of this year when the MRI was ordered.  He was not able to get the MRI until about a month ago.    Pain  Pertinent negatives include no chest pain, chills, congestion, coughing, headaches, rash or sore throat.   Follow-up  Pertinent negatives include no chest pain, chills, congestion, coughing, headaches, rash or sore throat.       Review of Systems   Constitutional: Negative for chills and decreased appetite.   HENT:  Negative for congestion and sore throat.    Eyes:  Negative for blurred vision.   Cardiovascular:  Negative for chest pain, dyspnea on exertion and palpitations.   Respiratory:  Negative for cough and shortness of breath.    Skin:  Negative for rash.   Neurological:  Negative for difficulty with concentration, disturbances in coordination and headaches.   Psychiatric/Behavioral:  Negative for altered mental status, depression, hallucinations, memory loss and suicidal ideas.         Objective     General    Nursing note and vitals reviewed.  Constitutional: He is oriented to person, place, and time. He appears well-developed and well-nourished.   HENT:   Nose: Nose normal.   Eyes: EOM are normal. Pupils are equal, round, and reactive to light.   Neck: Neck supple.   Cardiovascular:  Normal rate.            Pulmonary/Chest: Effort normal.   Abdominal: Soft.   Neurological: He is alert and oriented to person, place, and time. He has normal reflexes.   Psychiatric: He has a normal mood and affect. His  behavior is normal. Judgment and thought content normal.         Right Shoulder Exam     Inspection/Observation   Swelling: absent  Bruising: absent  Scars: absent  Deformity: absent  Scapular Winging: absent  Scapular Dyskinesia: negative    Tenderness   The patient is tender to palpation of the acromioclavicular joint, supraspinatus and medial scapula.    Crepitus   The patient has crepitus of the AC joint and supraspinatus.    Range of Motion   Active abduction:  120   Passive abduction:  normal   Extension:  normal   Forward Flexion:  150   Forward Elevation: normal  Adduction: 90     Tests & Signs   Cassidy test: positive  Impingement: positive  Rotator Cuff Painful Arc/Range: moderate  Active Compression Test ([place]'s Sign): negative  Speed's Test: negative  Anterior Drawer Test: 0   Posterior Drawer Test: 0    Other   Sensation: normal    Left Shoulder Exam   Left shoulder exam is normal.       Muscle Strength   Right Upper Extremity   Shoulder Abduction: 4/5   Shoulder Internal Rotation: 4/5   Shoulder External Rotation: 4/5   Supraspinatus: 4/5   Subscapularis: 5/5   Biceps: 5/5     Vascular Exam     Right Pulses      Radial:                    2+    Physical Exam  Vitals and nursing note reviewed.   Constitutional:       Appearance: He is well-developed and well-nourished.   HENT:      Nose: Nose normal.   Eyes:      Extraocular Movements: EOM normal.      Pupils: Pupils are equal, round, and reactive to light.   Cardiovascular:      Rate and Rhythm: Normal rate.      Pulses:           Radial pulses are 2+ on the right side.   Pulmonary:      Effort: Pulmonary effort is normal.   Abdominal:      Palpations: Abdomen is soft.   Musculoskeletal:      Right shoulder: No swelling or deformity.      Cervical back: Neck supple.   Neurological:      Mental Status: He is alert and oriented to person, place, and time.      Deep Tendon Reflexes: Reflexes are normal and symmetric.   Psychiatric:         Mood and  Affect: Mood and affect normal.         Behavior: Behavior normal.         Thought Content: Thought content normal.         Judgment: Judgment normal.      EXAMINATION:  MRI SHOULDER WITHOUT CONTRAST RIGHT     CLINICAL HISTORY:  Shoulder trauma, rotator cuff tear suspected, xray done;  Pain in right shoulder     TECHNIQUE:  MRI right shoulder performed without contrast per routine protocol.     COMPARISON:  Radiographs 03/26/2024     FINDINGS:  Rotator cuff: There is full-thickness full width tear of the supraspinatus and infraspinatus tendons with retraction of approximately 3.5 cm.  Subscapularis and teres minor tendons are intact.  There is moderate atrophy of the supraspinatus and infraspinatus muscles.     Labrum: Circumferential fraying.     Biceps: Intra-articular long head biceps tendinosis with tenosynovitis.     Bone: No fracture or marrow infiltrative process.     Acromioclavicular joint: Moderate arthrosis.     Cartilage: Generalized chondral thinning.  Probable full-thickness cartilage loss over the superior humeral head.     Miscellaneous: Moderate joint effusion with synovitis, contiguous with distended subacromial subdeltoid bursa.     Impression:     1. Full-thickness full width tear of supraspinatus and infraspinatus muscles with retraction and moderate muscle atrophy.  2. Long head biceps tendinosis/tenosynovitis.  3. Glenohumeral chondral thinning.  4. Moderate joint effusion with synovitis and subacromial subdeltoid bursitis.  5. Moderate acromioclavicular arthrosis.     Assessment and Plan     Encounter Diagnoses   Name Primary?    Rotator cuff tear arthropathy of right shoulder Yes    Acute pain of right shoulder          Fabiano was seen today for pain and follow-up.    Diagnoses and all orders for this visit:    Rotator cuff tear arthropathy of right shoulder    Acute pain of right shoulder    Significant pathology on MRI with full-thickness tears of both the infraspinatus and supraspinatus  with retraction and atrophy of the musculature.  I discussed these findings with him and discussed treatment options.  We talked about how with atrophy of the musculature surgical repair would be quite difficult and possibly not the best option.  I would like him to see Dr. Pillai to discuss this further.  If surgical repair is not the best option we also discussed the possibility of suprascapular nerve blocks and ablation with physical therapy.

## 2024-08-01 ENCOUNTER — PATIENT MESSAGE (OUTPATIENT)
Dept: OPHTHALMOLOGY | Facility: CLINIC | Age: 54
End: 2024-08-01
Payer: MEDICAID

## 2024-08-01 ENCOUNTER — OFFICE VISIT (OUTPATIENT)
Dept: ORTHOPEDICS | Facility: CLINIC | Age: 54
End: 2024-08-01
Payer: MEDICAID

## 2024-08-01 VITALS — RESPIRATION RATE: 18 BRPM | WEIGHT: 225.06 LBS | BODY MASS INDEX: 32.22 KG/M2 | HEIGHT: 70 IN

## 2024-08-01 DIAGNOSIS — S46.011A TRAUMATIC COMPLETE TEAR OF RIGHT ROTATOR CUFF, INITIAL ENCOUNTER: Primary | ICD-10-CM

## 2024-08-01 DIAGNOSIS — Z01.818 PRE-OP TESTING: ICD-10-CM

## 2024-08-01 DIAGNOSIS — S46.011A TRAUMATIC TEAR OF RIGHT ROTATOR CUFF: ICD-10-CM

## 2024-08-01 PROCEDURE — 99213 OFFICE O/P EST LOW 20 MIN: CPT | Mod: PBBFAC,PO | Performed by: ORTHOPAEDIC SURGERY

## 2024-08-01 PROCEDURE — 99999 PR PBB SHADOW E&M-EST. PATIENT-LVL III: CPT | Mod: PBBFAC,,, | Performed by: ORTHOPAEDIC SURGERY

## 2024-08-01 RX ORDER — MUPIROCIN 20 MG/G
OINTMENT TOPICAL
OUTPATIENT
Start: 2024-08-01
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
## 2024-08-12 NOTE — TELEPHONE ENCOUNTER
Due to Dr. Mathias being out of the clinic, please review refill request.  LOV 7/19/2021, [facility] pharmacy.    2 CONTINUOUS PULSE OX

## 2024-08-17 NOTE — TELEPHONE ENCOUNTER
No care due was identified.  [facility] Embedded Care Due Messages. Reference number: [id number].   8/17/2024 12:37:30 PM CDT

## 2024-08-19 RX ORDER — ROSUVASTATIN CALCIUM 10 MG/1
10 TABLET, COATED ORAL NIGHTLY
Qty: 90 TABLET | Refills: 3 | Status: SHIPPED | OUTPATIENT
Start: 2024-08-19

## 2024-08-19 RX ORDER — AMLODIPINE BESYLATE 5 MG/1
5 TABLET ORAL DAILY
Qty: 90 TABLET | Refills: 3 | Status: SHIPPED | OUTPATIENT
Start: 2024-08-19

## 2024-08-30 ENCOUNTER — HOSPITAL ENCOUNTER (OUTPATIENT)
Dept: PREADMISSION TESTING | Facility: HOSPITAL | Age: 54
Discharge: HOME | End: 2024-08-30
Attending: STUDENT IN AN ORGANIZED HEALTH CARE EDUCATION/TRAINING PROGRAM
Payer: MEDICAID

## 2024-08-30 VITALS — WEIGHT: 225 LBS | HEIGHT: 70 IN | BODY MASS INDEX: 32.21 KG/M2

## 2024-08-30 DIAGNOSIS — S46.011A TRAUMATIC COMPLETE TEAR OF RIGHT ROTATOR CUFF, INITIAL ENCOUNTER: ICD-10-CM

## 2024-08-30 DIAGNOSIS — Z01.818 PRE-OP TESTING: ICD-10-CM

## 2024-08-30 DIAGNOSIS — Z01.818 PREOP TESTING: Primary | ICD-10-CM

## 2024-08-30 DIAGNOSIS — Z01.818 PREOP TESTING: ICD-10-CM

## 2024-08-30 LAB
ANION GAP SERPL CALC-SCNC: 9 MMOL/L (ref 8–16)
BASOPHILS # BLD AUTO: 0.02 K/UL (ref 0–0.2)
BASOPHILS NFR BLD: 0.6 % (ref 0–1.9)
BUN SERPL-MCNC: 15 MG/DL (ref 6–20)
CALCIUM SERPL-MCNC: 9.6 MG/DL (ref 8.7–10.5)
CHLORIDE SERPL-SCNC: 106 MMOL/L (ref 95–110)
CO2 SERPL-SCNC: 25 MMOL/L (ref 23–29)
CREAT SERPL-MCNC: 0.8 MG/DL (ref 0.5–1.4)
DIFFERENTIAL METHOD BLD: ABNORMAL
EOSINOPHIL # BLD AUTO: 0.1 K/UL (ref 0–0.5)
EOSINOPHIL NFR BLD: 2.2 % (ref 0–8)
ERYTHROCYTE [DISTWIDTH] IN BLOOD BY AUTOMATED COUNT: 14.1 % (ref 11.5–14.5)
EST. GFR  (NO RACE VARIABLE): >60 ML/MIN/1.73 M^2
GLUCOSE SERPL-MCNC: 87 MG/DL (ref 70–110)
HCT VFR BLD AUTO: 38 % (ref 40–54)
HGB BLD-MCNC: 12.3 G/DL (ref 14–18)
IMM GRANULOCYTES # BLD AUTO: 0 K/UL (ref 0–0.04)
IMM GRANULOCYTES NFR BLD AUTO: 0 % (ref 0–0.5)
LYMPHOCYTES # BLD AUTO: 1.9 K/UL (ref 1–4.8)
LYMPHOCYTES NFR BLD: 60.5 % (ref 18–48)
MCH RBC QN AUTO: 28.1 PG (ref 27–31)
MCHC RBC AUTO-ENTMCNC: 32.4 G/DL (ref 32–36)
MCV RBC AUTO: 87 FL (ref 82–98)
MONOCYTES # BLD AUTO: 0.3 K/UL (ref 0.3–1)
MONOCYTES NFR BLD: 8.6 % (ref 4–15)
NEUTROPHILS # BLD AUTO: 0.9 K/UL (ref 1.8–7.7)
NEUTROPHILS NFR BLD: 28.1 % (ref 38–73)
NRBC BLD-RTO: 0 /100 WBC
PLATELET # BLD AUTO: 277 K/UL (ref 150–450)
PMV BLD AUTO: 9.8 FL (ref 9.2–12.9)
POTASSIUM SERPL-SCNC: 3.9 MMOL/L (ref 3.5–5.1)
RBC # BLD AUTO: 4.38 M/UL (ref 4.6–6.2)
SODIUM SERPL-SCNC: 140 MMOL/L (ref 136–145)
WBC # BLD AUTO: 3.14 K/UL (ref 3.9–12.7)

## 2024-08-30 PROCEDURE — 80048 BASIC METABOLIC PNL TOTAL CA: CPT | Performed by: ORTHOPAEDIC SURGERY

## 2024-08-30 PROCEDURE — 93010 ELECTROCARDIOGRAM REPORT: CPT | Mod: ,,, | Performed by: GENERAL PRACTICE

## 2024-08-30 PROCEDURE — 93005 ELECTROCARDIOGRAM TRACING: CPT

## 2024-08-30 PROCEDURE — 85025 COMPLETE CBC W/AUTO DIFF WBC: CPT | Performed by: ORTHOPAEDIC SURGERY

## 2024-08-30 PROCEDURE — 36415 COLL VENOUS BLD VENIPUNCTURE: CPT | Performed by: ORTHOPAEDIC SURGERY

## 2024-08-30 RX ORDER — ASPIRIN 81 MG/1
81 TABLET ORAL DAILY
COMMUNITY

## 2024-08-30 NOTE — DISCHARGE INSTRUCTIONS
To confirm, Your doctor has instructed you that surgery is scheduled for:  9-6-2024    Please report to [facility], Registration the morning of surgery. You must check-in and receive a wristband before going to your procedure.  [address] [place] [address] [place] [address]    Pre-Op will call the afternoon prior to surgery between 1:00 and 6:00 PM with the final arrival time.  Phone number: [phone]    PLEASE NOTE:  The surgery schedule has many variables which may affect the time of your surgery case.  Family members should be available if your surgery time changes.  Plan to be here the day of your procedure between 4-6 hours.    MEDICATIONS:  TAKE ONLY THESE MEDICATIONS WITH A SMALL SIP OF WATER THE MORNING OF YOUR PROCEDURE:          DO NOT TAKE THESE MEDICATIONS 5-7 DAYS PRIOR to your procedure or per your surgeon's request:   ASPIRIN, ALEVE, ADVIL, IBUPROFEN, FISH OIL VITAMIN E, HERBALS  (May take Tylenol)    ONLY if you are prescribed any types of blood thinners such as:  Aspirin, Coumadin, Plavix, Pradaxa, Xarelto, Aggrenox, Effient, Eliquis, Savasya, Brilinta, or any other, ask your surgeon whether you should stop taking them and how long before surgery you should stop.  You may also need to verify with the prescribing physician if it is ok to stop your medication.      INSTRUCTIONS IMPORTANT!!  Do not eat or drink anything between midnight and the time of your procedure- this includes gum, mints, and candy.  Do not smoke or drink alcoholic beverages 24 hours prior to your procedure.  Shower the night before AND the morning of your procedure with a Chlorhexidine wash such as Hibiclens or Dial antibacterial soap from the neck down.  Do not get it on your face or in your eyes.  You may use your own shampoo and face wash. This helps your skin to be as bacteria free as possible.    If you wear contact lenses, dentures, hearing aids or glasses, bring a container to put them in during surgery  and give to a family member for safe keeping.  Please leave all jewelry, piercing's and valuables at home. You must remove your false eyelashes prior to surgery.    DO NOT remove hair from the surgery site.  Do not shave the incision site unless you are given specific instructions to do so.    ONLY if you have been diagnosed with sleep apnea please bring your C-PAP machine.  ONLY if you wear home oxygen please bring your portable oxygen tank the day of your procedure.  ONLY if you have a history of OPEN HEART SURGERY you will need a clearance from your Cardiologist per Anesthesia.      ONLY for patients requiring bowel prep, written instructions will be given by your doctor's office.  ONLY if you have a neuro stimulator, please bring the controller with you the morning of surgery  ONLY if a type and screen test is needed before surgery, please return:  If your doctor has scheduled you for an overnight stay, bring a small overnight bag with any personal items you need.  Make arrangements in advance for transportation home by a responsible adult. You can not go home in an uber or a cab per hospital policy.  It is not safe to drive a vehicle during the 24 hours after anesthesia.          All  facilities and properties are tobacco free.  Smoking is NOT allowed.   If you have any questions about these instructions, call Pre-Op Admit  Nursing at [phone] or the Pre-Op Day Surgery Unit at [phone].

## 2024-09-03 LAB
OHS QRS DURATION: 114 MS
OHS QTC CALCULATION: 397 MS

## 2024-09-05 ENCOUNTER — PATIENT MESSAGE (OUTPATIENT)
Dept: FAMILY MEDICINE | Facility: CLINIC | Age: 54
End: 2024-09-05
Payer: MEDICAID

## 2024-09-05 ENCOUNTER — ANESTHESIA EVENT (OUTPATIENT)
Dept: SURGERY | Facility: HOSPITAL | Age: 54
End: 2024-09-05
Payer: MEDICAID

## 2024-09-05 RX ORDER — OXYCODONE AND ACETAMINOPHEN 5; 325 MG/1; MG/1
1 TABLET ORAL EVERY 6 HOURS PRN
Qty: 14 TABLET | Refills: 0 | Status: SHIPPED | OUTPATIENT
Start: 2024-09-05

## 2024-09-05 RX ORDER — CYCLOBENZAPRINE HCL 10 MG
10 TABLET ORAL 3 TIMES DAILY PRN
Qty: 30 TABLET | Refills: 0 | Status: SHIPPED | OUTPATIENT
Start: 2024-09-05 | End: 2024-09-15

## 2024-09-05 RX ORDER — ACETAMINOPHEN 500 MG
1000 TABLET ORAL EVERY 8 HOURS PRN
Qty: 30 TABLET | Refills: 0 | Status: SHIPPED | OUTPATIENT
Start: 2024-09-05

## 2024-09-05 RX ORDER — ONDANSETRON 4 MG/1
4 TABLET, FILM COATED ORAL EVERY 6 HOURS PRN
Qty: 30 TABLET | Refills: 0 | Status: SHIPPED | OUTPATIENT
Start: 2024-09-05

## 2024-09-06 ENCOUNTER — PATIENT MESSAGE (OUTPATIENT)
Dept: SURGERY | Facility: HOSPITAL | Age: 54
End: 2024-09-06

## 2024-09-06 ENCOUNTER — ANESTHESIA (OUTPATIENT)
Dept: SURGERY | Facility: HOSPITAL | Age: 54
End: 2024-09-06
Payer: MEDICAID

## 2024-09-06 ENCOUNTER — HOSPITAL ENCOUNTER (OUTPATIENT)
Facility: HOSPITAL | Age: 54
Discharge: HOME | End: 2024-09-06
Attending: ORTHOPAEDIC SURGERY | Admitting: ORTHOPAEDIC SURGERY
Payer: MEDICAID

## 2024-09-06 DIAGNOSIS — S46.011A TRAUMATIC TEAR OF RIGHT ROTATOR CUFF: ICD-10-CM

## 2024-09-06 DIAGNOSIS — Z01.818 PRE-OP TESTING: ICD-10-CM

## 2024-09-06 DIAGNOSIS — S46.011A TRAUMATIC COMPLETE TEAR OF RIGHT ROTATOR CUFF, INITIAL ENCOUNTER: ICD-10-CM

## 2024-09-06 PROCEDURE — C9290 INJ, BUPIVACAINE LIPOSOME: HCPCS | Performed by: ANESTHESIOLOGY

## 2024-09-06 PROCEDURE — 94799 UNLISTED PULMONARY SVC/PX: CPT

## 2024-09-06 PROCEDURE — 71000015 HC POSTOP RECOV 1ST HR: Performed by: ORTHOPAEDIC SURGERY

## 2024-09-06 PROCEDURE — 63600175 PHARM REV CODE 636 W HCPCS: Performed by: ORTHOPAEDIC SURGERY

## 2024-09-06 PROCEDURE — 27201423 OPTIME MED/SURG SUP & DEVICES STERILE SUPPLY: Performed by: ORTHOPAEDIC SURGERY

## 2024-09-06 PROCEDURE — 36000711: Performed by: ORTHOPAEDIC SURGERY

## 2024-09-06 PROCEDURE — 63600175 PHARM REV CODE 636 W HCPCS: Performed by: ANESTHESIOLOGY

## 2024-09-06 PROCEDURE — 63600175 PHARM REV CODE 636 W HCPCS: Performed by: STUDENT IN AN ORGANIZED HEALTH CARE EDUCATION/TRAINING PROGRAM

## 2024-09-06 PROCEDURE — C1889 IMPLANT/INSERT DEVICE, NOC: HCPCS | Performed by: ORTHOPAEDIC SURGERY

## 2024-09-06 PROCEDURE — 36000710: Performed by: ORTHOPAEDIC SURGERY

## 2024-09-06 PROCEDURE — 71000033 HC RECOVERY, INTIAL HOUR: Performed by: ORTHOPAEDIC SURGERY

## 2024-09-06 PROCEDURE — 25000003 PHARM REV CODE 250: Performed by: STUDENT IN AN ORGANIZED HEALTH CARE EDUCATION/TRAINING PROGRAM

## 2024-09-06 PROCEDURE — 71000016 HC POSTOP RECOV ADDL HR: Performed by: ORTHOPAEDIC SURGERY

## 2024-09-06 PROCEDURE — 25000003 PHARM REV CODE 250: Performed by: ORTHOPAEDIC SURGERY

## 2024-09-06 PROCEDURE — 25000003 PHARM REV CODE 250: Performed by: ANESTHESIOLOGY

## 2024-09-06 PROCEDURE — 71000039 HC RECOVERY, EACH ADD'L HOUR: Performed by: ORTHOPAEDIC SURGERY

## 2024-09-06 PROCEDURE — 29823 SHO ARTHRS SRG XTNSV DBRDMT: CPT | Mod: 51,RT,, | Performed by: ORTHOPAEDIC SURGERY

## 2024-09-06 PROCEDURE — 37000009 HC ANESTHESIA EA ADD 15 MINS: Performed by: ORTHOPAEDIC SURGERY

## 2024-09-06 PROCEDURE — C1713 ANCHOR/SCREW BN/BN,TIS/BN: HCPCS | Performed by: ORTHOPAEDIC SURGERY

## 2024-09-06 PROCEDURE — 29827 SHO ARTHRS SRG RT8TR CUF RPR: CPT | Mod: RT,,, | Performed by: ORTHOPAEDIC SURGERY

## 2024-09-06 PROCEDURE — 37000008 HC ANESTHESIA 1ST 15 MINUTES: Performed by: ORTHOPAEDIC SURGERY

## 2024-09-06 PROCEDURE — 29828 SHO ARTHRS SRG BICP TENODSIS: CPT | Mod: 51,RT,, | Performed by: ORTHOPAEDIC SURGERY

## 2024-09-06 DEVICE — SPEEDBRG IMP SYS W/BIO-COMP SWVLK
Type: IMPLANTABLE DEVICE | Site: SHOULDER | Status: FUNCTIONAL
Brand: ARTHREX®

## 2024-09-06 DEVICE — IMPLANT ARTHSCP BIOINDUCTV LG: Type: IMPLANTABLE DEVICE | Site: SHOULDER | Status: FUNCTIONAL

## 2024-09-06 DEVICE — ANCHOR TENDON 8: Type: IMPLANTABLE DEVICE | Site: SHOULDER | Status: FUNCTIONAL

## 2024-09-06 DEVICE — BIO-COMP, SWIVELOCK C, FT, 4.75X19.1MM
Type: IMPLANTABLE DEVICE | Site: SHOULDER | Status: FUNCTIONAL
Brand: ARTHREX®

## 2024-09-06 DEVICE — ANCHOR BONE ARTHSCP DEL SYS: Type: IMPLANTABLE DEVICE | Site: SHOULDER | Status: FUNCTIONAL

## 2024-09-06 RX ORDER — SODIUM CHLORIDE, SODIUM LACTATE, POTASSIUM CHLORIDE, CALCIUM CHLORIDE 600; 310; 30; 20 MG/100ML; MG/100ML; MG/100ML; MG/100ML
INJECTION, SOLUTION INTRAVENOUS CONTINUOUS
Status: DISCONTINUED | OUTPATIENT
Start: 2024-09-06 | End: 2024-09-06 | Stop reason: HOSPADM

## 2024-09-06 RX ORDER — DEXAMETHASONE SODIUM PHOSPHATE 4 MG/ML
INJECTION, SOLUTION INTRA-ARTICULAR; INTRALESIONAL; INTRAMUSCULAR; INTRAVENOUS; SOFT TISSUE
Status: DISCONTINUED | OUTPATIENT
Start: 2024-09-06 | End: 2024-09-06

## 2024-09-06 RX ORDER — SCOLOPAMINE TRANSDERMAL SYSTEM 1 MG/1
1 PATCH, EXTENDED RELEASE TRANSDERMAL ONCE
Status: DISCONTINUED | OUTPATIENT
Start: 2024-09-06 | End: 2024-09-06 | Stop reason: HOSPADM

## 2024-09-06 RX ORDER — HYDROCODONE BITARTRATE AND ACETAMINOPHEN 5; 325 MG/1; MG/1
1 TABLET ORAL EVERY 4 HOURS PRN
Status: DISCONTINUED | OUTPATIENT
Start: 2024-09-06 | End: 2024-09-06 | Stop reason: HOSPADM

## 2024-09-06 RX ORDER — ROCURONIUM BROMIDE 10 MG/ML
INJECTION, SOLUTION INTRAVENOUS
Status: DISCONTINUED | OUTPATIENT
Start: 2024-09-06 | End: 2024-09-06

## 2024-09-06 RX ORDER — FENTANYL CITRATE 50 UG/ML
25 INJECTION, SOLUTION INTRAMUSCULAR; INTRAVENOUS EVERY 5 MIN PRN
Status: DISCONTINUED | OUTPATIENT
Start: 2024-09-06 | End: 2024-09-06 | Stop reason: HOSPADM

## 2024-09-06 RX ORDER — LIDOCAINE HYDROCHLORIDE 20 MG/ML
INJECTION INTRAVENOUS
Status: DISCONTINUED | OUTPATIENT
Start: 2024-09-06 | End: 2024-09-06

## 2024-09-06 RX ORDER — PROCHLORPERAZINE EDISYLATE 5 MG/ML
5 INJECTION INTRAMUSCULAR; INTRAVENOUS EVERY 4 HOURS PRN
Status: DISCONTINUED | OUTPATIENT
Start: 2024-09-06 | End: 2024-09-06 | Stop reason: HOSPADM

## 2024-09-06 RX ORDER — MUPIROCIN 20 MG/G
OINTMENT TOPICAL
Status: DISCONTINUED | OUTPATIENT
Start: 2024-09-06 | End: 2024-09-06 | Stop reason: HOSPADM

## 2024-09-06 RX ORDER — DIPHENHYDRAMINE HYDROCHLORIDE 50 MG/ML
25 INJECTION INTRAMUSCULAR; INTRAVENOUS EVERY 6 HOURS PRN
Status: DISCONTINUED | OUTPATIENT
Start: 2024-09-06 | End: 2024-09-06 | Stop reason: HOSPADM

## 2024-09-06 RX ORDER — EPHEDRINE SULFATE 50 MG/ML
INJECTION, SOLUTION INTRAVENOUS
Status: DISCONTINUED | OUTPATIENT
Start: 2024-09-06 | End: 2024-09-06

## 2024-09-06 RX ORDER — HYDROMORPHONE HYDROCHLORIDE 2 MG/ML
0.2 INJECTION, SOLUTION INTRAMUSCULAR; INTRAVENOUS; SUBCUTANEOUS EVERY 5 MIN PRN
Status: DISCONTINUED | OUTPATIENT
Start: 2024-09-06 | End: 2024-09-06 | Stop reason: HOSPADM

## 2024-09-06 RX ORDER — PROPOFOL 10 MG/ML
VIAL (ML) INTRAVENOUS
Status: DISCONTINUED | OUTPATIENT
Start: 2024-09-06 | End: 2024-09-06

## 2024-09-06 RX ORDER — LIDOCAINE HYDROCHLORIDE 10 MG/ML
0.5 INJECTION, SOLUTION EPIDURAL; INFILTRATION; INTRACAUDAL; PERINEURAL ONCE
Status: DISCONTINUED | OUTPATIENT
Start: 2024-09-06 | End: 2024-09-06 | Stop reason: HOSPADM

## 2024-09-06 RX ORDER — SUCCINYLCHOLINE CHLORIDE 20 MG/ML
INJECTION INTRAMUSCULAR; INTRAVENOUS
Status: DISCONTINUED | OUTPATIENT
Start: 2024-09-06 | End: 2024-09-06

## 2024-09-06 RX ORDER — ONDANSETRON HYDROCHLORIDE 2 MG/ML
4 INJECTION, SOLUTION INTRAVENOUS ONCE
Status: DISCONTINUED | OUTPATIENT
Start: 2024-09-06 | End: 2024-09-06 | Stop reason: HOSPADM

## 2024-09-06 RX ORDER — EPINEPHRINE 1 MG/ML
INJECTION, SOLUTION, CONCENTRATE INTRAVENOUS
Status: DISCONTINUED | OUTPATIENT
Start: 2024-09-06 | End: 2024-09-06 | Stop reason: HOSPADM

## 2024-09-06 RX ORDER — MEPERIDINE HYDROCHLORIDE 50 MG/ML
12.5 INJECTION INTRAMUSCULAR; INTRAVENOUS; SUBCUTANEOUS ONCE
Status: DISCONTINUED | OUTPATIENT
Start: 2024-09-06 | End: 2024-09-06 | Stop reason: HOSPADM

## 2024-09-06 RX ORDER — MIDAZOLAM HYDROCHLORIDE 1 MG/ML
2 INJECTION, SOLUTION INTRAMUSCULAR; INTRAVENOUS ONCE
Status: COMPLETED | OUTPATIENT
Start: 2024-09-06 | End: 2024-09-06

## 2024-09-06 RX ORDER — OXYCODONE HYDROCHLORIDE 5 MG/1
5 TABLET ORAL
Status: DISCONTINUED | OUTPATIENT
Start: 2024-09-06 | End: 2024-09-06 | Stop reason: HOSPADM

## 2024-09-06 RX ORDER — ACETAMINOPHEN 10 MG/ML
INJECTION, SOLUTION INTRAVENOUS
Status: DISCONTINUED | OUTPATIENT
Start: 2024-09-06 | End: 2024-09-06

## 2024-09-06 RX ORDER — GLUCAGON 1 MG
1 KIT INJECTION
Status: DISCONTINUED | OUTPATIENT
Start: 2024-09-06 | End: 2024-09-06 | Stop reason: HOSPADM

## 2024-09-06 RX ORDER — ONDANSETRON HYDROCHLORIDE 2 MG/ML
INJECTION, SOLUTION INTRAVENOUS
Status: DISCONTINUED | OUTPATIENT
Start: 2024-09-06 | End: 2024-09-06

## 2024-09-06 RX ORDER — FENTANYL CITRATE 50 UG/ML
25 INJECTION, SOLUTION INTRAMUSCULAR; INTRAVENOUS EVERY 5 MIN PRN
Status: COMPLETED | OUTPATIENT
Start: 2024-09-06 | End: 2024-09-06

## 2024-09-06 RX ORDER — BUPIVACAINE HYDROCHLORIDE 5 MG/ML
INJECTION, SOLUTION EPIDURAL; INTRACAUDAL
Status: COMPLETED | OUTPATIENT
Start: 2024-09-06 | End: 2024-09-06

## 2024-09-06 RX ADMIN — MUPIROCIN 1 G: 20 OINTMENT TOPICAL at 06:09

## 2024-09-06 RX ADMIN — FENTANYL CITRATE 25 MCG: 50 INJECTION, SOLUTION INTRAMUSCULAR; INTRAVENOUS at 09:09

## 2024-09-06 RX ADMIN — ACETAMINOPHEN 1000 MG: 10 INJECTION INTRAVENOUS at 09:09

## 2024-09-06 RX ADMIN — FENTANYL CITRATE 25 MCG: 50 INJECTION, SOLUTION INTRAMUSCULAR; INTRAVENOUS at 08:09

## 2024-09-06 RX ADMIN — MIDAZOLAM HYDROCHLORIDE 2 MG: 1 INJECTION, SOLUTION INTRAMUSCULAR; INTRAVENOUS at 07:09

## 2024-09-06 RX ADMIN — SUCCINYLCHOLINE CHLORIDE 160 MG: 20 INJECTION, SOLUTION INTRAMUSCULAR; INTRAVENOUS at 07:09

## 2024-09-06 RX ADMIN — EPHEDRINE SULFATE 10 MG: 50 INJECTION, SOLUTION INTRAMUSCULAR; INTRAVENOUS; SUBCUTANEOUS at 08:09

## 2024-09-06 RX ADMIN — DEXAMETHASONE SODIUM PHOSPHATE 8 MG: 4 INJECTION, SOLUTION INTRA-ARTICULAR; INTRALESIONAL; INTRAMUSCULAR; INTRAVENOUS; SOFT TISSUE at 07:09

## 2024-09-06 RX ADMIN — FENTANYL CITRATE 50 MCG: 50 INJECTION, SOLUTION INTRAMUSCULAR; INTRAVENOUS at 07:09

## 2024-09-06 RX ADMIN — SODIUM CHLORIDE, SODIUM GLUCONATE, SODIUM ACETATE, POTASSIUM CHLORIDE AND MAGNESIUM CHLORIDE: 526; 502; 368; 37; 30 INJECTION, SOLUTION INTRAVENOUS at 08:09

## 2024-09-06 RX ADMIN — ONDANSETRON 4 MG: 2 INJECTION INTRAMUSCULAR; INTRAVENOUS at 07:09

## 2024-09-06 RX ADMIN — GLYCOPYRROLATE 0.2 MG: 0.2 INJECTION, SOLUTION INTRAMUSCULAR; INTRAVITREAL at 07:09

## 2024-09-06 RX ADMIN — CEFAZOLIN 2 G: 2 INJECTION, POWDER, FOR SOLUTION INTRAMUSCULAR; INTRAVENOUS at 07:09

## 2024-09-06 RX ADMIN — LIDOCAINE HYDROCHLORIDE 80 MG: 20 INJECTION, SOLUTION INTRAVENOUS at 07:09

## 2024-09-06 RX ADMIN — BUPIVACAINE HYDROCHLORIDE 5 ML: 5 INJECTION, SOLUTION EPIDURAL; INTRACAUDAL; PERINEURAL at 07:09

## 2024-09-06 RX ADMIN — SODIUM CHLORIDE, SODIUM GLUCONATE, SODIUM ACETATE, POTASSIUM CHLORIDE AND MAGNESIUM CHLORIDE: 526; 502; 368; 37; 30 INJECTION, SOLUTION INTRAVENOUS at 06:09

## 2024-09-06 RX ADMIN — ROCURONIUM BROMIDE 10 MG: 10 INJECTION, SOLUTION INTRAVENOUS at 07:09

## 2024-09-06 RX ADMIN — PROPOFOL 200 MG: 10 INJECTION, EMULSION INTRAVENOUS at 07:09

## 2024-09-06 RX ADMIN — FENTANYL CITRATE 50 MCG: 50 INJECTION, SOLUTION INTRAMUSCULAR; INTRAVENOUS at 09:09

## 2024-09-06 RX ADMIN — SCOPOLAMINE 1 PATCH: 1.5 PATCH, EXTENDED RELEASE TRANSDERMAL at 07:09

## 2024-09-06 RX ADMIN — OXYCODONE HYDROCHLORIDE 5 MG: 5 TABLET ORAL at 09:09

## 2024-09-06 RX ADMIN — BUPIVACAINE 10 ML: 13.3 INJECTION, SUSPENSION, LIPOSOMAL INFILTRATION at 07:09

## 2024-09-06 NOTE — TRANSFER OF CARE
"Anesthesia Transfer of Care Note    Patient: Fabiano Jules Jr.    Procedure(s) Performed: Procedure(s) (LRB):  REPAIR, ROTATOR CUFF, ARTHROSCOPIC, WITH BIOINDUCTIVE IMPLANT (Right)  EXTENSIVE DEBRIDEMENT, SHOULDER, ARTHROSCOPIC (Right)  ARTHROSCOPY, SHOULDER, WITH SUBACROMIAL SPACE DECOMPRESSION (Right)  ARTHROSCOPY,SHOULDER,WITH BICEPS TENODESIS (Right)    Patient location: PACU    Anesthesia Type: general    Transport from OR: Transported from OR on 2-3 L/min O2 by NC with adequate spontaneous ventilation    Post pain: adequate analgesia    Post assessment: no apparent anesthetic complications    Post vital signs: stable    Level of consciousness: responds to stimulation and lethargic    Nausea/Vomiting: no nausea/vomiting    Complications: none    Transfer of care protocol was followed      Last vitals: Visit Vitals  BP (!) 142/89 (BP Location: Left arm, Patient Position: Lying)   Pulse (!) 47   Temp 36.4 °C (97.5 °F) (Temporal)   Resp 12   Ht 5' 10" (1.778 m)   Wt 99.8 kg (220 lb)   SpO2 100%   BMI 31.57 kg/m²     "

## 2024-09-06 NOTE — ANESTHESIA PROCEDURE NOTES
Peripheral Block    Patient location during procedure: pre-op   Block not for primary anesthetic.  Reason for block: at surgeon's request and post-op pain management   Post-op Pain Location: right shoulder   Start time: 9/6/2024 7:06 AM  Timeout: 9/6/2024 7:05 AM   End time: 9/6/2024 7:10 AM    Staffing  Authorizing Provider: Mitzy Carreon MD  Performing Provider: Mitzy Carreon MD    Staffing  Performed by: Mitzy Carreon MD  Authorized by: Mitzy Carreon MD    Preanesthetic Checklist  Completed: patient identified, IV checked, site marked, risks and benefits discussed, surgical consent, monitors and equipment checked, pre-op evaluation and timeout performed  Peripheral Block  Patient position: sitting  Prep: ChloraPrep  Patient monitoring: heart rate, cardiac monitor, continuous pulse ox, continuous capnometry and frequent blood pressure checks  Block type: interscalene  Laterality: right  Injection technique: single shot  Needle  Needle type: Stimuplex   Needle gauge: 22 G  Needle length: 2 in  Needle localization: anatomical landmarks and ultrasound guidance   -ultrasound image captured on disc.  Assessment  Injection assessment: negative aspiration, negative parasthesia and local visualized surrounding nerve  Paresthesia pain: none  Heart rate change: no  Slow fractionated injection: yes  Pain Tolerance: comfortable throughout block and no complaints  Medications:    Medications: bupivacaine (pf) (MARCAINE) injection 0.5% - Perineural   5 mL - 9/6/2024 7:10:00 AM    Additional Notes  VSS.  [facility] RN monitoring vitals throughout procedure.  Patient tolerated procedure well.     Exparel 10mL

## 2024-09-06 NOTE — ANESTHESIA PROCEDURE NOTES
Intubation    Date/Time: 9/6/2024 7:49 AM    Performed by: Elisabeth Boswell CRNA  Authorized by: Mitzy Carreon MD    Intubation:     Induction:  Intravenous    Intubated:  Postinduction    Mask Ventilation:  Easy mask    Attempts:  1    Attempted By:  CRNA    Method of Intubation:  Video laryngoscopy    Blade:  Kapoor 3    Laryngeal View Grade: Grade I - full view of cords      Difficult Airway Encountered?: No      Complications:  None    Airway Device:  Oral endotracheal tube    Airway Device Size:  7.5    Style/Cuff Inflation:  Cuffed (inflated to minimal occlusive pressure)    Tube secured:  22    Secured at:  The lips    Placement Verified By:  Capnometry    Complicating Factors:  None    Findings Post-Intubation:  BS equal bilateral and atraumatic/condition of teeth unchanged

## 2024-09-06 NOTE — DISCHARGE SUMMARY
[facility]  Discharge Note  Short Stay    Procedure(s) (LRB):  REPAIR, ROTATOR CUFF, ARTHROSCOPIC, WITH BIOINDUCTIVE IMPLANT (Right)  EXTENSIVE DEBRIDEMENT, SHOULDER, ARTHROSCOPIC (Right)  ARTHROSCOPY, SHOULDER, WITH SUBACROMIAL SPACE DECOMPRESSION (Right)  ARTHROSCOPY,SHOULDER,WITH BICEPS TENODESIS (Right)      OUTCOME: Patient tolerated treatment/procedure well without complication and is now ready for discharge.    DISPOSITION: Home or Self Care    FINAL DIAGNOSIS:  <principal problem not specified>    FOLLOWUP: In clinic    DISCHARGE INSTRUCTIONS:    Discharge Procedure Orders   Diet general     Ice to affected area     Lifting restrictions     No driving, operating heavy equipment or signing legal documents while taking pain medication     Remove dressing in 48 hours     Change dressing (specify)   Order Comments: Dressing change: 1 times per day using waterproof bandaids.     Call MD for:  temperature >100.4     Call MD for:  persistent nausea and vomiting     Call MD for:  severe uncontrolled pain     Call MD for:  difficulty breathing, headache or visual disturbances     Call MD for:  redness, tenderness, or signs of infection (pain, swelling, redness, odor or green/yellow discharge around incision site)     Call MD for:  hives     Call MD for:  persistent dizziness or light-headedness     Call MD for:  extreme fatigue        TIME SPENT ON DISCHARGE: 5 minutes

## 2024-09-06 NOTE — PLAN OF CARE
Pt prepped for surgery. Consents at bedside. Incentive spirometry taught by respiratory. Pt belongings placed in postop cabinet. Spouse set up with text alerts.

## 2024-09-06 NOTE — CARE UPDATE
09/06/24 0626   PRE-TX-O2   Device (Oxygen Therapy) room air   SpO2 100 %   Pulse Oximetry Type Continuous   Pulse (!) 51   Resp 16   Temp 97.5 °F (36.4 °C)   BP (!) 157/86   Incentive Spirometer   $ Incentive Spirometer Charges done with encouragement   Incentive Spirometer Predicted Level (mL) 2200   Administration (IS) instruction provided, initial;mouthpiece utilized   Number of Repetitions (IS) 3   Level Incentive Spirometer (mL) 1750   Patient Tolerance (IS) good;no adverse signs/symptoms present

## 2024-09-06 NOTE — PLAN OF CARE
Has met unit/department guidelines for discharge from each phase of the post procedure continuum. Leaving floor per w/c. AAO x3. Resp even and unlabored room air. No distress noted. Tolerating Po fluids without c/o nausea/vomiting. All personal belongings returned to pt

## 2024-09-06 NOTE — ANESTHESIA PREPROCEDURE EVALUATION
09/06/2024  Fabiano Jules Jr. is a 54 y.o., male.      Pre-op Assessment          Review of Systems  Cardiovascular:     Hypertension                                  Hypertension     Atrial Fibrillation     Pulmonary:        Sleep Apnea     Obstructive Sleep Apnea (RAFAEL).           Musculoskeletal:  Arthritis        Arthritis          Neurological:    Neuromuscular Disease,           Arthritis                         Neuromuscular Disease       Physical Exam  General: Well nourished        Anesthesia Plan  Type of Anesthesia, risks & benefits discussed:    Anesthesia Type: Gen Natural Airway  Intra-op Monitoring Plan: Standard ASA Monitors  Post Op Pain Control Plan: multimodal analgesia, IV/PO Opioids PRN and peripheral nerve block  Induction:  IV  Informed Consent: Informed consent signed with the Patient and all parties understand the risks and agree with anesthesia plan.  All questions answered.   ASA Score: 3  Day of Surgery Review of History & Physical: H&P Update referred to the surgeon/provider.    Ready For Surgery From Anesthesia Perspective.     .

## 2024-09-06 NOTE — OP NOTE
[facility]  Orthopedic Surgery  Operative Note    SUMMARY     Date of Procedure: 9/6/2024     Procedure: Procedure(s) (LRB):  REPAIR, ROTATOR CUFF, ARTHROSCOPIC, WITH BIOINDUCTIVE IMPLANT (Right)  EXTENSIVE DEBRIDEMENT, SHOULDER, ARTHROSCOPIC (Right)  ARTHROSCOPY, SHOULDER, WITH SUBACROMIAL SPACE DECOMPRESSION (Right)  ARTHROSCOPY,SHOULDER,WITH BICEPS TENODESIS (Right)       Surgeons and Role:     * Darrion Ricks MD - Primary    Assistant: Oliver CAMEJO    Pre-Operative Diagnosis: Traumatic complete tear of right rotator cuff, initial encounter [S46.011A]  Pre-op testing [Z01.818]    Post-Operative Diagnosis: Post-Op Diagnosis Codes:     * Traumatic complete tear of right rotator cuff, initial encounter [S46.011A]     * Pre-op testing [Z01.818]    Anesthesia: General/Regional      Description of the Findings of the Procedure: Diagnostic arthroscopy findings on the patient's Right shoulder showed some degenerative tearing of the labrum circumferentially.  Patient also had some intra-articular tearing of the biceps tendon.  Healthy-appearing subscapularis.  The patient had full-thickness tearing of the supraspinatus and anterior portion of the infraspinatus.  Total tear retraction was about 2 centimeters.  Total tear width in an AP dimension was 3 centimeters.  Articular surface showed some early arthritic changes with some arthritis of the superior portion of the humerus and central glenoid. In the subacromial space, the patient had bursitis in the subacromial space with a type 2 acromion. Ac joint showed minimal arthritic changes.       Complications: No    Estimated Blood Loss (EBL): * No values recorded between 9/6/2024  8:01 AM and 9/6/2024  8:59 AM *           Implants:   Implant Name Type Inv. Item Serial No.  Lot No. LRB No. Used Action   SYS ANCHOR SUTURE 4.75MM [facility] - [id number]  SYS ANCHOR SUTURE 4.75MM [facility]  [employer] [id number] Right 1 Implanted    ANCHOR SWIVELOCK C JOS[employer] F[employer]ERTP - [id number]  ANCHOR SWIVELOCK C JOSÉ FIBERTP  [employer] [id number] Right 1 Implanted   ANCHOR TENDON 8 - [id number]  ANCHOR TENDON 8  [employer] [id number] Right 1 Implanted   ANCHOR BONE ARTHSCP DEL SYS - [id number]  ANCHOR BONE ARTHSCP DEL SYS  [employer] [id number] Right 1 Implanted   IMPLANT ARTHSCP BIOINDUCTV LG - [id number]  IMPLANT ARTHSCP BIOINDUCTV LG  [facility] PRN[employer]VEST[employer] [facility] [id number] Right 1 Implanted       Specimens:   Specimen (24h ago, onward)      None                    Condition: Good    Disposition: PACU - hemodynamically stable.    Attestation: I was present and scrubbed for the entire procedure.      Indications for the procedure:A 54 year old male with a history ofRight shoulder pain that failed to resolve with physical therapy, activity modification, injections, and rest.  Patient desired the procedure listed above after MRI was obtained.    PROCEDURE IN DETAIL: Risks, benefits and alternatives of the procedure were   explained to the patient including, but not limited to damage to nerves,   arteries and blood vessels. Also explained risk of re-rupture, nonhealing, infection, DVT,   PE as well as anesthetic complications including seizure, stroke, heart attack   and death. They understood this, signed informed consent. The patient's Right  shoulder was marked prior to coming to the Operating Room. Once there a formal   timeout was done in which correct patient, procedure and operative site were all   correctly identified and confirmed by the entire operating team.  Appropriate preoperative antibiotics was   given prior to surgical incision. General anesthesia was induced. The   patient was placed in lateral decubitus position on a bean bag with his Right upper extremity   hanging in balanced suspension.The arm was suspended with 10 lbs of weight for balanced traction. The extremity was prepped and draped in normal   sterile fashion.A standard posterior portal was then  made. A spinal   needle was used to localize an anterior portal within the rotator interval and   this was made as well. We then performed a diagnostic arthroscopy of the   glenohumeral joint through both the anterior and posterior viewing portals,with the findings listed above.  Shaver was used to perform an extensive debridement within the joint.  Some of the frayed labrum was debrided back to more stable tissue.  Biceps fraying was debrided and then a tenotomy was performed using the ablator for later tenodesis.  Some of the cuff tissue as well as the footprint was debrided in preparation for rotator cuff repair.  Chondroplasty was performed of loose chondral flaps on both the glenoid and humeral surfaces.    After this was done, we then proceeded up in the subacromial space   where a spinal needle was used to localize a lateral portal and then this was   made as well. A 4.0 shaver was used to perform a subtotal bursectomy. We then   used the ablator to remove the soft tissue off the undersurface of the acromion.We then thoroughly inspected the cuff on the rotator cuff side. We shaved away any additional adhesions in the anterior, lateral on posterior gutters.  Passport cannulas were placed in the lateral portal and an accessory lateral portal was created right off the acromial edge. Cannula placed here also. Plastic shuttling cannula placed anteriorly. Footprint was prepared lightly using the shaver and kvng. 2 x 4.75 Biocomposite Swivellock anchors loaded with Fibertape were punched and placed for the medial row right off the articular margin. The tapes were passed through the cuff tissue using a Fast Pass Scorpion.  The retention stitches were closed at the corners of the tears in a simple manner.  These were then tied using a modified Collingsworth knot backed up with alternating half hitches on alternating posts.  The tapes were then secured in the lateral row to 2 x 4.75 Biocomposite Swivellock anchors in a  SpeedBridge pattern with one blue and one white in each anchor.  Prior to placing our anterolateral row anchor, we then placed a luggage tag stitch around the biceps tendon in the past the stitch back through the biceps tendon.  This stitch was then included in our anterior lateral row anchor for a biceps tenodesis.  Remainder of the tendon was then debrided.  We then placed a [employer] Regeneten bio inductive implant on top of the rotator cuff repair and stabilized it with the PDS staples.        All excess fluid was removed from the shoulder. The portals were closed using nylon. Sterile dressing applied.  They were then placed in a cryotherapy cuff with   UltraSling, extubated, awakened and transferred from the Operating Room to   Recovery Room in stable condition.     Postoperative rehab course will be for rotator cuff repair with bio inductive implant and biceps tenodesis
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
## 2024-09-06 NOTE — ANESTHESIA POSTPROCEDURE EVALUATION
Anesthesia Post Evaluation    Patient: Fabiano Jules Jr.    Procedure(s) Performed: Procedure(s) (LRB):  REPAIR, ROTATOR CUFF, ARTHROSCOPIC (Right)  EXTENSIVE DEBRIDEMENT, SHOULDER, ARTHROSCOPIC (Right)  ARTHROSCOPY, SHOULDER, WITH SUBACROMIAL SPACE DECOMPRESSION (Right)  ARTHROSCOPY,SHOULDER,WITH BICEPS TENODESIS (Right)    Final Anesthesia Type: general      Patient location during evaluation: PACU  Patient participation: Yes- Able to Participate  Level of consciousness: awake and alert  Post-procedure vital signs: reviewed and stable  Pain management: adequate  Airway patency: patent    PONV status at discharge: No PONV  Anesthetic complications: no      Cardiovascular status: blood pressure returned to baseline  Respiratory status: unassisted and room air  Hydration status: euvolemic  Follow-up not needed.              Vitals Value Taken Time   /80 09/06/24 1130   Temp 36.9 °C (98.4 °F) 09/06/24 1015   Pulse 52 09/06/24 1130   Resp 20 09/06/24 1130   SpO2 98 % 09/06/24 1130         Event Time   Out of Recovery 10:15:00         Pain/Arian Score: Pain Rating Prior to Med Admin: 0 (9/6/2024 10:35 AM)  Arian Score: 10 (9/6/2024 10:35 AM)

## 2024-09-06 NOTE — DISCHARGE INSTRUCTIONS
1.Diet: Ice chips, clear liquids, and then diet as tolerated. Drink plenty of liquids.  2.Ice the area at least three times a day (20 minutes per session).  3.Elevate the extremity above the level of the heart to help reduce swelling.  4.Pain medication can be taken every four to six hours as needed. It is helpful to take pain medication prior to physical therapy.  Use Tylenol or anti-inflammatories for pain as much as possible.  Pain medication is for pain not responding to over-the-counter medications only.  5.Any activity that requires precise thinking or accuracy should be avoided for a minimum of 72 hours after surgery and while on narcotic pain medication. This includes operating machinery and/or driving a vehicle.  6.All sutures/staples will be removed approximately 14 days from the time of surgery. Leave steri-strips (skin tapes) in place until sutures are removed.  7. If skin glue is used instead of stitches, do not apply ointments or solutions to the incision. Keep the incision dry. The skin glue will peel off in 3-4 weeks.  8. Change dressing on the 2nd post-op day. Use gauze for the first 3 days, then start using Band-Aids over the incision sites.   9. All casts, splints, braces, slings, crutches, abduction pillows, etc... Are to be worn as instructed. Use sling at all times except for showering and exercises.  10. Keep the incision dry for 10-14 days. A waterproof dressing (purchase at [employer], [employer], etc) can be used to shower. No bath, pool, hot tub until instructed.  11. Start PT in 14 days. Call office to help with scheduling.  12. Call [phone] with any questions or concerns.

## 2024-09-09 ENCOUNTER — PATIENT MESSAGE (OUTPATIENT)
Dept: ORTHOPEDICS | Facility: CLINIC | Age: 54
End: 2024-09-09
Payer: MEDICAID

## 2024-09-09 VITALS
WEIGHT: 220 LBS | DIASTOLIC BLOOD PRESSURE: 80 MMHG | SYSTOLIC BLOOD PRESSURE: 138 MMHG | HEIGHT: 70 IN | OXYGEN SATURATION: 98 % | RESPIRATION RATE: 20 BRPM | HEART RATE: 52 BPM | BODY MASS INDEX: 31.5 KG/M2 | TEMPERATURE: 98 F

## 2024-09-12 ENCOUNTER — OFFICE VISIT (OUTPATIENT)
Dept: NEUROSURGERY | Facility: CLINIC | Age: 54
End: 2024-09-12
Payer: MEDICAID

## 2024-09-12 ENCOUNTER — PATIENT MESSAGE (OUTPATIENT)
Dept: ORTHOPEDICS | Facility: CLINIC | Age: 54
End: 2024-09-12
Payer: MEDICAID

## 2024-09-12 VITALS
DIASTOLIC BLOOD PRESSURE: 83 MMHG | SYSTOLIC BLOOD PRESSURE: 124 MMHG | HEART RATE: 51 BPM | BODY MASS INDEX: 31.5 KG/M2 | WEIGHT: 220 LBS | HEIGHT: 70 IN

## 2024-09-12 DIAGNOSIS — G51.0 FACIAL NERVE PALSY, SECONDARY: ICD-10-CM

## 2024-09-12 DIAGNOSIS — G51.0 BELL'S PALSY: Primary | ICD-10-CM

## 2024-09-12 DIAGNOSIS — S46.011A TRAUMATIC COMPLETE TEAR OF RIGHT ROTATOR CUFF, INITIAL ENCOUNTER: Primary | ICD-10-CM

## 2024-09-12 PROCEDURE — 3074F SYST BP LT 130 MM HG: CPT | Mod: CPTII,,, | Performed by: NEUROLOGICAL SURGERY

## 2024-09-12 PROCEDURE — 99204 OFFICE O/P NEW MOD 45 MIN: CPT | Mod: S$PBB,,, | Performed by: NEUROLOGICAL SURGERY

## 2024-09-12 PROCEDURE — 3008F BODY MASS INDEX DOCD: CPT | Mod: CPTII,,, | Performed by: NEUROLOGICAL SURGERY

## 2024-09-12 PROCEDURE — 99999 PR PBB SHADOW E&M-EST. PATIENT-LVL IV: CPT | Mod: PBBFAC,,, | Performed by: NEUROLOGICAL SURGERY

## 2024-09-12 PROCEDURE — 3079F DIAST BP 80-89 MM HG: CPT | Mod: CPTII,,, | Performed by: NEUROLOGICAL SURGERY

## 2024-09-12 PROCEDURE — 99214 OFFICE O/P EST MOD 30 MIN: CPT | Mod: PBBFAC | Performed by: NEUROLOGICAL SURGERY

## 2024-09-12 PROCEDURE — 1160F RVW MEDS BY RX/DR IN RCRD: CPT | Mod: CPTII,,, | Performed by: NEUROLOGICAL SURGERY

## 2024-09-12 PROCEDURE — 1159F MED LIST DOCD IN RCRD: CPT | Mod: CPTII,,, | Performed by: NEUROLOGICAL SURGERY

## 2024-09-12 RX ORDER — OXYCODONE AND ACETAMINOPHEN 5; 325 MG/1; MG/1
1 TABLET ORAL EVERY 6 HOURS PRN
Qty: 14 TABLET | Refills: 0 | Status: SHIPPED | OUTPATIENT
Start: 2024-09-12

## 2024-09-12 NOTE — PROGRESS NOTES
Fabiano JulesJr., was seen in neurosurgical consultation at the office this morning.  He is a 53-year-old man who noted onset of some pain in his left ear and left facial droop on 07/01/2023.  He was seen in an emergency room and it was felt that he might have Ana Hunt syndrome although no vesicular rash was noted and he was given prednisone.  He was seen by his physician on 07/10/2023 who felt that the left facial paralysis was probably Bell's palsy.  Tegretol was prescribed.  He was then seen in neurology follow-up and was complaining of dryness and pain in his left eye being unable to close the lid.  He began to show gradual improvement in left facial function over the next few months but continued to complain of some blurriness of vision.  He was seen in neuro ophthalmological evaluation by Dr. Aguilar here on 01/18/2024 at which time he had begun to show recovery of facial motor function and it was recommended he continue with Systane eyedrops.  MRI of the brain was ordered and this was done on 06/28/2024.  He was then referred for neurosurgical evaluation.  At this point there is some residual tonic contraction of the left side of the face but the eyelid does close and he has noted some tears.  More recently he suffered a rotator cuff injury to the right shoulder and has undergone surgery for this.  He works as an .  Past medical history includes hypertension treated with Norvasc.    On physical examination he is a well-developed, well-nourished black male who is alert and cooperative.  Examination of the head shows no tenderness over the scalp.  Eyes show full extraocular movements without nystagmus.  Pupils are equal reactive to light and fundi show clear disc margins.  He feels vision in the left eye is a little blurry.  The cornea appears clear and there is no injection of the sclera today.  Hearing is about equal to tuning fork but midline tuning fork referred mildly to the right.  He is moving  the neck freely.  On neurological examination is speaking clearly and provides a good history.  The right upper extremity is in a sling.  He stood and walked without difficulty.  Cranial nerves otherwise show generally intact sensation on the face but a little tonic contracture and mild weakness of the left side of the face.  He is able to close the eye completely.  Facial sensation is equal to touch.  The tongue protrudes in midline.  He shows good strength extremities, normal sensation and brisk symmetrical deep tendon reflexes.    MRI of the brain was performed at [facility] on 06/28/2024.  Ventricular size is normal.  T2 space studies show some enlarged perivascular spaces in the sharon and mesencephalon.  These are not near the origins of the cranial nerves.  There is a fairly prominent loop of PICA that comes up below the CN VII and VIII complex but does not actually touch it.  The brain is otherwise unremarkable.    Impression:  Left Bell's palsy.      Recommendation:  He seems to be recovering reasonably well from the Bell's palsy and no specific neurosurgical intervention is indicated.  He will follow-up with Neuro-Ophthalmology.

## 2024-09-19 ENCOUNTER — OFFICE VISIT (OUTPATIENT)
Dept: ORTHOPEDICS | Facility: CLINIC | Age: 54
End: 2024-09-19
Payer: MEDICAID

## 2024-09-19 VITALS — HEIGHT: 70 IN | WEIGHT: 220 LBS | BODY MASS INDEX: 31.5 KG/M2

## 2024-09-19 DIAGNOSIS — S46.011A TRAUMATIC COMPLETE TEAR OF RIGHT ROTATOR CUFF, INITIAL ENCOUNTER: Primary | ICD-10-CM

## 2024-09-19 PROCEDURE — 99999 PR PBB SHADOW E&M-EST. PATIENT-LVL II: CPT | Mod: PBBFAC,,, | Performed by: ORTHOPAEDIC SURGERY

## 2024-09-19 PROCEDURE — 1159F MED LIST DOCD IN RCRD: CPT | Mod: CPTII,,, | Performed by: ORTHOPAEDIC SURGERY

## 2024-09-19 PROCEDURE — 1160F RVW MEDS BY RX/DR IN RCRD: CPT | Mod: CPTII,,, | Performed by: ORTHOPAEDIC SURGERY

## 2024-09-19 PROCEDURE — 99212 OFFICE O/P EST SF 10 MIN: CPT | Mod: PBBFAC,PO | Performed by: ORTHOPAEDIC SURGERY

## 2024-09-19 PROCEDURE — 99024 POSTOP FOLLOW-UP VISIT: CPT | Mod: ,,, | Performed by: ORTHOPAEDIC SURGERY

## 2024-09-19 NOTE — PROGRESS NOTES
Past Medical History:   Diagnosis Date    Arthritis     Back injury     Colon polyp     COVID     CPAP (continuous positive airway pressure) dependence     NOT NEEDED AFTER WEIGHT LOSS SURGERY    General anesthetics causing adverse effect in therapeutic use     WOKE UP CONFUSED X 1    Hypertension     PONV (postoperative nausea and vomiting)     Sleep apnea     c pap machine used  - NOT NEEDED AFTER GASTRIC SLEEVE    Wears glasses        Past Surgical History:   Procedure Laterality Date    ARTHROSCOPIC DEBRIDEMENT OF SHOULDER Right 9/6/2024    Procedure: EXTENSIVE DEBRIDEMENT, SHOULDER, ARTHROSCOPIC;  Surgeon: Darrion Ricks MD;  Location: [facility] OR;  Service: Orthopedics;  Laterality: Right;    ARTHROSCOPIC REPAIR OF ROTATOR CUFF OF SHOULDER Right 9/6/2024    Procedure: REPAIR, ROTATOR CUFF, ARTHROSCOPIC;  Surgeon: Darrion Ricks MD;  Location: [facility] OR;  Service: Orthopedics;  Laterality: Right;  arthrex-cuffmend/SCR stuff  Hood w/Arthrex notified 8/30/24 ark    ARTHROSCOPY OF SHOULDER WITH DECOMPRESSION OF SUBACROMIAL SPACE Right 9/6/2024    Procedure: ARTHROSCOPY, SHOULDER, WITH SUBACROMIAL SPACE DECOMPRESSION;  Surgeon: Darrion Ricks MD;  Location: [facility] OR;  Service: Orthopedics;  Laterality: Right;    ARTHROSCOPY,SHOULDER,WITH BICEPS TENODESIS Right 9/6/2024    Procedure: ARTHROSCOPY,SHOULDER,WITH BICEPS TENODESIS;  Surgeon: Darrion Ricks MD;  Location: [facility] OR;  Service: Orthopedics;  Laterality: Right;    BONE GRAFT N/A 04/05/2022    Procedure: BONE GRAFT;  Surgeon: Duran Adler Jr., MD;  Location: [facility] OR;  Service: Orthopedics;  Laterality: N/A;    CIRCUMCISION      COLONOSCOPY N/A 12/03/2018    Procedure: COLONOSCOPY;  Surgeon: CHRISSY Reyna MD;  Location: [facility] ENDO (4TH FLR);  Service: Endoscopy;  Laterality: N/A;    COLONOSCOPY N/A 09/01/2022    Procedure: COLONOSCOPY;  Surgeon: Alondra Arias MD;  Location: [facility];  Service: Endoscopy;   Laterality: N/A;    epidural steroid injection X 2      lower lumbar    ESOPHAGOGASTRODUODENOSCOPY N/A 09/01/2022    Procedure: EGD (ESOPHAGOGASTRODUODENOSCOPY);  Surgeon: Alondra Arias MD;  Location: [facility] ENDO;  Service: Endoscopy;  Laterality: N/A;    facet injection       [facility] [place]     INSERTION OF IMPLANTABLE LOOP RECORDER N/A 9/15/2023    Procedure: Insertion, Implantable Loop Recorder;  Surgeon: Romario Wallace MD;  Location: [facility] CATH/EP LAB;  Service: Cardiology;  Laterality: N/A;    INTRALUMINAL GASTROINTESTINAL TRACT IMAGING VIA CAPSULE N/A 10/03/2022    Procedure: IMAGING PROCEDURE, GI TRACT, INTRALUMINAL, VIA CAPSULE;  Surgeon: Alondra Arias MD;  Location: [facility] ENDO;  Service: Endoscopy;  Laterality: N/A;    LAPAROSCOPIC SLEEVE GASTRECTOMY N/A 12/19/2022    Procedure: GASTRECTOMY, SLEEVE, LAPAROSCOPIC;  Surgeon: Lashonda Pinedo MD;  Location: [facility] OR;  Service: General;  Laterality: N/A;    LIPOMA RESECTION      Back of neck    MAGNETIC RESONANCE IMAGING N/A 05/29/2020    Procedure: MRI (MAGNETIC RESONANCE IMAGING) LUMBAR SPINE;  Surgeon: [facility] Diagnostic Provider;  Location: [facility];  Service: General;  Laterality: N/A;  COVID NEG    MAGNETIC RESONANCE IMAGING N/A 12/03/2021    Procedure: MRI (MAGNETIC RESONANCE IMAGING), LUMBAR SPINE;  Surgeon: [facility] Diagnostic Provider;  Location: [facility];  Service: General;  Laterality: N/A;  In MRI @ 7:50 per Ariela    MAGNETIC RESONANCE IMAGING N/A 3/6/2024    Procedure: MRI (Magnetic Resonance Imagine);  Surgeon: Vicenta Michele;  Location: [facility];  Service: Anesthesiology;  Laterality: N/A;    MAGNETIC RESONANCE IMAGING Bilateral 6/28/2024    Procedure: MRI (Magnetic Resonance Imagine);  Surgeon: Vicenta Michele;  Location: [facility];  Service: Anesthesiology;  Laterality: Bilateral;  MRI BRAIN WITH AND WITHOUT CONTRAST    MEDIAL COLLATERAL LIGAMENT AND LATERAL COLLATERAL LIGAMENT REPAIR, KNEE Right  09/01/2018    Dr. Christophe Gore in [place]     MINIMALLY INVASIVE TRANSFORAMINAL LUMBAR INTERBODY FUSION (TLIF) N/A 04/05/2022    Procedure: FUSION, SPINE, LUMBAR, TLIF, MINIMALLY INVASIVE, WITH INSTRUMENTATION,  L4/5 RIGHT;  Surgeon: Duran Adler Jr., MD;  Location: [facility] OR;  Service: Orthopedics;  Laterality: N/A;  10:30am per Tamela    MYELOGRAPHY N/A 06/23/2020    Procedure: MYELOGRAM;  Surgeon: Andi Diagnostic Provider;  Location: [facility] OR;  Service: General;  Laterality: N/A;       Current Outpatient Medications   Medication Sig    acetaminophen (TYLENOL) 500 MG tablet Take 2 tablets (1,000 mg total) by mouth every 8 (eight) hours as needed for Pain.    amLODIPine (NORVASC) 5 MG tablet Take 1 tablet (5 mg total) by mouth once daily.    aspirin (ECOTRIN) 81 MG EC tablet Take 81 mg by mouth once daily.    b complex vitamins (B COMPLEX-VITAMIN B12) tablet Take 1 tablet by mouth once daily.    calcium citrate (CALCITRATE) 200 mg (950 mg) tablet Take 1 tablet by mouth 2 (two) times daily.    EPINEPHrine (EPIPEN) 0.3 mg/0.3 mL AtIn Inject into the muscle once for one dose as needed.    meloxicam (MOBIC) 15 MG tablet Take 1 tablet (15 mg total) by mouth once daily. (Patient not taking: Reported on 9/12/2024)    multivitamin (THERAGRAN) per tablet Take 1 tablet by mouth once daily.    ondansetron (ZOFRAN) 4 MG tablet Take 1 tablet (4 mg total) by mouth every 6 (six) hours as needed for Nausea.    oxyCODONE-acetaminophen (PERCOCET) 5-325 mg per tablet Take 1 tablet by mouth every 6 (six) hours as needed for Pain.    rosuvastatin (CRESTOR) 10 MG tablet Take 1 tablet (10 mg total) by mouth every evening. (Patient taking differently: Take 10 mg by mouth once daily.)     No current facility-administered medications for this visit.     Facility-Administered Medications Ordered in Other Visits   Medication    ceFAZolin in dextrose (iso-os) 2 gram/100 mL PgBk 2,000 mg    HYDROmorphone injection 1 mg     oxyCODONE-acetaminophen  mg per tablet 1 tablet    polyethylene glycol packet 17 g       Review of patient's allergies indicates:   Allergen Reactions    Peaches [peach (prunus persica)] Anaphylaxis    Peanut Anaphylaxis    Tree pollen-red birch Anaphylaxis       Family History   Problem Relation Name Age of Onset    Hypertension Mother      Diabetes Father      Cancer Father          mesotheliosma     Cataracts Neg Hx      Glaucoma Neg Hx      Macular degeneration Neg Hx      Retinal detachment Neg Hx      Colon cancer Neg Hx      Colon polyps Neg Hx      Crohn's disease Neg Hx      Ulcerative colitis Neg Hx         Social History     Socioeconomic History    Marital status:     Number of children: 4   Occupational History     Employer: [employer]   Tobacco Use    Smoking status: Former     Current packs/day: 0.00     Average packs/day: 0.5 packs/day for 10.0 years (5.0 ttl pk-yrs)     Types: Cigarettes     Start date: 1/1/1999     Quit date: 1/1/2009     Years since quitting: 15.7    Smokeless tobacco: Never    Tobacco comments:     quit smoking in 2010   Substance and Sexual Activity    Alcohol use: Yes     Comment: rarely    Drug use: Not Currently     Types: Marijuana     Comment: medical---oil, smoke and edibles    Sexual activity: Not Currently     Social Determinants of Health     Financial Resource Strain: Low Risk  (1/17/2024)    Overall Financial Resource Strain (CARDIA)     Difficulty of Paying Living Expenses: Not hard at all   Food Insecurity: No Food Insecurity (1/17/2024)    Hunger Vital Sign     Worried About Running Out of Food in the Last Year: Never true     Ran Out of Food in the Last Year: Never true   Transportation Needs: No Transportation Needs (1/17/2024)    PRAPARE - Transportation     Lack of Transportation (Medical): No     Lack of Transportation (Non-Medical): No   Physical Activity: Sufficiently Active (1/17/2024)    Exercise Vital Sign     Days of Exercise per Week: 4  days     Minutes of Exercise per Session: 120 min   Recent Concern: Physical Activity - Insufficiently Active (11/1/2023)    Exercise Vital Sign     Days of Exercise per Week: 2 days     Minutes of Exercise per Session: 60 min   Stress: Stress Concern Present (1/17/2024)    [employer] - Occupational Stress Questionnaire     Feeling of Stress : To some extent   Housing Stability: High Risk (1/17/2024)    Housing Stability Vital Sign     Unable to Pay for Housing in the Last Year: Yes     Number of Places Lived in the Last Year: 1     Unstable Housing in the Last Year: No       Chief Complaint:   Chief Complaint   Patient presents with    Post-op Evaluation     R RCR- 2 wk post  op        Date of surgery:  September 6, 2024    History of present illness:  54-year-old male underwent right arthroscopic rotator cuff repair with Regeneten patch augmentation as well as biceps tenodesis.  Patient is doing pretty well.  Pain is a 7/10.  Past some soreness and tightness.  Compliant with the sling      Review of Systems:    Musculoskeletal:  See HPI        Physical Examination:    Vital Signs:  There were no vitals filed for this visit.    Body mass index is 31.57 kg/m².    This a well-developed, well nourished patient in no acute distress.  They are alert and oriented and cooperative to examination.  Pt. walks without an antalgic gait.      Examination of the right shoulder shows well-healing surgical portals.  No erythema drainage.  Patient is in the sling.  Neurovascularly intact.    X-rays:  None     Assessment::  Status post right rotator cuff repair with Regeneten patch augmentation and biceps tenodesis    Plan:  I reviewed the findings with him today.  Reviewed the arthroscopic images.  Gave him a pendulum and table slide handout to start.  Continue the sling.  Follow up in a month.    This note was created using [employer] voice recognition software that occasionally misinterpreted phrases or  words.

## 2024-09-22 ENCOUNTER — PATIENT MESSAGE (OUTPATIENT)
Dept: ORTHOPEDICS | Facility: CLINIC | Age: 54
End: 2024-09-22
Payer: MEDICAID

## 2024-10-17 ENCOUNTER — OFFICE VISIT (OUTPATIENT)
Dept: ORTHOPEDICS | Facility: CLINIC | Age: 54
End: 2024-10-17
Payer: MEDICAID

## 2024-10-17 DIAGNOSIS — S46.011D TRAUMATIC COMPLETE TEAR OF RIGHT ROTATOR CUFF, SUBSEQUENT ENCOUNTER: Primary | ICD-10-CM

## 2024-10-17 PROCEDURE — 99211 OFF/OP EST MAY X REQ PHY/QHP: CPT | Mod: PBBFAC,PO | Performed by: ORTHOPAEDIC SURGERY

## 2024-10-17 PROCEDURE — 99999 PR PBB SHADOW E&M-EST. PATIENT-LVL I: CPT | Mod: PBBFAC,,, | Performed by: ORTHOPAEDIC SURGERY

## 2024-10-17 RX ORDER — ACETAMINOPHEN 500 MG
1000 TABLET ORAL EVERY 8 HOURS PRN
Qty: 30 TABLET | Refills: 0 | Status: SHIPPED | OUTPATIENT
Start: 2024-10-17

## 2024-10-17 NOTE — PROGRESS NOTES
Past Medical History:   Diagnosis Date    Arthritis     Back injury     Colon polyp     COVID     CPAP (continuous positive airway pressure) dependence     NOT NEEDED AFTER WEIGHT LOSS SURGERY    General anesthetics causing adverse effect in therapeutic use     WOKE UP CONFUSED X 1    Hypertension     PONV (postoperative nausea and vomiting)     Sleep apnea     c pap machine used  - NOT NEEDED AFTER GASTRIC SLEEVE    Wears glasses        Past Surgical History:   Procedure Laterality Date    ARTHROSCOPIC DEBRIDEMENT OF SHOULDER Right 9/6/2024    Procedure: EXTENSIVE DEBRIDEMENT, SHOULDER, ARTHROSCOPIC;  Surgeon: Darrion Ricks MD;  Location: [facility] OR;  Service: Orthopedics;  Laterality: Right;    ARTHROSCOPIC REPAIR OF ROTATOR CUFF OF SHOULDER Right 9/6/2024    Procedure: REPAIR, ROTATOR CUFF, ARTHROSCOPIC;  Surgeon: Darrion Ricks MD;  Location: [facility] OR;  Service: Orthopedics;  Laterality: Right;  arthrex-cuffmend/SCR stuff  Hood w/Arthrex notified 8/30/24 ark    ARTHROSCOPY OF SHOULDER WITH DECOMPRESSION OF SUBACROMIAL SPACE Right 9/6/2024    Procedure: ARTHROSCOPY, SHOULDER, WITH SUBACROMIAL SPACE DECOMPRESSION;  Surgeon: Darrion Ricks MD;  Location: [facility] OR;  Service: Orthopedics;  Laterality: Right;    ARTHROSCOPY,SHOULDER,WITH BICEPS TENODESIS Right 9/6/2024    Procedure: ARTHROSCOPY,SHOULDER,WITH BICEPS TENODESIS;  Surgeon: Darrion Ricks MD;  Location: [facility] OR;  Service: Orthopedics;  Laterality: Right;    BONE GRAFT N/A 04/05/2022    Procedure: BONE GRAFT;  Surgeon: Duran Adler Jr., MD;  Location: [facility] OR;  Service: Orthopedics;  Laterality: N/A;    CIRCUMCISION      COLONOSCOPY N/A 12/03/2018    Procedure: COLONOSCOPY;  Surgeon: CHRISSY Reyna MD;  Location: [facility] ENDO (4TH FLR);  Service: Endoscopy;  Laterality: N/A;    COLONOSCOPY N/A 09/01/2022    Procedure: COLONOSCOPY;  Surgeon: Alondra Arias MD;  Location: [facility];  Service: Endoscopy;   Laterality: N/A;    epidural steroid injection X 2      lower lumbar    ESOPHAGOGASTRODUODENOSCOPY N/A 09/01/2022    Procedure: EGD (ESOPHAGOGASTRODUODENOSCOPY);  Surgeon: Alondra Arias MD;  Location: [facility] ENDO;  Service: Endoscopy;  Laterality: N/A;    facet injection       [facility] [place]     INSERTION OF IMPLANTABLE LOOP RECORDER N/A 9/15/2023    Procedure: Insertion, Implantable Loop Recorder;  Surgeon: Romario Wallace MD;  Location: [facility] CATH/EP LAB;  Service: Cardiology;  Laterality: N/A;    INTRALUMINAL GASTROINTESTINAL TRACT IMAGING VIA CAPSULE N/A 10/03/2022    Procedure: IMAGING PROCEDURE, GI TRACT, INTRALUMINAL, VIA CAPSULE;  Surgeon: Alondra Arias MD;  Location: [facility] ENDO;  Service: Endoscopy;  Laterality: N/A;    LAPAROSCOPIC SLEEVE GASTRECTOMY N/A 12/19/2022    Procedure: GASTRECTOMY, SLEEVE, LAPAROSCOPIC;  Surgeon: Lashonda Pinedo MD;  Location: [facility] OR;  Service: General;  Laterality: N/A;    LIPOMA RESECTION      Back of neck    MAGNETIC RESONANCE IMAGING N/A 05/29/2020    Procedure: MRI (MAGNETIC RESONANCE IMAGING) LUMBAR SPINE;  Surgeon: [facility] Diagnostic Provider;  Location: [facility];  Service: General;  Laterality: N/A;  COVID NEG    MAGNETIC RESONANCE IMAGING N/A 12/03/2021    Procedure: MRI (MAGNETIC RESONANCE IMAGING), LUMBAR SPINE;  Surgeon: [facility] Diagnostic Provider;  Location: [facility];  Service: General;  Laterality: N/A;  In MRI @ 7:50 per Ariela    MAGNETIC RESONANCE IMAGING N/A 3/6/2024    Procedure: MRI (Magnetic Resonance Imagine);  Surgeon: Vicenta Michele;  Location: [facility];  Service: Anesthesiology;  Laterality: N/A;    MAGNETIC RESONANCE IMAGING Bilateral 6/28/2024    Procedure: MRI (Magnetic Resonance Imagine);  Surgeon: Vicenta Michele;  Location: [facility];  Service: Anesthesiology;  Laterality: Bilateral;  MRI BRAIN WITH AND WITHOUT CONTRAST    MEDIAL COLLATERAL LIGAMENT AND LATERAL COLLATERAL LIGAMENT REPAIR, KNEE Right  09/01/2018    Dr. Christophe Gore in [place]     MINIMALLY INVASIVE TRANSFORAMINAL LUMBAR INTERBODY FUSION (TLIF) N/A 04/05/2022    Procedure: FUSION, SPINE, LUMBAR, TLIF, MINIMALLY INVASIVE, WITH INSTRUMENTATION,  L4/5 RIGHT;  Surgeon: Duran Adler Jr., MD;  Location: [facility] OR;  Service: Orthopedics;  Laterality: N/A;  10:30am per Tamela    MYELOGRAPHY N/A 06/23/2020    Procedure: MYELOGRAM;  Surgeon: Andi Diagnostic Provider;  Location: [facility] OR;  Service: General;  Laterality: N/A;       Current Outpatient Medications   Medication Sig    acetaminophen (TYLENOL) 500 MG tablet Take 2 tablets (1,000 mg total) by mouth every 8 (eight) hours as needed for Pain.    amLODIPine (NORVASC) 5 MG tablet Take 1 tablet (5 mg total) by mouth once daily.    aspirin (ECOTRIN) 81 MG EC tablet Take 81 mg by mouth once daily.    b complex vitamins (B COMPLEX-VITAMIN B12) tablet Take 1 tablet by mouth once daily.    calcium citrate (CALCITRATE) 200 mg (950 mg) tablet Take 1 tablet by mouth 2 (two) times daily.    EPINEPHrine (EPIPEN) 0.3 mg/0.3 mL AtIn Inject into the muscle once for one dose as needed.    multivitamin (THERAGRAN) per tablet Take 1 tablet by mouth once daily.    ondansetron (ZOFRAN) 4 MG tablet Take 1 tablet (4 mg total) by mouth every 6 (six) hours as needed for Nausea.    oxyCODONE-acetaminophen (PERCOCET) 5-325 mg per tablet Take 1 tablet by mouth every 6 (six) hours as needed for Pain.    rosuvastatin (CRESTOR) 10 MG tablet Take 1 tablet (10 mg total) by mouth every evening. (Patient taking differently: Take 10 mg by mouth once daily.)     No current facility-administered medications for this visit.     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    Peanut Anaphylaxis    Tree pollen-red birch Anaphylaxis       Family History   Problem Relation Name Age of Onset    Hypertension Mother      Diabetes Father      Cancer Father          mesotheliosma     Cataracts Neg Hx      Glaucoma Neg Hx      Macular degeneration Neg Hx      Retinal detachment Neg Hx      Colon cancer Neg Hx      Colon polyps Neg Hx      Crohn's disease Neg Hx      Ulcerative colitis Neg Hx         Social History     Socioeconomic History    Marital status:     Number of children: 4   Occupational History     Employer: [employer]   Tobacco Use    Smoking status: Former     Current packs/day: 0.00     Average packs/day: 0.5 packs/day for 10.0 years (5.0 ttl pk-yrs)     Types: Cigarettes     Start date: 1/1/1999     Quit date: 1/1/2009     Years since quitting: 15.8    Smokeless tobacco: Never    Tobacco comments:     quit smoking in 2010   Substance and Sexual Activity    Alcohol use: Yes     Comment: rarely    Drug use: Not Currently     Types: Marijuana     Comment: medical---oil, smoke and edibles    Sexual activity: Not Currently     Social Drivers of Health     Financial Resource Strain: Low Risk  (1/17/2024)    Overall Financial Resource Strain (CARDIA)     Difficulty of Paying Living Expenses: Not hard at all   Food Insecurity: No Food Insecurity (1/17/2024)    Hunger Vital Sign     Worried About Running Out of Food in the Last Year: Never true     Ran Out of Food in the Last Year: Never true   Transportation Needs: No Transportation Needs (1/17/2024)    PRAPARE - Transportation     Lack of Transportation (Medical): No     Lack of Transportation (Non-Medical): No   Physical Activity: Sufficiently Active (1/17/2024)    Exercise Vital Sign     Days of Exercise per Week: 4 days     Minutes of Exercise per Session: 120 min   Recent Concern: Physical Activity - Insufficiently Active (11/1/2023)    Exercise Vital Sign     Days  of Exercise per Week: 2 days     Minutes of Exercise per Session: 60 min   Stress: Stress Concern Present (1/17/2024)    [employer] [place] [employer] - Occupational Stress Questionnaire     Feeling of Stress : To some extent   Housing Stability: High Risk (1/17/2024)    Housing Stability Vital Sign     Unable to Pay for Housing in the Last Year: Yes     Number of Places Lived in the Last Year: 1     Unstable Housing in the Last Year: No       Chief Complaint:   No chief complaint on file.      Date of surgery:  September 6, 2024    History of present illness:  54-year-old male underwent right arthroscopic rotator cuff repair with Regeneten patch augmentation as well as biceps tenodesis.  Patient is doing pretty well.  He has been compliant with the sling.  Little more pain today as he had to changes tire.      Review of Systems:    Musculoskeletal:  See HPI        Physical Examination:    Vital Signs:  There were no vitals filed for this visit.    There is no height or weight on file to calculate BMI.    This a well-developed, well nourished patient in no acute distress.  They are alert and oriented and cooperative to examination.  Pt. walks without an antalgic gait.      Examination of the right shoulder shows well-healed surgical portals.  Patient has decent range of motion with forward flexion of about 140°.  Neurovascularly intact.    X-rays:  None     Assessment::  Status post right rotator cuff repair with Regeneten patch augmentation and biceps tenodesis    Plan:  I reviewed the findings with him today.  Patient may stop the sling altogether now.  We will get him started with some formal physical therapy for rotator cuff repair protocol.  I will see him back in 6 weeks.  Advised him to be very careful with this still.    This note was created using [employer] voice recognition software that occasionally misinterpreted phrases or words.

## 2024-10-18 ENCOUNTER — PATIENT MESSAGE (OUTPATIENT)
Dept: ORTHOPEDICS | Facility: CLINIC | Age: 54
End: 2024-10-18
Payer: MEDICAID

## 2024-10-23 ENCOUNTER — CLINICAL SUPPORT (OUTPATIENT)
Dept: REHABILITATION | Facility: HOSPITAL | Age: 54
End: 2024-10-23
Attending: ORTHOPAEDIC SURGERY
Payer: MEDICAID

## 2024-10-23 DIAGNOSIS — S46.011D TRAUMATIC COMPLETE TEAR OF RIGHT ROTATOR CUFF, SUBSEQUENT ENCOUNTER: ICD-10-CM

## 2024-10-23 DIAGNOSIS — M25.60 RANGE OF MOTION DEFICIT: Primary | ICD-10-CM

## 2024-10-23 DIAGNOSIS — R53.1 STRENGTH LOSS OF: ICD-10-CM

## 2024-10-23 PROCEDURE — 97161 PT EVAL LOW COMPLEX 20 MIN: CPT | Mod: PN
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
## 2024-10-28 ENCOUNTER — CLINICAL SUPPORT (OUTPATIENT)
Dept: REHABILITATION | Facility: HOSPITAL | Age: 54
End: 2024-10-28
Payer: MEDICAID

## 2024-10-28 DIAGNOSIS — M25.60 RANGE OF MOTION DEFICIT: Primary | ICD-10-CM

## 2024-10-28 DIAGNOSIS — R53.1 STRENGTH LOSS OF: ICD-10-CM

## 2024-10-28 PROCEDURE — 97110 THERAPEUTIC EXERCISES: CPT | Mod: PN,CQ

## 2024-10-30 ENCOUNTER — CLINICAL SUPPORT (OUTPATIENT)
Dept: REHABILITATION | Facility: HOSPITAL | Age: 54
End: 2024-10-30
Payer: MEDICAID

## 2024-10-30 DIAGNOSIS — R53.1 STRENGTH LOSS OF: ICD-10-CM

## 2024-10-30 DIAGNOSIS — M25.60 RANGE OF MOTION DEFICIT: Primary | ICD-10-CM

## 2024-10-30 PROCEDURE — 97110 THERAPEUTIC EXERCISES: CPT | Mod: PN

## 2024-11-04 ENCOUNTER — CLINICAL SUPPORT (OUTPATIENT)
Dept: REHABILITATION | Facility: HOSPITAL | Age: 54
End: 2024-11-04
Payer: MEDICAID

## 2024-11-04 DIAGNOSIS — M25.60 RANGE OF MOTION DEFICIT: Primary | ICD-10-CM

## 2024-11-04 DIAGNOSIS — R53.1 STRENGTH LOSS OF: ICD-10-CM

## 2024-11-04 PROCEDURE — 97110 THERAPEUTIC EXERCISES: CPT | Mod: PN,CQ
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
## 2024-11-05 ENCOUNTER — OCCUPATIONAL HEALTH (OUTPATIENT)
Dept: URGENT CARE | Facility: CLINIC | Age: 54
End: 2024-11-05

## 2024-11-05 PROCEDURE — 80305 DRUG TEST PRSMV DIR OPT OBS: CPT | Mod: S$GLB,,, | Performed by: EMERGENCY MEDICINE

## 2024-11-06 ENCOUNTER — CLINICAL SUPPORT (OUTPATIENT)
Dept: REHABILITATION | Facility: HOSPITAL | Age: 54
End: 2024-11-06
Payer: MEDICAID

## 2024-11-06 DIAGNOSIS — M25.60 RANGE OF MOTION DEFICIT: Primary | ICD-10-CM

## 2024-11-06 DIAGNOSIS — R53.1 STRENGTH LOSS OF: ICD-10-CM

## 2024-11-06 PROCEDURE — 97110 THERAPEUTIC EXERCISES: CPT | Mod: PN,CQ

## 2024-11-06 NOTE — PROGRESS NOTES
"[facility]   Physical Therapy Treatment Note      Name: Fabiano Jules Jr.  Clinic Number: [id number]    Therapy Diagnosis:   Encounter Diagnoses   Name Primary?    Range of motion deficit Yes    Strength loss of        Physician: Darrion Ricks,*    Visit Date: 11/6/2024      Physician Orders: PT Eval and treat   Medical Diagnosis from Referral:   Diagnosis   S46.011D (ICD-10-CM) - Traumatic complete tear of right rotator cuff, subsequent encounter      Evaluation Date: 10/23/2024  Authorization Period Expiration:     12/31/2024      Plan of Care Expiration: 1/15/2025  Progress Note Due: 11/23/2024  Visit # / Visits authorized: 4/ 20   FOTO: 1/ 3     Precautions: Standard      Time In: 1300 pm  Time Out: 1345 pm  Total Billable Time: 40 minutes     Date of Procedure: 9/6/2024   Post Op Day: 7 weeks and 5 days as of 10/30  Procedure: Procedure(s) (LRB):  REPAIR, ROTATOR CUFF, ARTHROSCOPIC, WITH BIOINDUCTIVE IMPLANT (Right)  EXTENSIVE DEBRIDEMENT, SHOULDER, ARTHROSCOPIC (Right)  ARTHROSCOPY, SHOULDER, WITH SUBACROMIAL SPACE DECOMPRESSION (Right)  ARTHROSCOPY,SHOULDER,WITH BICEPS TENODESIS (Right)     Surgeons and Role:     * Darrion Ricks MD - Primary     From MD on 10/17: "Plan:  I reviewed the findings with him today.  Patient may stop the sling altogether now.  We will get him started with some formal physical therapy for rotator cuff repair protocol.  I will see him back in 6 weeks.  Advised him to be very careful with this still."    PTA Visit #: 2/5       Subjective     Patient reports: His periscapular area is still sore but improving. Used ice packs and his sling to help mitigate his pain.     He was compliant with home exercise program.  Response to previous treatment: as noted  Functional change: as noted     Pain: 5/10  Location: right shoulder      Objective      Objective Measures updated at progress report unless specified.     Treatment     8 weeks and 5 days " post op as of 11/6/24        Fabiano received the treatments listed below:      therapeutic exercises to develop strength, endurance, ROM, and flexibility for 30 minutes including:    Supine AAROM flexion 3 x 10 repetitions    Side Lying External Rotation x 30  Supine external rotation stretch 5 seconds holds x 3 minutes   Prone scapular setting 5 seconds holds x 3 minutes   Prone row to neutral 3 x 10 repetitions   Wall slides with pillow case 3 x 10 repetitions   Shoulder walk outs internal rotation and external rotation 3 x 10 repetitions   Seated shoulder flexion to 90 with t-bar 3 x 10 repetitions   Flexion slides on stair rail x 3 minutes  Scaption slides on stair rail x 3 minutes     Not Performed Today:  Sidelying flexion with dowel x 3 minutes  Shoulder isometrics flexion, abduction, extension 5 seconds holds x 10 repetitions     manual therapy techniques: Joint mobilizations were applied to the: right shoulder for 10 minutes, including:    Inferior and posterior joint mobilizations grade II  Thoracic PA mobs grade III     Not Performed Today:  Manual pendulums followed by PROM    Patient Education and Home Exercises       Education provided:   - Home Exercise Program     Written Home Exercises Provided: Yes. Exercises were reviewed and Fabiano was able to demonstrate them prior to the end of the session.  Fabiano demonstrated good  understanding of the education provided. See Electronic Medical Record under Patient Instructions for exercises provided during therapy sessions    Assessment     Fabiano noted with good tolerance to treatment with no increase in pain. Continued focus on strength within tolerance. Will progress as able.     Fabiano Is progressing well towards his goals.   Patient prognosis is Good.     Patient will continue to benefit from skilled outpatient physical therapy to address the deficits listed in the problem list box on initial evaluation, provide pt/family education and to maximize pt's level of  independence in the home and community environment.     Patient's spiritual, cultural and educational needs considered and pt agreeable to plan of care and goals.     Anticipated barriers to physical therapy: co-morbidities, chronic pain     Goals:     Short Term Goals (6 Weeks)  -Progressing, not met   Patient will be independent in Home Exercise Program to support improving range of motion.  Patient will have improved Passive Range of Motion to equal the uninvolved side.  Patient will have improved Active Range of Motion to 120 of flexion.      Long Term Goals (12 Weeks) -Progressing, not met   Patient will be independent in progressed Home Exercise Program to support improving functional strength.  Patient goal: return to work and to the gym.  Patient will have improved FOTO score to predicted level to demonstrate true functional improvements.   Patient will have improved Active Range of Motion and Passive Range of Motion to equal the uninvolved side.     Plan     Progress as per POC.     Mandy Cowan, PTA

## 2024-11-06 NOTE — PROGRESS NOTES
"[facility]   Physical Therapy Treatment Note      Name: Fabiano Jules Jr.  Clinic Number: [id number]    Therapy Diagnosis:   No diagnosis found.      Physician: Darrion Ricks,*    Visit Date: 11/6/2024      Physician Orders: PT Eval and treat   Medical Diagnosis from Referral:   Diagnosis   S46.011D (ICD-10-CM) - Traumatic complete tear of right rotator cuff, subsequent encounter      Evaluation Date: 10/23/2024  Authorization Period Expiration:     12/31/2024      Plan of Care Expiration: 1/15/2025  Progress Note Due: 11/23/2024  Visit # / Visits authorized: 2/ 20   FOTO: 1/ 3     Precautions: Standard      Time In: 1100am  Time Out: 1140am  Total Billable Time: 40 minutes     Date of Procedure: 9/6/2024   Post Op Day: 7 weeks and 5 days as of 10/30  Procedure: Procedure(s) (LRB):  REPAIR, ROTATOR CUFF, ARTHROSCOPIC, WITH BIOINDUCTIVE IMPLANT (Right)  EXTENSIVE DEBRIDEMENT, SHOULDER, ARTHROSCOPIC (Right)  ARTHROSCOPY, SHOULDER, WITH SUBACROMIAL SPACE DECOMPRESSION (Right)  ARTHROSCOPY,SHOULDER,WITH BICEPS TENODESIS (Right)     Surgeons and Role:     * Darrion Ricks MD - Primary     From MD on 10/17: "Plan:  I reviewed the findings with him today.  Patient may stop the sling altogether now.  We will get him started with some formal physical therapy for rotator cuff repair protocol.  I will see him back in 6 weeks.  Advised him to be very careful with this still."    PTA Visit #: 1/5       Subjective     Patient reports: Thinks he over did it on Saturday. 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 Took him and 4 other guys and a few hours to accomplish. Reports he tried not to use his right upper extremity but had to at times.  His whole posterior right shoulder is hurting today. Took his pain medication and rubbed some muscle cream on it and nothing has helped as of yet.     He was compliant with home exercise program.  Response to previous treatment: as " noted  Functional change: as noted     Pain: 5/10  Location: right shoulder      Objective      Objective Measures updated at progress report unless specified.     Treatment     8 weeks and 3 days post op as of 11/4/24        Fabiano received the treatments listed below:      therapeutic exercises to develop strength, endurance, ROM, and flexibility for 30 minutes including:    Supine AAROM flexion 3 x 10 repetitions    Side Lying External Rotation x 30  Supine external rotation stretch 5 seconds holds x 3 minutes   Prone scapular setting 5 seconds holds x 3 minutes   Prone row to neutral 3 x 10 repetitions   Wall slides with pillow case 3 x 10 repetitions   Shoulder walk outs internal rotation and external rotation 3 x 10 repetitions   Seated shoulder flexion to 90 with t-bar 3 x 10 repetitions   Flexion slides on stair rail 3 x 10 repetitions   Scaption slides on stair rail 3 x 10 repetitions     Not Performed Today:  Sidelying flexion with dowel x 3 minutes  Shoulder isometrics flexion, abduction, extension 5 seconds holds x 10 repetitions     manual therapy techniques: Joint mobilizations were applied to the: right shoulder for 10 minutes, including:    Inferior and posterior joint mobilizations grade II    Not Performed Today:  Manual pendulums followed by PROM    Patient Education and Home Exercises       Education provided:   - Home Exercise Program     Written Home Exercises Provided: Yes. Exercises were reviewed and Fabiano was able to demonstrate them prior to the end of the session.  Fabiano demonstrated good  understanding of the education provided. See Electronic Medical Record under Patient Instructions for exercises provided during therapy sessions    Assessment     Fabiano arrived with increased symptoms following increased activity on Saturday. Despite increased soreness he was able to progress exercises as indicated above. Will continue to benefit from skilled Physical Therapy to maximize strength gains as  able to allow him to return to prior level of function without limitations.     Fabiano Is progressing well towards his goals.   Patient prognosis is Good.     Patient will continue to benefit from skilled outpatient physical therapy to address the deficits listed in the problem list box on initial evaluation, provide pt/family education and to maximize pt's level of independence in the home and community environment.     Patient's spiritual, cultural and educational needs considered and pt agreeable to plan of care and goals.     Anticipated barriers to physical therapy: co-morbidities, chronic pain     Goals:     Short Term Goals (6 Weeks)  -Progressing, not met   Patient will be independent in Home Exercise Program to support improving range of motion.  Patient will have improved Passive Range of Motion to equal the uninvolved side.  Patient will have improved Active Range of Motion to 120 of flexion.      Long Term Goals (12 Weeks) -Progressing, not met   Patient will be independent in progressed Home Exercise Program to support improving functional strength.  Patient goal: return to work and to the gym.  Patient will have improved FOTO score to predicted level to demonstrate true functional improvements.   Patient will have improved Active Range of Motion and Passive Range of Motion to equal the uninvolved side.     Plan     Progress as per POC.     Cindy Purvis, PT , DPT  Board Certified Clinical Specialist in Orthopedic Physical Therapy

## 2024-11-11 ENCOUNTER — TELEPHONE (OUTPATIENT)
Dept: FAMILY MEDICINE | Facility: CLINIC | Age: 54
End: 2024-11-11
Payer: MEDICAID

## 2024-11-11 NOTE — TELEPHONE ENCOUNTER
Pt's request for release of medical records per [employer] JASWANT Krishnamurthy faxed to Medical Records 1/16/24

## 2024-11-12 ENCOUNTER — CLINICAL SUPPORT (OUTPATIENT)
Dept: REHABILITATION | Facility: HOSPITAL | Age: 54
End: 2024-11-12
Payer: MEDICAID

## 2024-11-12 DIAGNOSIS — M25.60 RANGE OF MOTION DEFICIT: Primary | ICD-10-CM

## 2024-11-12 DIAGNOSIS — R53.1 STRENGTH LOSS OF: ICD-10-CM

## 2024-11-12 PROCEDURE — 97110 THERAPEUTIC EXERCISES: CPT | Mod: PN

## 2024-11-12 NOTE — PROGRESS NOTES
"[facility]   Physical Therapy Treatment Note      Name: Fabiano Jules Jr.  Clinic Number: [id number]    Therapy Diagnosis:   Encounter Diagnoses   Name Primary?    Range of motion deficit Yes    Strength loss of        Physician: Darrion Ricks,*    Visit Date: 11/12/2024      Physician Orders: PT Eval and treat   Medical Diagnosis from Referral:   Diagnosis   S46.011D (ICD-10-CM) - Traumatic complete tear of right rotator cuff, subsequent encounter      Evaluation Date: 10/23/2024  Authorization Period Expiration:     12/31/2024      Plan of Care Expiration: 1/15/2025  Progress Note Due: 11/23/2024  Visit # / Visits authorized: 5/ 20   FOTO: 1/ 3     Precautions: Standard      Time In: 500 pm  Time Out: 545 pm  Total Billable Time: 40 minutes     Date of Procedure: 9/6/2024   Post Op Day: 9 weeks and 4 days as of 11/12  Procedure: Procedure(s) (LRB):  REPAIR, ROTATOR CUFF, ARTHROSCOPIC, WITH BIOINDUCTIVE IMPLANT (Right)  EXTENSIVE DEBRIDEMENT, SHOULDER, ARTHROSCOPIC (Right)  ARTHROSCOPY, SHOULDER, WITH SUBACROMIAL SPACE DECOMPRESSION (Right)  ARTHROSCOPY,SHOULDER,WITH BICEPS TENODESIS (Right)     Surgeons and Role:     * Darrion Ricks MD - Primary     From MD on 10/17: "Plan:  I reviewed the findings with him today.  Patient may stop the sling altogether now.  We will get him started with some formal physical therapy for rotator cuff repair protocol.  I will see him back in 6 weeks.  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      He was compliant with home exercise program.  Response to previous treatment: as noted  Functional change: as noted     Pain: 5/10  Location: right shoulder      Objective      Objective Measures updated at progress report unless specified.     Treatment     Fabiano received the treatments listed below:  "     therapeutic exercises to develop strength, endurance, ROM, and flexibility for 35 minutes including:    Upper Body Ergometer 4'/4' level 1   Supine AAROM flexion 3 x 10 repetitions    Side Lying External Rotation x 30  Supine external rotation stretch 5 seconds holds x 3 minutes   Prone scapular setting 5 seconds holds x 3 minutes   Prone row to neutral 3 x 10 repetitions   Wall slides with pillow case 3 x 10 repetitions   Shoulder walk outs internal rotation and external rotation 3 x 10 repetitions   Seated shoulder flexion to 90 with t-bar 3 x 10 repetitions   Flexion slides on stair rail x 3 minutes  Scaption slides on stair rail x 3 minutes     Seated thoracic extension  Red band Internal Rotation/External Rotation   Side Lying open books     Not Performed Today:  Sidelying flexion with dowel x 3 minutes  Shoulder isometrics flexion, abduction, extension 5 seconds holds x 10 repetitions     manual therapy techniques: Joint mobilizations were applied to the: right shoulder for 10 minutes, including:    Inferior and posterior joint mobilizations grade II  Thoracic PA mobs grade III     Not Performed Today:  Manual pendulums followed by PROM    Patient Education and Home Exercises       Education provided:   - Home Exercise Program     Written Home Exercises Provided: Yes. Exercises were reviewed and Fabiano was able to demonstrate them prior to the end of the session.  Fabiano demonstrated good  understanding of the education provided. See Electronic Medical Record under Patient Instructions for exercises provided during therapy sessions    Assessment     Fabiano noted with good tolerance to treatment with no increase in pain. Continued focus on strength within tolerance. Will progress as able per protocol.     Fabiano Is progressing well towards his goals.   Patient prognosis is Good.     Patient will continue to benefit from skilled outpatient physical therapy to address the deficits listed in the problem list box on  initial evaluation, provide pt/family education and to maximize pt's level of independence in the home and community environment.     Patient's spiritual, cultural and educational needs considered and pt agreeable to plan of care and goals.     Anticipated barriers to physical therapy: co-morbidities, chronic pain     Goals:     Short Term Goals (6 Weeks)  -Progressing, not met   Patient will be independent in Home Exercise Program to support improving range of motion.  Patient will have improved Passive Range of Motion to equal the uninvolved side.  Patient will have improved Active Range of Motion to 120 of flexion.      Long Term Goals (12 Weeks) -Progressing, not met   Patient will be independent in progressed Home Exercise Program to support improving functional strength.  Patient goal: return to work and to the gym.  Patient will have improved FOTO score to predicted level to demonstrate true functional improvements.   Patient will have improved Active Range of Motion and Passive Range of Motion to equal the uninvolved side.     Plan     Progress as per POC.     Cindy Purvis, PT , DPT  Board Certified Clinical Specialist in Orthopedic Physical Therapy

## 2024-11-20 ENCOUNTER — PATIENT MESSAGE (OUTPATIENT)
Dept: FAMILY MEDICINE | Facility: CLINIC | Age: 54
End: 2024-11-20
Payer: MEDICAID

## 2024-11-21 ENCOUNTER — OFFICE VISIT (OUTPATIENT)
Dept: ORTHOPEDICS | Facility: CLINIC | Age: 54
End: 2024-11-21
Payer: MEDICAID

## 2024-11-21 ENCOUNTER — CLINICAL SUPPORT (OUTPATIENT)
Dept: REHABILITATION | Facility: HOSPITAL | Age: 54
End: 2024-11-21
Payer: MEDICAID

## 2024-11-21 VITALS — WEIGHT: 220 LBS | HEIGHT: 70 IN | BODY MASS INDEX: 31.5 KG/M2

## 2024-11-21 DIAGNOSIS — R53.1 STRENGTH LOSS OF: ICD-10-CM

## 2024-11-21 DIAGNOSIS — S46.011D TRAUMATIC COMPLETE TEAR OF RIGHT ROTATOR CUFF, SUBSEQUENT ENCOUNTER: Primary | ICD-10-CM

## 2024-11-21 DIAGNOSIS — M25.60 RANGE OF MOTION DEFICIT: Primary | ICD-10-CM

## 2024-11-21 PROCEDURE — 99212 OFFICE O/P EST SF 10 MIN: CPT | Mod: PBBFAC,PO | Performed by: ORTHOPAEDIC SURGERY

## 2024-11-21 PROCEDURE — 99999 PR PBB SHADOW E&M-EST. PATIENT-LVL II: CPT | Mod: PBBFAC,,, | Performed by: ORTHOPAEDIC SURGERY

## 2024-11-21 PROCEDURE — 97110 THERAPEUTIC EXERCISES: CPT | Mod: PN

## 2024-11-21 NOTE — PROGRESS NOTES
"[facility]   Physical Therapy Treatment Note      Name: Fabiano Jules Jr.  Clinic Number: [id number]    Therapy Diagnosis:   Encounter Diagnoses   Name Primary?    Range of motion deficit Yes    Strength loss of        Physician: Darrion Ricks,*    Visit Date: 11/21/2024      Physician Orders: PT Eval and treat   Medical Diagnosis from Referral:   Diagnosis   S46.011D (ICD-10-CM) - Traumatic complete tear of right rotator cuff, subsequent encounter      Evaluation Date: 10/23/2024  Authorization Period Expiration:     12/31/2024      Plan of Care Expiration: 1/15/2025  Progress Note Due: 11/23/2024  Visit # / Visits authorized: 6/ 20   FOTO: 1/ 3     Precautions: Standard      Time In: 300 pm  Time Out: 345 pm  Total Billable Time: 45 minutes     Date of Procedure: 9/6/2024   Post Op Day: 9 weeks and 4 days as of 11/12  Procedure: Procedure(s) (LRB):  REPAIR, ROTATOR CUFF, ARTHROSCOPIC, WITH BIOINDUCTIVE IMPLANT (Right)  EXTENSIVE DEBRIDEMENT, SHOULDER, ARTHROSCOPIC (Right)  ARTHROSCOPY, SHOULDER, WITH SUBACROMIAL SPACE DECOMPRESSION (Right)  ARTHROSCOPY,SHOULDER,WITH BICEPS TENODESIS (Right)     Surgeons and Role:     * Darrion Ricks MD - Primary     From MD on 10/17: "Plan:  I reviewed the findings with him today.  Patient may stop the sling altogether now.  We will get him started with some formal physical therapy for rotator cuff repair protocol.  I will see him back in 6 weeks.  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      He was compliant with home exercise program.  Response to previous treatment: as noted  Functional change: as noted     Pain: 5/10  Location: right shoulder      Objective      Objective Measures updated at progress report unless specified.     Flexion to 150    Treatment     Fabiano received the treatments " listed below:      therapeutic exercises to develop strength, endurance, ROM, and flexibility for 35 minutes including:    Upper Body Ergometer 4'/4' level 1   Supine AAROM flexion 3 x 10 repetitions    Side Lying External Rotation x 30  Supine external rotation stretch 5 seconds holds x 3 minutes   Prone scapular setting 5 seconds holds x 3 minutes   Prone row to neutral 3 x 10 repetitions   Wall slides with pillow case 3 x 10 repetitions   Shoulder walk outs internal rotation and external rotation 3 x 10 repetitions   Seated shoulder flexion to 90 with t-bar 3 x 10 repetitions   Flexion slides on stair rail x 3 minutes  Scaption slides on stair rail x 3 minutes   Biceps curl 3# x 30    Seated thoracic extension  Red band Internal Rotation/External Rotation   Side Lying open books     Not Performed Today:  Sidelying flexion with dowel x 3 minutes  Shoulder isometrics flexion, abduction, extension 5 seconds holds x 10 repetitions     manual therapy techniques: Joint mobilizations were applied to the: right shoulder for 10 minutes, including:    Inferior and posterior joint mobilizations grade II  Thoracic PA mobs grade III     Not Performed Today:  Manual pendulums followed by PROM    Patient Education and Home Exercises       Education provided:   - Home Exercise Program     Written Home Exercises Provided: Yes. Exercises were reviewed and Fabiano was able to demonstrate them prior to the end of the session.  Fabiano demonstrated good  understanding of the education provided. See Electronic Medical Record under Patient Instructions for exercises provided during therapy sessions    Assessment     Fabiano noted with good tolerance to treatment with no increase in pain. Continued focus on strength within tolerance. Progressed bicep curls. Will progress as able per protocol.     Fabiano Is progressing well towards his goals.   Patient prognosis is Good.     Patient will continue to benefit from skilled outpatient physical therapy  to address the deficits listed in the problem list box on initial evaluation, provide pt/family education and to maximize pt's level of independence in the home and community environment.     Patient's spiritual, cultural and educational needs considered and pt agreeable to plan of care and goals.     Anticipated barriers to physical therapy: co-morbidities, chronic pain     Goals:     Short Term Goals (6 Weeks)  -Progressing, not met   Patient will be independent in Home Exercise Program to support improving range of motion.  Patient will have improved Passive Range of Motion to equal the uninvolved side.  Patient will have improved Active Range of Motion to 120 of flexion.      Long Term Goals (12 Weeks) -Progressing, not met   Patient will be independent in progressed Home Exercise Program to support improving functional strength.  Patient goal: return to work and to the gym.  Patient will have improved FOTO score to predicted level to demonstrate true functional improvements.   Patient will have improved Active Range of Motion and Passive Range of Motion to equal the uninvolved side.     Plan     Progress as per POC.     Cindy Purvis, PT , DPT  Board Certified Clinical Specialist in Orthopedic Physical Therapy

## 2024-11-21 NOTE — PROGRESS NOTES
Past Medical History:   Diagnosis Date    Arthritis     Back injury     Colon polyp     COVID     CPAP (continuous positive airway pressure) dependence     NOT NEEDED AFTER WEIGHT LOSS SURGERY    General anesthetics causing adverse effect in therapeutic use     WOKE UP CONFUSED X 1    Hypertension     PONV (postoperative nausea and vomiting)     Sleep apnea     c pap machine used  - NOT NEEDED AFTER GASTRIC SLEEVE    Wears glasses        Past Surgical History:   Procedure Laterality Date    ARTHROSCOPIC DEBRIDEMENT OF SHOULDER Right 9/6/2024    Procedure: EXTENSIVE DEBRIDEMENT, SHOULDER, ARTHROSCOPIC;  Surgeon: Darrion Ricks MD;  Location: [facility] OR;  Service: Orthopedics;  Laterality: Right;    ARTHROSCOPIC REPAIR OF ROTATOR CUFF OF SHOULDER Right 9/6/2024    Procedure: REPAIR, ROTATOR CUFF, ARTHROSCOPIC;  Surgeon: Darrion Ricks MD;  Location: [facility] OR;  Service: Orthopedics;  Laterality: Right;  arthrex-cuffmend/SCR stuff  Hood w/Arthrex notified 8/30/24 ark    ARTHROSCOPY OF SHOULDER WITH DECOMPRESSION OF SUBACROMIAL SPACE Right 9/6/2024    Procedure: ARTHROSCOPY, SHOULDER, WITH SUBACROMIAL SPACE DECOMPRESSION;  Surgeon: Darrion Ricks MD;  Location: [facility] OR;  Service: Orthopedics;  Laterality: Right;    ARTHROSCOPY,SHOULDER,WITH BICEPS TENODESIS Right 9/6/2024    Procedure: ARTHROSCOPY,SHOULDER,WITH BICEPS TENODESIS;  Surgeon: Darrion Ricks MD;  Location: [facility] OR;  Service: Orthopedics;  Laterality: Right;    BONE GRAFT N/A 04/05/2022    Procedure: BONE GRAFT;  Surgeon: Duran Adler Jr., MD;  Location: [facility] OR;  Service: Orthopedics;  Laterality: N/A;    CIRCUMCISION      COLONOSCOPY N/A 12/03/2018    Procedure: COLONOSCOPY;  Surgeon: CHRISSY Reyna MD;  Location: [facility] ENDO (4TH FLR);  Service: Endoscopy;  Laterality: N/A;    COLONOSCOPY N/A 09/01/2022    Procedure: COLONOSCOPY;  Surgeon: Alondra Arias MD;  Location: [facility];  Service: Endoscopy;   Laterality: N/A;    epidural steroid injection X 2      lower lumbar    ESOPHAGOGASTRODUODENOSCOPY N/A 09/01/2022    Procedure: EGD (ESOPHAGOGASTRODUODENOSCOPY);  Surgeon: Alondra Arias MD;  Location: [facility] ENDO;  Service: Endoscopy;  Laterality: N/A;    facet injection       Dr Manpreet Gore [facility] [place]     INSERTION OF IMPLANTABLE LOOP RECORDER N/A 9/15/2023    Procedure: Insertion, Implantable Loop Recorder;  Surgeon: Romario Wallace MD;  Location: [facility] CATH/EP LAB;  Service: Cardiology;  Laterality: N/A;    INTRALUMINAL GASTROINTESTINAL TRACT IMAGING VIA CAPSULE N/A 10/03/2022    Procedure: IMAGING PROCEDURE, GI TRACT, INTRALUMINAL, VIA CAPSULE;  Surgeon: Alondra Arias MD;  Location: [facility] ENDO;  Service: Endoscopy;  Laterality: N/A;    LAPAROSCOPIC SLEEVE GASTRECTOMY N/A 12/19/2022    Procedure: GASTRECTOMY, SLEEVE, LAPAROSCOPIC;  Surgeon: Lashonda Pinedo MD;  Location: [facility] OR;  Service: General;  Laterality: N/A;    LIPOMA RESECTION      Back of neck    MAGNETIC RESONANCE IMAGING N/A 05/29/2020    Procedure: MRI (MAGNETIC RESONANCE IMAGING) LUMBAR SPINE;  Surgeon: [facility] Diagnostic Provider;  Location: [facility];  Service: General;  Laterality: N/A;  COVID NEG    MAGNETIC RESONANCE IMAGING N/A 12/03/2021    Procedure: MRI (MAGNETIC RESONANCE IMAGING), LUMBAR SPINE;  Surgeon: [facility] Diagnostic Provider;  Location: [facility];  Service: General;  Laterality: N/A;  In MRI @ 7:50 per Ariela    MAGNETIC RESONANCE IMAGING N/A 3/6/2024    Procedure: MRI (Magnetic Resonance Imagine);  Surgeon: Vicenta Michele;  Location: [facility];  Service: Anesthesiology;  Laterality: N/A;    MAGNETIC RESONANCE IMAGING Bilateral 6/28/2024    Procedure: MRI (Magnetic Resonance Imagine);  Surgeon: Vicenta Michele;  Location: [facility];  Service: Anesthesiology;  Laterality: Bilateral;  MRI BRAIN WITH AND WITHOUT CONTRAST    MEDIAL COLLATERAL LIGAMENT AND LATERAL COLLATERAL LIGAMENT REPAIR, KNEE Right  09/01/2018    Dr. Christophe Gore in [place]     MINIMALLY INVASIVE TRANSFORAMINAL LUMBAR INTERBODY FUSION (TLIF) N/A 04/05/2022    Procedure: FUSION, SPINE, LUMBAR, TLIF, MINIMALLY INVASIVE, WITH INSTRUMENTATION,  L4/5 RIGHT;  Surgeon: Duran Adler Jr., MD;  Location: [facility] OR;  Service: Orthopedics;  Laterality: N/A;  10:30am per Tamela    MYELOGRAPHY N/A 06/23/2020    Procedure: MYELOGRAM;  Surgeon: Andi Diagnostic Provider;  Location: [facility] OR;  Service: General;  Laterality: N/A;       Current Outpatient Medications   Medication Sig    acetaminophen (TYLENOL) 500 MG tablet Take 2 tablets (1,000 mg total) by mouth every 8 (eight) hours as needed for Pain.    amLODIPine (NORVASC) 5 MG tablet Take 1 tablet (5 mg total) by mouth once daily.    aspirin (ECOTRIN) 81 MG EC tablet Take 81 mg by mouth once daily.    b complex vitamins (B COMPLEX-VITAMIN B12) tablet Take 1 tablet by mouth once daily.    calcium citrate (CALCITRATE) 200 mg (950 mg) tablet Take 1 tablet by mouth 2 (two) times daily.    EPINEPHrine (EPIPEN) 0.3 mg/0.3 mL AtIn Inject into the muscle once for one dose as needed.    multivitamin (THERAGRAN) per tablet Take 1 tablet by mouth once daily.    ondansetron (ZOFRAN) 4 MG tablet Take 1 tablet (4 mg total) by mouth every 6 (six) hours as needed for Nausea.    oxyCODONE-acetaminophen (PERCOCET) 5-325 mg per tablet Take 1 tablet by mouth every 6 (six) hours as needed for Pain.    rosuvastatin (CRESTOR) 10 MG tablet Take 1 tablet (10 mg total) by mouth every evening. (Patient taking differently: Take 10 mg by mouth once daily.)     No current facility-administered medications for this visit.     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    Peanut Anaphylaxis    Tree pollen-red birch Anaphylaxis       Family History   Problem Relation Name Age of Onset    Hypertension Mother      Diabetes Father      Cancer Father          mesotheliosma     Cataracts Neg Hx      Glaucoma Neg Hx      Macular degeneration Neg Hx      Retinal detachment Neg Hx      Colon cancer Neg Hx      Colon polyps Neg Hx      Crohn's disease Neg Hx      Ulcerative colitis Neg Hx         Social History     Socioeconomic History    Marital status:     Number of children: 4   Occupational History     Employer: [employer]   Tobacco Use    Smoking status: Former     Current packs/day: 0.00     Average packs/day: 0.5 packs/day for 10.0 years (5.0 ttl pk-yrs)     Types: Cigarettes     Start date: 1/1/1999     Quit date: 1/1/2009     Years since quitting: 15.8    Smokeless tobacco: Never    Tobacco comments:     quit smoking in 2010   Substance and Sexual Activity    Alcohol use: Yes     Comment: rarely    Drug use: Not Currently     Types: Marijuana     Comment: medical---oil, smoke and edibles    Sexual activity: Not Currently     Social Drivers of Health     Financial Resource Strain: Low Risk  (1/17/2024)    Overall Financial Resource Strain (CARDIA)     Difficulty of Paying Living Expenses: Not hard at all   Food Insecurity: No Food Insecurity (1/17/2024)    Hunger Vital Sign     Worried About Running Out of Food in the Last Year: Never true     Ran Out of Food in the Last Year: Never true   Transportation Needs: No Transportation Needs (1/17/2024)    PRAPARE - Transportation     Lack of Transportation (Medical): No     Lack of Transportation (Non-Medical): No   Physical Activity: Sufficiently Active (1/17/2024)    Exercise Vital Sign     Days of Exercise per Week: 4 days     Minutes of Exercise per Session: 120 min   Recent Concern: Physical Activity - Insufficiently Active (11/1/2023)    Exercise Vital Sign     Days  of Exercise per Week: 2 days     Minutes of Exercise per Session: 60 min   Stress: Stress Concern Present (1/17/2024)    [employer] - Occupational Stress Questionnaire     Feeling of Stress : To some extent   Housing Stability: High Risk (1/17/2024)    Housing Stability Vital Sign     Unable to Pay for Housing in the Last Year: Yes     Number of Places Lived in the Last Year: 1     Unstable Housing in the Last Year: No       Chief Complaint:   Chief Complaint   Patient presents with    Post-op Evaluation     6 WK POST OP- R RCR       Date of surgery:  September 6, 2024    History of present illness:  54-year-old male underwent right arthroscopic rotator cuff repair with Regeneten patch augmentation as well as biceps tenodesis.  Patient is doing pretty well.  He is in physical therapy.  Still working on range of motion.  They have not initiate any strengthening yet.  Pain is a 5/10.  Patient is using a cryo machine which does help a lot.        Review of Systems:    Musculoskeletal:  See HPI        Physical Examination:    Vital Signs:  There were no vitals filed for this visit.    Body mass index is 31.57 kg/m².    This a well-developed, well nourished patient in no acute distress.  They are alert and oriented and cooperative to examination.  Pt. walks without an antalgic gait.      Examination of the right shoulder shows well-healed surgical portals.  Patient has near full range of motion with forward flexion of about 174°.  Neurovascularly intact.  Just a little bit of tightness.    X-rays:  None     Assessment::  Status post right rotator cuff repair with Regeneten patch augmentation and biceps tenodesis    Plan:  I reviewed the findings with him today.  Continue with the formal physical therapy for rotator cuff repair protocol.  I will see him back in 6 weeks.  Advised him to be very careful with this still.    This note was created using [employer] voice recognition software that  occasionally misinterpreted phrases or words.

## 2024-11-27 ENCOUNTER — CLINICAL SUPPORT (OUTPATIENT)
Dept: REHABILITATION | Facility: HOSPITAL | Age: 54
End: 2024-11-27
Payer: MEDICAID

## 2024-11-27 DIAGNOSIS — M25.60 RANGE OF MOTION DEFICIT: Primary | ICD-10-CM

## 2024-11-27 DIAGNOSIS — R53.1 STRENGTH LOSS OF: ICD-10-CM

## 2024-11-27 PROCEDURE — 97110 THERAPEUTIC EXERCISES: CPT | Mod: PN

## 2024-11-27 NOTE — PROGRESS NOTES
"[facility]   Physical Therapy Treatment Note      Name: Fabiano Jules Jr.  Clinic Number: [id number]    Therapy Diagnosis:   Encounter Diagnoses   Name Primary?    Range of motion deficit Yes    Strength loss of        Physician: Darrion Ricks,*    Visit Date: 11/27/2024      Physician Orders: PT Eval and treat   Medical Diagnosis from Referral:   Diagnosis   S46.011D (ICD-10-CM) - Traumatic complete tear of right rotator cuff, subsequent encounter      Evaluation Date: 10/23/2024  Authorization Period Expiration:     12/31/2024      Plan of Care Expiration: 1/15/2025  Progress Note Due: 11/23/2024  Visit # / Visits authorized: 7/ 20   FOTO: 1/ 3     Precautions: Standard      Time In: 500 pm  Time Out: 545 pm  Total Billable Time: 45 minutes     Date of Procedure: 9/6/2024   Post Op Day: 9 weeks and 4 days as of 11/12  Procedure: Procedure(s) (LRB):  REPAIR, ROTATOR CUFF, ARTHROSCOPIC, WITH BIOINDUCTIVE IMPLANT (Right)  EXTENSIVE DEBRIDEMENT, SHOULDER, ARTHROSCOPIC (Right)  ARTHROSCOPY, SHOULDER, WITH SUBACROMIAL SPACE DECOMPRESSION (Right)  ARTHROSCOPY,SHOULDER,WITH BICEPS TENODESIS (Right)     Surgeons and Role:     * Darrion Ricks MD - Primary     From MD on 10/17: "Plan:  I reviewed the findings with him today.  Patient may stop the sling altogether now.  We will get him started with some formal physical therapy for rotator cuff repair protocol.  I will see him back in 6 weeks.  Advised him to be very careful with this still."    PTA Visit #: 0/5       Subjective     Patient reports: feeling a little stiff.      He was compliant with home exercise program.  Response to previous treatment: as noted  Functional change: as noted     Pain: 5/10  Location: right shoulder      Objective      Objective Measures updated at progress report unless specified.     Flexion to 150    Treatment     Fabiano received the treatments listed below:      therapeutic exercises to develop " strength, endurance, ROM, and flexibility for 35 minutes including:    Upper Body Ergometer 4'/4' level 1   Supine AAROM flexion 3 x 10 repetitions    Side Lying External Rotation x 30  Supine external rotation stretch 5 seconds holds x 3 minutes   Prone scapular setting 5 seconds holds x 3 minutes   Prone row to neutral 3 x 10 repetitions   Wall slides with pillow case 3 x 10 repetitions   Shoulder walk outs internal rotation and external rotation 3 x 10 repetitions   Seated shoulder flexion to 90 with t-bar 3 x 10 repetitions   Flexion slides on stair rail x 3 minutes  Scaption slides on stair rail x 3 minutes   Biceps curl 3# x 30    Seated thoracic extension  Red band Internal Rotation/External Rotation   Side Lying open books     Not Performed Today:  Sidelying flexion with dowel x 3 minutes  Shoulder isometrics flexion, abduction, extension 5 seconds holds x 10 repetitions     manual therapy techniques: Joint mobilizations were applied to the: right shoulder for 10 minutes, including:    Inferior and posterior joint mobilizations grade II  Thoracic PA mobs grade III     Not Performed Today:  Manual pendulums followed by PROM    Patient Education and Home Exercises       Education provided:   - Home Exercise Program     Written Home Exercises Provided: Yes. Exercises were reviewed and Fabiano was able to demonstrate them prior to the end of the session.  Fabiano demonstrated good  understanding of the education provided. See Electronic Medical Record under Patient Instructions for exercises provided during therapy sessions    Assessment     Fabiano noted with good tolerance to treatment with no increase in pain. Continued focus on strength within tolerance. Progressed bicep curls. Will progress as able per protocol.     Fabiano Is progressing well towards his goals.   Patient prognosis is Good.     Patient will continue to benefit from skilled outpatient physical therapy to address the deficits listed in the problem list  box on initial evaluation, provide pt/family education and to maximize pt's level of independence in the home and community environment.     Patient's spiritual, cultural and educational needs considered and pt agreeable to plan of care and goals.     Anticipated barriers to physical therapy: co-morbidities, chronic pain     Goals:     Short Term Goals (6 Weeks)  -Progressing, not met   Patient will be independent in Home Exercise Program to support improving range of motion.  Patient will have improved Passive Range of Motion to equal the uninvolved side.  Patient will have improved Active Range of Motion to 120 of flexion.      Long Term Goals (12 Weeks) -Progressing, not met   Patient will be independent in progressed Home Exercise Program to support improving functional strength.  Patient goal: return to work and to the gym.  Patient will have improved FOTO score to predicted level to demonstrate true functional improvements.   Patient will have improved Active Range of Motion and Passive Range of Motion to equal the uninvolved side.     Plan     Progress as per POC.     Cindy Purvis, PT , DPT  Board Certified Clinical Specialist in Orthopedic Physical Therapy

## 2024-12-04 ENCOUNTER — CLINICAL SUPPORT (OUTPATIENT)
Dept: REHABILITATION | Facility: HOSPITAL | Age: 54
End: 2024-12-04
Payer: MEDICAID

## 2024-12-04 ENCOUNTER — PATIENT MESSAGE (OUTPATIENT)
Dept: CARDIOLOGY | Facility: CLINIC | Age: 54
End: 2024-12-04
Payer: MEDICAID

## 2024-12-04 ENCOUNTER — PATIENT MESSAGE (OUTPATIENT)
Dept: FAMILY MEDICINE | Facility: CLINIC | Age: 54
End: 2024-12-04
Payer: MEDICAID

## 2024-12-04 DIAGNOSIS — R53.1 STRENGTH LOSS OF: ICD-10-CM

## 2024-12-04 DIAGNOSIS — M25.60 RANGE OF MOTION DEFICIT: Primary | ICD-10-CM

## 2024-12-04 PROCEDURE — 97110 THERAPEUTIC EXERCISES: CPT | Mod: PN

## 2024-12-04 NOTE — TELEPHONE ENCOUNTER
"Please see attached portal message from patient.  Call placed to patient, offered virtual appointment on today's date with Dr. Mathias.  Patient states "I have an appointment with him on Friday already."  Advised patient will forward message to Dr. Mathias for further guidance.  Please advise. Thank you.     "

## 2024-12-04 NOTE — PROGRESS NOTES
"[facility]   Physical Therapy Treatment Note      Name: Fabiano Jules Jr.  Clinic Number: [id number]    Therapy Diagnosis:   Encounter Diagnoses   Name Primary?    Range of motion deficit Yes    Strength loss of          Physician: Darrion Ricks,*    Visit Date: 12/4/2024      Physician Orders: PT Eval and treat   Medical Diagnosis from Referral:   Diagnosis   S46.011D (ICD-10-CM) - Traumatic complete tear of right rotator cuff, subsequent encounter      Evaluation Date: 10/23/2024  Authorization Period Expiration:     12/31/2024      Plan of Care Expiration: 1/15/2025  Progress Note Due: 11/23/2024  Visit # / Visits authorized: 8/ 20   FOTO: 1/ 3     Precautions: Standard      Time In: 500 pm  Time Out: 545 pm  Total Billable Time: 45 minutes     Date of Procedure: 9/6/2024   Post Op Day: 12 weeks and 5 days as of 12/5  Procedure: Procedure(s) (LRB):  REPAIR, ROTATOR CUFF, ARTHROSCOPIC, WITH BIOINDUCTIVE IMPLANT (Right)  EXTENSIVE DEBRIDEMENT, SHOULDER, ARTHROSCOPIC (Right)  ARTHROSCOPY, SHOULDER, WITH SUBACROMIAL SPACE DECOMPRESSION (Right)  ARTHROSCOPY,SHOULDER,WITH BICEPS TENODESIS (Right)     Surgeons and Role:     * Darrion Ricks MD - Primary     From MD on 10/17: "Plan:  I reviewed the findings with him today.  Patient may stop the sling altogether now.  We will get him started with some formal physical therapy for rotator cuff repair protocol.  I will see him back in 6 weeks.  Advised him to be very careful with this still."    PTA Visit #: 0/5       Subjective     Patient reports: feels about the same. Sore from doing the exercises all the time.       He was compliant with home exercise program.  Response to previous treatment: as noted  Functional change: as noted     Pain: 5/10  Location: right shoulder      Objective      Objective Measures updated at progress report unless specified.     Flexion to 150    Treatment     Fabiano received the treatments listed below:  "     therapeutic exercises to develop strength, endurance, ROM, and flexibility for 35 minutes including:    Upper Body Ergometer 4'/4' level 1.5      Wall slides with pillow case 3 x 10 repetitions red band     Biceps curl 4# x 30  Seated thoracic extension x 30  Green band Internal Rotation/External Rotation 2 x 15  Side Lying open books x 20 Bilateral   Table step backs x 20    Not Performed Today:  Side Lying External Rotation x 30  Supine external rotation stretch 5 seconds holds x 3 minutes  Flexion slides on stair rail x 3 minutes  Scaption slides on stair rail x 3 minutes    manual therapy techniques: Joint mobilizations were applied to the: right shoulder for 10 minutes, including:    Inferior and posterior joint mobilizations grade II  Thoracic PA mobs grade III     Not Performed Today:  Manual pendulums followed by PROM    Patient Education and Home Exercises       Education provided:   - Home Exercise Program     Written Home Exercises Provided: Yes. Exercises were reviewed and Fabiano was able to demonstrate them prior to the end of the session.  Fabiano demonstrated good  understanding of the education provided. See Electronic Medical Record under Patient Instructions for exercises provided during therapy sessions    Assessment     Fabiano presented with some discomfort at beginning of session. Interventions continued with emphasis on range of motion and progressing with some resistance. He is tolerating interventions moderately well with some fatigue and discomfort.      Fabiano Is progressing well towards his goals.   Patient prognosis is Good.     Patient will continue to benefit from skilled outpatient physical therapy to address the deficits listed in the problem list box on initial evaluation, provide pt/family education and to maximize pt's level of independence in the home and community environment.     Patient's spiritual, cultural and educational needs considered and pt agreeable to plan of care and  goals.     Anticipated barriers to physical therapy: co-morbidities, chronic pain     Goals:     Short Term Goals (6 Weeks)  -Progressing, not met   Patient will be independent in Home Exercise Program to support improving range of motion.  Patient will have improved Passive Range of Motion to equal the uninvolved side.  Patient will have improved Active Range of Motion to 120 of flexion.      Long Term Goals (12 Weeks) -Progressing, not met   Patient will be independent in progressed Home Exercise Program to support improving functional strength.  Patient goal: return to work and to the gym.  Patient will have improved FOTO score to predicted level to demonstrate true functional improvements.   Patient will have improved Active Range of Motion and Passive Range of Motion to equal the uninvolved side.     Plan     Progress as per POC.     Cindy Purvis, PT, DPT   Board Certified Clinical Specialist in Orthopedic Physical Therapy

## 2024-12-05 ENCOUNTER — OFFICE VISIT (OUTPATIENT)
Dept: CARDIOLOGY | Facility: CLINIC | Age: 54
End: 2024-12-05
Payer: MEDICAID

## 2024-12-05 ENCOUNTER — OFFICE VISIT (OUTPATIENT)
Dept: FAMILY MEDICINE | Facility: CLINIC | Age: 54
End: 2024-12-05
Payer: MEDICAID

## 2024-12-05 ENCOUNTER — LAB VISIT (OUTPATIENT)
Dept: LAB | Facility: HOSPITAL | Age: 54
End: 2024-12-05
Payer: MEDICAID

## 2024-12-05 ENCOUNTER — PATIENT MESSAGE (OUTPATIENT)
Dept: CARDIOLOGY | Facility: CLINIC | Age: 54
End: 2024-12-05

## 2024-12-05 VITALS
WEIGHT: 226 LBS | HEIGHT: 70 IN | RESPIRATION RATE: 16 BRPM | HEART RATE: 63 BPM | SYSTOLIC BLOOD PRESSURE: 124 MMHG | DIASTOLIC BLOOD PRESSURE: 80 MMHG | OXYGEN SATURATION: 99 % | BODY MASS INDEX: 32.35 KG/M2

## 2024-12-05 VITALS
HEART RATE: 58 BPM | HEIGHT: 70 IN | DIASTOLIC BLOOD PRESSURE: 82 MMHG | WEIGHT: 228.63 LBS | TEMPERATURE: 98 F | BODY MASS INDEX: 32.73 KG/M2 | OXYGEN SATURATION: 99 % | SYSTOLIC BLOOD PRESSURE: 142 MMHG

## 2024-12-05 DIAGNOSIS — I48.0 PAROXYSMAL ATRIAL FIBRILLATION: ICD-10-CM

## 2024-12-05 DIAGNOSIS — I10 PRIMARY HYPERTENSION: Chronic | ICD-10-CM

## 2024-12-05 DIAGNOSIS — I48.91 ATRIAL FIBRILLATION, UNSPECIFIED TYPE: Primary | ICD-10-CM

## 2024-12-05 DIAGNOSIS — G47.00 INSOMNIA, UNSPECIFIED TYPE: ICD-10-CM

## 2024-12-05 DIAGNOSIS — R41.3 MEMORY PROBLEM: Primary | ICD-10-CM

## 2024-12-05 DIAGNOSIS — F32.A DEPRESSION, UNSPECIFIED DEPRESSION TYPE: ICD-10-CM

## 2024-12-05 DIAGNOSIS — R41.3 MEMORY PROBLEM: ICD-10-CM

## 2024-12-05 DIAGNOSIS — Z95.818 IMPLANTABLE LOOP RECORDER PRESENT: ICD-10-CM

## 2024-12-05 DIAGNOSIS — E78.5 DYSLIPIDEMIA: ICD-10-CM

## 2024-12-05 DIAGNOSIS — E87.6 HYPOKALEMIA: ICD-10-CM

## 2024-12-05 DIAGNOSIS — Z86.69 HISTORY OF SLEEP APNEA: ICD-10-CM

## 2024-12-05 LAB
ALBUMIN SERPL BCP-MCNC: 4.1 G/DL (ref 3.5–5.2)
ALP SERPL-CCNC: 61 U/L (ref 40–150)
ALT SERPL W/O P-5'-P-CCNC: 9 U/L (ref 10–44)
ANION GAP SERPL CALC-SCNC: 6 MMOL/L (ref 8–16)
AST SERPL-CCNC: 18 U/L (ref 10–40)
BASOPHILS # BLD AUTO: 0.02 K/UL (ref 0–0.2)
BASOPHILS NFR BLD: 0.5 % (ref 0–1.9)
BILIRUB SERPL-MCNC: 0.6 MG/DL (ref 0.1–1)
BUN SERPL-MCNC: 11 MG/DL (ref 6–20)
CALCIUM SERPL-MCNC: 9.8 MG/DL (ref 8.7–10.5)
CHLORIDE SERPL-SCNC: 106 MMOL/L (ref 95–110)
CO2 SERPL-SCNC: 29 MMOL/L (ref 23–29)
CREAT SERPL-MCNC: 0.7 MG/DL (ref 0.5–1.4)
DIFFERENTIAL METHOD BLD: ABNORMAL
EOSINOPHIL # BLD AUTO: 0.1 K/UL (ref 0–0.5)
EOSINOPHIL NFR BLD: 2.1 % (ref 0–8)
ERYTHROCYTE [DISTWIDTH] IN BLOOD BY AUTOMATED COUNT: 15.4 % (ref 11.5–14.5)
EST. GFR  (NO RACE VARIABLE): >60 ML/MIN/1.73 M^2
FERRITIN SERPL-MCNC: 125 NG/ML (ref 20–300)
FOLATE SERPL-MCNC: 17.5 NG/ML (ref 4–24)
GLUCOSE SERPL-MCNC: 93 MG/DL (ref 70–110)
HCT VFR BLD AUTO: 40.9 % (ref 40–54)
HGB BLD-MCNC: 12.9 G/DL (ref 14–18)
IMM GRANULOCYTES # BLD AUTO: 0 K/UL (ref 0–0.04)
IMM GRANULOCYTES NFR BLD AUTO: 0 % (ref 0–0.5)
LYMPHOCYTES # BLD AUTO: 2.1 K/UL (ref 1–4.8)
LYMPHOCYTES NFR BLD: 56.6 % (ref 18–48)
MCH RBC QN AUTO: 27.4 PG (ref 27–31)
MCHC RBC AUTO-ENTMCNC: 31.5 G/DL (ref 32–36)
MCV RBC AUTO: 87 FL (ref 82–98)
MONOCYTES # BLD AUTO: 0.3 K/UL (ref 0.3–1)
MONOCYTES NFR BLD: 7.9 % (ref 4–15)
NEUTROPHILS # BLD AUTO: 1.2 K/UL (ref 1.8–7.7)
NEUTROPHILS NFR BLD: 32.9 % (ref 38–73)
NRBC BLD-RTO: 0 /100 WBC
PLATELET # BLD AUTO: 311 K/UL (ref 150–450)
PMV BLD AUTO: 10.8 FL (ref 9.2–12.9)
POTASSIUM SERPL-SCNC: 4.3 MMOL/L (ref 3.5–5.1)
PROT SERPL-MCNC: 7.4 G/DL (ref 6–8.4)
RBC # BLD AUTO: 4.7 M/UL (ref 4.6–6.2)
SODIUM SERPL-SCNC: 141 MMOL/L (ref 136–145)
TREPONEMA PALLIDUM IGG+IGM AB [PRESENCE] IN SERUM OR PLASMA BY IMMUNOASSAY: NONREACTIVE
TSH SERPL DL<=0.005 MIU/L-ACNC: 1.56 UIU/ML (ref 0.4–4)
VIT B12 SERPL-MCNC: 345 PG/ML (ref 210–950)
WBC # BLD AUTO: 3.78 K/UL (ref 3.9–12.7)

## 2024-12-05 PROCEDURE — 99215 OFFICE O/P EST HI 40 MIN: CPT | Mod: PBBFAC,PO

## 2024-12-05 PROCEDURE — 80053 COMPREHEN METABOLIC PANEL: CPT

## 2024-12-05 PROCEDURE — 82607 VITAMIN B-12: CPT

## 2024-12-05 PROCEDURE — 82746 ASSAY OF FOLIC ACID SERUM: CPT

## 2024-12-05 PROCEDURE — 99213 OFFICE O/P EST LOW 20 MIN: CPT | Mod: PBBFAC,27,PN

## 2024-12-05 PROCEDURE — 3079F DIAST BP 80-89 MM HG: CPT | Mod: CPTII,,,

## 2024-12-05 PROCEDURE — 36415 COLL VENOUS BLD VENIPUNCTURE: CPT | Mod: PO

## 2024-12-05 PROCEDURE — 99999 PR PBB SHADOW E&M-EST. PATIENT-LVL V: CPT | Mod: PBBFAC,,,

## 2024-12-05 PROCEDURE — 85025 COMPLETE CBC W/AUTO DIFF WBC: CPT

## 2024-12-05 PROCEDURE — 3077F SYST BP >= 140 MM HG: CPT | Mod: CPTII,,,

## 2024-12-05 PROCEDURE — 84425 ASSAY OF VITAMIN B-1: CPT

## 2024-12-05 PROCEDURE — 82728 ASSAY OF FERRITIN: CPT

## 2024-12-05 PROCEDURE — 84443 ASSAY THYROID STIM HORMONE: CPT

## 2024-12-05 PROCEDURE — 86593 SYPHILIS TEST NON-TREP QUANT: CPT

## 2024-12-05 PROCEDURE — 1159F MED LIST DOCD IN RCRD: CPT | Mod: CPTII,,,

## 2024-12-05 PROCEDURE — 1160F RVW MEDS BY RX/DR IN RCRD: CPT | Mod: CPTII,,,

## 2024-12-05 PROCEDURE — 99214 OFFICE O/P EST MOD 30 MIN: CPT | Mod: S$PBB,,,

## 2024-12-05 PROCEDURE — 3008F BODY MASS INDEX DOCD: CPT | Mod: CPTII,,,

## 2024-12-05 PROCEDURE — 99999 PR PBB SHADOW E&M-EST. PATIENT-LVL III: CPT | Mod: PBBFAC,,,

## 2024-12-05 RX ORDER — TRAZODONE HYDROCHLORIDE 50 MG/1
50 TABLET ORAL NIGHTLY
Qty: 30 TABLET | Refills: 2 | Status: SHIPPED | OUTPATIENT
Start: 2024-12-05 | End: 2025-12-05

## 2024-12-05 NOTE — PROGRESS NOTES
"Subjective:    Patient ID:  Fabiano Jules Jr. is a 54 y.o. male who presents for follow-up.   Chief Complaint   Patient presents with    Follow-up     He is wanting his loop removed     HPI:  Fabiano Jules Jr. is here for follow-up visit requesting to get his loop recorder out as it is "hurting him." He had it put in 09/15/2023 per recommendation from Dr Lynn to further assess Afib burden however I do not see any cardiac device checks uploaded. Last live Zio back in 05/2023 monitor did not show any afib. Denies chest pain or shortness of breath at this time. Denies recent fever cough chills or congestion. Denies blood in the urine or blood in the stool. Denies myalgias. Denies orthopnea or peripheral edema. Denies nausea vomiting or dyspepsia. No recent arm neck or jaw pain. No lightheadedness or dizziness. States compliance with all medications. Denies any palpitations.     Review of patient's allergies indicates:   Allergen Reactions    Peaches [peach (prunus persica)] Anaphylaxis    Peanut Anaphylaxis    Tree pollen-red birch Anaphylaxis       Past Medical History:   Diagnosis Date    Arthritis     Back injury     Colon polyp     COVID     CPAP (continuous positive airway pressure) dependence     NOT NEEDED AFTER WEIGHT LOSS SURGERY    General anesthetics causing adverse effect in therapeutic use     WOKE UP CONFUSED X 1    Hypertension     PONV (postoperative nausea and vomiting)     Sleep apnea     c pap machine used  - NOT NEEDED AFTER GASTRIC SLEEVE    Wears glasses      Past Surgical History:   Procedure Laterality Date    ARTHROSCOPIC DEBRIDEMENT OF SHOULDER Right 9/6/2024    Procedure: EXTENSIVE DEBRIDEMENT, SHOULDER, ARTHROSCOPIC;  Surgeon: Darrion Ricks MD;  Location: [facility] OR;  Service: Orthopedics;  Laterality: Right;    ARTHROSCOPIC REPAIR OF ROTATOR CUFF OF SHOULDER Right 9/6/2024    Procedure: REPAIR, ROTATOR CUFF, ARTHROSCOPIC;  Surgeon: Darrion Ricks MD;  Location: [facility] OR;  Service: " Orthopedics;  Laterality: Right;  arthrex-cuffmend/SCR stuff  Hood w/Arthrex notified 8/30/24 ark    ARTHROSCOPY OF SHOULDER WITH DECOMPRESSION OF SUBACROMIAL SPACE Right 9/6/2024    Procedure: ARTHROSCOPY, SHOULDER, WITH SUBACROMIAL SPACE DECOMPRESSION;  Surgeon: Darrion Ricks MD;  Location: [facility] OR;  Service: Orthopedics;  Laterality: Right;    ARTHROSCOPY,SHOULDER,WITH BICEPS TENODESIS Right 9/6/2024    Procedure: ARTHROSCOPY,SHOULDER,WITH BICEPS TENODESIS;  Surgeon: Darrion Ricks MD;  Location: [facility] OR;  Service: Orthopedics;  Laterality: Right;    BONE GRAFT N/A 04/05/2022    Procedure: BONE GRAFT;  Surgeon: Duran Adler Jr., MD;  Location: [facility] OR;  Service: Orthopedics;  Laterality: N/A;    CIRCUMCISION      COLONOSCOPY N/A 12/03/2018    Procedure: COLONOSCOPY;  Surgeon: CHRISSY Reyna MD;  Location: [facility] ([address] [place] [address]);  Service: Endoscopy;  Laterality: N/A;    COLONOSCOPY N/A 09/01/2022    Procedure: COLONOSCOPY;  Surgeon: Alondra Arias MD;  Location: [facility];  Service: Endoscopy;  Laterality: N/A;    epidural steroid injection X 2      lower lumbar    ESOPHAGOGASTRODUODENOSCOPY N/A 09/01/2022    Procedure: EGD (ESOPHAGOGASTRODUODENOSCOPY);  Surgeon: Alondra Arias MD;  Location: [facility];  Service: Endoscopy;  Laterality: N/A;    facet injection       [facility] [place]     INSERTION OF IMPLANTABLE LOOP RECORDER N/A 9/15/2023    Procedure: Insertion, Implantable Loop Recorder;  Surgeon: Romario Wallace MD;  Location: [facility] CATH/EP LAB;  Service: Cardiology;  Laterality: N/A;    INTRALUMINAL GASTROINTESTINAL TRACT IMAGING VIA CAPSULE N/A 10/03/2022    Procedure: IMAGING PROCEDURE, GI TRACT, INTRALUMINAL, VIA CAPSULE;  Surgeon: Alondra Arias MD;  Location: [facility] ENDO;  Service: Endoscopy;  Laterality: N/A;    LAPAROSCOPIC SLEEVE GASTRECTOMY N/A 12/19/2022    Procedure: GASTRECTOMY, SLEEVE, LAPAROSCOPIC;  Surgeon: Lashonda QUIJANO  MD Shahida;  Location: [facility] OR;  Service: General;  Laterality: N/A;    LIPOMA RESECTION      Back of neck    MAGNETIC RESONANCE IMAGING N/A 05/29/2020    Procedure: MRI (MAGNETIC RESONANCE IMAGING) LUMBAR SPINE;  Surgeon: [facility] Diagnostic Provider;  Location: [facility];  Service: General;  Laterality: N/A;  COVID NEG    MAGNETIC RESONANCE IMAGING N/A 12/03/2021    Procedure: MRI (MAGNETIC RESONANCE IMAGING), LUMBAR SPINE;  Surgeon: [facility] Diagnostic Provider;  Location: [facility];  Service: General;  Laterality: N/A;  In MRI @ 7:50 per Ariela    MAGNETIC RESONANCE IMAGING N/A 3/6/2024    Procedure: MRI (Magnetic Resonance Imagine);  Surgeon: SurgeonVicenta;  Location: [facility];  Service: Anesthesiology;  Laterality: N/A;    MAGNETIC RESONANCE IMAGING Bilateral 6/28/2024    Procedure: MRI (Magnetic Resonance Imagine);  Surgeon: Vicenta Michele;  Location: [facility];  Service: Anesthesiology;  Laterality: Bilateral;  MRI BRAIN WITH AND WITHOUT CONTRAST    MEDIAL COLLATERAL LIGAMENT AND LATERAL COLLATERAL LIGAMENT REPAIR, KNEE Right 09/01/2018    Dr. Christophe Gore in [place]     MINIMALLY INVASIVE TRANSFORAMINAL LUMBAR INTERBODY FUSION (TLIF) N/A 04/05/2022    Procedure: FUSION, SPINE, LUMBAR, TLIF, MINIMALLY INVASIVE, WITH INSTRUMENTATION,  L4/5 RIGHT;  Surgeon: Duran Adler Jr., MD;  Location: [facility];  Service: Orthopedics;  Laterality: N/A;  10:30am per Tamela    MYELOGRAPHY N/A 06/23/2020    Procedure: MYELOGRAM;  Surgeon: [facility] Diagnostic Provider;  Location: [facility];  Service: General;  Laterality: N/A;     Social History     Tobacco Use    Smoking status: Former     Current packs/day: 0.00     Average packs/day: 0.5 packs/day for 10.0 years (5.0 ttl pk-yrs)     Types: Cigarettes     Start date: 1/1/1999     Quit date: 1/1/2009     Years since quitting: 15.9    Smokeless tobacco: Never    Tobacco comments:     quit smoking in 2010   Substance Use Topics    Alcohol use: Yes     Comment: rarely    Drug use: Not  Currently     Types: Marijuana     Comment: medical---oil, smoke and edibles     Family History   Problem Relation Name Age of Onset    Hypertension Mother      Diabetes Father      Cancer Father          mesotheliosma     Cataracts Neg Hx      Glaucoma Neg Hx      Macular degeneration Neg Hx      Retinal detachment Neg Hx      Colon cancer Neg Hx      Colon polyps Neg Hx      Crohn's disease Neg Hx      Ulcerative colitis Neg Hx          Review of Systems:   Constitution: Negative for diaphoresis and fever.   HEENT: Negative for nosebleeds.    Cardiovascular: Negative for chest pain, + pain and discomfort around loop recorder site        No dyspnea on exertion       No leg swelling        No palpitations  Respiratory: Negative for shortness of breath and wheezing.    Hematologic/Lymphatic: Negative for bleeding problem. Does not bruise/bleed easily.   Skin: Negative for color change and rash.   Musculoskeletal: Negative for falls and myalgias.   Gastrointestinal: Negative for hematemesis and hematochezia.   Genitourinary: Negative for hematuria.   Neurological: Negative for dizziness and light-headedness.   Psychiatric/Behavioral: Negative for altered mental status and memory loss.          Objective:        Vitals:    12/05/24 1608   BP: 124/80   Pulse: 63   Resp: 16       Lab Results   Component Value Date    WBC 3.78 (L) 12/05/2024    HGB 12.9 (L) 12/05/2024    HCT 40.9 12/05/2024     12/05/2024    CHOL 111 (L) 01/20/2024    TRIG 25 (L) 01/20/2024    HDL 42 01/20/2024    ALT 9 (L) 12/05/2024    AST 18 12/05/2024     12/05/2024    K 4.3 12/05/2024     12/05/2024    CREATININE 0.7 12/05/2024    BUN 11 12/05/2024    CO2 29 12/05/2024    TSH 1.557 12/05/2024    PSA 3.1 10/20/2023    INR 1.1 04/06/2023    HGBA1C 5.2 10/20/2023        ECHOCARDIOGRAM RESULTS  Results for orders placed during the hospital encounter of 04/06/23    Echo    Interpretation Summary  · The left ventricle is normal in size  with concentric hypertrophy and normal systolic function.  · The estimated ejection fraction is 60%.  · Normal left ventricular diastolic function.  · Normal right ventricular size with normal right ventricular systolic function.  · Mild left atrial enlargement.  · Mild aortic regurgitation.  · Mild mitral regurgitation.  · Normal central venous pressure (3 mmHg).        CURRENT/PREVIOUS VISIT EKG  Results for orders placed or performed during the hospital encounter of 08/30/24   EKG 12-lead    Collection Time: 08/30/24  9:24 AM   Result Value Ref Range    QRS Duration 114 ms    OHS QTC Calculation 397 ms    Narrative    Test Reason : Z01.818,Z01.818,    Vent. Rate : 056 BPM     Atrial Rate : 056 BPM     P-R Int : 146 ms          QRS Dur : 114 ms      QT Int : 412 ms       P-R-T Axes : 080 087 031 degrees     QTc Int : 397 ms    Sinus bradycardia  Otherwise normal ECG  When compared with ECG of 18-AUG-2023 15:15,  No significant change was found  Confirmed by Rodrigo CONLEY, Romario GENTILE ([id number]) on 9/3/2024 8:17:14 PM    Referred By:  ANASTACIO           Confirmed By:Romario Wallace MD     No valid procedures specified.   Results for orders placed during the hospital encounter of 07/13/21    Nuclear Stress Test    Interpretation Summary    The EKG portion of this study is negative for ischemia.    The patient reported no chest pain during the stress test.    During stress, occasional PVCs are noted.    The nuclear portion of this study will be reported separately.      Physical Exam:  CONSTITUTIONAL: No fever, no chills  HEENT: Normocephalic, atraumatic,pupils reactive to light                 NECK:  No JVD no carotid bruit  CVS: S1S2+, RRR, no murmurs,   LUNGS: Clear  ABDOMEN: Soft, NT, BS+  EXTREMITIES: No cyanosis, edema  : No durant catheter  NEURO: AAO X 3  PSY: Normal affect      Medication List with Changes/Refills   Current Medications    ACETAMINOPHEN (TYLENOL) 500 MG TABLET    Take 2 tablets (1,000 mg total) by  mouth every 8 (eight) hours as needed for Pain.    AMLODIPINE (NORVASC) 5 MG TABLET    Take 1 tablet (5 mg total) by mouth once daily.    ASPIRIN (ECOTRIN) 81 MG EC TABLET    Take 81 mg by mouth once daily.    B COMPLEX VITAMINS (B COMPLEX-VITAMIN B12) TABLET    Take 1 tablet by mouth once daily.    CALCIUM CITRATE (CALCITRATE) 200 MG (950 MG) TABLET    Take 1 tablet by mouth 2 (two) times daily.    EPINEPHRINE (EPIPEN) 0.3 MG/0.3 ML ATIN    Inject into the muscle once for one dose as needed.    MULTIVITAMIN (THERAGRAN) PER TABLET    Take 1 tablet by mouth once daily.    ONDANSETRON (ZOFRAN) 4 MG TABLET    Take 1 tablet (4 mg total) by mouth every 6 (six) hours as needed for Nausea.    OXYCODONE-ACETAMINOPHEN (PERCOCET) 5-325 MG PER TABLET    Take 1 tablet by mouth every 6 (six) hours as needed for Pain.    ROSUVASTATIN (CRESTOR) 10 MG TABLET    Take 1 tablet (10 mg total) by mouth every evening.    TRAZODONE (DESYREL) 50 MG TABLET    Take 1 tablet (50 mg total) by mouth every evening.             Assessment:       1. Atrial fibrillation, unspecified type    2. Primary hypertension    3. Paroxysmal atrial fibrillation    4. Implantable loop recorder present    5. Hypokalemia    6. Dyslipidemia         Plan:     Problem List Items Addressed This Visit          Cardiac/Vascular    HTN (hypertension) (Chronic)    Current Assessment & Plan     Bp currently stable at 124/80. Continue current HTN regimen to include amldopine 5 mg daily. Encouraged low sodium diet.          Paroxysmal atrial fibrillation    Current Assessment & Plan     Had one episode of Afib noted May 2023 after having an allergic reaction to peanuts and requiring Epi pen at home. Also at the time of the noted afib was hypokalemic. It appears possible long term use of anticoagulation was discussed at that time however they had decided to just proceed with baby asa at that time. No further episodes of afib noted, however I also do not see any recent  "device checks on chart. Would advise pt to have loop recorder checked one last time before possible removal. Denies any palpitations.          Implantable loop recorder present    Current Assessment & Plan     Pt  requesting to have loop recorder removed as he does not "want it anymore" and it causing him discomfort. No infection noted around site.     Will tentatively plan for loop recorder removal with Dr. Comer on 12/30 however would like one last cardiac device check before removal to assess Afib burden (if any).          Dyslipidemia    Current Assessment & Plan     Last lipid panel stable      Latest Reference Range & Units 01/20/24 07:17   Cholesterol Total 120 - 199 mg/dL 111 (L)   HDL 40 - 75 mg/dL 42   HDL/Cholesterol Ratio 20.0 - 50.0 % 37.8   Non-HDL Cholesterol mg/dL 69   Total Cholesterol/HDL Ratio 2.0 - 5.0  2.6   Triglycerides 30 - 150 mg/dL 25 (L)   LDL Cholesterol 63.0 - 159.0 mg/dL 64.0   (L): Data is abnormally low    Continue with Crestor 10 mg nightly. Encouraged heart healthy low fat low cholesterol diet. Exercise as tolerated. Will be due for repeat labs early next year.             Renal/    Hypokalemia    Current Assessment & Plan     Last K stable at 4.3. Not currently on supplements.           Other Visit Diagnoses       Atrial fibrillation, unspecified type    -  Primary    Relevant Orders    Case Request-Cath Lab: REMOVAL, IMPLANTABLE LOOP RECORDER (Completed)    Cardiac device check - [facility]            Follow up in about 6 months (around 6/5/2025).      "

## 2024-12-05 NOTE — PROGRESS NOTES
"Subjective:       Patient ID: Fabiano Jules Jr. is a 54 y.o. male.    Chief Complaint: memory problem      Fabiano Jules Jr. is a 54 y.o. male with HTN, HLD who presents to clinic for evaluation of memory problems ongoing for about 3 years. He states memory changes have been persistent since he had Covid in 2021. He reports he has had some difficulties recognizing people he has known for several years. He states he sets reminders all day long, and he notes sometimes when the reminder notifies him he cannot remember what it is for. He states he has missed several appointments due to this. He notes he has his bills on auto-draft due to forgetting to pay them. He reports he currently has misplaced his debit card and some cash. He reports he has had some difficulties at work too as an . He reports he does not get lost driving, but he occasionally forgets where he is going. He states he completed speech therapy in 2022 for long Covid. He has history of sleep apnea, but states he no longer uses CPAP after significant weight loss. States he has not had an updated sleep study since the weight loss.    MRI brain from March 2024:   "Small T2 hyperintense foci of the right aspect of the sharon and right thalamus, favoring remote lacunar type infarcts over prominent perivascular spaces.  Ill-defined edema and enhancement involving the partially visualized left muscles of mastication.  Differential should include denervation edema, myositis, amongst other etiologies.  Clinical correlation is required.  Dedicated cranial nerve imaging ([facility] protocol MRI) and/or imaging of the face in light of reported history of "kaya facial spasm and Bell's palsy"."    He also reports depression, stating his lapse in memory has been trigger for the depression. He reports he has difficulty keeping a job and some trouble with his finances. 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 He has had some depression intermittently over the past few years. He has tried Remeron in the past, but he states it was "too strong" and caused him to feel suicidal and have nightmares. Reports he has difficulty sleeping and this is his primary concern. He denies current SI, but notes he has considered it in the past. Denies SI with plan. He is amicable to therapy and trying medication.         Review of Systems   Constitutional:  Negative for fatigue.   Respiratory:  Negative for shortness of breath.    Cardiovascular:  Negative for chest pain.   Gastrointestinal:  Negative for abdominal pain, constipation and diarrhea.   Skin:  Negative for rash.   Neurological:  Negative for dizziness and headaches.        Memory problem   Psychiatric/Behavioral:  Positive for dysphoric mood and sleep disturbance.          Past Medical History:   Diagnosis Date    Arthritis     Back injury     Colon polyp     COVID     CPAP (continuous positive airway pressure) dependence     NOT NEEDED AFTER WEIGHT LOSS SURGERY    General anesthetics causing adverse effect in therapeutic use     WOKE UP CONFUSED X 1    Hypertension     PONV (postoperative nausea and vomiting)     Sleep apnea     c pap machine used  - NOT NEEDED AFTER GASTRIC SLEEVE    Wears glasses        Review of patient's allergies indicates:   Allergen Reactions    Peaches [peach (prunus persica)] Anaphylaxis    Peanut Anaphylaxis    Tree pollen-red birch Anaphylaxis         Current Outpatient Medications:     acetaminophen (TYLENOL) 500 MG tablet, Take 2 tablets (1,000 mg total) by mouth every 8 (eight) hours as needed for Pain., Disp: 30 tablet, Rfl: 0    amLODIPine (NORVASC) 5 MG tablet, Take 1 tablet (5 mg total) by mouth once daily., Disp: 90 tablet, Rfl: 3    aspirin (ECOTRIN) 81 MG EC tablet, Take 81 mg by mouth once daily., Disp: , Rfl:     b complex vitamins (B COMPLEX-VITAMIN B12) tablet, Take 1 tablet by mouth once daily., Disp: 90 tablet, Rfl: 3    " calcium citrate (CALCITRATE) 200 mg (950 mg) tablet, Take 1 tablet by mouth 2 (two) times daily., Disp: , Rfl:     EPINEPHrine (EPIPEN) 0.3 mg/0.3 mL AtIn, Inject into the muscle once for one dose as needed., Disp: 2 each, Rfl: 1    multivitamin (THERAGRAN) per tablet, Take 1 tablet by mouth once daily., Disp: , Rfl:     ondansetron (ZOFRAN) 4 MG tablet, Take 1 tablet (4 mg total) by mouth every 6 (six) hours as needed for Nausea., Disp: 30 tablet, Rfl: 0    oxyCODONE-acetaminophen (PERCOCET) 5-325 mg per tablet, Take 1 tablet by mouth every 6 (six) hours as needed for Pain., Disp: 14 tablet, Rfl: 0    rosuvastatin (CRESTOR) 10 MG tablet, Take 1 tablet (10 mg total) by mouth every evening. (Patient taking differently: Take 10 mg by mouth once daily.), Disp: 90 tablet, Rfl: 3    traZODone (DESYREL) 50 MG tablet, Take 1 tablet (50 mg total) by mouth every evening., Disp: 30 tablet, Rfl: 2  No current facility-administered medications for this visit.    Facility-Administered Medications Ordered in Other Visits:     ceFAZolin in dextrose (iso-os) 2 gram/100 mL PgBk 2,000 mg, 2 g, Intravenous, Q8H, Katie Shane, NP, Stopped at 04/09/22 1036    HYDROmorphone injection 1 mg, 1 mg, Intravenous, Q6H PRN, Katie Shane NP, 1 mg at 04/09/22 0539    oxyCODONE-acetaminophen  mg per tablet 1 tablet, 1 tablet, Oral, Q4H PRN, Katie Shane, NP, 1 tablet at 04/09/22 0906    polyethylene glycol packet 17 g, 17 g, Oral, Daily, Katie Shane, NP, 17 g at 04/08/22 0822    Objective:        Physical Exam  Vitals reviewed.   Constitutional:       General: He is not in acute distress.     Appearance: Normal appearance.   HENT:      Head: Normocephalic and atraumatic.   Eyes:      Conjunctiva/sclera: Conjunctivae normal.   Cardiovascular:      Rate and Rhythm: Normal rate.   Pulmonary:      Effort: Pulmonary effort is normal.   Musculoskeletal:         General: Normal range of motion.      Cervical back: Normal range of  "motion.   Skin:     General: Skin is warm and dry.   Neurological:      Mental Status: He is alert and oriented to person, place, and time.           Visit Vitals  BP (!) 142/82 (BP Location: Right arm, Patient Position: Sitting)   Pulse (!) 58   Temp 98.3 °F (36.8 °C) (Oral)   Ht 5' 10" (1.778 m)   Wt 103.7 kg (228 lb 9.9 oz)   SpO2 99%   BMI 32.80 kg/m²      Assessment:         1. Memory problem    2. History of sleep apnea    3. Depression, unspecified depression type    4. Insomnia, unspecified type        Plan:         Diagnoses and all orders for this visit:    Memory problem  -     VITAMIN B12; Future  -     Treponema Pallidium Antibodies IgG, IgM; Future  -     FOLATE; Future  -     VITAMIN B1; Future  -     TSH; Future  -     CBC Auto Differential; Future  -     FERRITIN; Future  -     Comprehensive Metabolic Panel; Future  -     Ambulatory referral/consult to Neurology; Future    History of sleep apnea  -     Ambulatory referral/consult to Pulmonology; Future    Depression, unspecified depression type  -     Ambulatory referral/consult to Psychiatry; Future  -     Pt is amicable to therapy and medication management of his depression and insomnia. Discussed memory problems may be worsening secondary to the depression/ insomnia. Would like for him to have updated sleep study to r/o RAFAEL.     Insomnia, unspecified type  -     traZODone (DESYREL) 50 MG tablet; Take 1 tablet (50 mg total) by mouth every evening.  -     Ambulatory referral/consult to Psychiatry; Future    Follow up as scheduled with PCP or sooner if needed.        Family Medicine Physician Assistant     Future Appointments       Date Provider Specialty Appt Notes    12/5/2024  Lab .    12/5/2024 Cindy Lala NP Cardiology wants loop recorder removed    12/6/2024 Howie Mathias MD Family Medicine annual    12/10/2024 Cindy Purvis PT, DPT Outpatient Rehab Est Pt =2x - 6w    12/16/2024 Cindy Purvis PT, DPT Outpatient Rehab Est " Pt =2x - 6w    12/23/2024 Cindy Purvis S, PT, DPT Outpatient Rehab Est Pt =2x - 6w    12/26/2024 Cindy Purvis S, PT, DPT Outpatient Rehab Est Pt =2x - 6w    12/30/2024 Cindy Purvis S, PT, DPT Outpatient Rehab Est Pt =2x - 6w    1/2/2025 Darrion Ricks MD Orthopedics [facility]-6 week post op             All of your core healthy metrics are met.       I spent a total of 30 minutes on the day of the visit.This includes face to face time and non-face to face time preparing to see the patient (eg, review of tests), obtaining and/or reviewing separately obtained history, documenting clinical information in the electronic or other health record, independently interpreting results and communicating results to the patient/family/caregiver, or care coordinator.

## 2024-12-06 ENCOUNTER — TELEPHONE (OUTPATIENT)
Dept: FAMILY MEDICINE | Facility: CLINIC | Age: 54
End: 2024-12-06
Payer: MEDICAID

## 2024-12-06 NOTE — TELEPHONE ENCOUNTER
This matter has been handled & documented in a separate encounter. Please refer to result notes.

## 2024-12-06 NOTE — TELEPHONE ENCOUNTER
----- Message from Kelsie Faria PA-C sent at 12/6/2024  8:02 AM CST -----  Labs appear stable. No findings to account for his memory changes. Recommend follow up with neurology as discussed.

## 2024-12-06 NOTE — TELEPHONE ENCOUNTER
Kristi Hernandez Staff     ----- Message from Kristi sent at 12/6/2024  9:24 AM CST -----  Contact: Pt  Type:  Patient Returning Call    Who Called:Pt  Who Left Message for Patient:Deidra  Does the patient know what this is regarding?: Results   Would the patient rather a call back or a response via [facility]? Call  Best Call Back Number: [phone]  Additional Information: Thank you

## 2024-12-11 LAB — VIT B1 BLD-MCNC: 55 UG/L (ref 38–122)

## 2024-12-12 PROBLEM — Z95.818 IMPLANTABLE LOOP RECORDER PRESENT: Status: ACTIVE | Noted: 2024-12-12

## 2024-12-12 PROBLEM — E78.5 DYSLIPIDEMIA: Status: ACTIVE | Noted: 2024-12-12

## 2024-12-12 NOTE — ASSESSMENT & PLAN NOTE
Bp currently stable at 124/80. Continue current HTN regimen to include amldopine 5 mg daily. Encouraged low sodium diet.

## 2024-12-12 NOTE — ASSESSMENT & PLAN NOTE
"Pt  requesting to have loop recorder removed as he does not "want it anymore" and it causing him discomfort. No infection noted around site.     Will tentatively plan for loop recorder removal with Dr. Comer on 12/30 however would like one last cardiac device check before removal to assess Afib burden (if any).   "

## 2024-12-12 NOTE — ASSESSMENT & PLAN NOTE
Had one episode of Afib noted May 2023 after having an allergic reaction to peanuts and requiring Epi pen at home. Also at the time of the noted afib was hypokalemic. It appears possible long term use of anticoagulation was discussed at that time however they had decided to just proceed with baby asa at that time. No further episodes of afib noted, however I also do not see any recent device checks on chart. Would advise pt to have loop recorder checked one last time before possible removal. Denies any palpitations.

## 2024-12-12 NOTE — ASSESSMENT & PLAN NOTE
Last lipid panel stable      Latest Reference Range & Units 01/20/24 07:17   Cholesterol Total 120 - 199 mg/dL 111 (L)   HDL 40 - 75 mg/dL 42   HDL/Cholesterol Ratio 20.0 - 50.0 % 37.8   Non-HDL Cholesterol mg/dL 69   Total Cholesterol/HDL Ratio 2.0 - 5.0  2.6   Triglycerides 30 - 150 mg/dL 25 (L)   LDL Cholesterol 63.0 - 159.0 mg/dL 64.0   (L): Data is abnormally low    Continue with Crestor 10 mg nightly. Encouraged heart healthy low fat low cholesterol diet. Exercise as tolerated. Will be due for repeat labs early next year.

## 2024-12-16 ENCOUNTER — CLINICAL SUPPORT (OUTPATIENT)
Dept: REHABILITATION | Facility: HOSPITAL | Age: 54
End: 2024-12-16
Payer: MEDICAID

## 2024-12-16 DIAGNOSIS — R53.1 STRENGTH LOSS OF: ICD-10-CM

## 2024-12-16 DIAGNOSIS — M25.60 RANGE OF MOTION DEFICIT: Primary | ICD-10-CM

## 2024-12-16 PROCEDURE — 97110 THERAPEUTIC EXERCISES: CPT | Mod: PN

## 2024-12-16 NOTE — PROGRESS NOTES
"[facility]   Physical Therapy Treatment Note      Name: Fabiano Jules Jr.  Clinic Number: [id number]    Therapy Diagnosis:   Encounter Diagnoses   Name Primary?    Range of motion deficit Yes    Strength loss of        Physician: Darrion Ricks,*    Visit Date: 12/16/2024      Physician Orders: PT Eval and treat   Medical Diagnosis from Referral:   Diagnosis   S46.011D (ICD-10-CM) - Traumatic complete tear of right rotator cuff, subsequent encounter      Evaluation Date: 10/23/2024  Authorization Period Expiration:     12/31/2024      Plan of Care Expiration: 1/15/2025  Progress Note Due: 11/23/2024  Visit # / Visits authorized: 9/ 20   FOTO: 2/ 3     Precautions: Standard      Time In: 400 pm  Time Out: 445 pm  Total Billable Time: 45 minutes     Date of Procedure: 9/6/2024   Post Op Day: 12 weeks and 5 days as of 12/5  Procedure: Procedure(s) (LRB):  REPAIR, ROTATOR CUFF, ARTHROSCOPIC, WITH BIOINDUCTIVE IMPLANT (Right)  EXTENSIVE DEBRIDEMENT, SHOULDER, ARTHROSCOPIC (Right)  ARTHROSCOPY, SHOULDER, WITH SUBACROMIAL SPACE DECOMPRESSION (Right)  ARTHROSCOPY,SHOULDER,WITH BICEPS TENODESIS (Right)     Surgeons and Role:     * Darrion Ricks MD - Primary     From MD on 10/17: "Plan:  I reviewed the findings with him today.  Patient may stop the sling altogether now.  We will get him started with some formal physical therapy for rotator cuff repair protocol.  I will see him back in 6 weeks.  Advised him to be very careful with this still."    PTA Visit #: 0/5       Subjective     Patient reports: he quit his other job and is applying for a job at the [employer]. He is excited about that job. Still has discomfort and weakness in his shoulder.     He was compliant with home exercise program.  Response to previous treatment: as noted  Functional change: as noted     Pain: 5/10  Location: right shoulder      Objective      Objective Measures updated at progress report unless specified. "     Flexion to 160    Treatment     Fabiano received the treatments listed below:      therapeutic exercises to develop strength, endurance, ROM, and flexibility for 35 minutes including:    Upper Body Ergometer 4'/4' level 2  Side Lying open books x 20 Bilateral   Table step backs x 20     Wall slides with pillow case 3 x 10 repetitions red band     Biceps curl 6# x 30  Seated thoracic extension x 30  Green band Internal Rotation/External Rotation 2 x 15  Wall ball 30/30 gray ball    Not Performed Today:  Side Lying External Rotation x 30  Supine external rotation stretch 5 seconds holds x 3 minutes  Flexion slides on stair rail x 3 minutes  Scaption slides on stair rail x 3 minutes    manual therapy techniques: Joint mobilizations were applied to the: right shoulder for 10 minutes, including:    Inferior and posterior joint mobilizations grade II  Thoracic PA mobs grade III     Not Performed Today:  Manual pendulums followed by PROM    Patient Education and Home Exercises       Education provided:   - Home Exercise Program     Written Home Exercises Provided: Yes. Exercises were reviewed and Fabiano was able to demonstrate them prior to the end of the session.  Fabiano demonstrated good  understanding of the education provided. See Electronic Medical Record under Patient Instructions for exercises provided during therapy sessions    Assessment     Fabiano presented with some discomfort with end range of motion. We continued to work on range of motion interventions along with strength training. He tolerates interventions well with minimal pain and appropriate muscle burn. Will continue to progress as able.     Fabiano Is progressing well towards his goals.   Patient prognosis is Good.     Patient will continue to benefit from skilled outpatient physical therapy to address the deficits listed in the problem list box on initial evaluation, provide pt/family education and to maximize pt's level of independence in the home and  community environment.     Patient's spiritual, cultural and educational needs considered and pt agreeable to plan of care and goals.     Anticipated barriers to physical therapy: co-morbidities, chronic pain     Goals:     Short Term Goals (6 Weeks)  -Progressing, not met   Patient will be independent in Home Exercise Program to support improving range of motion.  Patient will have improved Passive Range of Motion to equal the uninvolved side.  Patient will have improved Active Range of Motion to 120 of flexion.      Long Term Goals (12 Weeks) -Progressing, not met   Patient will be independent in progressed Home Exercise Program to support improving functional strength.  Patient goal: return to work and to the gym.  Patient will have improved FOTO score to predicted level to demonstrate true functional improvements.   Patient will have improved Active Range of Motion and Passive Range of Motion to equal the uninvolved side.     Plan     Progress as per POC.     Cindy Purvis, PT, DPT   Board Certified Clinical Specialist in Orthopedic Physical Therapy

## 2024-12-19 ENCOUNTER — HOSPITAL ENCOUNTER (OUTPATIENT)
Dept: CARDIOLOGY | Facility: CLINIC | Age: 54
Discharge: HOME | End: 2024-12-19
Payer: MEDICAID

## 2024-12-19 ENCOUNTER — TELEPHONE (OUTPATIENT)
Dept: CARDIOLOGY | Facility: CLINIC | Age: 54
End: 2024-12-19
Payer: MEDICAID

## 2024-12-19 DIAGNOSIS — I48.91 ATRIAL FIBRILLATION, UNSPECIFIED TYPE: ICD-10-CM

## 2024-12-19 NOTE — TELEPHONE ENCOUNTER
Patient has MDT ILR. Patient wanted loop removed prior to report today. Patient loop report showed runs of SVT rates 160-190s. Patient wanted loop removed due to machine at home did not properly work. Patient was advised he can wait and us get a new device at bedside or he can continue and have it removed. Patient opted to wait and get a new bedside monitor and hold off on removal at this time. Informed Cindy Spike his decision and let her review report.

## 2024-12-23 ENCOUNTER — CLINICAL SUPPORT (OUTPATIENT)
Dept: REHABILITATION | Facility: HOSPITAL | Age: 54
End: 2024-12-23
Payer: MEDICAID

## 2024-12-23 DIAGNOSIS — R53.1 STRENGTH LOSS OF: ICD-10-CM

## 2024-12-23 DIAGNOSIS — M25.60 RANGE OF MOTION DEFICIT: Primary | ICD-10-CM

## 2024-12-23 PROCEDURE — 97110 THERAPEUTIC EXERCISES: CPT | Mod: PN

## 2024-12-23 NOTE — PROGRESS NOTES
"[facility]   Physical Therapy Treatment Note      Name: Fabiano Jules Jr.  Clinic Number: [id number]    Therapy Diagnosis:   Encounter Diagnoses   Name Primary?    Range of motion deficit Yes    Strength loss of        Physician: Darrion Ricks,*    Visit Date: 12/23/2024      Physician Orders: PT Eval and treat   Medical Diagnosis from Referral:   Diagnosis   S46.011D (ICD-10-CM) - Traumatic complete tear of right rotator cuff, subsequent encounter      Evaluation Date: 10/23/2024  Authorization Period Expiration:     12/31/2024      Plan of Care Expiration: 1/15/2025  Progress Note Due: 11/23/2024  Visit # / Visits authorized: 9/ 20   FOTO: 2/ 3     Precautions: Standard      Time In: 400 pm  Time Out: 445 pm  Total Billable Time: 45 minutes     Date of Procedure: 9/6/2024   Post Op Day: 12 weeks and 5 days as of 12/5  Procedure: Procedure(s) (LRB):  REPAIR, ROTATOR CUFF, ARTHROSCOPIC, WITH BIOINDUCTIVE IMPLANT (Right)  EXTENSIVE DEBRIDEMENT, SHOULDER, ARTHROSCOPIC (Right)  ARTHROSCOPY, SHOULDER, WITH SUBACROMIAL SPACE DECOMPRESSION (Right)  ARTHROSCOPY,SHOULDER,WITH BICEPS TENODESIS (Right)     Surgeons and Role:     * Darrion Ricks MD - Primary     From MD on 10/17: "Plan:  I reviewed the findings with him today.  Patient may stop the sling altogether now.  We will get him started with some formal physical therapy for rotator cuff repair protocol.  I will see him back in 6 weeks.  Advised him to be very careful with this still."    PTA Visit #: 0/5       Subjective     Patient reports: waiting to hear back about his application to the job at the [employer].     He was compliant with home exercise program.  Response to previous treatment: as noted  Functional change: as noted     Pain: 5/10  Location: right shoulder      Objective      Objective Measures updated at progress report unless specified.     Flexion to 160    Treatment     Fabiano received the treatments listed below:  "     therapeutic exercises to develop strength, endurance, ROM, and flexibility for 35 minutes including:    Upper Body Ergometer 4'/4' level 2  Prone middle trap level 2, 3 x 10  Side Lying open books x 20 Bilateral   Table step backs x 20     Wall slides with pillow case 3 x 10 repetitions red band     Biceps curl 6# x 30  Seated thoracic extension x 30  Green band Internal Rotation/External Rotation 2 x 15  Wall ball 30/30 gray ball  Wall walk x 15    Not Performed Today:  Side Lying External Rotation x 30  Supine external rotation stretch 5 seconds holds x 3 minutes  Flexion slides on stair rail x 3 minutes  Scaption slides on stair rail x 3 minutes    manual therapy techniques: Joint mobilizations were applied to the: right shoulder for 10 minutes, including:    Inferior and posterior joint mobilizations grade II  Thoracic PA mobs grade III     Not Performed Today:  Manual pendulums followed by PROM    Patient Education and Home Exercises       Education provided:   - Home Exercise Program     Written Home Exercises Provided: Yes. Exercises were reviewed and Fabiano was able to demonstrate them prior to the end of the session.  Fabiano demonstrated good  understanding of the education provided. See Electronic Medical Record under Patient Instructions for exercises provided during therapy sessions    Assessment     Fabiano presented with continued discomfort at end range, though Active Range of Motion sees to be improving well. Continued with strengthening and range of motion interventions. Will progress as tolerated.     Fabiano Is progressing well towards his goals.   Patient prognosis is Good.     Patient will continue to benefit from skilled outpatient physical therapy to address the deficits listed in the problem list box on initial evaluation, provide pt/family education and to maximize pt's level of independence in the home and community environment.     Patient's spiritual, cultural and educational needs considered  and pt agreeable to plan of care and goals.     Anticipated barriers to physical therapy: co-morbidities, chronic pain     Goals:     Short Term Goals (6 Weeks)  -Progressing, not met   Patient will be independent in Home Exercise Program to support improving range of motion.  Patient will have improved Passive Range of Motion to equal the uninvolved side.  Patient will have improved Active Range of Motion to 120 of flexion.      Long Term Goals (12 Weeks) -Progressing, not met   Patient will be independent in progressed Home Exercise Program to support improving functional strength.  Patient goal: return to work and to the gym.  Patient will have improved FOTO score to predicted level to demonstrate true functional improvements.   Patient will have improved Active Range of Motion and Passive Range of Motion to equal the uninvolved side.     Plan     Progress as per POC.     Cindy Purvis, PT, DPT   Board Certified Clinical Specialist in Orthopedic Physical Therapy

## 2024-12-26 ENCOUNTER — CLINICAL SUPPORT (OUTPATIENT)
Dept: REHABILITATION | Facility: HOSPITAL | Age: 54
End: 2024-12-26
Payer: MEDICAID

## 2024-12-26 DIAGNOSIS — R53.1 STRENGTH LOSS OF: ICD-10-CM

## 2024-12-26 DIAGNOSIS — M25.60 RANGE OF MOTION DEFICIT: Primary | ICD-10-CM

## 2024-12-26 PROCEDURE — 97110 THERAPEUTIC EXERCISES: CPT | Mod: PN

## 2024-12-26 NOTE — PROGRESS NOTES
"[facility]   Physical Therapy Treatment Note      Name: Fabiano Jules Jr.  Clinic Number: [id number]    Therapy Diagnosis:   Encounter Diagnoses   Name Primary?    Range of motion deficit Yes    Strength loss of        Physician: Darrion Ricks,*    Visit Date: 12/26/2024      Physician Orders: PT Eval and treat   Medical Diagnosis from Referral:   Diagnosis   S46.011D (ICD-10-CM) - Traumatic complete tear of right rotator cuff, subsequent encounter      Evaluation Date: 10/23/2024  Authorization Period Expiration:     12/31/2024      Plan of Care Expiration: 1/15/2025  Progress Note Due: 11/23/2024  Visit # / Visits authorized: 11/ 20   FOTO: 2/ 3     Precautions: Standard      Time In: 400 pm  Time Out: 445 pm  Total Billable Time: 45 minutes     Date of Procedure: 9/6/2024   Post Op Day: 12 weeks and 5 days as of 12/5  Procedure: Procedure(s) (LRB):  REPAIR, ROTATOR CUFF, ARTHROSCOPIC, WITH BIOINDUCTIVE IMPLANT (Right)  EXTENSIVE DEBRIDEMENT, SHOULDER, ARTHROSCOPIC (Right)  ARTHROSCOPY, SHOULDER, WITH SUBACROMIAL SPACE DECOMPRESSION (Right)  ARTHROSCOPY,SHOULDER,WITH BICEPS TENODESIS (Right)     Surgeons and Role:     * Darrion Ricks MD - Primary     From MD on 10/17: "Plan:  I reviewed the findings with him today.  Patient may stop the sling altogether now.  We will get him started with some formal physical therapy for rotator cuff repair protocol.  I will see him back in 6 weeks.  Advised him to be very careful with this still."    PTA Visit #: 0/5       Subjective     Patient reports: sore today, must have slept on it wrong.     He was compliant with home exercise program.  Response to previous treatment: as noted  Functional change: as noted     Pain: 5/10  Location: right shoulder      Objective      Objective Measures updated at progress report unless specified.     Flexion to 160    Treatment     Fabiano received the treatments listed below:      therapeutic exercises " to develop strength, endurance, ROM, and flexibility for 35 minutes including:    Upper Body Ergometer 4'/4' level 3  Prone middle trap level 2, 3 x 10  Side Lying open books x 20 Bilateral   Table step backs x 20     Wall slides with pillow case 3 x 10 repetitions red band     Biceps curl 8# x 30  Seated thoracic extension x 30  Green band Internal Rotation/External Rotation 2 x 15  Wall ball 30/30 yellow ball  Wall walk x 15    Not Performed Today:  Side Lying External Rotation x 30  Supine external rotation stretch 5 seconds holds x 3 minutes  Flexion slides on stair rail x 3 minutes  Scaption slides on stair rail x 3 minutes    manual therapy techniques: Joint mobilizations were applied to the: right shoulder for 10 minutes, including:    Inferior and posterior joint mobilizations grade II  Thoracic PA mobs grade III     Not Performed Today:  Manual pendulums followed by PROM    Patient Education and Home Exercises       Education provided:   - Home Exercise Program     Written Home Exercises Provided: Yes. Exercises were reviewed and Fabiano was able to demonstrate them prior to the end of the session.  Fabiano demonstrated good  understanding of the education provided. See Electronic Medical Record under Patient Instructions for exercises provided during therapy sessions    Assessment     Fabiano presented with continued discomfort at end range. Continued with strengthening and range of motion interventions. Will progress as tolerated.     Fabiano Is progressing well towards his goals.   Patient prognosis is Good.     Patient will continue to benefit from skilled outpatient physical therapy to address the deficits listed in the problem list box on initial evaluation, provide pt/family education and to maximize pt's level of independence in the home and community environment.     Patient's spiritual, cultural and educational needs considered and pt agreeable to plan of care and goals.     Anticipated barriers to physical  therapy: co-morbidities, chronic pain     Goals:     Short Term Goals (6 Weeks)  -Progressing, not met   Patient will be independent in Home Exercise Program to support improving range of motion.  Patient will have improved Passive Range of Motion to equal the uninvolved side.  Patient will have improved Active Range of Motion to 120 of flexion.      Long Term Goals (12 Weeks) -Progressing, not met   Patient will be independent in progressed Home Exercise Program to support improving functional strength.  Patient goal: return to work and to the gym.  Patient will have improved FOTO score to predicted level to demonstrate true functional improvements.   Patient will have improved Active Range of Motion and Passive Range of Motion to equal the uninvolved side.     Plan     Progress as per POC.     Cindy Purvis, PT, DPT   Board Certified Clinical Specialist in Orthopedic Physical Therapy

## 2024-12-30 ENCOUNTER — HOSPITAL ENCOUNTER (OUTPATIENT)
Dept: CARDIOLOGY | Facility: CLINIC | Age: 54
Discharge: HOME | End: 2024-12-30
Attending: GENERAL PRACTICE
Payer: MEDICAID

## 2024-12-30 ENCOUNTER — CLINICAL SUPPORT (OUTPATIENT)
Dept: REHABILITATION | Facility: HOSPITAL | Age: 54
End: 2024-12-30
Payer: MEDICAID

## 2024-12-30 DIAGNOSIS — R53.1 STRENGTH LOSS OF: ICD-10-CM

## 2024-12-30 DIAGNOSIS — M25.60 RANGE OF MOTION DEFICIT: Primary | ICD-10-CM

## 2024-12-30 DIAGNOSIS — I49.8 OTHER SPECIFIED CARDIAC ARRHYTHMIAS: ICD-10-CM

## 2024-12-30 PROCEDURE — 97110 THERAPEUTIC EXERCISES: CPT | Mod: PN

## 2024-12-30 PROCEDURE — 93298 REM INTERROG DEV EVAL SCRMS: CPT | Mod: PBBFAC,PN | Performed by: INTERNAL MEDICINE

## 2024-12-30 NOTE — PROGRESS NOTES
"[facility]   Physical Therapy Treatment Note      Name: Fabiano Jules Jr.  Clinic Number: [id number]    Therapy Diagnosis:   Encounter Diagnoses   Name Primary?    Range of motion deficit Yes    Strength loss of        Physician: Darrion Ricks,*    Visit Date: 12/30/2024      Physician Orders: PT Eval and treat   Medical Diagnosis from Referral:   Diagnosis   S46.011D (ICD-10-CM) - Traumatic complete tear of right rotator cuff, subsequent encounter      Evaluation Date: 10/23/2024  Authorization Period Expiration:     12/31/2024      Plan of Care Expiration: 1/15/2025  Progress Note Due: 11/23/2024  Visit # / Visits authorized: 11/ 20   FOTO: 2/ 3     Precautions: Standard      Time In: 400 pm  Time Out: 445 pm  Total Billable Time: 45 minutes     Date of Procedure: 9/6/2024   Post Op Day: 12 weeks and 5 days as of 12/5  Procedure: Procedure(s) (LRB):  REPAIR, ROTATOR CUFF, ARTHROSCOPIC, WITH BIOINDUCTIVE IMPLANT (Right)  EXTENSIVE DEBRIDEMENT, SHOULDER, ARTHROSCOPIC (Right)  ARTHROSCOPY, SHOULDER, WITH SUBACROMIAL SPACE DECOMPRESSION (Right)  ARTHROSCOPY,SHOULDER,WITH BICEPS TENODESIS (Right)     Surgeons and Role:     * Darrion Ricks MD - Primary     From MD on 10/17: "Plan:  I reviewed the findings with him today.  Patient may stop the sling altogether now.  We will get him started with some formal physical therapy for rotator cuff repair protocol.  I will see him back in 6 weeks.  Advised him to be very careful with this still."    PTA Visit #: 0/5       Subjective     Patient reports: feeling real stiff but still able to lift it.     He was compliant with home exercise program.  Response to previous treatment: as noted  Functional change: as noted     Pain: 5/10  Location: right shoulder      Objective      Objective Measures updated at progress report unless specified.     Flexion to 165    Treatment     Fabiano received the treatments listed below:      therapeutic " exercises to develop strength, endurance, ROM, and flexibility for 35 minutes including:    Upper Body Ergometer 4'/4' level 3  Prone middle trap level 2, 3 x 10  Side Lying open books x 20 Bilateral   Table step backs x 20     Wall slides with pillow case 3 x 10 repetitions red band     Biceps curl 8# x 30  Seated thoracic extension x 30  Green band Internal Rotation/External Rotation 2 x 15  Wall ball 30/30 yellow ball  Wall walk x 15    Not Performed Today:  Side Lying External Rotation x 30  Supine external rotation stretch 5 seconds holds x 3 minutes  Flexion slides on stair rail x 3 minutes  Scaption slides on stair rail x 3 minutes    manual therapy techniques: Joint mobilizations were applied to the: right shoulder for 10 minutes, including:    Inferior and posterior joint mobilizations grade II  Thoracic PA mobs grade III     Not Performed Today:  Manual pendulums followed by PROM    Patient Education and Home Exercises       Education provided:   - Home Exercise Program     Written Home Exercises Provided: Yes. Exercises were reviewed and Fabiano was able to demonstrate them prior to the end of the session.  Fabiano demonstrated good  understanding of the education provided. See Electronic Medical Record under Patient Instructions for exercises provided during therapy sessions    Assessment     Fabiano presented with continued discomfort at end range. Range of motion looking good and strength while performing interventions is doing well. Will progress as tolerated.     Fabiano Is progressing well towards his goals.   Patient prognosis is Good.     Patient will continue to benefit from skilled outpatient physical therapy to address the deficits listed in the problem list box on initial evaluation, provide pt/family education and to maximize pt's level of independence in the home and community environment.     Patient's spiritual, cultural and educational needs considered and pt agreeable to plan of care and goals.      Anticipated barriers to physical therapy: co-morbidities, chronic pain     Goals:     Short Term Goals (6 Weeks)  -Progressing, not met   Patient will be independent in Home Exercise Program to support improving range of motion.  Patient will have improved Passive Range of Motion to equal the uninvolved side.  Patient will have improved Active Range of Motion to 120 of flexion.      Long Term Goals (12 Weeks) -Progressing, not met   Patient will be independent in progressed Home Exercise Program to support improving functional strength.  Patient goal: return to work and to the gym.  Patient will have improved FOTO score to predicted level to demonstrate true functional improvements.   Patient will have improved Active Range of Motion and Passive Range of Motion to equal the uninvolved side.     Plan     Progress as per POC.     Cindy Purvis, PT, DPT   Board Certified Clinical Specialist in Orthopedic Physical Therapy

## 2025-01-02 ENCOUNTER — OFFICE VISIT (OUTPATIENT)
Dept: ORTHOPEDICS | Facility: CLINIC | Age: 55
End: 2025-01-02
Payer: MEDICAID

## 2025-01-02 VITALS — HEIGHT: 70 IN | RESPIRATION RATE: 17 BRPM | BODY MASS INDEX: 32.43 KG/M2

## 2025-01-02 DIAGNOSIS — S46.011D TRAUMATIC COMPLETE TEAR OF RIGHT ROTATOR CUFF, SUBSEQUENT ENCOUNTER: Primary | ICD-10-CM

## 2025-01-02 PROCEDURE — 1159F MED LIST DOCD IN RCRD: CPT | Mod: CPTII,,, | Performed by: ORTHOPAEDIC SURGERY

## 2025-01-02 PROCEDURE — 99999 PR PBB SHADOW E&M-EST. PATIENT-LVL II: CPT | Mod: PBBFAC,,, | Performed by: ORTHOPAEDIC SURGERY

## 2025-01-02 PROCEDURE — 1160F RVW MEDS BY RX/DR IN RCRD: CPT | Mod: CPTII,,, | Performed by: ORTHOPAEDIC SURGERY

## 2025-01-02 PROCEDURE — 99212 OFFICE O/P EST SF 10 MIN: CPT | Mod: PBBFAC,PO | Performed by: ORTHOPAEDIC SURGERY

## 2025-01-02 PROCEDURE — 99214 OFFICE O/P EST MOD 30 MIN: CPT | Mod: S$PBB,,, | Performed by: ORTHOPAEDIC SURGERY

## 2025-01-02 PROCEDURE — 3008F BODY MASS INDEX DOCD: CPT | Mod: CPTII,,, | Performed by: ORTHOPAEDIC SURGERY

## 2025-01-02 NOTE — PROGRESS NOTES
Past Medical History:   Diagnosis Date    Arthritis     Back injury     Colon polyp     COVID     CPAP (continuous positive airway pressure) dependence     NOT NEEDED AFTER WEIGHT LOSS SURGERY    General anesthetics causing adverse effect in therapeutic use     WOKE UP CONFUSED X 1    Hypertension     PONV (postoperative nausea and vomiting)     Sleep apnea     c pap machine used  - NOT NEEDED AFTER GASTRIC SLEEVE    Wears glasses        Past Surgical History:   Procedure Laterality Date    ARTHROSCOPIC DEBRIDEMENT OF SHOULDER Right 9/6/2024    Procedure: EXTENSIVE DEBRIDEMENT, SHOULDER, ARTHROSCOPIC;  Surgeon: Darrion Ricks MD;  Location: [facility] OR;  Service: Orthopedics;  Laterality: Right;    ARTHROSCOPIC REPAIR OF ROTATOR CUFF OF SHOULDER Right 9/6/2024    Procedure: REPAIR, ROTATOR CUFF, ARTHROSCOPIC;  Surgeon: Darrion Ricks MD;  Location: [facility] OR;  Service: Orthopedics;  Laterality: Right;  arthrex-cuffmend/SCR stuff  Hood w/Arthrex notified 8/30/24 ark    ARTHROSCOPY OF SHOULDER WITH DECOMPRESSION OF SUBACROMIAL SPACE Right 9/6/2024    Procedure: ARTHROSCOPY, SHOULDER, WITH SUBACROMIAL SPACE DECOMPRESSION;  Surgeon: Darrion Ricks MD;  Location: [facility] OR;  Service: Orthopedics;  Laterality: Right;    ARTHROSCOPY,SHOULDER,WITH BICEPS TENODESIS Right 9/6/2024    Procedure: ARTHROSCOPY,SHOULDER,WITH BICEPS TENODESIS;  Surgeon: Darrion Ricks MD;  Location: [facility] OR;  Service: Orthopedics;  Laterality: Right;    BONE GRAFT N/A 04/05/2022    Procedure: BONE GRAFT;  Surgeon: Duran Adler Jr., MD;  Location: [facility] OR;  Service: Orthopedics;  Laterality: N/A;    CIRCUMCISION      COLONOSCOPY N/A 12/03/2018    Procedure: COLONOSCOPY;  Surgeon: CHRISSY Reyna MD;  Location: [facility] ENDO (4TH FLR);  Service: Endoscopy;  Laterality: N/A;    COLONOSCOPY N/A 09/01/2022    Procedure: COLONOSCOPY;  Surgeon: Alondra Arias MD;  Location: [facility];  Service: Endoscopy;   Laterality: N/A;    epidural steroid injection X 2      lower lumbar    ESOPHAGOGASTRODUODENOSCOPY N/A 09/01/2022    Procedure: EGD (ESOPHAGOGASTRODUODENOSCOPY);  Surgeon: Alondra Arias MD;  Location: [facility] ENDO;  Service: Endoscopy;  Laterality: N/A;    facet injection       Dr Manpreet Gore [facility] in [place]     INSERTION OF IMPLANTABLE LOOP RECORDER N/A 9/15/2023    Procedure: Insertion, Implantable Loop Recorder;  Surgeon: Romario Wallace MD;  Location: [facility] CATH/EP LAB;  Service: Cardiology;  Laterality: N/A;    INTRALUMINAL GASTROINTESTINAL TRACT IMAGING VIA CAPSULE N/A 10/03/2022    Procedure: IMAGING PROCEDURE, GI TRACT, INTRALUMINAL, VIA CAPSULE;  Surgeon: Alondra Arias MD;  Location: [facility] ENDO;  Service: Endoscopy;  Laterality: N/A;    LAPAROSCOPIC SLEEVE GASTRECTOMY N/A 12/19/2022    Procedure: GASTRECTOMY, SLEEVE, LAPAROSCOPIC;  Surgeon: Lashonda Pinedo MD;  Location: [facility] OR;  Service: General;  Laterality: N/A;    LIPOMA RESECTION      Back of neck    MAGNETIC RESONANCE IMAGING N/A 05/29/2020    Procedure: MRI (MAGNETIC RESONANCE IMAGING) LUMBAR SPINE;  Surgeon: [facility] Diagnostic Provider;  Location: [facility];  Service: General;  Laterality: N/A;  COVID NEG    MAGNETIC RESONANCE IMAGING N/A 12/03/2021    Procedure: MRI (MAGNETIC RESONANCE IMAGING), LUMBAR SPINE;  Surgeon: [facility] Diagnostic Provider;  Location: [facility];  Service: General;  Laterality: N/A;  In MRI @ 7:50 per Ariela    MAGNETIC RESONANCE IMAGING N/A 3/6/2024    Procedure: MRI (Magnetic Resonance Imagine);  Surgeon: Vicenta Michele;  Location: [facility];  Service: Anesthesiology;  Laterality: N/A;    MAGNETIC RESONANCE IMAGING Bilateral 6/28/2024    Procedure: MRI (Magnetic Resonance Imagine);  Surgeon: Vicenta Michele;  Location: [facility];  Service: Anesthesiology;  Laterality: Bilateral;  MRI BRAIN WITH AND WITHOUT CONTRAST    MEDIAL COLLATERAL LIGAMENT AND LATERAL COLLATERAL LIGAMENT REPAIR, KNEE Right  09/01/2018    Dr. Christophe Gore in [place]     MINIMALLY INVASIVE TRANSFORAMINAL LUMBAR INTERBODY FUSION (TLIF) N/A 04/05/2022    Procedure: FUSION, SPINE, LUMBAR, TLIF, MINIMALLY INVASIVE, WITH INSTRUMENTATION,  L4/5 RIGHT;  Surgeon: Duran Adler Jr., MD;  Location: [facility] OR;  Service: Orthopedics;  Laterality: N/A;  10:30am per Tamela    MYELOGRAPHY N/A 06/23/2020    Procedure: MYELOGRAM;  Surgeon: Andi Diagnostic Provider;  Location: [facility] OR;  Service: General;  Laterality: N/A;       Current Outpatient Medications   Medication Sig    acetaminophen (TYLENOL) 500 MG tablet Take 2 tablets (1,000 mg total) by mouth every 8 (eight) hours as needed for Pain.    amLODIPine (NORVASC) 5 MG tablet Take 1 tablet (5 mg total) by mouth once daily.    aspirin (ECOTRIN) 81 MG EC tablet Take 81 mg by mouth once daily.    b complex vitamins (B COMPLEX-VITAMIN B12) tablet Take 1 tablet by mouth once daily.    calcium citrate (CALCITRATE) 200 mg (950 mg) tablet Take 1 tablet by mouth 2 (two) times daily.    EPINEPHrine (EPIPEN) 0.3 mg/0.3 mL AtIn Inject into the muscle once for one dose as needed.    multivitamin (THERAGRAN) per tablet Take 1 tablet by mouth once daily.    ondansetron (ZOFRAN) 4 MG tablet Take 1 tablet (4 mg total) by mouth every 6 (six) hours as needed for Nausea.    oxyCODONE-acetaminophen (PERCOCET) 5-325 mg per tablet Take 1 tablet by mouth every 6 (six) hours as needed for Pain.    rosuvastatin (CRESTOR) 10 MG tablet Take 1 tablet (10 mg total) by mouth every evening.    traZODone (DESYREL) 50 MG tablet Take 1 tablet (50 mg total) by mouth every evening.     No current facility-administered medications for this visit.     Facility-Administered Medications Ordered in Other Visits   Medication    ceFAZolin in dextrose (iso-os) 2 gram/100 mL PgBk 2,000 mg    HYDROmorphone injection 1 mg    oxyCODONE-acetaminophen  mg per tablet 1 tablet    polyethylene glycol packet 17 g       Review of patient's  allergies indicates:   Allergen Reactions    Peaches [peach (prunus persica)] Anaphylaxis    Peanut Anaphylaxis    Tree pollen-red birch Anaphylaxis       Family History   Problem Relation Name Age of Onset    Hypertension Mother      Diabetes Father      Cancer Father          mesotheliosma     Cataracts Neg Hx      Glaucoma Neg Hx      Macular degeneration Neg Hx      Retinal detachment Neg Hx      Colon cancer Neg Hx      Colon polyps Neg Hx      Crohn's disease Neg Hx      Ulcerative colitis Neg Hx         Social History     Socioeconomic History    Marital status:     Number of children: 4   Occupational History     Employer: [employer]   Tobacco Use    Smoking status: Former     Current packs/day: 0.00     Average packs/day: 0.5 packs/day for 10.0 years (5.0 ttl pk-yrs)     Types: Cigarettes     Start date: 1999     Quit date: 2009     Years since quittin.0    Smokeless tobacco: Never    Tobacco comments:     quit smoking in    Substance and Sexual Activity    Alcohol use: Yes     Comment: rarely    Drug use: Not Currently     Types: Marijuana     Comment: medical---oil, smoke and edibles    Sexual activity: Not Currently     Social Drivers of Health     Financial Resource Strain: Low Risk  (2024)    Overall Financial Resource Strain (CARDIA)     Difficulty of Paying Living Expenses: Not hard at all   Food Insecurity: No Food Insecurity (2024)    Hunger Vital Sign     Worried About Running Out of Food in the Last Year: Never true     Ran Out of Food in the Last Year: Never true   Transportation Needs: No Transportation Needs (2024)    PRAPARE - Transportation     Lack of Transportation (Medical): No     Lack of Transportation (Non-Medical): No   Physical Activity: Sufficiently Active (2024)    Exercise Vital Sign     Days of Exercise per Week: 4 days     Minutes of Exercise per Session: 120 min   Recent Concern: Physical Activity - Insufficiently Active (2023)     Exercise Vital Sign     Days of Exercise per Week: 2 days     Minutes of Exercise per Session: 60 min   Stress: Stress Concern Present (1/17/2024)    [employer] - Occupational Stress Questionnaire     Feeling of Stress : To some extent   Housing Stability: High Risk (1/17/2024)    Housing Stability Vital Sign     Unable to Pay for Housing in the Last Year: Yes     Number of Places Lived in the Last Year: 1     Unstable Housing in the Last Year: No       Chief Complaint:   Chief Complaint   Patient presents with    Post-op Evaluation       Date of surgery:  September 6, 2024    History of present illness:  54-year-old male underwent right arthroscopic rotator cuff repair with Regeneten patch augmentation as well as biceps tenodesis.  Patient is doing pretty well.  He is in physical therapy.  Not a lot of pain.  To sore after.  Pain is a 5/10 today.      Review of Systems:    Musculoskeletal:  See HPI        Physical Examination:    Vital Signs:    Vitals:    01/02/25 1015   Resp: 17       Body mass index is 32.43 kg/m².    This a well-developed, well nourished patient in no acute distress.  They are alert and oriented and cooperative to examination.  Pt. walks without an antalgic gait.      Examination of the right shoulder shows well-healed surgical portals.  Patient has near full range of motion with forward flexion of about 174°.  Neurovascularly intact.  Just a little bit of tightness.  4+ out of 5 strength    X-rays:  None     Assessment::  Status post right rotator cuff repair with Regeneten patch augmentation and biceps tenodesis    Plan:  I reviewed the findings with him today.  Continue with the formal physical therapy for rotator cuff repair protocol.  I will see him back in 8 weeks.  Advised him to be very careful with this still.    This note was created using [employer] voice recognition software that occasionally misinterpreted phrases or words.

## 2025-01-03 ENCOUNTER — OFFICE VISIT (OUTPATIENT)
Dept: FAMILY MEDICINE | Facility: CLINIC | Age: 55
End: 2025-01-03
Payer: MEDICAID

## 2025-01-03 ENCOUNTER — LAB VISIT (OUTPATIENT)
Dept: LAB | Facility: HOSPITAL | Age: 55
End: 2025-01-03
Attending: PHYSICIAN ASSISTANT
Payer: MEDICAID

## 2025-01-03 VITALS
HEIGHT: 70 IN | OXYGEN SATURATION: 99 % | DIASTOLIC BLOOD PRESSURE: 66 MMHG | BODY MASS INDEX: 32.89 KG/M2 | WEIGHT: 229.75 LBS | HEART RATE: 66 BPM | SYSTOLIC BLOOD PRESSURE: 112 MMHG

## 2025-01-03 DIAGNOSIS — F32.2 CURRENT SEVERE EPISODE OF MAJOR DEPRESSIVE DISORDER WITHOUT PSYCHOTIC FEATURES, UNSPECIFIED WHETHER RECURRENT: ICD-10-CM

## 2025-01-03 DIAGNOSIS — I10 PRIMARY HYPERTENSION: Chronic | ICD-10-CM

## 2025-01-03 DIAGNOSIS — Z00.00 ROUTINE GENERAL MEDICAL EXAMINATION AT A HEALTH CARE FACILITY: Primary | ICD-10-CM

## 2025-01-03 DIAGNOSIS — M54.41 CHRONIC RIGHT-SIDED LOW BACK PAIN WITH RIGHT-SIDED SCIATICA: ICD-10-CM

## 2025-01-03 DIAGNOSIS — Z00.00 ROUTINE GENERAL MEDICAL EXAMINATION AT A HEALTH CARE FACILITY: ICD-10-CM

## 2025-01-03 DIAGNOSIS — E78.5 DYSLIPIDEMIA: ICD-10-CM

## 2025-01-03 DIAGNOSIS — G89.29 CHRONIC RIGHT-SIDED LOW BACK PAIN WITH RIGHT-SIDED SCIATICA: ICD-10-CM

## 2025-01-03 DIAGNOSIS — F51.01 PRIMARY INSOMNIA: ICD-10-CM

## 2025-01-03 DIAGNOSIS — S46.011D TRAUMATIC COMPLETE TEAR OF RIGHT ROTATOR CUFF, SUBSEQUENT ENCOUNTER: ICD-10-CM

## 2025-01-03 PROBLEM — E78.00 HYPERCHOLESTEROLEMIA: Status: ACTIVE | Noted: 2024-12-12

## 2025-01-03 PROBLEM — S46.011A TRAUMATIC COMPLETE TEAR OF RIGHT ROTATOR CUFF: Status: ACTIVE | Noted: 2025-01-03

## 2025-01-03 LAB
CHOLEST SERPL-MCNC: 126 MG/DL (ref 120–199)
CHOLEST/HDLC SERPL: 2.7 {RATIO} (ref 2–5)
COMPLEXED PSA SERPL-MCNC: 4.3 NG/ML (ref 0–4)
HDLC SERPL-MCNC: 47 MG/DL (ref 40–75)
HDLC SERPL: 37.3 % (ref 20–50)
LDLC SERPL CALC-MCNC: 72.4 MG/DL (ref 63–159)
NONHDLC SERPL-MCNC: 79 MG/DL
TRIGL SERPL-MCNC: 33 MG/DL (ref 30–150)

## 2025-01-03 PROCEDURE — 99999 PR PBB SHADOW E&M-EST. PATIENT-LVL IV: CPT | Mod: PBBFAC,,, | Performed by: STUDENT IN AN ORGANIZED HEALTH CARE EDUCATION/TRAINING PROGRAM

## 2025-01-03 PROCEDURE — 36415 COLL VENOUS BLD VENIPUNCTURE: CPT | Mod: PO | Performed by: PHYSICIAN ASSISTANT

## 2025-01-03 PROCEDURE — 84153 ASSAY OF PSA TOTAL: CPT | Performed by: PHYSICIAN ASSISTANT

## 2025-01-03 PROCEDURE — 80061 LIPID PANEL: CPT | Performed by: PHYSICIAN ASSISTANT

## 2025-01-03 PROCEDURE — 99214 OFFICE O/P EST MOD 30 MIN: CPT | Mod: PBBFAC,PO | Performed by: STUDENT IN AN ORGANIZED HEALTH CARE EDUCATION/TRAINING PROGRAM

## 2025-01-03 RX ORDER — GABAPENTIN 100 MG/1
100 CAPSULE ORAL 2 TIMES DAILY PRN
Qty: 90 CAPSULE | Refills: 1 | Status: SHIPPED | OUTPATIENT
Start: 2025-01-03

## 2025-01-03 RX ORDER — BUPROPION HYDROCHLORIDE 150 MG/1
150 TABLET ORAL DAILY
Qty: 90 TABLET | Refills: 3 | Status: SHIPPED | OUTPATIENT
Start: 2025-01-03

## 2025-01-03 RX ORDER — ACETAMINOPHEN 500 MG
1000 TABLET ORAL EVERY 8 HOURS PRN
Qty: 90 TABLET | Refills: 0 | Status: SHIPPED | OUTPATIENT
Start: 2025-01-03

## 2025-01-03 NOTE — ASSESSMENT & PLAN NOTE
Patient with several years of depression history currently affecting daily activities, interfering with sleep, and he feels is affecting his memory. He has tried Prozac in the past which he did not tolerate, and also did not tolerate mirtazapine. PHQ in office scored 22. Patient open to addition of Wellbutrin  mg daily, patient denies history of known seizures. Follow up in 4-6 weeks.

## 2025-01-03 NOTE — ASSESSMENT & PLAN NOTE
Patient with acute worsening of right sided sciatica over the last 3 days. Pain is worse when sitting and improves with movement. He has done physical therapy in the past and has history of lumbar fusion in 2022. He has not tried any medications at home. Interested in retrying gabapentin prn for sciatica.

## 2025-01-03 NOTE — ASSESSMENT & PLAN NOTE
Patient with difficulty staying asleep likely secondary to uncontrolled depression. Has recently been taking trazodone 50 mg a few hours before bedtime and helps with sleep however causing grogginess the following day. Prescribing antidepressant and advising decreasing trazodone dose to 25 mg to see if grogginess improves. If not improved advise to discontinue.

## 2025-01-03 NOTE — PATIENT INSTRUCTIONS
Start Wellbutrin 150 mg daily in the morning. Cut trazodone in half and take before bedtime to help with sleep.

## 2025-01-03 NOTE — PROGRESS NOTES
Subjective:       Patient ID: Fabiano Jules Jr. is a 54 y.o. male.    Chief Complaint: Annual Exam    Patient presents for annual physical exam and follow up of multiple medical problems including hypertension, hyperlipidemia, chronic low back pain, and depression concerns. Patient reporting depression symptoms for several years that has been interfering with daily life as well as memory issues and sleep. He started trazodone 50 mg several weeks ago and reports medication helps with sleep but makes him groggy during the follow day. He does not report much improvement in memory. He has taken Prozac in the past however did not tolerate it.   He also reports acute worsening of right sided sciatica over the last several days. He has not tried any home medications for this.   Patient up to date on health care maintenance and due for lipid panel and PSA testing. He would like a flu shot today and is interested in Shingles vaccination at pharmacy.           Past Medical History:   Diagnosis Date    Arthritis     Back injury     Colon polyp     COVID     CPAP (continuous positive airway pressure) dependence     NOT NEEDED AFTER WEIGHT LOSS SURGERY    General anesthetics causing adverse effect in therapeutic use     WOKE UP CONFUSED X 1    Hypertension     PONV (postoperative nausea and vomiting)     Sleep apnea     c pap machine used  - NOT NEEDED AFTER GASTRIC SLEEVE    Wears glasses        Review of patient's allergies indicates:   Allergen Reactions    Peaches [peach (prunus persica)] Anaphylaxis    Peanut Anaphylaxis    Tree pollen-red birch Anaphylaxis         Current Outpatient Medications:     amLODIPine (NORVASC) 5 MG tablet, Take 1 tablet (5 mg total) by mouth once daily., Disp: 90 tablet, Rfl: 3    aspirin (ECOTRIN) 81 MG EC tablet, Take 81 mg by mouth once daily., Disp: , Rfl:     b complex vitamins (B COMPLEX-VITAMIN B12) tablet, Take 1 tablet by mouth once daily., Disp: 90 tablet, Rfl: 3    calcium citrate  (CALCITRATE) 200 mg (950 mg) tablet, Take 1 tablet by mouth 2 (two) times daily., Disp: , Rfl:     EPINEPHrine (EPIPEN) 0.3 mg/0.3 mL AtIn, Inject into the muscle once for one dose as needed., Disp: 2 each, Rfl: 1    multivitamin (THERAGRAN) per tablet, Take 1 tablet by mouth once daily., Disp: , Rfl:     ondansetron (ZOFRAN) 4 MG tablet, Take 1 tablet (4 mg total) by mouth every 6 (six) hours as needed for Nausea., Disp: 30 tablet, Rfl: 0    oxyCODONE-acetaminophen (PERCOCET) 5-325 mg per tablet, Take 1 tablet by mouth every 6 (six) hours as needed for Pain., Disp: 14 tablet, Rfl: 0    rosuvastatin (CRESTOR) 10 MG tablet, Take 1 tablet (10 mg total) by mouth every evening., Disp: 90 tablet, Rfl: 3    traZODone (DESYREL) 50 MG tablet, Take 1 tablet (50 mg total) by mouth every evening., Disp: 30 tablet, Rfl: 2    acetaminophen (TYLENOL) 500 MG tablet, Take 2 tablets (1,000 mg total) by mouth every 8 (eight) hours as needed for Pain., Disp: 90 tablet, Rfl: 0    buPROPion (WELLBUTRIN XL) 150 MG TB24 tablet, Take 1 tablet (150 mg total) by mouth once daily., Disp: 90 tablet, Rfl: 3    gabapentin (NEURONTIN) 100 MG capsule, Take 1 capsule (100 mg total) by mouth 2 (two) times daily as needed (sciatica)., Disp: 90 capsule, Rfl: 1  No current facility-administered medications for this visit.    Facility-Administered Medications Ordered in Other Visits:     ceFAZolin in dextrose (iso-os) 2 gram/100 mL PgBk 2,000 mg, 2 g, Intravenous, Q8H, Katie Shane, NP, Stopped at 04/09/22 1036    HYDROmorphone injection 1 mg, 1 mg, Intravenous, Q6H PRN, Katie Shane, NP, 1 mg at 04/09/22 0539    oxyCODONE-acetaminophen  mg per tablet 1 tablet, 1 tablet, Oral, Q4H PRN, Katie Shane, DEANGELO, 1 tablet at 04/09/22 0906    polyethylene glycol packet 17 g, 17 g, Oral, Daily, Katie Shane, NP, 17 g at 04/08/22 0822    Review of Systems   Constitutional:  Negative for appetite change, chills, fatigue and fever.   HENT:   "Negative for congestion and sore throat.    Respiratory:  Negative for cough and shortness of breath.    Cardiovascular:  Negative for chest pain and palpitations.   Gastrointestinal:  Negative for abdominal pain, constipation, diarrhea, nausea and vomiting.   Musculoskeletal:  Negative for back pain and myalgias.        Right sided sciatica   Neurological:  Negative for weakness.   Psychiatric/Behavioral:  Positive for sleep disturbance. Negative for self-injury and suicidal ideas. The patient is not nervous/anxious.         Memory loss       Objective:      /66 (BP Location: Right arm, Patient Position: Sitting)   Pulse 66   Ht 5' 10" (1.778 m)   Wt 104.2 kg (229 lb 11.5 oz)   SpO2 99%   BMI 32.96 kg/m²     Physical Exam  Constitutional:       General: He is not in acute distress.     Appearance: Normal appearance. He is normal weight. He is not ill-appearing.   HENT:      Head: Normocephalic and atraumatic.      Right Ear: External ear normal.      Left Ear: External ear normal.      Nose: Nose normal.   Eyes:      Extraocular Movements: Extraocular movements intact.      Conjunctiva/sclera: Conjunctivae normal.      Pupils: Pupils are equal, round, and reactive to light.   Cardiovascular:      Rate and Rhythm: Normal rate and regular rhythm.      Pulses: Normal pulses.      Heart sounds: Normal heart sounds.   Pulmonary:      Effort: Pulmonary effort is normal.      Breath sounds: Normal breath sounds.   Abdominal:      Palpations: Abdomen is soft.   Musculoskeletal:         General: No swelling. Normal range of motion.      Cervical back: Normal range of motion.      Right lower leg: No edema.      Left lower leg: No edema.   Skin:     General: Skin is warm and dry.   Neurological:      General: No focal deficit present.      Mental Status: He is alert and oriented to person, place, and time. Mental status is at baseline.   Psychiatric:         Mood and Affect: Mood normal.         Behavior: Behavior " normal.         Thought Content: Thought content normal.         Judgment: Judgment normal.         Assessment:       1. Routine general medical examination at a health care facility    2. Primary hypertension    3. Chronic right-sided low back pain with right-sided sciatica    4. Traumatic complete tear of right rotator cuff, subsequent encounter    5. Primary insomnia    6. Current severe episode of major depressive disorder without psychotic features, unspecified whether recurrent    7. Dyslipidemia        Plan:       Routine general medical examination at a health care facility  -     Lipid Panel; Future; Expected date: 01/03/2025  -     PSA, Screening; Future; Expected date: 01/03/2025    Primary hypertension    Chronic right-sided low back pain with right-sided sciatica  -     gabapentin (NEURONTIN) 100 MG capsule; Take 1 capsule (100 mg total) by mouth 2 (two) times daily as needed (sciatica).  Dispense: 90 capsule; Refill: 1    Traumatic complete tear of right rotator cuff, subsequent encounter  -     acetaminophen (TYLENOL) 500 MG tablet; Take 2 tablets (1,000 mg total) by mouth every 8 (eight) hours as needed for Pain.  Dispense: 90 tablet; Refill: 0    Primary insomnia    Current severe episode of major depressive disorder without psychotic features, unspecified whether recurrent  -     buPROPion (WELLBUTRIN XL) 150 MG TB24 tablet; Take 1 tablet (150 mg total) by mouth once daily.  Dispense: 90 tablet; Refill: 3    Dyslipidemia  -     Lipid Panel; Future; Expected date: 01/03/2025          Problem List Items Addressed This Visit          Psychiatric    Current severe episode of major depressive disorder without psychotic features     Patient with several years of depression history currently affecting daily activities, interfering with sleep, and he feels is affecting his memory. He has tried Prozac in the past which he did not tolerate, and also did not tolerate mirtazapine. PHQ in office scored 22.  Patient open to addition of Wellbutrin  mg daily, patient denies history of known seizures. Follow up in 4-6 weeks.          Relevant Medications    buPROPion (WELLBUTRIN XL) 150 MG TB24 tablet       Cardiac/Vascular    HTN (hypertension) (Chronic)     Pressure stable on current regimen of amlodipine 5 mg daily.         Hypercholesterolemia     Lipid panel pending. Continue rosuvastatin 10 mg daily.             Orthopedic    Chronic right-sided low back pain with right-sided sciatica     Patient with acute worsening of right sided sciatica over the last 3 days. Pain is worse when sitting and improves with movement. He has done physical therapy in the past and has history of lumbar fusion in 2022. He has not tried any medications at home. Interested in retrying gabapentin prn for sciatica.         Relevant Medications    gabapentin (NEURONTIN) 100 MG capsule    Traumatic complete tear of right rotator cuff    Relevant Medications    acetaminophen (TYLENOL) 500 MG tablet       Other    Primary insomnia     Patient with difficulty staying asleep likely secondary to uncontrolled depression. Has recently been taking trazodone 50 mg a few hours before bedtime and helps with sleep however causing grogginess the following day. Prescribing antidepressant and advising decreasing trazodone dose to 25 mg to see if grogginess improves. If not improved advise to discontinue.          Other Visit Diagnoses       Routine general medical examination at a health care facility    -  Primary    Relevant Orders    Lipid Panel    PSA, Screening                Geovanna Toledo PA-C  Family Medicine Physician Assistant       Future Appointments       Date Provider Specialty Appt Notes    1/8/2025 Cindy Purvis, PT, DPT Outpatient Rehab EST PT    1/13/2025 Cindy Purvis PT, DPT Outpatient Rehab EST PT    1/15/2025 Cindy Purvis, PT, DPT Outpatient Rehab EST PT    1/21/2025 Cindy Purvis, PT, DPT Outpatient  Rehab EST PT    1/23/2025 Cindy Purvis, PT, DPT Outpatient Rehab EST PT    1/27/2025 Cindy Purvis, PT, DPT Outpatient Rehab EST PT    1/29/2025 Cindy Purvis, PT, DPT Outpatient Rehab EST PT    3/3/2025 Darrion Ricks MD Orthopedics [facility]-6 week post op

## 2025-01-13 ENCOUNTER — CLINICAL SUPPORT (OUTPATIENT)
Dept: REHABILITATION | Facility: HOSPITAL | Age: 55
End: 2025-01-13
Payer: COMMERCIAL

## 2025-01-13 ENCOUNTER — PATIENT MESSAGE (OUTPATIENT)
Dept: FAMILY MEDICINE | Facility: CLINIC | Age: 55
End: 2025-01-13
Payer: COMMERCIAL

## 2025-01-13 DIAGNOSIS — R53.1 STRENGTH LOSS OF: ICD-10-CM

## 2025-01-13 DIAGNOSIS — R97.20 ELEVATED PSA: Primary | ICD-10-CM

## 2025-01-13 DIAGNOSIS — N40.0 BENIGN PROSTATIC HYPERPLASIA, UNSPECIFIED WHETHER LOWER URINARY TRACT SYMPTOMS PRESENT: ICD-10-CM

## 2025-01-13 DIAGNOSIS — M25.60 RANGE OF MOTION DEFICIT: Primary | ICD-10-CM

## 2025-01-13 PROCEDURE — 97110 THERAPEUTIC EXERCISES: CPT | Mod: PN

## 2025-01-15 ENCOUNTER — CLINICAL SUPPORT (OUTPATIENT)
Dept: REHABILITATION | Facility: HOSPITAL | Age: 55
End: 2025-01-15
Payer: COMMERCIAL

## 2025-01-15 DIAGNOSIS — R53.1 STRENGTH LOSS OF: ICD-10-CM

## 2025-01-15 DIAGNOSIS — M25.60 RANGE OF MOTION DEFICIT: Primary | ICD-10-CM

## 2025-01-15 PROCEDURE — 97110 THERAPEUTIC EXERCISES: CPT | Mod: PN

## 2025-01-15 NOTE — PROGRESS NOTES
[facility]   Physical Therapy Treatment Note      Name: Fabiano Jules Jr.  Clinic Number: [id number]    Therapy Diagnosis:   Encounter Diagnoses   Name Primary?    Range of motion deficit Yes    Strength loss of        Physician: Darrion Ricks,*    Visit Date: 1/15/2025      Physician Orders: PT Eval and treat   Medical Diagnosis from Referral:   Diagnosis   S46.011D (ICD-10-CM) - Traumatic complete tear of right rotator cuff, subsequent encounter      Evaluation Date: 10/23/2024  Authorization Period Expiration:     12/31/2024      Plan of Care Expiration: 2/15/2025  Progress Note Due: 2/15/2025  Visit # / Visits authorized: 2/ 20 (14 total)  FOTO: 3/ 3     Precautions: Standard      Time In: 455 pm  Time Out: 530 pm  Total Billable Time: 35 minutes     Date of Procedure: 9/6/2024   Post Op Day: 12 weeks and 5 days as of 12/5  Procedure: Procedure(s) (LRB):  REPAIR, ROTATOR CUFF, ARTHROSCOPIC, WITH BIOINDUCTIVE IMPLANT (Right)  EXTENSIVE DEBRIDEMENT, SHOULDER, ARTHROSCOPIC (Right)  ARTHROSCOPY, SHOULDER, WITH SUBACROMIAL SPACE DECOMPRESSION (Right)  ARTHROSCOPY,SHOULDER,WITH BICEPS TENODESIS (Right)     Surgeons and Role:     * Darrion Ricks MD - Primary    PTA Visit #: 0/5       Subjective     Patient reports: still feeling sore. New job is going well. He doesn't have to stress the shoulder too bad with it.     He was compliant with home exercise program.  Response to previous treatment: as noted  Functional change: as noted     Pain: 5/10  Location: right shoulder      Objective      Objective Measures updated at progress report unless specified.     Flexion to 170 with pain at end range     Treatment     Fabiano received the treatments listed below:      therapeutic exercises to develop strength, endurance, ROM, and flexibility for 25 minutes including:    Upper Body Ergometer 4'/4' level 3  Prone middle trap level 2, 3 x 10  Side Lying open books x 20 Bilateral   Table  step backs x 20     Wall slides with pillow case 3 x 10 repetitions red band     Biceps curl 10# x 30  Seated thoracic extension x 30  Green band Internal Rotation/External Rotation 2 x 15  Wall ball 30/30 yellow ball  Wall walk x 15    Not Performed Today:  Side Lying External Rotation x 30  Supine external rotation stretch 5 seconds holds x 3 minutes  Flexion slides on stair rail x 3 minutes  Scaption slides on stair rail x 3 minutes    manual therapy techniques: Joint mobilizations were applied to the: right shoulder for 10 minutes, including:    Inferior and posterior joint mobilizations grade II  Thoracic PA mobs grade III     Not Performed Today:  Manual pendulums followed by PROM    Patient Education and Home Exercises       Education provided:   - Home Exercise Program     Written Home Exercises Provided: Yes. Exercises were reviewed and Fabiano was able to demonstrate them prior to the end of the session.  Fabiano demonstrated good  understanding of the education provided. See Electronic Medical Record under Patient Instructions for exercises provided during therapy sessions    Assessment     Fabiano presented with continued discomfort at end range. Range of motion is doing well and strength is improving. He continues to tolerate interventions well. Will continue to progress with strengthening to increase tolerance of work activities and decrease pain. Plan of care extended another month.     Fabiano Is progressing well towards his goals.   Patient prognosis is Good.     Patient will continue to benefit from skilled outpatient physical therapy to address the deficits listed in the problem list box on initial evaluation, provide pt/family education and to maximize pt's level of independence in the home and community environment.     Patient's spiritual, cultural and educational needs considered and pt agreeable to plan of care and goals.     Anticipated barriers to physical therapy: co-morbidities, chronic pain      Goals:     Short Term Goals (6 Weeks)  -Met  Patient will be independent in Home Exercise Program to support improving range of motion.  Patient will have improved Passive Range of Motion to equal the uninvolved side.  Patient will have improved Active Range of Motion to 120 of flexion.      Long Term Goals (12 Weeks) -Progressing, not met   Patient will be independent in progressed Home Exercise Program to support improving functional strength.  Patient goal: return to work and to the gym.  Patient will have improved FOTO score to predicted level to demonstrate true functional improvements.   Patient will have improved Active Range of Motion and Passive Range of Motion to equal the uninvolved side.     Plan     Progress as per POC.     Cindy Purvis, PT, DPT   Board Certified Clinical Specialist in Orthopedic Physical Therapy

## 2025-01-16 NOTE — PLAN OF CARE
[facility]   Physical Therapy Treatment Note      Name: Fabiano Jules Jr.  Clinic Number: [id number]    Therapy Diagnosis:   Encounter Diagnoses   Name Primary?    Range of motion deficit Yes    Strength loss of        Physician: Darrion Ricks,*    Visit Date: 1/15/2025      Physician Orders: PT Eval and treat   Medical Diagnosis from Referral:   Diagnosis   S46.011D (ICD-10-CM) - Traumatic complete tear of right rotator cuff, subsequent encounter      Evaluation Date: 10/23/2024  Authorization Period Expiration:     12/31/2024      Plan of Care Expiration: 2/15/2025  Progress Note Due: 2/15/2025  Visit # / Visits authorized: 2/ 20 (14 total)  FOTO: 3/ 3     Precautions: Standard      Time In: 455 pm  Time Out: 530 pm  Total Billable Time: 35 minutes     Date of Procedure: 9/6/2024   Post Op Day: 12 weeks and 5 days as of 12/5  Procedure: Procedure(s) (LRB):  REPAIR, ROTATOR CUFF, ARTHROSCOPIC, WITH BIOINDUCTIVE IMPLANT (Right)  EXTENSIVE DEBRIDEMENT, SHOULDER, ARTHROSCOPIC (Right)  ARTHROSCOPY, SHOULDER, WITH SUBACROMIAL SPACE DECOMPRESSION (Right)  ARTHROSCOPY,SHOULDER,WITH BICEPS TENODESIS (Right)     Surgeons and Role:     * Darrion Ricks MD - Primary    PTA Visit #: 0/5       Subjective     Patient reports: still feeling sore. New job is going well. He doesn't have to stress the shoulder too bad with it.     He was compliant with home exercise program.  Response to previous treatment: as noted  Functional change: as noted     Pain: 5/10  Location: right shoulder      Objective      Objective Measures updated at progress report unless specified.     Flexion to 170 with pain at end range     Treatment     Fabiano received the treatments listed below:      therapeutic exercises to develop strength, endurance, ROM, and flexibility for 25 minutes including:    Upper Body Ergometer 4'/4' level 3  Prone middle trap level 2, 3 x 10  Side Lying open books x 20 Bilateral   Table  step backs x 20     Wall slides with pillow case 3 x 10 repetitions red band     Biceps curl 10# x 30  Seated thoracic extension x 30  Green band Internal Rotation/External Rotation 2 x 15  Wall ball 30/30 yellow ball  Wall walk x 15    Not Performed Today:  Side Lying External Rotation x 30  Supine external rotation stretch 5 seconds holds x 3 minutes  Flexion slides on stair rail x 3 minutes  Scaption slides on stair rail x 3 minutes    manual therapy techniques: Joint mobilizations were applied to the: right shoulder for 10 minutes, including:    Inferior and posterior joint mobilizations grade II  Thoracic PA mobs grade III     Not Performed Today:  Manual pendulums followed by PROM    Patient Education and Home Exercises       Education provided:   - Home Exercise Program     Written Home Exercises Provided: Yes. Exercises were reviewed and Fabiano was able to demonstrate them prior to the end of the session.  Fabiano demonstrated good  understanding of the education provided. See Electronic Medical Record under Patient Instructions for exercises provided during therapy sessions    Assessment     Fabiano presented with continued discomfort at end range. Range of motion is doing well and strength is improving. He continues to tolerate interventions well. Will continue to progress with strengthening to increase tolerance of work activities and decrease pain. Plan of care extended another month.     Fabiano Is progressing well towards his goals.   Patient prognosis is Good.     Patient will continue to benefit from skilled outpatient physical therapy to address the deficits listed in the problem list box on initial evaluation, provide pt/family education and to maximize pt's level of independence in the home and community environment.     Patient's spiritual, cultural and educational needs considered and pt agreeable to plan of care and goals.     Anticipated barriers to physical therapy: co-morbidities, chronic pain      Goals:     Short Term Goals (6 Weeks)  -Met  Patient will be independent in Home Exercise Program to support improving range of motion.  Patient will have improved Passive Range of Motion to equal the uninvolved side.  Patient will have improved Active Range of Motion to 120 of flexion.      Long Term Goals (12 Weeks) -Progressing, not met   Patient will be independent in progressed Home Exercise Program to support improving functional strength.  Patient goal: return to work and to the gym.  Patient will have improved FOTO score to predicted level to demonstrate true functional improvements.   Patient will have improved Active Range of Motion and Passive Range of Motion to equal the uninvolved side.     Plan     Progress as per POC.     Cindy Purvis, PT, DPT   Board Certified Clinical Specialist in Orthopedic Physical Therapy

## 2025-01-21 RX ORDER — TAMSULOSIN HYDROCHLORIDE 0.4 MG/1
0.4 CAPSULE ORAL DAILY
Qty: 30 CAPSULE | Refills: 11 | Status: SHIPPED | OUTPATIENT
Start: 2025-01-21 | End: 2026-01-21

## 2025-01-25 ENCOUNTER — CLINICAL SUPPORT (OUTPATIENT)
Dept: CARDIOLOGY | Facility: CLINIC | Age: 55
End: 2025-01-25
Payer: COMMERCIAL

## 2025-01-25 DIAGNOSIS — I49.8 OTHER SPECIFIED CARDIAC ARRHYTHMIAS: ICD-10-CM

## 2025-01-29 ENCOUNTER — CLINICAL SUPPORT (OUTPATIENT)
Dept: REHABILITATION | Facility: HOSPITAL | Age: 55
End: 2025-01-29
Payer: COMMERCIAL

## 2025-01-29 DIAGNOSIS — M25.60 RANGE OF MOTION DEFICIT: Primary | ICD-10-CM

## 2025-01-29 DIAGNOSIS — R53.1 STRENGTH LOSS OF: ICD-10-CM

## 2025-01-29 PROCEDURE — 97140 MANUAL THERAPY 1/> REGIONS: CPT | Mod: PN

## 2025-01-29 PROCEDURE — 97110 THERAPEUTIC EXERCISES: CPT | Mod: PN

## 2025-01-29 NOTE — PROGRESS NOTES
[facility]   Physical Therapy Treatment Note      Name: Fabiano Jules Jr.  Clinic Number: [id number]    Therapy Diagnosis:   Encounter Diagnoses   Name Primary?    Range of motion deficit Yes    Strength loss of        Physician: Darrion Ricks,*    Visit Date: 1/29/2025      Physician Orders: PT Eval and treat   Medical Diagnosis from Referral:   Diagnosis   S46.011D (ICD-10-CM) - Traumatic complete tear of right rotator cuff, subsequent encounter      Evaluation Date: 10/23/2024  Authorization Period Expiration:     12/31/2024      Plan of Care Expiration: 2/15/2025  Progress Note Due: 2/15/2025  Visit # / Visits authorized: 3/ 20 (15 total)  FOTO: 3/ 3     Precautions: Standard      Time In: 500 pm  Time Out: 545 pm  Total Billable Time: 45 minutes     Date of Procedure: 9/6/2024   Post Op Day: 12 weeks and 5 days as of 12/5  Procedure: Procedure(s) (LRB):  REPAIR, ROTATOR CUFF, ARTHROSCOPIC, WITH BIOINDUCTIVE IMPLANT (Right)  EXTENSIVE DEBRIDEMENT, SHOULDER, ARTHROSCOPIC (Right)  ARTHROSCOPY, SHOULDER, WITH SUBACROMIAL SPACE DECOMPRESSION (Right)  ARTHROSCOPY,SHOULDER,WITH BICEPS TENODESIS (Right)     Surgeons and Role:     * Darrion Ricks MD - Primary    PTA Visit #: 0/5       Subjective     Patient reports: feeling better today. Sore from work but overall it has been doing better.     He was compliant with home exercise program.  Response to previous treatment: as noted  Functional change: as noted     Pain: 5/10  Location: right shoulder      Objective      Objective Measures updated at progress report unless specified.     Flexion to 170 with pain at end range     Treatment     Fabiano received the treatments listed below:      therapeutic exercises to develop strength, endurance, ROM, and flexibility for 35 minutes including:    Upper Body Ergometer 4'/4' level 3  Prone middle trap level 2, 3 x 10  Side Lying open books x 20 Bilateral   Table step backs x 20     Wall  slides with pillow case 3 x 10 repetitions red band     Biceps curl 10# x 30  Seated thoracic extension x 30  Green band Internal Rotation/External Rotation 2 x 15  Wall ball 30/30 yellow ball  Wall walk x 15    Not Performed Today:  Side Lying External Rotation x 30  Supine external rotation stretch 5 seconds holds x 3 minutes  Flexion slides on stair rail x 3 minutes  Scaption slides on stair rail x 3 minutes    manual therapy techniques: Joint mobilizations were applied to the: right shoulder for 10 minutes, including:    Inferior and posterior joint mobilizations grade II  Thoracic PA mobs grade III     Not Performed Today:  Manual pendulums followed by PROM    Patient Education and Home Exercises       Education provided:   - Home Exercise Program     Written Home Exercises Provided: Yes. Exercises were reviewed and Fabiano was able to demonstrate them prior to the end of the session.  Fabiano demonstrated good  understanding of the education provided. See Electronic Medical Record under Patient Instructions for exercises provided during therapy sessions    Assessment     Fabiano presented with less discomfort with Active Range of Motion. Strength and tolerance of intervention is improving. Will continue to progress as tolerated with strength and range of motion.     Fabiano Is progressing well towards his goals.   Patient prognosis is Good.     Patient will continue to benefit from skilled outpatient physical therapy to address the deficits listed in the problem list box on initial evaluation, provide pt/family education and to maximize pt's level of independence in the home and community environment.     Patient's spiritual, cultural and educational needs considered and pt agreeable to plan of care and goals.     Anticipated barriers to physical therapy: co-morbidities, chronic pain     Goals:     Short Term Goals (6 Weeks)  -Met  Patient will be independent in Home Exercise Program to support improving range of  motion.  Patient will have improved Passive Range of Motion to equal the uninvolved side.  Patient will have improved Active Range of Motion to 120 of flexion.      Long Term Goals (12 Weeks) -Progressing, not met   Patient will be independent in progressed Home Exercise Program to support improving functional strength.  Patient goal: return to work and to the gym.  Patient will have improved FOTO score to predicted level to demonstrate true functional improvements.   Patient will have improved Active Range of Motion and Passive Range of Motion to equal the uninvolved side.     Plan     Progress as per POC.     Cindy Purvis, PT, DPT   Board Certified Clinical Specialist in Orthopedic Physical Therapy

## 2025-01-30 ENCOUNTER — HOSPITAL ENCOUNTER (OUTPATIENT)
Dept: CARDIOLOGY | Facility: CLINIC | Age: 55
Discharge: HOME | End: 2025-01-30
Attending: GENERAL PRACTICE
Payer: COMMERCIAL

## 2025-01-30 DIAGNOSIS — I49.8 OTHER SPECIFIED CARDIAC ARRHYTHMIAS: ICD-10-CM

## 2025-01-30 PROCEDURE — 93298 REM INTERROG DEV EVAL SCRMS: CPT | Mod: PN | Performed by: INTERNAL MEDICINE

## 2025-02-04 ENCOUNTER — CLINICAL SUPPORT (OUTPATIENT)
Dept: REHABILITATION | Facility: HOSPITAL | Age: 55
End: 2025-02-04
Payer: COMMERCIAL

## 2025-02-04 DIAGNOSIS — R53.1 STRENGTH LOSS OF: ICD-10-CM

## 2025-02-04 DIAGNOSIS — M25.60 RANGE OF MOTION DEFICIT: Primary | ICD-10-CM

## 2025-02-04 PROCEDURE — 97530 THERAPEUTIC ACTIVITIES: CPT | Mod: PN

## 2025-02-04 PROCEDURE — 97110 THERAPEUTIC EXERCISES: CPT | Mod: PN

## 2025-02-04 PROCEDURE — 97140 MANUAL THERAPY 1/> REGIONS: CPT | Mod: PN

## 2025-02-04 NOTE — PROGRESS NOTES
[facility]   Physical Therapy Treatment Note      Name: Fabiano Jules Jr.  Clinic Number: [id number]    Therapy Diagnosis:   Encounter Diagnoses   Name Primary?    Range of motion deficit Yes    Strength loss of          Physician: Darrion Ricks,*    Visit Date: 2/4/2025      Physician Orders: PT Eval and treat   Medical Diagnosis from Referral:   Diagnosis   S46.011D (ICD-10-CM) - Traumatic complete tear of right rotator cuff, subsequent encounter      Evaluation Date: 10/23/2024  Authorization Period Expiration:     12/31/2024      Plan of Care Expiration: 2/15/2025  Progress Note Due: 2/15/2025  Visit # / Visits authorized: 4/ 20 (16 total)  FOTO: 3/ 3     Precautions: Standard      Time In: 500 pm  Time Out: 545 pm  Total Billable Time: 45 minutes     Date of Procedure: 9/6/2024   Procedure: Procedure(s) (LRB):  REPAIR, ROTATOR CUFF, ARTHROSCOPIC, WITH BIOINDUCTIVE IMPLANT (Right)  EXTENSIVE DEBRIDEMENT, SHOULDER, ARTHROSCOPIC (Right)  ARTHROSCOPY, SHOULDER, WITH SUBACROMIAL SPACE DECOMPRESSION (Right)  ARTHROSCOPY,SHOULDER,WITH BICEPS TENODESIS (Right)     Surgeons and Role:     * Darrion Ricks MD - Primary    PTA Visit #: 0/5       Subjective     Patient reports: his job consists of a lot of overhead activity which makes his arm sore quickly. Not painful, just fatigues.     He was compliant with home exercise program.  Response to previous treatment: as noted  Functional change: as noted     Pain: 5/10  Location: right shoulder      Objective      Objective Measures updated at progress report unless specified.     Flexion to 170 with pain at end range     Treatment     Fabiano received the treatments listed below:      therapeutic exercises to develop strength, endurance, ROM, and flexibility for 25 minutes including:    Upper Body Ergometer 4'/4' level 3  Prone middle trap level 2, 3 x 10  Side Lying open books x 20 Bilateral   Table step backs x 20     Wall slides with  pillow case 3 x 10 repetitions red band     Biceps curl 10# x 30  Seated thoracic extension x 30  Green band Internal Rotation/External Rotation 2 x 15    Therapeutic activity for 10 minutes:    Wall ball 30/30 yellow ball  Wall walk x 15  Lifting yellow weighted ball up onto elevated shelf 3 x 10    manual therapy techniques: Joint mobilizations were applied to the: right shoulder for 10 minutes, including:    Inferior and posterior joint mobilizations grade II  Thoracic PA mobs grade III     Not Performed Today:  Manual pendulums followed by PROM    Patient Education and Home Exercises       Education provided:   - Home Exercise Program     Written Home Exercises Provided: Yes. Exercises were reviewed and Fabiano was able to demonstrate them prior to the end of the session.  Fabiano demonstrated good  understanding of the education provided. See Electronic Medical Record under Patient Instructions for exercises provided during therapy sessions    Assessment     Fabiano presented with less discomfort with Active Range of Motion. Progressed strengthening with lifting to just above shoulder height. Will progress height as tolerated and work on endurance.     Fabiano Is progressing well towards his goals.   Patient prognosis is Good.     Patient will continue to benefit from skilled outpatient physical therapy to address the deficits listed in the problem list box on initial evaluation, provide pt/family education and to maximize pt's level of independence in the home and community environment.     Patient's spiritual, cultural and educational needs considered and pt agreeable to plan of care and goals.     Anticipated barriers to physical therapy: co-morbidities, chronic pain     Goals:     Short Term Goals (6 Weeks)  -Met  Patient will be independent in Home Exercise Program to support improving range of motion.  Patient will have improved Passive Range of Motion to equal the uninvolved side.  Patient will have improved Active  Range of Motion to 120 of flexion.      Long Term Goals (12 Weeks) -Progressing, not met   Patient will be independent in progressed Home Exercise Program to support improving functional strength.  Patient goal: return to work and to the gym.  Patient will have improved FOTO score to predicted level to demonstrate true functional improvements.   Patient will have improved Active Range of Motion and Passive Range of Motion to equal the uninvolved side.     Plan     Progress as per POC.     Cindy Purvis, PT, DPT   Board Certified Clinical Specialist in Orthopedic Physical Therapy

## 2025-02-11 LAB
OHS CV AF BURDEN PERCENT: < 1
OHS CV AF BURDEN PERCENT: < 1
OHS CV DC REMOTE DEVICE TYPE: NORMAL
OHS CV DC REMOTE DEVICE TYPE: NORMAL

## 2025-02-13 ENCOUNTER — PATIENT MESSAGE (OUTPATIENT)
Dept: UROLOGY | Facility: CLINIC | Age: 55
End: 2025-02-13

## 2025-02-13 ENCOUNTER — OFFICE VISIT (OUTPATIENT)
Dept: UROLOGY | Facility: CLINIC | Age: 55
End: 2025-02-13
Payer: COMMERCIAL

## 2025-02-13 VITALS
WEIGHT: 229.63 LBS | HEIGHT: 70 IN | HEART RATE: 62 BPM | DIASTOLIC BLOOD PRESSURE: 70 MMHG | BODY MASS INDEX: 32.87 KG/M2 | SYSTOLIC BLOOD PRESSURE: 105 MMHG

## 2025-02-13 DIAGNOSIS — R97.20 ELEVATED PSA: ICD-10-CM

## 2025-02-13 DIAGNOSIS — N40.1 BPH WITH OBSTRUCTION/LOWER URINARY TRACT SYMPTOMS: Primary | ICD-10-CM

## 2025-02-13 DIAGNOSIS — N13.8 BPH WITH OBSTRUCTION/LOWER URINARY TRACT SYMPTOMS: Primary | ICD-10-CM

## 2025-02-13 DIAGNOSIS — N32.81 OAB (OVERACTIVE BLADDER): ICD-10-CM

## 2025-02-13 DIAGNOSIS — R68.89 ABNORMAL DIGITAL RECTAL EXAM: ICD-10-CM

## 2025-02-13 DIAGNOSIS — N40.0 BENIGN PROSTATIC HYPERPLASIA, UNSPECIFIED WHETHER LOWER URINARY TRACT SYMPTOMS PRESENT: ICD-10-CM

## 2025-02-13 DIAGNOSIS — N39.41 URGE INCONTINENCE: ICD-10-CM

## 2025-02-13 LAB — POC RESIDUAL URINE VOLUME: 0 ML (ref 0–100)

## 2025-02-13 PROCEDURE — 3078F DIAST BP <80 MM HG: CPT | Mod: CPTII,S$GLB,, | Performed by: UROLOGY

## 2025-02-13 PROCEDURE — 99204 OFFICE O/P NEW MOD 45 MIN: CPT | Mod: S$GLB,,, | Performed by: UROLOGY

## 2025-02-13 PROCEDURE — 3074F SYST BP LT 130 MM HG: CPT | Mod: CPTII,S$GLB,, | Performed by: UROLOGY

## 2025-02-13 PROCEDURE — 1160F RVW MEDS BY RX/DR IN RCRD: CPT | Mod: CPTII,S$GLB,, | Performed by: UROLOGY

## 2025-02-13 PROCEDURE — 3008F BODY MASS INDEX DOCD: CPT | Mod: CPTII,S$GLB,, | Performed by: UROLOGY

## 2025-02-13 PROCEDURE — 51798 US URINE CAPACITY MEASURE: CPT | Mod: S$GLB,,, | Performed by: UROLOGY

## 2025-02-13 PROCEDURE — 99999 PR PBB SHADOW E&M-EST. PATIENT-LVL IV: CPT | Mod: PBBFAC,,, | Performed by: UROLOGY

## 2025-02-13 PROCEDURE — 1159F MED LIST DOCD IN RCRD: CPT | Mod: CPTII,S$GLB,, | Performed by: UROLOGY

## 2025-02-13 RX ORDER — TAMSULOSIN HYDROCHLORIDE 0.4 MG/1
0.8 CAPSULE ORAL DAILY
Qty: 180 CAPSULE | Refills: 3 | Status: SHIPPED | OUTPATIENT
Start: 2025-02-13 | End: 2027-02-03

## 2025-02-13 NOTE — PROGRESS NOTES
Formerly [facility], formerly known as [facility]  Group MD's:Raffaele/Harshal/Kita/Marcial  Group NP's: Karma Garland    PCP: Howie Mathias MD  Date of Service: 02/13/2025  Today's note written by: MD Raffaele    Subjective:        HPI: Fabiano Jules Jr. is a 54 y.o. male     Initial consult by me in clinic on 2/13/25:  History of Present Illness    CHIEF COMPLAINT:  Mr. Jules presents with concerns about an enlarged prostate and elevated PSA levels, experiencing urinary symptoms such as straining to urinate and frequent urination.    HPI:  Mr. Jules reports urinary symptoms for approximately the past 12-18 months, including straining to urinate, difficulty emptying his bladder, and frequent urination with small volumes. He also mentions occasional urinary incontinence, with leakage occurring about 50% of the time en route to the bathroom, consisting of drops rather than large volumes. Complete urinary incontinence occurs approximately once monthly for the last 6 months, necessitating a change of underwear.    These symptoms have gradually worsened over the past year, with the straining to urinate beginning around 12 months ago. He denies dysuria. Flomax has been prescribed, which he reports has improved his symptoms. He takes one Flomax tablet nightly.    Urinary frequency is described as every 2-3 hours, with a maximum of 3 hours between urinations. Daily fluid intake is 8-10 glasses of water, a lifelong habit. Recently, he has reduced his coffee intake from 3 cups daily to one cup every other day, which he states has significantly improved his symptoms, although he still has frequent urination.    He had back surgery (lumbar fusion of L4 and L5) on April 5, 2020. When asked if his overactive bladder symptoms worsened after the surgery, he indicated that they may have, but he had not considered it for an extended period.    He reports waking up during the night to  drink water, but not always to urinate. He denies urinary incontinence with coughing or sneezing, history of urinary tract infections, or stress incontinence. He also denies a family history of prostate cancer, renal cancer, bladder cancer, or kidney stones.    PERTINENT MEDICATIONS:  Flomax (tamsulosin), 1 tablet at night, for urinary symptoms, effective in improving symptoms  Aspirin 81 mg (baby aspirin), daily, reason not specified    PERTINENT MEDICAL HISTORY:  Back injury: Prior to 2020    PERTINENT FAMILY HISTORY:  No family history of prostate cancer  No family history of kidney stones  No family history of renal cancer or bladder cancer    PERTINENT SURGICAL HISTORY:  Lumbar fusion (L4 and L5): April 5, 2020, for back injury    PERTINENT TEST RESULTS:  PSA: October 2021, 3.1  PSA: 2020, 2.1  PSA: 2007, 0.4  PSA: January 25, 2023, 4.3  Urinalysis: 2023, negative except for trace protein  Urinalysis: 2015, negative for blood CT Lumbar Spine: June 2020, no stones or hydronephrosis visible in the visible portion of the kidneys    SH:SUBSTANCE USE:  Smoking: Quit 25 years ago, smoked for at least 10 years, 1 pack per day  Alcohol: Denies current use         Bladder scan PVR today was 0mL.      AUA ssx today:(4 incomplete emptying, 5 frequency, 4 intermittency, 5 urgency, 3 weak stream, 3 straining, 2 sleeping). 25. QOL: TERRIBLE    Lab Results   Component Value Date    HGBA1C 5.2 10/20/2023   Lipids normal 12/5/24  Cr normal 12/5/24      Urine history: family history of kidney, bladder or prostate cancer:No, personal or family history of kidney stones: No,tobacco use: 1 ppd x 10 yrs, quit around 2000, anticoagulation: Yes - asa 81mg      PSA History: no fam hx of prostate cancer    2/13/25 ALYCIA: 35g, 1/2 cm nodule round soft nontender left apex.             REVIEW OF SYSTEMS:  Negative except for as stated above    Past Medical History:   Diagnosis Date    Arthritis     Back injury     Colon polyp     COVID      CPAP (continuous positive airway pressure) dependence     NOT NEEDED AFTER WEIGHT LOSS SURGERY    General anesthetics causing adverse effect in therapeutic use     WOKE UP CONFUSED X 1    Hypertension     PONV (postoperative nausea and vomiting)     Sleep apnea     c pap machine used  - NOT NEEDED AFTER GASTRIC SLEEVE    Wears glasses        Past Surgical History:   Procedure Laterality Date    ARTHROSCOPIC DEBRIDEMENT OF SHOULDER Right 9/6/2024    Procedure: EXTENSIVE DEBRIDEMENT, SHOULDER, ARTHROSCOPIC;  Surgeon: Darrion Ricks MD;  Location: [facility] OR;  Service: Orthopedics;  Laterality: Right;    ARTHROSCOPIC REPAIR OF ROTATOR CUFF OF SHOULDER Right 9/6/2024    Procedure: REPAIR, ROTATOR CUFF, ARTHROSCOPIC;  Surgeon: Darrion Ricks MD;  Location: [facility] OR;  Service: Orthopedics;  Laterality: Right;  arthrex-cuffmend/SCR stuff  Hood w/Arthrex notified 8/30/24 ark    ARTHROSCOPY OF SHOULDER WITH DECOMPRESSION OF SUBACROMIAL SPACE Right 9/6/2024    Procedure: ARTHROSCOPY, SHOULDER, WITH SUBACROMIAL SPACE DECOMPRESSION;  Surgeon: Darrion Ricks MD;  Location: [facility] OR;  Service: Orthopedics;  Laterality: Right;    ARTHROSCOPY,SHOULDER,WITH BICEPS TENODESIS Right 9/6/2024    Procedure: ARTHROSCOPY,SHOULDER,WITH BICEPS TENODESIS;  Surgeon: Darrion Ricks MD;  Location: [facility] OR;  Service: Orthopedics;  Laterality: Right;    BONE GRAFT N/A 04/05/2022    Procedure: BONE GRAFT;  Surgeon: Duran Adler Jr., MD;  Location: [facility];  Service: Orthopedics;  Laterality: N/A;    CIRCUMCISION      COLONOSCOPY N/A 12/03/2018    Procedure: COLONOSCOPY;  Surgeon: CHRISSY Reyna MD;  [facility]  Service: Endoscopy;  Laterality: N/A;    COLONOSCOPY N/A 09/01/2022    Procedure: COLONOSCOPY;  Surgeon: Alondra Arias MD;  Location: [facility] ENDO;  Service: Endoscopy;  Laterality: N/A;    epidural steroid injection X 2      lower lumbar    ESOPHAGOGASTRODUODENOSCOPY N/A  09/01/2022    Procedure: EGD (ESOPHAGOGASTRODUODENOSCOPY);  Surgeon: Alondra Arias MD;  Location: [facility] ENDO;  Service: Endoscopy;  Laterality: N/A;    facet injection       Dr Manpreet [facility] in [place]     INSERTION OF IMPLANTABLE LOOP RECORDER N/A 9/15/2023    Procedure: Insertion, Implantable Loop Recorder;  Surgeon: Romario Wallace MD;  Location: [facility] CATH/EP LAB;  Service: Cardiology;  Laterality: N/A;    INTRALUMINAL GASTROINTESTINAL TRACT IMAGING VIA CAPSULE N/A 10/03/2022    Procedure: IMAGING PROCEDURE, GI TRACT, INTRALUMINAL, VIA CAPSULE;  Surgeon: Alondra Arias MD;  Location: [facility] ENDO;  Service: Endoscopy;  Laterality: N/A;    LAPAROSCOPIC SLEEVE GASTRECTOMY N/A 12/19/2022    Procedure: GASTRECTOMY, SLEEVE, LAPAROSCOPIC;  Surgeon: Lashonda Pinedo MD;  Location: [facility] OR;  Service: General;  Laterality: N/A;    LIPOMA RESECTION      Back of neck    MAGNETIC RESONANCE IMAGING N/A 05/29/2020    Procedure: MRI (MAGNETIC RESONANCE IMAGING) LUMBAR SPINE;  Surgeon: [facility] Diagnostic Provider;  [facility]: [facility];  Service: General;  Laterality: N/A;  COVID NEG    MAGNETIC RESONANCE IMAGING N/A 12/03/2021    Procedure: MRI (MAGNETIC RESONANCE IMAGING), LUMBAR SPINE;  Surgeon: [facility] Diagnostic Provider;  Location: [facility];  Service: General;  Laterality: N/A;  In MRI @ 7:50 per Ariela    MAGNETIC RESONANCE IMAGING N/A 3/6/2024    Procedure: MRI (Magnetic Resonance Imagine);  Surgeon: Vicenta Michele;  Location: [facility];  Service: Anesthesiology;  Laterality: N/A;    MAGNETIC RESONANCE IMAGING Bilateral 6/28/2024    Procedure: MRI (Magnetic Resonance Imagine);  Surgeon: Vicenta Michele;  Location: [facility];  Service: Anesthesiology;  Laterality: Bilateral;  MRI BRAIN WITH AND WITHOUT CONTRAST    MEDIAL COLLATERAL LIGAMENT AND LATERAL COLLATERAL LIGAMENT REPAIR, KNEE Right 09/01/2018    Dr. Christophe Gore in [place]     MINIMALLY INVASIVE TRANSFORAMINAL LUMBAR INTERBODY FUSION (TLIF)  N/A 04/05/2022    Procedure: FUSION, SPINE, LUMBAR, TLIF, MINIMALLY INVASIVE, WITH INSTRUMENTATION,  L4/5 RIGHT;  Surgeon: Duran Adler Jr., MD;  Location: [facility] OR;  Service: Orthopedics;  Laterality: N/A;  10:30am per Tamela    MYELOGRAPHY N/A 06/23/2020    Procedure: MYELOGRAM;  Surgeon: Andi Diagnostic Provider;  Location: [facility] OR;  Service: General;  Laterality: N/A;         Objective:     Vitals:    02/13/25 0832   BP: 105/70   Pulse: 62       Physical Exam    Male Genitourinary: Half cm nodule on the left side of prostate towards rectum (apex). Prostate size approximately 35 grams. Prostate soft, non-tender.             Assessment:     Fabiano Jules Jr. is a 54 y.o. male with     1. BPH with obstruction/lower urinary tract symptoms    2. Elevated PSA    3. OAB (overactive bladder)    4. Urge incontinence    5. Benign prostatic hyperplasia, unspecified whether lower urinary tract symptoms present    6. Abnormal digital rectal exam          Plan:     's typed/written- Abbreviated/Short Plan:  Repeat psa to confirm elevation: Screening psa and psa free and total  Lab ua reflex - confirm no infection and for baseline (none since 2023)  Mri prostate (nodule 0.5cm felt at left apex)  If psa higher OR if mri shows pirads lesion >=3 especially at left apex where nodule felt proceed with prostate biopsy (uronav if it shows pirads 3)  If psa the same and mri neg then repeat psa in 3 months and fu after   Is psa lower and mri neg then repeat psa in 3 months and fu after  Ok with scheduling bx wo fu visit   Pt states he will  need anesthesia with mri- will see what psa is then plan for mri -   Increase flomax to 2 a night   At minumum fu in 3 months with psa free and total aua ssx       The following assessment plan was created by [employer] via ambient listening:  Assessment & Plan    R97.20 Elevated PSA  N40.1, N13.8 BPH with obstruction/lower urinary tract symptoms  N32.81 OAB (overactive  bladder)  N39.41 Urge incontinence  N40.0 Benign prostatic hyperplasia, unspecified whether lower urinary tract symptoms present  R68.89 Abnormal digital rectal exam    IMPRESSION:   Elevated PSA (4.3) and palpable 0.5cm nodule on left apex of prostate warrant further investigation   Ordered prostate MRI due to concerning physical exam findings, regardless of repeat PSA results   Plan for prostate biopsy if MRI shows PI-RADS lesion =3, especially at left apex, or if PSA remains elevated with equivocal MRI   Differential includes benign prostatic hyperplasia vs. prostate cancer; low PSA and slow rise suggest early-stage disease if malignant   Increased Flomax dosage to address urinary symptoms    Please review the short plan as above for concise and accurate plan. The dictated/AI generated plan may have some inaccuracies .    PLAN SUMMARY:   Order PSA (free and total) test and prostate MRI   Increase Flomax dosage to 2 tablets nightly   Communicate MRI and PSA results via [employer]   Follow-up in 3 months for repeat PSA and AUA symptom score   Schedule prostate biopsy if indicated by results, without follow-up visit   Reduce caffeine intake to manage symptoms    ELEVATED PSA:   Discussed PSA test purpose, potential causes of elevation, and prostate cancer screening process with the patient, including MRI and biopsy roles.   Educated on management options for low-grade prostate cancer, such as active surveillance.   Ordered PSA (free and total) test and prostate MRI.   Scheduled 3-month follow-up for repeat PSA and AUA symptom score.   Will communicate MRI and PSA results via [employer].   Instructed patient to schedule prostate biopsy without follow-up visit if indicated by results.    BPH WITH OBSTRUCTION/LOWER URINARY TRACT SYMPTOMS:   Explained prostate anatomy and its relation to urinary symptoms.   Increased Flomax dosage to 2 tablets nightly.   Scheduled 3-month follow-up for AUA symptom score.    OAB (OVERACTIVE  BLADDER):   Advised patient to reduce caffeine intake to help manage symptoms.    BENIGN PROSTATIC HYPERPLASIA, UNSPECIFIED WHETHER LOWER URINARY TRACT SYMPTOMS PRESENT:   (Content merged with BPH WITH OBSTRUCTION/LOWER URINARY TRACT SYMPTOMS section to avoid redundancy)             Portions of this note was generated with the assistance of ambient listening technology. Verbal consent was obtained by the patient and accompanying visitor(s) for the recording of patient appointment to facilitate this note. I attest to having reviewed and edited the generated note for accuracy, though some syntax or spelling errors may persist. Please contact the author of this note for any clarification.          Mandy Castorena MD

## 2025-02-13 NOTE — PATIENT INSTRUCTIONS
's typed/written- Abbreviated/Short Plan:  Repeat psa to confirm elevation: Screening psa and psa free and total  Lab ua reflex - confirm no infection and for baseline (none since 2023)  Mri prostate (nodule 0.5cm felt at left apex)  If psa higher OR if mri shows pirads lesion >=3 especially at left apex where nodule felt proceed with prostate biopsy (uronav if it shows pirads 3)  If psa the same and mri neg then repeat psa in 3 months and fu after   Is psa lower and mri neg then repeat psa in 3 months and fu after  Ok with scheduling bx wo fu visit   Pt states he will  need anesthesia with mri- will see what psa is then plan for mri -   Increase flomax to 2 a night   At minumum fu in 3 months with psa free and total aua ssx       The following assessment plan was created by [employer] via ambient listening:  Assessment & Plan    R97.20 Elevated PSA  N40.1, N13.8 BPH with obstruction/lower urinary tract symptoms  N32.81 OAB (overactive bladder)  N39.41 Urge incontinence  N40.0 Benign prostatic hyperplasia, unspecified whether lower urinary tract symptoms present  R68.89 Abnormal digital rectal exam    IMPRESSION:   Elevated PSA (4.3) and palpable 0.5cm nodule on left apex of prostate warrant further investigation   Ordered prostate MRI due to concerning physical exam findings, regardless of repeat PSA results   Plan for prostate biopsy if MRI shows PI-RADS lesion =3, especially at left apex, or if PSA remains elevated with equivocal MRI   Differential includes benign prostatic hyperplasia vs. prostate cancer; low PSA and slow rise suggest early-stage disease if malignant   Increased Flomax dosage to address urinary symptoms    Please review the short plan as above for concise and accurate plan. The dictated/AI generated plan may have some inaccuracies .    PLAN SUMMARY:   Order PSA (free and total) test and prostate MRI   Increase Flomax dosage to 2 tablets nightly   Communicate MRI and PSA results via  MyChart   Follow-up in 3 months for repeat PSA and AUA symptom score   Schedule prostate biopsy if indicated by results, without follow-up visit   Reduce caffeine intake to manage symptoms    ELEVATED PSA:   Discussed PSA test purpose, potential causes of elevation, and prostate cancer screening process with the patient, including MRI and biopsy roles.   Educated on management options for low-grade prostate cancer, such as active surveillance.   Ordered PSA (free and total) test and prostate MRI.   Scheduled 3-month follow-up for repeat PSA and AUA symptom score.   Will communicate MRI and PSA results via [employer].   Instructed patient to schedule prostate biopsy without follow-up visit if indicated by results.    BPH WITH OBSTRUCTION/LOWER URINARY TRACT SYMPTOMS:   Explained prostate anatomy and its relation to urinary symptoms.   Increased Flomax dosage to 2 tablets nightly.   Scheduled 3-month follow-up for AUA symptom score.    OAB (OVERACTIVE BLADDER):   Advised patient to reduce caffeine intake to help manage symptoms.    BENIGN PROSTATIC HYPERPLASIA, UNSPECIFIED WHETHER LOWER URINARY TRACT SYMPTOMS PRESENT:   (Content merged with BPH WITH OBSTRUCTION/LOWER URINARY TRACT SYMPTOMS section to avoid redundancy)             Portions of this note was generated with the assistance of ambient listening technology. Verbal consent was obtained by the patient and accompanying visitor(s) for the recording of patient appointment to facilitate this note. I attest to having reviewed and edited the generated note for accuracy, though some syntax or spelling errors may persist. Please contact the author of this note for any clarification.

## 2025-02-14 ENCOUNTER — LAB VISIT (OUTPATIENT)
Dept: LAB | Facility: HOSPITAL | Age: 55
End: 2025-02-14
Attending: UROLOGY
Payer: COMMERCIAL

## 2025-02-14 DIAGNOSIS — N13.8 BPH WITH OBSTRUCTION/LOWER URINARY TRACT SYMPTOMS: ICD-10-CM

## 2025-02-14 DIAGNOSIS — R97.20 ELEVATED PSA: ICD-10-CM

## 2025-02-14 DIAGNOSIS — N40.1 BPH WITH OBSTRUCTION/LOWER URINARY TRACT SYMPTOMS: ICD-10-CM

## 2025-02-14 LAB
BILIRUB UR QL STRIP: NEGATIVE
CLARITY UR: CLEAR
COLOR UR: YELLOW
GLUCOSE UR QL STRIP: NEGATIVE
HGB UR QL STRIP: NEGATIVE
KETONES UR QL STRIP: NEGATIVE
LEUKOCYTE ESTERASE UR QL STRIP: NEGATIVE
NITRITE UR QL STRIP: NEGATIVE
PH UR STRIP: 7 [PH] (ref 5–8)
PROT UR QL STRIP: NEGATIVE
SP GR UR STRIP: 1.02 (ref 1–1.03)
URN SPEC COLLECT METH UR: NORMAL
UROBILINOGEN UR STRIP-ACNC: NEGATIVE EU/DL

## 2025-02-14 PROCEDURE — 81003 URINALYSIS AUTO W/O SCOPE: CPT | Performed by: UROLOGY

## 2025-02-24 ENCOUNTER — CLINICAL SUPPORT (OUTPATIENT)
Dept: REHABILITATION | Facility: HOSPITAL | Age: 55
End: 2025-02-24
Payer: COMMERCIAL

## 2025-02-24 DIAGNOSIS — R53.1 STRENGTH LOSS OF: ICD-10-CM

## 2025-02-24 DIAGNOSIS — M25.60 RANGE OF MOTION DEFICIT: Primary | ICD-10-CM

## 2025-02-24 PROCEDURE — 97530 THERAPEUTIC ACTIVITIES: CPT | Mod: PN

## 2025-02-24 PROCEDURE — 97140 MANUAL THERAPY 1/> REGIONS: CPT | Mod: PN

## 2025-02-24 PROCEDURE — 97110 THERAPEUTIC EXERCISES: CPT | Mod: PN

## 2025-02-24 NOTE — PROGRESS NOTES
[facility]   Physical Therapy Treatment Note      Name: Fabiano Juels Jr.  Clinic Number: [id number]    Therapy Diagnosis:   Encounter Diagnoses   Name Primary?    Range of motion deficit Yes    Strength loss of          Physician: Darrion Ricks,*    Visit Date: 2/24/2025      Physician Orders: PT Eval and treat   Medical Diagnosis from Referral:   Diagnosis   S46.011D (ICD-10-CM) - Traumatic complete tear of right rotator cuff, subsequent encounter      Evaluation Date: 10/23/2024  Authorization Period Expiration:     12/31/2024      Plan of Care Expiration: 4/7/2025  Progress Note Due: 3/25/2025  Visit # / Visits authorized: 5/5  FOTO: 3/ 3     Precautions: Standard      Time In: 500 pm  Time Out: 545 pm  Total Billable Time: 45 minutes     Date of Procedure: 9/6/2024   Procedure: Procedure(s) (LRB):  REPAIR, ROTATOR CUFF, ARTHROSCOPIC, WITH BIOINDUCTIVE IMPLANT (Right)  EXTENSIVE DEBRIDEMENT, SHOULDER, ARTHROSCOPIC (Right)  ARTHROSCOPY, SHOULDER, WITH SUBACROMIAL SPACE DECOMPRESSION (Right)  ARTHROSCOPY,SHOULDER,WITH BICEPS TENODESIS (Right)     Surgeons and Role:     * Darrion Ricks MD - Primary    PTA Visit #: 0/5       Subjective     Patient reports: doesn't think this job is for him right now since he has to reach overhead so much. That makes his shoulder sore. Still having trouble reaching overhead at work.    He was compliant with home exercise program.  Response to previous treatment: as noted  Functional change: as noted     Pain: 5/10  Location: right shoulder      Objective      Objective Measures updated at progress report unless specified.     Flexion to 175 with no pain at end range  Flexion 4-/5    Treatment     Fabiano received the treatments listed below:      therapeutic exercises to develop strength, endurance, ROM, and flexibility for 25 minutes including:    Upper Body Ergometer 4'/4' level 3  Prone middle trap level 3, 3 x 10  Side Lying open books x 20  Bilateral   Table step backs x 20     Wall slides with pillow case 3 x 10 repetitions red band     Biceps curl 10# x 30  Seated thoracic extension x 30  Green band Internal Rotation/External Rotation 2 x 15    Therapeutic activity for 10 minutes:    Wall ball 30/30 yellow ball  Wall walk x 15  Lifting yellow weighted ball up onto elevated shelf 3 x 10  Shoulder press 5# 3 x 5    manual therapy techniques: Joint mobilizations were applied to the: right shoulder for 10 minutes, including:    Inferior and posterior joint mobilizations grade II  Thoracic PA mobs grade III     Not Performed Today:  Manual pendulums followed by PROM    Patient Education and Home Exercises       Education provided:   - Home Exercise Program     Written Home Exercises Provided: Yes. Exercises were reviewed and Fabiano was able to demonstrate them prior to the end of the session.  Fabiano demonstrated good  understanding of the education provided. See Electronic Medical Record under Patient Instructions for exercises provided during therapy sessions    Assessment     Fabiano presented with less discomfort with Active Range of Motion. Progressed strengthening overhead with 5# shoulder press. Will extend Plan of care 6 weeks to work on strength overhead.     Fabiano Is progressing well towards his goals.   Patient prognosis is Good.     Patient will continue to benefit from skilled outpatient physical therapy to address the deficits listed in the problem list box on initial evaluation, provide pt/family education and to maximize pt's level of independence in the home and community environment.     Patient's spiritual, cultural and educational needs considered and pt agreeable to plan of care and goals.     Anticipated barriers to physical therapy: co-morbidities, chronic pain     Goals:     Short Term Goals (6 Weeks)  -Met  Patient will be independent in Home Exercise Program to support improving range of motion.  Patient will have improved Passive Range  of Motion to equal the uninvolved side.  Patient will have improved Active Range of Motion to 120 of flexion.      Long Term Goals (12 Weeks) -Progressing, not met   Patient will be independent in progressed Home Exercise Program to support improving functional strength.  Patient goal: return to work and to the gym.  Patient will have improved FOTO score to predicted level to demonstrate true functional improvements.   Patient will have improved Active Range of Motion and Passive Range of Motion to equal the uninvolved side.     Plan     Progress as per POC.     Cindy Purvis, PT, DPT   Board Certified Clinical Specialist in Orthopedic Physical Therapy

## 2025-02-25 ENCOUNTER — TELEPHONE (OUTPATIENT)
Dept: UROLOGY | Facility: CLINIC | Age: 55
End: 2025-02-25
Payer: COMMERCIAL

## 2025-02-25 ENCOUNTER — HOSPITAL ENCOUNTER (OUTPATIENT)
Dept: RADIOLOGY | Facility: HOSPITAL | Age: 55
Discharge: HOME | End: 2025-02-25
Attending: ORTHOPAEDIC SURGERY
Payer: COMMERCIAL

## 2025-02-25 ENCOUNTER — OFFICE VISIT (OUTPATIENT)
Dept: ORTHOPEDICS | Facility: CLINIC | Age: 55
End: 2025-02-25
Payer: COMMERCIAL

## 2025-02-25 DIAGNOSIS — M19.072 ARTHRITIS OF ANKLE, LEFT: ICD-10-CM

## 2025-02-25 DIAGNOSIS — M19.071 ARTHRITIS OF ANKLE, RIGHT: Primary | ICD-10-CM

## 2025-02-25 DIAGNOSIS — M19.071 ARTHRITIS OF ANKLE, RIGHT: ICD-10-CM

## 2025-02-25 DIAGNOSIS — M76.61 TENDONITIS, ACHILLES, RIGHT: ICD-10-CM

## 2025-02-25 PROCEDURE — 99999 PR PBB SHADOW E&M-EST. PATIENT-LVL II: CPT | Mod: PBBFAC,,, | Performed by: ORTHOPAEDIC SURGERY

## 2025-02-25 PROCEDURE — 73610 X-RAY EXAM OF ANKLE: CPT | Mod: 26,,, | Performed by: STUDENT IN AN ORGANIZED HEALTH CARE EDUCATION/TRAINING PROGRAM

## 2025-02-25 PROCEDURE — 73610 X-RAY EXAM OF ANKLE: CPT | Mod: TC,50,PO

## 2025-02-25 RX ADMIN — TRIAMCINOLONE ACETONIDE 40 MG: 40 INJECTION, SUSPENSION INTRA-ARTICULAR; INTRAMUSCULAR at 08:02

## 2025-02-25 NOTE — PROGRESS NOTES
Status/Diagnosis: Bilateral, Left > Right, PCFD; valgus talar tilt and end-stage tibiotalar arthritis; posterior tibial tendinitis; gastroc contracture  Date of Surgery: none  Date of Injury: none  Return visit: 3-6 months for repeat injection  X-rays on Return: pending patient complaint     Chief Complaint:   Chief Complaint   Patient presents with    Right Ankle - Pain    Left Ankle - Pain     Present History:  Fabiano Jules Jr. is a 54 y.o. male who presents today for new patient evaluation.  Patient previously seen by Dr. Orozco.  Last seen in 2021 at which time he received bilateral ankle corticosteroid injections.  Patient endorses significant symptomatic relief at that time.  Patient has been managed for chronic symptomatic flatfoot deformity.  Has tried shoe wear and activity modification including not limited to custom orthotics, what sounds like an [place] brace, etc..  No recent immobilization or physical therapy.  Currently taking oral NSAIDs intermittently as needed for pain.  Denies any numbness or tingling.  Minimal pain at rest, increased with weight-bearing.  Also endorses recent history of lumbar fusion earlier this year which she believes has exacerbated his symptoms to bilateral feet.  States left greater than right foot and ankle pain.    06/01/2023:  Patient returns after last being seen in late November 2022.  Endorses near complete symptomatic relief with bilateral intra-articular corticosteroid injection the lasted 4+ month.  Presents today for repeat evaluation and repeat injection.    Since last being seen, patient underwent gastric bypass surgery.  Has lost 100+ lb postoperatively.      09/07/2023:  Patient returns for repeat clinical evaluation and possible intra-articular corticosteroid injection.  After last clinic visit in early June, patient endorses near-complete symptomatic relief for the last 2.5 months.  No new injuries.    Of note, patient did develop Bell's palsy that was thought  to be related to an ear infection since last being seen.  Does not believe that there is any relationship to previous corticosteroid injection.    01/11/2024:  Patient returns today for repeat clinical evaluation and possible repeat corticosteroid injection.    Last seen in September 2023.  At that time underwent bilateral ankle corticosteroid injections.  On the right, endorses 90+% relief even through today.    On the left with 75% relief but only for approximately 1 month.  Today with left >> right persistent ankle pain.  Localizes pain to the anterior ankle joint line.  No new injuries.  Denies any numbness or tingling.    02/25/2025  He returns today for repeat evaluation of bilateral ankle pain. Currently he complains of diffuse right ankle pain with most significant pain along the Achilles.  He is having trouble with shoes due to pressure on the heel.  His left ankle pain is localized mostly to the medial aspect of the ankle.  His pain is worse with activity, prolonged standing or walking.  He is an  however currently not working as he is limited by his chronic ankle pain.  He reports about 100% relief with the last injections in January 2024 that lasted about 4 months.      Past Medical History:   Diagnosis Date    Arthritis     Back injury     Colon polyp     COVID     CPAP (continuous positive airway pressure) dependence     NOT NEEDED AFTER WEIGHT LOSS SURGERY    General anesthetics causing adverse effect in therapeutic use     WOKE UP CONFUSED X 1    Hypertension     PONV (postoperative nausea and vomiting)     Sleep apnea     c pap machine used  - NOT NEEDED AFTER GASTRIC SLEEVE    Wears glasses        Past Surgical History:   Procedure Laterality Date    ARTHROSCOPIC DEBRIDEMENT OF SHOULDER Right 9/6/2024    Procedure: EXTENSIVE DEBRIDEMENT, SHOULDER, ARTHROSCOPIC;  Surgeon: Darrion Ricks MD;  Location: [facility];  Service: Orthopedics;  Laterality: Right;    ARTHROSCOPIC REPAIR OF  ROTATOR CUFF OF SHOULDER Right 9/6/2024    Procedure: REPAIR, ROTATOR CUFF, ARTHROSCOPIC;  Surgeon: Darrion Ricks MD;  Location: [facility] OR;  Service: Orthopedics;  Laterality: Right;  arthrex-cuffmend/SCR stuff  Hood w/Arthrex notified 8/30/24 ark    ARTHROSCOPY OF SHOULDER WITH DECOMPRESSION OF SUBACROMIAL SPACE Right 9/6/2024    Procedure: ARTHROSCOPY, SHOULDER, WITH SUBACROMIAL SPACE DECOMPRESSION;  Surgeon: Darrion Ricks MD;  Location: [facility] OR;  Service: Orthopedics;  Laterality: Right;    ARTHROSCOPY,SHOULDER,WITH BICEPS TENODESIS Right 9/6/2024    Procedure: ARTHROSCOPY,SHOULDER,WITH BICEPS TENODESIS;  Surgeon: Darrion Ricks MD;  Location: [facility] OR;  Service: Orthopedics;  Laterality: Right;    BONE GRAFT N/A 04/05/2022    Procedure: BONE GRAFT;  Surgeon: Duran Adler Jr., MD;  Location: [facility] OR;  Service: Orthopedics;  Laterality: N/A;    CIRCUMCISION      COLONOSCOPY N/A 12/03/2018    Procedure: COLONOSCOPY;  Surgeon: CHRISSY Reyna MD;  [facility]  Service: Endoscopy;  Laterality: N/A;    COLONOSCOPY N/A 09/01/2022    Procedure: COLONOSCOPY;  Surgeon: Alondra Arias MD;  Location: [facility];  Service: Endoscopy;  Laterality: N/A;    epidural steroid injection X 2      lower lumbar    ESOPHAGOGASTRODUODENOSCOPY N/A 09/01/2022    Procedure: EGD (ESOPHAGOGASTRODUODENOSCOPY);  Surgeon: Alondra Arias MD;  Location: [facility];  Service: Endoscopy;  Laterality: N/A;    facet injection       [facility] [place]     INSERTION OF IMPLANTABLE LOOP RECORDER N/A 9/15/2023    Procedure: Insertion, Implantable Loop Recorder;  Surgeon: Romario Wallace MD;  Location: [facility] CATH/EP LAB;  Service: Cardiology;  Laterality: N/A;    INTRALUMINAL GASTROINTESTINAL TRACT IMAGING VIA CAPSULE N/A 10/03/2022    Procedure: IMAGING PROCEDURE, GI TRACT, INTRALUMINAL, VIA CAPSULE;  Surgeon: Alondra Arias MD;  Location: [facility];   Service: Endoscopy;  Laterality: N/A;    LAPAROSCOPIC SLEEVE GASTRECTOMY N/A 12/19/2022    Procedure: GASTRECTOMY, SLEEVE, LAPAROSCOPIC;  Surgeon: Lashonda Pinedo MD;  Location: [facility];  Service: General;  Laterality: N/A;    LIPOMA RESECTION      Back of neck    MAGNETIC RESONANCE IMAGING N/A 05/29/2020    Procedure: MRI (MAGNETIC RESONANCE IMAGING) LUMBAR SPINE;  Surgeon: [facility] Diagnostic Provider;  Location: [facility];  Service: General;  Laterality: N/A;  COVID NEG    MAGNETIC RESONANCE IMAGING N/A 12/03/2021    Procedure: MRI (MAGNETIC RESONANCE IMAGING), LUMBAR SPINE;  Surgeon: [facility] Diagnostic Provider;  Location: [facility];  Service: General;  Laterality: N/A;  In MRI @ 7:50 per Ariela    MAGNETIC RESONANCE IMAGING N/A 3/6/2024    Procedure: MRI (Magnetic Resonance Imagine);  Surgeon: Vicenta Michele;  [facility];  Service: Anesthesiology;  Laterality: N/A;    MAGNETIC RESONANCE IMAGING Bilateral 6/28/2024    Procedure: MRI (Magnetic Resonance Imagine);  Surgeon: Vicenta Michele;  [facility];  Service: Anesthesiology;  Laterality: Bilateral;  MRI BRAIN WITH AND WITHOUT CONTRAST    MEDIAL COLLATERAL LIGAMENT AND LATERAL COLLATERAL LIGAMENT REPAIR, KNEE Right 09/01/2018    Dr. Christophe Gore in [place]     MINIMALLY INVASIVE TRANSFORAMINAL LUMBAR INTERBODY FUSION (TLIF) N/A 04/05/2022    Procedure: FUSION, SPINE, LUMBAR, TLIF, MINIMALLY INVASIVE, WITH INSTRUMENTATION,  L4/5 RIGHT;  Surgeon: Duran Adler Jr., MD;  Location: [facility];  Service: Orthopedics;  Laterality: N/A;  10:30am per Tamela    MYELOGRAPHY N/A 06/23/2020    Procedure: MYELOGRAM;  Surgeon: [facility] Diagnostic Provider;  Location: [facility];  Service: General;  Laterality: N/A;       Current Outpatient Medications   Medication Sig    acetaminophen (TYLENOL) 500 MG tablet Take 2 tablets (1,000 mg total) by mouth every 8 (eight) hours as needed for Pain.    amLODIPine (NORVASC) 5 MG tablet Take 1 tablet (5 mg total) by mouth once  daily.    aspirin (ECOTRIN) 81 MG EC tablet Take 81 mg by mouth once daily.    b complex vitamins (B COMPLEX-VITAMIN B12) tablet Take 1 tablet by mouth once daily.    buPROPion (WELLBUTRIN XL) 150 MG TB24 tablet Take 1 tablet (150 mg total) by mouth once daily.    calcium citrate (CALCITRATE) 200 mg (950 mg) tablet Take 1 tablet by mouth 2 (two) times daily.    EPINEPHrine (EPIPEN) 0.3 mg/0.3 mL AtIn Inject into the muscle once for one dose as needed.    gabapentin (NEURONTIN) 100 MG capsule Take 1 capsule (100 mg total) by mouth 2 (two) times daily as needed (sciatica).    multivitamin (THERAGRAN) per tablet Take 1 tablet by mouth once daily.    ondansetron (ZOFRAN) 4 MG tablet Take 1 tablet (4 mg total) by mouth every 6 (six) hours as needed for Nausea.    oxyCODONE-acetaminophen (PERCOCET) 5-325 mg per tablet Take 1 tablet by mouth every 6 (six) hours as needed for Pain.    rosuvastatin (CRESTOR) 10 MG tablet Take 1 tablet (10 mg total) by mouth every evening.    tamsulosin (FLOMAX) 0.4 mg Cap Take 2 capsules (0.8 mg total) by mouth once daily.     No current facility-administered medications for this visit.     Facility-Administered Medications Ordered in Other Visits   Medication    ceFAZolin in dextrose (iso-os) 2 gram/100 mL PgBk 2,000 mg    HYDROmorphone injection 1 mg    oxyCODONE-acetaminophen  mg per tablet 1 tablet    polyethylene glycol packet 17 g       Review of patient's allergies indicates:   Allergen Reactions    Peaches [peach (prunus persica)] Anaphylaxis    Peanut Anaphylaxis    Tree pollen-red birch Anaphylaxis       Family History   Problem Relation Name Age of Onset    Hypertension Mother      Diabetes Father      Cancer Father          mesotheliosma     Cataracts Neg Hx      Glaucoma Neg Hx      Macular degeneration Neg Hx      Retinal detachment Neg Hx      Colon cancer Neg Hx      Colon polyps Neg Hx      Crohn's disease Neg Hx      Ulcerative colitis Neg Hx         Social History      Socioeconomic History    Marital status:     Number of children: 4   Occupational History     Employer: [employer]   Tobacco Use    Smoking status: Former     Current packs/day: 0.00     Average packs/day: 0.5 packs/day for 10.0 years (5.0 ttl pk-yrs)     Types: Cigarettes     Start date: 1999     Quit date: 2009     Years since quittin.1    Smokeless tobacco: Never    Tobacco comments:     quit smoking in    Substance and Sexual Activity    Alcohol use: Yes     Comment: rarely    Drug use: Not Currently     Types: Marijuana     Comment: medical---oil, smoke and edibles    Sexual activity: Not Currently     Social Drivers of Health     Financial Resource Strain: Low Risk  (2024)    Overall Financial Resource Strain (CARDIA)     Difficulty of Paying Living Expenses: Not hard at all   Food Insecurity: No Food Insecurity (2024)    Hunger Vital Sign     Worried About Running Out of Food in the Last Year: Never true     Ran Out of Food in the Last Year: Never true   Transportation Needs: No Transportation Needs (2024)    PRAPARE - Transportation     Lack of Transportation (Medical): No     Lack of Transportation (Non-Medical): No   Physical Activity: Sufficiently Active (2024)    Exercise Vital Sign     Days of Exercise per Week: 4 days     Minutes of Exercise per Session: 120 min   Recent Concern: Physical Activity - Insufficiently Active (2023)    Exercise Vital Sign     Days of Exercise per Week: 2 days     Minutes of Exercise per Session: 60 min   Stress: Stress Concern Present (2024)    [employer] [place] [employer] - Occupational Stress Questionnaire     Feeling of Stress : To some extent   Housing Stability: High Risk (2024)    Housing Stability Vital Sign     Unable to Pay for Housing in the Last Year: Yes     Number of Places Lived in the Last Year: 1     Unstable Housing in the Last Year: No       Physical exam:  There were no vitals  filed for this visit.  There is no height or weight on file to calculate BMI.  General: In no apparent distress; well developed and well nourished.  HEENT: normocephalic; atraumatic.  Cardiovascular: regular rate.  Respiratory: no increased work of breathing.  Musculoskeletal:   Gait: antalgic  Inspection:  Exam unchanged.  Significant flatfoot deformity with associated hindfoot and ankle valgus.  No significant forefoot abduction.  Hindfoot and ankle are not able to be completely passively corrected.  Unable to perform double or single limb heel rise bilaterally.  Patient localizes pain symmetrically.  Most prominent along the anteromedial ankle joint line and less so along the course of the posterior tibial tendon, left worse than right.  Minimal tenderness along the sinus tarsi.  No peroneal tenderness.  Silf[place]old:  Positive  Alignment:  Knee: neutral               Ankle: increased valgus              Hindfoot: increased valgus              Forefoot: neutral   Strength:              Dorsiflexion 5/5  Plantar flexion 5/5  Inversion 3+/5  Eversion 5/5   Sensation:             Altered but present sensation on monofilament testing bilaterally.  ROM:              Ankle: limited with pain at extremes              Subtalar:  limited with pain at extremes  Pulses: 2+ DP/PT pulses.                   Imaging Studies/Outside documentation:  I have ordered/reviewed/interpreted the following images/outside documentation:  WB 3-views Left ankle:  New x-rays performed today at patient's request.    On my independent review, no acute bony abnormality noted.  Patient is still with severe tibiotalar joint space narrowing.  Significant lateral translation of the calcaneus consistent with hindfoot valgus.  Questionable widening of the distal tib-fib articulation.  Significant osteophytes diffusely about the tibiotalar joint.  Some degree of posterior translation of the talus relative to the tibial plafond.         Assessment:  Fabiano Jules Jr. is a 54 y.o. male with Bilateral, Left > Right, PCFD; valgus talar tilt and end-stage tibiotalar arthritis; posterior tibial tendinitis; gastroc contracture     Plan:   Clinical and radiographic findings were again discussed.  He was given bilateral ankle corticosteroid injections today  Physical therapy referral today for achilles tendonitis  He may follow up for repeat injections in 3-6 months if needed  Patient voiced understanding.  All questions were answered.      This note was created using voice recognition software and may contain grammatical errors.

## 2025-02-25 NOTE — TELEPHONE ENCOUNTER
Bridget please contact Lynette Petersen RT and find out what needs to be done. Instructions below a little confusing.     Unclear how I'm supposed to put a case request in for this. No options.     If necessary ask Krystina Barillas for recommendations/help

## 2025-02-25 NOTE — TELEPHONE ENCOUNTER
----- Message from Ryley Ojeda sent at 2/25/2025  3:33 PM CST -----  Regarding: Mri With Anesthesia needs to be reschedule at [facility]  Good AfternoonI am writing in regards to our patient [facility].Please call Anesthesia at [phone] ask for Emely or Claudette to get a date and timeAvailable nest enter case request in with date given then last step call Mri at [phone] Mri can schedule confirm with anesthesia.Please adviseMarlene/Mri

## 2025-02-26 NOTE — TELEPHONE ENCOUNTER
Spoke with patient telephone visit scheduled for tomorrow. Patient verbally voiced understanding.

## 2025-02-26 NOTE — TELEPHONE ENCOUNTER
Spoke with patient informed him of recommendations below. Spoke with main campus the next available mri where patient can be put to sleep with anesthesia is 6/6 at 1p. Please advise if I should schedule this so far off?

## 2025-02-27 ENCOUNTER — PATIENT MESSAGE (OUTPATIENT)
Dept: UROLOGY | Facility: CLINIC | Age: 55
End: 2025-02-27

## 2025-02-27 ENCOUNTER — OFFICE VISIT (OUTPATIENT)
Dept: UROLOGY | Facility: CLINIC | Age: 55
End: 2025-02-27
Payer: COMMERCIAL

## 2025-02-27 DIAGNOSIS — R97.20 ELEVATED PSA: ICD-10-CM

## 2025-02-27 DIAGNOSIS — R68.89 ABNORMAL DIGITAL RECTAL EXAM: ICD-10-CM

## 2025-02-27 DIAGNOSIS — N13.8 BPH WITH OBSTRUCTION/LOWER URINARY TRACT SYMPTOMS: Primary | ICD-10-CM

## 2025-02-27 DIAGNOSIS — N40.1 BPH WITH OBSTRUCTION/LOWER URINARY TRACT SYMPTOMS: Primary | ICD-10-CM

## 2025-02-27 NOTE — PATIENT INSTRUCTIONS
's typed/written- Abbreviated/Short Plan:  Continue flomax 2 a night  Go ahead and schedule mri prostate with anesthesia at [facility] (beginning of nia is first available). Pt cannot do without anesthesia  Repeat psa free and total in 3 months and fu after (may)  Ir psa rises then mri at [facility] with anesthesia  If it is the same or lower, cancel mri   Eventually can address bph and offer bph treatments once we confirm psa stable.       The following assessment plan was created by [employer] via ambient listening:  Assessment & Plan    N40.1, N13.8 BPH with obstruction/lower urinary tract symptoms  R97.20 Elevated PSA  R68.89 Abnormal digital rectal exam    IMPRESSION:   Assessed elevated PSA (4.3) and soft nodule on recent exam   Considered prostate MRI, but patient unable to tolerate without anesthesia   Repeat PSA decreased to 3.5, suggesting possible inflammation/prostatitis   Plan to monitor PSA and pursue MRI if levels rise again   Ruled out UTI with negative urine culture   Continued maximum dose Flomax for BPH symptoms   Will consider additional BPH treatments once prostate cancer risk is ruled out    Please review the short plan as above for concise and accurate plan. The dictated/AI generated plan may have some inaccuracies .    PLAN SUMMARY:   Complete lab work before next appointment   Continue tamsulosin (Flomax) 0.8 mg (2 tablets) nightly for BPH   Repeat PSA (free and total) in 3 months   Schedule prostate MRI with anesthesia at [facility] for early June   Follow up in May to review PSA results and determine need for MRI    BPH WITH OBSTRUCTION/LOWER URINARY TRACT SYMPTOMS:   Continue tamsulosin (Flomax) 0.8 mg (2 tablets) nightly for BPH symptoms.    ELEVATED PSA:   Repeat PSA (free and total) in 3 months.   If PSA rises, proceed with scheduled MRI.   If PSA is stable or lower, cancel scheduled MRI.    ABNORMAL DIGITAL RECTAL EXAM:   Schedule prostate MRI with anesthesia at  main campus for early June.   Inform office of appointment date or contact if unable to schedule.    FOLLOW UP:   Follow up in May.   Complete lab work prior to appointment.             Portions of this note was generated with the assistance of ambient listening technology. Verbal consent was obtained by the patient and accompanying visitor(s) for the recording of patient appointment to facilitate this note. I attest to having reviewed and edited the generated note for accuracy, though some syntax or spelling errors may persist. Please contact the author of this note for any clarification.

## 2025-02-27 NOTE — PROGRESS NOTES
The patient location is: HOME  The state in which patient is residing at time of visit: [place]  Visit type: Virtual visit with AUDIO ONLY (TELEPHONE)  Total time spent in medical discussion: 10 minutes  Total time spent on date of the encounter: 20 minutes    The chief complaint leading to consultation is: elevated psa    Date of Service: 02/27/2025  Mandy Castorena MD    Each patient to whom he or she provides medical services by telemedicine is:    (1) informed of the relationship between the physician and patient and the respective role of any other health care provider with respect to management of the patient; and   (2) notified that he or she may decline to receive medical services by telemedicine and may withdraw from such care at any time.  (3) Patient verbally consented to treatment via phone, according to the clinic consent to treat policies outlined by the registrar    This service was not originating from a related E/M service provided within the previous 7 days nor will  to an E/M service or procedure within the next 24 hours or my soonest available appointment.  Prevailing standard of care was able to be met in this audio-only visit.      [facility], formerly known as [facility]  Group MD's:Raffaele/Harshal/Kita/Marcial  Group NP's: Karma Garland    PCP: Howie Mathias MD  Date of Service: 02/27/2025  Today's note written by: MD Raffaele    Subjective:        HPI: Fabiano Jules Jr. is a 54 y.o. male     Initial consult by me in clinic on 2/13/25:  Mr. Jules reports urinary symptoms for approximately the past 12-18 months, including straining to urinate, difficulty emptying his bladder, and frequent urination with small volumes. He also mentions occasional urinary incontinence, with leakage occurring about 50% of the time en route to the bathroom, consisting of drops rather than large volumes. Complete urinary incontinence  occurs approximately once monthly for the last 6 months, necessitating a change of underwear.  These symptoms have gradually worsened over the past year, with the straining to urinate beginning around 12 months ago. He denies dysuria. Flomax has been prescribed, which he reports has improved his symptoms. He takes one Flomax tablet nightly.  Urinary frequency is described as every 2-3 hours, with a maximum of 3 hours between urinations. Daily fluid intake is 8-10 glasses of water, a lifelong habit. Recently, he has reduced his coffee intake from 3 cups daily to one cup every other day, which he states has significantly improved his symptoms, although he still has frequent urination.  He had back surgery (lumbar fusion of L4 and L5) on April 5, 2020. When asked if his overactive bladder symptoms worsened after the surgery, he indicated that they may have, but he had not considered it for an extended period.  He reports waking up during the night to drink water, but not always to urinate. He denies urinary incontinence with coughing or sneezing, history of urinary tract infections, or stress incontinence. He also denies a family history of prostate cancer, renal cancer, bladder cancer, or kidney stones.  Bladder scan PVR today was 0mL.    AUA ssx today:(4 incomplete emptying, 5 frequency, 4 intermittency, 5 urgency, 3 weak stream, 3 straining, 2 sleeping). 25. QOL: TERRIBLE    PERTINENT MEDICATIONS:  Flomax (tamsulosin), 1 tablet at night, for urinary symptoms, effective in improving symptoms  Aspirin 81 mg (baby aspirin), daily, reason not specified    PERTINENT MEDICAL HISTORY:  Back injury: Prior to 2020    PERTINENT FAMILY HISTORY:  No family history of prostate cancer  No family history of kidney stones  No family history of renal cancer or bladder cancer    PERTINENT SURGICAL HISTORY:  Lumbar fusion (L4 and L5): April 5, 2020, for back injury    PERTINENT TEST RESULTS:  PSA: October 2021, 3.1  PSA: 2020,  2.1  PSA: 2007, 0.4  PSA: January 25, 2023, 4.3  Urinalysis: 2023, negative except for trace protein  Urinalysis: 2015, negative for blood CT Lumbar Spine: June 2020, no stones or hydronephrosis visible in the visible portion of the kidneys    SH:SUBSTANCE USE:  Smoking: Quit 25 years ago, smoked for at least 10 years, 1 pack per day  Alcohol: Denies current use       Interval history by ME/ - CLINIC virtual visit (audio only) on 2/27/25:  History of Present Illness    CHIEF COMPLAINT:  Mr. Jules presents for follow-up regarding an elevated PSA and to discuss recent test results.    HPI:  Mr. Jules has a history of enlarged prostate. Two weeks ago, he had an elevated PSA of 4.3, and Dr. Castorena felt a soft nodule on exam. A repeat PSA test showed a decrease to 3.5. plan was for prostate mri but n/a with anesthesia is June and pt cannot do without anesthesia.  After increasing his Flomax dosage to 2 pills at night, as previously recommended, he had some initial improvement in his symptoms, specifically less straining during urination. This improvement has since plateaued. A urine test was performed, which came back completely negative.    Visit today is to discuss psa levels and decide on mri now vs later bc of nodule.     PERTINENT MEDICATIONS:  Flomax, 2 tablets, nightly, for enlarged prostate (BPH)    PERTINENT MEDICAL HISTORY:  Enlarged prostate    PERTINENT TEST RESULTS:  PSA: 2 weeks ago, 4.3  PSA: Recent, 3.5, decreased from previous test  Urinalysis: Recent, completely negative           PSA History: no fam hx of prostate cancer  2/14/25 Total psa 3.7, %free 17.57  2/14/25 3.5  2/13/25 ALYCIA: 35g, 1/2 cm nodule round soft nontender left apex.       Urine history: family history of kidney, bladder or prostate cancer:No, personal or family history of kidney stones: No,tobacco use: 1 ppd x 10 yrs, quit around 2000, anticoagulation: Yes - asa 81mg  2/14/25 neg          REVIEW OF SYSTEMS:  Negative  except for as stated above    Past Medical History:   Diagnosis Date    Arthritis     Back injury     Colon polyp     COVID     CPAP (continuous positive airway pressure) dependence     NOT NEEDED AFTER WEIGHT LOSS SURGERY    General anesthetics causing adverse effect in therapeutic use     WOKE UP CONFUSED X 1    Hypertension     PONV (postoperative nausea and vomiting)     Sleep apnea     c pap machine used  - NOT NEEDED AFTER GASTRIC SLEEVE    Wears glasses        Past Surgical History:   Procedure Laterality Date    ARTHROSCOPIC DEBRIDEMENT OF SHOULDER Right 9/6/2024    Procedure: EXTENSIVE DEBRIDEMENT, SHOULDER, ARTHROSCOPIC;  Surgeon: Darrion Ricks MD;  Location: [facility] OR;  Service: Orthopedics;  Laterality: Right;    ARTHROSCOPIC REPAIR OF ROTATOR CUFF OF SHOULDER Right 9/6/2024    Procedure: REPAIR, ROTATOR CUFF, ARTHROSCOPIC;  Surgeon: Darrion Ricks MD;  Location: [facility] OR;  Service: Orthopedics;  Laterality: Right;  arthrex-cuffmend/SCR stuff  Hood w/Arthrex notified 8/30/24 ark    ARTHROSCOPY OF SHOULDER WITH DECOMPRESSION OF SUBACROMIAL SPACE Right 9/6/2024    Procedure: ARTHROSCOPY, SHOULDER, WITH SUBACROMIAL SPACE DECOMPRESSION;  Surgeon: Darrion Ricks MD;  Location: [facility] OR;  Service: Orthopedics;  Laterality: Right;    ARTHROSCOPY,SHOULDER,WITH BICEPS TENODESIS Right 9/6/2024    Procedure: ARTHROSCOPY,SHOULDER,WITH BICEPS TENODESIS;  Surgeon: Darrion Ricks MD;  Location: [facility] OR;  Service: Orthopedics;  Laterality: Right;    BONE GRAFT N/A 04/05/2022    Procedure: BONE GRAFT;  Surgeon: Duran Adler Jr., MD;  Location: [facility] OR;  Service: Orthopedics;  Laterality: N/A;    CIRCUMCISION      COLONOSCOPY N/A 12/03/2018    Procedure: COLONOSCOPY;  Surgeon: CHRISSY Reyna MD;  Location: [facility] ENDO (4TH FLR);  Service: Endoscopy;  Laterality: N/A;    COLONOSCOPY N/A 09/01/2022    Procedure: COLONOSCOPY;  Surgeon: Alondra Arias MD;  Location: [facility];   Service: Endoscopy;  Laterality: N/A;    epidural steroid injection X 2      lower lumbar    ESOPHAGOGASTRODUODENOSCOPY N/A 09/01/2022    Procedure: EGD (ESOPHAGOGASTRODUODENOSCOPY);  Surgeon: Alondra Arias MD;  Location: [facility] ENDO;  Service: Endoscopy;  Laterality: N/A;    facet injection       Dr Manpreet [facility] [place]     INSERTION OF IMPLANTABLE LOOP RECORDER N/A 9/15/2023    Procedure: Insertion, Implantable Loop Recorder;  Surgeon: Romario Wallace MD;  Location: [facility] CATH/EP LAB;  Service: Cardiology;  Laterality: N/A;    INTRALUMINAL GASTROINTESTINAL TRACT IMAGING VIA CAPSULE N/A 10/03/2022    Procedure: IMAGING PROCEDURE, GI TRACT, INTRALUMINAL, VIA CAPSULE;  Surgeon: Alondra Arias MD;  Location: [facility];  Service: Endoscopy;  Laterality: N/A;    LAPAROSCOPIC SLEEVE GASTRECTOMY N/A 12/19/2022    Procedure: GASTRECTOMY, SLEEVE, LAPAROSCOPIC;  Surgeon: Lashonda Pinedo MD;  Location: [facility] OR;  Service: General;  Laterality: N/A;    LIPOMA RESECTION      Back of neck    MAGNETIC RESONANCE IMAGING N/A 05/29/2020    Procedure: MRI (MAGNETIC RESONANCE IMAGING) LUMBAR SPINE;  Surgeon: [facility] Diagnostic Provider;  Location: [facility];  Service: General;  Laterality: N/A;  COVID NEG    MAGNETIC RESONANCE IMAGING N/A 12/03/2021    Procedure: MRI (MAGNETIC RESONANCE IMAGING), LUMBAR SPINE;  Surgeon: [facility] Diagnostic Provider;  Location: [facility];  Service: General;  Laterality: N/A;  In MRI @ 7:50 per Ariela    MAGNETIC RESONANCE IMAGING N/A 3/6/2024    Procedure: MRI (Magnetic Resonance Imagine);  Surgeon: Vicenta Michele;  Location: [facility];  Service: Anesthesiology;  Laterality: N/A;    MAGNETIC RESONANCE IMAGING Bilateral 6/28/2024    Procedure: MRI (Magnetic Resonance Imagine);  Surgeon: Vicenta Michele;  Location: [facility];  Service: Anesthesiology;  Laterality: Bilateral;  MRI BRAIN WITH AND WITHOUT CONTRAST    MEDIAL COLLATERAL LIGAMENT AND LATERAL COLLATERAL LIGAMENT  REPAIR, KNEE Right 09/01/2018    Dr. Christophe Gore in [place]     MINIMALLY INVASIVE TRANSFORAMINAL LUMBAR INTERBODY FUSION (TLIF) N/A 04/05/2022    Procedure: FUSION, SPINE, LUMBAR, TLIF, MINIMALLY INVASIVE, WITH INSTRUMENTATION,  L4/5 RIGHT;  Surgeon: Duran Adler Jr., MD;  Location: [facility] OR;  Service: Orthopedics;  Laterality: N/A;  10:30am per Tamela    MYELOGRAPHY N/A 06/23/2020    Procedure: MYELOGRAM;  Surgeon: [facility] Diagnostic Provider;  Location: [facility] OR;  Service: General;  Laterality: N/A;         Objective:     There were no vitals filed for this visit.      Physical Exam    Male Genitourinary: Soft nodule on prostate.             Assessment:     Fabiano Jules Jr. is a 54 y.o. male with     1. BPH with obstruction/lower urinary tract symptoms    2. Elevated PSA    3. Abnormal digital rectal exam        Plan:         's typed/written- Abbreviated/Short Plan:  Continue flomax 2 a night  Go ahead and schedule mri prostate with anesthesia at [facility] (beginning of jun is first available). Pt cannot do without anesthesia  Repeat psa free and total in 3 months and fu after (may)  Ir psa rises then mri at [facility] with anesthesia  If it is the same or lower, cancel mri   Eventually can address bph and offer bph treatments once we confirm psa stable.       The following assessment plan was created by [employer] via ambient listening:  Assessment & Plan    N40.1, N13.8 BPH with obstruction/lower urinary tract symptoms  R97.20 Elevated PSA  R68.89 Abnormal digital rectal exam    IMPRESSION:   Assessed elevated PSA (4.3) and soft nodule on recent exam   Considered prostate MRI, but patient unable to tolerate without anesthesia   Repeat PSA decreased to 3.5, suggesting possible inflammation/prostatitis   Plan to monitor PSA and pursue MRI if levels rise again   Ruled out UTI with negative urine culture   Continued maximum dose Flomax for BPH symptoms   Will consider additional BPH treatments  once prostate cancer risk is ruled out    Please review the short plan as above for concise and accurate plan. The dictated/AI generated plan may have some inaccuracies .    PLAN SUMMARY:   Complete lab work before next appointment   Continue tamsulosin (Flomax) 0.8 mg (2 tablets) nightly for BPH   Repeat PSA (free and total) in 3 months   Schedule prostate MRI with anesthesia at [facility] for early June   Follow up in May to review PSA results and determine need for MRI    BPH WITH OBSTRUCTION/LOWER URINARY TRACT SYMPTOMS:   Continue tamsulosin (Flomax) 0.8 mg (2 tablets) nightly for BPH symptoms.    ELEVATED PSA:   Repeat PSA (free and total) in 3 months.   If PSA rises, proceed with scheduled MRI.   If PSA is stable or lower, cancel scheduled MRI.    ABNORMAL DIGITAL RECTAL EXAM:   Schedule prostate MRI with anesthesia at [facility] for early June.   Inform office of appointment date or contact if unable to schedule.    FOLLOW UP:   Follow up in May.   Complete lab work prior to appointment.             Portions of this note was generated with the assistance of ambient listening technology. Verbal consent was obtained by the patient and accompanying visitor(s) for the recording of patient appointment to facilitate this note. I attest to having reviewed and edited the generated note for accuracy, though some syntax or spelling errors may persist. Please contact the author of this note for any clarification.    Mandy Castorena MD

## 2025-02-27 NOTE — Clinical Note
's typed/written- Abbreviated/Short Plan: · Continue flomax 2 a night · Bridget Go ahead and schedule mri prostate with anesthesia at main [facility] (beginning of jun is first available)0 orders are in. Let me know if I need to orer anything else · Schedule Repeat psa free and total in 3 months prior to fu in may  · Ir psa rises then mri at [facility] with anesthesia · If it is the same or lower, cancel mri  · Eventually can address bph and offer bph treatments once we confirm psa stable.

## 2025-02-28 RX ORDER — TRIAMCINOLONE ACETONIDE 40 MG/ML
40 INJECTION, SUSPENSION INTRA-ARTICULAR; INTRAMUSCULAR
Status: DISCONTINUED | OUTPATIENT
Start: 2025-02-25 | End: 2025-02-28 | Stop reason: HOSPADM

## 2025-02-28 NOTE — PROCEDURES
Intermediate Joint Aspiration/Injection: R ankle, L ankle    Date/Time: 2/25/2025 8:40 AM    Performed by: Liban Hirsch MD  Authorized by: Liban Hirsch MD    Consent Done?:  Yes (Verbal)  Indications:  Arthritis  Site marked: The procedure site was marked    Timeout: Prior to procedure the correct patient, procedure, and site was verified      Location:  Ankle  Site:  R ankle and L ankle  Prep: Patient was prepped and draped in usual sterile fashion    Ultrasonic Guidance for needle placement: No  Needle size:  25 G  Approach:  Anteromedial  Medications:  40 mg triamcinolone acetonide 40 mg/mL  Patient tolerance:  Patient tolerated the procedure well with no immediate complications

## 2025-03-02 ENCOUNTER — CLINICAL SUPPORT (OUTPATIENT)
Dept: CARDIOLOGY | Facility: CLINIC | Age: 55
End: 2025-03-02

## 2025-03-02 ENCOUNTER — HOSPITAL ENCOUNTER (OUTPATIENT)
Dept: CARDIOLOGY | Facility: CLINIC | Age: 55
Discharge: HOME | End: 2025-03-02
Attending: GENERAL PRACTICE
Payer: COMMERCIAL

## 2025-03-02 DIAGNOSIS — I49.8 OTHER SPECIFIED CARDIAC ARRHYTHMIAS: ICD-10-CM

## 2025-03-02 PROCEDURE — 93298 REM INTERROG DEV EVAL SCRMS: CPT | Mod: PN | Performed by: GENERAL PRACTICE

## 2025-03-03 ENCOUNTER — OFFICE VISIT (OUTPATIENT)
Dept: ORTHOPEDICS | Facility: CLINIC | Age: 55
End: 2025-03-03
Payer: COMMERCIAL

## 2025-03-03 ENCOUNTER — CLINICAL SUPPORT (OUTPATIENT)
Dept: REHABILITATION | Facility: HOSPITAL | Age: 55
End: 2025-03-03
Payer: COMMERCIAL

## 2025-03-03 ENCOUNTER — TELEPHONE (OUTPATIENT)
Dept: UROLOGY | Facility: CLINIC | Age: 55
End: 2025-03-03

## 2025-03-03 VITALS — WEIGHT: 229.75 LBS | HEIGHT: 70 IN | BODY MASS INDEX: 32.89 KG/M2

## 2025-03-03 DIAGNOSIS — R53.1 STRENGTH LOSS OF: ICD-10-CM

## 2025-03-03 DIAGNOSIS — M25.60 RANGE OF MOTION DEFICIT: Primary | ICD-10-CM

## 2025-03-03 DIAGNOSIS — R97.20 ELEVATED PSA: Primary | ICD-10-CM

## 2025-03-03 DIAGNOSIS — S46.011D TRAUMATIC COMPLETE TEAR OF RIGHT ROTATOR CUFF, SUBSEQUENT ENCOUNTER: Primary | ICD-10-CM

## 2025-03-03 PROCEDURE — 1159F MED LIST DOCD IN RCRD: CPT | Mod: CPTII,S$GLB,, | Performed by: ORTHOPAEDIC SURGERY

## 2025-03-03 PROCEDURE — 97140 MANUAL THERAPY 1/> REGIONS: CPT | Mod: PN

## 2025-03-03 PROCEDURE — 1160F RVW MEDS BY RX/DR IN RCRD: CPT | Mod: CPTII,S$GLB,, | Performed by: ORTHOPAEDIC SURGERY

## 2025-03-03 PROCEDURE — 97530 THERAPEUTIC ACTIVITIES: CPT | Mod: PN

## 2025-03-03 PROCEDURE — 99214 OFFICE O/P EST MOD 30 MIN: CPT | Mod: S$GLB,,, | Performed by: ORTHOPAEDIC SURGERY

## 2025-03-03 PROCEDURE — 97110 THERAPEUTIC EXERCISES: CPT | Mod: PN

## 2025-03-03 PROCEDURE — 3008F BODY MASS INDEX DOCD: CPT | Mod: CPTII,S$GLB,, | Performed by: ORTHOPAEDIC SURGERY

## 2025-03-03 PROCEDURE — 99999 PR PBB SHADOW E&M-EST. PATIENT-LVL III: CPT | Mod: PBBFAC,,, | Performed by: ORTHOPAEDIC SURGERY

## 2025-03-03 NOTE — TELEPHONE ENCOUNTER
Spoke with mahendra on Thursday to schedule mri on 6/6. She stated slot will be placed on hold. Tried reaching out to mango in mri Thursday and Friday with no answer. Spoke with mango today and 6/6 is no longer available mri scheduled for 6/17

## 2025-03-03 NOTE — PROGRESS NOTES
Past Medical History:   Diagnosis Date    Arthritis     Back injury     Colon polyp     COVID     CPAP (continuous positive airway pressure) dependence     NOT NEEDED AFTER WEIGHT LOSS SURGERY    General anesthetics causing adverse effect in therapeutic use     WOKE UP CONFUSED X 1    Hypertension     PONV (postoperative nausea and vomiting)     Sleep apnea     c pap machine used  - NOT NEEDED AFTER GASTRIC SLEEVE    Wears glasses        Past Surgical History:   Procedure Laterality Date    ARTHROSCOPIC DEBRIDEMENT OF SHOULDER Right 9/6/2024    Procedure: EXTENSIVE DEBRIDEMENT, SHOULDER, ARTHROSCOPIC;  Surgeon: Darrion Ricks MD;  Location: [facility] OR;  Service: Orthopedics;  Laterality: Right;    ARTHROSCOPIC REPAIR OF ROTATOR CUFF OF SHOULDER Right 9/6/2024    Procedure: REPAIR, ROTATOR CUFF, ARTHROSCOPIC;  Surgeon: Darrion Ricks MD;  Location: [facility] OR;  Service: Orthopedics;  Laterality: Right;  arthrex-cuffmend/SCR stuff  Hood w/Arthrex notified 8/30/24 ark    ARTHROSCOPY OF SHOULDER WITH DECOMPRESSION OF SUBACROMIAL SPACE Right 9/6/2024    Procedure: ARTHROSCOPY, SHOULDER, WITH SUBACROMIAL SPACE DECOMPRESSION;  Surgeon: Darrion Ricks MD;  Location: [facility] OR;  Service: Orthopedics;  Laterality: Right;    ARTHROSCOPY,SHOULDER,WITH BICEPS TENODESIS Right 9/6/2024    Procedure: ARTHROSCOPY,SHOULDER,WITH BICEPS TENODESIS;  Surgeon: Darrion Ricks MD;  Location: [facility] OR;  Service: Orthopedics;  Laterality: Right;    BONE GRAFT N/A 04/05/2022    Procedure: BONE GRAFT;  Surgeon: Duran Adler Jr., MD;  Location: [facility] OR;  Service: Orthopedics;  Laterality: N/A;    CIRCUMCISION      COLONOSCOPY N/A 12/03/2018    Procedure: COLONOSCOPY;  Surgeon: CHRISSY Reyna MD;  Location: [facility] ENDO (4TH FLR);  Service: Endoscopy;  Laterality: N/A;    COLONOSCOPY N/A 09/01/2022    Procedure: COLONOSCOPY;  Surgeon: Alondra Arias MD;  Location: [facility];  Service: Endoscopy;   Laterality: N/A;    epidural steroid injection X 2      lower lumbar    ESOPHAGOGASTRODUODENOSCOPY N/A 09/01/2022    Procedure: EGD (ESOPHAGOGASTRODUODENOSCOPY);  Surgeon: Alondra Arias MD;  Location: [facility] ENDO;  Service: Endoscopy;  Laterality: N/A;    facet injection       Dr Manpreet Gore [facility] [place]     INSERTION OF IMPLANTABLE LOOP RECORDER N/A 9/15/2023    Procedure: Insertion, Implantable Loop Recorder;  Surgeon: Romario Wallace MD;  Location: [facility] CATH/EP LAB;  Service: Cardiology;  Laterality: N/A;    INTRALUMINAL GASTROINTESTINAL TRACT IMAGING VIA CAPSULE N/A 10/03/2022    Procedure: IMAGING PROCEDURE, GI TRACT, INTRALUMINAL, VIA CAPSULE;  Surgeon: Alondra Arias MD;  Location: [facility] ENDO;  Service: Endoscopy;  Laterality: N/A;    LAPAROSCOPIC SLEEVE GASTRECTOMY N/A 12/19/2022    Procedure: GASTRECTOMY, SLEEVE, LAPAROSCOPIC;  Surgeon: Lashonda Pinedo MD;  Location: [facility] OR;  Service: General;  Laterality: N/A;    LIPOMA RESECTION      Back of neck    MAGNETIC RESONANCE IMAGING N/A 05/29/2020    Procedure: MRI (MAGNETIC RESONANCE IMAGING) LUMBAR SPINE;  Surgeon: [facility] Diagnostic Provider;  Location: [facility];  Service: General;  Laterality: N/A;  COVID NEG    MAGNETIC RESONANCE IMAGING N/A 12/03/2021    Procedure: MRI (MAGNETIC RESONANCE IMAGING), LUMBAR SPINE;  Surgeon: [facility] Diagnostic Provider;  Location: [facility];  Service: General;  Laterality: N/A;  In MRI @ 7:50 per Ariela    MAGNETIC RESONANCE IMAGING N/A 3/6/2024    Procedure: MRI (Magnetic Resonance Imagine);  Surgeon: Vicenta Michele;  Location: [facility];  Service: Anesthesiology;  Laterality: N/A;    MAGNETIC RESONANCE IMAGING Bilateral 6/28/2024    Procedure: MRI (Magnetic Resonance Imagine);  Surgeon: Vicenta Michele;  Location: [facility];  Service: Anesthesiology;  Laterality: Bilateral;  MRI BRAIN WITH AND WITHOUT CONTRAST    MEDIAL COLLATERAL LIGAMENT AND LATERAL COLLATERAL LIGAMENT REPAIR, KNEE Right  09/01/2018    Dr. Christophe Gore in [place]     MINIMALLY INVASIVE TRANSFORAMINAL LUMBAR INTERBODY FUSION (TLIF) N/A 04/05/2022    Procedure: FUSION, SPINE, LUMBAR, TLIF, MINIMALLY INVASIVE, WITH INSTRUMENTATION,  L4/5 RIGHT;  Surgeon: Duran Adler Jr., MD;  Location: [facility] OR;  Service: Orthopedics;  Laterality: N/A;  10:30am per Tamela    MYELOGRAPHY N/A 06/23/2020    Procedure: MYELOGRAM;  Surgeon: Andi Diagnostic Provider;  Location: [facility] OR;  Service: General;  Laterality: N/A;       Current Outpatient Medications   Medication Sig    acetaminophen (TYLENOL) 500 MG tablet Take 2 tablets (1,000 mg total) by mouth every 8 (eight) hours as needed for Pain.    amLODIPine (NORVASC) 5 MG tablet Take 1 tablet (5 mg total) by mouth once daily.    aspirin (ECOTRIN) 81 MG EC tablet Take 81 mg by mouth once daily.    b complex vitamins (B COMPLEX-VITAMIN B12) tablet Take 1 tablet by mouth once daily.    buPROPion (WELLBUTRIN XL) 150 MG TB24 tablet Take 1 tablet (150 mg total) by mouth once daily.    calcium citrate (CALCITRATE) 200 mg (950 mg) tablet Take 1 tablet by mouth 2 (two) times daily.    EPINEPHrine (EPIPEN) 0.3 mg/0.3 mL AtIn Inject into the muscle once for one dose as needed.    gabapentin (NEURONTIN) 100 MG capsule Take 1 capsule (100 mg total) by mouth 2 (two) times daily as needed (sciatica).    multivitamin (THERAGRAN) per tablet Take 1 tablet by mouth once daily.    ondansetron (ZOFRAN) 4 MG tablet Take 1 tablet (4 mg total) by mouth every 6 (six) hours as needed for Nausea.    oxyCODONE-acetaminophen (PERCOCET) 5-325 mg per tablet Take 1 tablet by mouth every 6 (six) hours as needed for Pain.    rosuvastatin (CRESTOR) 10 MG tablet Take 1 tablet (10 mg total) by mouth every evening.    tamsulosin (FLOMAX) 0.4 mg Cap Take 2 capsules (0.8 mg total) by mouth once daily.     No current facility-administered medications for this visit.     Facility-Administered Medications Ordered in Other Visits   Medication     ceFAZolin in dextrose (iso-os) 2 gram/100 mL PgBk 2,000 mg    HYDROmorphone injection 1 mg    oxyCODONE-acetaminophen  mg per tablet 1 tablet    polyethylene glycol packet 17 g       Review of patient's allergies indicates:   Allergen Reactions    Peaches [peach (prunus persica)] Anaphylaxis    Peanut Anaphylaxis    Tree pollen-red birch Anaphylaxis       Family History   Problem Relation Name Age of Onset    Hypertension Mother      Diabetes Father      Cancer Father          mesotheliosma     Cataracts Neg Hx      Glaucoma Neg Hx      Macular degeneration Neg Hx      Retinal detachment Neg Hx      Colon cancer Neg Hx      Colon polyps Neg Hx      Crohn's disease Neg Hx      Ulcerative colitis Neg Hx         Social History     Socioeconomic History    Marital status:     Number of children: 4   Occupational History     Employer: [employer]   Tobacco Use    Smoking status: Former     Current packs/day: 0.00     Average packs/day: 0.5 packs/day for 10.0 years (5.0 ttl pk-yrs)     Types: Cigarettes     Start date: 1999     Quit date: 2009     Years since quittin.1    Smokeless tobacco: Never    Tobacco comments:     quit smoking in    Substance and Sexual Activity    Alcohol use: Yes     Comment: rarely    Drug use: Not Currently     Types: Marijuana     Comment: medical---oil, smoke and edibles    Sexual activity: Not Currently     Social Drivers of Health     Financial Resource Strain: Low Risk  (2024)    Overall Financial Resource Strain (CARDIA)     Difficulty of Paying Living Expenses: Not hard at all   Food Insecurity: No Food Insecurity (2024)    Hunger Vital Sign     Worried About Running Out of Food in the Last Year: Never true     Ran Out of Food in the Last Year: Never true   Transportation Needs: No Transportation Needs (2024)    PRAPARE - Transportation     Lack of Transportation (Medical): No     Lack of Transportation (Non-Medical): No   Physical  Activity: Sufficiently Active (1/17/2024)    Exercise Vital Sign     Days of Exercise per Week: 4 days     Minutes of Exercise per Session: 120 min   Recent Concern: Physical Activity - Insufficiently Active (11/1/2023)    Exercise Vital Sign     Days of Exercise per Week: 2 days     Minutes of Exercise per Session: 60 min   Stress: Stress Concern Present (1/17/2024)    [employer] [place] [employer] - Occupational Stress Questionnaire     Feeling of Stress : To some extent   Housing Stability: High Risk (1/17/2024)    Housing Stability Vital Sign     Unable to Pay for Housing in the Last Year: Yes     Number of Places Lived in the Last Year: 1     Unstable Housing in the Last Year: No       Chief Complaint:   No chief complaint on file.      Date of surgery:  September 6, 2024    History of present illness:  54-year-old male underwent right arthroscopic rotator cuff repair with Regeneten patch augmentation as well as biceps tenodesis.  He is doing well.  Has 2 more sessions of physical therapy.  Pain is only a 1/10.      Review of Systems:    Musculoskeletal:  See HPI        Physical Examination:    Vital Signs:    There were no vitals filed for this visit.      There is no height or weight on file to calculate BMI.    This a well-developed, well nourished patient in no acute distress.  They are alert and oriented and cooperative to examination.  Pt. walks without an antalgic gait.      Examination of the right shoulder shows well-healed surgical portals.  Patient has near full range of motion with forward flexion of about 174°.  Neurovascularly intact.  Just a little bit of tightness.  4+ out of 5 strength    X-rays:  None     Assessment::  Status post right rotator cuff repair with Regeneten patch augmentation and biceps tenodesis    Plan:  I reviewed the findings with him today.  Okay to progress to just a home therapy program.  Advised him to be careful with overhead or heavy lifting.  Follow up if  needed.      This note was created using eÃ‡ift voice recognition software that occasionally misinterpreted phrases or words.

## 2025-03-03 NOTE — PROGRESS NOTES
[facility]   Physical Therapy Treatment Note      Name: Fabiano Jules Jr.  Clinic Number: [id number]    Therapy Diagnosis:   Encounter Diagnoses   Name Primary?    Range of motion deficit Yes    Strength loss of          Physician: Darrion Ricks,*    Visit Date: 3/3/2025      Physician Orders: PT Eval and treat   Medical Diagnosis from Referral:   Diagnosis   S46.011D (ICD-10-CM) - Traumatic complete tear of right rotator cuff, subsequent encounter      Evaluation Date: 10/23/2024  Authorization Period Expiration:     12/31/2024      Plan of Care Expiration: 4/7/2025  Progress Note Due: 3/25/2025  Visit # / Visits authorized: 5/5  FOTO: 3/ 3     Precautions: Standard      Time In: 500 pm  Time Out: 545 pm  Total Billable Time: 45 minutes     Date of Procedure: 9/6/2024   Procedure: Procedure(s) (LRB):  REPAIR, ROTATOR CUFF, ARTHROSCOPIC, WITH BIOINDUCTIVE IMPLANT (Right)  EXTENSIVE DEBRIDEMENT, SHOULDER, ARTHROSCOPIC (Right)  ARTHROSCOPY, SHOULDER, WITH SUBACROMIAL SPACE DECOMPRESSION (Right)  ARTHROSCOPY,SHOULDER,WITH BICEPS TENODESIS (Right)     Surgeons and Role:     * Darrion Ricks MD - Primary    PTA Visit #: 0/5       Subjective     Patient reports: doing pretty well. Will come back to see me for his foot next week.     He was compliant with home exercise program.  Response to previous treatment: as noted  Functional change: as noted     Pain: 5/10  Location: right shoulder      Objective      Objective Measures updated at progress report unless specified.     Flexion to 175 with no pain at end range  Flexion 4-/5    Treatment     Fabiano received the treatments listed below:      therapeutic exercises to develop strength, endurance, ROM, and flexibility for 25 minutes including:    Upper Body Ergometer 4'/4' level 3  Prone middle trap level 3, 3 x 10  Side Lying open books x 20 Bilateral   Table step backs x 20     Wall slides with pillow case 3 x 10 repetitions red band      Biceps curl 10# x 30  Seated thoracic extension x 30  Green band Internal Rotation/External Rotation 2 x 15    Therapeutic activity for 10 minutes:    Wall ball 30/30 yellow ball  Finger ladder x 15  Shoulder press 5# 3 x 5    manual therapy techniques: Joint mobilizations were applied to the: right shoulder for 10 minutes, including:    Inferior and posterior joint mobilizations grade II  Thoracic PA mobs grade III     Not Performed Today:  Manual pendulums followed by PROM    Patient Education and Home Exercises       Education provided:   - Home Exercise Program     Written Home Exercises Provided: Yes. Exercises were reviewed and Fabiano was able to demonstrate them prior to the end of the session.  Fabiano demonstrated good  understanding of the education provided. See Electronic Medical Record under Patient Instructions for exercises provided during therapy sessions    Assessment     Fabiano presented with improvements in Active Range of Motion compared to beginning of therapy. He continues to struggle most with overhead strength. Will see him one more session and then discharge with Home Exercise Program.     Fabiano Is progressing well towards his goals.   Patient prognosis is Good.     Patient will continue to benefit from skilled outpatient physical therapy to address the deficits listed in the problem list box on initial evaluation, provide pt/family education and to maximize pt's level of independence in the home and community environment.     Patient's spiritual, cultural and educational needs considered and pt agreeable to plan of care and goals.     Anticipated barriers to physical therapy: co-morbidities, chronic pain     Goals:     Short Term Goals (6 Weeks)  -Met  Patient will be independent in Home Exercise Program to support improving range of motion.  Patient will have improved Passive Range of Motion to equal the uninvolved side.  Patient will have improved Active Range of Motion to 120 of flexion.       Long Term Goals (12 Weeks) -Progressing, not met   Patient will be independent in progressed Home Exercise Program to support improving functional strength.  Patient goal: return to work and to the gym.  Patient will have improved FOTO score to predicted level to demonstrate true functional improvements.   Patient will have improved Active Range of Motion and Passive Range of Motion to equal the uninvolved side.     Plan     Progress as per POC.     Cindy Purvis, PT, DPT   Board Certified Clinical Specialist in Orthopedic Physical Therapy

## 2025-03-03 NOTE — TELEPHONE ENCOUNTER
----- Message from Nurse Gill sent at 2/27/2025  3:46 PM CST -----  Schedule mri  ----- Message -----  From: Mandy Castorena MD  Sent: 2/27/2025   3:43 PM CST  To: LIBERTY Enriquez's typed/written- Abbreviated/Short Plan:  · Continue flomax 2 a night  · Bridget Go ahead and schedule mri prostate with anesthesia at [facility] (beginning of jun is first available)0 orders are in. Let me know if I need to orer anything else  · Schedule Repeat psa free and total in 3 months prior to fu in may   · Ir psa rises then mri at [facility] with anesthesia  · If it is the same or lower, cancel mri   · Eventually can address bph and offer bph treatments once we confirm psa stable.

## 2025-03-11 ENCOUNTER — CLINICAL SUPPORT (OUTPATIENT)
Dept: REHABILITATION | Facility: HOSPITAL | Age: 55
End: 2025-03-11
Payer: COMMERCIAL

## 2025-03-11 DIAGNOSIS — R26.9 GAIT ABNORMALITY: Primary | ICD-10-CM

## 2025-03-11 DIAGNOSIS — M76.61 TENDONITIS, ACHILLES, RIGHT: ICD-10-CM

## 2025-03-11 DIAGNOSIS — M19.071 ARTHRITIS OF ANKLE, RIGHT: ICD-10-CM

## 2025-03-11 DIAGNOSIS — M19.072 ARTHRITIS OF ANKLE, LEFT: ICD-10-CM

## 2025-03-11 PROCEDURE — 97161 PT EVAL LOW COMPLEX 20 MIN: CPT | Mod: PN

## 2025-03-11 PROCEDURE — 97112 NEUROMUSCULAR REEDUCATION: CPT | Mod: PN

## 2025-03-11 PROCEDURE — 97530 THERAPEUTIC ACTIVITIES: CPT | Mod: PN

## 2025-03-11 NOTE — PROGRESS NOTES
Outpatient Rehab    Physical Therapy Evaluation    Patient Name: Fabiano Jules Jr.  MRN: [id number]  YOB: 1970  Encounter Date: 3/11/2025    Therapy Diagnosis:   Encounter Diagnoses   Name Primary?    Arthritis of ankle, right     Arthritis of ankle, left     Tendonitis, Achilles, right     Gait abnormality Yes     Physician: Jewell Bacon PA-C    Physician Orders: Eval and Treat  Medical Diagnosis:   Diagnosis   M19.071 (ICD-10-CM) - Arthritis of ankle, right   M19.072 (ICD-10-CM) - Arthritis of ankle, left   M76.61 (ICD-10-CM) - Tendonitis, Achilles, right       Visit # / Visits Authorized:  1 / 1   Date of Evaluation:  3/11/2025   Insurance Authorization Period: 03/11/2025  to 12/31/2025   Plan of Care Certification:  3/11/2025 to 5/6/2025      Time In: 1500   Time Out: 1550  Total Time: 50   Total Billable Time: 50    Intake Outcome Measure for FOTO Survey    Therapist reviewed FOTO scores for Fabiano Jules Jr. on 3/11/2025.   FOTO report - see Media section or FOTO account episode details.     Intake Score: 47%           Subjective   History of Present Illness  Fabiano is a 54 y.o. male who reports to physical therapy with a chief concern of Bilateral foot pain, Right achilles pain.     The patient reports a medical diagnosis of Diagnosis  M19.071 (ICD-10-CM) - Arthritis of ankle, right  M19.072 (ICD-10-CM) - Arthritis of ankle, left  M76.61 (ICD-10-CM) - Tendonitis, Achilles, right.    Diagnostic tests related to this condition: X-ray.   X-Ray Details: Right: No acute fracture, dislocation, or aggressive bone lesion.  Degenerative changes of the tibiotalar joint with joint space loss and osteophyte production, similar to prior exam.  Pes planus.  Mild soft tissue swelling of the lateral ankle.     Left: No acute fracture, dislocation, or aggressive bone lesion.  Degenerative changes of the tibiotalar and fibulotalar joints with joint space loss and osteophyte production, similar to prior exam.   Ossification of the distal syndesmosis.  Pes planus.  Mild soft tissue swelling of the lateral ankle.    History of Present Condition/Illness: He has had flat feet his whole life. His arches have collapsed. He has pain in both his feet on the medial side and midfoot area. He also has significant pain in the Right achilles. He reports his left ankle is bone on bone in the ankle joints. 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 It has never improved much since then. It is painful and very weak. He thinks he needs surgery in both feet. The surgery he believes he needs requires a year recovery, and he is not prepared for that financially. He is in constant pain in the feet and that affects his job.     Pain     Patient reports a current pain level of 10/10. Pain at best is reported as 10/10. Pain at worst is reported as 10/10.   Location: Bilateral arches and right achilles  Clinical Progression (since onset): Worsening  Pain Qualities: Burning, Sharp  Pain-Relieving Factors: Medications - prescription, Ice  Pain-Aggravating Factors: Walking, Standing, Other (Comment)  Other Pain-Aggravating Factors: climbing the ladder           Past Medical History/Physical Systems Review:   Fabiano Jules Jr.  has a past medical history of Arthritis, Back injury, Colon polyp, COVID, CPAP (continuous positive airway pressure) dependence, General anesthetics causing adverse effect in therapeutic use, Hypertension, PONV (postoperative nausea and vomiting), Sleep apnea, and Wears glasses.    Fabiano Jules Jr.  has a past surgical history that includes Lipoma resection; Circumcision; Medial collateral ligament and lateral collateral ligament repair, knee (Right, 09/01/2018); facet injection ; Colonoscopy (N/A, 12/03/2018); Magnetic resonance imaging (N/A, 05/29/2020); epidural steroid injection X 2; Myelography (N/A, 06/23/2020); Magnetic resonance imaging (N/A, 12/03/2021);  Minimally invasive transforaminal lumbar interbody fusion (TLIF) (N/A, 04/05/2022); Bone graft (N/A, 04/05/2022); Esophagogastroduodenoscopy (N/A, 09/01/2022); Colonoscopy (N/A, 09/01/2022); Intraluminal gastrointestinal tract imaging via capsule (N/A, 10/03/2022); Laparoscopic sleeve gastrectomy (N/A, 12/19/2022); Insertion of implantable loop recorder (N/A, 9/15/2023); Magnetic resonance imaging (N/A, 3/6/2024); Magnetic resonance imaging (Bilateral, 6/28/2024); Arthroscopic repair of rotator cuff of shoulder (Right, 9/6/2024); Arthroscopic debridement of shoulder (Right, 9/6/2024); Arthroscopy of shoulder with decompression of subacromial space (Right, 9/6/2024); and arthroscopy,shoulder,with biceps tenodesis (Right, 9/6/2024).    Fabiano has a current medication list which includes the following prescription(s): acetaminophen, amlodipine, aspirin, b complex vitamins, bupropion, calcium citrate, epinephrine, gabapentin, multivitamin, ondansetron, oxycodone-acetaminophen, rosuvastatin, and tamsulosin, and the following Facility-Administered Medications: cefazolin in dextrose (iso-os), hydromorphone, oxycodone-acetaminophen, and polyethylene glycol.    Review of patient's allergies indicates:   Allergen Reactions    Peaches [peach (prunus persica)] Anaphylaxis    Peanut Anaphylaxis    Tree pollen-red birch Anaphylaxis        Objective   Posture                 Bilateral ankles/feet exhibit: Foot Pes Planus       Ankle/Foot Palpation     Very tender at the achilles tendon                 Ankle/Foot Range of Motion   Right Ankle/Foot   Active (deg) Passive (deg) Pain   Dorsiflexion (KE) 5       Dorsiflexion (KF)         Plantar Flexion 45       Ankle Inversion 15       Ankle Eversion 5       Subtalar Inversion         Subtalar Eversion         Great Toe MTP Flexion         Great Toe MTP Extension         Great Toe IP Flexion             Left Ankle/Foot   Active (deg) Passive (deg) Pain   Dorsiflexion (KE) 5        Dorsiflexion (KF)         Plantar Flexion 35       Ankle Inversion 10       Ankle Eversion 10       Subtalar Inversion         Subtalar Eversion         Great Toe MTP Flexion         Great Toe MTP Extension         Great Toe IP Flexion                            Ankle/Foot Strength - Planes of Motion   Right Strength Right Pain Left Strength Left  Pain   Dorsiflexion (L4) 4+   4+     Plantar Flexion (S1) 3-   3-     Inversion 3-   3-     Eversion 3+   3+     Great Toe Flexion           Great Toe Extension (L5)           Lesser Toes Flexion           Lesser Toes Extension                  Ankle/Foot Joint Mobility  Right Ankle/Foot Joint Mobility  Hypomobile: Talocrural Joint, Subtalar Joint, and Midfoot  Left Ankle/Foot Joint Mobility  Hypomobile: Talocrural Joint, Subtalar Joint, and Midfoot              Gait Analysis  Knee Observations During Gait  Bilateral: Knee Varus Thrust  Ankle/Foot Observations During Gait  Bilateral: Pronated Foot         Treatment:            Balance/Neuromuscular Re-Education  Balance/Neuromuscular Re-Education Activity 1: Seated supination  Balance/Neuromuscular Re-Education Activity 2: Seated heel raises  Balance/Neuromuscular Re-Education Activity 3: Standing DF self mobilization    Therapeutic Activity  Therapeutic Activity 1: Education on condition and HEP    Assessment & Plan   Assessment  Fabiano presents with a condition of Low complexity.   Presentation of Symptoms: Stable       Functional Limitations: Activity tolerance, Decreased ambulation distance/endurance, Functional mobility, Gait limitations, Pain with ADLs/IADLs, Completing work/school activities, Range of motion  Impairments: Abnormal gait, Activity intolerance, Impaired physical strength, Lack of appropriate home exercise program  Personal Factors Affecting Prognosis: Pain, Physical limitations    Patient Goal for Therapy (PT): Have less pain with work and ADLs, get strength back in his feet  Prognosis: Fair  Prognosis  Details: Chronic condition, structural deformities   Assessment Details: Fabiano is a 54 year old male presenting with medical diagnosis of ankle arthritis and achilles tendonitis. He presents to clinic with significant structural changes in his feet. He has painful and limited range of motion. Hypomobility in Talocrural and subtalar joints in both feet. Significant weakness in inversion, and weakness and pain with PF on the right. He also has gait impairments and functional limitations of pain with work and Activities of daily living.     Plan  From a physical therapy perspective, the patient would benefit from: Skilled Rehab Services    Planned therapy interventions include: Therapeutic exercise, Therapeutic activities, Neuromuscular re-education, and Manual therapy.            Visit Frequency: 2 times Per Week for 8 Weeks.       This plan was discussed with Patient.   Discussion participants: Agreed Upon Plan of Care             Patient's spiritual, cultural, and educational needs considered and patient agreeable to plan of care and goals.           Goals:   Active       Long term goals       patient will be independent in Home Exercise Program to support improving functional strength       Start:  03/13/25    Expected End:  05/08/25            patient will have improved FOTO score to predicted level to show true functional improvement.       Start:  03/13/25    Expected End:  05/08/25            patient goal: Have less pain with work and ADLs, get strength back in his feet       Start:  03/13/25    Expected End:  05/08/25               Short term goals       patient will be show improved DF range of motion by 5 degrees to show improving mobility       Start:  03/13/25    Expected End:  04/10/25            patient will have decreased pain with resisted PF to show improving function       Start:  03/13/25    Expected End:  04/10/25            patient will have improved inversion strength by 1/3 MMT       Start:   03/13/25    Expected End:  04/10/25                Cindy Purvis, PT, DPT  Board Certified Clinical Specialist in Orthopedic Physical Therapy

## 2025-03-25 ENCOUNTER — CLINICAL SUPPORT (OUTPATIENT)
Dept: REHABILITATION | Facility: HOSPITAL | Age: 55
End: 2025-03-25
Payer: COMMERCIAL

## 2025-03-25 DIAGNOSIS — R26.9 GAIT ABNORMALITY: ICD-10-CM

## 2025-03-25 DIAGNOSIS — M25.60 RANGE OF MOTION DEFICIT: Primary | ICD-10-CM

## 2025-03-25 PROCEDURE — 97530 THERAPEUTIC ACTIVITIES: CPT | Mod: PN

## 2025-03-25 PROCEDURE — 97112 NEUROMUSCULAR REEDUCATION: CPT | Mod: PN

## 2025-03-25 PROCEDURE — 97140 MANUAL THERAPY 1/> REGIONS: CPT | Mod: PN

## 2025-03-26 NOTE — PROGRESS NOTES
Outpatient Rehab    Physical Therapy Visit    Patient Name: Fabiano Jules Jr.  MRN: [id number]  YOB: 1970  Encounter Date: 3/25/2025    Therapy Diagnosis:   Encounter Diagnoses   Name Primary?    Range of motion deficit Yes    Gait abnormality      Physician: Jewell Bacon PA-C    Physician Orders: Eval and Treat  Medical Diagnosis: Arthritis of ankle, right  Arthritis of ankle, left  Tendonitis, Achilles, right    Visit # / Visits Authorized:  1 / 20  Insurance Authorization Period: 3/11/2025 to 12/31/2025  Date of Evaluation: 3/11/2025   Plan of Care Certification: 3/11/2025 to 5/6/2025      PT/PTA:     Number of PTA visits since last PT visit:   Time In: 1700   Time Out: 1745  Total Time: 45   Total Billable Time: 45    FOTO:  Intake Score:  %  Survey Score 1:  %  Survey Score 2:  %         Subjective   His feet still bother him really badly..  Pain reported as 8/10.      Objective            Treatment:  Manual Therapy  MT 1: AP to talocrural joint grade III-IV  MT 2: Subtalar figure 8 mob grade III-IV  Balance/Neuromuscular Re-Education  NMR 2: Seated heel raises 20# KB on knee x 30 bilaterl  NMR 3: Standing DF self mobilization x 30 bilateral  NMR 4: Inversion yellow band x 30 bilateral  NMR 5: Seated towel scrunches x 30  NMR 6: Standing heel raises with ball between heels x 20  Therapeutic Activity  TA 1: Nustep 10' for activity tolerance level 2    Time Entry(in minutes):  Manual Therapy Time Entry: 8  Neuromuscular Re-Education Time Entry: 27  Therapeutic Activity Time Entry: 10    Assessment & Plan   Assessment: Fabiano presented with continued foot pain. We progressed from HEP. He did well with all except standing heel raises which he was compensating with knee flexion. Will progress as tolerated.  Evaluation/Treatment Tolerance: Patient limited by fatigue    Patient will continue to benefit from skilled outpatient physical therapy to address the deficits listed in the problem list box on  initial evaluation, provide pt/family education and to maximize pt's level of independence in the home and community environment.     Patient's spiritual, cultural, and educational needs considered and patient agreeable to plan of care and goals.           Plan: Progress as tolerated per plan of care.    Goals:   Active       Long term goals       patient will be independent in Home Exercise Program to support improving functional strength (Progressing)       Start:  03/13/25    Expected End:  05/08/25            patient will have improved FOTO score to predicted level to show true functional improvement. (Progressing)       Start:  03/13/25    Expected End:  05/08/25            patient goal: Have less pain with work and ADLs, get strength back in his feet (Progressing)       Start:  03/13/25    Expected End:  05/08/25               Short term goals       patient will be show improved DF range of motion by 5 degrees to show improving mobility (Progressing)       Start:  03/13/25    Expected End:  04/10/25            patient will have decreased pain with resisted PF to show improving function (Progressing)       Start:  03/13/25    Expected End:  04/10/25            patient will have improved inversion strength by 1/3 MMT (Progressing)       Start:  03/13/25    Expected End:  04/10/25                Cindy Purvis, PT, DPT  Board Certified Clinical Specialist in Orthopedic Physical Therapy

## 2025-04-07 ENCOUNTER — PATIENT MESSAGE (OUTPATIENT)
Dept: UROLOGY | Facility: CLINIC | Age: 55
End: 2025-04-07
Payer: MEDICAID

## 2025-05-08 ENCOUNTER — LAB VISIT (OUTPATIENT)
Dept: LAB | Facility: HOSPITAL | Age: 55
End: 2025-05-08
Attending: UROLOGY
Payer: MEDICAID

## 2025-05-08 DIAGNOSIS — R97.20 ELEVATED PSA: ICD-10-CM

## 2025-05-08 LAB
PSA FREE MFR SERPL: 16.29 %
PSA FREE SERPL-MCNC: 0.8 NG/ML
PSA SERPL-MCNC: 4.91 NG/ML (ref ?–4)

## 2025-05-08 PROCEDURE — 36415 COLL VENOUS BLD VENIPUNCTURE: CPT

## 2025-05-08 PROCEDURE — 84153 ASSAY OF PSA TOTAL: CPT

## 2025-05-10 ENCOUNTER — CLINICAL SUPPORT (OUTPATIENT)
Dept: CARDIOLOGY | Facility: CLINIC | Age: 55
End: 2025-05-10

## 2025-05-10 ENCOUNTER — HOSPITAL ENCOUNTER (OUTPATIENT)
Dept: CARDIOLOGY | Facility: CLINIC | Age: 55
Discharge: HOME | End: 2025-05-10
Attending: GENERAL PRACTICE
Payer: MEDICAID

## 2025-05-10 DIAGNOSIS — I49.8 OTHER SPECIFIED CARDIAC ARRHYTHMIAS: ICD-10-CM

## 2025-05-10 PROCEDURE — 93298 REM INTERROG DEV EVAL SCRMS: CPT | Mod: PBBFAC,PN | Performed by: GENERAL PRACTICE

## 2025-05-12 ENCOUNTER — LAB VISIT (OUTPATIENT)
Dept: LAB | Facility: HOSPITAL | Age: 55
End: 2025-05-12
Attending: NURSE PRACTITIONER
Payer: MEDICAID

## 2025-05-12 DIAGNOSIS — I63.81 LACUNAR INFARCTION: Primary | ICD-10-CM

## 2025-05-12 DIAGNOSIS — I63.81 BASAL GANGLIA INFARCTION: Primary | ICD-10-CM

## 2025-05-12 DIAGNOSIS — R97.20 ELEVATED PSA: ICD-10-CM

## 2025-05-12 LAB
CREAT SERPL-MCNC: 0.8 MG/DL (ref 0.5–1.4)
GFR SERPLBLD CREATININE-BSD FMLA CKD-EPI: >60 ML/MIN/1.73/M2

## 2025-05-12 PROCEDURE — 85390 FIBRINOLYSINS SCREEN I&R: CPT

## 2025-05-12 PROCEDURE — 81241 F5 GENE: CPT

## 2025-05-12 PROCEDURE — 85303 CLOT INHIBIT PROT C ACTIVITY: CPT

## 2025-05-12 PROCEDURE — 86147 CARDIOLIPIN ANTIBODY EA IG: CPT

## 2025-05-12 PROCEDURE — 85306 CLOT INHIBIT PROT S FREE: CPT

## 2025-05-12 PROCEDURE — 82565 ASSAY OF CREATININE: CPT

## 2025-05-12 PROCEDURE — 36415 COLL VENOUS BLD VENIPUNCTURE: CPT

## 2025-05-12 PROCEDURE — 85613 RUSSELL VIPER VENOM DILUTED: CPT | Mod: 59

## 2025-05-12 PROCEDURE — 85300 ANTITHROMBIN III ACTIVITY: CPT

## 2025-05-13 ENCOUNTER — OFFICE VISIT (OUTPATIENT)
Dept: UROLOGY | Facility: CLINIC | Age: 55
End: 2025-05-13
Payer: MEDICAID

## 2025-05-13 VITALS — BODY MASS INDEX: 32.82 KG/M2 | HEIGHT: 70 IN | WEIGHT: 229.25 LBS

## 2025-05-13 DIAGNOSIS — R97.20 ELEVATED PSA: Primary | ICD-10-CM

## 2025-05-13 DIAGNOSIS — N40.0 BENIGN PROSTATIC HYPERPLASIA, UNSPECIFIED WHETHER LOWER URINARY TRACT SYMPTOMS PRESENT: ICD-10-CM

## 2025-05-13 LAB
APTT PPP: 34 SEC (ref 25–37)
AT III ACT/NOR PPP CHRO: 95 % (ref 80–130)
INR PPP: 1 (ref 0.9–1.1)
LA 3 SCREEN W REFLEX-IMP: NORMAL
MIXING STUDIES PPP-IMP: NORMAL
PROT C ACT/NOR PPP CHRO: 113 % (ref 70–150)
PROT S ACT/NOR PPP: 83 % (ref 65–150)
PROTHROMBIN TIME: 11.2 SEC (ref 9.4–12.5)
SCREEN DRVVT/NORMAL: 0.6 RATIO
SCREEN DRVVT/NORMAL: 0.64 RATIO

## 2025-05-13 PROCEDURE — 99999 PR PBB SHADOW E&M-EST. PATIENT-LVL IV: CPT | Mod: PBBFAC,,, | Performed by: UROLOGY

## 2025-05-13 PROCEDURE — 3008F BODY MASS INDEX DOCD: CPT | Mod: CPTII,,, | Performed by: UROLOGY

## 2025-05-13 PROCEDURE — 99214 OFFICE O/P EST MOD 30 MIN: CPT | Mod: PBBFAC,PO | Performed by: UROLOGY

## 2025-05-13 PROCEDURE — 99214 OFFICE O/P EST MOD 30 MIN: CPT | Mod: S$PBB,,, | Performed by: UROLOGY

## 2025-05-13 PROCEDURE — 1160F RVW MEDS BY RX/DR IN RCRD: CPT | Mod: CPTII,,, | Performed by: UROLOGY

## 2025-05-13 PROCEDURE — 1159F MED LIST DOCD IN RCRD: CPT | Mod: CPTII,,, | Performed by: UROLOGY

## 2025-05-13 RX ORDER — SULFAMETHOXAZOLE AND TRIMETHOPRIM 800; 160 MG/1; MG/1
1 TABLET ORAL 2 TIMES DAILY
Qty: 3 TABLET | Refills: 0 | Status: SHIPPED | OUTPATIENT
Start: 2025-05-13 | End: 2025-05-15

## 2025-05-13 RX ORDER — GENTAMICIN 40 MG/ML
80 INJECTION, SOLUTION INTRAMUSCULAR; INTRAVENOUS ONCE
OUTPATIENT
Start: 2025-05-13 | End: 2025-05-13

## 2025-05-13 NOTE — PROGRESS NOTES
Procedure Order to Urology [[id number]]    Electronically signed by: Mandy Castorena MD on 05/13/25 1437 Status: Active   Ordering user: Mandy Castorena MD 05/13/25 1437 Authorized by: Mandy Castorena MD   Ordering mode: Standard   Frequency:  05/13/25 -     Diagnoses  Elevated PSA [R97.20]  Benign prostatic hyperplasia, unspecified whether lower urinary tract symptoms present [N40.0]   Questionnaire    Question Answer   Procedure Prostate Biopsy Comment - uronav. monday july 14  Cystoscopy   Facility Name: [facility], please order local sedation, UA poct   preop IM Gentamyacin 80mg,160mg if over 300 lbs (no urine Dr narayan or Marcial) /cysto

## 2025-05-13 NOTE — PROGRESS NOTES
[facility], formerly known as [facility]  Group MD's:Raffaele/Harshal/Marcial  Group NP's: Bebe Kaur Karma Cassidy  Office #: [phone]    PCP: Howie Mathais MD  Date of Service: 05/13/2025  Today's note written by: MD Raffaele      Subjective:        HPI: Fabiano Jules Jr. is a 55 y.o. male     Initial consult by me in clinic on 2/13/25:  Mr. Jules reports urinary symptoms for approximately the past 12-18 months, including straining to urinate, difficulty emptying his bladder, and frequent urination with small volumes. He also mentions occasional urinary incontinence, with leakage occurring about 50% of the time en route to the bathroom, consisting of drops rather than large volumes. Complete urinary incontinence occurs approximately once monthly for the last 6 months, necessitating a change of underwear.  These symptoms have gradually worsened over the past year, with the straining to urinate beginning around 12 months ago. He denies dysuria. Flomax has been prescribed, which he reports has improved his symptoms. He takes one Flomax tablet nightly.  Urinary frequency is described as every 2-3 hours, with a maximum of 3 hours between urinations. Daily fluid intake is 8-10 glasses of water, a lifelong habit. Recently, he has reduced his coffee intake from 3 cups daily to one cup every other day, which he states has significantly improved his symptoms, although he still has frequent urination.  He had back surgery (lumbar fusion of L4 and L5) on April 5, 2020. When asked if his overactive bladder symptoms worsened after the surgery, he indicated that they may have, but he had not considered it for an extended period.  He reports waking up during the night to drink water, but not always to urinate. He denies urinary incontinence with coughing or sneezing, history of urinary tract infections, or stress incontinence. He also denies a family history of prostate cancer,  renal cancer, bladder cancer, or kidney stones.  Bladder scan PVR today was 0mL.    AUA ssx today:(4 incomplete emptying, 5 frequency, 4 intermittency, 5 urgency, 3 weak stream, 3 straining, 2 sleeping). 25. QOL: TERRIBLE      Interval history by ME/ - CLINIC virtual visit (audio only) on 2/27/25:  Mr. Jules has a history of enlarged prostate. Two weeks ago, he had an elevated PSA of 4.3, and Dr. Castorena felt a soft nodule on exam. A repeat PSA test showed a decrease to 3.5. plan was for prostate mri but n/a with anesthesia is June and pt cannot do without anesthesia.  After increasing his Flomax dosage to 2 pills at night, as previously recommended, he had some initial improvement in his symptoms, specifically less straining during urination. This improvement has since plateaued. A urine test was performed, which came back completely negative.  Visit today is to discuss psa levels and decide on mri now vs later bc of nodule.       Interval history by ME/ in CLINIC (In-person) on 5/13/25:  History of Present Illness    CHIEF COMPLAINT:  Mr. Jules presents for follow-up of elevated PSA and prostate nodule, with discussion of further testing and biopsy planning.    HPI:  Mr. Jules has a history of elevated PSA and a prostate nodule. His most recent PSA test showed an increase from 3.7 to 4.9 over a 3-month period. He takes tamsulosin for urinary symptoms, which is effective most of the time but occasionally ineffective. He expresses a desire for long-term healing and inquires about potential treatments to decrease prostate size. He reports a history of stroke, though the exact timing and location are unknown. He is currently taking aspirin due to this history.    He denies taking Flomax or any anticoagulants other than aspirin.    PERTINENT MEDICATIONS:  Tamsulosin for prostate/urinary symptoms, effective most of the time  Aspirin 81 mg for history of stroke  Discontinued Flomax  (replaced with tamsulosin)    PERTINENT MEDICAL HISTORY:  Elevated PSA  Prostate nodule  Stroke  BPH    PERTINENT TEST RESULTS:  PSA: 3 months ago, 3.7  PSA: Most recent, 4.9 MPA (urine test): Results normal MRI: Scheduled for June 17th, with anesthesia due to claustrophobia, results pending       My notes:  Last total psa was 3.7, %free 17.57 2/14/25  Repeated psa 3 months later 5/8/25 and up to 4.91, %16.29  On no blood thinners except asa 81mg.  Has prostate mri scheduled in June with anesthesia.  Still taking flomax 2 a night-   AUA ssx:(2 incomplete emptying, 3 frequency, 1 intermittency, 5 urgency, 2 weak stream, 2 straining, 1 sleeping). QOL: mostly satisfied  Go ahead and schedule mri prostate with anesthesia at main campus (beginning of jun is first available). Pt cannot do without anesthesia               PSA History: no fam hx of prostate cancer  5/8/25  4.91, %16.29  2/14/25 Total psa 3.7, %free 17.57  2/14/25 3.5  2/13/25 ALYCIA: 35g, 1/2 cm nodule round soft nontender left apex.       Urine history: family history of kidney, bladder or prostate cancer:No, personal or family history of kidney stones: No,tobacco use: 1 ppd x 10 yrs, quit around 2000, anticoagulation: Yes - asa 81mg  2/14/25 neg          REVIEW OF SYSTEMS:  Negative except for as stated above    Past Medical History:   Diagnosis Date    Arthritis     Back injury     Colon polyp     COVID     CPAP (continuous positive airway pressure) dependence     NOT NEEDED AFTER WEIGHT LOSS SURGERY    General anesthetics causing adverse effect in therapeutic use     WOKE UP CONFUSED X 1    Hypertension     PONV (postoperative nausea and vomiting)     Sleep apnea     c pap machine used  - NOT NEEDED AFTER GASTRIC SLEEVE    Wears glasses        Past Surgical History:   Procedure Laterality Date    ARTHROSCOPIC DEBRIDEMENT OF SHOULDER Right 9/6/2024    Procedure: EXTENSIVE DEBRIDEMENT, SHOULDER, ARTHROSCOPIC;  Surgeon: Darrion Ricks MD;  Location:  [facility] OR;  Service: Orthopedics;  Laterality: Right;    ARTHROSCOPIC REPAIR OF ROTATOR CUFF OF SHOULDER Right 9/6/2024    Procedure: REPAIR, ROTATOR CUFF, ARTHROSCOPIC;  Surgeon: Darrion Ricks MD;  Location: [facility] OR;  Service: Orthopedics;  Laterality: Right;  arthrex-cuffmend/SCR stuff  Hood w/Arthrex notified 8/30/24 ark    ARTHROSCOPY OF SHOULDER WITH DECOMPRESSION OF SUBACROMIAL SPACE Right 9/6/2024    Procedure: ARTHROSCOPY, SHOULDER, WITH SUBACROMIAL SPACE DECOMPRESSION;  Surgeon: Darrion Ricks MD;  Location: [facility] OR;  Service: Orthopedics;  Laterality: Right;    ARTHROSCOPY,SHOULDER,WITH BICEPS TENODESIS Right 9/6/2024    Procedure: ARTHROSCOPY,SHOULDER,WITH BICEPS TENODESIS;  Surgeon: Darrion Ricks MD;  Location: [facility] OR;  Service: Orthopedics;  Laterality: Right;    BONE GRAFT N/A 04/05/2022    Procedure: BONE GRAFT;  Surgeon: Duran Adler Jr., MD;  Location: [facility] OR;  Service: Orthopedics;  Laterality: N/A;    CIRCUMCISION      COLONOSCOPY N/A 12/03/2018    Procedure: COLONOSCOPY;  Surgeon: CHRISSY Reyna MD;  [facility]  Service: Endoscopy;  Laterality: N/A;    COLONOSCOPY N/A 09/01/2022    Procedure: COLONOSCOPY;  Surgeon: Alondra Arias MD;  Location: [facility] ENDO;  Service: Endoscopy;  Laterality: N/A;    epidural steroid injection X 2      lower lumbar    ESOPHAGOGASTRODUODENOSCOPY N/A 09/01/2022    Procedure: EGD (ESOPHAGOGASTRODUODENOSCOPY);  Surgeon: Alondra Arias MD;  Location: [facility] ENDO;  Service: Endoscopy;  Laterality: N/A;    facet injection       Dr Manpreet [facility] [place]     INSERTION OF IMPLANTABLE LOOP RECORDER N/A 9/15/2023    Procedure: Insertion, Implantable Loop Recorder;  Surgeon: Romario Wallace MD;  Location: [facility] CATH/EP LAB;  Service: Cardiology;  Laterality: N/A;    INTRALUMINAL GASTROINTESTINAL TRACT IMAGING VIA CAPSULE N/A 10/03/2022    Procedure: IMAGING PROCEDURE, GI TRACT,  INTRALUMINAL, VIA CAPSULE;  Surgeon: Alondra Arias MD;  Location: [facility] ENDO;  Service: Endoscopy;  Laterality: N/A;    LAPAROSCOPIC SLEEVE GASTRECTOMY N/A 12/19/2022    Procedure: GASTRECTOMY, SLEEVE, LAPAROSCOPIC;  Surgeon: Lashonda Pinedo MD;  Location: [facility] OR;  Service: General;  Laterality: N/A;    LIPOMA RESECTION      Back of neck    MAGNETIC RESONANCE IMAGING N/A 05/29/2020    Procedure: MRI (MAGNETIC RESONANCE IMAGING) LUMBAR SPINE;  Surgeon: [facility] Diagnostic Provider;  Location: [facility];  Service: General;  Laterality: N/A;  COVID NEG    MAGNETIC RESONANCE IMAGING N/A 12/03/2021    Procedure: MRI (MAGNETIC RESONANCE IMAGING), LUMBAR SPINE;  Surgeon: [facility] Diagnostic Provider;  Location: [facility];  Service: General;  Laterality: N/A;  In MRI @ 7:50 per Ariela    MAGNETIC RESONANCE IMAGING N/A 3/6/2024    Procedure: MRI (Magnetic Resonance Imagine);  Surgeon: Vicenta Michele;  [facility]: [facility];  Service: Anesthesiology;  Laterality: N/A;    MAGNETIC RESONANCE IMAGING Bilateral 6/28/2024    Procedure: MRI (Magnetic Resonance Imagine);  Surgeon: Vicenta Michele;  Location: [facility];  Service: Anesthesiology;  Laterality: Bilateral;  MRI BRAIN WITH AND WITHOUT CONTRAST    MEDIAL COLLATERAL LIGAMENT AND LATERAL COLLATERAL LIGAMENT REPAIR, KNEE Right 09/01/2018    Dr. Christophe Gore in [place]     MINIMALLY INVASIVE TRANSFORAMINAL LUMBAR INTERBODY FUSION (TLIF) N/A 04/05/2022    Procedure: FUSION, SPINE, LUMBAR, TLIF, MINIMALLY INVASIVE, WITH INSTRUMENTATION,  L4/5 RIGHT;  Surgeon: Duran Adler Jr., MD;  Location: [facility];  Service: Orthopedics;  Laterality: N/A;  10:30am per Tamela    MYELOGRAPHY N/A 06/23/2020    Procedure: MYELOGRAM;  Surgeon: [facility] Diagnostic Provider;  Location: [facility];  Service: General;  Laterality: N/A;         Objective:     There were no vitals filed for this visit.      Physical Exam    Male Genitourinary: Soft nodule in prostate.             Assessment:     Fabiaon Jules Jr.  is a 55 y.o. male with     1. Elevated PSA    2. Benign prostatic hyperplasia, unspecified whether lower urinary tract symptoms present          Plan:       's typed/written- Abbreviated/Short Plan:  Has a h/o elevated psa and prostate nodule. Psa now higher. Has mri scheduled for June 17 with anesthesia due to claustrophobia. That's n/a.   Will schedule prostate biopsy (uronav for now) for Monday June 30 (will likely be moved to a Thursday). Take antibiotics prior and fleets enemas. Will change to regular biopsy if mri shows pirads 1 or 2 only.  Also doing cystoscopy bc of bph. Ultimately may want bph procedure if no prostate cancer.   2 Fleets enemas morning of procedure   Bactrim night before, morning of and night of  Ok to stay on asa 81mg for procedure (h/o stroke?)  Lab ua reflex 2 weeks prior     Continue flomax 0.8mg nightly for now     The patient is here today for a transrectal ultrasound and prostate biopsy  The risks and benefits of the procedure were discussed with the patient in detail.  The risks include but are not limited to bleeding, infection (sepsis), pain and need for further procedures.                The following assessment plan was created by [employer] via ambient listening:  Assessment & Plan    R97.20 Elevated PSA  N40.0 Benign prostatic hyperplasia, unspecified whether lower urinary tract symptoms present    IMPRESSION:   History of elevated PSA and prostate nodule.   PSA increased from 3.7 to 4.9 over 3 months.   MRI scheduled for June 17th with anesthesia due to claustrophobia.   Plan for prostate biopsy after MRI on June 30th.   If MRI shows PIRADS 1 or 2, will proceed with regular biopsy; otherwise, will perform EuroNav biopsy.   Cystoscopy planned in conjunction with biopsy to evaluate prostate shape and assist in potential future procedure planning.   Continued aspirin 81 mg due to history of stroke, despite slight increased risk of bleeding during biopsy.    Considered future prostate reduction procedure if cancer is ruled out.    Please review the short plan as above for concise and accurate plan. The dictated/AI generated plan may have some inaccuracies .    PLAN SUMMARY:   Ordered MRI Prostate with anesthesia on June 17th   Ordered Urine sample (U Lab UA reflex) to be collected 2 weeks prior to biopsy   Ordered Prostate biopsy with Cystoscopy on June 30th   Started Ciprofloxacin: Take night before, morning of, and night of procedure   Follow up after MRI to confirm type of biopsy needed   Follow up on June 30th for biopsy (date may change to a Thursday)   Follow up in 3 weeks after biopsy for lab results    ELEVATED PSA:   Explained prostate biopsy procedure, including cystoscopy and rectal probe.   Discussed risks of biopsy: fevers, chills, sepsis (1% risk).   Explained potential post-biopsy symptoms: blood in semen (for months), urine (few days), and stool (rare).   Discussed importance of seeking immediate medical attention if experiencing flu-like symptoms within 2 weeks post-biopsy.   Started Ciprofloxacin: Take the night before procedure, morning of procedure, and night of procedure.   Ordered MRI Prostate with anesthesia on June 17th.   Ordered Prostate biopsy with Cystoscopy on June 30th.   Ordered Urine sample (U Lab UA reflex) to be collected 2 weeks prior to biopsy.   Follow up after MRI to confirm type of biopsy needed.   Follow up on June 30th as scheduled for biopsy, but date may change to a Thursday.   Follow up in 3 weeks after biopsy for lab results.    BENIGN PROSTATIC HYPERPLASIA, UNSPECIFIED WHETHER LOWER URINARY TRACT SYMPTOMS PRESENT:   Ordered MRI Prostate with anesthesia on June 17th.   Ordered Prostate biopsy with Cystoscopy on June 30th.   Follow up after MRI to confirm type of biopsy needed.   Follow up on June 30th as scheduled for biopsy, but date may change to a Thursday.   Follow up in 3 weeks after biopsy for lab results.              Portions of this note was generated with the assistance of ambient listening technology. Verbal consent was obtained by the patient and accompanying visitor(s) for the recording of patient appointment to facilitate this note. I attest to having reviewed and edited the generated note for accuracy, though some syntax or spelling errors may persist. Please contact the author of this note for any clarification.            Mandy Castorena MD

## 2025-05-13 NOTE — PATIENT INSTRUCTIONS
's typed/written- Abbreviated/Short Plan:  Has a h/o elevated psa and prostate nodule. Psa now higher. Has mri scheduled for June 17 with anesthesia due to claustrophobia. That's n/a.   Will schedule prostate biopsy (uronav for now) for Monday July 14. Take antibiotics prior and fleets enemas. Will change to regular biopsy if mri shows pirads 1 or 2 only.  Also doing cystoscopy bc of bph. Ultimately may want bph procedure if no prostate cancer.   2 Fleets enemas morning of procedure   Bactrim night before, morning of and night of  Ok to stay on asa 81mg for procedure (h/o stroke?)  Lab ua reflex 2 weeks prior     Continue flomax 0.8mg nightly for now       The patient is here today for a transrectal ultrasound and prostate biopsy  The risks and benefits of the procedure were discussed with the patient in detail.  The risks include but are not limited to bleeding, infection (sepsis), pain and need for further procedures.

## 2025-05-14 LAB
CARDIOLIPIN IGA SER IA-ACNC: <9 APL U/ML (ref 0–11)
CARDIOLIPIN IGG SER IA-ACNC: <9 GPL U/ML (ref 0–14)
CARDIOLIPIN IGM SER IA-ACNC: <9 MPL U/ML (ref 0–12)

## 2025-05-15 LAB
COAGULATION SPECIALIST REVIEW: NORMAL
F5 P.R506Q BLD/T QL: NEGATIVE
PROVIDER SIGNING NAME: NORMAL

## 2025-05-21 ENCOUNTER — HOSPITAL ENCOUNTER (OUTPATIENT)
Dept: RADIOLOGY | Facility: HOSPITAL | Age: 55
Discharge: HOME | End: 2025-05-21
Attending: STUDENT IN AN ORGANIZED HEALTH CARE EDUCATION/TRAINING PROGRAM
Payer: MEDICAID

## 2025-05-21 ENCOUNTER — HOSPITAL ENCOUNTER (OUTPATIENT)
Dept: CARDIOLOGY | Facility: HOSPITAL | Age: 55
Discharge: HOME | End: 2025-05-21
Attending: STUDENT IN AN ORGANIZED HEALTH CARE EDUCATION/TRAINING PROGRAM
Payer: MEDICAID

## 2025-05-21 VITALS — HEIGHT: 70 IN | WEIGHT: 229 LBS | BODY MASS INDEX: 32.78 KG/M2

## 2025-05-21 DIAGNOSIS — I63.81 LACUNAR INFARCTION: ICD-10-CM

## 2025-05-21 LAB
AORTIC ROOT ANNULUS: 3.8 CM
AORTIC VALVE CUSP SEPERATION: 2.01 CM
APICAL FOUR CHAMBER EJECTION FRACTION: 45 %
APICAL TWO CHAMBER EJECTION FRACTION: 47 %
AV INDEX (PROSTH): 0.73
AV MEAN GRADIENT: 5 MMHG
AV PEAK GRADIENT: 8 MMHG
AV REGURGITATION PRESSURE HALF TIME: 1071 MS
AV VALVE AREA BY VELOCITY RATIO: 2.2 CM²
AV VALVE AREA: 2.5 CM²
AV VELOCITY RATIO: 0.64
BSA FOR ECHO PROCEDURE: 2.26 M2
CV ECHO LV RWT: 0.46 CM
DOP CALC AO PEAK VEL: 1.4 M/S
DOP CALC AO VTI: 28.1 CM
DOP CALC LVOT AREA: 3.5 CM2
DOP CALC LVOT DIAMETER: 2.1 CM
DOP CALC LVOT PEAK VEL: 0.9 M/S
DOP CALC LVOT STROKE VOLUME: 70.6 CM3
DOP CALC MV VTI: 29 CM
DOP CALCLVOT PEAK VEL VTI: 20.4 CM
E WAVE DECELERATION TIME: 228 MSEC
E/A RATIO: 1.31
E/E' RATIO: 12 M/S
ECHO LV POSTERIOR WALL: 1.3 CM (ref 0.6–1.1)
FRACTIONAL SHORTENING: 33.3 % (ref 28–44)
INTERVENTRICULAR SEPTUM: 1 CM (ref 0.6–1.1)
IVC DIAMETER: 1.98 CM
LA MAJOR: 6.2 CM
LA MINOR: 5.9 CM
LEFT ATRIUM AREA SYSTOLIC (APICAL 2 CHAMBER): 22.76 CM2
LEFT ATRIUM AREA SYSTOLIC (APICAL 4 CHAMBER): 24.78 CM2
LEFT ATRIUM SIZE: 4.4 CM
LEFT ATRIUM VOLUME INDEX MOD: 36 ML/M2
LEFT ATRIUM VOLUME MOD: 79 ML
LEFT INTERNAL DIMENSION IN SYSTOLE: 3.8 CM (ref 2.1–4)
LEFT VENTRICLE DIASTOLIC VOLUME INDEX: 72.85 ML/M2
LEFT VENTRICLE DIASTOLIC VOLUME: 161 ML
LEFT VENTRICLE END DIASTOLIC VOLUME APICAL 2 CHAMBER: 137.49 ML
LEFT VENTRICLE END DIASTOLIC VOLUME APICAL 4 CHAMBER: 136.06 ML
LEFT VENTRICLE END SYSTOLIC VOLUME APICAL 2 CHAMBER: 73.33 ML
LEFT VENTRICLE END SYSTOLIC VOLUME APICAL 4 CHAMBER: 83.1 ML
LEFT VENTRICLE MASS INDEX: 123.3 G/M2
LEFT VENTRICLE SYSTOLIC VOLUME INDEX: 28.5 ML/M2
LEFT VENTRICLE SYSTOLIC VOLUME: 63 ML
LEFT VENTRICULAR INTERNAL DIMENSION IN DIASTOLE: 5.7 CM (ref 3.5–6)
LEFT VENTRICULAR MASS: 272.5 G
LV LATERAL E/E' RATIO: 16 M/S
LV SEPTAL E/E' RATIO: 9.1 M/S
LVED V (TEICH): 161.03 ML
LVES V (TEICH): 63.39 ML
LVOT MG: 1.94 MMHG
LVOT MV: 0.67 CM/S
MV MEAN GRADIENT: 1 MMHG
MV PEAK A VEL: 0.49 M/S
MV PEAK E VEL: 0.64 M/S
MV PEAK GRADIENT: 3 MMHG
MV STENOSIS PRESSURE HALF TIME: 83.08 MS
MV VALVE AREA BY CONTINUITY EQUATION: 2.44 CM2
MV VALVE AREA P 1/2 METHOD: 2.65 CM2
OHS LV EJECTION FRACTION SIMPSONS BIPLANE MOD: 45 %
PISA AR MAX VEL: 3.72 M/S
PISA MRMAX VEL: 5.96 M/S
PISA TR MAX VEL: 2.5 M/S
PV MV: 0.6 M/S
PV PEAK GRADIENT: 3 MMHG
PV PEAK VELOCITY: 0.86 M/S
RA MAJOR: 6.11 CM
RA VOL SYS: 71.77 ML
RIGHT ATRIAL AREA: 21.8 CM2
RIGHT ATRIUM END SYSTOLIC VOLUME APICAL 4 CHAMBER INDEX BSA: 31.03 ML/M2
RIGHT ATRIUM VOLUME AREA LENGTH APICAL 4 CHAMBER: 68.58 ML
RV TISSUE DOPPLER FREE WALL SYSTOLIC VELOCITY 1 (APICAL 4 CHAMBER VIEW): 10.32 CM/S
STJ: 3.5 CM
TDI LATERAL: 0.04 M/S
TDI SEPTAL: 0.07 M/S
TDI: 0.06 M/S
TR MAX PG: 24 MMHG
Z-SCORE OF LEFT VENTRICULAR DIMENSION IN END DIASTOLE: -2.92
Z-SCORE OF LEFT VENTRICULAR DIMENSION IN END SYSTOLE: -1.57

## 2025-05-21 PROCEDURE — 25500020 PHARM REV CODE 255: Performed by: STUDENT IN AN ORGANIZED HEALTH CARE EDUCATION/TRAINING PROGRAM

## 2025-05-21 PROCEDURE — 70496 CT ANGIOGRAPHY HEAD: CPT | Mod: TC

## 2025-05-21 PROCEDURE — 93306 TTE W/DOPPLER COMPLETE: CPT

## 2025-05-21 RX ADMIN — IOHEXOL 100 ML: 350 INJECTION, SOLUTION INTRAVENOUS at 10:05

## 2025-06-09 ENCOUNTER — OFFICE VISIT (OUTPATIENT)
Dept: PODIATRY | Facility: CLINIC | Age: 55
End: 2025-06-09
Payer: MEDICAID

## 2025-06-09 VITALS
RESPIRATION RATE: 16 BRPM | SYSTOLIC BLOOD PRESSURE: 122 MMHG | DIASTOLIC BLOOD PRESSURE: 71 MMHG | HEART RATE: 58 BPM | BODY MASS INDEX: 32.21 KG/M2 | WEIGHT: 225 LBS | HEIGHT: 70 IN

## 2025-06-09 DIAGNOSIS — R23.8 PIEZOGENIC PEDAL PAPULE: Primary | ICD-10-CM

## 2025-06-09 DIAGNOSIS — M21.6X1 PRONATION DEFORMITY OF BOTH FEET: ICD-10-CM

## 2025-06-09 DIAGNOSIS — M21.6X2 PRONATION DEFORMITY OF ANKLE, ACQUIRED, LEFT: ICD-10-CM

## 2025-06-09 DIAGNOSIS — M79.672 PAIN OF LEFT HEEL: ICD-10-CM

## 2025-06-09 DIAGNOSIS — L85.1 ACQUIRED KERATOSIS OF PLANTAR ASPECT OF FOOT: ICD-10-CM

## 2025-06-09 DIAGNOSIS — M21.6X2 PRONATION DEFORMITY OF BOTH FEET: ICD-10-CM

## 2025-06-09 PROCEDURE — 3008F BODY MASS INDEX DOCD: CPT | Mod: CPTII,,, | Performed by: PODIATRIST

## 2025-06-09 PROCEDURE — 99214 OFFICE O/P EST MOD 30 MIN: CPT | Mod: PBBFAC | Performed by: PODIATRIST

## 2025-06-09 PROCEDURE — 99999 PR PBB SHADOW E&M-EST. PATIENT-LVL IV: CPT | Mod: PBBFAC,,, | Performed by: PODIATRIST

## 2025-06-09 PROCEDURE — 3074F SYST BP LT 130 MM HG: CPT | Mod: CPTII,,, | Performed by: PODIATRIST

## 2025-06-09 PROCEDURE — 3078F DIAST BP <80 MM HG: CPT | Mod: CPTII,,, | Performed by: PODIATRIST

## 2025-06-09 PROCEDURE — 1159F MED LIST DOCD IN RCRD: CPT | Mod: CPTII,,, | Performed by: PODIATRIST

## 2025-06-09 PROCEDURE — 99203 OFFICE O/P NEW LOW 30 MIN: CPT | Mod: S$PBB,,, | Performed by: PODIATRIST

## 2025-06-09 RX ORDER — BUSPIRONE HYDROCHLORIDE 15 MG/1
TABLET ORAL
COMMUNITY
End: 2025-06-10

## 2025-06-09 RX ORDER — TRAZODONE HYDROCHLORIDE 50 MG/1
TABLET ORAL
COMMUNITY
End: 2025-06-10

## 2025-06-09 RX ORDER — ROSUVASTATIN CALCIUM 10 MG/1
TABLET, COATED ORAL
COMMUNITY
End: 2025-06-10 | Stop reason: SDUPTHER

## 2025-06-09 RX ORDER — MULTIVITAMIN
TABLET ORAL
COMMUNITY
End: 2025-06-10

## 2025-06-09 RX ORDER — CALCIUM CITRATE/VITAMIN D3 315MG-6.25
TABLET ORAL
COMMUNITY

## 2025-06-09 RX ORDER — ONDANSETRON 4 MG/1
TABLET, ORALLY DISINTEGRATING ORAL
COMMUNITY
End: 2025-06-10 | Stop reason: SDUPTHER

## 2025-06-09 RX ORDER — OXYCODONE AND ACETAMINOPHEN 5; 325 MG/1; MG/1
TABLET ORAL
COMMUNITY
End: 2025-06-10 | Stop reason: SDUPTHER

## 2025-06-09 RX ORDER — AMLODIPINE BESYLATE 5 MG/1
TABLET ORAL
COMMUNITY
End: 2025-06-10 | Stop reason: SDUPTHER

## 2025-06-09 RX ORDER — NAPROXEN SODIUM 220 MG/1
TABLET, FILM COATED ORAL
COMMUNITY
End: 2025-06-10 | Stop reason: SDUPTHER

## 2025-06-09 RX ORDER — OXYCODONE AND ACETAMINOPHEN 7.5; 325 MG/1; MG/1
1 TABLET ORAL 2 TIMES DAILY PRN
COMMUNITY
Start: 2025-05-28

## 2025-06-09 RX ORDER — MELOXICAM 15 MG/1
15 TABLET ORAL
COMMUNITY
Start: 2025-06-02

## 2025-06-10 ENCOUNTER — HOSPITAL ENCOUNTER (OUTPATIENT)
Dept: CARDIOLOGY | Facility: CLINIC | Age: 55
Discharge: HOME | End: 2025-06-10
Attending: GENERAL PRACTICE
Payer: MEDICAID

## 2025-06-10 ENCOUNTER — CLINICAL SUPPORT (OUTPATIENT)
Dept: CARDIOLOGY | Facility: CLINIC | Age: 55
End: 2025-06-10
Payer: MEDICAID

## 2025-06-10 DIAGNOSIS — I49.8 OTHER SPECIFIED CARDIAC ARRHYTHMIAS: ICD-10-CM

## 2025-06-10 PROCEDURE — 93298 REM INTERROG DEV EVAL SCRMS: CPT | Mod: PN | Performed by: GENERAL PRACTICE

## 2025-06-10 NOTE — PROGRESS NOTES
Subjective:       Patient ID: Fabiano Jules Jr. is a 55 y.o. male.    Chief Complaint: Callouses, Ingrown Toenail, and Nail Problem  Patient presents with complaint left heel pain, bumps and callus in the left heel he has noticed for a while but just recently became painful.  Relates long history of foot pain due to excessively flat feet, arthritis especially chronic arthritic pain of the left ankle.  Was seeing Dr. Cassidy and has recently been getting injections from Dr. Hirsch which does help.  Wears very good tennis shoes/Hoka as.  Not currently wearing an orthotic but he has had them in the past.  Relates problems with chronic ingrown nail and points to the medial left hallux.  Currently he has treated this area himself and has no pain at this time but states it has been causing him problems on and out for a long time  Pain level left ankle and heel 3/10    Past Medical History:   Diagnosis Date    Arthritis     Back injury     Colon polyp     COVID     CPAP (continuous positive airway pressure) dependence     NOT NEEDED AFTER WEIGHT LOSS SURGERY    General anesthetics causing adverse effect in therapeutic use     WOKE UP CONFUSED X 1    Hypertension     PONV (postoperative nausea and vomiting)     Sleep apnea     c pap machine used  - NOT NEEDED AFTER GASTRIC SLEEVE    Wears glasses      Past Surgical History:   Procedure Laterality Date    ARTHROSCOPIC DEBRIDEMENT OF SHOULDER Right 9/6/2024    Procedure: EXTENSIVE DEBRIDEMENT, SHOULDER, ARTHROSCOPIC;  Surgeon: Darrion Ricks MD;  Location: [facility] OR;  Service: Orthopedics;  Laterality: Right;    ARTHROSCOPIC REPAIR OF ROTATOR CUFF OF SHOULDER Right 9/6/2024    Procedure: REPAIR, ROTATOR CUFF, ARTHROSCOPIC;  Surgeon: Darrion Ricks MD;  Location: [facility] OR;  Service: Orthopedics;  Laterality: Right;  arthrex-cuffmend/LEANNA cespedes/Esteban notified 8/30/24 ark    ARTHROSCOPY OF SHOULDER WITH DECOMPRESSION OF SUBACROMIAL SPACE Right 9/6/2024     Procedure: ARTHROSCOPY, SHOULDER, WITH SUBACROMIAL SPACE DECOMPRESSION;  Surgeon: Darrion Ricks MD;  Location: [facility] OR;  Service: Orthopedics;  Laterality: Right;    ARTHROSCOPY,SHOULDER,WITH BICEPS TENODESIS Right 9/6/2024    Procedure: ARTHROSCOPY,SHOULDER,WITH BICEPS TENODESIS;  Surgeon: Darrion Ricks MD;  Location: [facility] OR;  Service: Orthopedics;  Laterality: Right;    BONE GRAFT N/A 04/05/2022    Procedure: BONE GRAFT;  Surgeon: Duran Adler Jr., MD;  Location: [facility] OR;  Service: Orthopedics;  Laterality: N/A;    CIRCUMCISION      COLONOSCOPY N/A 12/03/2018    Procedure: COLONOSCOPY;  Surgeon: CHRISSY Reyna MD;  Location: [facility] ENDO ([facility]);  Service: Endoscopy;  Laterality: N/A;    COLONOSCOPY N/A 09/01/2022    Procedure: COLONOSCOPY;  Surgeon: Alondra Arias MD;  Location: [facility] ENDO;  Service: Endoscopy;  Laterality: N/A;    epidural steroid injection X 2      lower lumbar    ESOPHAGOGASTRODUODENOSCOPY N/A 09/01/2022    Procedure: EGD (ESOPHAGOGASTRODUODENOSCOPY);  Surgeon: Alondra Arias MD;  Location: [facility] ENDO;  Service: Endoscopy;  Laterality: N/A;    facet injection       Dr Manpreet [facility] [place]     INSERTION OF IMPLANTABLE LOOP RECORDER N/A 9/15/2023    Procedure: Insertion, Implantable Loop Recorder;  Surgeon: Romario Wallace MD;  Location: [facility] CATH/EP LAB;  Service: Cardiology;  Laterality: N/A;    INTRALUMINAL GASTROINTESTINAL TRACT IMAGING VIA CAPSULE N/A 10/03/2022    Procedure: IMAGING PROCEDURE, GI TRACT, INTRALUMINAL, VIA CAPSULE;  Surgeon: Alondra Arias MD;  Location: [facility] ENDO;  Service: Endoscopy;  Laterality: N/A;    LAPAROSCOPIC SLEEVE GASTRECTOMY N/A 12/19/2022    Procedure: GASTRECTOMY, SLEEVE, LAPAROSCOPIC;  Surgeon: Lashonda Pinedo MD;  Location: [facility] OR;  Service: General;  Laterality: N/A;    LIPOMA RESECTION      Back of neck    MAGNETIC RESONANCE IMAGING N/A 05/29/2020    Procedure: MRI (MAGNETIC  RESONANCE IMAGING) LUMBAR SPINE;  Surgeon: Andi Diagnostic Provider;  Location: [facility];  Service: General;  Laterality: N/A;  COVID NEG    MAGNETIC RESONANCE IMAGING N/A 12/03/2021    Procedure: MRI (MAGNETIC RESONANCE IMAGING), LUMBAR SPINE;  Surgeon: Darek Diagnostic Provider;  Location: [facility];  Service: General;  Laterality: N/A;  In MRI @ 7:50 per Ariela    MAGNETIC RESONANCE IMAGING N/A 3/6/2024    Procedure: MRI (Magnetic Resonance Imagine);  Surgeon: Vicenta Michele;  Location: [facility];  Service: Anesthesiology;  Laterality: N/A;    MAGNETIC RESONANCE IMAGING Bilateral 6/28/2024    Procedure: MRI (Magnetic Resonance Imagine);  Surgeon: Vicenta Michele;  Location: [facility];  Service: Anesthesiology;  Laterality: Bilateral;  MRI BRAIN WITH AND WITHOUT CONTRAST    MEDIAL COLLATERAL LIGAMENT AND LATERAL COLLATERAL LIGAMENT REPAIR, KNEE Right 09/01/2018    Dr. Christophe Gore in [place]     MINIMALLY INVASIVE TRANSFORAMINAL LUMBAR INTERBODY FUSION (TLIF) N/A 04/05/2022    Procedure: FUSION, SPINE, LUMBAR, TLIF, MINIMALLY INVASIVE, WITH INSTRUMENTATION,  L4/5 RIGHT;  Surgeon: Duran Adler Jr., MD;  Location: [facility];  Service: Orthopedics;  Laterality: N/A;  10:30am per Tamela    MYELOGRAPHY N/A 06/23/2020    Procedure: MYELOGRAM;  Surgeon: Darek Diagnostic Provider;  Location: [facility];  Service: General;  Laterality: N/A;     Family History   Problem Relation Name Age of Onset    Hypertension Mother      Diabetes Father      Cancer Father          mesotheliosma     Cataracts Neg Hx      Glaucoma Neg Hx      Macular degeneration Neg Hx      Retinal detachment Neg Hx      Colon cancer Neg Hx      Colon polyps Neg Hx      Crohn's disease Neg Hx      Ulcerative colitis Neg Hx       Social History     Socioeconomic History    Marital status:     Number of children: 4   Occupational History     Employer: [employer]   Tobacco Use    Smoking status: Former     Current packs/day: 0.00     Average packs/day:  0.5 packs/day for 10.0 years (5.0 ttl pk-yrs)     Types: Cigarettes     Start date: 1999     Quit date: 2009     Years since quittin.4    Smokeless tobacco: Never    Tobacco comments:     quit smoking in    Substance and Sexual Activity    Alcohol use: Yes     Comment: rarely    Drug use: Not Currently     Types: Marijuana     Comment: medical---oil, smoke and edibles    Sexual activity: Not Currently     Social Drivers of Health     Financial Resource Strain: Low Risk  (2024)    Overall Financial Resource Strain (CARDIA)     Difficulty of Paying Living Expenses: Not hard at all   Food Insecurity: No Food Insecurity (2024)    Hunger Vital Sign     Worried About Running Out of Food in the Last Year: Never true     Ran Out of Food in the Last Year: Never true   Transportation Needs: No Transportation Needs (2024)    PRAPARE - Transportation     Lack of Transportation (Medical): No     Lack of Transportation (Non-Medical): No   Physical Activity: Sufficiently Active (2024)    Exercise Vital Sign     Days of Exercise per Week: 4 days     Minutes of Exercise per Session: 120 min   Recent Concern: Physical Activity - Insufficiently Active (2023)    Exercise Vital Sign     Days of Exercise per Week: 2 days     Minutes of Exercise per Session: 60 min   Stress: Stress Concern Present (2024)    [employer] - Occupational Stress Questionnaire     Feeling of Stress : To some extent   Housing Stability: High Risk (2024)    Housing Stability Vital Sign     Unable to Pay for Housing in the Last Year: Yes     Number of Places Lived in the Last Year: 1     Unstable Housing in the Last Year: No       Current Medications[1]  Review of patient's allergies indicates:   Allergen Reactions    Peaches [peach (prunus persica)] Anaphylaxis    Peanut Anaphylaxis    Tree pollen-red birch Anaphylaxis       Review of Systems   All other systems reviewed and are  "negative.      Objective:      Vitals:    06/09/25 1419   BP: 122/71   Pulse: (!) 58   Resp: 16   Weight: 102.1 kg (225 lb)   Height: 5' 10" (1.778 m)     Physical Exam  Vitals and nursing note reviewed.   Constitutional:       General: He is not in acute distress.     Appearance: Normal appearance.   Cardiovascular:      Pulses:           Dorsalis pedis pulses are 2+ on the right side and 2+ on the left side.        Posterior tibial pulses are 2+ on the right side and 2+ on the left side.   Musculoskeletal:         General: Tenderness and deformity present.      Right foot: Decreased range of motion. Deformity present.      Left foot: Decreased range of motion. Deformity (Pes planus with pronation deformity foot bilateral, pronation deformity left ankle) present.      Comments: Painful lesion left heel     Feet:      Right foot:      Skin integrity: Dry skin present.      Left foot:      Skin integrity: Callus (few areas of herniated fat pad/small lipomas palpated plantar left heel, 1 with small callus tender without erythema or calor) and dry skin present.   Skin:     General: Skin is dry.      Capillary Refill: Capillary refill takes less than 2 seconds.   Neurological:      General: No focal deficit present.      Mental Status: He is alert.   Psychiatric:         Thought Content: Thought content normal.     EXAMINATION:  XR ANKLE COMPLETE 3 VIEW BILATERAL     CLINICAL HISTORY:  Primary osteoarthritis, right ankle and foot     TECHNIQUE:  AP, lateral and oblique views of both ankles were performed.     COMPARISON:  11/11/2024, 11/21/2022     FINDINGS:  Right: No acute fracture, dislocation, or aggressive bone lesion.  Degenerative changes of the tibiotalar joint with joint space loss and osteophyte production, similar to prior exam.  Pes planus.  Mild soft tissue swelling of the lateral ankle.     Left: No acute fracture, dislocation, or aggressive bone lesion.  Degenerative changes of the tibiotalar and " fibulotalar joints with joint space loss and osteophyte production, similar to prior exam.  Ossification of the distal syndesmosis.  Pes planus.  Mild soft tissue swelling of the lateral ankle.     Impression:     As above.        Electronically signed by:Mario Knight  Date:                                            02/25/2025                               Assessment:       1. Piezogenic pedal papule - Left Foot    2. Pain of left heel    3. Pronation deformity of ankle, acquired, left    4. Acquired keratosis of plantar aspect of foot    5. Pronation deformity of both feet        Plan:             Reviewed with patient granules/fat pad which herniates out from under the heel either to the bottom of the sides is very common with his foot type and structure  1 of these areas has developed a small callus over it on the bottom of the heel  Area was debrided we had a lengthy discussion regarding better care and maintenance of skin  Reviewed multiple products to help with dry skin and callus  Explained treating dry skin is important to prevent recurrence of these lesions as the her needed fat pad is not something that resolves  Explained due to his foot type and structure he needs continued arch support, preferably orthotics at all times.  An arch support with a deep heel cup can take a lot of pressure off the fat pad under the heel and help alleviate his symptoms due to this condition  Explained he also needs the arch support long term  X-rays reviewed and discussed the significant amount of damaged to the left ankle is pretty much crushing the talus.  We discussed the anatomy, location of the talus, the bone connecting the foot to the lower leg  At this point patient relates he was about to have reconstructive surgery with Dr. Orozco course she left [facility] and has not considered having the surgery done with another physician at this time  Reviewed surgical options  We reviewed long term aggressive supportive  options to help take pressure off the ankle joint  He is wearing excellent shoes and explained to patient he needs to have them on at all times even in the house as it is the only thing supporting the back of his foot and ankle and taking pressure off the heel  We reviewed options regarding ingrown nail, nail avulsion procedure or nail avulsion procedure with application of chemical to try to prevent recurrence of the chronic ingrown nail medial left great toe  Since the areas pain-free at this time we discussed multiple topical options to help keep the nail and skin lubricated to try to prevent recurrence  Reviewed with patient power step Pro Tech versus custom-molded orthotics  Encouraged patient to pursue new custom-molded orthotics  Patient was in understanding and agreement with treatment plan.  I counseled the patient on their conditions, implications and medical management.  Instructed patient to contact the office with any changes, questions, concerns, worsening of symptoms.   Total face to face time 30 minutes, exam, assessment, treatment, discussion, additional time for review of chart prior to and following appointment and visit documentation, consultation and coordination of care.    Follow up as needed      This note was created using [employer] voice recognition software that occasionally misinterpreted phrases or words.         [1]   Current Outpatient Medications   Medication Sig Dispense Refill    amLODIPine (NORVASC) 5 MG tablet Take 1 tablet (5 mg total) by mouth once daily. 90 tablet 3    aspirin (ECOTRIN) 81 MG EC tablet Take 81 mg by mouth once daily.      calcium citrate-vitamin D3 (CALCITRATE-VITAMIN D) 315 mg-6.25 mcg (250 unit) Tab 1 tablet Orally Twice a day      meloxicam (MOBIC) 15 MG tablet Take 15 mg by mouth.      multivitamin (THERAGRAN) per tablet Take 1 tablet by mouth once daily.      oxyCODONE-acetaminophen (PERCOCET) 7.5-325 mg per tablet Take 1 tablet by mouth 2 (two) times daily  as needed.      rosuvastatin (CRESTOR) 10 MG tablet Take 1 tablet (10 mg total) by mouth every evening. 90 tablet 3    tamsulosin (FLOMAX) 0.4 mg Cap Take 2 capsules (0.8 mg total) by mouth once daily. 180 capsule 3    EPINEPHrine (EPIPEN) 0.3 mg/0.3 mL AtIn Inject into the muscle once for one dose as needed. (Patient not taking: Reported on 6/9/2025) 2 each 1     No current facility-administered medications for this visit.     Facility-Administered Medications Ordered in Other Visits   Medication Dose Route Frequency Provider Last Rate Last Admin    ceFAZolin in dextrose (iso-os) 2 gram/100 mL PgBk 2,000 mg  2 g Intravenous Q8H Katie Shane, NP   Stopped at 04/09/22 1036    HYDROmorphone injection 1 mg  1 mg Intravenous Q6H PRN Katie Shnae, NP   1 mg at 04/09/22 0539    oxyCODONE-acetaminophen  mg per tablet 1 tablet  1 tablet Oral Q4H PRN Katie Shane, NP   1 tablet at 04/09/22 0906    polyethylene glycol packet 17 g  17 g Oral Daily Katie Shane, NP   17 g at 04/08/22 0822

## 2025-06-11 ENCOUNTER — TELEPHONE (OUTPATIENT)
Dept: CARDIOLOGY | Facility: CLINIC | Age: 55
End: 2025-06-11
Payer: MEDICAID

## 2025-06-11 NOTE — TELEPHONE ENCOUNTER
06/11/25    Called the patient in reference to his Loop monitor transmissions.  We haven't received a transmission since January of 2025.  I asked him to unplug the unit and plug back in, to re-boot it.  I will check on it later to see if the transmission goes through.      06/12/25    Received the transmission this am and it looks good and home monitor working well.  Left a msg with the patient as well.

## 2025-06-13 NOTE — PRE-PROCEDURE INSTRUCTIONS
Arrival time: 12:00 PM     [facility]  [address] [place] [address]  [phone]     Ped. Pre-Op Instructions given:     -- Medication information (what to hold and what to take)  -- Pediatric NPO instructions as follows: (or as per your Surgeon)  1. Stop ALL solid food, gum, candy (including formula/breast milk with cereal in it) 8 hours before arrival time.  2. Stop all CLOUDY liquids: formula, tube feeds, cloudy juices and thicken liquids 6 hours prior to arrival time.  3. Stop plain breast milk 4 hours prior to arrival time.  4. Stop CLEAR liquids 2 hours prior to arrival time.  5. CLEAR liquids include only water, clear oral rehydration (no red) drinks, clear sports drinks or clear fruit juices (no orange juice, no pulpy juices, no apple cider).  6. IF IN DOUBT, drink water instead.  7. INOTHING TO EAT OR DRINK 2 hours before to arrival time. If you are told to take medication on the morning of surgery, it may be taken with a sip of water.  -- *Arrival place and directions given *. Time to be given the day before procedure or Friday before (if Monday case) by the Surgeon's Office        -- Make sure to give your child a bath or shower Bathe with normal soap (or per surgeon's office) and wash hair with normal shampoo  Please do not let your child brush their teeth the morning of surgery.  Please do not put any deodorant, powder, lotions, creams or diaper rash creams after giving them a bath or shower.  Remove any jewelry//earrings and no metals on skin or in hair AM of surgery

## 2025-06-17 ENCOUNTER — HOSPITAL ENCOUNTER (OUTPATIENT)
Facility: HOSPITAL | Age: 55
Discharge: HOME | End: 2025-06-17
Payer: MEDICAID

## 2025-06-17 ENCOUNTER — PATIENT MESSAGE (OUTPATIENT)
Dept: UROLOGY | Facility: CLINIC | Age: 55
End: 2025-06-17
Payer: MEDICAID

## 2025-06-17 ENCOUNTER — HOSPITAL ENCOUNTER (OUTPATIENT)
Dept: RADIOLOGY | Facility: HOSPITAL | Age: 55
Discharge: HOME | End: 2025-06-17
Attending: UROLOGY
Payer: MEDICAID

## 2025-06-17 ENCOUNTER — ANESTHESIA EVENT (OUTPATIENT)
Dept: ENDOSCOPY | Facility: HOSPITAL | Age: 55
End: 2025-06-17
Payer: MEDICAID

## 2025-06-17 ENCOUNTER — ANESTHESIA (OUTPATIENT)
Dept: ENDOSCOPY | Facility: HOSPITAL | Age: 55
End: 2025-06-17
Payer: MEDICAID

## 2025-06-17 ENCOUNTER — TELEPHONE (OUTPATIENT)
Dept: UROLOGY | Facility: CLINIC | Age: 55
End: 2025-06-17
Payer: MEDICAID

## 2025-06-17 VITALS
OXYGEN SATURATION: 94 % | SYSTOLIC BLOOD PRESSURE: 114 MMHG | DIASTOLIC BLOOD PRESSURE: 77 MMHG | RESPIRATION RATE: 20 BRPM | TEMPERATURE: 98 F | HEART RATE: 60 BPM

## 2025-06-17 DIAGNOSIS — R97.20 ELEVATED PSA: ICD-10-CM

## 2025-06-17 PROCEDURE — 72197 MRI PELVIS W/O & W/DYE: CPT | Mod: 26,,, | Performed by: STUDENT IN AN ORGANIZED HEALTH CARE EDUCATION/TRAINING PROGRAM

## 2025-06-17 PROCEDURE — 72197 MRI PELVIS W/O & W/DYE: CPT | Mod: TC

## 2025-06-17 PROCEDURE — 25500020 PHARM REV CODE 255: Performed by: UROLOGY

## 2025-06-17 PROCEDURE — 25000003 PHARM REV CODE 250

## 2025-06-17 PROCEDURE — 37000009 HC ANESTHESIA EA ADD 15 MINS

## 2025-06-17 PROCEDURE — 71000044 HC DOSC ROUTINE RECOVERY FIRST HOUR

## 2025-06-17 PROCEDURE — A9585 GADOBUTROL INJECTION: HCPCS | Performed by: UROLOGY

## 2025-06-17 PROCEDURE — 63600175 PHARM REV CODE 636 W HCPCS

## 2025-06-17 PROCEDURE — 37000008 HC ANESTHESIA 1ST 15 MINUTES

## 2025-06-17 RX ORDER — PROPOFOL 10 MG/ML
VIAL (ML) INTRAVENOUS
Status: DISCONTINUED | OUTPATIENT
Start: 2025-06-17 | End: 2025-06-17

## 2025-06-17 RX ORDER — ONDANSETRON HYDROCHLORIDE 2 MG/ML
INJECTION, SOLUTION INTRAVENOUS
Status: DISCONTINUED | OUTPATIENT
Start: 2025-06-17 | End: 2025-06-17

## 2025-06-17 RX ORDER — LIDOCAINE HYDROCHLORIDE 10 MG/ML
1 INJECTION, SOLUTION EPIDURAL; INFILTRATION; INTRACAUDAL; PERINEURAL ONCE
Status: DISCONTINUED | OUTPATIENT
Start: 2025-06-17 | End: 2025-06-17 | Stop reason: HOSPADM

## 2025-06-17 RX ORDER — PROPOFOL 10 MG/ML
INJECTION, EMULSION INTRAVENOUS
Status: COMPLETED
Start: 2025-06-17 | End: 2025-06-17

## 2025-06-17 RX ORDER — GLUCAGON 1 MG
1 KIT INJECTION
Status: DISCONTINUED | OUTPATIENT
Start: 2025-06-17 | End: 2025-06-17 | Stop reason: HOSPADM

## 2025-06-17 RX ORDER — GADOBUTROL 604.72 MG/ML
10 INJECTION INTRAVENOUS
Status: COMPLETED | OUTPATIENT
Start: 2025-06-17 | End: 2025-06-17

## 2025-06-17 RX ORDER — MIDAZOLAM HYDROCHLORIDE 1 MG/ML
INJECTION INTRAMUSCULAR; INTRAVENOUS
Status: DISCONTINUED | OUTPATIENT
Start: 2025-06-17 | End: 2025-06-17

## 2025-06-17 RX ORDER — SODIUM CHLORIDE 9 MG/ML
INJECTION, SOLUTION INTRAVENOUS CONTINUOUS
Status: DISCONTINUED | OUTPATIENT
Start: 2025-06-17 | End: 2025-06-17 | Stop reason: HOSPADM

## 2025-06-17 RX ORDER — MIDAZOLAM HYDROCHLORIDE 2 MG/2ML
INJECTION, SOLUTION INTRAMUSCULAR; INTRAVENOUS
Status: DISCONTINUED
Start: 2025-06-17 | End: 2025-06-17 | Stop reason: WASHOUT

## 2025-06-17 RX ORDER — ONDANSETRON HYDROCHLORIDE 2 MG/ML
4 INJECTION, SOLUTION INTRAVENOUS DAILY PRN
Status: DISCONTINUED | OUTPATIENT
Start: 2025-06-17 | End: 2025-06-17 | Stop reason: HOSPADM

## 2025-06-17 RX ORDER — LIDOCAINE HYDROCHLORIDE 10 MG/ML
1 INJECTION, SOLUTION EPIDURAL; INFILTRATION; INTRACAUDAL; PERINEURAL ONCE AS NEEDED
Status: DISCONTINUED | OUTPATIENT
Start: 2025-06-17 | End: 2025-06-17 | Stop reason: HOSPADM

## 2025-06-17 RX ADMIN — GADOBUTROL 10 ML: 604.72 INJECTION INTRAVENOUS at 04:06

## 2025-06-17 RX ADMIN — MIDAZOLAM HYDROCHLORIDE 2 MG: 2 INJECTION, SOLUTION INTRAMUSCULAR; INTRAVENOUS at 03:06

## 2025-06-17 RX ADMIN — ONDANSETRON 4 MG: 2 INJECTION INTRAMUSCULAR; INTRAVENOUS at 03:06

## 2025-06-17 RX ADMIN — PROPOFOL 175 MCG/KG/MIN: 10 INJECTION, EMULSION INTRAVENOUS at 03:06

## 2025-06-17 RX ADMIN — SODIUM CHLORIDE: 9 INJECTION, SOLUTION INTRAVENOUS at 03:06

## 2025-06-17 RX ADMIN — PROPOFOL 50 MG: 10 INJECTION, EMULSION INTRAVENOUS at 03:06

## 2025-06-17 RX ADMIN — PROPOFOL 20 MG: 10 INJECTION, EMULSION INTRAVENOUS at 04:06

## 2025-06-17 NOTE — TELEPHONE ENCOUNTER
Patient was scheduled today for mri of the prostate with anesthesia. Patient no showed appointment. Patient unable to have uronav procedure done on 7/10 without mri. Left message on patient's voicemail to give the office a call back.

## 2025-06-17 NOTE — PLAN OF CARE
Patient arrived to unit via ambulation . Patient appears to be in no immediate distress. Patient prepared for surgery. Perioperative period discussed and all questions addressed. Family at the bedside, call bell within reach, and bed in lowest position.

## 2025-06-17 NOTE — ANESTHESIA PREPROCEDURE EVALUATION
06/17/2025  Fabiano Jules Jr. is a 55 y.o., male with elevated PSA who is sscheduled for MRI prostate    Past Medical History:   Diagnosis Date    Arthritis     Back injury     Colon polyp     COVID     CPAP (continuous positive airway pressure) dependence     NOT NEEDED AFTER WEIGHT LOSS SURGERY    General anesthetics causing adverse effect in therapeutic use     WOKE UP CONFUSED X 1    Hypertension     PONV (postoperative nausea and vomiting)     Sleep apnea     c pap machine used  - NOT NEEDED AFTER GASTRIC SLEEVE    Wears glasses        Past Surgical History:   Procedure Laterality Date    ARTHROSCOPIC DEBRIDEMENT OF SHOULDER Right 9/6/2024    Procedure: EXTENSIVE DEBRIDEMENT, SHOULDER, ARTHROSCOPIC;  Surgeon: Darrion Ricks MD;  Location: [facility] OR;  Service: Orthopedics;  Laterality: Right;    ARTHROSCOPIC REPAIR OF ROTATOR CUFF OF SHOULDER Right 9/6/2024    Procedure: REPAIR, ROTATOR CUFF, ARTHROSCOPIC;  Surgeon: Darrion Ricks MD;  Location: [facility] OR;  Service: Orthopedics;  Laterality: Right;  arthrex-cuffmend/SCR stuff  Hood w/Arthrex notified 8/30/24 ark    ARTHROSCOPY OF SHOULDER WITH DECOMPRESSION OF SUBACROMIAL SPACE Right 9/6/2024    Procedure: ARTHROSCOPY, SHOULDER, WITH SUBACROMIAL SPACE DECOMPRESSION;  Surgeon: Darrion Ricks MD;  Location: [facility] OR;  Service: Orthopedics;  Laterality: Right;    ARTHROSCOPY,SHOULDER,WITH BICEPS TENODESIS Right 9/6/2024    Procedure: ARTHROSCOPY,SHOULDER,WITH BICEPS TENODESIS;  Surgeon: Darrion Ricks MD;  Location: [facility] OR;  Service: Orthopedics;  Laterality: Right;    BONE GRAFT N/A 04/05/2022    Procedure: BONE GRAFT;  Surgeon: Duran Adler Jr., MD;  Location: [facility] OR;  Service: Orthopedics;  Laterality: N/A;    CIRCUMCISION      COLONOSCOPY N/A 12/03/2018    Procedure: COLONOSCOPY;  Surgeon: CHRISSY Reyna MD;   Location: [facility] ENDO (4TH FLR);  Service: Endoscopy;  Laterality: N/A;    COLONOSCOPY N/A 09/01/2022    Procedure: COLONOSCOPY;  Surgeon: Alondra Arias MD;  Location: [facility] ENDO;  Service: Endoscopy;  Laterality: N/A;    epidural steroid injection X 2      lower lumbar    ESOPHAGOGASTRODUODENOSCOPY N/A 09/01/2022    Procedure: EGD (ESOPHAGOGASTRODUODENOSCOPY);  Surgeon: Alondra Arias MD;  Location: [facility] ENDO;  Service: Endoscopy;  Laterality: N/A;    facet injection       [facility] Manp[facility] [place]     INSERTION OF IMPLANTABLE LOOP RECORDER N/A 9/15/2023    Procedure: Insertion, Implantable Loop Recorder;  Surgeon: Romario Wallace MD;  Location: [facility] CATH/EP LAB;  Service: Cardiology;  Laterality: N/A;    INTRALUMINAL GASTROINTESTINAL TRACT IMAGING VIA CAPSULE N/A 10/03/2022    Procedure: IMAGING PROCEDURE, GI TRACT, INTRALUMINAL, VIA CAPSULE;  Surgeon: Alondra Arias MD;  Location: [facility] ENDO;  Service: Endoscopy;  Laterality: N/A;    LAPAROSCOPIC SLEEVE GASTRECTOMY N/A 12/19/2022    Procedure: GASTRECTOMY, SLEEVE, LAPAROSCOPIC;  Surgeon: Lashonda Pinedo MD;  Location: [facility] OR;  Service: General;  Laterality: N/A;    LIPOMA RESECTION      Back of neck    MAGNETIC RESONANCE IMAGING N/A 05/29/2020    Procedure: MRI (MAGNETIC RESONANCE IMAGING) LUMBAR SPINE;  Surgeon: [facility] Diagnostic Provider;  Location: [facility];  Service: General;  Laterality: N/A;  COVID NEG    MAGNETIC RESONANCE IMAGING N/A 12/03/2021    Procedure: MRI (MAGNETIC RESONANCE IMAGING), LUMBAR SPINE;  Surgeon: [facility] Diagnostic Provider;  Location: [facility];  Service: General;  Laterality: N/A;  In MRI @ 7:50 per Ariela    MAGNETIC RESONANCE IMAGING N/A 3/6/2024    Procedure: MRI (Magnetic Resonance Imagine);  Surgeon: Vicenta Michele;  Location: [facility];  Service: Anesthesiology;  Laterality: N/A;    MAGNETIC RESONANCE IMAGING Bilateral 6/28/2024    Procedure: MRI (Magnetic Resonance Imagine);  Surgeon:  Surgeon, Vicenta;  Location: [facility];  Service: Anesthesiology;  Laterality: Bilateral;  MRI BRAIN WITH AND WITHOUT CONTRAST    MEDIAL COLLATERAL LIGAMENT AND LATERAL COLLATERAL LIGAMENT REPAIR, KNEE Right 09/01/2018    Dr. Christophe Gore in [place]     MINIMALLY INVASIVE TRANSFORAMINAL LUMBAR INTERBODY FUSION (TLIF) N/A 04/05/2022    Procedure: FUSION, SPINE, LUMBAR, TLIF, MINIMALLY INVASIVE, WITH INSTRUMENTATION,  L4/5 RIGHT;  Surgeon: Duran Adler Jr., MD;  Location: [facility];  Service: Orthopedics;  Laterality: N/A;  10:30am per Tamela    MYELOGRAPHY N/A 06/23/2020    Procedure: MYELOGRAM;  Surgeon: [facility] Diagnostic Provider;  Location: [facility] OR;  Service: General;  Laterality: N/A;       Family History   Problem Relation Name Age of Onset    Hypertension Mother      Diabetes Father      Cancer Father          mesotheliosma     Cataracts Neg Hx      Glaucoma Neg Hx      Macular degeneration Neg Hx      Retinal detachment Neg Hx      Colon cancer Neg Hx      Colon polyps Neg Hx      Crohn's disease Neg Hx      Ulcerative colitis Neg Hx         Review of patient's allergies indicates:   Allergen Reactions    Peaches [peach (prunus persica)] Anaphylaxis    Peanut Anaphylaxis    Tree pollen-red birch Anaphylaxis           Pre-op Assessment    I have reviewed the Patient Summary Reports.    I have reviewed the NPO Status.      Review of Systems  Anesthesia Hx:  No problems with previous Anesthesia   History of prior surgery of interest to airway management or planning:          Denies Family Hx of Anesthesia complications.    Denies Personal Hx of Anesthesia complications.                    Social:  Non-Smoker, Dip/Chew       Hematology/Oncology:                                  Oncology Comments: Elevated PSA     Cardiovascular:     Hypertension                                          Pulmonary:        Sleep Apnea                Musculoskeletal:  Arthritis               Neurological:    Neuromuscular Disease,  (sciatica)                                   Endocrine:        Obesity / BMI > 30  Psych:  Psychiatric History anxiety depression                Physical Exam  General: Well nourished, Cooperative, Alert and Oriented    Airway:  Mallampati: II   Mouth Opening: Normal  TM Distance: Normal  Tongue: Normal    Chest/Lungs:  Normal Respiratory Rate    Heart:  Rate: Normal        Anesthesia Plan  Type of Anesthesia, risks & benefits discussed:    Anesthesia Type: Gen Natural Airway  Intra-op Monitoring Plan: Standard ASA Monitors  Induction:  IV  Informed Consent: Informed consent signed with the Patient and all parties understand the risks and agree with anesthesia plan.  All questions answered.   ASA Score: 3  Day of Surgery Review of History & Physical: H&P completed by Anesthesiologist.    Ready For Surgery From Anesthesia Perspective.     .

## 2025-06-17 NOTE — ANESTHESIA POSTPROCEDURE EVALUATION
Anesthesia Post Evaluation    Patient: Fabiano Jules Jr.    Procedure(s) Performed: Procedure(s) (LRB):  MRI (Magnetic Resonance Imagine) (N/A)    Final Anesthesia Type: general      Patient location during evaluation: PACU  Patient participation: Yes- Able to Participate  Level of consciousness: awake and alert  Post-procedure vital signs: reviewed and stable  Pain management: adequate  Airway patency: patent    PONV status at discharge: No PONV  Anesthetic complications: no      Cardiovascular status: blood pressure returned to baseline  Respiratory status: unassisted  Hydration status: euvolemic  Follow-up not needed.              Vitals Value Taken Time   /84 06/17/25 16:32   Temp 37.1 °C (98.7 °F) 06/17/25 13:01   Pulse 59 06/17/25 16:41   Resp 13 06/17/25 16:42   SpO2 94 % 06/17/25 16:41   Vitals shown include unfiled device data.      No case tracking events are documented in the log.      Pain/Arian Score: No data recorded

## 2025-06-17 NOTE — TRANSFER OF CARE
Anesthesia Transfer of Care Note    Patient: Fabiano Jules Jr.    Procedure(s) Performed: Procedure(s) (LRB):  MRI (Magnetic Resonance Imagine) (N/A)    Patient location: PACU    Anesthesia Type: general    Transport from OR: Transported from OR on 6-10 L/min O2 by face mask with adequate spontaneous ventilation. Continuous SpO2 monitoring in transport    Post pain: adequate analgesia    Post assessment: no apparent anesthetic complications and tolerated procedure well    Post vital signs: stable    Level of consciousness: awake and responds to stimulation    Nausea/Vomiting: no nausea/vomiting    Complications: none    Transfer of care protocol was followed      Last vitals: Visit Vitals  /76   Pulse 58   Temp 37.1 °C (98.7 °F) (Temporal)   SpO2 100%

## 2025-06-18 ENCOUNTER — RESULTS FOLLOW-UP (OUTPATIENT)
Dept: UROLOGY | Facility: CLINIC | Age: 55
End: 2025-06-18

## 2025-06-20 ENCOUNTER — LAB VISIT (OUTPATIENT)
Dept: LAB | Facility: HOSPITAL | Age: 55
End: 2025-06-20
Attending: UROLOGY
Payer: MEDICAID

## 2025-06-20 ENCOUNTER — PATIENT MESSAGE (OUTPATIENT)
Dept: UROLOGY | Facility: CLINIC | Age: 55
End: 2025-06-20
Payer: MEDICAID

## 2025-06-20 DIAGNOSIS — R97.20 ELEVATED PSA: ICD-10-CM

## 2025-06-20 LAB
PSA FREE MFR SERPL: 18.25 %
PSA FREE SERPL-MCNC: 1.04 NG/ML
PSA SERPL-MCNC: 5.7 NG/ML (ref ?–4)

## 2025-06-20 PROCEDURE — 84153 ASSAY OF PSA TOTAL: CPT

## 2025-06-20 PROCEDURE — 36415 COLL VENOUS BLD VENIPUNCTURE: CPT

## 2025-06-23 ENCOUNTER — RESULTS FOLLOW-UP (OUTPATIENT)
Dept: UROLOGY | Facility: CLINIC | Age: 55
End: 2025-06-23

## 2025-06-23 ENCOUNTER — PATIENT MESSAGE (OUTPATIENT)
Dept: ORTHOPEDICS | Facility: CLINIC | Age: 55
End: 2025-06-23
Payer: MEDICAID

## 2025-06-26 ENCOUNTER — OFFICE VISIT (OUTPATIENT)
Dept: ORTHOPEDICS | Facility: CLINIC | Age: 55
End: 2025-06-26
Payer: MEDICAID

## 2025-06-26 ENCOUNTER — PATIENT MESSAGE (OUTPATIENT)
Dept: ORTHOPEDICS | Facility: CLINIC | Age: 55
End: 2025-06-26
Payer: MEDICAID

## 2025-06-26 DIAGNOSIS — M19.072 ARTHRITIS OF ANKLE, LEFT: ICD-10-CM

## 2025-06-26 DIAGNOSIS — M19.071 ARTHRITIS OF ANKLE, RIGHT: Primary | ICD-10-CM

## 2025-06-26 PROCEDURE — 99999PBSHW PR PBB SHADOW TECHNICAL ONLY FILED TO HB: Mod: PBBFAC,,,

## 2025-06-26 PROCEDURE — 20605 DRAIN/INJ JOINT/BURSA W/O US: CPT | Mod: 50,PBBFAC,PO | Performed by: ORTHOPAEDIC SURGERY

## 2025-06-26 PROCEDURE — 99212 OFFICE O/P EST SF 10 MIN: CPT | Mod: PBBFAC,PO | Performed by: ORTHOPAEDIC SURGERY

## 2025-06-26 PROCEDURE — 99999 PR PBB SHADOW E&M-EST. PATIENT-LVL II: CPT | Mod: PBBFAC,,, | Performed by: ORTHOPAEDIC SURGERY

## 2025-06-26 PROCEDURE — 99213 OFFICE O/P EST LOW 20 MIN: CPT | Mod: S$PBB,25,, | Performed by: ORTHOPAEDIC SURGERY

## 2025-06-26 RX ADMIN — TRIAMCINOLONE ACETONIDE 40 MG: 40 INJECTION, SUSPENSION INTRA-ARTICULAR; INTRAMUSCULAR at 09:06

## 2025-06-27 ENCOUNTER — PATIENT MESSAGE (OUTPATIENT)
Dept: FAMILY MEDICINE | Facility: CLINIC | Age: 55
End: 2025-06-27
Payer: MEDICAID

## 2025-07-01 ENCOUNTER — OFFICE VISIT (OUTPATIENT)
Dept: FAMILY MEDICINE | Facility: CLINIC | Age: 55
End: 2025-07-01
Payer: MEDICAID

## 2025-07-01 ENCOUNTER — LAB VISIT (OUTPATIENT)
Dept: LAB | Facility: HOSPITAL | Age: 55
End: 2025-07-01
Attending: STUDENT IN AN ORGANIZED HEALTH CARE EDUCATION/TRAINING PROGRAM
Payer: MEDICAID

## 2025-07-01 VITALS
OXYGEN SATURATION: 99 % | HEART RATE: 58 BPM | WEIGHT: 224.19 LBS | SYSTOLIC BLOOD PRESSURE: 136 MMHG | BODY MASS INDEX: 32.1 KG/M2 | RESPIRATION RATE: 18 BRPM | DIASTOLIC BLOOD PRESSURE: 78 MMHG | HEIGHT: 70 IN

## 2025-07-01 DIAGNOSIS — M19.072 OSTEOARTHRITIS OF BOTH FEET, UNSPECIFIED OSTEOARTHRITIS TYPE: ICD-10-CM

## 2025-07-01 DIAGNOSIS — M54.17 LUMBOSACRAL RADICULOPATHY: Primary | ICD-10-CM

## 2025-07-01 DIAGNOSIS — G62.9 PERIPHERAL POLYNEUROPATHY: ICD-10-CM

## 2025-07-01 DIAGNOSIS — M19.071 OSTEOARTHRITIS OF BOTH FEET, UNSPECIFIED OSTEOARTHRITIS TYPE: ICD-10-CM

## 2025-07-01 PROBLEM — I48.0 PAROXYSMAL ATRIAL FIBRILLATION: Status: RESOLVED | Noted: 2023-07-12 | Resolved: 2025-07-01

## 2025-07-01 PROBLEM — E66.01 CLASS 3 SEVERE OBESITY DUE TO EXCESS CALORIES WITH SERIOUS COMORBIDITY AND BODY MASS INDEX (BMI) OF 50.0 TO 59.9 IN ADULT: Status: RESOLVED | Noted: 2018-10-19 | Resolved: 2025-07-01

## 2025-07-01 PROBLEM — E66.813 CLASS 3 SEVERE OBESITY DUE TO EXCESS CALORIES WITH SERIOUS COMORBIDITY AND BODY MASS INDEX (BMI) OF 50.0 TO 59.9 IN ADULT: Status: RESOLVED | Noted: 2018-10-19 | Resolved: 2025-07-01

## 2025-07-01 PROBLEM — E66.01 SEVERE OBESITY (BMI >= 40): Chronic | Status: RESOLVED | Noted: 2022-12-20 | Resolved: 2025-07-01

## 2025-07-01 PROBLEM — E66.01 MORBID OBESITY: Status: RESOLVED | Noted: 2021-07-14 | Resolved: 2025-07-01

## 2025-07-01 PROBLEM — E66.01 OBESITY, MORBID, BMI 40.0-49.9: Status: RESOLVED | Noted: 2017-03-01 | Resolved: 2025-07-01

## 2025-07-01 LAB
ANION GAP (OHS): 7 MMOL/L (ref 8–16)
BUN SERPL-MCNC: 13 MG/DL (ref 6–20)
CALCIUM SERPL-MCNC: 9.8 MG/DL (ref 8.7–10.5)
CHLORIDE SERPL-SCNC: 103 MMOL/L (ref 95–110)
CO2 SERPL-SCNC: 29 MMOL/L (ref 23–29)
CREAT SERPL-MCNC: 0.7 MG/DL (ref 0.5–1.4)
GFR SERPLBLD CREATININE-BSD FMLA CKD-EPI: >60 ML/MIN/1.73/M2
GLUCOSE SERPL-MCNC: 88 MG/DL (ref 70–110)
POTASSIUM SERPL-SCNC: 4.3 MMOL/L (ref 3.5–5.1)
SODIUM SERPL-SCNC: 139 MMOL/L (ref 136–145)

## 2025-07-01 PROCEDURE — 99999 PR PBB SHADOW E&M-EST. PATIENT-LVL IV: CPT | Mod: PBBFAC,,, | Performed by: STUDENT IN AN ORGANIZED HEALTH CARE EDUCATION/TRAINING PROGRAM

## 2025-07-01 PROCEDURE — 80048 BASIC METABOLIC PNL TOTAL CA: CPT

## 2025-07-01 PROCEDURE — G2211 COMPLEX E/M VISIT ADD ON: HCPCS | Mod: ,,, | Performed by: STUDENT IN AN ORGANIZED HEALTH CARE EDUCATION/TRAINING PROGRAM

## 2025-07-01 PROCEDURE — 3008F BODY MASS INDEX DOCD: CPT | Mod: CPTII,,, | Performed by: STUDENT IN AN ORGANIZED HEALTH CARE EDUCATION/TRAINING PROGRAM

## 2025-07-01 PROCEDURE — 99214 OFFICE O/P EST MOD 30 MIN: CPT | Mod: PBBFAC,PO | Performed by: STUDENT IN AN ORGANIZED HEALTH CARE EDUCATION/TRAINING PROGRAM

## 2025-07-01 PROCEDURE — 36415 COLL VENOUS BLD VENIPUNCTURE: CPT | Mod: PO

## 2025-07-01 PROCEDURE — 99214 OFFICE O/P EST MOD 30 MIN: CPT | Mod: S$PBB,,, | Performed by: STUDENT IN AN ORGANIZED HEALTH CARE EDUCATION/TRAINING PROGRAM

## 2025-07-01 PROCEDURE — 1159F MED LIST DOCD IN RCRD: CPT | Mod: CPTII,,, | Performed by: STUDENT IN AN ORGANIZED HEALTH CARE EDUCATION/TRAINING PROGRAM

## 2025-07-01 PROCEDURE — 1160F RVW MEDS BY RX/DR IN RCRD: CPT | Mod: CPTII,,, | Performed by: STUDENT IN AN ORGANIZED HEALTH CARE EDUCATION/TRAINING PROGRAM

## 2025-07-01 PROCEDURE — 3075F SYST BP GE 130 - 139MM HG: CPT | Mod: CPTII,,, | Performed by: STUDENT IN AN ORGANIZED HEALTH CARE EDUCATION/TRAINING PROGRAM

## 2025-07-01 PROCEDURE — 3078F DIAST BP <80 MM HG: CPT | Mod: CPTII,,, | Performed by: STUDENT IN AN ORGANIZED HEALTH CARE EDUCATION/TRAINING PROGRAM

## 2025-07-01 RX ORDER — MELOXICAM 15 MG/1
15 TABLET ORAL DAILY
Qty: 30 TABLET | Refills: 11 | Status: SHIPPED | OUTPATIENT
Start: 2025-07-01

## 2025-07-01 RX ORDER — GABAPENTIN 300 MG/1
300 CAPSULE ORAL 3 TIMES DAILY
Qty: 90 CAPSULE | Refills: 11 | Status: SHIPPED | OUTPATIENT
Start: 2025-07-01 | End: 2026-07-01

## 2025-07-01 NOTE — PROGRESS NOTES
Patient ID: Fabiano Jules Jr. is a 55 y.o. male.    Chief Complaint: Foot Pain    History of Present Illness    CHIEF COMPLAINT:  Patient presents today for bilateral foot pain.    FOOT PAIN:  He reports bilateral foot pain secondary to flat feet with collapsed arches. Pain is described as throbbing, similar to a toothache, with burning and tingling sensations that persist throughout the day. He experiences numbness in the foot and pain severe enough to wake him at night. He has significant functional impairment, having quit multiple jobs due to inability to complete full work weeks secondary to pain limitations. Previous insoles provided minimal relief, and recent injections from Dr. Meyers typically only provided three weeks of relief. He is currently skeptical about potential surgical interventions, understanding that two surgeries per foot would be required with uncertain outcomes. Neuropathy testing has been performed, and he reports constant nerve-related pain. He expresses low optimism about finding a definitive treatment.    BACK HISTORY:  He underwent lumbar fusion surgery in 2022 with significant improvement in back pain compared to pre-surgical condition. He experienced acute back pain 3-4 weeks ago after physical strain from yard work involving water drainage. Meloxicam prescribed by his back surgeon provided complete pain relief within three days. He currently denies persistent back pain.    MEDICAL HISTORY:  He has history of enlarged prostate, recently evaluated with MRI.      Physical Exam    General: No acute distress. Well-developed. Well-nourished.  Musculoskeletal: No  obvious deformity.  Extremities: No lower extremity edema.          Assessment & Plan    G62.9 Peripheral polyneuropathy  M54.17 Lumbosacral radiculopathy  M19.071, M19.072 Osteoarthritis of both feet, unspecified osteoarthritis type  M19.071 Primary osteoarthritis, right ankle and foot  M19.072 Primary osteoarthritis, left ankle and  foot  G89.29 Other chronic pain    IMPRESSION:  - Assessed foot pain, considering structural issues, arthritis, and potential neuropathy.  - Reviewed previous XRs showing arthritis and bone spurs in feet.  - Evaluated effectiveness of recent steroid injections given by Dr. Meyers.  - Determined neuropathy may be primary source of pain, potentially stemming from previous back issues rather than foot arthritis.  - Decided surgery may not significantly improve symptoms, especially nerve-related pain.  - Pursued conservative management with combination of anti-inflammatory and neuropathic pain medication.  - Plan to check renal function to ensure safety of long-term meloxicam use, as it can cause kidney problems requiring regular monitoring.    PERIPHERAL POLYNEUROPATHY:  - Explained the difference between arthritis pain and neuropathic pain.  - Informed the patient that nerve damage typically does not heal once it occurs.  - Initiated gabapentin 300 mg 3 times daily (instructed to take before bed initially and adjust dosing as needed).  - Advised the patient to contact the office to monitor effectiveness of gabapentin, with adjustments to be made as necessary.    LUMBOSACRAL RADICULOPATHY:  - Instructed the patient to consult with orthopedic specialist about potential nerve block or other interventions for foot pain at scheduled appointment on July 11.    OSTEOARTHRITIS OF BOTH FEET, UNSPECIFIED OSTEOARTHRITIS TYPE:  - Discussed the limited treatment options for foot and ankle arthritis compared to knee arthritis.  - Continued meloxicam for pain management.  - Advised the patient to contact the office to monitor effectiveness of meloxicam, with adjustments to be made as necessary.  - Ordered laboratory studies to evaluate renal function.    ## PRIMARY OSTEOARTHRITIS, RIGHT ANKLE AND FOOT:  - Patient reports excruciating pain in the right foot, described as bone on bone, throbbing, burning, and tingling.  - X-rays  confirm arthritis, showing collapsed arches and bone spurs.  - Discussed how these structural issues contribute to pain.    ## PRIMARY OSTEOARTHRITIS, LEFT ANKLE AND FOOT:  - Patient reports similar excruciating pain in the left foot with identical presentation to the right foot.  - X-rays confirm arthritis with collapsed arches and bone spurs.  - Discussed structural issues and their contribution to pain.    ## OTHER CHRONIC PAIN:  - Patient reports chronic pain in both feet affecting daily activities and work ability.  - Pain is persistent and severe, significantly impacting quality of life.  - Discussed disability options due to patient's inability to work.  - Scheduled renal function monitoring twice yearly due to meloxicam use.          Fabiano was seen today for foot pain.    Diagnoses and all orders for this visit:    Lumbosacral radiculopathy  -     gabapentin (NEURONTIN) 300 MG capsule; Take 1 capsule (300 mg total) by mouth 3 (three) times daily.    Peripheral polyneuropathy    Osteoarthritis of both feet, unspecified osteoarthritis type  -     Basic Metabolic Panel; Future  -     meloxicam (MOBIC) 15 MG tablet; Take 1 tablet (15 mg total) by mouth once daily.          No follow-ups on file.    This note was generated with the assistance of ambient listening technology. Verbal consent was obtained by the patient and accompanying visitor(s) for the recording of patient appointment to facilitate this note. I attest to having reviewed and edited the generated note for accuracy, though some syntax or spelling errors may persist. Please contact the author of this note for any clarification.

## 2025-07-02 ENCOUNTER — RESULTS FOLLOW-UP (OUTPATIENT)
Dept: FAMILY MEDICINE | Facility: CLINIC | Age: 55
End: 2025-07-02

## 2025-07-07 ENCOUNTER — PATIENT MESSAGE (OUTPATIENT)
Dept: FAMILY MEDICINE | Facility: CLINIC | Age: 55
End: 2025-07-07
Payer: MEDICAID

## 2025-07-07 NOTE — TELEPHONE ENCOUNTER
Spoke with Mr. Jules via telephone. Mr. Jules stated he was having trouble accessing his recent x-ray results through his my chart. Recent x-ray results were mailed to Mr. Jules at this time.

## 2025-07-08 RX ORDER — SULFAMETHOXAZOLE AND TRIMETHOPRIM 800; 160 MG/1; MG/1
1 TABLET ORAL
COMMUNITY

## 2025-07-08 RX ORDER — TRIAMCINOLONE ACETONIDE 40 MG/ML
40 INJECTION, SUSPENSION INTRA-ARTICULAR; INTRAMUSCULAR
Status: DISCONTINUED | OUTPATIENT
Start: 2025-06-26 | End: 2025-07-08 | Stop reason: HOSPADM

## 2025-07-08 NOTE — PROCEDURES
Intermediate Joint Aspiration/Injection: R ankle, L ankle    Date/Time: 6/26/2025 9:50 AM    Performed by: Liban Hirsch MD  Authorized by: Liban Hirsch MD    Consent Done?:  Yes (Verbal)  Indications:  Arthritis  Site marked: The procedure site was marked    Timeout: Prior to procedure the correct patient, procedure, and site was verified      Location:  Ankle    Local anesthesia used?: Yes    Anesthesia:  Local infiltration  Local anesthetic:  Bupivacaine 0.25% without epinephrine and lidocaine 1% without epinephrine  Site:  R ankle and L ankle  Prep: Patient was prepped and draped in usual sterile fashion    Ultrasonic Guidance for needle placement: No  Needle size:  25 G  Approach:  Anteromedial  Medications:  40 mg triamcinolone acetonide 40 mg/mL  Patient tolerance:  Patient tolerated the procedure well with no immediate complications

## 2025-07-08 NOTE — PROGRESS NOTES
Status/Diagnosis: Bilateral, Left > Right, PCFD; valgus talar tilt and end-stage tibiotalar arthritis; posterior tibial tendinitis; gastroc contracture  Date of Surgery: none  Date of Injury: none  Return visit: PRN  X-rays on Return: pending patient complaint     Chief Complaint:   Chief Complaint   Patient presents with    Right Ankle - Pain    Left Ankle - Pain     Present History:  Fabiano Jules Jr. is a 55 y.o. male who presents today for new patient evaluation.  Patient previously seen by Dr. Orozco.  Last seen in 2021 at which time he received bilateral ankle corticosteroid injections.  Patient endorses significant symptomatic relief at that time.  Patient has been managed for chronic symptomatic flatfoot deformity.  Has tried shoe wear and activity modification including not limited to custom orthotics, what sounds like an [place] brace, etc..  No recent immobilization or physical therapy.  Currently taking oral NSAIDs intermittently as needed for pain.  Denies any numbness or tingling.  Minimal pain at rest, increased with weight-bearing.  Also endorses recent history of lumbar fusion earlier this year which she believes has exacerbated his symptoms to bilateral feet.  States left greater than right foot and ankle pain.    06/01/2023:  Patient returns after last being seen in late November 2022.  Endorses near complete symptomatic relief with bilateral intra-articular corticosteroid injection the lasted 4+ month.  Presents today for repeat evaluation and repeat injection.    Since last being seen, patient underwent gastric bypass surgery.  Has lost 100+ lb postoperatively.      09/07/2023:  Patient returns for repeat clinical evaluation and possible intra-articular corticosteroid injection.  After last clinic visit in early June, patient endorses near-complete symptomatic relief for the last 2.5 months.  No new injuries.    Of note, patient did develop Bell's palsy that was thought to be related to an ear  infection since last being seen.  Does not believe that there is any relationship to previous corticosteroid injection.    01/11/2024:  Patient returns today for repeat clinical evaluation and possible repeat corticosteroid injection.    Last seen in September 2023.  At that time underwent bilateral ankle corticosteroid injections.  On the right, endorses 90+% relief even through today.    On the left with 75% relief but only for approximately 1 month.  Today with left >> right persistent ankle pain.  Localizes pain to the anterior ankle joint line.  No new injuries.  Denies any numbness or tingling.    02/25/2025  He returns today for repeat evaluation of bilateral ankle pain. Currently he complains of diffuse right ankle pain with most significant pain along the Achilles.  He is having trouble with shoes due to pressure on the heel.  His left ankle pain is localized mostly to the medial aspect of the ankle.  His pain is worse with activity, prolonged standing or walking.  He is an  however currently not working as he is limited by his chronic ankle pain.  He reports about 100% relief with the last injections in January 2024 that lasted about 4 months.    06/26/2025:  Patient returns today for repeat clinical evaluation and possible injection.  Still with bilateral, left worse than, ankle pain.  Previous injection again with significant symptomatic relief but lasting for approximately 3 to 4 months.      Past Medical History:   Diagnosis Date    Arthritis     Atrial fibrillation     suspected-has loop recorder    Back injury     Colon polyp     COVID     CPAP (continuous positive airway pressure) dependence     NOT NEEDED AFTER WEIGHT LOSS SURGERY    General anesthetics causing adverse effect in therapeutic use     WOKE UP CONFUSED X 1    Hypertension     PONV (postoperative nausea and vomiting)     Sleep apnea     c pap machine used  - NOT NEEDED AFTER GASTRIC SLEEVE    TIA     3/2025    Wears glasses         Past Surgical History:   Procedure Laterality Date    ARTHROSCOPIC DEBRIDEMENT OF SHOULDER Right 09/06/2024    Procedure: EXTENSIVE DEBRIDEMENT, SHOULDER, ARTHROSCOPIC;  Surgeon: Darrion Ricks MD;  Location: [facility] OR;  Service: Orthopedics;  Laterality: Right;    ARTHROSCOPIC REPAIR OF ROTATOR CUFF OF SHOULDER Right 09/06/2024    Procedure: REPAIR, ROTATOR CUFF, ARTHROSCOPIC;  Surgeon: Darrion Ricks MD;  Location: [facility] OR;  Service: Orthopedics;  Laterality: Right;  arthrex-cuffmend/SCR stuff  Hood w/Arthrex notified 8/30/24 ark    ARTHROSCOPY OF SHOULDER WITH DECOMPRESSION OF SUBACROMIAL SPACE Right 09/06/2024    Procedure: ARTHROSCOPY, SHOULDER, WITH SUBACROMIAL SPACE DECOMPRESSION;  Surgeon: Darrino Ricks MD;  Location: [facility] OR;  Service: Orthopedics;  Laterality: Right;    ARTHROSCOPY,SHOULDER,WITH BICEPS TENODESIS Right 09/06/2024    Procedure: ARTHROSCOPY,SHOULDER,WITH BICEPS TENODESIS;  Surgeon: Darrion Ricks MD;  Location: [facility] OR;  Service: Orthopedics;  Laterality: Right;    BONE GRAFT N/A 04/05/2022    Procedure: BONE GRAFT;  Surgeon: Duran Adler Jr., MD;  Location: [facility] OR;  Service: Orthopedics;  Laterality: N/A;    CIRCUMCISION      COLONOSCOPY N/A 12/03/2018    Procedure: COLONOSCOPY;  Surgeon: CHRISSY Reyna MD;  Location: [facility] ([address] [place] [address]);  Service: Endoscopy;  Laterality: N/A;    COLONOSCOPY N/A 09/01/2022    Procedure: COLONOSCOPY;  Surgeon: Alondra Arias MD;  Location: [facility];  Service: Endoscopy;  Laterality: N/A;    epidural steroid injection X 2      lower lumbar    ESOPHAGOGASTRODUODENOSCOPY N/A 09/01/2022    Procedure: EGD (ESOPHAGOGASTRODUODENOSCOPY);  Surgeon: Alondra Arias MD;  Location: [facility];  Service: Endoscopy;  Laterality: N/A;    facet injection       [facility] [place]     INSERTION OF IMPLANTABLE LOOP RECORDER N/A 09/15/2023    Procedure: Insertion, Implantable Loop  Recorder;  Surgeon: Romario Wallace MD;  Location: [facility] CATH/EP LAB;  Service: Cardiology;  Laterality: N/A;    INTRALUMINAL GASTROINTESTINAL TRACT IMAGING VIA CAPSULE N/A 10/03/2022    Procedure: IMAGING PROCEDURE, GI TRACT, INTRALUMINAL, VIA CAPSULE;  Surgeon: Alondra Arias MD;  Location: [facility] ENDO;  Service: Endoscopy;  Laterality: N/A;    LAPAROSCOPIC SLEEVE GASTRECTOMY N/A 12/19/2022    Procedure: GASTRECTOMY, SLEEVE, LAPAROSCOPIC;  Surgeon: Lashonda Pinedo MD;  Location: [facility] OR;  Service: General;  Laterality: N/A;    LIPOMA RESECTION      Back of neck    MAGNETIC RESONANCE IMAGING N/A 05/29/2020    Procedure: MRI (MAGNETIC RESONANCE IMAGING) LUMBAR SPINE;  Surgeon: [facility] Diagnostic Provider;  Location: [facility];  Service: General;  Laterality: N/A;  COVID NEG    MAGNETIC RESONANCE IMAGING N/A 12/03/2021    Procedure: MRI (MAGNETIC RESONANCE IMAGING), LUMBAR SPINE;  Surgeon: [facility] Diagnostic Provider;  Location: [facility];  Service: General;  Laterality: N/A;  In MRI @ 7:50 per Ariela    MAGNETIC RESONANCE IMAGING N/A 03/06/2024    Procedure: MRI (Magnetic Resonance Imagine);  Surgeon: Vicenta Michele;  Location: [facility];  Service: Anesthesiology;  Laterality: N/A;    MAGNETIC RESONANCE IMAGING Bilateral 06/28/2024    Procedure: MRI (Magnetic Resonance Imagine);  Surgeon: Vicenta Michele;  Location: [facility];  Service: Anesthesiology;  Laterality: Bilateral;  MRI BRAIN WITH AND WITHOUT CONTRAST    MAGNETIC RESONANCE IMAGING N/A 06/17/2025    Procedure: MRI (Magnetic Resonance Imagine);  Surgeon: Vicenta Michele;  Location: [facility];  Service: Anesthesiology;  Laterality: N/A;    MEDIAL COLLATERAL LIGAMENT AND LATERAL COLLATERAL LIGAMENT REPAIR, KNEE Right 09/01/2018    Dr. Christophe Gore in [place]     MINIMALLY INVASIVE TRANSFORAMINAL LUMBAR INTERBODY FUSION (TLIF) N/A 04/05/2022    Procedure: FUSION, SPINE, LUMBAR, TLIF, MINIMALLY INVASIVE, WITH INSTRUMENTATION,  L4/5 RIGHT;  Surgeon: Duran Adler  MD Nathan;  Location: [facility] OR;  Service: Orthopedics;  Laterality: N/A;  10:30am per Tamela    MYELOGRAPHY N/A 06/23/2020    Procedure: MYELOGRAM;  Surgeon: Dosc Diagnostic Provider;  Location: [facility] OR;  Service: General;  Laterality: N/A;       Current Outpatient Medications   Medication Sig    amLODIPine (NORVASC) 5 MG tablet Take 1 tablet (5 mg total) by mouth once daily.    aspirin (ECOTRIN) 81 MG EC tablet Take 81 mg by mouth once daily.    calcium citrate-vitamin D3 (CALCITRATE-VITAMIN D) 315 mg-6.25 mcg (250 unit) Tab 1 tablet Orally Twice a day    multivitamin (THERAGRAN) per tablet Take 1 tablet by mouth once daily.    oxyCODONE-acetaminophen (PERCOCET) 7.5-325 mg per tablet Take 1 tablet by mouth 2 (two) times daily as needed.    rosuvastatin (CRESTOR) 10 MG tablet Take 1 tablet (10 mg total) by mouth every evening.    tamsulosin (FLOMAX) 0.4 mg Cap Take 2 capsules (0.8 mg total) by mouth once daily.    EPINEPHrine (EPIPEN) 0.3 mg/0.3 mL AtIn Inject into the muscle once for one dose as needed.    gabapentin (NEURONTIN) 300 MG capsule Take 1 capsule (300 mg total) by mouth 3 (three) times daily.    meloxicam (MOBIC) 15 MG tablet Take 1 tablet (15 mg total) by mouth once daily.    sulfamethoxazole-trimethoprim 800-160mg (BACTRIM DS) 800-160 mg Tab Take 1 tablet by mouth every 12 (twelve) hours.     No current facility-administered medications for this visit.     Facility-Administered Medications Ordered in Other Visits   Medication    ceFAZolin in dextrose (iso-os) 2 gram/100 mL PgBk 2,000 mg    HYDROmorphone injection 1 mg    oxyCODONE-acetaminophen  mg per tablet 1 tablet    polyethylene glycol packet 17 g       Review of patient's allergies indicates:   Allergen Reactions    Peaches [peach (prunus persica)] Anaphylaxis    Peanut Anaphylaxis    Tree pollen-red birch Anaphylaxis       Family History   Problem Relation Name Age of Onset    Hypertension Mother      Diabetes Father      Cancer Father           mesotheliosma     Cataracts Neg Hx      Glaucoma Neg Hx      Macular degeneration Neg Hx      Retinal detachment Neg Hx      Colon cancer Neg Hx      Colon polyps Neg Hx      Crohn's disease Neg Hx      Ulcerative colitis Neg Hx         Social History     Socioeconomic History    Marital status:     Number of children: 4   Occupational History     Employer: [employer]   Tobacco Use    Smoking status: Former     Current packs/day: 0.00     Average packs/day: 0.5 packs/day for 10.0 years (5.0 ttl pk-yrs)     Types: Cigarettes     Start date: 1999     Quit date: 2009     Years since quittin.5    Smokeless tobacco: Never    Tobacco comments:     quit smoking in    Substance and Sexual Activity    Alcohol use: Yes     Comment: rarely    Drug use: Not Currently     Types: Marijuana     Comment: medical---oil, smoke and edibles    Sexual activity: Not Currently     Social Drivers of Health     Financial Resource Strain: Low Risk  (2024)    Overall Financial Resource Strain (CARDIA)     Difficulty of Paying Living Expenses: Not hard at all   Food Insecurity: No Food Insecurity (2024)    Hunger Vital Sign     Worried About Running Out of Food in the Last Year: Never true     Ran Out of Food in the Last Year: Never true   Transportation Needs: No Transportation Needs (2024)    PRAPARE - Transportation     Lack of Transportation (Medical): No     Lack of Transportation (Non-Medical): No   Physical Activity: Sufficiently Active (2024)    Exercise Vital Sign     Days of Exercise per Week: 4 days     Minutes of Exercise per Session: 120 min   Recent Concern: Physical Activity - Insufficiently Active (2023)    Exercise Vital Sign     Days of Exercise per Week: 2 days     Minutes of Exercise per Session: 60 min   Stress: Stress Concern Present (2024)    [employer] - Occupational Stress Questionnaire     Feeling of Stress : To some extent    Housing Stability: High Risk (1/17/2024)    Housing Stability Vital Sign     Unable to Pay for Housing in the Last Year: Yes     Number of Places Lived in the Last Year: 1     Unstable Housing in the Last Year: No       Physical exam:  There were no vitals filed for this visit.  There is no height or weight on file to calculate BMI.  General: In no apparent distress; well developed and well nourished.  HEENT: normocephalic; atraumatic.  Cardiovascular: regular rate.  Respiratory: no increased work of breathing.  Musculoskeletal:   Gait: antalgic  Inspection:  Exam unchanged.  Significant flatfoot deformity with associated hindfoot and ankle valgus.  No significant forefoot abduction.  Hindfoot and ankle are not able to be completely passively corrected.  Unable to perform double or single limb heel rise bilaterally.  Patient localizes pain symmetrically.  Most prominent along the anteromedial ankle joint line and less so along the course of the posterior tibial tendon, left worse than right.  Minimal tenderness along the sinus tarsi.  No peroneal tenderness.  Silfverskiold:  Positive  Alignment:  Knee: neutral               Ankle: increased valgus              Hindfoot: increased valgus              Forefoot: neutral   Strength:              Dorsiflexion 5/5  Plantar flexion 5/5  Inversion 3+/5  Eversion 5/5   Sensation:             Altered but present sensation on monofilament testing bilaterally.  ROM:              Ankle: limited with pain at extremes              Subtalar:  limited with pain at extremes  Pulses: 2+ DP/PT pulses.                   Imaging Studies/Outside documentation:  I have ordered/reviewed/interpreted the following images/outside documentation:  WB 3-views Left ankle:  New x-rays performed today at patient's request.    On my independent review, no acute bony abnormality noted.  Patient is still with severe tibiotalar joint space narrowing.  Significant lateral translation of the calcaneus  consistent with hindfoot valgus.  Questionable widening of the distal tib-fib articulation.  Significant osteophytes diffusely about the tibiotalar joint.  Some degree of posterior translation of the talus relative to the tibial plafond.        Assessment:  Fabiano Jules Jr. is a 55 y.o. male with Bilateral, Left > Right, PCFD; valgus talar tilt and end-stage tibiotalar arthritis; posterior tibial tendinitis; gastroc contracture     Plan:   Clinical and radiographic findings were again discussed.  Operative versus nonoperative treatment options were described.  Options include two-stage double vs triple hindfoot fusion with return to OR for ankle replacement OR pain talar fusion via TTC nail with correction and flatfoot deformity at time procedure.  Patient unable to undergo the above now due to were recurrence.    Bilateral ankle injections performed today without complication.  Also fit for a lace-up ankle brace for extrinsic support.        He may follow up for repeat injections in 3-6 months if needed  Patient voiced understanding.  All questions were answered.    We performed a custom orthotic/brace fitting, adjusting and training with the patient. The patient demonstrated understanding and proper care. This was performed for 15 minutes.    This note was created using voice recognition software and may contain grammatical errors.

## 2025-07-09 ENCOUNTER — PATIENT MESSAGE (OUTPATIENT)
Dept: UROLOGY | Facility: CLINIC | Age: 55
End: 2025-07-09
Payer: MEDICAID

## 2025-07-09 ENCOUNTER — ANESTHESIA EVENT (OUTPATIENT)
Dept: SURGERY | Facility: HOSPITAL | Age: 55
End: 2025-07-09
Payer: MEDICAID

## 2025-07-09 ENCOUNTER — OFFICE VISIT (OUTPATIENT)
Dept: PODIATRY | Facility: CLINIC | Age: 55
End: 2025-07-09
Payer: MEDICAID

## 2025-07-09 VITALS
DIASTOLIC BLOOD PRESSURE: 89 MMHG | WEIGHT: 220 LBS | BODY MASS INDEX: 31.5 KG/M2 | HEART RATE: 58 BPM | SYSTOLIC BLOOD PRESSURE: 158 MMHG | RESPIRATION RATE: 18 BRPM | HEIGHT: 70 IN

## 2025-07-09 DIAGNOSIS — L60.8 ACQUIRED DYSMORPHIC TOENAIL: ICD-10-CM

## 2025-07-09 DIAGNOSIS — L60.0 INGROWN NAIL OF GREAT TOE OF LEFT FOOT: ICD-10-CM

## 2025-07-09 DIAGNOSIS — M21.6X2 PRONATION DEFORMITY OF ANKLE, ACQUIRED, LEFT: Primary | ICD-10-CM

## 2025-07-09 DIAGNOSIS — M21.6X2 PRONATION DEFORMITY OF BOTH FEET: ICD-10-CM

## 2025-07-09 DIAGNOSIS — M79.675 CHRONIC PAIN OF TOE, LEFT: ICD-10-CM

## 2025-07-09 DIAGNOSIS — G89.29 CHRONIC PAIN OF TOE, LEFT: ICD-10-CM

## 2025-07-09 DIAGNOSIS — M21.6X1 PRONATION DEFORMITY OF BOTH FEET: ICD-10-CM

## 2025-07-09 DIAGNOSIS — B35.1 ONYCHOMYCOSIS OF TOENAIL: ICD-10-CM

## 2025-07-09 LAB
OHS CV AF BURDEN PERCENT: < 1
OHS CV DC REMOTE DEVICE TYPE: NORMAL

## 2025-07-09 PROCEDURE — 11750 EXCISION NAIL&NAIL MATRIX: CPT | Mod: TA,PBBFAC | Performed by: PODIATRIST

## 2025-07-09 PROCEDURE — 99999 PR PBB SHADOW E&M-EST. PATIENT-LVL III: CPT | Mod: PBBFAC,,, | Performed by: PODIATRIST

## 2025-07-09 PROCEDURE — 99213 OFFICE O/P EST LOW 20 MIN: CPT | Mod: PBBFAC | Performed by: PODIATRIST

## 2025-07-09 RX ORDER — TERBINAFINE HYDROCHLORIDE 250 MG/1
250 TABLET ORAL DAILY
Qty: 45 TABLET | Refills: 0 | Status: SHIPPED | OUTPATIENT
Start: 2025-07-09 | End: 2025-08-23

## 2025-07-10 ENCOUNTER — HOSPITAL ENCOUNTER (OUTPATIENT)
Facility: HOSPITAL | Age: 55
Discharge: HOME | End: 2025-07-10
Attending: UROLOGY | Admitting: UROLOGY
Payer: MEDICAID

## 2025-07-10 ENCOUNTER — ANESTHESIA (OUTPATIENT)
Dept: SURGERY | Facility: HOSPITAL | Age: 55
End: 2025-07-10
Payer: MEDICAID

## 2025-07-10 DIAGNOSIS — R97.20 ELEVATED PSA: ICD-10-CM

## 2025-07-10 LAB
BILIRUBIN, POC UA: NEGATIVE
BLOOD, POC UA: NEGATIVE
CLARITY, UA: CLEAR
COLOR, UA: ABNORMAL
GLUCOSE, POC UA: NEGATIVE
KETONES, POC UA: NEGATIVE
LEUKOCYTE EST, POC UA: NEGATIVE
NITRITE, POC UA: NEGATIVE
PH UR STRIP: 7 [PH] (ref 5–8)
PROTEIN, POC UA: NEGATIVE
SPECIFIC GRAVITY, POC UA: 1.02 (ref 1–1.03)
UROBILINOGEN, POC UA: 0.2 E.U./DL

## 2025-07-10 PROCEDURE — 36000707: Performed by: UROLOGY

## 2025-07-10 PROCEDURE — 71000033 HC RECOVERY, INTIAL HOUR: Performed by: UROLOGY

## 2025-07-10 PROCEDURE — 27201423 OPTIME MED/SURG SUP & DEVICES STERILE SUPPLY: Performed by: UROLOGY

## 2025-07-10 PROCEDURE — 36000706: Performed by: UROLOGY

## 2025-07-10 PROCEDURE — 55700 PR BIOPSY OF PROSTATE,NEEDLE/PUNCH: CPT | Mod: ,,, | Performed by: UROLOGY

## 2025-07-10 PROCEDURE — 63600175 PHARM REV CODE 636 W HCPCS: Performed by: NURSE ANESTHETIST, CERTIFIED REGISTERED

## 2025-07-10 PROCEDURE — 37000009 HC ANESTHESIA EA ADD 15 MINS: Performed by: UROLOGY

## 2025-07-10 PROCEDURE — 76872 US TRANSRECTAL: CPT | Mod: 26,,, | Performed by: UROLOGY

## 2025-07-10 PROCEDURE — 25000003 PHARM REV CODE 250: Performed by: NURSE ANESTHETIST, CERTIFIED REGISTERED

## 2025-07-10 PROCEDURE — 71000039 HC RECOVERY, EACH ADD'L HOUR: Performed by: UROLOGY

## 2025-07-10 PROCEDURE — 37000008 HC ANESTHESIA 1ST 15 MINUTES: Performed by: UROLOGY

## 2025-07-10 PROCEDURE — 52000 CYSTOURETHROSCOPY: CPT | Mod: XS,,, | Performed by: UROLOGY

## 2025-07-10 PROCEDURE — 63600175 PHARM REV CODE 636 W HCPCS: Performed by: UROLOGY

## 2025-07-10 PROCEDURE — 63600175 PHARM REV CODE 636 W HCPCS: Performed by: ANESTHESIOLOGY

## 2025-07-10 RX ORDER — ONDANSETRON HYDROCHLORIDE 2 MG/ML
4 INJECTION, SOLUTION INTRAVENOUS DAILY PRN
Status: DISCONTINUED | OUTPATIENT
Start: 2025-07-10 | End: 2025-07-10 | Stop reason: HOSPADM

## 2025-07-10 RX ORDER — ACETAMINOPHEN 10 MG/ML
INJECTION, SOLUTION INTRAVENOUS
Status: DISCONTINUED | OUTPATIENT
Start: 2025-07-10 | End: 2025-07-10

## 2025-07-10 RX ORDER — PROPOFOL 10 MG/ML
VIAL (ML) INTRAVENOUS
Status: DISCONTINUED | OUTPATIENT
Start: 2025-07-10 | End: 2025-07-10

## 2025-07-10 RX ORDER — MIDAZOLAM HYDROCHLORIDE 1 MG/ML
INJECTION INTRAMUSCULAR; INTRAVENOUS
Status: DISCONTINUED | OUTPATIENT
Start: 2025-07-10 | End: 2025-07-10

## 2025-07-10 RX ORDER — SODIUM CHLORIDE, SODIUM LACTATE, POTASSIUM CHLORIDE, CALCIUM CHLORIDE 600; 310; 30; 20 MG/100ML; MG/100ML; MG/100ML; MG/100ML
INJECTION, SOLUTION INTRAVENOUS CONTINUOUS
Status: DISCONTINUED | OUTPATIENT
Start: 2025-07-10 | End: 2025-07-10 | Stop reason: HOSPADM

## 2025-07-10 RX ORDER — GENTAMICIN 40 MG/ML
80 INJECTION, SOLUTION INTRAMUSCULAR; INTRAVENOUS ONCE
Status: DISCONTINUED | OUTPATIENT
Start: 2025-07-10 | End: 2025-07-10

## 2025-07-10 RX ORDER — LIDOCAINE HYDROCHLORIDE 10 MG/ML
1 INJECTION, SOLUTION EPIDURAL; INFILTRATION; INTRACAUDAL; PERINEURAL ONCE
Status: COMPLETED | OUTPATIENT
Start: 2025-07-10 | End: 2025-07-10

## 2025-07-10 RX ORDER — GENTAMICIN 40 MG/ML
80 INJECTION, SOLUTION INTRAMUSCULAR; INTRAVENOUS ONCE
Status: COMPLETED | OUTPATIENT
Start: 2025-07-10 | End: 2025-07-10

## 2025-07-10 RX ORDER — FENTANYL CITRATE 50 UG/ML
INJECTION, SOLUTION INTRAMUSCULAR; INTRAVENOUS
Status: DISCONTINUED | OUTPATIENT
Start: 2025-07-10 | End: 2025-07-10

## 2025-07-10 RX ORDER — ONDANSETRON HYDROCHLORIDE 2 MG/ML
INJECTION, SOLUTION INTRAVENOUS
Status: DISCONTINUED | OUTPATIENT
Start: 2025-07-10 | End: 2025-07-10

## 2025-07-10 RX ORDER — OXYCODONE HYDROCHLORIDE 5 MG/1
5 TABLET ORAL
Status: DISCONTINUED | OUTPATIENT
Start: 2025-07-10 | End: 2025-07-10 | Stop reason: HOSPADM

## 2025-07-10 RX ORDER — LIDOCAINE HYDROCHLORIDE 20 MG/ML
INJECTION INTRAVENOUS
Status: DISCONTINUED | OUTPATIENT
Start: 2025-07-10 | End: 2025-07-10

## 2025-07-10 RX ORDER — EPHEDRINE SULFATE 50 MG/ML
INJECTION, SOLUTION INTRAVENOUS
Status: DISCONTINUED | OUTPATIENT
Start: 2025-07-10 | End: 2025-07-10

## 2025-07-10 RX ORDER — SODIUM CHLORIDE 0.9 % (FLUSH) 0.9 %
3 SYRINGE (ML) INJECTION
Status: DISCONTINUED | OUTPATIENT
Start: 2025-07-10 | End: 2025-07-10 | Stop reason: HOSPADM

## 2025-07-10 RX ORDER — DEXAMETHASONE SODIUM PHOSPHATE 4 MG/ML
INJECTION, SOLUTION INTRA-ARTICULAR; INTRALESIONAL; INTRAMUSCULAR; INTRAVENOUS; SOFT TISSUE
Status: DISCONTINUED | OUTPATIENT
Start: 2025-07-10 | End: 2025-07-10

## 2025-07-10 RX ORDER — PHENYLEPHRINE HYDROCHLORIDE 10 MG/ML
INJECTION INTRAVENOUS
Status: DISCONTINUED | OUTPATIENT
Start: 2025-07-10 | End: 2025-07-10

## 2025-07-10 RX ORDER — FENTANYL CITRATE 50 UG/ML
25 INJECTION, SOLUTION INTRAMUSCULAR; INTRAVENOUS EVERY 5 MIN PRN
Status: DISCONTINUED | OUTPATIENT
Start: 2025-07-10 | End: 2025-07-10 | Stop reason: HOSPADM

## 2025-07-10 RX ADMIN — DEXAMETHASONE SODIUM PHOSPHATE 8 MG: 4 INJECTION, SOLUTION INTRAMUSCULAR; INTRAVENOUS at 02:07

## 2025-07-10 RX ADMIN — LIDOCAINE HYDROCHLORIDE 100 MG: 20 INJECTION, SOLUTION INTRAVENOUS at 01:07

## 2025-07-10 RX ADMIN — EPHEDRINE SULFATE 10 MG: 50 INJECTION, SOLUTION INTRAMUSCULAR; INTRAVENOUS; SUBCUTANEOUS at 02:07

## 2025-07-10 RX ADMIN — ONDANSETRON 4 MG: 2 INJECTION, SOLUTION INTRAMUSCULAR; INTRAVENOUS at 01:07

## 2025-07-10 RX ADMIN — EPHEDRINE SULFATE 20 MG: 50 INJECTION, SOLUTION INTRAMUSCULAR; INTRAVENOUS; SUBCUTANEOUS at 02:07

## 2025-07-10 RX ADMIN — LIDOCAINE HYDROCHLORIDE 10 MG: 10 INJECTION, SOLUTION EPIDURAL; INFILTRATION; INTRACAUDAL at 01:07

## 2025-07-10 RX ADMIN — ONDANSETRON 4 MG: 2 INJECTION, SOLUTION INTRAMUSCULAR; INTRAVENOUS at 02:07

## 2025-07-10 RX ADMIN — PHENYLEPHRINE HYDROCHLORIDE 100 MCG: 10 INJECTION, SOLUTION INTRAMUSCULAR; INTRAVENOUS; SUBCUTANEOUS at 01:07

## 2025-07-10 RX ADMIN — SODIUM CHLORIDE, POTASSIUM CHLORIDE, SODIUM LACTATE AND CALCIUM CHLORIDE: 600; 310; 30; 20 INJECTION, SOLUTION INTRAVENOUS at 01:07

## 2025-07-10 RX ADMIN — PHENYLEPHRINE HYDROCHLORIDE 100 MCG: 10 INJECTION, SOLUTION INTRAMUSCULAR; INTRAVENOUS; SUBCUTANEOUS at 02:07

## 2025-07-10 RX ADMIN — GENTAMICIN SULFATE 80 MG: 40 INJECTION, SOLUTION INTRAMUSCULAR; INTRAVENOUS at 01:07

## 2025-07-10 RX ADMIN — GLYCOPYRROLATE 0.2 MG: 0.2 INJECTION, SOLUTION INTRAMUSCULAR; INTRAVITREAL at 01:07

## 2025-07-10 RX ADMIN — FENTANYL CITRATE 50 MCG: 50 INJECTION, SOLUTION INTRAMUSCULAR; INTRAVENOUS at 02:07

## 2025-07-10 RX ADMIN — PROPOFOL 200 MG: 10 INJECTION, EMULSION INTRAVENOUS at 01:07

## 2025-07-10 RX ADMIN — MIDAZOLAM 2 MG: 1 INJECTION INTRAMUSCULAR; INTRAVENOUS at 01:07

## 2025-07-10 RX ADMIN — ACETAMINOPHEN 1000 MG: 10 INJECTION INTRAVENOUS at 02:07

## 2025-07-10 RX ADMIN — FENTANYL CITRATE 50 MCG: 50 INJECTION, SOLUTION INTRAMUSCULAR; INTRAVENOUS at 01:07

## 2025-07-10 NOTE — DISCHARGE SUMMARY
[facility] - Periop Services  Urology  Discharge Note - Short Stay      Patient Name: Fabiano Jules Jr.  MRN: [id number]  Discharge Date and Time: 07/10/2025 2:21 PM  Attending Physician: Mandy Castorena.*   Discharging Provider: Mandy Castorena MD  Primary Care Physician: Howie Mathias MD    There are no hospital problems to display for this patient.      Final Diagnoses: Same as principal problem.    Hospital Course: Patient was admitted for an outpatient procedure and tolerated the procedure well with no complications.*    Procedure(s) (LRB):  BIOPSY, PROSTATE, RECTAL APPROACH, WITH US GUIDANCE with anesthesia (N/A)  CYSTOSCOPY (N/A)     Indwelling Lines/Drains at time of discharge:   Lines/Drains/Airways       Airway  Duration                  Airway - Non-Surgical 07/10/25 1358 LMA <1 day                    Discharged Condition: good    Disposition: home    Follow Up:      Patient Instructions:   No discharge procedures on file.    Medications:  Reconciled Home Medications:      Medication List        CONTINUE taking these medications      amLODIPine 5 MG tablet  Commonly known as: NORVASC  Take 1 tablet (5 mg total) by mouth once daily.     aspirin 81 MG EC tablet  Commonly known as: ECOTRIN  Take 81 mg by mouth once daily.     calcium citrate-vitamin D3 315 mg-6.25 mcg (250 unit) Tab  Commonly known as: CALCITRATE-VITAMIN D  1 tablet Orally Twice a day     gabapentin 300 MG capsule  Commonly known as: NEURONTIN  Take 1 capsule (300 mg total) by mouth 3 (three) times daily.     meloxicam 15 MG tablet  Commonly known as: MOBIC  Take 1 tablet (15 mg total) by mouth once daily.     multivitamin per tablet  Commonly known as: THERAGRAN  Take 1 tablet by mouth once daily.     oxyCODONE-acetaminophen 7.5-325 mg per tablet  Commonly known as: PERCOCET  Take 1 tablet by mouth 2 (two) times daily as needed.     rosuvastatin 10 MG tablet  Commonly known as: CRESTOR  Take 1 tablet (10 mg  total) by mouth every evening.     sulfamethoxazole-trimethoprim 800-160mg 800-160 mg Tab  Commonly known as: BACTRIM DS  Take 1 tablet by mouth every 12 (twelve) hours.     tamsulosin 0.4 mg Cap  Commonly known as: FLOMAX  Take 2 capsules (0.8 mg total) by mouth once daily.     terbinafine  mg tablet  Commonly known as: LAMISIL  Take 1 tablet (250 mg total) by mouth once daily.            ASK your doctor about these medications      EPINEPHrine 0.3 mg/0.3 mL Atin  Commonly known as: EPIPEN  Inject into the muscle once for one dose as needed.              Discharge Procedure Orders (must include Diet, Follow-up, Activity):  No discharge procedures on file.       Plan:  Finish antibiotics  Return in 2 weeks to discuss results   Counseled when to return to ER      Mandy Castorena MD  Urology  [facility] Services

## 2025-07-10 NOTE — OP NOTE
U[facility] Procedure Note - [facility]  Date: 07/10/2025    Procedure:   Transrectal prostate ultrasound, ultrasound guided prostate biopsy  Cystoscopy    Pre Procedure Diagnosis:  1. Elevated PSA        Post Procedure Diagnosis: same, see below for findings    Surgeon: Mandy Castorena MD    Indications: Fabiano Jules Jr. is a 55 y.o. male with BPH. Current PSA is 5.7. H&P. Urine from today reviewed and confirmed to have no infection.  reviewed. It was confirmed the pt took his antibiotics and did his fleet enemas as directed. The risks and benefits of both procedures were explained and consent was obtained.     Date: 7/10/25, Procedure:  TRUS BIOPSY AND  Flexible cystourethroscopy  Cytoscopic Specimen: none  Prostate specimens:   Total number of cores taken: 18  Right base lateral- 1  Right base medial - 1  Right mid lateral - 1  Right mid medial - 1  Right apex lateral - 1  Right apex medial - 1  Right Transition Zone - 2  Left base lateral - 1  Left base medial - 1  Left mid lateral - 1  Left mid medial - 1  Left apex lateral - 2  Left apex medial - 3  Left Transition Zone- 1        Cystoscopy was performed   2% lidocaine urojet was used for local analgesia in the urethra.  The genitalia was prepped and draped in the sterile fashion with betadine.    The flexible scope was advanced into the urethra and into the bladder.  Bilateral ureteral orifice were evaluated and noted to be normal with clear efflux.  The bladder was completely surveyed in a systematic fashion and the cytoscope was retroflexed.  Cystoscopy findings as listed below.       Transrectal ultrasound and prostate biopsy was performed   It had been confirmed that he had taken his pre-op antibiotics and fleets enemas as directed. Also it was noted whether or not he was on anti-coagulation and if he had stopped. 2% urojet was placed into the rectum by the nurses and allowed to sit for 10 minutes.  A transrectal ultrasound probe was introduced  and the triangle between the seminal vescical and the base of the prostate was identified. 5cc of 2%lidocaine was used to perform a jailene-prostatic block on the right and then another 5cc was placed in the same location on the left side.  Prostate biopsy was then performed. See above for prostate specimen count. The probe was then removed and it was confirmed he had no significant bleeding.     The patient tolerated the procedures well without complication.    Findings 7/10/25:   Cystoscopic findings:moderate bph with circ intravesical median lobe  Prostate Ultrasound findings:   Seminal vesicles/Ejaculatory Ducts: Normal  Outline/Symmetry of Prostate: WNL  Central Gland/Transition Zone: Well demarcated  Peripheral Zone: WNL    Prostate Measurements:   Height:  44.89 mm  Width:  57.73 mm  Length:  55.74 mm  TRUS Volume: 75.5 cm3  Intravesical protrusion:   PSAD:                             Assessment: Fabiano Jules Jr. is a 55 y.o. male with   1. Elevated PSA          Plan:  Finish antibiotics  Return in 2 weeks to discuss results   Counseled when to return to ER        Mandy Castorena MD

## 2025-07-10 NOTE — DISCHARGE INSTRUCTIONS
What can I expect after a prostate biopsy?  After the biopsy it is normal to experience the following sensations or   symptoms:  ? Burning with urination - It is normal to feel burning with urination for the first 24 hours after the biopsy. It may continue for up to three days.  ? Frequent urination - This will gradually improve over the first 24 to 36  hours.  ? Blood in the urine - It is normal to have slightly red tinged urine or urine that resembles a raymundo or red wine color. This may last from 12 hours to 3 weeks after the biopsy.   ? Blood in stool - You may notice red stains on the toilet tissue or see some bloody streaks in your stool. This may last for up to 5 days.  ? Blood in the semen - this may persist for up to 6 weeks after your biopsy.    How should I care for myself after the biopsy?   ? Drink plenty of fluids to prevent blood clots and infection in the bladder  ? Avoid strenuous exercise such as jogging, heavy lifting, golfing, and bike riding for at least 7 days.  ? Take your antibiotics as directed and complete the full dose given.   ? Do not drink alcoholic beverages until after completing your antibiotics.  ? Do not take aspirin and anti-inflammatory products such as Celebrex for 1 (one) week following prostate biopsy.  ? Avoid sexual activity for 7 days. Department of Urology    When should I call my doctor?  Call the clinic if you have any of the following  signs and symptoms. These could indicate an infection.  ? Persistent urinary frequency or burning.   ? Fever of 101 degrees or greater - call clinic and head to ER for admission for IV antibiotics  ? Urine that is cherry-red or has clots in it.   ? Rectal bleeding with clots or pure bloody stools.  ? Persistent bleeding lasting longer than 7 days.    When should I go to ER?  FEVER, WEAKNESS within 1 to 2 weeks after procedure- Sepsis is a serious risk of this procedure and you should go to ER for admission for IV antibiotics. Call the  office and/or on call urologist/nurse so that the treating urologist is aware of admission. You should not be discharged from ER with antibiotics if you have fever, you will need IV antibiotics.

## 2025-07-10 NOTE — H&P
[facility], formerly known as [facility]  Group MD's:Raffaele/Harshal/Marcial  Group NP's: Bebe Kaur Karma Cassidy  Office #: [phone]    PCP: Howie Mathias MD  Date of Service: 07/10/2025  Today's note written by: MD Raffaele      Subjective:        HPI: Fabiano Jules Jr. is a 55 y.o. male     Initial consult by me in clinic on 2/13/25:  Mr. Jules reports urinary symptoms for approximately the past 12-18 months, including straining to urinate, difficulty emptying his bladder, and frequent urination with small volumes. He also mentions occasional urinary incontinence, with leakage occurring about 50% of the time en route to the bathroom, consisting of drops rather than large volumes. Complete urinary incontinence occurs approximately once monthly for the last 6 months, necessitating a change of underwear.  These symptoms have gradually worsened over the past year, with the straining to urinate beginning around 12 months ago. He denies dysuria. Flomax has been prescribed, which he reports has improved his symptoms. He takes one Flomax tablet nightly.  Urinary frequency is described as every 2-3 hours, with a maximum of 3 hours between urinations. Daily fluid intake is 8-10 glasses of water, a lifelong habit. Recently, he has reduced his coffee intake from 3 cups daily to one cup every other day, which he states has significantly improved his symptoms, although he still has frequent urination.  He had back surgery (lumbar fusion of L4 and L5) on April 5, 2020. When asked if his overactive bladder symptoms worsened after the surgery, he indicated that they may have, but he had not considered it for an extended period.  He reports waking up during the night to drink water, but not always to urinate. He denies urinary incontinence with coughing or sneezing, history of urinary tract infections, or stress incontinence. He also denies a family history of prostate cancer,  renal cancer, bladder cancer, or kidney stones.  Bladder scan PVR today was 0mL.    AUA ssx today:(4 incomplete emptying, 5 frequency, 4 intermittency, 5 urgency, 3 weak stream, 3 straining, 2 sleeping). 25. QOL: TERRIBLE      Interval history by ME/ - CLINIC virtual visit (audio only) on 2/27/25:  Mr. Jules has a history of enlarged prostate. Two weeks ago, he had an elevated PSA of 4.3, and Dr. Castorena felt a soft nodule on exam. A repeat PSA test showed a decrease to 3.5. plan was for prostate mri but n/a with anesthesia is June and pt cannot do without anesthesia.  After increasing his Flomax dosage to 2 pills at night, as previously recommended, he had some initial improvement in his symptoms, specifically less straining during urination. This improvement has since plateaued. A urine test was performed, which came back completely negative.  Visit today is to discuss psa levels and decide on mri now vs later bc of nodule.       Interval history by ME/ in CLINIC (In-person) on 5/13/25:  Mr. Jules has a history of elevated PSA and a prostate nodule. His most recent PSA test showed an increase from 3.7 to 4.9 over a 3-month period. He takes tamsulosin for urinary symptoms, which is effective most of the time but occasionally ineffective. He expresses a desire for long-term healing and inquires about potential treatments to decrease prostate size. He reports a history of stroke, though the exact timing and location are unknown. He is currently taking aspirin due to this history.  He denies taking Flomax or any anticoagulants other than aspirin.    My notes:  Last total psa was 3.7, %free 17.57 2/14/25  Repeated psa 3 months later 5/8/25 and up to 4.91, %16.29  On no blood thinners except asa 81mg.  Has prostate mri scheduled in June with anesthesia.  Still taking flomax 2 a night-   AUA ssx:(2 incomplete emptying, 3 frequency, 1 intermittency, 5 urgency, 2 weak stream, 2 straining, 1  sleeping). QOL: mostly satisfied  Go ahead and schedule mri prostate with anesthesia at main campus (beginning of nai is first available). Pt cannot do without anesthesia    Interval history/H&P Update by me/ prior to trus bx and cysto on 7/10/25:  He had an mri prostate on 6/17/25. Volume 80.61g. pirads 2   Repeat psa 6/20/25 higher at 5.7, %free 18  He has been taking asa 81mg  Ua today neg    PSA History: no fam hx of prostate cancer  6/20/25 5.7, %free 18.25  6/17/25 Mri pros: 80.61g,  pirads 2   5/8/25  4.91, %16.29  2/14/25 Total psa 3.7, %free 17.57  2/14/25 3.5  2/13/25 ALYCIA: 35g, 1/2 cm nodule round soft nontender left apex.       Urine history: family history of kidney, bladder or prostate cancer:No, personal or family history of kidney stones: No,tobacco use: 1 ppd x 10 yrs, quit around 2000, anticoagulation: Yes - asa 81mg  2/14/25 neg          REVIEW OF SYSTEMS:  Negative except for as stated above    Past Medical History:   Diagnosis Date    Arthritis     Atrial fibrillation     suspected-has loop recorder    Back injury     Colon polyp     COVID     CPAP (continuous positive airway pressure) dependence     NOT NEEDED AFTER WEIGHT LOSS SURGERY    General anesthetics causing adverse effect in therapeutic use     WOKE UP CONFUSED X 1    Hypertension     PONV (postoperative nausea and vomiting)     Poor memory     states per pt    Sleep apnea     c pap machine used  - NOT NEEDED AFTER GASTRIC SLEEVE    TIA     3/2025    Wears glasses        Past Surgical History:   Procedure Laterality Date    ARTHROSCOPIC DEBRIDEMENT OF SHOULDER Right 09/06/2024    Procedure: EXTENSIVE DEBRIDEMENT, SHOULDER, ARTHROSCOPIC;  Surgeon: Darrion Ricks MD;  Location: [facility] OR;  Service: Orthopedics;  Laterality: Right;    ARTHROSCOPIC REPAIR OF ROTATOR CUFF OF SHOULDER Right 09/06/2024    Procedure: REPAIR, ROTATOR CUFF, ARTHROSCOPIC;  Surgeon: Darrion Ricks MD;  Location: [facility];  Service:  Orthopedics;  Laterality: Right;  arthrex-cuffmend/SCR stuff  Hood w/Arthrex notified 8/30/24 ark    ARTHROSCOPY OF SHOULDER WITH DECOMPRESSION OF SUBACROMIAL SPACE Right 09/06/2024    Procedure: ARTHROSCOPY, SHOULDER, WITH SUBACROMIAL SPACE DECOMPRESSION;  Surgeon: Darrion Ricks MD;  Location: [facility] OR;  Service: Orthopedics;  Laterality: Right;    ARTHROSCOPY,SHOULDER,WITH BICEPS TENODESIS Right 09/06/2024    Procedure: ARTHROSCOPY,SHOULDER,WITH BICEPS TENODESIS;  Surgeon: Darrion Ricks MD;  Location: [facility] OR;  Service: Orthopedics;  Laterality: Right;    BONE GRAFT N/A 04/05/2022    Procedure: BONE GRAFT;  Surgeon: Duran Adler Jr., MD;  [facility];  Service: Orthopedics;  Laterality: N/A;    CIRCUMCISION      COLONOSCOPY N/A 12/03/2018    Procedure: COLONOSCOPY;  Surgeon: CHRISSY Reyna MD;  Location: [facility] ([address] [place] [address]);  Service: Endoscopy;  Laterality: N/A;    COLONOSCOPY N/A 09/01/2022    Procedure: COLONOSCOPY;  Surgeon: Alondar Arias MD;  Location: [facility];  Service: Endoscopy;  Laterality: N/A;    epidural steroid injection X 2      lower lumbar    ESOPHAGOGASTRODUODENOSCOPY N/A 09/01/2022    Procedure: EGD (ESOPHAGOGASTRODUODENOSCOPY);  Surgeon: Alondra Arias MD;  Location: [facility];  Service: Endoscopy;  Laterality: N/A;    facet injection       [facility] [place]     INSERTION OF IMPLANTABLE LOOP RECORDER N/A 09/15/2023    Procedure: Insertion, Implantable Loop Recorder;  Surgeon: Romario Wallace MD;  Location: [facility] CATH[facility]EP LAB;  Service: Cardiology;  Laterality: N/A;    INTRALUMINAL GASTROINTESTINAL TRACT IMAGING VIA CAPSULE N/A 10/03/2022    Procedure: IMAGING PROCEDURE, GI TRACT, INTRALUMINAL, VIA CAPSULE;  Surgeon: Alondra Arias MD;  Location: [facility] ENDO;  Service: Endoscopy;  Laterality: N/A;    LAPAROSCOPIC SLEEVE GASTRECTOMY N/A 12/19/2022    Procedure: GASTRECTOMY, SLEEVE, LAPAROSCOPIC;  Surgeon: Lashonad  SAMM Pinedo MD;  Location: [facility] OR;  Service: General;  Laterality: N/A;    LIPOMA RESECTION      Back of neck    MAGNETIC RESONANCE IMAGING N/A 05/29/2020    Procedure: MRI (MAGNETIC RESONANCE IMAGING) LUMBAR SPINE;  Surgeon: [facility] Diagnostic Provider;  Location: [facility];  Service: General;  Laterality: N/A;  COVID NEG    MAGNETIC RESONANCE IMAGING N/A 12/03/2021    Procedure: MRI (MAGNETIC RESONANCE IMAGING), LUMBAR SPINE;  Surgeon: [facility] Diagnostic Provider;  Location: [facility];  Service: General;  Laterality: N/A;  In MRI @ 7:50 per Ariela    MAGNETIC RESONANCE IMAGING N/A 03/06/2024    Procedure: MRI (Magnetic Resonance Imagine);  Surgeon: Vicenta Michele;  Location: [facility];  Service: Anesthesiology;  Laterality: N/A;    MAGNETIC RESONANCE IMAGING Bilateral 06/28/2024    Procedure: MRI (Magnetic Resonance Imagine);  Surgeon: Vicenta Michele;  Location: [facility];  Service: Anesthesiology;  Laterality: Bilateral;  MRI BRAIN WITH AND WITHOUT CONTRAST    MAGNETIC RESONANCE IMAGING N/A 06/17/2025    Procedure: MRI (Magnetic Resonance Imagine);  Surgeon: Vicenta Michele;  Location: [facility];  Service: Anesthesiology;  Laterality: N/A;    MEDIAL COLLATERAL LIGAMENT AND LATERAL COLLATERAL LIGAMENT REPAIR, KNEE Right 09/01/2018    Dr. Christophe Gore in [place]     MINIMALLY INVASIVE TRANSFORAMINAL LUMBAR INTERBODY FUSION (TLIF) N/A 04/05/2022    Procedure: FUSION, SPINE, LUMBAR, TLIF, MINIMALLY INVASIVE, WITH INSTRUMENTATION,  L4/5 RIGHT;  Surgeon: Duran Adler Jr., MD;  Location: [facility] OR;  Service: Orthopedics;  Laterality: N/A;  10:30am per Tamela    MYELOGRAPHY N/A 06/23/2020    Procedure: MYELOGRAM;  Surgeon: [facility] Diagnostic Provider;  Location: [facility] OR;  Service: General;  Laterality: N/A;         Objective:     Vitals:    07/10/25 1311   BP: 124/84   Pulse: (!) 57   Resp: 17   Temp: 97.9 °F (36.6 °C)           General:wdwn  in NAD  Neurologic: CN grossly normal. Normal sensation.   Psychiatric: awake, alert and  oriented x 3. Mood and affect Normal. Cooperative.  Eyes: PERRLA, normal conjunctiva  Respiratory: no increased work on breathing. No wheezing.   Cardiovascular: No obvious extremity edema. Warm and well perfused.  GI: no obvious stomach distension  Musculoskeletal: normal  range of motion of bilateral upper extremities. Normal muscle strength and tone.  Skin: no obvious rashes or lesions. No tightening of skin noted.    Pertinent  exam in HPI    Recent Labs   Lab 01/20/24  0717 08/30/24  0945 12/05/24  0817   WBC 4.45 3.14 L 3.78 L   Hemoglobin 12.5 L 12.3 L 12.9 L   Hematocrit 39.1 L 38.0 L 40.9   Platelets 297 277 311   ]  Recent Labs   Lab 12/20/22  0418 04/06/23  1203 04/06/23  2107 04/07/23  0605 07/07/23  0719 10/20/23  0945 01/20/24  0717 08/30/24  0945 12/05/24  0817 05/12/25  1421 07/01/25  1012   Sodium 133 L   < > 137 139   < > 139  139 142 140 141  --  139   Potassium 3.3 L   < > 3.1 L 3.5   < > 4.5  4.5 4.2 3.9 4.3  --  4.3   Chloride 96   < > 101 104   < > 102  102 105 106 106  --  103   CO2 27   < > 25 29   < > 28  28 29 25 29  --  29   BUN 8   < > 14 11   < > 14  14 14 15 11  --  13   Creatinine 0.6   < > 1.1 0.8   < > 0.8  0.8 0.7 0.8 0.7 0.8 0.7   Glucose 102   < > 138 H 99   < > 83  83 93 87 93  --  88   Calcium 9.2   < > 9.2 8.8   < > 9.9  9.9 9.5 9.6 9.8  --  9.8   Magnesium 1.9  --  2.0 2.1  --   --   --   --   --   --   --    Phosphorus 3.5  --   --   --   --   --   --   --   --   --   --    Alkaline Phosphatase  --    < > 58 53 L   < > 48 L 53 L  --  61  --   --    Total Protein  --    < > 7.8 6.8   < > 7.0 7.3  --  7.4  --   --    Albumin  --    < > 4.4 3.8   < > 4.0 4.1  --  4.1  --   --    Total Bilirubin  --    < > 0.7 0.9   < > 0.8 0.9  --  0.6  --   --    AST  --    < > 27 18   < > 17 29  --  18  --   --    ALT  --    < > 16 14   < > 9 L 32  --  9 L  --   --     < > = values in this interval not displayed.   ]    Lab Results   Component Value Date    HGBA1C 5.2 10/20/2023            Assessment:     Fabiano Jules Jr. is a 55 y.o. male with     1. Elevated PSA            Plan:       's typed/written- Abbreviated/Short Plan:  Psa 4.93, flex with left nodule apex, mri 80g pirads 2 on flomax 0.8mg. Repeat psa 6/20/25 higher at 5.7, %free 18  Scheduled for cysto trus bx today  Has fu on 7/21/25 to discuss results    The patient is here today for a transrectal ultrasound and prostate biopsy  The risks and benefits of the procedure were discussed with the patient in detail.  The risks include but are not limited to bleeding, infection (sepsis), pain and need for further procedures.                        Mandy Castorena MD

## 2025-07-10 NOTE — TRANSFER OF CARE
"Anesthesia Transfer of Care Note    Patient: Fabiano Jules Jr.    Procedure(s) Performed: Procedure(s) (LRB):  BIOPSY, PROSTATE, RECTAL APPROACH, WITH US GUIDANCE with anesthesia (N/A)  CYSTOSCOPY (N/A)    Patient location: PACU    Anesthesia Type: general    Transport from OR: Transported from OR on room air with adequate spontaneous ventilation    Post pain: adequate analgesia    Post assessment: no apparent anesthetic complications and tolerated procedure well    Post vital signs: stable    Level of consciousness: sedated    Nausea/Vomiting: no nausea/vomiting    Complications: none    Transfer of care protocol was followed    Last vitals: Visit Vitals  /84 (BP Location: Right arm, Patient Position: Lying)   Pulse 84   Temp 36.6 °C (97.9 °F) (Skin)   Resp 18   Ht 5' 10" (1.778 m)   Wt 101.7 kg (224 lb 3.3 oz)   SpO2 100%   BMI 32.17 kg/m²     "

## 2025-07-10 NOTE — ANESTHESIA PREPROCEDURE EVALUATION
07/10/2025  Fabiano Jules Jr. is a 55 y.o., male.      Pre-op Assessment    I have reviewed the Patient Summary Reports.     I have reviewed the Nursing Notes. I have reviewed the NPO Status.   I have reviewed the Medications.     Review of Systems  Anesthesia Hx:  No problems with previous Anesthesia  PONV                Social:  Former Smoker       Hematology/Oncology:       -- Anemia:                                  Cardiovascular:     Hypertension, well controlled    Dysrhythmias atrial fibrillation                                     Pulmonary:        Sleep Apnea                Renal/:  Renal/ Normal                 Musculoskeletal:  Arthritis          Spine Disorders: lumbar            Neurological:   CVA, no residual symptoms Neuromuscular Disease,             Peripheral Neuropathy                          Endocrine:  Endocrine Normal          Obesity / BMI > 30  Psych:  Psychiatric History                Physical Exam  General: Well nourished, Cooperative, Alert and Oriented    Airway:  Mallampati: II   Mouth Opening: Normal  TM Distance: Normal  Neck ROM: Normal ROM    Anesthesia Plan  Type of Anesthesia, risks & benefits discussed:    Anesthesia Type: Gen ETT, Gen Supraglottic Airway, Gen Natural Airway, MAC  Intra-op Monitoring Plan: Standard ASA Monitors  Post Op Pain Control Plan: multimodal analgesia  Induction:  IV  Airway Plan: Direct, Video and Fiberoptic, Post-Induction  Informed Consent: Informed consent signed with the Patient and all parties understand the risks and agree with anesthesia plan.  All questions answered.   ASA Score: 3    Ready For Surgery From Anesthesia Perspective.   .

## 2025-07-10 NOTE — ANESTHESIA PROCEDURE NOTES
Intubation    Date/Time: 7/10/2025 1:58 PM    Performed by: Iftikhar Doss CRNA  Authorized by: Iftikhar Doss CRNA    Intubation:     Induction:  Intravenous    Intubated:  Postinduction    Mask Ventilation:  Easy mask    Attempts:  1    Attempted By:  CRNA    Difficult Airway Encountered?: No      Complications:  None    Airway Device:  Supraglottic airway/LMA    Airway Device Size:  4.0    Style/Cuff Inflation:  Cuffed (inflated to minimal occlusive pressure)    Placement Verified By:  Capnometry    Complicating Factors:  None    Findings Post-Intubation:  BS equal bilateral

## 2025-07-10 NOTE — ANESTHESIA POSTPROCEDURE EVALUATION
Anesthesia Post Evaluation    Patient: Fabiano Jules Jr.    Procedure(s) Performed: Procedure(s) (LRB):  BIOPSY, PROSTATE, RECTAL APPROACH, WITH US GUIDANCE with anesthesia (N/A)  CYSTOSCOPY (N/A)    Final Anesthesia Type: general      Patient location during evaluation: PACU  Patient participation: Yes- Able to Participate  Level of consciousness: awake and alert and oriented  Post-procedure vital signs: reviewed and stable  Pain management: adequate  Airway patency: patent    PONV status at discharge: No PONV  Anesthetic complications: no      Cardiovascular status: blood pressure returned to baseline and stable  Respiratory status: unassisted and spontaneous ventilation  Hydration status: euvolemic  Follow-up not needed.              Vitals Value Taken Time   /72 07/10/25 15:17   Temp 36.5 °C (97.7 °F) 07/10/25 14:25   Pulse 65 07/10/25 15:20   Resp 18 07/10/25 15:20   SpO2 99 % 07/10/25 15:20         Event Time   Out of Recovery 15:26:00         Pain/Arian Score: Arian Score: 10 (7/10/2025  2:51 PM)

## 2025-07-10 NOTE — PLAN OF CARE
Patient is going home with his son. Their ride was contacted. His upcoming appointment was reviewed.

## 2025-07-12 ENCOUNTER — HOSPITAL ENCOUNTER (OUTPATIENT)
Dept: CARDIOLOGY | Facility: CLINIC | Age: 55
Discharge: HOME | End: 2025-07-12
Attending: GENERAL PRACTICE
Payer: MEDICAID

## 2025-07-12 ENCOUNTER — CLINICAL SUPPORT (OUTPATIENT)
Dept: CARDIOLOGY | Facility: CLINIC | Age: 55
End: 2025-07-12
Payer: MEDICAID

## 2025-07-12 DIAGNOSIS — I49.8 OTHER SPECIFIED CARDIAC ARRHYTHMIAS: ICD-10-CM

## 2025-07-12 PROCEDURE — 93298 REM INTERROG DEV EVAL SCRMS: CPT | Mod: PN | Performed by: INTERNAL MEDICINE

## 2025-07-14 ENCOUNTER — PATIENT MESSAGE (OUTPATIENT)
Dept: UROLOGY | Facility: CLINIC | Age: 55
End: 2025-07-14
Payer: MEDICAID

## 2025-07-14 ENCOUNTER — PATIENT MESSAGE (OUTPATIENT)
Dept: FAMILY MEDICINE | Facility: CLINIC | Age: 55
End: 2025-07-14
Payer: MEDICAID

## 2025-07-14 VITALS
HEART RATE: 65 BPM | OXYGEN SATURATION: 99 % | SYSTOLIC BLOOD PRESSURE: 118 MMHG | DIASTOLIC BLOOD PRESSURE: 72 MMHG | RESPIRATION RATE: 18 BRPM | TEMPERATURE: 98 F | BODY MASS INDEX: 32.1 KG/M2 | HEIGHT: 70 IN | WEIGHT: 224.19 LBS

## 2025-07-21 ENCOUNTER — PATIENT MESSAGE (OUTPATIENT)
Dept: UROLOGY | Facility: CLINIC | Age: 55
End: 2025-07-21

## 2025-07-21 ENCOUNTER — OFFICE VISIT (OUTPATIENT)
Dept: UROLOGY | Facility: CLINIC | Age: 55
End: 2025-07-21
Payer: MEDICAID

## 2025-07-21 DIAGNOSIS — R97.20 ELEVATED PSA: ICD-10-CM

## 2025-07-21 DIAGNOSIS — N13.8 BPH WITH OBSTRUCTION/LOWER URINARY TRACT SYMPTOMS: Primary | ICD-10-CM

## 2025-07-21 DIAGNOSIS — N40.1 BPH WITH OBSTRUCTION/LOWER URINARY TRACT SYMPTOMS: Primary | ICD-10-CM

## 2025-07-21 PROCEDURE — 1159F MED LIST DOCD IN RCRD: CPT | Mod: CPTII,93,, | Performed by: UROLOGY

## 2025-07-21 PROCEDURE — 98014 SYNCH AUDIO-ONLY EST MOD 30: CPT | Mod: 93,,, | Performed by: UROLOGY

## 2025-07-21 PROCEDURE — 1160F RVW MEDS BY RX/DR IN RCRD: CPT | Mod: CPTII,93,, | Performed by: UROLOGY

## 2025-07-21 RX ORDER — FINASTERIDE 5 MG/1
5 TABLET, FILM COATED ORAL EVERY MORNING
Qty: 90 TABLET | Refills: 3 | Status: SHIPPED | OUTPATIENT
Start: 2025-07-21 | End: 2026-07-21
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
## 2025-07-21 NOTE — Clinical Note
Fu in 6 months in person with aua ssx and pvr and psa free and total prior (on finasteride)  Schedule for uroflow and pvr - any day   can do another telephone visit with any other questions (schedule a fu visit on Monday afternoon in early oct)  Fu in 6 months in person with aua ssx and pvr and psa free and total prior (on finasteride)

## 2025-07-25 ENCOUNTER — PATIENT MESSAGE (OUTPATIENT)
Dept: UROLOGY | Facility: CLINIC | Age: 55
End: 2025-07-25
Payer: MEDICAID

## 2025-07-27 NOTE — PROGRESS NOTES
Subjective:       Patient ID: Fabiano Jules Jr. is a 55 y.o. male.    Chief Complaint: Follow-up and Foot Pain  Patient presents for follow-up pronation deformity/pes planus bilateral, follow-up chronic ingrown nail left great toe  Patient confirms wearing good supportive tennis shoes at all times indoors and out, would like to pursue power step arch supports today  Since last visit he went on a trip to [place], did a lot of walking and flat feet did fairly well  Relates his ingrown nail bothered him the whole time  Patient inquires if there is a treatment for nail problem/fungus in all of his nails  He would like to pursue permanent removal of ingrown nail of the left great toe which we discussed on last visit.  Pain level in this toe has been a 10/10.  Denies redness swelling or drainage      Past Medical History:   Diagnosis Date    Arthritis     Atrial fibrillation     suspected-has loop recorder    Back injury     Colon polyp     COVID     CPAP (continuous positive airway pressure) dependence     NOT NEEDED AFTER WEIGHT LOSS SURGERY    General anesthetics causing adverse effect in therapeutic use     WOKE UP CONFUSED X 1    Hypertension     PONV (postoperative nausea and vomiting)     Poor memory     states per pt    Sleep apnea     c pap machine used  - NOT NEEDED AFTER GASTRIC SLEEVE    TIA     3/2025    Wears glasses      Past Surgical History:   Procedure Laterality Date    ARTHROSCOPIC DEBRIDEMENT OF SHOULDER Right 09/06/2024    Procedure: EXTENSIVE DEBRIDEMENT, SHOULDER, ARTHROSCOPIC;  Surgeon: Darrion Ricks MD;  Location: [facility] OR;  Service: Orthopedics;  Laterality: Right;    ARTHROSCOPIC REPAIR OF ROTATOR CUFF OF SHOULDER Right 09/06/2024    Procedure: REPAIR, ROTATOR CUFF, ARTHROSCOPIC;  Surgeon: Darrion Ricks MD;  Location: [facility] OR;  Service: Orthopedics;  Laterality: Right;  arthrex-cuffmenjinny/LEANNA cespedes/Arthsophie notified 8/30/24 ark    ARTHROSCOPY OF SHOULDER WITH DECOMPRESSION OF  SUBACROMIAL SPACE Right 09/06/2024    Procedure: ARTHROSCOPY, SHOULDER, WITH SUBACROMIAL SPACE DECOMPRESSION;  Surgeon: Darrion Ricks MD;  Location: [facility] OR;  Service: Orthopedics;  Laterality: Right;    ARTHROSCOPY,SHOULDER,WITH BICEPS TENODESIS Right 09/06/2024    Procedure: ARTHROSCOPY,SHOULDER,WITH BICEPS TENODESIS;  Surgeon: Darrion Ricks MD;  Location: [facility] OR;  Service: Orthopedics;  Laterality: Right;    BONE GRAFT N/A 04/05/2022    Procedure: BONE GRAFT;  Surgeon: Duran Adler Jr., MD;  Location: [facility] OR;  Service: Orthopedics;  Laterality: N/A;    CIRCUMCISION      COLONOSCOPY N/A 12/03/2018    Procedure: COLONOSCOPY;  Surgeon: CHRISSY Reyna MD;  [facility]  Service: Endoscopy;  Laterality: N/A;    COLONOSCOPY N/A 09/01/2022    Procedure: COLONOSCOPY;  Surgeon: Alondra Arias MD;  Location: [facility];  Service: Endoscopy;  Laterality: N/A;    CYSTOSCOPY N/A 7/10/2025    Procedure: CYSTOSCOPY;  Surgeon: Mandy Castorena MD;  Location: [facility] OR;  Service: Urology;  Laterality: N/A;    epidural steroid injection X 2      lower lumbar    ESOPHAGOGASTRODUODENOSCOPY N/A 09/01/2022    Procedure: EGD (ESOPHAGOGASTRODUODENOSCOPY);  Surgeon: Alondra Arias MD;  Location: [facility] ENDO;  Service: Endoscopy;  Laterality: N/A;    facet injection       Dr Manpreet [facility] [place]     INSERTION OF IMPLANTABLE LOOP RECORDER N/A 09/15/2023    Procedure: Insertion, Implantable Loop Recorder;  Surgeon: Romario Wallace MD;  Location: [facility] CATH/EP LAB;  Service: Cardiology;  Laterality: N/A;    INTRALUMINAL GASTROINTESTINAL TRACT IMAGING VIA CAPSULE N/A 10/03/2022    Procedure: IMAGING PROCEDURE, GI TRACT, INTRALUMINAL, VIA CAPSULE;  Surgeon: Alondra Arias MD;  Location: [facility] ENDO;  Service: Endoscopy;  Laterality: N/A;    LAPAROSCOPIC SLEEVE GASTRECTOMY N/A 12/19/2022    Procedure: GASTRECTOMY, SLEEVE, LAPAROSCOPIC;  Surgeon:  Lashonda Pinedo MD;  Location: [facility] OR;  Service: General;  Laterality: N/A;    LIPOMA RESECTION      Back of neck    MAGNETIC RESONANCE IMAGING N/A 05/29/2020    Procedure: MRI (MAGNETIC RESONANCE IMAGING) LUMBAR SPINE;  Surgeon: [facility] Diagnostic Provider;  Location: [facility];  Service: General;  Laterality: N/A;  COVID NEG    MAGNETIC RESONANCE IMAGING N/A 12/03/2021    Procedure: MRI (MAGNETIC RESONANCE IMAGING), LUMBAR SPINE;  Surgeon: [facility] Diagnostic Provider;  Location: [facility];  Service: General;  Laterality: N/A;  In MRI @ 7:50 per Ariela    MAGNETIC RESONANCE IMAGING N/A 03/06/2024    Procedure: MRI (Magnetic Resonance Imagine);  Surgeon: Vicenta Michele;  Location: [facility];  Service: Anesthesiology;  Laterality: N/A;    MAGNETIC RESONANCE IMAGING Bilateral 06/28/2024    Procedure: MRI (Magnetic Resonance Imagine);  Surgeon: Vicenta Michele;  Location: [facility];  Service: Anesthesiology;  Laterality: Bilateral;  MRI BRAIN WITH AND WITHOUT CONTRAST    MAGNETIC RESONANCE IMAGING N/A 06/17/2025    Procedure: MRI (Magnetic Resonance Imagine);  Surgeon: Vicenta Michele;  Location: [facility];  Service: Anesthesiology;  Laterality: N/A;    MEDIAL COLLATERAL LIGAMENT AND LATERAL COLLATERAL LIGAMENT REPAIR, KNEE Right 09/01/2018    Dr. Christophe Gore in [place]     MINIMALLY INVASIVE TRANSFORAMINAL LUMBAR INTERBODY FUSION (TLIF) N/A 04/05/2022    Procedure: FUSION, SPINE, LUMBAR, TLIF, MINIMALLY INVASIVE, WITH INSTRUMENTATION,  L4/5 RIGHT;  Surgeon: Duran Adler Jr., MD;  Location: [facility] OR;  Service: Orthopedics;  Laterality: N/A;  10:30am per Tamela    MYELOGRAPHY N/A 06/23/2020    Procedure: MYELOGRAM;  Surgeon: [facility] Diagnostic Provider;  Location: [facility] OR;  Service: General;  Laterality: N/A;    TRANSRECTAL BIOPSY OF PROSTATE WITH ULTRASOUND GUIDANCE N/A 7/10/2025    Procedure: BIOPSY, PROSTATE, RECTAL APPROACH, WITH US GUIDANCE with anesthesia;  Surgeon: Mandy Castorena MD;  Location: [facility] OR;   Service: Urology;  Laterality: N/A;     Family History   Problem Relation Name Age of Onset    Hypertension Mother      Diabetes Father      Cancer Father          mesotheliosma     Cataracts Neg Hx      Glaucoma Neg Hx      Macular degeneration Neg Hx      Retinal detachment Neg Hx      Colon cancer Neg Hx      Colon polyps Neg Hx      Crohn's disease Neg Hx      Ulcerative colitis Neg Hx       Social History     Socioeconomic History    Marital status:     Number of children: 4   Occupational History     Employer: [employer]   Tobacco Use    Smoking status: Former     Current packs/day: 0.00     Average packs/day: 0.5 packs/day for 10.0 years (5.0 ttl pk-yrs)     Types: Cigarettes     Start date: 1999     Quit date: 2009     Years since quittin.5    Smokeless tobacco: Never    Tobacco comments:     quit smoking in    Substance and Sexual Activity    Alcohol use: Yes     Comment: rarely    Drug use: Not Currently     Types: Marijuana     Comment: medical---oil, smoke and edibles    Sexual activity: Not Currently     Social Drivers of Health     Financial Resource Strain: Low Risk  (2024)    Overall Financial Resource Strain (CARDIA)     Difficulty of Paying Living Expenses: Not hard at all   Food Insecurity: No Food Insecurity (2024)    Hunger Vital Sign     Worried About Running Out of Food in the Last Year: Never true     Ran Out of Food in the Last Year: Never true   Transportation Needs: No Transportation Needs (2024)    PRAPARE - Transportation     Lack of Transportation (Medical): No     Lack of Transportation (Non-Medical): No   Physical Activity: Sufficiently Active (2024)    Exercise Vital Sign     Days of Exercise per Week: 4 days     Minutes of Exercise per Session: 120 min   Recent Concern: Physical Activity - Insufficiently Active (2023)    Exercise Vital Sign     Days of Exercise per Week: 2 days     Minutes of Exercise per Session: 60 min   Stress:  "Stress Concern Present (1/17/2024)    [employer] - Occupational Stress Questionnaire     Feeling of Stress : To some extent   Housing Stability: High Risk (1/17/2024)    Housing Stability Vital Sign     Unable to Pay for Housing in the Last Year: Yes     Number of Places Lived in the Last Year: 1     Unstable Housing in the Last Year: No       Current Medications[1]  Review of patient's allergies indicates:   Allergen Reactions    Peaches [peach (prunus persica)] Anaphylaxis    Peanut Anaphylaxis    Tree pollen-red birch Anaphylaxis       Review of Systems   All other systems reviewed and are negative.      Objective:      Vitals:    07/09/25 1419   BP: (!) 158/89   Pulse: (!) 58   Resp: 18   Weight: 99.8 kg (220 lb)   Height: 5' 10" (1.778 m)     Physical Exam  Vitals and nursing note reviewed.   Constitutional:       General: He is not in acute distress.     Appearance: Normal appearance.   Cardiovascular:      Pulses:           Dorsalis pedis pulses are 2+ on the right side and 2+ on the left side.        Posterior tibial pulses are 2+ on the right side and 2+ on the left side.   Musculoskeletal:         General: Tenderness and deformity present.      Right foot: Decreased range of motion. Deformity present.      Left foot: Decreased range of motion. Deformity (Pes planus with pronation deformity foot bilateral, pronation deformity left ankle) present.   Feet:      Left foot:      Skin integrity: No erythema (Painful chronic ingrown nail medial left hallux without infection).      Toenail Condition: Left toenails are ingrown.   Skin:     General: Skin is dry.      Capillary Refill: Capillary refill takes less than 2 seconds.   Neurological:      General: No focal deficit present.      Mental Status: He is alert.   Psychiatric:         Thought Content: Thought content normal.     EXAMINATION:  XR ANKLE COMPLETE 3 VIEW BILATERAL     CLINICAL HISTORY:  Primary osteoarthritis, right " ankle and foot     TECHNIQUE:  AP, lateral and oblique views of both ankles were performed.     COMPARISON:  11/11/2024, 11/21/2022     FINDINGS:  Right: No acute fracture, dislocation, or aggressive bone lesion.  Degenerative changes of the tibiotalar joint with joint space loss and osteophyte production, similar to prior exam.  Pes planus.  Mild soft tissue swelling of the lateral ankle.     Left: No acute fracture, dislocation, or aggressive bone lesion.  Degenerative changes of the tibiotalar and fibulotalar joints with joint space loss and osteophyte production, similar to prior exam.  Ossification of the distal syndesmosis.  Pes planus.  Mild soft tissue swelling of the lateral ankle.     Impression:  As above.     Electronically signed by:Mario Knight  Date:                                            02/25/2025       Assessment:       1. Pronation deformity of ankle, acquired, left    2. Pronation deformity of both feet    3. Chronic pain of toe, left    4. Ingrown nail of great toe of left foot    5. Acquired dysmorphic toenail    6. Onychomycosis of toenail          Plan:           TERBINAFINE 250 MG 1 DAILY TIMES 45 DAYS  MATRIXECTOMY MEDIAL LEFT HALLUX      We discussed power steps versus a rigid custom-molded orthotic with patient and advised I would recommend he start with a power step arch support.  Remove existing insoles from tennis shoes and discussed how to gradually adjust to wearing comfortably  Explained he will get some correction from these arch supports but long term he most likely will need more aggressive firm control and this can only be achieved with a custom-molded orthotic  When wearing power steps instructed patient to wear tennis shoes at all times indoors and out, advised arch supports are only placing his foot in a more neutral position and supporting his arch when he has his tennis shoes on, therefore they should be worn in the house as well  We discussed fungal involvement which  is in all of his nails, contributing to his ingrown nail on the left great toe but I do feel the biggest contributing factor to the pressure on the medial aspect of the left great toe is the severity of his flatfoot/pronation on this foot.  Explained this puts excessive pressure on the side of the toe and a long the entire inside/medial column of his left foot  Patient was started on terbinafine/Lamisil and instructed to take 1 daily, prescribed for 45 days.  Explained this is half the dose, this medication needs to be taken for 90 days.  When he has a few days left contact office for blood work to check liver function and then the other half/45 days will be sent into his pharmacy.  We discussed this medication, how it works and metabolism through the liver, therefore blood test needed to make sure there no side effects  Reviewed importance of long term care and maintenance of nails and topical treatment for fungal involvement for best results, can take up to 12 months  Patient was in agreement    Reviewed nail avulsion procedure, injections to anesthetize the toe, removal of ingrown nail and chemical years to burn the root of the nail in hopes of tried to prevent recurrence.  Explained to patient this procedure is typically effective, however there is always the possibility of recurrence  Explained to patient treating the nail for fungal involvement and using arch supports to help take pressure off the side of the toe should additionally be helpful to reduce risk of recurrence  Patient was in understanding and agreement with treatment plan, did want to pursue nail avulsion left great toe with application of  See procedure report attached  Patient tolerated well  Reviewed home going instructions, soaking warm water and Epson salt once daily, no longer than 10 minutes, keep the area clean and dry  Advised patient it will for 2-3 days, then start to when in a clean environment  Contact office immediately pain swelling  or if drainage occurs  I counseled the patient on their conditions, implications and medical management.  Instructed patient to contact the office with any changes, questions, concerns, worsening of symptoms.   Total face to face time 30 minutes, exam, assessment, treatment, discussion, additional time for review of chart prior to and following appointment and visit documentation, consultation and coordination of care.    Additional time required for procedure/matrixectomy medial left hallux  Separate discussion evaluation, treatment and management of pronation deformity/pes planus bilateral and fungal involvement of nails/prescription medication  Follow up as needed, or if ingrown nail is not painful in 2 weeks      This note was created using [employer] voice recognition software that occasionally misinterpreted phrases or words.           [1]   Current Outpatient Medications   Medication Sig Dispense Refill    amLODIPine (NORVASC) 5 MG tablet Take 1 tablet (5 mg total) by mouth once daily. 90 tablet 3    aspirin (ECOTRIN) 81 MG EC tablet Take 81 mg by mouth once daily.      calcium citrate-vitamin D3 (CALCITRATE-VITAMIN D) 315 mg-6.25 mcg (250 unit) Tab 1 tablet Orally Twice a day      gabapentin (NEURONTIN) 300 MG capsule Take 1 capsule (300 mg total) by mouth 3 (three) times daily. 90 capsule 11    meloxicam (MOBIC) 15 MG tablet Take 1 tablet (15 mg total) by mouth once daily. 30 tablet 11    multivitamin (THERAGRAN) per tablet Take 1 tablet by mouth once daily.      oxyCODONE-acetaminophen (PERCOCET) 7.5-325 mg per tablet Take 1 tablet by mouth 2 (two) times daily as needed.      rosuvastatin (CRESTOR) 10 MG tablet Take 1 tablet (10 mg total) by mouth every evening. 90 tablet 3    sulfamethoxazole-trimethoprim 800-160mg (BACTRIM DS) 800-160 mg Tab Take 1 tablet by mouth every 12 (twelve) hours.      tamsulosin (FLOMAX) 0.4 mg Cap Take 2 capsules (0.8 mg total) by mouth once daily. 180 capsule 3    EPINEPHrine  (EPIPEN) 0.3 mg/0.3 mL AtIn Inject into the muscle once for one dose as needed. (Patient not taking: Reported on 7/9/2025) 2 each 1    finasteride (PROSCAR) 5 mg tablet Take 1 tablet (5 mg total) by mouth every morning. Take 1 tablet by mouth every morning to help shrink prostate 90 tablet 3    terbinafine HCL (LAMISIL) 250 mg tablet Take 1 tablet (250 mg total) by mouth once daily. 45 tablet 0     No current facility-administered medications for this visit.     Facility-Administered Medications Ordered in Other Visits   Medication Dose Route Frequency Provider Last Rate Last Admin    ceFAZolin in dextrose (iso-os) 2 gram/100 mL PgBk 2,000 mg  2 g Intravenous Q8H Katie Shane, NP   Stopped at 04/09/22 1036    HYDROmorphone injection 1 mg  1 mg Intravenous Q6H PRN Katie Shane, NP   1 mg at 04/09/22 0539    oxyCODONE-acetaminophen  mg per tablet 1 tablet  1 tablet Oral Q4H PRN Katie hSane NP   1 tablet at 04/09/22 0906    polyethylene glycol packet 17 g  17 g Oral Daily Katie Shane NP   17 g at 04/08/22 0822

## 2025-07-27 NOTE — PROCEDURES
Nail Removal    Date/Time: 7/9/2025 2:50 PM    Performed by: Linda Sapp DPM  Authorized by: Linda Sapp DPM    Consent Done?:  Yes (Written)  Location:     Location:  Left foot    Location detail:  Left big toe  Anesthesia:     Anesthesia:  Digital block    Local anesthetic:  Topical anesthetic, lidocaine 1% without epinephrine and bupivacaine 0.5% without epinephrine    Anesthetic total (ml):  8 (4mLeach)  Procedure Details:     Preparation:  Skin prepped with alcohol    Amount removed:  Partial (MEDIAL)    Side:  Medial    Wedge excision of skin of nail fold: No      Nail bed sutured?: No      Nail matrix removed:  Partial (Following nail avulsion medial left hallux surrounding skin was protected utilizing triple antibiotic ointment, 3 applications of phenol applied to perform matrixectomy)    Dressing: Area flushed with sterile saline, Silvadene, Telfa, gauze and Coban applied.    Patient tolerance:  Patient tolerated the procedure well with no immediate complications     Reviewed home going instructions

## 2025-08-01 LAB
OHS CV AF BURDEN PERCENT: < 1
OHS CV DC REMOTE DEVICE TYPE: NORMAL
OHS CV ICD SHOCK: NO

## 2025-08-06 ENCOUNTER — PATIENT MESSAGE (OUTPATIENT)
Dept: UROLOGY | Facility: CLINIC | Age: 55
End: 2025-08-06
Payer: MEDICAID

## 2025-08-11 DIAGNOSIS — Z79.899 HIGH RISK MEDICATION USE: Primary | ICD-10-CM

## 2025-08-12 ENCOUNTER — PATIENT MESSAGE (OUTPATIENT)
Dept: PODIATRY | Facility: CLINIC | Age: 55
End: 2025-08-12
Payer: MEDICAID

## 2025-08-12 ENCOUNTER — HOSPITAL ENCOUNTER (OUTPATIENT)
Dept: CARDIOLOGY | Facility: CLINIC | Age: 55
Discharge: HOME | End: 2025-08-12
Attending: GENERAL PRACTICE
Payer: MEDICAID

## 2025-08-12 ENCOUNTER — CLINICAL SUPPORT (OUTPATIENT)
Dept: CARDIOLOGY | Facility: CLINIC | Age: 55
End: 2025-08-12
Payer: MEDICAID

## 2025-08-12 DIAGNOSIS — I49.8 OTHER SPECIFIED CARDIAC ARRHYTHMIAS: ICD-10-CM

## 2025-08-12 PROCEDURE — 93298 REM INTERROG DEV EVAL SCRMS: CPT | Mod: PBBFAC,PN | Performed by: INTERNAL MEDICINE

## 2025-08-14 RX ORDER — TERBINAFINE HYDROCHLORIDE 250 MG/1
250 TABLET ORAL
Qty: 30 TABLET | Refills: 1 | OUTPATIENT
Start: 2025-08-14

## 2025-08-15 ENCOUNTER — LAB VISIT (OUTPATIENT)
Dept: LAB | Facility: HOSPITAL | Age: 55
End: 2025-08-15
Attending: PODIATRIST
Payer: MEDICAID

## 2025-08-15 DIAGNOSIS — Z79.899 HIGH RISK MEDICATION USE: ICD-10-CM

## 2025-08-15 LAB
ALBUMIN SERPL-MCNC: 4.5 G/DL (ref 3.5–5.2)
ALP SERPL-CCNC: 67 UNIT/L (ref 40–150)
ALT SERPL-CCNC: 8 UNIT/L (ref 10–44)
AST SERPL-CCNC: 19 UNIT/L (ref 11–45)
BILIRUB DIRECT SERPL-MCNC: 0.3 MG/DL (ref 0.1–0.3)
BILIRUB SERPL-MCNC: 0.7 MG/DL (ref 0.1–1)
PROT SERPL-MCNC: 7.5 GM/DL (ref 6–8.4)

## 2025-08-15 PROCEDURE — 84450 TRANSFERASE (AST) (SGOT): CPT

## 2025-08-15 PROCEDURE — 36415 COLL VENOUS BLD VENIPUNCTURE: CPT

## 2025-08-15 RX ORDER — TERBINAFINE HYDROCHLORIDE 250 MG/1
250 TABLET ORAL DAILY
Qty: 45 TABLET | Refills: 0 | Status: SHIPPED | OUTPATIENT
Start: 2025-08-15 | End: 2025-09-29

## 2025-08-22 LAB
OHS CV AF BURDEN PERCENT: < 1
OHS CV DC REMOTE DEVICE TYPE: NORMAL

## 2025-08-24 RX ORDER — AMLODIPINE BESYLATE 5 MG/1
5 TABLET ORAL DAILY
Qty: 90 TABLET | Refills: 3 | Status: SHIPPED | OUTPATIENT
Start: 2025-08-24

## 2025-08-28 RX ORDER — ROSUVASTATIN CALCIUM 10 MG/1
10 TABLET, COATED ORAL NIGHTLY
Qty: 30 TABLET | Refills: 11 | Status: SHIPPED | OUTPATIENT
Start: 2025-08-28

## (undated) DEVICE — HYDROGEN PEROXIDE HDX10 3% 4OZ

## (undated) DEVICE — TAPE ADH MEDIPORE 4 X 10YDS

## (undated) DEVICE — WRAP SHLDR HIP ACCU THRM PACK

## (undated) DEVICE — KIT ENDOKIT EGD/COLON/ERCP

## (undated) DEVICE — APPLIER CLIP  5MM

## (undated) DEVICE — BANDAID 3/4

## (undated) DEVICE — SUT SUTURETAPE TIGERLINK 1.3MM

## (undated) DEVICE — DRAPE THREE-QTR REINF 53X77IN

## (undated) DEVICE — GUN BIOPSY 18GA MONOPLY

## (undated) DEVICE — Device

## (undated) DEVICE — SYRINGE 30ML 302832

## (undated) DEVICE — PAD BOVIE ADULT

## (undated) DEVICE — SUTURE VICRYL #0 27 UR-6 VCP603H

## (undated) DEVICE — SUTURE MONOCRYL 4-0 27 SH MCP415H

## (undated) DEVICE — BLADE SCALPEL #11 371111

## (undated) DEVICE — NDL SCORPION HD MEGALOADER

## (undated) DEVICE — CANNULA PASSPORT 8 MM X 4CM.

## (undated) DEVICE — STERISTRIP 1/2 R1547

## (undated) DEVICE — GLOVE SENSICARE PI GRN 8

## (undated) DEVICE — CUTTER AGGRESSIVE PLUS 3.5MM

## (undated) DEVICE — COVER PROBE 3D/4D

## (undated) DEVICE — NDL SPINAL 18GX3.5 SPINOCAN

## (undated) DEVICE — SET SPRAY APPLICATOR FIBRIN SEALANT L40CM WITH SNAP LOCK

## (undated) DEVICE — UNDERPAD ULTRASORB 300LB 30X36

## (undated) DEVICE — GLOVE SENSICARE PI ALOE 7.5

## (undated) DEVICE — SET CYSTO IRR DRP CHMBR 84IN

## (undated) DEVICE — SOLUTION NACL 0.9% 3000ML

## (undated) DEVICE — GLOVE BIOGELPI GOLD SIZE 7.5

## (undated) DEVICE — SOL NACL IRR 3000ML

## (undated) DEVICE — COVER TRANSDUCER LATEX N/STERI

## (undated) DEVICE — WARMER SCOPE SEE SHARP

## (undated) DEVICE — DRAPE ORTH SPLIT 77X108IN

## (undated) DEVICE — DRESSING N ADH OIL EMUL 3X3

## (undated) DEVICE — MAT QUICK 40X30 FLOOR FLUID LF

## (undated) DEVICE — NEEDLE SPINAL 18GA 3 1/2 333350

## (undated) DEVICE — PACK SET UP 190 OMC-NS

## (undated) DEVICE — IMMOBILIZER SHOULDER ABD LG

## (undated) DEVICE — TRAY GENERAL LAPAROSCOPY

## (undated) DEVICE — SLEEVE SCD EXPRESS KNEE MEDIUM

## (undated) DEVICE — CANNULA PASSPORT 8 MM X 3CM

## (undated) DEVICE — GLOVE SENSICARE PI ALOE 6

## (undated) DEVICE — PAD ABDOMINAL STERILE 8X10IN

## (undated) DEVICE — PROBE ABLATION RF ARTHSCP 3.5

## (undated) DEVICE — RELOAD THICK TRI-STAPLE ENDO GIA 60MM

## (undated) DEVICE — SCISSORS 5MM APPLIED MEDICAL   CB030

## (undated) DEVICE — COVER MAYO STAND 89601

## (undated) DEVICE — TOWEL OR DISP STRL BLUE 4/PK

## (undated) DEVICE — PREP CHLORA 26ML

## (undated) DEVICE — SET INSUFFLATION TUBING HIGH FLOW SMOKE EVACUATION PNEUMOCLE

## (undated) DEVICE — DRAPE STERI U-SHAPED 47X51IN

## (undated) DEVICE — DRAPE STERI INSTRUMENT 1018

## (undated) DEVICE — APPLICATOR CHLORAPREP ORN 26ML

## (undated) DEVICE — GOWN POLY REINF X-LONG XL

## (undated) DEVICE — SLEEVE LATERAL TRACTION ARM

## (undated) DEVICE — DRAPE INCISE IOBAN 2 23X33IN

## (undated) DEVICE — BLADE SURG CARBON STEEL SZ11

## (undated) DEVICE — SUT ETHILON 3-0 FS-1 30

## (undated) DEVICE — PACK SHOULDER DRAPE POUCH

## (undated) DEVICE — GUIDE BIOPSY BIPLANAR 18G

## (undated) DEVICE — DRAPE INVISISHIELD TOWEL SMALL

## (undated) DEVICE — MANIFOLD 4 PORT

## (undated) DEVICE — ALCOHOL 70% ANTISEPTIC ISO 4OZ

## (undated) DEVICE — TROCAR ADVANCED FIXATION  CFF03

## (undated) DEVICE — STRAP TABLE 5X72 M5173

## (undated) DEVICE — SUCTION/IRRIGATOR W/TIP

## (undated) DEVICE — CONNECTOR TUBING STR 5 IN 1

## (undated) DEVICE — STRAP OR TABLE 5IN X 72IN

## (undated) DEVICE — CANNULA TWIST IN 7MM X 7CM

## (undated) DEVICE — LIGASURE MARYLAND LF1937 REPLACES LF1737

## (undated) DEVICE — TUBING ARTHROSCOPY

## (undated) DEVICE — MAT SURGICAL ECOSUCTIONER

## (undated) DEVICE — SUTURE VICRYL 3-0 SH 27 VCP416H

## (undated) DEVICE — SPONGE BULKEE II ABSRB 6X6.75

## (undated) DEVICE — TUBING SUC UNIV W/CONN 12FT

## (undated) DEVICE — SOLUTION IRRI NS BOTTLE 1000ML R5200-01

## (undated) DEVICE — PACK SIRUS BASIC V SURG STRL